# Patient Record
Sex: MALE | Race: WHITE | Employment: OTHER | URBAN - METROPOLITAN AREA
[De-identification: names, ages, dates, MRNs, and addresses within clinical notes are randomized per-mention and may not be internally consistent; named-entity substitution may affect disease eponyms.]

---

## 2017-10-10 ENCOUNTER — HOSPITAL ENCOUNTER (OUTPATIENT)
Dept: GENERAL RADIOLOGY | Age: 62
Discharge: HOME | End: 2017-10-10
Payer: MEDICARE

## 2017-10-10 DIAGNOSIS — R52 PAIN: ICD-10-CM

## 2017-10-10 PROCEDURE — 72100 X-RAY EXAM L-S SPINE 2/3 VWS: CPT

## 2018-04-28 ENCOUNTER — APPOINTMENT (OUTPATIENT)
Dept: GENERAL RADIOLOGY | Age: 63
DRG: 674 | End: 2018-04-28
Attending: EMERGENCY MEDICINE
Payer: MEDICARE

## 2018-04-28 ENCOUNTER — HOSPITAL ENCOUNTER (INPATIENT)
Age: 63
LOS: 16 days | Discharge: HOME | DRG: 674 | End: 2018-05-14
Attending: EMERGENCY MEDICINE | Admitting: HOSPITALIST
Payer: MEDICARE

## 2018-04-28 DIAGNOSIS — Z91.14 NON COMPLIANCE W MEDICATION REGIMEN: ICD-10-CM

## 2018-04-28 DIAGNOSIS — R77.8 ELEVATED TROPONIN: ICD-10-CM

## 2018-04-28 DIAGNOSIS — N17.9 AKI (ACUTE KIDNEY INJURY) (HCC): Primary | ICD-10-CM

## 2018-04-28 PROBLEM — I21.4 NSTEMI (NON-ST ELEVATED MYOCARDIAL INFARCTION) (HCC): Status: ACTIVE | Noted: 2018-04-28

## 2018-04-28 LAB
ALBUMIN SERPL-MCNC: 2.5 G/DL (ref 3.5–5)
ALBUMIN SERPL-MCNC: 2.5 G/DL (ref 3.5–5)
ALBUMIN/GLOB SERPL: 0.7 {RATIO} (ref 1.1–2.2)
ALBUMIN/GLOB SERPL: 0.7 {RATIO} (ref 1.1–2.2)
ALP SERPL-CCNC: 177 U/L (ref 45–117)
ALP SERPL-CCNC: 190 U/L (ref 45–117)
ALT SERPL-CCNC: 12 U/L (ref 12–78)
ALT SERPL-CCNC: 12 U/L (ref 12–78)
ANION GAP SERPL CALC-SCNC: 11 MMOL/L (ref 5–15)
ANION GAP SERPL CALC-SCNC: 12 MMOL/L (ref 5–15)
APTT PPP: 28.8 SEC (ref 22.1–32)
AST SERPL-CCNC: 12 U/L (ref 15–37)
AST SERPL-CCNC: 13 U/L (ref 15–37)
BASOPHILS # BLD: 0 K/UL (ref 0–0.1)
BASOPHILS NFR BLD: 0 % (ref 0–1)
BILIRUB SERPL-MCNC: 0.2 MG/DL (ref 0.2–1)
BILIRUB SERPL-MCNC: 0.3 MG/DL (ref 0.2–1)
BUN SERPL-MCNC: 71 MG/DL (ref 6–20)
BUN SERPL-MCNC: 71 MG/DL (ref 6–20)
BUN/CREAT SERPL: 15 (ref 12–20)
BUN/CREAT SERPL: 16 (ref 12–20)
CALCIUM SERPL-MCNC: 6.9 MG/DL (ref 8.5–10.1)
CALCIUM SERPL-MCNC: 6.9 MG/DL (ref 8.5–10.1)
CHLORIDE SERPL-SCNC: 115 MMOL/L (ref 97–108)
CHLORIDE SERPL-SCNC: 116 MMOL/L (ref 97–108)
CK MB CFR SERPL CALC: 3.7 % (ref 0–2.5)
CK MB SERPL-MCNC: 3.9 NG/ML (ref 5–25)
CK SERPL-CCNC: 105 U/L (ref 39–308)
CO2 SERPL-SCNC: 18 MMOL/L (ref 21–32)
CO2 SERPL-SCNC: 19 MMOL/L (ref 21–32)
CREAT SERPL-MCNC: 4.52 MG/DL (ref 0.7–1.3)
CREAT SERPL-MCNC: 4.66 MG/DL (ref 0.7–1.3)
DIFFERENTIAL METHOD BLD: ABNORMAL
EOSINOPHIL # BLD: 0.1 K/UL (ref 0–0.4)
EOSINOPHIL NFR BLD: 1 % (ref 0–7)
ERYTHROCYTE [DISTWIDTH] IN BLOOD BY AUTOMATED COUNT: 13.3 % (ref 11.5–14.5)
ERYTHROCYTE [DISTWIDTH] IN BLOOD BY AUTOMATED COUNT: 13.5 % (ref 11.5–14.5)
GLOBULIN SER CALC-MCNC: 3.6 G/DL (ref 2–4)
GLOBULIN SER CALC-MCNC: 3.6 G/DL (ref 2–4)
GLUCOSE SERPL-MCNC: 125 MG/DL (ref 65–100)
GLUCOSE SERPL-MCNC: 225 MG/DL (ref 65–100)
HCT VFR BLD AUTO: 30.5 % (ref 36.6–50.3)
HCT VFR BLD AUTO: 31.1 % (ref 36.6–50.3)
HGB BLD-MCNC: 10 G/DL (ref 12.1–17)
HGB BLD-MCNC: 10.3 G/DL (ref 12.1–17)
IMM GRANULOCYTES # BLD: 0 K/UL (ref 0–0.04)
IMM GRANULOCYTES NFR BLD AUTO: 0 % (ref 0–0.5)
INR PPP: 1.1 (ref 0.9–1.1)
LYMPHOCYTES # BLD: 0.8 K/UL (ref 0.8–3.5)
LYMPHOCYTES NFR BLD: 14 % (ref 12–49)
MAGNESIUM SERPL-MCNC: 1.7 MG/DL (ref 1.6–2.4)
MCH RBC QN AUTO: 30.8 PG (ref 26–34)
MCH RBC QN AUTO: 30.9 PG (ref 26–34)
MCHC RBC AUTO-ENTMCNC: 32.8 G/DL (ref 30–36.5)
MCHC RBC AUTO-ENTMCNC: 33.1 G/DL (ref 30–36.5)
MCV RBC AUTO: 93.4 FL (ref 80–99)
MCV RBC AUTO: 93.8 FL (ref 80–99)
MONOCYTES # BLD: 0.5 K/UL (ref 0–1)
MONOCYTES NFR BLD: 8 % (ref 5–13)
NEUTS SEG # BLD: 4.5 K/UL (ref 1.8–8)
NEUTS SEG NFR BLD: 76 % (ref 32–75)
NRBC # BLD: 0 K/UL (ref 0–0.01)
NRBC # BLD: 0 K/UL (ref 0–0.01)
NRBC BLD-RTO: 0 PER 100 WBC
NRBC BLD-RTO: 0 PER 100 WBC
PLATELET # BLD AUTO: 128 K/UL (ref 150–400)
PLATELET # BLD AUTO: 137 K/UL (ref 150–400)
PMV BLD AUTO: 10 FL (ref 8.9–12.9)
PMV BLD AUTO: 10.1 FL (ref 8.9–12.9)
POTASSIUM SERPL-SCNC: 3.5 MMOL/L (ref 3.5–5.1)
POTASSIUM SERPL-SCNC: 3.7 MMOL/L (ref 3.5–5.1)
PROT SERPL-MCNC: 6.1 G/DL (ref 6.4–8.2)
PROT SERPL-MCNC: 6.1 G/DL (ref 6.4–8.2)
PROTHROMBIN TIME: 10.9 SEC (ref 9–11.1)
RBC # BLD AUTO: 3.25 M/UL (ref 4.1–5.7)
RBC # BLD AUTO: 3.33 M/UL (ref 4.1–5.7)
SODIUM SERPL-SCNC: 145 MMOL/L (ref 136–145)
SODIUM SERPL-SCNC: 146 MMOL/L (ref 136–145)
THERAPEUTIC RANGE,PTTT: NORMAL SECS (ref 58–77)
TROPONIN I SERPL-MCNC: 0.09 NG/ML
TROPONIN I SERPL-MCNC: 0.12 NG/ML
TROPONIN I SERPL-MCNC: 0.16 NG/ML
WBC # BLD AUTO: 5.9 K/UL (ref 4.1–11.1)
WBC # BLD AUTO: 7.6 K/UL (ref 4.1–11.1)

## 2018-04-28 PROCEDURE — 74011250637 HC RX REV CODE- 250/637: Performed by: EMERGENCY MEDICINE

## 2018-04-28 PROCEDURE — 74011250637 HC RX REV CODE- 250/637: Performed by: HOSPITALIST

## 2018-04-28 PROCEDURE — 84484 ASSAY OF TROPONIN QUANT: CPT | Performed by: EMERGENCY MEDICINE

## 2018-04-28 PROCEDURE — 71045 X-RAY EXAM CHEST 1 VIEW: CPT

## 2018-04-28 PROCEDURE — 74011250636 HC RX REV CODE- 250/636: Performed by: HOSPITALIST

## 2018-04-28 PROCEDURE — 85610 PROTHROMBIN TIME: CPT | Performed by: EMERGENCY MEDICINE

## 2018-04-28 PROCEDURE — 85025 COMPLETE CBC W/AUTO DIFF WBC: CPT | Performed by: EMERGENCY MEDICINE

## 2018-04-28 PROCEDURE — 85027 COMPLETE CBC AUTOMATED: CPT

## 2018-04-28 PROCEDURE — 80053 COMPREHEN METABOLIC PANEL: CPT | Performed by: EMERGENCY MEDICINE

## 2018-04-28 PROCEDURE — 82550 ASSAY OF CK (CPK): CPT | Performed by: EMERGENCY MEDICINE

## 2018-04-28 PROCEDURE — 94762 N-INVAS EAR/PLS OXIMTRY CONT: CPT

## 2018-04-28 PROCEDURE — 83036 HEMOGLOBIN GLYCOSYLATED A1C: CPT

## 2018-04-28 PROCEDURE — 96374 THER/PROPH/DIAG INJ IV PUSH: CPT

## 2018-04-28 PROCEDURE — 65660000000 HC RM CCU STEPDOWN

## 2018-04-28 PROCEDURE — 80053 COMPREHEN METABOLIC PANEL: CPT

## 2018-04-28 PROCEDURE — 36415 COLL VENOUS BLD VENIPUNCTURE: CPT | Performed by: EMERGENCY MEDICINE

## 2018-04-28 PROCEDURE — 85730 THROMBOPLASTIN TIME PARTIAL: CPT

## 2018-04-28 PROCEDURE — 99285 EMERGENCY DEPT VISIT HI MDM: CPT

## 2018-04-28 PROCEDURE — 83735 ASSAY OF MAGNESIUM: CPT | Performed by: EMERGENCY MEDICINE

## 2018-04-28 PROCEDURE — 74011250636 HC RX REV CODE- 250/636: Performed by: EMERGENCY MEDICINE

## 2018-04-28 PROCEDURE — 93005 ELECTROCARDIOGRAM TRACING: CPT

## 2018-04-28 RX ORDER — MAGNESIUM SULFATE 100 %
4 CRYSTALS MISCELLANEOUS AS NEEDED
Status: DISCONTINUED | OUTPATIENT
Start: 2018-04-28 | End: 2018-05-14 | Stop reason: HOSPADM

## 2018-04-28 RX ORDER — CLOPIDOGREL BISULFATE 75 MG/1
300 TABLET ORAL ONCE
Status: COMPLETED | OUTPATIENT
Start: 2018-04-28 | End: 2018-04-28

## 2018-04-28 RX ORDER — INSULIN LISPRO 100 [IU]/ML
INJECTION, SOLUTION INTRAVENOUS; SUBCUTANEOUS EVERY 6 HOURS
Status: DISCONTINUED | OUTPATIENT
Start: 2018-04-29 | End: 2018-05-02

## 2018-04-28 RX ORDER — OXYCODONE AND ACETAMINOPHEN 5; 325 MG/1; MG/1
1 TABLET ORAL
Status: DISCONTINUED | OUTPATIENT
Start: 2018-04-28 | End: 2018-05-14 | Stop reason: HOSPADM

## 2018-04-28 RX ORDER — ONDANSETRON 2 MG/ML
4 INJECTION INTRAMUSCULAR; INTRAVENOUS
Status: DISCONTINUED | OUTPATIENT
Start: 2018-04-28 | End: 2018-05-14 | Stop reason: HOSPADM

## 2018-04-28 RX ORDER — HYDRALAZINE HYDROCHLORIDE 20 MG/ML
10 INJECTION INTRAMUSCULAR; INTRAVENOUS
Status: DISCONTINUED | OUTPATIENT
Start: 2018-04-28 | End: 2018-05-14 | Stop reason: HOSPADM

## 2018-04-28 RX ORDER — SODIUM CHLORIDE 0.9 % (FLUSH) 0.9 %
5-10 SYRINGE (ML) INJECTION AS NEEDED
Status: DISCONTINUED | OUTPATIENT
Start: 2018-04-28 | End: 2018-04-29 | Stop reason: SDUPTHER

## 2018-04-28 RX ORDER — HEPARIN SODIUM 10000 [USP'U]/100ML
12-25 INJECTION, SOLUTION INTRAVENOUS
Status: DISCONTINUED | OUTPATIENT
Start: 2018-04-28 | End: 2018-05-01

## 2018-04-28 RX ORDER — HYDRALAZINE HYDROCHLORIDE 20 MG/ML
10 INJECTION INTRAMUSCULAR; INTRAVENOUS
Status: COMPLETED | OUTPATIENT
Start: 2018-04-28 | End: 2018-04-28

## 2018-04-28 RX ORDER — NITROGLYCERIN 0.4 MG/1
0.4 TABLET SUBLINGUAL
Status: DISCONTINUED | OUTPATIENT
Start: 2018-04-28 | End: 2018-05-14 | Stop reason: HOSPADM

## 2018-04-28 RX ORDER — FAMOTIDINE 10 MG/ML
20 INJECTION INTRAVENOUS EVERY 12 HOURS
Status: DISCONTINUED | OUTPATIENT
Start: 2018-04-28 | End: 2018-04-28

## 2018-04-28 RX ORDER — SODIUM CHLORIDE 0.9 % (FLUSH) 0.9 %
5-10 SYRINGE (ML) INJECTION EVERY 8 HOURS
Status: DISCONTINUED | OUTPATIENT
Start: 2018-04-28 | End: 2018-04-29 | Stop reason: SDUPTHER

## 2018-04-28 RX ORDER — SODIUM CHLORIDE 0.9 % (FLUSH) 0.9 %
5-10 SYRINGE (ML) INJECTION EVERY 8 HOURS
Status: DISCONTINUED | OUTPATIENT
Start: 2018-04-28 | End: 2018-05-08 | Stop reason: SDUPTHER

## 2018-04-28 RX ORDER — HEPARIN SODIUM 5000 [USP'U]/ML
60 INJECTION, SOLUTION INTRAVENOUS; SUBCUTANEOUS ONCE
Status: COMPLETED | OUTPATIENT
Start: 2018-04-28 | End: 2018-04-28

## 2018-04-28 RX ORDER — CLOPIDOGREL BISULFATE 75 MG/1
75 TABLET ORAL DAILY
Status: DISCONTINUED | OUTPATIENT
Start: 2018-04-29 | End: 2018-05-14 | Stop reason: HOSPADM

## 2018-04-28 RX ORDER — NITROGLYCERIN 0.4 MG/1
0.4 TABLET SUBLINGUAL
Status: DISCONTINUED | OUTPATIENT
Start: 2018-04-28 | End: 2018-04-28 | Stop reason: SDUPTHER

## 2018-04-28 RX ORDER — GUAIFENESIN 100 MG/5ML
81 LIQUID (ML) ORAL DAILY
Status: DISCONTINUED | OUTPATIENT
Start: 2018-04-29 | End: 2018-05-14 | Stop reason: HOSPADM

## 2018-04-28 RX ORDER — NITROGLYCERIN 0.4 MG/1
0.4 TABLET SUBLINGUAL
Status: COMPLETED | OUTPATIENT
Start: 2018-04-28 | End: 2018-04-28

## 2018-04-28 RX ORDER — DEXTROSE 50 % IN WATER (D50W) INTRAVENOUS SYRINGE
12.5-25 AS NEEDED
Status: DISCONTINUED | OUTPATIENT
Start: 2018-04-28 | End: 2018-05-14 | Stop reason: HOSPADM

## 2018-04-28 RX ORDER — ACETAMINOPHEN 325 MG/1
650 TABLET ORAL
Status: DISCONTINUED | OUTPATIENT
Start: 2018-04-28 | End: 2018-05-14 | Stop reason: HOSPADM

## 2018-04-28 RX ORDER — HEPARIN SODIUM 5000 [USP'U]/ML
30 INJECTION, SOLUTION INTRAVENOUS; SUBCUTANEOUS AS NEEDED
Status: DISCONTINUED | OUTPATIENT
Start: 2018-04-29 | End: 2018-05-01

## 2018-04-28 RX ORDER — SODIUM CHLORIDE 9 MG/ML
100 INJECTION, SOLUTION INTRAVENOUS CONTINUOUS
Status: DISCONTINUED | OUTPATIENT
Start: 2018-04-28 | End: 2018-04-29

## 2018-04-28 RX ORDER — FAMOTIDINE 20 MG/50ML
20 INJECTION, SOLUTION INTRAVENOUS EVERY 12 HOURS
Status: DISCONTINUED | OUTPATIENT
Start: 2018-04-28 | End: 2018-04-29

## 2018-04-28 RX ORDER — HEPARIN SODIUM 5000 [USP'U]/ML
60 INJECTION, SOLUTION INTRAVENOUS; SUBCUTANEOUS AS NEEDED
Status: DISCONTINUED | OUTPATIENT
Start: 2018-04-29 | End: 2018-05-01

## 2018-04-28 RX ORDER — METOPROLOL TARTRATE 5 MG/5ML
2.5 INJECTION INTRAVENOUS EVERY 6 HOURS
Status: DISCONTINUED | OUTPATIENT
Start: 2018-04-29 | End: 2018-04-28

## 2018-04-28 RX ORDER — MORPHINE SULFATE 4 MG/ML
4 INJECTION INTRAVENOUS
Status: DISCONTINUED | OUTPATIENT
Start: 2018-04-28 | End: 2018-04-29

## 2018-04-28 RX ORDER — ATORVASTATIN CALCIUM 40 MG/1
80 TABLET, FILM COATED ORAL EVERY EVENING
Status: DISCONTINUED | OUTPATIENT
Start: 2018-04-29 | End: 2018-05-14 | Stop reason: HOSPADM

## 2018-04-28 RX ORDER — SODIUM CHLORIDE 0.9 % (FLUSH) 0.9 %
5-10 SYRINGE (ML) INJECTION AS NEEDED
Status: DISCONTINUED | OUTPATIENT
Start: 2018-04-28 | End: 2018-05-08 | Stop reason: SDUPTHER

## 2018-04-28 RX ORDER — METOPROLOL TARTRATE 25 MG/1
25 TABLET, FILM COATED ORAL EVERY 6 HOURS
Status: DISCONTINUED | OUTPATIENT
Start: 2018-04-28 | End: 2018-05-14 | Stop reason: HOSPADM

## 2018-04-28 RX ORDER — BISACODYL 5 MG
5 TABLET, DELAYED RELEASE (ENTERIC COATED) ORAL DAILY PRN
Status: DISCONTINUED | OUTPATIENT
Start: 2018-04-28 | End: 2018-05-14 | Stop reason: HOSPADM

## 2018-04-28 RX ORDER — DIPHENHYDRAMINE HYDROCHLORIDE 50 MG/ML
12.5 INJECTION, SOLUTION INTRAMUSCULAR; INTRAVENOUS
Status: DISCONTINUED | OUTPATIENT
Start: 2018-04-28 | End: 2018-05-14 | Stop reason: HOSPADM

## 2018-04-28 RX ORDER — NALOXONE HYDROCHLORIDE 0.4 MG/ML
0.4 INJECTION, SOLUTION INTRAMUSCULAR; INTRAVENOUS; SUBCUTANEOUS AS NEEDED
Status: DISCONTINUED | OUTPATIENT
Start: 2018-04-28 | End: 2018-05-14 | Stop reason: HOSPADM

## 2018-04-28 RX ADMIN — HEPARIN SODIUM 3950 UNITS: 5000 INJECTION, SOLUTION INTRAVENOUS; SUBCUTANEOUS at 23:56

## 2018-04-28 RX ADMIN — HEPARIN SODIUM AND DEXTROSE 12 UNITS/KG/HR: 10000; 5 INJECTION INTRAVENOUS at 23:56

## 2018-04-28 RX ADMIN — Medication 10 ML: at 22:42

## 2018-04-28 RX ADMIN — METOPROLOL TARTRATE 25 MG: 25 TABLET ORAL at 23:25

## 2018-04-28 RX ADMIN — HYDRALAZINE HYDROCHLORIDE 10 MG: 20 INJECTION INTRAMUSCULAR; INTRAVENOUS at 16:49

## 2018-04-28 RX ADMIN — OXYCODONE HYDROCHLORIDE AND ACETAMINOPHEN 1 TABLET: 5; 325 TABLET ORAL at 22:41

## 2018-04-28 RX ADMIN — SODIUM CHLORIDE 100 ML/HR: 900 INJECTION, SOLUTION INTRAVENOUS at 22:43

## 2018-04-28 RX ADMIN — NITROGLYCERIN 1 INCH: 20 OINTMENT TOPICAL at 23:12

## 2018-04-28 RX ADMIN — CLOPIDOGREL BISULFATE 300 MG: 75 TABLET ORAL at 23:24

## 2018-04-28 RX ADMIN — NITROGLYCERIN 0.4 MG: 0.4 TABLET SUBLINGUAL at 19:13

## 2018-04-28 RX ADMIN — MORPHINE SULFATE 4 MG: 4 INJECTION INTRAVENOUS at 23:29

## 2018-04-28 RX ADMIN — NITROGLYCERIN 0.4 MG: 0.4 TABLET SUBLINGUAL at 21:37

## 2018-04-28 NOTE — ED NOTES
Pt updated on plan of care, pt resting in bed in position of comfort, family bedside, call bell within reach.

## 2018-04-28 NOTE — ED NOTES
Pt presents today via ems with c/o 8/10 chest pain, sob and nausea since 1300 while sitting outside smoking. Pt denies sob at this time. Pt lives with his sister and she gave pt 325 of aspirin and was given 1 nitro by ems, pain now 3/10. Has h/o MI x 3. . Checked himself out of nursing home [address]  In March and stopped all medications in February.

## 2018-04-28 NOTE — ED NOTES
Bedside and Verbal shift change report given to [facility] (oncoming nurse) by Kayla Delgado (offgoing nurse). Report included the following information SBAR, ED Summary, Intake/Output, MAR and Recent Results. Pt on monitor x 3. Call bell in reach. Family at bedside.

## 2018-04-28 NOTE — IP AVS SNAPSHOT
[address] [facility] 
[phone] Patient: Poonam Duong 
MRN: [id number] LNW:[id number] You are allergic to the following Allergen Reactions Nubain (Nalbuphine) Other (comments) Made me wild; Dysphoria Recent Documentation Height Weight BMI Smoking Status 1.753 m 68.9 kg 22.45 kg/m2 Former Smoker Emergency Contacts  (Rel.) Home Phone Work Phone Mobile Phone Sherene Barthel [phone] -- [phone] Paul Malhotra [phone] -- [phone] Sonali Monzon (Other Relative) [phone] -- -- About your hospitalization You were admitted on:  April 28, 2018 You last received care in the:  [address] You were discharged on:  May 14, 2018 Why you were hospitalized Your primary diagnosis was:  Not on File Your diagnoses also included:  Arf (Acute Renal Failure) (LTAC, located within [facility]), Nstemi (Non-St Elevated Myocardial Infarction) (Hcc) Providers Seen During Your Hospitalization Provider Specialty Primary office phone Rocío Brown DO Emergency Medicine [phone] Nazanin Mcrae MD Internal Medicine [phone] Vince Coe MD Internal Medicine [phone] Flaco Proctor MD Hospitalist [phone] Ebenezer Christy MD Hospitalist [phone] Domo Wilkerson MD Internal Medicine [phone] Shirin Brady MD Internal Medicine [phone] Your Primary Care Physician (PCP) Primary Care Physician Office Phone Office Fax OTHER, PHYS ** None ** ** None ** Follow-up Information Follow up With Details Comments Contact Info Mellissa Magdaleno III, DO Go on 5/17/2018 Hospital follow-up scheduled at 10:30am (If you have questions or need to reschedule please call [phone] [address] [place] [address] 
[phone] On 5/17/2018 Hospital follow-up scheduled at time (If you have questions or need to reschedule please call Phys Ines, MD   Patient can only remember the practice name and not the physician RAVINDER GANT Go on 5/16/2018 Go to dialysis on Wednesday - 5/16/2018 at 11:35AM.  You will need to complete paperwork on arrival and start dialysis at 12:15PM. Jackie [address] [facility] 
[phone] Jhonathan Alicea MD Go on 5/21/2018 For new patient appointment at 11:00AM  [address] [facility] [address] [facility] 
[phone] Appointments for Next 14 days 5/21/2018 11:00 AM  NEW PATIENT [facility] at [address] My Medications TAKE these medications as instructed Instructions Each Dose to Equal  
 Morning Noon Evening Bedtime  
 aspirin 81 mg chewable tablet Start taking on:  5/15/2018 Your last dose was: Your next dose is: Take 1 Tab by mouth daily. Indications: myocardial infarction prevention 81 mg  
    
   
   
   
  
 clopidogrel 75 mg Tab Commonly known as:  PLAVIX Start taking on:  5/15/2018 Your last dose was: Your next dose is: Take 1 Tab by mouth daily. 75 mg  
    
   
   
   
  
 diazePAM 10 mg tablet Commonly known as:  VALIUM Your last dose was: Your next dose is: Take 0.5 Tabs by mouth every twelve (12) hours. Max Daily Amount: 10 mg.  
 5 mg  
    
   
   
   
  
 doxazosin 2 mg tablet Commonly known as:  CARDURA Your last dose was: Your next dose is: Take 1 Tab by mouth nightly. 2 mg  
    
   
   
   
  
 epoetin calos 10,000 unit/mL injection Commonly known as:  EPOGEN;PROCRIT Your last dose was: Your next dose is:    
   
   
 10,000 Units by SubCUTAneous route every thirty (30) days. 89625 Units ergocalciferol 50,000 unit capsule Commonly known as:  VITAMIN D2 Your last dose was: Your next dose is: Take 1 Cap by mouth every seven (7) days. [address]210 Units  
    
   
   
   
  
 fenofibrate micronized 200 mg capsule Commonly known as:  LOFIBRA Your last dose was: Your next dose is: Take 1 Cap by mouth every morning. 200 mg  
    
   
   
   
  
 ferrous gluconate 324 mg (37.5 mg iron) tablet Your last dose was: Your next dose is: Take 1 Tab by mouth three (3) times daily (with meals). 324 mg  
    
   
   
   
  
 finasteride 5 mg tablet Commonly known as:  PROSCAR Your last dose was: Your next dose is: Take 1 Tab by mouth daily. 5 mg  
    
   
   
   
  
 food supplemt, lactose-reduced Liqd Commonly known as:  [facility] Your last dose was: Your next dose is: Take 237 mL by mouth four (4) times daily. 237 mL  
    
   
   
   
  
 gabapentin 400 mg capsule Commonly known as:  NEURONTIN Your last dose was: Your next dose is: Take 1 Cap by mouth three (3) times daily. 400 mg  
    
   
   
   
  
 hydrALAZINE 50 mg tablet Commonly known as:  APRESOLINE Your last dose was: Your next dose is: Take 1 Tab by mouth every eight (8) hours. Indications: hypertension 50 mg  
    
   
   
   
  
 isosorbide mononitrate ER 30 mg tablet Commonly known as:  IMDUR Your last dose was: Your next dose is: Take 1 Tab by mouth daily. 30 mg  
    
   
   
   
  
 methadone 10 mg tablet Commonly known as:  DOLOPHINE Your last dose was: Your next dose is: Take 1 Tab by mouth two (2) times daily (with meals). Max Daily Amount: 60 mg. One tab in am, 2 tab at lunch, 1 at dinner and 2 at bed  10 mg  
    
   
   
   
  
 metoprolol tartrate 25 mg tablet Commonly known as:  LOPRESSOR Your last dose was: Your next dose is: Take 1 Tab by mouth two (2) times a day. 25 mg  
    
   
   
   
  
 neomycin-bacitracin-polymyxin 3.5mg-400 unit- 5,000 unit/gram ointment Commonly known as:  NEOSPORIN Your last dose was: Your next dose is:    
   
   
 Apply  to affected area daily. nitroglycerin 0.4 mg SL tablet Commonly known as:  NITROSTAT Your last dose was: Your next dose is:    
   
   
 1 Tab by SubLINGual route every five (5) minutes as needed for Chest Pain. 0.4 mg  
    
   
   
   
  
 omeprazole 40 mg capsule Commonly known as:  PRILOSEC Your last dose was: Your next dose is: Take 1 Cap by mouth two (2) times a day. 40 mg  
    
   
   
   
  
 pioglitazone 15 mg tablet Commonly known as:  ACTOS Your last dose was: Your next dose is: Take 1 Tab by mouth daily. 15 mg  
    
   
   
   
  
 pravastatin 80 mg tablet Commonly known as:  PRAVACHOL Your last dose was: Your next dose is: Take 1 Tab by mouth nightly. 80 mg  
    
   
   
   
  
 traZODone 150 mg tablet Commonly known as:   Crosby Your last dose was: Your next dose is: Take 1 Tab by mouth nightly. 150 mg  
    
   
   
   
  
 venlafaxine 100 mg tablet Commonly known as:  [facility] Your last dose was: Your next dose is: Take 1 Tab by mouth two (2) times a day. 100 mg Where to Get Your Medications These medications were sent to [address]  [address], [address] [place] [address] Phone:  [phone]  
  hydrALAZINE 50 mg tablet Information on where to get these meds will be given to you by the nurse or doctor. ! Ask your nurse or doctor about these medications  
  aspirin 81 mg chewable tablet  
 clopidogrel 75 mg Tab Discharge Instructions [address] [facility], [address]   [phone] [address]    www.[employer] Patient Discharge Instructions Angelina Jesus / [id number] : 1955 Admitted 2018 Discharged: 2018 · It is important that you take the medication exactly as they are prescribed. · Keep your medication in the bottles provided by the pharmacist and keep a list of the medication names, dosages, and times to be taken in your wallet. · Do not take other medications without consulting your doctor. BRING ALL OF YOUR MEDICINES TO YOUR OFFICE VISIT with Fide Mcginnis III, DO. Follow-up with Fide Mcginnis III, DO in 2 weeks. Cardiac Catheterization  Discharge Instructions Transradial Catheterization Discharge Instructions (WRIST) Discharge instructions: Your radial artery in your wrist was used for your cardiac catheterization. This site may be slightly bruised and sore following your procedure. Expect mild tingling or the hand and tenderness at the puncture site for up to 3 days. Excess movement of the wrist used should be avoided for the next 24-48 hours. 1. No lifting over 2 pounds (approximately a ½ gallon of milk) with this arm for 24 hours. 2. Keep the site of the procedure covered with a bandage for 24 hours. 3. You may shower the day after your procedure. Do not take a tub bath or submerge the puncture site in water for 48 hours. 4. No heavy impact activity/lifting > 30 pounds for 1 week. If bleeding of the wrist occurs at home: If the site on your wrist where you had the catheterization procedure begins to bleed, do not panic.   
1. Place 1 or 2 fingers over the puncture site and hold pressure to stop the bleeding. You may be able to feel your pulse as you hold pressure. 2. Lift your fingers after 5 minutes to see if the bleeding has stopped. 3. Once the bleeding has stopped, gently wipe the wrist area clean and cover with a bandage. If the bleeding from your wrist does not stop after 15 minutes, or if there is a large amount of bleeding or spurting, call 911 immediately (do not drive yourself to the hospital). Other concerns: The site may be slightly bruised and sore following your procedure. Should any of the following occur, contact your physician immediately: 1. Any cool or coldness of the arm, discoloration over a large area, ongoing numbness or any abnormal sensations , moderate to severe pain or swelling in the arm. 2. Redness, soreness, swelling, chills or fever, or colored drainage at the procedure site within 3-7 days after your procedure. If you have any further questions or concerns regarding your procedure please call the Cardiac Cath Lab office at [phone]. During regular business hours ask to speak to Dr. Yuliya Viramontes. During non-business hours the answering service will answer. Ask to speak to the physician on call for [facility].  
 
Transfemoral Catheterization Discharge Instructions (GROIN) ? Do not drive, operate any machinery, or sign any legal documents for 24 hours after your procedure. You must have someone to drive you home. ? You may take a shower 24 hours after your cardiac catheterization. Be sure to get the dressing wet and then remove it; gently wash the area with warm soapy water. Pat dry and leave open to air. To help prevent infections, be sure to keep the cath site clean and dry. No lotions, creams, powders, ointments, etc. in the cath site for approximately 1 week. ? Do not take a tub bath, get in a hot tub or swimming pool for approximately 5 days or until the cath site is completely healed. ? No strenuous activity or heavy lifting over 10 lbs. for 7 days. ? Drink plenty of fluids for 24-48 hours after your cath to flush the contrast dye from your kidneys. No alcoholic beverages for 24 hours. You may resume your previous diet (low fat, low cholesterol) after your cath. ? After your cath, some bruising or discomfort is common during the healing process. Tylenol, 1-2 tablets every 6 hours as needed, is recommended if you experience any discomfort. If you experience any signs or symptoms of infection such as fever, chills, or poorly healing incision, persistent tenderness or swelling in the groin, redness and/or warmth to the touch, numbness, significant tingling or pain at the groin site or affected extremity, rash, drainage from the cath site, or if the leg feels tight or swollen, call your physician right away. ? If bleeding at the cath site occurs, take a clean gauze pad and apply direct pressure to the groin just above the puncture site. Call 911 immediately, and continue to apply direct pressure until an ambulance gets to your location. ? You may return to work  2  days after your cardiac cath if no groin bleeding. Information obtained by : 
I understand that if any problems occur once I am at home I am to contact my physician. I understand and acknowledge receipt of the instructions indicated above. R.N.'s Signature                                                                  Date/Time Patient or Representative Signature                                                          Date/Time Cliff Jameson [facility], [address] [facility] [address] [facility], [address]    [phone] [address]    [address]    [phone] [address]    www.[employer] Patient Discharge Instructions Deneise Mortimer / [id number] : 1955 Admitted 2018 Discharged: 2018 · It is important that you take the medication exactly as they are prescribed. · Keep your medication in the bottles provided by the pharmacist and keep a list of the medication names, dosages, and times to be taken in your wallet. · Do not take other medications without consulting your doctor. BRING ALL OF YOUR MEDICINES TO YOUR OFFICE VISIT. Follow-up with Janna Zuluaga III, DO in 2 weeks. Follow-up with your primary care physician in one week. Heart Attack: After Your Hospitalization Your Care Instructions A heart attack (myocardial infarction, or MI) occurs when one or more of the coronary arteries, which supply the heart with oxygen-rich blood, is blocked. A blockage usually occurs when plaque inside the artery breaks open and a blood clot forms in the artery. After a heart attack, you may be worried about your future. Over the next several weeks, your heart will start to heal. Although it is sometimes hard to break old habits, you can prevent another heart attack by making some lifestyle changes and by taking medicines. You may use the following information for ideas about what to do at home to speed your recovery. Follow-up care is a key part of your treatment and safety. Be sure to make and go to all appointments, and call your doctor if you are having problems. It is also a good idea to keep a list of the medicines you take, including dose. How can you care for yourself at home? Activity Increase your activities slowly. Take short rest breaks when you get tired. As you are able, get more exercise. Walking is a good choice.  Bit by bit, increase the amount you walk every day. Try for at least 30 minutes on most days of the week. You also may want to swim, bike, or do other activities. Cardiac rehabilitation (rehab) program is strongly recommended. Cardiac rehab includes supervised exercise, help with diet and lifestyle changes, and emotional support. It may reduce your risk of future heart problems. Do not drive until your doctor says you can. You can have sex when you are strong enough. This usually means when you can easily walk around or climb stairs. Talk with your doctor if you have any concerns. Do not take sildenafil citrate (Viagra), tadalafil (Cialis), or vardenafil (Levitra) if you are taking nitroglycerin. Lifestyle changes Do not smoke. Smoking increases your risk of another heart attack. If you need help quitting, talk to your doctor about stop-smoking programs and medicines. These can increase your chances of quitting for good. Eat a heart-healthy diet that is low in cholesterol, saturated fat, and salt, and is full of fruits, vegetables and whole-grains. Eat at least two servings of fish each week. You may get more details about how to eat healthy, but these tips can help you get started. Our website has more information:  www.[email or web address]. Bestowed Avoid colds and flu. Get a pneumococcal vaccine shot. If you have had one before, ask your primary care doctor whether you need a second dose. Get a flu shot every fall. If you must be around people with colds or flu, wash your hands often. Medicines Take your medicines exactly as prescribed. Call your doctor if you think you are having a problem with your medicine. You may need several medicines. Angiotensin-converting enzyme (ACE) inhibitors, beta-blockers, and statins can help prevent another heart attack. ACE inhibitors control your blood pressure, and statins help lower cholesterol. Aspirin and other blood thinners help prevent blood clots.  Blood clots can cause a stroke or heart attack. If Plavix is prescribed, you must take it to prevent your stent from clotting. You will likely need the plavix for at least 1 to 2 years. If your doctor has given you nitroglycerin, keep it with you at all times. If you have chest pain, sit down and rest, and take the first dose of nitroglycerin if the discomfort does not resolve. If chest pain gets worse or is not getting better within 5 minutes, call 911 immediately. Stay on the phone with the emergency ; he or she will give you further instructions. Be sure to tell your doctor about any chest pain you have had, even if it went away. Do not take any over-the-counter medicines, vitamins, or herbal products without talking to your doctor first. 
 
Mental health Talk to your family, friends, or a counselor about your feelings. It is normal to feel frightened, angry, hopeless, helpless, and even guilty. Talking openly about bad feelings can help you cope. If the blues last, talk to your primary care doctor. When should you call for help? Call 911 anytime you think you may need emergency care. For example, call if: 
You have signs of a heart attack. These may include: 
Chest pain or pressure. Sweating. Shortness of breath. Nausea or vomiting. Pain that spreads from the chest to the neck, jaw, or one or both shoulders or arms. Dizziness or lightheadedness. A fast or irregular pulse. After calling 911, chew 1 adult-strength aspirin. Wait for an ambulance. Do not try to drive yourself. You passed out (lost consciousness). You feel like you are having another heart attack. Call your doctor now or seek immediate medical care if: 
You have had any chest pain, even if it has gone away. You have new or increased shortness of breath. You are dizzy or lightheaded, or you feel like you may faint.  
 
Watch closely for changes in your health, and be sure to contact your doctor if you have any problems, including gaining 5 pounds or more. Cardiac Catheterization  Discharge Instructions ? You may take a shower. Be sure to get the dressing wet and then remove it; gently wash the area with warm soapy water. Pat dry and leave open to air. To help prevent infections, be sure to keep the cath site clean and dry. No lotions, creams, powders, ointments, etc. in the cath site for approximately 1 week. ? Do not take a tub bath, get in a hot tub or swimming pool for approximately 5 days or until the cath site is completely healed. ? No strenuous activity or heavy lifting over 10 lbs. for 7 days. ? After your cath, some bruising or discomfort is common during the healing process. Tylenol, 1-2 tablets every 6 hours as needed, is recommended if you experience any discomfort. If you experience any signs or symptoms of infection such as fever, chills, or poorly healing incision, persistent tenderness or swelling in the groin, redness and/or warmth to the touch, numbness, significant tingling or pain at the groin site or affected extremity, rash, drainage from the cath site, or if the leg feels tight or swollen, call your physician right away. ? If bleeding at the cath site occurs, take a clean gauze pad and apply direct pressure to the groin just above the puncture site. Call 911 immediately, and continue to apply direct pressure until an ambulance gets to your location. Information obtained by : 
I understand that if any problems occur once I am at home I am to contact my physician. I understand and acknowledge receipt of the instructions indicated above. R.N.'s Signature                                                                  Date/Time Patient or Representative Signature                                                          Date/Time [facility], DO Where can you learn more? Go to [email or web address] [address] [facility], [address]   [phone] [facility] [address] [facility]    www.[employer] Discharge Orders None MyChart Announcement We are excited to announce that we are making your provider's discharge notes available to you in ScienceLogic. You will see these notes when they are completed and signed by the physician that discharged you from your recent hospital stay. If you have any questions or concerns about any information you see in ScienceLogic, please call the Health Information Department where you were seen or reach out to your Primary Care Provider for more information about your plan of care. Introducing [facility]! [address] [facility] introduces ScienceLogic patient portal. Now you can access parts of your medical record, email your doctor's office, and request medication refills online. 1. In your internet browser, go to [email or web address] 2. Click on the First Time User? Click Here link in the Sign In box. You will see the New Member Sign Up page. 3. Enter your ScienceLogic Access Code exactly as it appears below. You will not need to use this code after youve completed the sign-up process. If you do not sign up before the expiration date, you must request a new code. · ScienceLogic Access Code: [email or web address] Expires: 7/27/2018  2:30 PM 
 
4. Enter the last four digits of your Social Security Number (xxxx) and Date of Birth (mm/dd/yyyy) as indicated and click Submit. You will be taken to the next sign-up page. 5. Create a ScienceLogic ID. This will be your ScienceLogic login ID and cannot be changed, so think of one that is secure and easy to remember. 6. Create a Motilo password. You can change your password at any time. 7. Enter your Password Reset Question and Answer. This can be used at a later time if you forget your password. 8. Enter your e-mail address. You will receive e-mail notification when new information is available in [address] 9. Click Sign Up. You can now view and download portions of your medical record. 10. Click the Download Summary menu link to download a portable copy of your medical information. If you have questions, please visit the Frequently Asked Questions section of the Motilo website. Remember, [employer]ilo is NOT to be used for urgent needs. For medical emergencies, dial 911. Now available from your iPhone and Android! General Information Please provide this summary of care documentation to your next provider. Patient Signature:  ____________________________________________________________ Date:  ____________________________________________________________  
  
Gilmer Santiago Provider Signature:  ____________________________________________________________ Date:  ____________________________________________________________

## 2018-04-28 NOTE — ED PROVIDER NOTES
EMERGENCY DEPARTMENT HISTORY AND PHYSICAL EXAM      Date: 4/28/2018  Patient Name: Andreia Cui Sr    History of Presenting Illness     Chief Complaint   Patient presents with    Chest Pain       History Provided By: Patient    HPI: Luis Felipe Bergeron, 58 y.o. male with PMHx significant for MI, CAD, CKD, DM, hypercholesteremia, HTN, GERD, and anemia, presents by EMS to the ED with cc of 4/10, mid-sternal chest pain that radiates into his left arm since 1:00 PM this afternoon. Pt endorses associated nausea. Pt notes that he has a hx of similar symptoms secondary to an MI. Pt explains that he was standing outside earlier this afternoon when he had an acute onset of chest pain followed by left arm pain and nausea. Pt notes that he has a Hx of 3 prior MIs for which he had a coronary stent placed. Pt states that his last stent was placed several years ago. Pt reports that he was on ASA, gabapentin, and other heart regulating medication 2 months ago, but he notes that he stopped taking the medication because it \"made him feel funny\". Pt states that he is currently managed by [facility], but he has not been evaluated by a Cardiologist in several years. Pt is also c/o of generalized pain to his bilateral legs, arms, and face. Pt endorses a social hx of tobacco use. Per EMS, the pt was given 325 mg of ASA and 1 NTG tablet en route to the ED. There are no other complaints, changes, or physical findings at this time. PCP: Mita Chacon MD    Current Facility-Administered Medications   Medication Dose Route Frequency Provider Last Rate Last Dose    sodium chloride (NS) flush 5-10 mL  5-10 mL IntraVENous Q8H Katelynn Wallace MD        sodium chloride (NS) flush 5-10 mL  5-10 mL IntraVENous PRN Katelynn Wallace MD         Current Outpatient Prescriptions   Medication Sig Dispense Refill    methadone (DOLOPHINE) 10 mg tablet Take 1 Tab by mouth two (2) times daily (with meals).  Max Daily Amount: 60 mg. One tab in am, 2 tab at lunch, 1 at dinner and 2 at bed 180 Tab 0    isosorbide mononitrate ER (IMDUR) 30 mg tablet Take 1 Tab by mouth daily. 30 Tab 0    ferrous gluconate 324 mg (37.5 mg iron) tablet Take 1 Tab by mouth three (3) times daily (with meals). 90 Tab 1    omeprazole (PRILOSEC) 40 mg capsule Take 1 Cap by mouth two (2) times a day. 60 Cap 0    metoprolol (LOPRESSOR) 25 mg tablet Take 1 Tab by mouth two (2) times a day. 30 Tab 1    venlafaxine (EFFEXOR) 100 mg tablet Take 1 Tab by mouth two (2) times a day. 60 Tab 3    gabapentin (NEURONTIN) 400 mg capsule Take 1 Cap by mouth three (3) times daily. 90 Cap 1    pravastatin (PRAVACHOL) 80 mg tablet Take 1 Tab by mouth nightly. 30 Tab 6    pioglitazone (ACTOS) 15 mg tablet Take 1 Tab by mouth daily. 90 Tab 0    diazepam (VALIUM) 10 mg tablet Take 0.5 Tabs by mouth every twelve (12) hours. Max Daily Amount: 10 mg. 60 Tab 0    doxazosin (CARDURA) 2 mg tablet Take 1 Tab by mouth nightly. 90 Tab 0    traZODone (DESYREL) 150 mg tablet Take 1 Tab by mouth nightly. 90 Tab 0    fenofibrate micronized (LOFIBRA) 200 mg capsule Take 1 Cap by mouth every morning. 90 Cap 0    finasteride (PROSCAR) 5 mg tablet Take 1 Tab by mouth daily. 90 Tab 1    food supplemt, lactose-reduced (ENSURE ACTIVE CLEAR) liqd Take 237 mL by mouth four (4) times daily. 12 Can 4    neomycin-bacitracin-polymyxin (NEOSPORIN) 3.5mg-400 unit- 5,000 unit/gram ointment Apply  to affected area daily. 1 Tube 1    nitroglycerin (NITROSTAT) 0.4 mg SL tablet 1 Tab by SubLINGual route every five (5) minutes as needed for Chest Pain. 20 Tab 0    ergocalciferol (VITAMIN D2) 50,000 unit capsule Take 1 Cap by mouth every seven (7) days. 8 Cap 2    epoetin calos (EPOGEN;PROCRIT) 10,000 unit/mL injection 10,000 Units by SubCUTAneous route every thirty (30) days.          Past History     Past Medical History:  Past Medical History:   Diagnosis Date    Anemia     Arthritis     back, hips  CAD (coronary artery disease)     Sees Dr. 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 Torsten Parr for pain mgmt    Chronic pain     Confusion     Depression     Diabetes ([facility]     ED (erectile dysfunction)     GERD (gastroesophageal reflux disease) 11/2015    Diagnosed at [facility] last month    Hypercholesteremia     Hypertension     Psychiatric disorder     depression    [facility]     Thromboembolus ([facility]        Past Surgical History:  Past Surgical History:   Procedure Laterality Date    CARDIAC SURG PROCEDURE UNLIST      PTCA    HX ABOVE KNEE AMPUTATION  2013    Left knee stump non-healing ulcer; Dr. Ileana Joe HX APPENDECTOMY      HX Sallyanne Guile      Left leg    HX CHOLECYSTECTOMY      HX GI      HX HIP REPLACEMENT      right hip replaced x 2    HX ORTHOPAEDIC  1990s    4 back surgeries    HX ORTHOPAEDIC      donna ankles pin placed    HX ORTHOPAEDIC      left leg 17 surgeries    UPPER GI ENDOSCOPY,BIOPSY  9/2/2015         UPPER GI ENDOSCOPY,BIOPSY  10/12/2015         VASCULAR SURGERY PROCEDURE UNLIST      Multiple revascularization procedures, toe amputations       Family History:  Family History   Problem Relation Age of Onset    Lung Disease Mother     Alcohol abuse Father     Diabetes Sister     Cancer Maternal Grandfather      Head and neck    Cancer Paternal Grandmother      Stomach    Diabetes Sister        Social History:  Social History   Substance Use Topics    Smoking status: Former Smoker    Smokeless tobacco: Never Used      Comment: 30 pack years; Occasional cigar    Alcohol use No      Comment: Former heavy drinker quit drinking about 17 years ago       Allergies:   Allergies   Allergen Reactions    Nubain [Nalbuphine] Other (comments)     Made me wild; Dysphoria         Review of Systems   Review of Systems   Constitutional: Negative for fatigue and fever. HENT: Negative. Eyes: Negative. Respiratory: Negative for shortness of breath and wheezing. Cardiovascular: Positive for chest pain. Negative for leg swelling. Gastrointestinal: Positive for nausea. Negative for blood in stool, constipation, diarrhea and vomiting. Endocrine: Negative. Genitourinary: Negative for difficulty urinating and dysuria. Musculoskeletal: Positive for arthralgias and myalgias. Skin: Negative for rash. Allergic/Immunologic: Negative. Neurological: Negative for weakness and numbness. Hematological: Negative. Psychiatric/Behavioral: Negative. Physical Exam   Physical Exam   Constitutional: He is oriented to person, place, and time. He appears well-developed and well-nourished. No distress. HENT:   Head: Normocephalic and atraumatic. Mouth/Throat: Oropharynx is clear and moist.   Eyes: Conjunctivae and EOM are normal.   Neck: Neck supple. No JVD present. No tracheal deviation present. Cardiovascular: Normal rate, regular rhythm and intact distal pulses. Exam reveals no gallop and no friction rub. No murmur heard. Pulmonary/Chest: Effort normal and breath sounds normal. No stridor. No respiratory distress. He has no wheezes. Abdominal: Soft. Bowel sounds are normal. He exhibits no distension and no mass. There is no tenderness. There is no guarding. Musculoskeletal: Normal range of motion. He exhibits no edema or tenderness. S/p L AKA   Neurological: He is alert and oriented to person, place, and time. He has normal strength. No focal deficits   Skin: Skin is warm, dry and intact. No rash noted. Psychiatric: He has a normal mood and affect. His behavior is normal. Judgment and thought content normal.   Nursing note and vitals reviewed.         Diagnostic Study Results     Labs -     Recent Results (from the past 12 hour(s))   EKG, 12 LEAD, INITIAL    Collection Time: 04/28/18  2:28 PM   Result Value Ref Range    Ventricular Rate 101 BPM    Atrial Rate 101 BPM    P-R Interval 144 ms    QRS Duration 86 ms    Q-T Interval 416 ms    QTC Calculation (Bezet) 539 ms    Calculated P Axis 70 degrees    Calculated R Axis -59 degrees    Calculated T Axis 106 degrees    Diagnosis       Sinus tachycardia  Left anterior fascicular block  Nonspecific T wave abnormality  Prolonged QT  When compared with ECG of 07-AUG-2015 14:45,  Significant changes have occurred     CBC WITH AUTOMATED DIFF    Collection Time: 04/28/18  2:40 PM   Result Value Ref Range    WBC 5.9 4.1 - 11.1 K/uL    RBC 3.33 (L) 4.10 - 5.70 M/uL    HGB 10.3 (L) 12.1 - 17.0 g/dL    HCT 31.1 (L) 36.6 - 50.3 %    MCV 93.4 80.0 - 99.0 FL    MCH 30.9 26.0 - 34.0 PG    MCHC 33.1 30.0 - 36.5 g/dL    RDW 13.3 11.5 - 14.5 %    PLATELET 881 (L) 844 - 400 K/uL    MPV 10.0 8.9 - 12.9 FL    NRBC 0.0 0  WBC    ABSOLUTE NRBC 0.00 0.00 - 0.01 K/uL    NEUTROPHILS 76 (H) 32 - 75 %    LYMPHOCYTES 14 12 - 49 %    MONOCYTES 8 5 - 13 %    EOSINOPHILS 1 0 - 7 %    BASOPHILS 0 0 - 1 %    IMMATURE GRANULOCYTES 0 0.0 - 0.5 %    ABS. NEUTROPHILS 4.5 1.8 - 8.0 K/UL    ABS. LYMPHOCYTES 0.8 0.8 - 3.5 K/UL    ABS. MONOCYTES 0.5 0.0 - 1.0 K/UL    ABS. EOSINOPHILS 0.1 0.0 - 0.4 K/UL    ABS. BASOPHILS 0.0 0.0 - 0.1 K/UL    ABS. IMM. GRANS. 0.0 0.00 - 0.04 K/UL    DF AUTOMATED     METABOLIC PANEL, COMPREHENSIVE    Collection Time: 04/28/18  2:40 PM   Result Value Ref Range    Sodium 145 136 - 145 mmol/L    Potassium 3.7 3.5 - 5.1 mmol/L    Chloride 115 (H) 97 - 108 mmol/L    CO2 18 (L) 21 - 32 mmol/L    Anion gap 12 5 - 15 mmol/L    Glucose 225 (H) 65 - 100 mg/dL    BUN 71 (H) 6 - 20 MG/DL    Creatinine 4.52 (H) 0.70 - 1.30 MG/DL    BUN/Creatinine ratio 16 12 - 20      GFR est AA 16 (L) >60 ml/min/1.73m2    GFR est non-AA 13 (L) >60 ml/min/1.73m2    Calcium 6.9 (L) 8.5 - 10.1 MG/DL    Bilirubin, total 0.3 0.2 - 1.0 MG/DL    ALT (SGPT) 12 12 - 78 U/L    AST (SGOT) 13 (L) 15 - 37 U/L    Alk. phosphatase 190 (H) 45 - 117 U/L    Protein, total 6.1 (L) 6.4 - 8.2 g/dL    Albumin 2.5 (L) 3.5 - 5.0 g/dL    Globulin 3.6 2.0 - 4.0 g/dL    A-G Ratio 0.7 (L) 1.1 - 2.2     MAGNESIUM    Collection Time: 04/28/18  2:40 PM   Result Value Ref Range    Magnesium 1.7 1.6 - 2.4 mg/dL   PROTHROMBIN TIME + INR    Collection Time: 04/28/18  2:40 PM   Result Value Ref Range    INR 1.1 0.9 - 1.1      Prothrombin time 10.9 9.0 - 11.1 sec   TROPONIN I    Collection Time: 04/28/18  2:40 PM   Result Value Ref Range    Troponin-I, Qt. 0.09 (H) <0.05 ng/mL       Radiologic Studies -     CXR Results  (Last 48 hours)               04/28/18 1458  XR CHEST PORT Final result    Impression:  IMPRESSION: Mild right basilar atelectasis. Narrative:  INDICATION:  Chest Pain with shortness of breath and nausea       COMPARISON: 9/25/2014       FINDINGS: Single AP portable view of the chest obtained at 1451 demonstrates a   stable cardiomediastinal silhouette. There is mild right basilar atelectasis. No   osseous abnormalities are seen. Medical Decision Making   I am the first provider for this patient. I reviewed the vital signs, available nursing notes, past medical history, past surgical history, family history and social history. Vital Signs-Reviewed the patient's vital signs. Patient Vitals for the past 12 hrs:   Temp Pulse Resp BP SpO2   04/28/18 1437 97.5 °F (36.4 °C) 98 11 173/82 99 %       Pulse Oximetry Analysis - 100% on RA    Cardiac Monitor:   Rate: 99 bpm  Rhythm: Normal Sinus Rhythm      EKG interpretation: (Preliminary) 2:28  Rhythm: sinus tachycardia; and irregular. Rate (approx.): 101 bpm; Axis: left axis deviation; MS interval: normal; QRS interval: normal ; ST/T wave: No ST changes. Written by ILEANA Santos, as dictated by Donna Ashby DO.     Records Reviewed: Nursing Notes, Old Medical Records, Previous electrocardiograms, Previous Radiology Studies and Previous Laboratory Studies    Provider Notes (Medical Decision Making):   Pt presenting with chest pain associated with SOB, radiating into his L arm. Pt has a significant CAD history, but has not been taking any of his medications for the past 2 months because they \"didn't make him feel good\". DDx includes ACS, angina, atypical chest pain, GERD, pneumonia, pulmonary edema. Will check labs, cardiac enzymes, ekg, CXR. Will likely need cardiology consult and admit. ED Course:   Initial assessment performed. The patients presenting problems have been discussed, and they are in agreement with the care plan formulated and outlined with them. I have encouraged them to ask questions as they arise throughout their visit. CONSULT NOTE:  6:23 PM  Cassandra Estrada DO spoke with Estrella Gresham DO  Specialty: Cardiology  Discussed patient's hx, disposition, and available diagnostic and imaging results. Reviewed care plans. Consultant agrees with plans as outlined. Dr. Frank Garvin recommends admission to the hospitalist and will follow as a consult. Written by ILEANA Zarate, as dictated by Cassandra Estrada DO.    CONSULT NOTE:   6:35 PM  Cassandra Estrada DO spoke with Stiven Schmidt DO   Specialty: Hospitalist  Discussed pt's hx, disposition, and available diagnostic and imaging results. Reviewed care plans. Consultant will evaluate pt for admission. Written by ILEANA Zarate, as dictated by Cassandra Estrada DO. Critical Care Time:   0    Disposition:  PLAN:  1. Admit to Hospitalist    ADMIT NOTE:  6:35 PM  Patient is being admitted to the hospital by Dr. Yuly Pablo. The results of their tests and reasons for their admission have been discussed with them and/or available family. They convey agreement and understanding for the need to be admitted and for their admission diagnosis. Consultation has been made with the inpatient physician specialist for hospitalization.     Diagnosis Clinical Impression:   1. MINO (acute kidney injury) ([facility]    2. Elevated troponin    3. Non compliance w medication regimen        Attestations: This note is prepared by Sailaja Schilling, acting as Scribe for Dea Huggins DO. Dea Huggins DO: The scribe's documentation has been prepared under my direction and personally reviewed by me in its entirety. I confirm that the note above accurately reflects all work, treatment, procedures, and medical decision making performed by me.

## 2018-04-29 ENCOUNTER — APPOINTMENT (OUTPATIENT)
Dept: ULTRASOUND IMAGING | Age: 63
DRG: 674 | End: 2018-04-29
Attending: INTERNAL MEDICINE
Payer: MEDICARE

## 2018-04-29 LAB
APTT PPP: 42.7 SEC (ref 22.1–32)
APTT PPP: 44.3 SEC (ref 22.1–32)
APTT PPP: 46 SEC (ref 22.1–32)
ATRIAL RATE: 101 BPM
BASOPHILS # BLD: 0 K/UL (ref 0–0.1)
BASOPHILS NFR BLD: 0 % (ref 0–1)
CALCULATED P AXIS, ECG09: 70 DEGREES
CALCULATED R AXIS, ECG10: -59 DEGREES
CALCULATED T AXIS, ECG11: 106 DEGREES
CHOLEST SERPL-MCNC: 236 MG/DL
DIAGNOSIS, 93000: NORMAL
DIFFERENTIAL METHOD BLD: ABNORMAL
EOSINOPHIL # BLD: 0.2 K/UL (ref 0–0.4)
EOSINOPHIL NFR BLD: 3 % (ref 0–7)
ERYTHROCYTE [DISTWIDTH] IN BLOOD BY AUTOMATED COUNT: 13.6 % (ref 11.5–14.5)
EST. AVERAGE GLUCOSE BLD GHB EST-MCNC: 169 MG/DL
GLUCOSE BLD STRIP.AUTO-MCNC: 140 MG/DL (ref 65–100)
GLUCOSE BLD STRIP.AUTO-MCNC: 170 MG/DL (ref 65–100)
GLUCOSE BLD STRIP.AUTO-MCNC: 180 MG/DL (ref 65–100)
GLUCOSE BLD STRIP.AUTO-MCNC: 190 MG/DL (ref 65–100)
HBA1C MFR BLD: 7.5 % (ref 4.2–6.3)
HCT VFR BLD AUTO: 30.3 % (ref 36.6–50.3)
HDLC SERPL-MCNC: 34 MG/DL
HDLC SERPL: 6.9 {RATIO} (ref 0–5)
HGB BLD-MCNC: 9.7 G/DL (ref 12.1–17)
IMM GRANULOCYTES # BLD: 0 K/UL (ref 0–0.04)
IMM GRANULOCYTES NFR BLD AUTO: 0 % (ref 0–0.5)
LDLC SERPL CALC-MCNC: 172.4 MG/DL (ref 0–100)
LIPID PROFILE,FLP: ABNORMAL
LYMPHOCYTES # BLD: 1.2 K/UL (ref 0.8–3.5)
LYMPHOCYTES NFR BLD: 23 % (ref 12–49)
MCH RBC QN AUTO: 30.5 PG (ref 26–34)
MCHC RBC AUTO-ENTMCNC: 32 G/DL (ref 30–36.5)
MCV RBC AUTO: 95.3 FL (ref 80–99)
MONOCYTES # BLD: 0.4 K/UL (ref 0–1)
MONOCYTES NFR BLD: 8 % (ref 5–13)
NEUTS SEG # BLD: 3.4 K/UL (ref 1.8–8)
NEUTS SEG NFR BLD: 64 % (ref 32–75)
NRBC # BLD: 0 K/UL (ref 0–0.01)
NRBC BLD-RTO: 0 PER 100 WBC
P-R INTERVAL, ECG05: 144 MS
PLATELET # BLD AUTO: 116 K/UL (ref 150–400)
PMV BLD AUTO: 9.7 FL (ref 8.9–12.9)
Q-T INTERVAL, ECG07: 416 MS
QRS DURATION, ECG06: 86 MS
QTC CALCULATION (BEZET), ECG08: 539 MS
RBC # BLD AUTO: 3.18 M/UL (ref 4.1–5.7)
SERVICE CMNT-IMP: ABNORMAL
THERAPEUTIC RANGE,PTTT: ABNORMAL SECS (ref 58–77)
TRIGL SERPL-MCNC: 148 MG/DL (ref ?–150)
TROPONIN I SERPL-MCNC: 0.09 NG/ML
VENTRICULAR RATE, ECG03: 101 BPM
VLDLC SERPL CALC-MCNC: 29.6 MG/DL
WBC # BLD AUTO: 5.2 K/UL (ref 4.1–11.1)

## 2018-04-29 PROCEDURE — 93306 TTE W/DOPPLER COMPLETE: CPT

## 2018-04-29 PROCEDURE — 74011000258 HC RX REV CODE- 258: Performed by: INTERNAL MEDICINE

## 2018-04-29 PROCEDURE — 74011250637 HC RX REV CODE- 250/637: Performed by: HOSPITALIST

## 2018-04-29 PROCEDURE — 74011250637 HC RX REV CODE- 250/637: Performed by: INTERNAL MEDICINE

## 2018-04-29 PROCEDURE — 85025 COMPLETE CBC W/AUTO DIFF WBC: CPT

## 2018-04-29 PROCEDURE — 85730 THROMBOPLASTIN TIME PARTIAL: CPT

## 2018-04-29 PROCEDURE — 65660000000 HC RM CCU STEPDOWN

## 2018-04-29 PROCEDURE — 74011636637 HC RX REV CODE- 636/637: Performed by: HOSPITALIST

## 2018-04-29 PROCEDURE — 93005 ELECTROCARDIOGRAM TRACING: CPT

## 2018-04-29 PROCEDURE — 74011250636 HC RX REV CODE- 250/636: Performed by: HOSPITALIST

## 2018-04-29 PROCEDURE — 36415 COLL VENOUS BLD VENIPUNCTURE: CPT

## 2018-04-29 PROCEDURE — 76770 US EXAM ABDO BACK WALL COMP: CPT

## 2018-04-29 PROCEDURE — 82962 GLUCOSE BLOOD TEST: CPT

## 2018-04-29 PROCEDURE — 84484 ASSAY OF TROPONIN QUANT: CPT | Performed by: INTERNAL MEDICINE

## 2018-04-29 PROCEDURE — 77010033678 HC OXYGEN DAILY

## 2018-04-29 PROCEDURE — 80061 LIPID PANEL: CPT

## 2018-04-29 RX ORDER — SODIUM CHLORIDE 9 MG/ML
100 INJECTION, SOLUTION INTRAVENOUS CONTINUOUS
Status: DISCONTINUED | OUTPATIENT
Start: 2018-04-29 | End: 2018-04-29

## 2018-04-29 RX ORDER — FAMOTIDINE 20 MG/50ML
20 INJECTION, SOLUTION INTRAVENOUS EVERY 24 HOURS
Status: DISCONTINUED | OUTPATIENT
Start: 2018-04-30 | End: 2018-05-01

## 2018-04-29 RX ORDER — CLONIDINE HYDROCHLORIDE 0.1 MG/1
0.1 TABLET ORAL
Status: DISCONTINUED | OUTPATIENT
Start: 2018-04-29 | End: 2018-05-14 | Stop reason: HOSPADM

## 2018-04-29 RX ORDER — ISOSORBIDE MONONITRATE 30 MG/1
30 TABLET, EXTENDED RELEASE ORAL DAILY
Status: DISCONTINUED | OUTPATIENT
Start: 2018-04-29 | End: 2018-05-01

## 2018-04-29 RX ORDER — SODIUM CHLORIDE 450 MG/100ML
100 INJECTION, SOLUTION INTRAVENOUS CONTINUOUS
Status: DISCONTINUED | OUTPATIENT
Start: 2018-04-29 | End: 2018-04-30

## 2018-04-29 RX ADMIN — ISOSORBIDE MONONITRATE 30 MG: 30 TABLET, EXTENDED RELEASE ORAL at 14:19

## 2018-04-29 RX ADMIN — NITROGLYCERIN 1 INCH: 20 OINTMENT TOPICAL at 06:09

## 2018-04-29 RX ADMIN — ASPIRIN 81 MG 81 MG: 81 TABLET ORAL at 10:25

## 2018-04-29 RX ADMIN — INSULIN LISPRO 2 UNITS: 100 INJECTION, SOLUTION INTRAVENOUS; SUBCUTANEOUS at 17:51

## 2018-04-29 RX ADMIN — HEPARIN SODIUM AND DEXTROSE 14 UNITS/KG/HR: 10000; 5 INJECTION INTRAVENOUS at 07:31

## 2018-04-29 RX ADMIN — INSULIN LISPRO 2 UNITS: 100 INJECTION, SOLUTION INTRAVENOUS; SUBCUTANEOUS at 06:20

## 2018-04-29 RX ADMIN — NEPHROCAP 1 CAPSULE: 1 CAP ORAL at 14:19

## 2018-04-29 RX ADMIN — SODIUM CHLORIDE 100 ML/HR: 450 INJECTION, SOLUTION INTRAVENOUS at 21:24

## 2018-04-29 RX ADMIN — ATORVASTATIN CALCIUM 80 MG: 40 TABLET, FILM COATED ORAL at 17:51

## 2018-04-29 RX ADMIN — NITROGLYCERIN 0.4 MG: 0.4 TABLET SUBLINGUAL at 08:16

## 2018-04-29 RX ADMIN — HEPARIN SODIUM 2000 UNITS: 5000 INJECTION, SOLUTION INTRAVENOUS; SUBCUTANEOUS at 15:25

## 2018-04-29 RX ADMIN — METOPROLOL TARTRATE 25 MG: 25 TABLET ORAL at 17:51

## 2018-04-29 RX ADMIN — MORPHINE SULFATE 4 MG: 4 INJECTION INTRAVENOUS at 02:34

## 2018-04-29 RX ADMIN — OXYCODONE HYDROCHLORIDE AND ACETAMINOPHEN 1 TABLET: 5; 325 TABLET ORAL at 17:51

## 2018-04-29 RX ADMIN — FAMOTIDINE 20 MG: 20 INJECTION, SOLUTION INTRAVENOUS at 00:13

## 2018-04-29 RX ADMIN — NITROGLYCERIN 0.4 MG: 0.4 TABLET SUBLINGUAL at 08:30

## 2018-04-29 RX ADMIN — HEPARIN SODIUM 2000 UNITS: 5000 INJECTION, SOLUTION INTRAVENOUS; SUBCUTANEOUS at 22:10

## 2018-04-29 RX ADMIN — METOPROLOL TARTRATE 25 MG: 25 TABLET ORAL at 23:14

## 2018-04-29 RX ADMIN — Medication 10 ML: at 08:04

## 2018-04-29 RX ADMIN — Medication 10 ML: at 13:17

## 2018-04-29 RX ADMIN — CLOPIDOGREL BISULFATE 75 MG: 75 TABLET ORAL at 10:25

## 2018-04-29 RX ADMIN — ACETAMINOPHEN 650 MG: 325 TABLET ORAL at 21:51

## 2018-04-29 RX ADMIN — METOPROLOL TARTRATE 25 MG: 25 TABLET ORAL at 11:25

## 2018-04-29 RX ADMIN — HEPARIN SODIUM AND DEXTROSE 16 UNITS/KG/HR: 10000; 5 INJECTION INTRAVENOUS at 15:23

## 2018-04-29 RX ADMIN — HEPARIN SODIUM 2000 UNITS: 5000 INJECTION, SOLUTION INTRAVENOUS; SUBCUTANEOUS at 07:28

## 2018-04-29 RX ADMIN — ONDANSETRON 4 MG: 2 INJECTION INTRAMUSCULAR; INTRAVENOUS at 13:16

## 2018-04-29 RX ADMIN — ONDANSETRON 4 MG: 2 INJECTION INTRAMUSCULAR; INTRAVENOUS at 08:03

## 2018-04-29 RX ADMIN — MORPHINE SULFATE 4 MG: 4 INJECTION INTRAVENOUS at 11:41

## 2018-04-29 RX ADMIN — OXYCODONE HYDROCHLORIDE AND ACETAMINOPHEN 1 TABLET: 5; 325 TABLET ORAL at 21:46

## 2018-04-29 RX ADMIN — Medication 10 ML: at 11:42

## 2018-04-29 RX ADMIN — INSULIN LISPRO 2 UNITS: 100 INJECTION, SOLUTION INTRAVENOUS; SUBCUTANEOUS at 14:18

## 2018-04-29 RX ADMIN — SODIUM CHLORIDE 75 ML/HR: 450 INJECTION, SOLUTION INTRAVENOUS at 09:49

## 2018-04-29 RX ADMIN — Medication 10 ML: at 21:22

## 2018-04-29 NOTE — ED NOTES
TRANSFER - OUT REPORT:    Verbal report given to Yan Weldon on [employer] [facility] Sr  being transferred to [facility] for routine progression of care       Report consisted of patients Situation, Background, Assessment and   Recommendations(SBAR). Information from the following report(s) SBAR, Kardex, ED Summary, [facility] and Recent Results was reviewed with the receiving nurse. Lines:   Peripheral IV 04/28/18 Left Antecubital (Active)   Site Assessment Clean, dry, & intact 4/28/2018  2:31 PM   Phlebitis Assessment 0 4/28/2018  2:31 PM   Infiltration Assessment 0 4/28/2018  2:31 PM   Dressing Status Clean, dry, & intact 4/28/2018  2:31 PM        Opportunity for questions and clarification was provided.

## 2018-04-29 NOTE — PROGRESS NOTES
2330 Pt complain of right chest pain. 7/10. Administered 4mg morphine IV and applied nitroglycerin patch to R chest           Wall. Will cont to monitor pt  2340 Pt states that CP feels better. Pt resting comfortably in bed watching tv.  Will cont to monitor pt

## 2018-04-29 NOTE — PROGRESS NOTES
Primary Nurse Kandi Lao RN and Marky Monge RN performed a dual skin assessment on this patient. Pt has 3 old heeled blisters on Right lower leg, Right leg is darkened and very dry.  Pt has small black scabs on 2nd,4th and 5th toes on right foot  Mitchel score is 20

## 2018-04-29 NOTE — PROGRESS NOTES
Received patient with chest pain this am non radiating. He rated it a 7/10 mid-left sternum. Applied 02 2 lpm gave sublingual x2 with some relief down to 2. Notified Dr. Rita Varma who will see him today. He was seen by Nephrology this am. Patient's blood pressure elevated and tolerated the nitro sub lingual. He also has a scheduled nitro patch on. patient is now getting an ECHO. He is due for some lab work. He had an EKG during the chest pain  0956 Dr. Vega Pae in to see patient. Chest pain has resolved but still has intermittent nausea with dry heaving. Last troponin this am is coming down.

## 2018-04-29 NOTE — CONSULTS
Consult    NAME: Stacey Her   :  1955   MRN:  [id number]     Date/Time:  2018 9:53 AM    Patient PCP: Merary Chacon MD  ________________________________________________________________________     Assessment:     1. Chest pain  2. Elevated troponin  3. Acute on chronic kidney disease Stg 4  4. Coronary artery disease s/p multivessel stenting s/p PCI of LCx, PTCA of RCA , PTCA LAD 3/94. Lexiscan  w/ EF 62%, distal ineroapical infarct w/o ischemia  5. Peripheral vascular disease s/p left iliac stenting, left SFA stenting 3/00, right iliac stenting   6. Status post LAKA  7. Bilateral carotid stenosis; 16-49%   8. Hypertension  9. Diabetes  10. Tobacco abuse  11. Chronic pain  12. Chronic anemia  13. Hyperlipidemia  14. Noncompliance  15. Peptic ulcer disease w/ GIB '15  16. Falls  17. Lives w/ sister in [address] [place] [address] now  . Usual Cardiologist:  Dr. Aislinn Conde (last seen )        Plan: TnI peaked @ 0.16 w/ nml CK  sCr 4.7  EKG:  ST, LAFB, NSSTTWc    1. TnI have peaked, stop checking  2. EKG is nonischemic  3. Can continue heparin gtt for 48h  4. Continue ASA  5. Continue plavix (started by primary)  6. Continue atorva  7. Continue metoprolol 25mg q12h  8. Start ISMN 30mg daily  9. Echo  10. Discussed potential cath vs med tx with pt at length. Cath makes it more likely he needs dialysis. He understands this. Will let me know today. He can eat today. [x]        High complexity decision making was performed        Subjective:   CHIEF COMPLAINT: CP    HISTORY OF PRESENT ILLNESS:     Ellen Bhatia is a 58 y.o.  male who \"presents by EMS to the ED with cc of 4/10, mid-sternal chest pain that radiates into his left arm since 1:00 PM this afternoon. Pt endorses associated nausea. Pt notes that he has a hx of similar symptoms secondary to an MI.  Pt explains that he was standing outside earlier this afternoon when he had an acute onset of chest pain followed by left arm pain and nausea. Pt notes that he has a Hx of 3 prior MIs for which he had a coronary stent placed. Pt states that his last stent was placed several years ago. Pt reports that he was on ASA, gabapentin, and other heart regulating medication 2 months ago, but he notes that he stopped taking the medication because it \"made him feel funny\". Pt states that he is currently managed by [facility], but he has not been evaluated by a Cardiologist in several years. Pt is also c/o of generalized pain to his bilateral legs, arms, and face. Pt endorses a social hx of tobacco use. Per EMS, the pt was given 325 mg of ASA and 1 NTG tablet en route to the ED. \"      We were asked to consult for work up and evaluation of the above problems. Past Medical History:   Diagnosis Date    Anemia     Arthritis     back, hips    CAD (coronary artery disease)     Sees Dr. 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 Dorina Eastman for pain mgmt    Chronic pain     Confusion     Depression     [facility] [address]     ED (erectile dysfunction)     GERD (gastroesophageal reflux disease) 11/2015    Diagnosed at [facility] last month    Hypercholesteremia     Hypertension     Psychiatric disorder     depression    PVD (peripheral vascular disease) ([address]     Thromboembolus ([facility] [address]       Past Surgical History:   Procedure Laterality Date    CARDIAC SURG PROCEDURE UNLIST      PTCA    HX ABOVE KNEE AMPUTATION  2013    Left knee stump non-healing ulcer; Dr. Minnie Jurado      Left leg    HX CHOLECYSTECTOMY      HX GI      HX HIP REPLACEMENT      right hip replaced x 2    HX ORTHOPAEDIC  1990s    4 back surgeries    HX ORTHOPAEDIC      donna ankles pin placed    HX ORTHOPAEDIC      left leg 17 surgeries    UPPER GI ENDOSCOPY,BIOPSY  9/2/2015         UPPER GI ENDOSCOPY,BIOPSY  10/12/2015  VASCULAR SURGERY PROCEDURE UNLIST      Multiple revascularization procedures, toe amputations     Allergies   Allergen Reactions    Nubain [Nalbuphine] Other (comments)     Made me wild; Dysphoria      Meds:  See below  Social History   Substance Use Topics    Smoking status: Former Smoker    Smokeless tobacco: Never Used      Comment: 30 pack years; Occasional cigar    Alcohol use No      Comment: Former heavy drinker quit drinking about 17 years ago      Family History   Problem Relation Age of Onset    Lung Disease Mother     Alcohol abuse Father     Diabetes Sister     Cancer Maternal Grandfather      Head and neck    Cancer Paternal Grandmother      Stomach    Diabetes Sister        REVIEW OF SYSTEMS:     []         Unable to obtain  ROS due to ---   [x]         Total of 12 systems reviewed as follows:    Constitutional: negative fever, negative chills, negative weight loss  Eyes:   negative visual changes  ENT:   negative sore throat, tongue or lip swelling  Respiratory:  negative cough, negative dyspnea  Cards:  negative for chest pain, palpitations, lower extremity edema  GI:   negative for nausea, vomiting, diarrhea, and abdominal pain  Genitourinary: negative for frequency, dysuria  Integument:  negative for rash   Hematologic:  negative for easy bruising and gum/nose bleeding  Musculoskel: negative for myalgias,  back pain  Neurological:  negative for headaches, dizziness, vertigo, weakness  Behavl/Psych: negative for feelings of anxiety, depression     Pertinent Positives include :    Objective:      Physical Exam:    Last 24hrs VS reviewed since prior progress note.  Most recent are:    Visit Vitals    /80    Pulse (!) 58    Temp 97.5 °F (36.4 °C)    Resp 19    Ht 5' 9\" (1.753 m)    Wt 66.2 kg (146 lb)    SpO2 95%    BMI 21.56 kg/m2       Intake/Output Summary (Last 24 hours) at 04/29/18 0953  Last data filed at 04/29/18 0811   Gross per 24 hour   Intake                0 ml   Output              775 ml   Net             -775 ml        General Appearance: Well developed, well nourished, alert & oriented x 3,    no acute distress. Ears/Nose/Mouth/Throat: Pupils equal and round, Hearing grossly normal.  Neck: Supple. JVP within normal limits. Carotids good upstrokes, with no bruit. Chest: Lungs clear to auscultation bilaterally. Cardiovascular: Regular rate and rhythm, S1S2 normal, no murmur, rubs, gallops. Abdomen: Soft, non-tender, bowel sounds are active. No organomegaly. Extremities: No edema bilaterally. LAKA  Skin: Warm and dry. Neuro: CN II-XII grossly intact, Strength and sensation grossly intact. []         Post-cath site without hematoma, bruit, tenderness, or thrill. Distal pulses intact. Data:      Telemetry:  SR    EKG:  SR, LAFB, NSSTTWc  []  No new EKG for review. Prior to Admission medications    Medication Sig Start Date End Date Taking? Authorizing Provider   methadone (DOLOPHINE) 10 mg tablet Take 1 Tab by mouth two (2) times daily (with meals). Max Daily Amount: 60 mg. One tab in am, 2 tab at lunch, 1 at dinner and 2 at bed 1/15/16   Barbara Garcia MD   isosorbide mononitrate ER (IMDUR) 30 mg tablet Take 1 Tab by mouth daily. 1/15/16   Barbara Garcia MD   ferrous gluconate 324 mg (37.5 mg iron) tablet Take 1 Tab by mouth three (3) times daily (with meals). 1/15/16   Barbara Garcia MD   omeprazole (PRILOSEC) 40 mg capsule Take 1 Cap by mouth two (2) times a day. 1/6/16   Barbara Garcia MD   metoprolol (LOPRESSOR) 25 mg tablet Take 1 Tab by mouth two (2) times a day. 12/9/15   Barbara Garcia MD   venlafaxine [facility]) 100 mg tablet Take 1 Tab by mouth two (2) times a day. 12/9/15   Barbara Garcia MD   gabapentin (NEURONTIN) 400 mg capsule Take 1 Cap by mouth three (3) times daily. 12/4/15   Barbara Garcia MD   pravastatin (PRAVACHOL) 80 mg tablet Take 1 Tab by mouth nightly.  12/4/15   Barbara Garcia MD   pioglitazone (ACTOS) 15 mg tablet Take 1 Tab by mouth daily. 12/4/15   Pancho Slade MD   diazepam (VALIUM) 10 mg tablet Take 0.5 Tabs by mouth every twelve (12) hours. Max Daily Amount: 10 mg. 12/4/15   Pancho Slade MD   doxazosin (CARDURA) 2 mg tablet Take 1 Tab by mouth nightly. 12/4/15   Pancho Slade MD   traZODone (DESYREL) 150 mg tablet Take 1 Tab by mouth nightly. 12/4/15   Pancho Slade MD   fenofibrate micronized (LOFIBRA) 200 mg capsule Take 1 Cap by mouth every morning. 12/4/15   Pancho Slade MD   finasteride (PROSCAR) 5 mg tablet Take 1 Tab by mouth daily. 12/4/15   Pancho Slade MD   food supplemt, lactose-reduced [facility] ACTIVE CLEAR) liqd Take 237 mL by mouth four (4) times daily. 12/4/15   Pancho Slade MD   neomycin-bacitracin-polymyxin (NEOSPORIN) 3.5mg-400 unit- 5,000 unit/gram ointment Apply  to affected area daily. 10/13/15   Yuli Johnston MD   nitroglycerin (NITROSTAT) 0.4 mg SL tablet 1 Tab by SubLINGual route every five (5) minutes as needed for Chest Pain. 6/18/15   Pancho Slade MD   ergocalciferol (VITAMIN D2) 50,000 unit capsule Take 1 Cap by mouth every seven (7) days. 3/24/15   Vinicius Arevalo MD   epoetin calos (EPOGEN;PROCRIT) 10,000 unit/mL injection 10,000 Units by SubCUTAneous route every thirty (30) days.     Historical Provider       Recent Results (from the past 24 hour(s))   EKG, 12 LEAD, INITIAL    Collection Time: 04/28/18  2:28 PM   Result Value Ref Range    Ventricular Rate 101 BPM    Atrial Rate 101 BPM    P-R Interval 144 ms    QRS Duration 86 ms    Q-T Interval 416 ms    QTC Calculation (Bezet) 539 ms    Calculated P Axis 70 degrees    Calculated R Axis -59 degrees    Calculated T Axis 106 degrees    Diagnosis       Sinus tachycardia  Left anterior fascicular block  Nonspecific T wave abnormality  Prolonged QT  When compared with ECG of 07-AUG-2015 14:45,  Significant changes have occurred     CBC WITH AUTOMATED DIFF    Collection Time: 04/28/18  2:40 PM   Result Value Ref Range    WBC 5.9 4.1 - 11.1 K/uL    RBC 3.33 (L) 4.10 - 5.70 M/uL    HGB 10.3 (L) 12.1 - 17.0 g/dL    HCT 31.1 (L) 36.6 - 50.3 %    MCV 93.4 80.0 - 99.0 FL    MCH 30.9 26.0 - 34.0 PG    MCHC 33.1 30.0 - 36.5 g/dL    RDW 13.3 11.5 - 14.5 %    PLATELET 351 (L) 245 - 400 K/uL    MPV 10.0 8.9 - 12.9 FL    NRBC 0.0 0  WBC    ABSOLUTE NRBC 0.00 0.00 - 0.01 K/uL    NEUTROPHILS 76 (H) 32 - 75 %    LYMPHOCYTES 14 12 - 49 %    MONOCYTES 8 5 - 13 %    EOSINOPHILS 1 0 - 7 %    BASOPHILS 0 0 - 1 %    IMMATURE GRANULOCYTES 0 0.0 - 0.5 %    ABS. NEUTROPHILS 4.5 1.8 - 8.0 K/UL    ABS. LYMPHOCYTES 0.8 0.8 - 3.5 K/UL    ABS. MONOCYTES 0.5 0.0 - 1.0 K/UL    ABS. EOSINOPHILS 0.1 0.0 - 0.4 K/UL    ABS. BASOPHILS 0.0 0.0 - 0.1 K/UL    ABS. IMM. GRANS. 0.0 0.00 - 0.04 K/UL    DF AUTOMATED     METABOLIC PANEL, COMPREHENSIVE    Collection Time: 04/28/18  2:40 PM   Result Value Ref Range    Sodium 145 136 - 145 mmol/L    Potassium 3.7 3.5 - 5.1 mmol/L    Chloride 115 (H) 97 - 108 mmol/L    CO2 18 (L) 21 - 32 mmol/L    Anion gap 12 5 - 15 mmol/L    Glucose 225 (H) 65 - 100 mg/dL    BUN 71 (H) 6 - 20 MG/DL    Creatinine 4.52 (H) 0.70 - 1.30 MG/DL    BUN/Creatinine ratio 16 12 - 20      GFR est AA 16 (L) >60 ml/min/1.73m2    GFR est non-AA 13 (L) >60 ml/min/1.73m2    Calcium 6.9 (L) 8.5 - 10.1 MG/DL    Bilirubin, total 0.3 0.2 - 1.0 MG/DL    ALT (SGPT) 12 12 - 78 U/L    AST (SGOT) 13 (L) 15 - 37 U/L    Alk.  phosphatase 190 (H) 45 - 117 U/L    Protein, total 6.1 (L) 6.4 - 8.2 g/dL    Albumin 2.5 (L) 3.5 - 5.0 g/dL    Globulin 3.6 2.0 - 4.0 g/dL    A-G Ratio 0.7 (L) 1.1 - 2.2     MAGNESIUM    Collection Time: 04/28/18  2:40 PM   Result Value Ref Range    Magnesium 1.7 1.6 - 2.4 mg/dL   PROTHROMBIN TIME + INR    Collection Time: 04/28/18  2:40 PM   Result Value Ref Range    INR 1.1 0.9 - 1.1      Prothrombin time 10.9 9.0 - 11.1 sec   TROPONIN I    Collection Time: 04/28/18  2:40 PM   Result Value Ref Range    Troponin-I, Qt. 0.09 (H) <0.05 ng/mL   CK W/ CKMB & INDEX    Collection Time: 04/28/18  5:51 PM   Result Value Ref Range     39 - 308 U/L    CK - MB 3.9 (H) <3.6 NG/ML    CK-MB Index 3.7 (H) 0 - 2.5     TROPONIN I    Collection Time: 04/28/18  5:51 PM   Result Value Ref Range    Troponin-I, Qt. 0.12 (H) <0.05 ng/mL   TROPONIN I    Collection Time: 04/28/18 10:17 PM   Result Value Ref Range    Troponin-I, Qt. 0.16 (H) <0.05 ng/mL   CBC W/O DIFF    Collection Time: 04/28/18 10:17 PM   Result Value Ref Range    WBC 7.6 4.1 - 11.1 K/uL    RBC 3.25 (L) 4.10 - 5.70 M/uL    HGB 10.0 (L) 12.1 - 17.0 g/dL    HCT 30.5 (L) 36.6 - 50.3 %    MCV 93.8 80.0 - 99.0 FL    MCH 30.8 26.0 - 34.0 PG    MCHC 32.8 30.0 - 36.5 g/dL    RDW 13.5 11.5 - 14.5 %    PLATELET 840 (L) 043 - 400 K/uL    MPV 10.1 8.9 - 12.9 FL    NRBC 0.0 0  WBC    ABSOLUTE NRBC 0.00 0.00 - 4.25 K/uL   METABOLIC PANEL, COMPREHENSIVE    Collection Time: 04/28/18 10:17 PM   Result Value Ref Range    Sodium 146 (H) 136 - 145 mmol/L    Potassium 3.5 3.5 - 5.1 mmol/L    Chloride 116 (H) 97 - 108 mmol/L    CO2 19 (L) 21 - 32 mmol/L    Anion gap 11 5 - 15 mmol/L    Glucose 125 (H) 65 - 100 mg/dL    BUN 71 (H) 6 - 20 MG/DL    Creatinine 4.66 (H) 0.70 - 1.30 MG/DL    BUN/Creatinine ratio 15 12 - 20      GFR est AA 15 (L) >60 ml/min/1.73m2    GFR est non-AA 13 (L) >60 ml/min/1.73m2    Calcium 6.9 (L) 8.5 - 10.1 MG/DL    Bilirubin, total 0.2 0.2 - 1.0 MG/DL    ALT (SGPT) 12 12 - 78 U/L    AST (SGOT) 12 (L) 15 - 37 U/L    Alk.  phosphatase 177 (H) 45 - 117 U/L    Protein, total 6.1 (L) 6.4 - 8.2 g/dL    Albumin 2.5 (L) 3.5 - 5.0 g/dL    Globulin 3.6 2.0 - 4.0 g/dL    A-G Ratio 0.7 (L) 1.1 - 2.2     PTT    Collection Time: 04/28/18 10:17 PM   Result Value Ref Range    aPTT 28.8 22.1 - 32.0 sec    aPTT, therapeutic range     58.0 - 77.0 SECS   CBC WITH AUTOMATED DIFF    Collection Time: 04/29/18  6:00 AM   Result Value Ref Range    WBC 5.2 4.1 - 11.1 K/uL    RBC 3.18 (L) 4.10 - 5.70 M/uL HGB 9.7 (L) 12.1 - 17.0 g/dL    HCT 30.3 (L) 36.6 - 50.3 %    MCV 95.3 80.0 - 99.0 FL    MCH 30.5 26.0 - 34.0 PG    MCHC 32.0 30.0 - 36.5 g/dL    RDW 13.6 11.5 - 14.5 %    PLATELET 544 (L) 473 - 400 K/uL    MPV 9.7 8.9 - 12.9 FL    NRBC 0.0 0  WBC    ABSOLUTE NRBC 0.00 0.00 - 0.01 K/uL    NEUTROPHILS 64 32 - 75 %    LYMPHOCYTES 23 12 - 49 %    MONOCYTES 8 5 - 13 %    EOSINOPHILS 3 0 - 7 %    BASOPHILS 0 0 - 1 %    IMMATURE GRANULOCYTES 0 0.0 - 0.5 %    ABS. NEUTROPHILS 3.4 1.8 - 8.0 K/UL    ABS. LYMPHOCYTES 1.2 0.8 - 3.5 K/UL    ABS. MONOCYTES 0.4 0.0 - 1.0 K/UL    ABS. EOSINOPHILS 0.2 0.0 - 0.4 K/UL    ABS. BASOPHILS 0.0 0.0 - 0.1 K/UL    ABS. IMM.  GRANS. 0.0 0.00 - 0.04 K/UL    DF AUTOMATED     PTT    Collection Time: 04/29/18  6:00 AM   Result Value Ref Range    aPTT 44.3 (H) 22.1 - 32.0 sec    aPTT, therapeutic range     58.0 - 77.0 SECS   GLUCOSE, POC    Collection Time: 04/29/18  6:16 AM   Result Value Ref Range    Glucose (POC) 170 (H) 65 - 100 mg/dL    Performed by Benjamin Garcia    TROPONIN I    Collection Time: 04/29/18  8:44 AM   Result Value Ref Range    Troponin-I, Qt. 0.09 (H) <0.05 ng/mL        Johan Ratel III, DO

## 2018-04-29 NOTE — PROGRESS NOTES
Renal Dosing/Monitoring  Medication: Famotidine   Current regimen:  20 mg every 12 hr  Recent Labs      04/28/18   2217  04/28/18   1440   CREA  4.66*  4.52*   BUN  71*  71*     Estimated CrCl:  15 mL/min  Plan: Change to 20 mg every 24 hours for CrCl < 50 mL/min per renal dosing protocol.       Thank you,   Savanna Alvarez, PHARMD

## 2018-04-29 NOTE — CONSULTS
[facility] Consult Note        NAME: Vladimir Syed       :  1955       MRN:  [id number]     Date/Time: 2018    Risk of deterioration: medium       Assessment:    Plan:  MINO on CKD IV   [place]  HTN  PAD/PVD s/p (L) AKA  ANemia Longstanding pt of [facility]-he has moved in with his sister after 3 years at [employer]. Admitted with cp  Echo (P)  Obtain renal us  Baseline creatinine around 3.5  Reviewed with pt that if cardiac cath needed that he is at high risk for needing dialysis-he understands will consider his options. Pt has not seen any cardiologist formally since dr. Aris Ott retired. Dr. Diaz Hollie to see today. Dr. Javier Alarcon is his vascular surgeon  Added nephrocap, continue  Epo, will check bone metabolism labs   Asked to see for advancing CKD. Mr Breanna Ojeda has been a pt of mine for around 10 years. PT was living in [employer] for several years with relatively few hospitalizations during that time (states was in [place])-now living with sister and developed substernal cp radiating to his (L) shoulder with diaphoresis and dry heaves. Had cp this am as well-but now resolved with sl ntg. Pt aware of his advanced ckd-    Subjective:     Chief Complaint:  My CP is getting better    Review of Systems: Review of systems otherwise negative    Objective:     VITALS:   Last 24hrs VS reviewed since prior progress note.  Most recent are:  Visit Vitals    /80    Pulse (!) 58    Temp 97.5 °F (36.4 °C)    Resp 19    Ht 5' 9\" (1.753 m)    Wt 66.2 kg (146 lb)    SpO2 (P) 95%    BMI 21.56 kg/m2     SpO2 Readings from Last 6 Encounters:   18 (P) 95%   01/15/16 98%   16 100%   12/04/15 98%   10/13/15 97%   09/25/15 100%    O2 Flow Rate (L/min): (P) 2 l/min     Intake/Output Summary (Last 24 hours) at 18 0932  Last data filed at 18 0811   Gross per 24 hour   Intake                0 ml   Output              675 ml   Net             -675 ml        Telemetry Reviewed    PHYSICAL EXAM:    General   well developed, well nourished, appears stated age, in no acute distress  EENT  NCAT, EOMI  Respiratory   Clear anteriorly  Cardiology  RRR  Abdominal  Soft, nt  Extremities  (L) AKA, (R) with brawny edema (his baseline)  Neurological  No focal neurological deficits noted  Psychological  Oriented x 3. Normal affect.               Lab Data Reviewed: (see below)    Medications Reviewed: (see below)    PMH/SH reviewed - no change compared to H&P  ___________________________________________________  ___________    Attending Physician: Ivan Flores MD     ____________________________________________________  MEDICATIONS:  Current Facility-Administered Medications   Medication Dose Route Frequency    0.45% sodium chloride infusion  75 mL/hr IntraVENous CONTINUOUS    B complex-vitaminC-folic acid (NEPHROCAP) cap  1 Cap Oral DAILY    cloNIDine HCl (CATAPRES) tablet 0.1 mg  0.1 mg Oral Q4H PRN    sodium chloride (NS) flush 5-10 mL  5-10 mL IntraVENous Q8H    sodium chloride (NS) flush 5-10 mL  5-10 mL IntraVENous PRN    nitroglycerin (NITROSTAT) tablet 0.4 mg  0.4 mg SubLINGual Q5MIN PRN    glucose chewable tablet 16 g  4 Tab Oral PRN    dextrose (D50W) injection syrg 12.5-25 g  12.5-25 g IntraVENous PRN    glucagon (GLUCAGEN) injection 1 mg  1 mg IntraMUSCular PRN    sodium chloride (NS) flush 5-10 mL  5-10 mL IntraVENous Q8H    sodium chloride (NS) flush 5-10 mL  5-10 mL IntraVENous PRN    insulin lispro (HUMALOG) injection   SubCUTAneous Q6H    nitroglycerin (NITROBID) 2 % ointment 1 Inch  1 Inch Topical Q6H    metoprolol tartrate (LOPRESSOR) tablet 25 mg  25 mg Oral Q6H    aspirin chewable tablet 81 mg  81 mg Oral DAILY    atorvastatin (LIPITOR) tablet 80 mg  80 mg Oral QPM    acetaminophen (TYLENOL) tablet 650 mg  650 mg Oral Q4H PRN    oxyCODONE-acetaminophen (PERCOCET) 5-325 mg per tablet 1 Tab  1 Tab Oral Q4H PRN    morphine injection 4 mg  4 mg IntraVENous Q5MIN PRN    hydrALAZINE (APRESOLINE) 20 mg/mL injection 10 mg  10 mg IntraVENous Q2H PRN    naloxone (NARCAN) injection 0.4 mg  0.4 mg IntraVENous PRN    diphenhydrAMINE (BENADRYL) injection 12.5 mg  12.5 mg IntraVENous Q4H PRN    ondansetron (ZOFRAN) injection 4 mg  4 mg IntraVENous Q4H PRN    bisacodyl (DULCOLAX) tablet 5 mg  5 mg Oral DAILY PRN    heparin 25,000 units in D5W 250 ml infusion  12-25 Units/kg/hr IntraVENous TITRATE    famotidine (PEPCID) IVPB 20 mg  20 mg IntraVENous Q12H    clopidogrel (PLAVIX) tablet 75 mg  75 mg Oral DAILY    heparin (porcine) injection 3,950 Units  60 Units/kg IntraVENous PRN    heparin (porcine) injection 2,000 Units  30 Units/kg IntraVENous PRN        LABS:  Recent Labs      04/29/18   0600 04/28/18 2217   WBC  5.2  7.6   HGB  9.7*  10.0*   HCT  30.3*  30.5*   PLT  116*  137*     Recent Labs      04/28/18 2217 04/28/18   1440   NA  146*  145   K  3.5  3.7   CL  116*  115*   CO2  19*  18*   BUN  71*  71*   CREA  4.66*  4.52*   GLU  125*  225*   CA  6.9*  6.9*   MG   --   1.7     Recent Labs      04/28/18 2217 04/28/18   1440   SGOT  12*  13*   ALT  12  12   AP  177*  190*   TBILI  0.2  0.3   TP  6.1*  6.1*   ALB  2.5*  2.5*   GLOB  3.6  3.6     Recent Labs      04/29/18   0600  04/28/18 2217 04/28/18   1440   INR   --    --   1.1   PTP   --    --   10.9   APTT  44.3*  28.8   --       No results for input(s): FE, TIBC, PSAT, FERR in the last 72 hours. No results for input(s): PH, PCO2, PO2 in the last 72 hours.   Recent Labs      04/29/18   0844  04/28/18 2217 04/28/18   1751   CPK   --    --   105   CKNDX   --    --   3.7*   TROIQ  0.09*  0.16*  0.12*     Lab Results   Component Value Date/Time    Glucose (POC) 170 (H) 04/29/2018 06:16 AM    Glucose (POC) 262 (H) 10/14/2015 08:58 AM    Glucose (POC) 57 (L) 10/14/2015 08:35 AM    Glucose (POC) 62 (L) 10/14/2015 08:10 AM    Glucose (POC) 119 (H) 10/13/2015 09:06 PM

## 2018-04-29 NOTE — H&P
Hospitalist Admission Note    NAME: Jennifer Turner   :  1955   MRN:  [id number]     Date/Time:  2018 9:12 PM    Patient PCP: Tabitha Warner MD  ________________________________________________________________________    My assessment of this patient's clinical condition and my plan of care is as follows. Assessment / Plan:  A/     1. Acute chest pain concerning for ACS likely NSTEMI vs UA with mildrly elevated troponin. 2. ARF on CKD stage 5  3. Hypertensive urgency  4. Elevated troponin likely NSTEMI but can be related to ARF and HTN urgency  5. DM 2 with nephropathy & neuropathy  6. Anemia of CKD  7. Chronic thrombocytopenia 137 on adm  8. Tobacco abuse  9. Medication noncompliance      P/     -  Will admit for further workup and treatment of possible ACS. -  Vitals per protocol  -  Bed rest with BSC  Meds:   -  Oxygen per protocol  -  ASA   -  NTG  -  Beta blocker   -  No Ace inhibitor or ARB due to ARF  -  Statin  -  Morphine as needed for pain  -  Heparin IV  -  Plavix load 300 mg then if less than 76 y.o. or 75 mg po if >75 y.o.  -  PRNs: Antiemetic, stool softener  Labs:  Serial Troponin, CMP, BNP, MAG, PO4, CBC, Lipid  Drug levels: n/a  Cultures:  n/a  Extras:    -  ECG prn CP  -  PCXR prn SOB  -  Echocardiogram  Consult(s):   -  Cardiology  -  Nephrology  -  Pharmacy to monitor and adjust meds. **Chart reviewed including medications, vitals, notes, labs and pertinent studies. **Assessment, condition, and plan reviewed and all questions and concerns addressed. **Counseled on the importance of smoking cessation.      Code Status: [x]Full Code      []DNR      []DNI      Surrogate Decision Maker:     DVT Prophylaxis: []  [x]On heparin drip  []SCDs    GI Prophylaxis: [x]H2B/PPI  []Not indicated    Disposition:  [x]Home w/ Family   [x] PT,OT,RN   []SNF/LTC   []SAH/Rehab    Care Plan discussed with:  [x]Patient   [x]Family    [x]ED Doc  [x]RN   []Specialist  []Care Manager Patient is at high risk of decompensation and will need to receive treatment that can only be done on an inpatient basis which will require at 2 or more midnights to treat NSTEMI and ARF. CC:   Chest pain    History of Present Illness:   Vonnie Zaman is a 58 y.o. male with history that includes CAD and MI x 3 with stens all three times, DM and CKD presents with gradual onset of severe substernal chest pain that radiated into the neck and into the left arm. .. Similar to the symptoms he felt when he had previous heart attack. He reports shortness of breath and lightheadedness, diaphoresis with nausea but no vomiting. Improved with NTG SL. He was given ASA prior to EMS arrival. He is currnetly CP free but in ER had another couple of episodes and given NTG in ER which also helped. No dyspnea now. Has not been taking meds because he didn't know how to take it and has not established care with PCP after leaving SNF last month. Dr Leonor Mata is neprhologist, Cardiologist  Dr. Tremaine Romo who retired. Review of Systems:  14 point review of systems. A comprehensive review of systems was negative except for: Constitutional: positive for fatigue  Respiratory: positive for dyspnea  Cardiovascular: positive for chest pain  Gastrointestinal: positive for nausea     Past Medical History:   Diagnosis Date    Anemia     Arthritis     back, hips    CAD (coronary artery disease)     Sees Dr. 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 Jadyn Urias for pain mgmt    Chronic pain     Confusion     Depression     Diabetes ([facility] [address]     ED (erectile dysfunction)     GERD (gastroesophageal reflux disease) 11/2015    Diagnosed at [facility] last month    Hypercholesteremia     Hypertension     Psychiatric disorder     depression    PVD (peripheral vascular disease) ([facility] [address]     Thromboembolus ([address]       Past Surgical History:   Procedure Laterality Date    CARDIAC SURG PROCEDURE UNLIST      PTCA    HX ABOVE KNEE AMPUTATION  2013    Left knee stump non-healing ulcer; Dr. Kyara Rubio      Left leg    HX CHOLECYSTECTOMY      HX GI      HX HIP REPLACEMENT      right hip replaced x 2    HX ORTHOPAEDIC  1990s    4 back surgeries    HX ORTHOPAEDIC      donna ankles pin placed    HX ORTHOPAEDIC      left leg 17 surgeries    UPPER GI ENDOSCOPY,BIOPSY  9/2/2015         UPPER GI ENDOSCOPY,BIOPSY  10/12/2015         VASCULAR SURGERY PROCEDURE UNLIST      Multiple revascularization procedures, toe amputations     Prior to Admission medications    Medication Sig Start Date End Date Taking? Authorizing Provider   methadone (DOLOPHINE) 10 mg tablet Take 1 Tab by mouth two (2) times daily (with meals). Max Daily Amount: 60 mg. One tab in am, 2 tab at lunch, 1 at dinner and 2 at bed 1/15/16   Pancho Slade MD   isosorbide mononitrate ER (IMDUR) 30 mg tablet Take 1 Tab by mouth daily. 1/15/16   Pancho Slade MD   ferrous gluconate 324 mg (37.5 mg iron) tablet Take 1 Tab by mouth three (3) times daily (with meals). 1/15/16   Pancho Slade MD   omeprazole (PRILOSEC) 40 mg capsule Take 1 Cap by mouth two (2) times a day. 1/6/16   Pancho Slade MD   metoprolol (LOPRESSOR) 25 mg tablet Take 1 Tab by mouth two (2) times a day. 12/9/15   Pancho Slade MD   venlafaxine [facility]) 100 mg tablet Take 1 Tab by mouth two (2) times a day. 12/9/15   Pnacho Slade MD   gabapentin (NEURONTIN) 400 mg capsule Take 1 Cap by mouth three (3) times daily. 12/4/15   Pancho Slade MD   pravastatin (PRAVACHOL) 80 mg tablet Take 1 Tab by mouth nightly. 12/4/15   Pancho Slade MD   pioglitazone (ACTOS) 15 mg tablet Take 1 Tab by mouth daily. 12/4/15   Pancho Slade MD   diazepam (VALIUM) 10 mg tablet Take 0.5 Tabs by mouth every twelve (12) hours.  Max Daily Amount: 10 mg. 12/4/15   Rogelio Gaitan Laine Cantu MD   doxazosin (CARDURA) 2 mg tablet Take 1 Tab by mouth nightly. 12/4/15   Cali Varner MD   traZODone (DESYREL) 150 mg tablet Take 1 Tab by mouth nightly. 12/4/15   Cali Varner MD   fenofibrate micronized (LOFIBRA) 200 mg capsule Take 1 Cap by mouth every morning. 12/4/15   Cali Varner MD   finasteride (PROSCAR) 5 mg tablet Take 1 Tab by mouth daily. 12/4/15   Cali Varner MD   food supplemt, lactose-reduced [facility] ACTIVE CLEAR) liqd Take 237 mL by mouth four (4) times daily. 12/4/15   Cali Varner MD   neomycin-bacitracin-polymyxin (NEOSPORIN) 3.5mg-400 unit- 5,000 unit/gram ointment Apply  to affected area daily. 10/13/15   Eliseo Alfaro MD   nitroglycerin (NITROSTAT) 0.4 mg SL tablet 1 Tab by SubLINGual route every five (5) minutes as needed for Chest Pain. 6/18/15   Cali Varner MD   ergocalciferol (VITAMIN D2) 50,000 unit capsule Take 1 Cap by mouth every seven (7) days. 3/24/15   Radha Maddox MD   epoetin calos (EPOGEN;PROCRIT) 10,000 unit/mL injection 10,000 Units by SubCUTAneous route every thirty (30) days. Historical Provider     Allergies   Allergen Reactions    Nubain [Nalbuphine] Other (comments)     Made me wild; Dysphoria      Family History   Problem Relation Age of Onset    Lung Disease Mother     Alcohol abuse Father     Diabetes Sister     Cancer Maternal Grandfather      Head and neck    Cancer Paternal Grandmother      Stomach    Diabetes Sister        [x]Family history reviewed. SOCIAL HISTORY:  Patient resides at Home. Smoking history:  Active smoker  Alcohol history: denies    Objective:     Physical Exam:   Visit Vitals    /70    Pulse 94    Temp 97.5 °F (36.4 °C)    Resp 16    Ht 5' 9\" (1.753 m)    Wt 66.2 kg (146 lb)    SpO2 100%    BMI 21.56 kg/m2     Temp (24hrs), Av.5 °F (36.4 °C), Min:97.5 °F (36.4 °C), Max:97.5 °F (36.4 °C)    Visit Vitals    /83    Pulse 86    Temp 97.5 °F (36.4 °C)    Resp 19    Ht 5' 9\" (1.753 m)    Wt 66.2 kg (146 lb)    SpO2 100%    BMI 21.56 kg/m2     General:  Alert, cooperative, no distress, appears stated age. Head:  Normocephalic, without obvious abnormality, atraumatic. Eyes:  Conjunctivae/corneas clear. PERRL, EOMs intact. Fundi benign   Ears:  Normal external, normal hearing   Nose: Nares normal. Septum midline. Mucosa normal. No drainage or sinus tenderness. Throat: Lips, mucosa, and tongue normal. No teeth   Neck: Supple, symmetrical, trachea midline, no adenopathy, thyroid: no enlargement/tenderness/nodules, no carotid bruit and no JVD. Back:   Symmetric, no curvature. ROM normal. No CVA tenderness. Lungs:   Clear to auscultation bilaterally. Chest wall:  No tenderness or deformity. Heart:  Regular rate and rhythm, S1, S2 normal, systolic murmur, but no click, rub or gallop. Abdomen:   Soft, mild epigastric tenderness to palpation w/o guarding or rebound, not reproducing chest pain. Extremities: Left BKA, right LE with chronic vascular dermatitis changes and 2 scabs old. Pulses: 1+ and symmetric all extremities. Skin: Skin color, texture, turgor normal. No rashes or lesions except as noted in the extremities   Lymph nodes: Cervical, supraclavicular, and axillary nodes normal.   Neurologic: CNII-XII intact. Normal strength, sensation and reflexes throughout. ECG:   Sinus tachycardia 101  Left anterior fascicular block   Nonspecific T wave abnormality   Prolonged QT   When compared with ECG of 07-AUG-2015 14:45,   Significant changes have occurred     Data Review: All diagnostic labs and studies have been reviewed. Xr Chest Port    Result Date: 4/28/2018  IMPRESSION: Mild right basilar atelectasis. Procedures: see electronic medical records for all procedures/Xrays and details which were not copied into this note but were reviewed prior to creation of Plan.

## 2018-04-29 NOTE — PROGRESS NOTES
Hospitalist Progress Note    NAME: Tello Haq   :  1955   MRN:  [id number]       Assessment / Plan:  1. Possible NSTEMI. 2. ARF on CKD stage 5  3. Hypertensive urgency  4. Hx of PAD with multiple stents and follows with Dr Jorge Benavides  5. DM 2 with nephropathy & neuropathy  6. Anemia of CKD  7. Chronic thrombocytopenia 137 on adm  8. Tobacco abuse  9. Medication noncompliance    Plan  Cont heparin drip. Cont asa, statin and BB. Cardiology consulted. Possible cath in am per cardiology. Pt is chest pain free now. Started on plavix and cont if okay with cardiology. Echo pending  Renal is following for MINO. He will need HD if cath is attempted. Base line cr is around 3.5 and currently cr is 4.66. Bmp in am. Check renal USG. On clonidine and hydralazine prn for htn urgency  Cbc in am  Cont SSI and blood sugar checks      Body mass index is 21.56 kg/(m^2). Code status: Full  Prophylaxis: Hep SQ  Recommended Disposition: Home w/Family     Subjective:     Chief Complaint / Reason for Physician Visit  Feeling better. No chest pain or sob. Review of Systems:  Symptom Y/N Comments  Symptom Y/N Comments   Fever/Chills    Chest Pain n    Poor Appetite    Edema     Cough n   Abdominal Pain n    Sputum    Joint Pain     SOB/COREA n   Pruritis/Rash     Nausea/vomit    Tolerating PT/OT     Diarrhea    Tolerating Diet     Constipation    Other       Could NOT obtain due to:      Objective:     VITALS:   Last 24hrs VS reviewed since prior progress note.  Most recent are:  Patient Vitals for the past 24 hrs:   Temp Pulse Resp BP SpO2   18 1119 97.8 °F (36.6 °C) (!) 51 18 (!) 188/95 100 %   18 0828 - (!) 58 - 148/80 95 %   18 0728 97.5 °F (36.4 °C) (!) 55 19 175/67 100 %   18 0550 97.4 °F (36.3 °C) (!) 57 18 157/63 95 %   18 2305 98.7 °F (37.1 °C) 75 18 179/65 100 %   18 2215 - 88 18 185/86 100 %   18 2200 - 86 19 188/83 100 %   185 - 92 23 182/90 100 %   18 - 86 - (!) 208/89 -   04/28/18 2100 - 90 23 (!) 191/95 100 %   04/28/18 2045 - 83 18 180/83 100 %   04/28/18 2030 - 90 22 192/90 100 %   04/28/18 2015 - 87 14 193/87 100 %   04/28/18 2000 - 87 12 167/67 100 %   04/28/18 1946 - 94 20 190/82 100 %   04/28/18 1930 - 94 15 168/83 100 %   04/28/18 1915 - 91 15 182/70 100 %   04/28/18 1914 - 94 16 - -   04/28/18 1913 - 94 - 182/70 -   04/28/18 1900 - 94 17 169/70 100 %   04/28/18 1845 - 95 19 174/70 100 %   04/28/18 1815 - 100 21 (!) 209/84 100 %   04/28/18 1715 - 99 18 170/89 100 %   04/28/18 1600 - 95 17 (!) 204/86 100 %   04/28/18 1437 97.5 °F (36.4 °C) 98 11 173/82 99 %       Intake/Output Summary (Last 24 hours) at 04/29/18 1206  Last data filed at 04/29/18 0811   Gross per 24 hour   Intake                0 ml   Output              775 ml   Net             -775 ml        PHYSICAL EXAM:  General: cooperative, no acute distress    EENT:  EOMI. Anicteric sclerae. MMM  Resp:  CTA bilaterally, no wheezing or rales. No accessory muscle use  CV:  Regular  rhythm,  No edema  GI:  Soft, Non distended, Non tender.  +Bowel sounds  Neurologic:  Alert and oriented X 3, normal speech,   Psych:   Not anxious nor agitated  Skin:  No rashes.   No jaundice    Reviewed most current lab test results and cultures  YES  Reviewed most current radiology test results   YES  Review and summation of old records today    NO  Reviewed patient's current orders and MAR    YES  PMH/SH reviewed - no change compared to H&P          Current Facility-Administered Medications:     0.45% sodium chloride infusion, 75 mL/hr, IntraVENous, CONTINUOUS, Cortney Todd MD, Last Rate: 75 mL/hr at 04/29/18 0949, 75 mL/hr at 04/29/18 0949    B complex-vitaminC-folic acid (NEPHROCAP) cap, 1 Cap, Oral, DAILY, Cortney Todd MD    cloNIDine HCl (CATAPRES) tablet 0.1 mg, 0.1 mg, Oral, Q4H PRN, Cortney Todd MD    isosorbide mononitrate ER (IMDUR) tablet 30 mg, 30 mg, Oral, DAILY, Brookland Scale III, DO   [START ON 4/30/2018] famotidine (PEPCID) IVPB 20 mg, 20 mg, IntraVENous, Q24H, Domonique Holden MD    nitroglycerin (NITROSTAT) tablet 0.4 mg, 0.4 mg, SubLINGual, Q5MIN PRN, Heriberto Cheema MD, 0.4 mg at 04/29/18 0830    glucose chewable tablet 16 g, 4 Tab, Oral, PRN, Heriberto Cheema MD    dextrose (D50W) injection syrg 12.5-25 g, 12.5-25 g, IntraVENous, PRN, Heriberto Cheema MD    glucagon [facility]) injection 1 mg, 1 mg, IntraMUSCular, PRN, Heriberto Cheema MD    sodium chloride (NS) flush 5-10 mL, 5-10 mL, IntraVENous, Q8H, Heriberto Cheema MD, 10 mL at 04/28/18 2242    sodium chloride (NS) flush 5-10 mL, 5-10 mL, IntraVENous, PRN, Heriberto Cheema MD    insulin lispro (HUMALOG) injection, , SubCUTAneous, Q6H, Heriberto Cheema MD, 2 Units at 04/29/18 0620    metoprolol tartrate (LOPRESSOR) tablet 25 mg, 25 mg, Oral, Q6H, Heriberto Cheema MD, 25 mg at 04/29/18 1125    aspirin chewable tablet 81 mg, 81 mg, Oral, DAILY, Heriberto Cheema MD, 81 mg at 04/29/18 1025    atorvastatin (LIPITOR) tablet 80 mg, 80 mg, Oral, QPM, Heriberto Cheema MD    acetaminophen (TYLENOL) tablet 650 mg, 650 mg, Oral, Q4H PRN, Heriberto Cheema MD    oxyCODONE-acetaminophen (PERCOCET) 5-325 mg per tablet 1 Tab, 1 Tab, Oral, Q4H PRN, Heriberto Cheema MD, 1 Tab at 04/28/18 2241    morphine injection 4 mg, 4 mg, IntraVENous, Q5MIN PRN, Heriberto Cheema MD, 4 mg at 04/29/18 1141    hydrALAZINE (APRESOLINE) 20 mg/mL injection 10 mg, 10 mg, IntraVENous, Q2H PRN, Heriberto Cheema MD    naloxone [facility]) injection 0.4 mg, 0.4 mg, IntraVENous, PRN, Heriberto Cheema MD    diphenhydrAMINE (BENADRYL) injection 12.5 mg, 12.5 mg, IntraVENous, Q4H PRN, Heriberto Cheema MD    ondansetron [facility]) injection 4 mg, 4 mg, IntraVENous, Q4H PRN, Heriberto Cheema MD, 4 mg at 04/29/18 0803    bisacodyl (DULCOLAX) tablet 5 mg, 5 mg, Oral, DAILY PRN, Heriberto Cheema MD    heparin 25,000 units in D5W 250 ml infusion, 12-25 Units/kg/hr, IntraVENous, TITRATE, Azeem Bravo MD, Last Rate: 9.3 mL/hr at 04/29/18 0731, 14 Units/kg/hr at 04/29/18 0731    clopidogrel (PLAVIX) tablet 75 mg, 75 mg, Oral, DAILY, Azeem Bravo MD, 75 mg at 04/29/18 1025    heparin (porcine) injection 3,950 Units, 60 Units/kg, IntraVENous, PRN, Azeem Bravo MD    heparin (porcine) injection 2,000 Units, 30 Units/kg, IntraVENous, PRN, Azeem Bravo MD, 2,000 Units at 04/29/18 2508  ________________________________________________________________________  Care Plan discussed with:    Comments   Patient y    Family      RN y    Care Manager     Consultant                        Multidiciplinary team rounds were held today with , nursing, pharmacist and clinical coordinator. Patient's plan of care was discussed; medications were reviewed and discharge planning was addressed. ________________________________________________________________________  Total NON critical care TIME:  35   Minutes    Total CRITICAL CARE TIME Spent:   Minutes non procedure based      Comments   >50% of visit spent in counseling and coordination of care     ________________________________________________________________________  Nikos Walsh MD     Procedures: see electronic medical records for all procedures/Xrays and details which were not copied into this note but were reviewed prior to creation of Plan. LABS:  I reviewed today's most current labs and imaging studies.   Pertinent labs include:  Recent Labs      04/29/18   0600  04/28/18 2217 04/28/18   1440   WBC  5.2  7.6  5.9   HGB  9.7*  10.0*  10.3*   HCT  30.3*  30.5*  31.1*   PLT  116*  137*  128*     Recent Labs      04/28/18 2217 04/28/18   1440   NA  146*  145   K  3.5  3.7   CL  116*  115*   CO2  19*  18*   GLU  125*  225*   BUN  71*  71*   CREA  4.66*  4.52*   CA  6.9*  6.9*   MG   --   1.7   ALB  2.5*  2.5*   TBILI  0.2  0.3   SGOT  12*  13*   ALT  12  12   INR   --   1.1 Signed: Mana Tolbert MD

## 2018-04-29 NOTE — PROGRESS NOTES
Pt has elected to proceed with cath tomorrow. Gentle hydration. NPO p MN  He understands the risks. I have recommended diagnostic cath for definitive evaluation of the patient's coronary anatomy and PCI if appropriate. All risks, benefits, and alternatives were discussed and the patient wishes to proceed.

## 2018-04-30 LAB
25(OH)D3 SERPL-MCNC: 22.5 NG/ML (ref 30–100)
ANION GAP SERPL CALC-SCNC: 11 MMOL/L (ref 5–15)
APTT PPP: 32.8 SEC (ref 22.1–32)
ATRIAL RATE: 58 BPM
BUN SERPL-MCNC: 63 MG/DL (ref 6–20)
BUN/CREAT SERPL: 14 (ref 12–20)
CALCIUM SERPL-MCNC: 6.1 MG/DL (ref 8.5–10.1)
CALCIUM SERPL-MCNC: 6.1 MG/DL (ref 8.5–10.1)
CALCULATED P AXIS, ECG09: 46 DEGREES
CALCULATED R AXIS, ECG10: -42 DEGREES
CALCULATED T AXIS, ECG11: -138 DEGREES
CHLORIDE SERPL-SCNC: 114 MMOL/L (ref 97–108)
CO2 SERPL-SCNC: 17 MMOL/L (ref 21–32)
CREAT SERPL-MCNC: 4.51 MG/DL (ref 0.7–1.3)
DIAGNOSIS, 93000: NORMAL
ERYTHROCYTE [DISTWIDTH] IN BLOOD BY AUTOMATED COUNT: 13.9 % (ref 11.5–14.5)
FERRITIN SERPL-MCNC: 744 NG/ML (ref 26–388)
GLUCOSE BLD STRIP.AUTO-MCNC: 126 MG/DL (ref 65–100)
GLUCOSE BLD STRIP.AUTO-MCNC: 173 MG/DL (ref 65–100)
GLUCOSE BLD STRIP.AUTO-MCNC: 180 MG/DL (ref 65–100)
GLUCOSE BLD STRIP.AUTO-MCNC: 90 MG/DL (ref 65–100)
GLUCOSE SERPL-MCNC: 236 MG/DL (ref 65–100)
HCT VFR BLD AUTO: 25.2 % (ref 36.6–50.3)
HGB BLD-MCNC: 8.1 G/DL (ref 12.1–17)
IRON SATN MFR SERPL: 61 % (ref 20–50)
IRON SERPL-MCNC: 98 UG/DL (ref 35–150)
MAGNESIUM SERPL-MCNC: 1.5 MG/DL (ref 1.6–2.4)
MCH RBC QN AUTO: 30.6 PG (ref 26–34)
MCHC RBC AUTO-ENTMCNC: 32.1 G/DL (ref 30–36.5)
MCV RBC AUTO: 95.1 FL (ref 80–99)
NRBC # BLD: 0 K/UL (ref 0–0.01)
NRBC BLD-RTO: 0 PER 100 WBC
P-R INTERVAL, ECG05: 146 MS
PHOSPHATE SERPL-MCNC: 6.5 MG/DL (ref 2.6–4.7)
PLATELET # BLD AUTO: 106 K/UL (ref 150–400)
PMV BLD AUTO: 10.3 FL (ref 8.9–12.9)
POTASSIUM SERPL-SCNC: 3.6 MMOL/L (ref 3.5–5.1)
PTH-INTACT SERPL-MCNC: 705.1 PG/ML (ref 18.4–88)
Q-T INTERVAL, ECG07: 568 MS
QRS DURATION, ECG06: 84 MS
QTC CALCULATION (BEZET), ECG08: 557 MS
RBC # BLD AUTO: 2.65 M/UL (ref 4.1–5.7)
SERVICE CMNT-IMP: ABNORMAL
SERVICE CMNT-IMP: NORMAL
SODIUM SERPL-SCNC: 142 MMOL/L (ref 136–145)
THERAPEUTIC RANGE,PTTT: ABNORMAL SECS (ref 58–77)
TIBC SERPL-MCNC: 161 UG/DL (ref 250–450)
URATE SERPL-MCNC: 7.7 MG/DL (ref 3.5–7.2)
VENTRICULAR RATE, ECG03: 58 BPM
WBC # BLD AUTO: 4.3 K/UL (ref 4.1–11.1)

## 2018-04-30 PROCEDURE — 74011636637 HC RX REV CODE- 636/637: Performed by: HOSPITALIST

## 2018-04-30 PROCEDURE — 82306 VITAMIN D 25 HYDROXY: CPT | Performed by: INTERNAL MEDICINE

## 2018-04-30 PROCEDURE — 84550 ASSAY OF BLOOD/URIC ACID: CPT | Performed by: INTERNAL MEDICINE

## 2018-04-30 PROCEDURE — 77030019698 HC SYR ANGI MDLON MRTM -A

## 2018-04-30 PROCEDURE — 77030019569 HC BND COMPR RAD TERU -B

## 2018-04-30 PROCEDURE — 77030008543 HC TBNG MON PRSS MRTM -A

## 2018-04-30 PROCEDURE — 74011250637 HC RX REV CODE- 250/637: Performed by: INTERNAL MEDICINE

## 2018-04-30 PROCEDURE — 74011250637 HC RX REV CODE- 250/637

## 2018-04-30 PROCEDURE — B4101ZZ FLUOROSCOPY OF ABDOMINAL AORTA USING LOW OSMOLAR CONTRAST: ICD-10-PCS | Performed by: INTERNAL MEDICINE

## 2018-04-30 PROCEDURE — 83970 ASSAY OF PARATHORMONE: CPT | Performed by: INTERNAL MEDICINE

## 2018-04-30 PROCEDURE — 77030028837 HC SYR ANGI PWR INJ COEU -A

## 2018-04-30 PROCEDURE — 93926 LOWER EXTREMITY STUDY: CPT

## 2018-04-30 PROCEDURE — C1769 GUIDE WIRE: HCPCS

## 2018-04-30 PROCEDURE — 74011000258 HC RX REV CODE- 258: Performed by: INTERNAL MEDICINE

## 2018-04-30 PROCEDURE — B4181ZZ FLUOROSCOPY OF BILATERAL RENAL ARTERIES USING LOW OSMOLAR CONTRAST: ICD-10-PCS | Performed by: INTERNAL MEDICINE

## 2018-04-30 PROCEDURE — 77030004549 HC CATH ANGI DX PRF MRTM -A

## 2018-04-30 PROCEDURE — 74011000250 HC RX REV CODE- 250: Performed by: INTERNAL MEDICINE

## 2018-04-30 PROCEDURE — 74011250636 HC RX REV CODE- 250/636: Performed by: INTERNAL MEDICINE

## 2018-04-30 PROCEDURE — 82962 GLUCOSE BLOOD TEST: CPT

## 2018-04-30 PROCEDURE — 83540 ASSAY OF IRON: CPT | Performed by: INTERNAL MEDICINE

## 2018-04-30 PROCEDURE — 74011250636 HC RX REV CODE- 250/636

## 2018-04-30 PROCEDURE — 75625 CONTRAST EXAM ABDOMINL AORTA: CPT

## 2018-04-30 PROCEDURE — 82728 ASSAY OF FERRITIN: CPT | Performed by: INTERNAL MEDICINE

## 2018-04-30 PROCEDURE — 74011636320 HC RX REV CODE- 636/320

## 2018-04-30 PROCEDURE — 4A033BC MEASUREMENT OF ARTERIAL PRESSURE, CORONARY, PERCUTANEOUS APPROACH: ICD-10-PCS | Performed by: INTERNAL MEDICINE

## 2018-04-30 PROCEDURE — 77030004553 HC CATH ANGI DX SFT MRTM -A

## 2018-04-30 PROCEDURE — 74011000250 HC RX REV CODE- 250

## 2018-04-30 PROCEDURE — 85027 COMPLETE CBC AUTOMATED: CPT | Performed by: INTERNAL MEDICINE

## 2018-04-30 PROCEDURE — 77030015766

## 2018-04-30 PROCEDURE — 84100 ASSAY OF PHOSPHORUS: CPT | Performed by: INTERNAL MEDICINE

## 2018-04-30 PROCEDURE — 83735 ASSAY OF MAGNESIUM: CPT | Performed by: INTERNAL MEDICINE

## 2018-04-30 PROCEDURE — 36415 COLL VENOUS BLD VENIPUNCTURE: CPT | Performed by: INTERNAL MEDICINE

## 2018-04-30 PROCEDURE — 85730 THROMBOPLASTIN TIME PARTIAL: CPT | Performed by: INTERNAL MEDICINE

## 2018-04-30 PROCEDURE — 74011250636 HC RX REV CODE- 250/636: Performed by: HOSPITALIST

## 2018-04-30 PROCEDURE — C1887 CATHETER, GUIDING: HCPCS

## 2018-04-30 PROCEDURE — 4A023N7 MEASUREMENT OF CARDIAC SAMPLING AND PRESSURE, LEFT HEART, PERCUTANEOUS APPROACH: ICD-10-PCS | Performed by: INTERNAL MEDICINE

## 2018-04-30 PROCEDURE — 77030010221 HC SPLNT WR POS TELE -B

## 2018-04-30 PROCEDURE — B2111ZZ FLUOROSCOPY OF MULTIPLE CORONARY ARTERIES USING LOW OSMOLAR CONTRAST: ICD-10-PCS | Performed by: INTERNAL MEDICINE

## 2018-04-30 PROCEDURE — C1894 INTRO/SHEATH, NON-LASER: HCPCS

## 2018-04-30 PROCEDURE — 65660000000 HC RM CCU STEPDOWN

## 2018-04-30 PROCEDURE — 80048 BASIC METABOLIC PNL TOTAL CA: CPT | Performed by: INTERNAL MEDICINE

## 2018-04-30 PROCEDURE — 74011250637 HC RX REV CODE- 250/637: Performed by: HOSPITALIST

## 2018-04-30 RX ORDER — MIDAZOLAM HYDROCHLORIDE 1 MG/ML
.5-2 INJECTION, SOLUTION INTRAMUSCULAR; INTRAVENOUS
Status: DISCONTINUED | OUTPATIENT
Start: 2018-04-30 | End: 2018-04-30 | Stop reason: HOSPADM

## 2018-04-30 RX ORDER — IODIXANOL 320 MG/ML
INJECTION, SOLUTION INTRAVASCULAR
Status: COMPLETED
Start: 2018-04-30 | End: 2018-04-30

## 2018-04-30 RX ORDER — VERAPAMIL HYDROCHLORIDE 2.5 MG/ML
INJECTION, SOLUTION INTRAVENOUS
Status: COMPLETED
Start: 2018-04-30 | End: 2018-04-30

## 2018-04-30 RX ORDER — CALCITRIOL 0.25 UG/1
0.25 CAPSULE ORAL DAILY
Status: DISCONTINUED | OUTPATIENT
Start: 2018-04-30 | End: 2018-05-14 | Stop reason: HOSPADM

## 2018-04-30 RX ORDER — HEPARIN SODIUM 200 [USP'U]/100ML
500 INJECTION, SOLUTION INTRAVENOUS ONCE
Status: COMPLETED | OUTPATIENT
Start: 2018-04-30 | End: 2018-04-30

## 2018-04-30 RX ORDER — SODIUM CHLORIDE 0.9 % (FLUSH) 0.9 %
5-10 SYRINGE (ML) INJECTION EVERY 8 HOURS
Status: DISCONTINUED | OUTPATIENT
Start: 2018-04-30 | End: 2018-05-11

## 2018-04-30 RX ORDER — IODIXANOL 320 MG/ML
0-100 INJECTION, SOLUTION INTRAVASCULAR
Status: DISCONTINUED | OUTPATIENT
Start: 2018-04-30 | End: 2018-05-10

## 2018-04-30 RX ORDER — CALCIUM CARBONATE 200(500)MG
200 TABLET,CHEWABLE ORAL
Status: DISCONTINUED | OUTPATIENT
Start: 2018-04-30 | End: 2018-05-14 | Stop reason: HOSPADM

## 2018-04-30 RX ORDER — HEPARIN SODIUM 200 [USP'U]/100ML
INJECTION, SOLUTION INTRAVENOUS
Status: COMPLETED
Start: 2018-04-30 | End: 2018-04-30

## 2018-04-30 RX ORDER — VERAPAMIL HYDROCHLORIDE 2.5 MG/ML
2.5 INJECTION, SOLUTION INTRAVENOUS ONCE
Status: COMPLETED | OUTPATIENT
Start: 2018-04-30 | End: 2018-04-30

## 2018-04-30 RX ORDER — SODIUM CHLORIDE 0.9 % (FLUSH) 0.9 %
5-10 SYRINGE (ML) INJECTION AS NEEDED
Status: DISCONTINUED | OUTPATIENT
Start: 2018-04-30 | End: 2018-05-11

## 2018-04-30 RX ORDER — LIDOCAINE HYDROCHLORIDE 10 MG/ML
1-30 INJECTION, SOLUTION EPIDURAL; INFILTRATION; INTRACAUDAL; PERINEURAL
Status: DISCONTINUED | OUTPATIENT
Start: 2018-04-30 | End: 2018-04-30 | Stop reason: HOSPADM

## 2018-04-30 RX ORDER — ADENOSINE 3 MG/ML
INJECTION, SOLUTION INTRAVENOUS
Status: DISCONTINUED
Start: 2018-04-30 | End: 2018-04-30

## 2018-04-30 RX ORDER — LANOLIN ALCOHOL/MO/W.PET/CERES
400 CREAM (GRAM) TOPICAL DAILY
Status: DISCONTINUED | OUTPATIENT
Start: 2018-04-30 | End: 2018-05-14 | Stop reason: HOSPADM

## 2018-04-30 RX ORDER — FENTANYL CITRATE 50 UG/ML
25-50 INJECTION, SOLUTION INTRAMUSCULAR; INTRAVENOUS
Status: DISCONTINUED | OUTPATIENT
Start: 2018-04-30 | End: 2018-04-30 | Stop reason: HOSPADM

## 2018-04-30 RX ORDER — LIDOCAINE HYDROCHLORIDE 10 MG/ML
INJECTION, SOLUTION EPIDURAL; INFILTRATION; INTRACAUDAL; PERINEURAL
Status: COMPLETED
Start: 2018-04-30 | End: 2018-04-30

## 2018-04-30 RX ORDER — HEPARIN SODIUM 1000 [USP'U]/ML
1000-10000 INJECTION, SOLUTION INTRAVENOUS; SUBCUTANEOUS
Status: DISCONTINUED | OUTPATIENT
Start: 2018-04-30 | End: 2018-04-30 | Stop reason: HOSPADM

## 2018-04-30 RX ORDER — HEPARIN SODIUM 1000 [USP'U]/ML
INJECTION, SOLUTION INTRAVENOUS; SUBCUTANEOUS
Status: COMPLETED
Start: 2018-04-30 | End: 2018-04-30

## 2018-04-30 RX ORDER — MIDAZOLAM HYDROCHLORIDE 1 MG/ML
INJECTION, SOLUTION INTRAMUSCULAR; INTRAVENOUS
Status: COMPLETED
Start: 2018-04-30 | End: 2018-04-30

## 2018-04-30 RX ORDER — FENTANYL CITRATE 50 UG/ML
INJECTION, SOLUTION INTRAMUSCULAR; INTRAVENOUS
Status: COMPLETED
Start: 2018-04-30 | End: 2018-04-30

## 2018-04-30 RX ADMIN — MIDAZOLAM HYDROCHLORIDE 1 MG: 1 INJECTION, SOLUTION INTRAMUSCULAR; INTRAVENOUS at 09:31

## 2018-04-30 RX ADMIN — NITROGLYCERIN 1 INCH: 20 OINTMENT TOPICAL at 10:24

## 2018-04-30 RX ADMIN — ASPIRIN 81 MG 81 MG: 81 TABLET ORAL at 08:54

## 2018-04-30 RX ADMIN — FENTANYL CITRATE 25 MCG: 50 INJECTION, SOLUTION INTRAMUSCULAR; INTRAVENOUS at 09:45

## 2018-04-30 RX ADMIN — IODIXANOL 80 ML: 320 INJECTION, SOLUTION INTRAVASCULAR at 10:11

## 2018-04-30 RX ADMIN — HEPARIN SODIUM 3950 UNITS: 5000 INJECTION, SOLUTION INTRAVENOUS; SUBCUTANEOUS at 04:51

## 2018-04-30 RX ADMIN — ATORVASTATIN CALCIUM 80 MG: 40 TABLET, FILM COATED ORAL at 17:57

## 2018-04-30 RX ADMIN — FENTANYL CITRATE 25 MCG: 50 INJECTION, SOLUTION INTRAMUSCULAR; INTRAVENOUS at 09:31

## 2018-04-30 RX ADMIN — NITROGLYCERIN 300 MCG: 5 INJECTION, SOLUTION INTRAVENOUS at 10:28

## 2018-04-30 RX ADMIN — HEPARIN SODIUM AND DEXTROSE 22.05 UNITS/KG/HR: 10000; 5 INJECTION INTRAVENOUS at 07:13

## 2018-04-30 RX ADMIN — FENTANYL CITRATE 25 MCG: 50 INJECTION, SOLUTION INTRAMUSCULAR; INTRAVENOUS at 10:12

## 2018-04-30 RX ADMIN — HEPARIN SODIUM 1000 UNITS: 1000 INJECTION, SOLUTION INTRAVENOUS; SUBCUTANEOUS at 09:54

## 2018-04-30 RX ADMIN — VERAPAMIL HYDROCHLORIDE 2.5 MG: 2.5 INJECTION INTRAVENOUS at 09:54

## 2018-04-30 RX ADMIN — CALCITRIOL 0.25 MCG: 0.25 CAPSULE, LIQUID FILLED ORAL at 14:29

## 2018-04-30 RX ADMIN — CALCIUM CARBONATE 200 MG: 500 TABLET, CHEWABLE ORAL at 21:33

## 2018-04-30 RX ADMIN — OXYCODONE HYDROCHLORIDE AND ACETAMINOPHEN 1 TABLET: 5; 325 TABLET ORAL at 03:55

## 2018-04-30 RX ADMIN — HEPARIN SODIUM 1000 UNITS: 200 INJECTION, SOLUTION INTRAVENOUS at 09:55

## 2018-04-30 RX ADMIN — CALCIUM CARBONATE 200 MG: 500 TABLET, CHEWABLE ORAL at 17:57

## 2018-04-30 RX ADMIN — IODIXANOL 100 ML: 320 INJECTION, SOLUTION INTRAVASCULAR at 10:36

## 2018-04-30 RX ADMIN — CALCIUM CARBONATE 200 MG: 500 TABLET, CHEWABLE ORAL at 12:05

## 2018-04-30 RX ADMIN — MIDAZOLAM 1 MG: 1 INJECTION INTRAMUSCULAR; INTRAVENOUS at 09:31

## 2018-04-30 RX ADMIN — FAMOTIDINE 20 MG: 20 INJECTION, SOLUTION INTRAVENOUS at 08:54

## 2018-04-30 RX ADMIN — ISOSORBIDE MONONITRATE 30 MG: 30 TABLET, EXTENDED RELEASE ORAL at 12:04

## 2018-04-30 RX ADMIN — WATER: 1000 INJECTION, SOLUTION INTRAVENOUS at 14:29

## 2018-04-30 RX ADMIN — IODIXANOL 50 ML: 320 INJECTION, SOLUTION INTRAVASCULAR at 10:41

## 2018-04-30 RX ADMIN — IOPAMIDOL 40 ML: 755 INJECTION, SOLUTION INTRAVENOUS at 10:35

## 2018-04-30 RX ADMIN — SODIUM CHLORIDE 100 ML/HR: 450 INJECTION, SOLUTION INTRAVENOUS at 08:51

## 2018-04-30 RX ADMIN — Medication 10 ML: at 21:34

## 2018-04-30 RX ADMIN — Medication 400 MG: at 12:04

## 2018-04-30 RX ADMIN — MIDAZOLAM HYDROCHLORIDE 1 MG: 1 INJECTION, SOLUTION INTRAMUSCULAR; INTRAVENOUS at 10:30

## 2018-04-30 RX ADMIN — LIDOCAINE HYDROCHLORIDE 2 ML: 10 INJECTION, SOLUTION EPIDURAL; INFILTRATION; INTRACAUDAL; PERINEURAL at 09:52

## 2018-04-30 RX ADMIN — Medication 10 ML: at 06:00

## 2018-04-30 RX ADMIN — MIDAZOLAM HYDROCHLORIDE 1 MG: 1 INJECTION, SOLUTION INTRAMUSCULAR; INTRAVENOUS at 09:45

## 2018-04-30 RX ADMIN — NEPHROCAP 1 CAPSULE: 1 CAP ORAL at 12:04

## 2018-04-30 RX ADMIN — ERYTHROPOIETIN 10000 UNITS: 10000 INJECTION, SOLUTION INTRAVENOUS; SUBCUTANEOUS at 18:37

## 2018-04-30 RX ADMIN — INSULIN LISPRO 2 UNITS: 100 INJECTION, SOLUTION INTRAVENOUS; SUBCUTANEOUS at 12:07

## 2018-04-30 RX ADMIN — CLOPIDOGREL BISULFATE 75 MG: 75 TABLET ORAL at 08:54

## 2018-04-30 RX ADMIN — NITROGLYCERIN 200 MCG: 5 INJECTION, SOLUTION INTRAVENOUS at 09:54

## 2018-04-30 RX ADMIN — ADENOSINE 140 MCG/KG/MIN: 3 INJECTION, SOLUTION INTRAVENOUS at 10:23

## 2018-04-30 RX ADMIN — METOPROLOL TARTRATE 25 MG: 25 TABLET ORAL at 17:57

## 2018-04-30 RX ADMIN — METOPROLOL TARTRATE 25 MG: 25 TABLET ORAL at 12:04

## 2018-04-30 RX ADMIN — OXYCODONE HYDROCHLORIDE AND ACETAMINOPHEN 1 TABLET: 5; 325 TABLET ORAL at 18:04

## 2018-04-30 RX ADMIN — HEPARIN SODIUM 5000 UNITS: 1000 INJECTION, SOLUTION INTRAVENOUS; SUBCUTANEOUS at 10:25

## 2018-04-30 RX ADMIN — VERAPAMIL HYDROCHLORIDE 2.5 MG: 2.5 INJECTION, SOLUTION INTRAVENOUS at 09:54

## 2018-04-30 RX ADMIN — MIDAZOLAM HYDROCHLORIDE 1 MG: 1 INJECTION, SOLUTION INTRAMUSCULAR; INTRAVENOUS at 10:10

## 2018-04-30 NOTE — PROGRESS NOTES
Progress Note      4/30/2018 9:27 AM  NAME: Moo Paul   MRN:  [id number]   Admit Diagnosis: NSTEMI (non-ST elevated myocardial infarction) ([facility]  ARF (acute renal failure) ([facility]      Problem List:     1. Chest pain  2. Elevated troponin  3. Acute on chronic kidney disease Stg 4  4. Coronary artery disease s/p multivessel stenting s/p PCI of LCx, PTCA of RCA 7/97, PTCA LAD 3/94. Lexiscan 2/08 w/ EF 62%, distal ineroapical infarct w/o ischemia  5. Peripheral vascular disease s/p left iliac stenting, left SFA stenting 3/00, right iliac stenting 11/99  6. Status post LAKA  7. Bilateral carotid stenosis; 16-49% 9/13  8. Hypertension  9. Diabetes  10. Tobacco abuse  11. Chronic pain  12. Chronic anemia  13. Hyperlipidemia  14. Noncompliance  15. Peptic ulcer disease w/ GIB '15  16. Falls  17. Lives w/ sister in [address] [place] [address] now  25. Usual Cardiologist:  Dr. Rainer Olvera (last seen 8/16)     Assessment/Plan:   TnI peaked @ 0.16 w/ nml CK  sCr 4.7  EKG:  ST, LAFB, NSSTTWc    No CP overnight     19. Cath today; he understands to the real risk of potentially needing dialysis. Discussed w/ Dr. Anshul Tillman; will shoot renals as well. 20. Continue ASA  21. Continue plavix  22. Continue atorva  23. Continue metoprolol 25mg q12h  24. Continue ISMN 30mg daily  25. Echo pending         []       High complexity decision making was performed in this patient at high risk for decompensation with multiple organ involvement. Subjective:     Moo Paul denies chest pain, dyspnea. Discussed with RN events overnight.      Review of Systems:    Symptom Y/N Comments  Symptom Y/N Comments   Fever/Chills N   Chest Pain N    Poor Appetite N   Edema N    Cough N   Abdominal Pain N    Sputum N   Joint Pain N    SOB/COREA N   Pruritis/Rash N    Nausea/vomit N   Tolerating PT/OT Y    Diarrhea N   Tolerating Diet Y    Constipation N   Other       Could NOT obtain due to:      Objective:      Physical Exam:    Last 24hrs VS reviewed since prior progress note. Most recent are:    Visit Vitals    /58 (BP 1 Location: Right arm, BP Patient Position: At rest)    Pulse (!) 55  Comment: notified PadmajaRN    Temp 97.7 °F (36.5 °C)    Resp 16    Ht 5' 9\" (1.753 m)    Wt 71.9 kg (158 lb 9.6 oz)    SpO2 98%    BMI 23.42 kg/m2       Intake/Output Summary (Last 24 hours) at 04/30/18 3574  Last data filed at 04/30/18 0426   Gross per 24 hour   Intake          2740.36 ml   Output              300 ml   Net          2440.36 ml        General Appearance: Well developed, well nourished, alert & oriented x 3,    no acute distress. Ears/Nose/Mouth/Throat: Hearing grossly normal.  Neck: Supple. Chest: Lungs clear to auscultation bilaterally. Cardiovascular: Regular rate and rhythm, S1S2 normal, no murmur. Abdomen: Soft, non-tender, bowel sounds are active. Extremities: No edema bilaterally. LAKA. 1+ RCFA pulses. Skin: Warm and dry. []         Post-cath site without hematoma, bruit, tenderness, or thrill. Distal pulses intact. PMH/SH reviewed - no change compared to H&P    Data Review    Telemetry: sinus rhythm     EKG:   [x]  No new EKG for review    Lab Data Personally Reviewed:    Recent Labs      04/30/18   0407  04/29/18   0600   WBC  4.3  5.2   HGB  8.1*  9.7*   HCT  25.2*  30.3*   PLT  106*  116*     Recent Labs      04/30/18   0407  04/29/18   2133  04/29/18   1435   04/28/18   1440   INR   --    --    --    --   1.1   PTP   --    --    --    --   10.9   APTT  32.8*  46.0*  42.7*   < >   --     < > = values in this interval not displayed.       Recent Labs      04/30/18   0407  04/28/18   2217  04/28/18   1440   NA  142  146*  145   K  3.6  3.5  3.7   CL  114*  116*  115*   CO2  17*  19*  18*   BUN  63*  71*  71*   CREA  4.51*  4.66*  4.52*   GLU  236*  125*  225*   CA  6.1*  6.1*  6.9*  6.9*   MG  1.5*   --   1.7     Recent Labs      04/29/18   0844  04/28/18   2217  04/28/18   1751   CPK   --    --   105   CKNDX   --    --   3.7* TROIQ  0.09*  0.16*  0.12*     Lab Results   Component Value Date/Time    Cholesterol, total 236 (H) 04/29/2018 06:00 AM    HDL Cholesterol 34 04/29/2018 06:00 AM    LDL,Direct 62 10/03/2014 05:50 AM    LDL, calculated 172.4 (H) 04/29/2018 06:00 AM    Triglyceride 148 04/29/2018 06:00 AM    CHOL/HDL Ratio 6.9 (H) 04/29/2018 06:00 AM       Recent Labs      04/28/18   2217  04/28/18   1440   SGOT  12*  13*   AP  177*  190*   TP  6.1*  6.1*   ALB  2.5*  2.5*   GLOB  3.6  3.6     No results for input(s): PH, PCO2, PO2 in the last 72 hours.     Medications Personally Reviewed:    Current Facility-Administered Medications   Medication Dose Route Frequency    calcium carbonate (TUMS) chewable tablet 200 mg [elemental]  200 mg Oral QID WITH MEALS    calcitRIOL (ROCALTROL) capsule 0.25 mcg  0.25 mcg Oral DAILY    magnesium oxide (MAG-OX) tablet 400 mg  400 mg Oral DAILY    epoetin calos (EPOGEN;PROCRIT) injection 10,000 Units  10,000 Units SubCUTAneous Once per day on Mon Thu    fentaNYL citrate (PF) injection 25-50 mcg  25-50 mcg IntraVENous Multiple    heparinized saline 2 units/mL infusion 1,000 Units  500 mL Irrigation ONCE    heparinized saline 2 units/mL infusion 1,000 Units  500 mL Irrigation ONCE    heparinized saline 2 units/mL infusion 1,000 Units  500 mL Irrigation ONCE    lidocaine (PF) (XYLOCAINE) 10 mg/mL (1 %) injection 1-30 mL  1-30 mL IntraDERMal Multiple    midazolam (VERSED) injection 0.5-2 mg  0.5-2 mg IntraVENous Multiple    iodixanol (VISIPAQUE) 320 mg iodine/mL contrast injection 0-100 mL  0-100 mL IntraarTERial Multiple    midazolam (VERSED) 1 mg/mL injection        lidocaine (PF) (XYLOCAINE) 10 mg/mL (1 %) injection        fentaNYL citrate (PF) 50 mcg/mL injection        iodixanol (VISIPAQUE) 320 mg iodine/mL contrast injection        heparinized saline 2 units/mL 1,000 unit/500 mL infusion        verapamil (ISOPTIN) 2.5 mg/mL injection        heparin (porcine) 1,000 unit/mL injection        nitroglycerin 1 mg/10mL injection        0.45% sodium chloride infusion  100 mL/hr IntraVENous CONTINUOUS    B complex-vitaminC-folic acid (NEPHROCAP) cap  1 Cap Oral DAILY    cloNIDine HCl (CATAPRES) tablet 0.1 mg  0.1 mg Oral Q4H PRN    isosorbide mononitrate ER (IMDUR) tablet 30 mg  30 mg Oral DAILY    famotidine (PEPCID) IVPB 20 mg  20 mg IntraVENous Q24H    nitroglycerin (NITROSTAT) tablet 0.4 mg  0.4 mg SubLINGual Q5MIN PRN    glucose chewable tablet 16 g  4 Tab Oral PRN    dextrose (D50W) injection syrg 12.5-25 g  12.5-25 g IntraVENous PRN    glucagon (GLUCAGEN) injection 1 mg  1 mg IntraMUSCular PRN    sodium chloride (NS) flush 5-10 mL  5-10 mL IntraVENous Q8H    sodium chloride (NS) flush 5-10 mL  5-10 mL IntraVENous PRN    insulin lispro (HUMALOG) injection   SubCUTAneous Q6H    metoprolol tartrate (LOPRESSOR) tablet 25 mg  25 mg Oral Q6H    aspirin chewable tablet 81 mg  81 mg Oral DAILY    atorvastatin (LIPITOR) tablet 80 mg  80 mg Oral QPM    acetaminophen (TYLENOL) tablet 650 mg  650 mg Oral Q4H PRN    oxyCODONE-acetaminophen (PERCOCET) 5-325 mg per tablet 1 Tab  1 Tab Oral Q4H PRN    hydrALAZINE (APRESOLINE) 20 mg/mL injection 10 mg  10 mg IntraVENous Q2H PRN    naloxone (NARCAN) injection 0.4 mg  0.4 mg IntraVENous PRN    diphenhydrAMINE (BENADRYL) injection 12.5 mg  12.5 mg IntraVENous Q4H PRN    ondansetron (ZOFRAN) injection 4 mg  4 mg IntraVENous Q4H PRN    bisacodyl (DULCOLAX) tablet 5 mg  5 mg Oral DAILY PRN    heparin 25,000 units in D5W 250 ml infusion  12-25 Units/kg/hr IntraVENous TITRATE    clopidogrel (PLAVIX) tablet 75 mg  75 mg Oral DAILY    heparin (porcine) injection 3,950 Units  60 Units/kg IntraVENous PRN    heparin (porcine) injection 2,000 Units  30 Units/kg IntraVENous PRN         Gonzalo Diana III, DO

## 2018-04-30 NOTE — CONSULTS
[facility] Progress Note        NAME: Viki Prado       :  1955       MRN:  [id number]     Date/Time: 2018    Risk of deterioration: medium       Assessment:    Plan:  MINO on CKD IV   S/P CATH-2V cad, (R) KIA--Right kidney measures 10.9 cm by US  HTN  PAD/PVD s/p (L) AKA-(L) iliac occlusion  ANemia  Secondary PTH Creatinine remains poor. Pt for cath-d/w dr. Olga Lidia Quintero, 2v cad with a (R) kia, occluded (L) common iliac (s/p left aka in past)  Dr. Adah Kawasaki is his vascular surgeon--will d/w him-either way pt is heading toward dialysis. May be worthwhile to have acces placed while here. .. Added nephrocap,  Epo,    checked bone metabolism labs--adding tums/calcitriol as PTH > 700  Will also check 25 vit D  Not adding allopurinol due to lower platelets  Add bicarb       Subjective:     Chief Complaint:  none    Review of Systems: no n/v/f/c    Objective:     VITALS:   Last 24hrs VS reviewed since prior progress note. Most recent are:  Visit Vitals    /65    Pulse (!) 54    Temp 97.4 °F (36.3 °C)    Resp 18    Ht 5' 9\" (1.753 m)    Wt 71.9 kg (158 lb 9.6 oz)    SpO2 100%    BMI 23.42 kg/m2     SpO2 Readings from Last 6 Encounters:   18 100%   01/15/16 98%   16 100%   12/04/15 98%   10/13/15 97%   09/25/15 100%    O2 Flow Rate (L/min): 2 l/min       Intake/Output Summary (Last 24 hours) at 18 1155  Last data filed at 18 4133   Gross per 24 hour   Intake          2590.36 ml   Output              550 ml   Net          2040.36 ml        Telemetry Reviewed    PHYSICAL EXAM:    General   well developed, well nourished, appears stated age, in no acute distress  EENT  NCAT, EOMI  Respiratory   Clear anteriorly  Cardiology  RRR  Abdominal  Soft, nt  Extremities  (L) AKA, (R) with brawny edema (his baseline)  Neurological  No focal neurological deficits noted  Psychological  Oriented x 3. Normal affect.               Lab Data Reviewed: (see below)    Medications Reviewed: (see below)    PMH/SH reviewed - no change compared to H&P  ___________________________________________________  ___________    Attending Physician: Enoc Urena MD     ____________________________________________________  MEDICATIONS:  Current Facility-Administered Medications   Medication Dose Route Frequency    calcium carbonate (TUMS) chewable tablet 200 mg [elemental]  200 mg Oral QID WITH MEALS    calcitRIOL (ROCALTROL) capsule 0.25 mcg  0.25 mcg Oral DAILY    magnesium oxide (MAG-OX) tablet 400 mg  400 mg Oral DAILY    epoetin calos (EPOGEN;PROCRIT) injection 10,000 Units  10,000 Units SubCUTAneous Once per day on Mon Thu    iodixanol (VISIPAQUE) 320 mg iodine/mL contrast injection 0-100 mL  0-100 mL IntraarTERial Multiple    sodium chloride (NS) flush 5-10 mL  5-10 mL IntraVENous Q8H    sodium chloride (NS) flush 5-10 mL  5-10 mL IntraVENous PRN    0.45% sodium chloride infusion  100 mL/hr IntraVENous CONTINUOUS    B complex-vitaminC-folic acid (NEPHROCAP) cap  1 Cap Oral DAILY    cloNIDine HCl (CATAPRES) tablet 0.1 mg  0.1 mg Oral Q4H PRN    isosorbide mononitrate ER (IMDUR) tablet 30 mg  30 mg Oral DAILY    famotidine (PEPCID) IVPB 20 mg  20 mg IntraVENous Q24H    nitroglycerin (NITROSTAT) tablet 0.4 mg  0.4 mg SubLINGual Q5MIN PRN    glucose chewable tablet 16 g  4 Tab Oral PRN    dextrose (D50W) injection syrg 12.5-25 g  12.5-25 g IntraVENous PRN    glucagon (GLUCAGEN) injection 1 mg  1 mg IntraMUSCular PRN    sodium chloride (NS) flush 5-10 mL  5-10 mL IntraVENous Q8H    sodium chloride (NS) flush 5-10 mL  5-10 mL IntraVENous PRN    insulin lispro (HUMALOG) injection   SubCUTAneous Q6H    metoprolol tartrate (LOPRESSOR) tablet 25 mg  25 mg Oral Q6H    aspirin chewable tablet 81 mg  81 mg Oral DAILY    atorvastatin (LIPITOR) tablet 80 mg  80 mg Oral QPM    acetaminophen (TYLENOL) tablet 650 mg  650 mg Oral Q4H PRN    oxyCODONE-acetaminophen (PERCOCET) 5-325 mg per tablet 1 Tab  1 Tab Oral Q4H PRN    hydrALAZINE (APRESOLINE) 20 mg/mL injection 10 mg  10 mg IntraVENous Q2H PRN    naloxone (NARCAN) injection 0.4 mg  0.4 mg IntraVENous PRN    diphenhydrAMINE (BENADRYL) injection 12.5 mg  12.5 mg IntraVENous Q4H PRN    ondansetron (ZOFRAN) injection 4 mg  4 mg IntraVENous Q4H PRN    bisacodyl (DULCOLAX) tablet 5 mg  5 mg Oral DAILY PRN    heparin 25,000 units in D5W 250 ml infusion  12-25 Units/kg/hr IntraVENous TITRATE    clopidogrel (PLAVIX) tablet 75 mg  75 mg Oral DAILY    heparin (porcine) injection 3,950 Units  60 Units/kg IntraVENous PRN    heparin (porcine) injection 2,000 Units  30 Units/kg IntraVENous PRN        LABS:  Recent Labs      04/30/18 0407 04/29/18   0600   WBC  4.3  5.2   HGB  8.1*  9.7*   HCT  25.2*  30.3*   PLT  106*  116*     Recent Labs      04/30/18 0407 04/28/18 2217 04/28/18   1440   NA  142  146*  145   K  3.6  3.5  3.7   CL  114*  116*  115*   CO2  17*  19*  18*   BUN  63*  71*  71*   CREA  4.51*  4.66*  4.52*   GLU  236*  125*  225*   CA  6.1*  6.1*  6.9*  6.9*   MG  1.5*   --   1.7   PHOS  6.5*   --    --    URICA  7.7*   --    --      Recent Labs      04/28/18   2217 04/28/18   1440   SGOT  12*  13*   ALT  12  12   AP  177*  190*   TBILI  0.2  0.3   TP  6.1*  6.1*   ALB  2.5*  2.5*   GLOB  3.6  3.6     Recent Labs      04/30/18   0407  04/29/18   2133  04/29/18   1435   04/28/18   1440   INR   --    --    --    --   1.1   PTP   --    --    --    --   10.9   APTT  32.8*  46.0*  42.7*   < >   --     < > = values in this interval not displayed. Recent Labs      04/30/18   0407   TIBC  161*   PSAT  61*   FERR  744*      No results for input(s): PH, PCO2, PO2 in the last 72 hours.   Recent Labs      04/29/18   0844  04/28/18   2217  04/28/18   1751   CPK   --    --   105   CKNDX   --    --   3.7*   TROIQ  0.09*  0.16*  0.12*     Lab Results   Component Value Date/Time    Glucose (POC) 180 (H) 04/30/2018 10:59 AM    Glucose (POC) 173 (H) 04/30/2018 07:37 AM    Glucose (POC) 190 (H) 04/29/2018 09:49 PM    Glucose (POC) 180 (H) 04/29/2018 05:08 PM    Glucose (POC) 140 (H) 04/29/2018 01:23 PM       D/W Dr. Abigail Huggins and Dr. Yuri Green

## 2018-04-30 NOTE — PROCEDURES
Cardiac Cathetherization Note     PreOp Diagnosis:    []       Chest Pain   []       STEMI   []       Unstable Angina   [x]       NSTEMI   []       Cardiomyopathy/Heart Failure   []       Abnormal Stress Test   []       Valve Disease   []       Pre-Operative Evaluation   []       Other:      Findings/PostOp Diagnosis:   1. Two vessel CAD  2. Patent stent in OM1 w/ mild ISR  3. Elevated LVEDP  4. Insignificant FFR of the oLAD  5. Patent left renal artery  6. Severe stenosis of the proximal right renal artery  7. Patent splenic and hepatic arteries  8. Occluded left common iliac artery  9. Right common iliac artery appears to be patent at its origin    Recommendations:   1. Continue DAPT   2. Routine post procedure & access site care   3. Continue aggressive medical management and risk factor modification   4. Dr. Rossy Forbes made aware of results    I have explained the nature of cardiac catheterization and possible percutaneous coronary intervention including risks and benefits of the procedure with the patient which include at least a 1:1000 risk for diagnostic procedure and 1/100 risk for percutaneous intervention. Risks include but are not limited to risk of heart attack, stroke, vascular trauma requiring surgical repair or transfusion, abnormal heart rhythm requiring defibrillation or pacemaker, need for intraaortic balloon pump support, renal dysfunction requiring dialysis, exacerbated gastrointestinal bleeding, allergic response to medications requiring ventilatory support, emergent cardiac surgery and even death. They also understand the need for medical compliance - particularly if stenting is required - mandating continued daily consumption of aspirin and plavix or other antiplatelet therapy. Differences between medicated stent versus bare metal stent reviewed with patient. The patient expresses an understanding and verbally consents.  They also understand plans for either radial or femoral access - with unique risks to both vascular beds including arterial occlusion, vascular trauma, hematoma and need for vascular surgery. I have answered all of their questions regarding the procedure and they are willing to proceed. Procedures: LHC, Cors, Cineflouroscopy, FFR of LAD, ABD Aogram, bilateral selective renal angiogram     Indication:  As above    Procedure status: []  Elective  [x] Urgent  [] Emergent    Operators:  Pancho Gaona,     Assistants: None    Access:   [x]  RIGHT Radial  []  LEFT Radial  [] RCFA  []  LCFA  []  RCFV  []  LCFV    Catheters: 6Fr JR4, EBU 3.5    Closure: [x]  TR Band  []  Angioseal  []  Perclose  []  Manual Compression    Tubes/Drains:  [x] No tubes or drains remain from this procedure  [] Other:     Estimated Blood Loss: Minimal     Specimens: None     Sedation: Moderate conscious sedation with IV fentany & versed, local anesthesia with 1% lidocaine. This was performed by non-anesthesia personnel and I provided direct supervision to a trained independent observer. Time under moderate sedation:  61 Min    Patient age:  58 y.o. Contrast:   270  cc  []  Isovue   [x]  Visipaque    Fluoro Time:   17.4  Min    Radiation Dose:   9142  mGy    Complications:  [x] None  [] Other:     Patient Condition at the end of the procedure:  [x] Stable  [] Other:      Hemodynamics: Ao: 154/68/97  LV: 154/25    Cors:     Dominance: [] Right  [] Left  [x] Mixed    LM: Large caliber vessel without significant stenosis    LAD: Large caliber vessel that wraps around the apex with diffuse luminal irregularities and an ostial 60% stenosis. This vessel provides some faint collaterals to the right. D1: Moderate caliber vessel without significant stenosis. D2: Small caliber vessel without significant stenosis.      LCX: Moderate caliber codominant vessel with a mid tubular 30% stenosis   OM1: Moderate caliber vessel with a patent stent with mild ISR  LPDA: Small caliber vessel without significant stenosis. LPLB: Small caliber vessel without significant stenosis. RCA: Proximal     LV angiography: N/A  EF:   Wall motion:   MR:    Indication for FFR:  Indeterminate stenosis of the LAD. Technique:    Lesion #1:  LAD    Anticoagulation:  Heparin was used for anticoagulation. Guiding Catheter:  A 6Fr EBU 3.5 guiding catheter was used to engage the Left Main. Guidewire: A Pressure was used to cross the lesion without difficulty. The pressure wire was zeroed outside of the body. The pressure wire was then introduced into the guiding catheter and into the vessel. The pressure wire was then equalized and then advanced across the indeterminate stenosis. The baseline flow rate was 0.90.  140 mcg/kg/min of adenosine was then administered intravenously for a total of three minutes. The final flow rate was 0.88 indicating a hemodynamically insignificant stenosis. Post-FFR Angiogram showed MALENA 3 flow without dissection or perforation. The patient tolerated the procedure well without any hemodynamic instability. Abdominal Aortogram:  Large caliber vessel with mild diffuse disease. The left common iliac artery appears occluded. The right common iliac artery appears to be patent at its ostium. Splenic and hepatic arteries are patent. The left renal artery is patent. There is concern for significant stenosis of the right renal artery proximally. Selective bilateral renal angiogram:  The left renal artery is patent. There is a subtotal occlusion of the very proximal right renal artery.       Jorge Kerr III, DO

## 2018-04-30 NOTE — PROGRESS NOTES
[facility]  Preliminary Report:  Arterial Duplex Leg - right    An arterial duplex scan of the right leg native arteries was performed with the following findings:    Vessel    Velocity (cm/s)  Waveform     Right CFA and SFA are patent. The CFA diameter is 7.4 mm. The SFA diameter is 6.2 mm. Final report to follow.

## 2018-04-30 NOTE — PROGRESS NOTES
0501 Paged on call doctor concerning critical calcium of 6.1.   0503 Spoke to Dr. Jacquie Preston, no new orders received.

## 2018-04-30 NOTE — WOUND CARE
Wound care Nurse consult from staff nurse for \" Pt with Black spots on right toes, possible DTI's?\". Patient is a 57 y/o CM admitted with ARF on CKD stage 5 and acute chest pain. PMHX:   Past Medical History:   Diagnosis Date    Anemia     Arthritis     back, hips    CAD (coronary artery disease)     Sees Dr. 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 Myriam Stiles for pain mgmt    Chronic pain     Confusion     Depression     [facility]     ED (erectile dysfunction)     GERD (gastroesophageal reflux disease) 11/2015    Diagnosed at [facility] last month    Hypercholesteremia     Hypertension     Psychiatric disorder     depression    [facility]peripheral vascular disease) [facility]     Thromboembolus ([facility]      Past Surgical History:   Procedure Laterality Date    CARDIAC SURG PROCEDURE UNLIST      PTCA    HX ABOVE KNEE AMPUTATION  2013    Left knee stump non-healing ulcer; Dr. Will Reddish      Left leg    HX CHOLECYSTECTOMY      HX GI      HX HIP REPLACEMENT      right hip replaced x 2    HX ORTHOPAEDIC  1990s    4 back surgeries    HX ORTHOPAEDIC      donna ankles pin placed    HX ORTHOPAEDIC      left leg 17 surgeries    UPPER GI ENDOSCOPY,BIOPSY  9/2/2015         UPPER GI ENDOSCOPY,BIOPSY  10/12/2015         VASCULAR SURGERY PROCEDURE UNLIST      Multiple revascularization procedures, toe amputations     Patient continues to smoke despite severe PAD. Patient has dry stable scabs to x2 toes to his remaining right foot and leg. These are vascular \"injuries\", not DTI's. Patient still follows with Dr Sandhya Smith, vascular surgeon, and he is well aware of his issues. I am able to find a dorsal pedal pulse, but foot is cold to touch and LE is cool with thin, hairless skin. .    No recommendations for treatment at this time because there is not an issue with current dry, stable scabs.     Ivan Hardy RN, [employer]

## 2018-04-30 NOTE — PROGRESS NOTES
Hospitalist Progress Note    NAME: David Montgomery   :  1955   MRN:  [id number]       Assessment / Plan:  1. Possible NSTEMI. 2. ARF on CKD stage 5  3. Hypertensive urgency  4. Hx of PAD with multiple stents and follows with Dr Edin Patterson  5. DM 2 with nephropathy & neuropathy  6. Anemia of CKD  7. Chronic thrombocytopenia 137 on adm  8. Tobacco abuse  9. Medication noncompliance    Plan  Cont heparin drip. Cont asa, statin and BB. For cath today per cardiology. Echo shows EF of 55 %. Cont plavix of okay with cardiology. Cont metoprolol. Renal is following for MINO. He will likely need HD. Base line cr is around 3.5 and currently cr is 4.5. Bmp in am. Renal USG is normal.  On clonidine and hydralazine prn for htn urgency  Cbc in am  Cont SSI and blood sugar checks      Body mass index is 23.42 kg/(m^2). Code status: Full  Prophylaxis: Hep SQ  Recommended Disposition: Home w/Family     Subjective:     Chief Complaint / Reason for Physician Visit  Going for Heart cath today. All questions answered. No chest pain or sob. Review of Systems:  Symptom Y/N Comments  Symptom Y/N Comments   Fever/Chills    Chest Pain n    Poor Appetite    Edema     Cough n   Abdominal Pain n    Sputum    Joint Pain     SOB/COREA n   Pruritis/Rash     Nausea/vomit    Tolerating PT/OT     Diarrhea    Tolerating Diet     Constipation    Other       Could NOT obtain due to:      Objective:     VITALS:   Last 24hrs VS reviewed since prior progress note.  Most recent are:  Patient Vitals for the past 24 hrs:   Temp Pulse Resp BP SpO2   18 1310 - (!) 49 - 174/73 100 %   18 1305 - (!) 50 - 168/77 99 %   18 1300 - (!) 52 - 175/77 -   18 1230 - (!) 54 - 175/74 98 %   18 1200 - (!) 57 - 184/78 99 %   18 1145 - (!) 56 - 169/71 100 %   18 1130 - (!) 54 - 142/65 100 %   18 1115 97.4 °F (36.3 °C) (!) 54 - 150/58 96 %   18 1100 - (!) 56 - 142/74 (!) 88 %   18 1054 97.5 °F (36.4 °C) (!) 56 18 146/55 94 %   04/30/18 0735 97.7 °F (36.5 °C) (!) 55 16 131/58 98 %   04/30/18 0354 98.1 °F (36.7 °C) (!) 59 18 125/55 100 %   04/29/18 2313 98.4 °F (36.9 °C) (!) 59 16 120/49 98 %   04/29/18 2030 98.3 °F (36.8 °C) (!) 59 18 127/54 100 %   04/29/18 1558 97.6 °F (36.4 °C) 60 18 154/56 98 %       Intake/Output Summary (Last 24 hours) at 04/30/18 1339  Last data filed at 04/30/18 2540   Gross per 24 hour   Intake          2590.36 ml   Output              550 ml   Net          2040.36 ml        PHYSICAL EXAM:  General: cooperative, no acute distress    EENT:  EOMI. Anicteric sclerae. MMM  Resp:  CTA bilaterally, no wheezing or rales. No accessory muscle use  CV:  Regular  rhythm,  No edema  GI:  Soft, Non distended, Non tender.  +Bowel sounds  Neurologic:  Alert and oriented X 3, normal speech,   Psych:   Not anxious nor agitated  Skin:  No rashes.   No jaundice    Reviewed most current lab test results and cultures  YES  Reviewed most current radiology test results   YES  Review and summation of old records today    NO  Reviewed patient's current orders and MAR    YES  PMH/SH reviewed - no change compared to H&P          Current Facility-Administered Medications:     calcium carbonate (TUMS) chewable tablet 200 mg [elemental], 200 mg, Oral, QID WITH MEALS, Katelyn Larry MD, 200 mg at 04/30/18 1205    calcitRIOL (ROCALTROL) capsule 0.25 mcg, 0.25 mcg, Oral, DAILY, Katelyn Larry MD    magnesium oxide (MAG-OX) tablet 400 mg, 400 mg, Oral, DAILY, Katelyn Larry MD, 400 mg at 04/30/18 1204    epoetin calos (EPOGEN;PROCRIT) injection 10,000 Units, 10,000 Units, SubCUTAneous, Once per day on Mon Thu, Ruma Crump MD    iodixanol (VISIPAQUE) 320 mg iodine/mL contrast injection 0-100 mL, 0-100 mL, IntraarTERial, Multiple, Ellin Joel Herrera III, DO, 50 mL at 04/30/18 1041    sodium chloride (NS) flush 5-10 mL, 5-10 mL, IntraVENous, Q8H, Ricardo Herrera III, DO    sodium chloride (NS) flush 5-10 mL, 5-10 mL, IntraVENous, PRN, Gwyn Muller III, DO    sodium bicarbonate (8.4%) 150 mEq in sterile water 1,000 mL infusion, , IntraVENous, CONTINUOUS, Cha Kramer MD    B complex-vitaminC-folic acid (NEPHROCAP) cap, 1 Cap, Oral, DAILY, Cha Kramer MD, 1 Cap at 04/30/18 1204    cloNIDine HCl (CATAPRES) tablet 0.1 mg, 0.1 mg, Oral, Q4H PRN, Cha Kramer MD    isosorbide mononitrate ER (IMDUR) tablet 30 mg, 30 mg, Oral, DAILY, Kavita Herrera III, DO, 30 mg at 04/30/18 1204    famotidine (PEPCID) IVPB 20 mg, 20 mg, IntraVENous, Q24H, Eugenie Pagan MD, 20 mg at 04/30/18 0854    nitroglycerin (NITROSTAT) tablet 0.4 mg, 0.4 mg, SubLINGual, Q5MIN PRN, Roshan Garcia MD, 0.4 mg at 04/29/18 0830    glucose chewable tablet 16 g, 4 Tab, Oral, PRN, Roshan Garcia MD    dextrose (D50W) injection syrg 12.5-25 g, 12.5-25 g, IntraVENous, PRN, Roshan Garcia MD    glucagon [facility]) injection 1 mg, 1 mg, IntraMUSCular, PRN, Roshan Garcia MD    sodium chloride (NS) flush 5-10 mL, 5-10 mL, IntraVENous, Q8H, Roshan Garcia MD, 10 mL at 04/30/18 0600    sodium chloride (NS) flush 5-10 mL, 5-10 mL, IntraVENous, PRN, Roshan Garcia MD    insulin lispro (HUMALOG) injection, , SubCUTAneous, Q6H, Roshan Garcia MD, 2 Units at 04/30/18 1207    metoprolol tartrate (LOPRESSOR) tablet 25 mg, 25 mg, Oral, Q6H, Roshan Garcia MD, 25 mg at 04/30/18 1204    aspirin chewable tablet 81 mg, 81 mg, Oral, DAILY, Roshan Garcia MD, 81 mg at 04/30/18 0854    atorvastatin (LIPITOR) tablet 80 mg, 80 mg, Oral, QPM, Roshan Garcia MD, 80 mg at 04/29/18 1751    acetaminophen (TYLENOL) tablet 650 mg, 650 mg, Oral, Q4H PRN, Roshan Garcia MD, 650 mg at 04/29/18 2151    oxyCODONE-acetaminophen (PERCOCET) 5-325 mg per tablet 1 Tab, 1 Tab, Oral, Q4H PRN, Roshan Garcia MD, 1 Tab at 04/30/18 0355    hydrALAZINE (APRESOLINE) 20 mg/mL injection 10 mg, 10 mg, IntraVENous, Q2H PRN, Roshan Garcia, MD    naloxone [facility]) injection 0.4 mg, 0.4 mg, IntraVENous, PRN, Bailey Meier MD    diphenhydrAMINE (BENADRYL) injection 12.5 mg, 12.5 mg, IntraVENous, Q4H PRN, Bailey Meier MD    ondansetron [facility]) injection 4 mg, 4 mg, IntraVENous, Q4H PRN, Bailey Meier MD, 4 mg at 04/29/18 1316    bisacodyl (DULCOLAX) tablet 5 mg, 5 mg, Oral, DAILY PRN, Bailey Meier MD    heparin 25,000 units in D5W 250 ml infusion, 12-25 Units/kg/hr, IntraVENous, TITRATE, Bailey Meier MD, Last Rate: 14.6 mL/hr at 04/30/18 0713, 22.054 Units/kg/hr at 04/30/18 0713    clopidogrel (PLAVIX) tablet 75 mg, 75 mg, Oral, DAILY, Bailey Meier MD, 75 mg at 04/30/18 0854    heparin (porcine) injection 3,950 Units, 60 Units/kg, IntraVENous, PRN, Bailey Meier MD, 7,594 Units at 04/30/18 0451    heparin (porcine) injection 2,000 Units, 30 Units/kg, IntraVENous, PRN, Bailey Meier MD, 2,000 Units at 04/29/18 2210  ________________________________________________________________________  Care Plan discussed with:    Comments   Patient y    Family      RN y    Care Manager     Consultant                        Multidiciplinary team rounds were held today with , nursing, pharmacist and clinical coordinator. Patient's plan of care was discussed; medications were reviewed and discharge planning was addressed. ________________________________________________________________________  Total NON critical care TIME:  35   Minutes    Total CRITICAL CARE TIME Spent:   Minutes non procedure based      Comments   >50% of visit spent in counseling and coordination of care     ________________________________________________________________________  Marley Zheng MD     Procedures: see electronic medical records for all procedures/Xrays and details which were not copied into this note but were reviewed prior to creation of Plan. LABS:  I reviewed today's most current labs and imaging studies.   Pertinent labs include:  Recent Labs      04/30/18   0407  04/29/18   0600  04/28/18   2217   WBC  4.3  5.2  7.6   HGB  8.1*  9.7*  10.0*   HCT  25.2*  30.3*  30.5*   PLT  106*  116*  137*     Recent Labs      04/30/18   0407  04/28/18   2217  04/28/18   1440   NA  142  146*  145   K  3.6  3.5  3.7   CL  114*  116*  115*   CO2  17*  19*  18*   GLU  236*  125*  225*   BUN  63*  71*  71*   CREA  4.51*  4.66*  4.52*   CA  6.1*  6.1*  6.9*  6.9*   MG  1.5*   --   1.7   PHOS  6.5*   --    --    ALB   --   2.5*  2.5*   TBILI   --   0.2  0.3   SGOT   --   12*  13*   ALT   --   12  12   INR   --    --   1.1       Signed: Shilo Agudelo MD

## 2018-04-30 NOTE — PROGRESS NOTES
Prelim arterial duplex w/ patent RCFA  Will plan on stenting of right renal artery Friday AM via RCFA

## 2018-04-30 NOTE — CARDIO/PULMONARY
Cardiopulmonary Rehab:    Chart reviewed. Consult acknowledged. Pt is a 58 y.o. male admitted with possible NSTEMI. Pt with hypertensive urgency, JENNIFER stage 5. PMH includes CKD, PAD with multiple stents, DM2, anemia, hx of tobacco abuse, medication noncompliance. Cath pending. Echo pending. May need dialysis. Pt visited, family at the bedside. Pt off the floor to cath lab at this time. Introduced self and explained role of cardiac rehab nurse to family at the bedside. MI teaching folder provided. Family repots is unable to walk at her is a above knee amuptee and does not use a prosthetic. He is not a candidate for cardiac rehab as he does not walk. Will follow pt for cardiac teaching post cath.

## 2018-04-30 NOTE — ROUTINE PROCESS
When attempting to take down pt's TR band, patient re-bled. I waited approx 45 minutes to try again, and he re-bled a second time. Air was re-inserted and pt is not bleeding. Will check band again.

## 2018-04-30 NOTE — PROCEDURES
[facility]  *** FINAL REPORT ***    Name: Lara Chávez  MRN: [id number]    Inpatient  : 1955  HIS Order #: [id number]  [id number] [address] [facility] Visit #: [id number]  Date: 2018    TYPE OF TEST: Extremity Arterial Duplex    REASON FOR TEST  Eval right CFA patency                            Right                     Left  Artery               PSV   Finding             PSV   Finding  ------------------  -----  ---------------    -----  ---------------  External iliac:  Common femoral:      83.0  Normal  Profunda femoris:    70.0  Normal  Proximal SFA:        73.0  Mid SFA:              Normal  Distal SFA:  Popliteal:  Anterior tibial:  Posterior tibial:    Pressures               Right     Left               -----     -----     Brachial:           DP:           PT:            LUISA:            Toe: INTERPRETATION/FINDINGS  PROCEDURE:  Arterial duplex examination using B-mode, color flow and  spectral Doppler of the lower extremity arteries. Right leg :  1. No significant occlusive disease in the arteries examined. 2. CFA diameter is 7.4mm and the SFA diameter is 6.2mm. ADDITIONAL COMMENTS    I have personally reviewed the data relevant to the interpretation of  this  study. TECHNOLOGIST: Shane Gomez. SABAS Bunch, RDMS  Signed: 2018 11:43 AM    PHYSICIAN: Charity Nicole.  Sharyn Mariano MD  Signed: 2018 04:11 PM

## 2018-05-01 LAB
ANION GAP SERPL CALC-SCNC: 14 MMOL/L (ref 5–15)
APTT PPP: 28.7 SEC (ref 22.1–32)
BUN SERPL-MCNC: 60 MG/DL (ref 6–20)
BUN/CREAT SERPL: 13 (ref 12–20)
CALCIUM SERPL-MCNC: 6.6 MG/DL (ref 8.5–10.1)
CHLORIDE SERPL-SCNC: 111 MMOL/L (ref 97–108)
CO2 SERPL-SCNC: 17 MMOL/L (ref 21–32)
CREAT SERPL-MCNC: 4.57 MG/DL (ref 0.7–1.3)
ERYTHROCYTE [DISTWIDTH] IN BLOOD BY AUTOMATED COUNT: 14.1 % (ref 11.5–14.5)
GLUCOSE BLD STRIP.AUTO-MCNC: 119 MG/DL (ref 65–100)
GLUCOSE BLD STRIP.AUTO-MCNC: 127 MG/DL (ref 65–100)
GLUCOSE BLD STRIP.AUTO-MCNC: 215 MG/DL (ref 65–100)
GLUCOSE SERPL-MCNC: 104 MG/DL (ref 65–100)
HCT VFR BLD AUTO: 27.2 % (ref 36.6–50.3)
HGB BLD-MCNC: 8.8 G/DL (ref 12.1–17)
MCH RBC QN AUTO: 30.7 PG (ref 26–34)
MCHC RBC AUTO-ENTMCNC: 32.4 G/DL (ref 30–36.5)
MCV RBC AUTO: 94.8 FL (ref 80–99)
NRBC # BLD: 0 K/UL (ref 0–0.01)
NRBC BLD-RTO: 0 PER 100 WBC
PLATELET # BLD AUTO: 114 K/UL (ref 150–400)
PMV BLD AUTO: 10.4 FL (ref 8.9–12.9)
POTASSIUM SERPL-SCNC: 3.7 MMOL/L (ref 3.5–5.1)
RBC # BLD AUTO: 2.87 M/UL (ref 4.1–5.7)
SERVICE CMNT-IMP: ABNORMAL
SODIUM SERPL-SCNC: 142 MMOL/L (ref 136–145)
THERAPEUTIC RANGE,PTTT: NORMAL SECS (ref 58–77)
WBC # BLD AUTO: 5.1 K/UL (ref 4.1–11.1)

## 2018-05-01 PROCEDURE — 74011250637 HC RX REV CODE- 250/637: Performed by: HOSPITALIST

## 2018-05-01 PROCEDURE — 74011636637 HC RX REV CODE- 636/637: Performed by: HOSPITALIST

## 2018-05-01 PROCEDURE — 80048 BASIC METABOLIC PNL TOTAL CA: CPT | Performed by: INTERNAL MEDICINE

## 2018-05-01 PROCEDURE — 85730 THROMBOPLASTIN TIME PARTIAL: CPT | Performed by: INTERNAL MEDICINE

## 2018-05-01 PROCEDURE — 85027 COMPLETE CBC AUTOMATED: CPT | Performed by: INTERNAL MEDICINE

## 2018-05-01 PROCEDURE — 74011250636 HC RX REV CODE- 250/636: Performed by: INTERNAL MEDICINE

## 2018-05-01 PROCEDURE — 93970 EXTREMITY STUDY: CPT

## 2018-05-01 PROCEDURE — 82962 GLUCOSE BLOOD TEST: CPT

## 2018-05-01 PROCEDURE — 65660000000 HC RM CCU STEPDOWN

## 2018-05-01 PROCEDURE — 36415 COLL VENOUS BLD VENIPUNCTURE: CPT | Performed by: INTERNAL MEDICINE

## 2018-05-01 PROCEDURE — 74011250637 HC RX REV CODE- 250/637: Performed by: INTERNAL MEDICINE

## 2018-05-01 PROCEDURE — 74011250636 HC RX REV CODE- 250/636: Performed by: HOSPITALIST

## 2018-05-01 PROCEDURE — 74011000250 HC RX REV CODE- 250: Performed by: INTERNAL MEDICINE

## 2018-05-01 RX ORDER — HYDRALAZINE HYDROCHLORIDE 25 MG/1
25 TABLET, FILM COATED ORAL EVERY 8 HOURS
Status: DISCONTINUED | OUTPATIENT
Start: 2018-05-01 | End: 2018-05-10

## 2018-05-01 RX ORDER — FAMOTIDINE 20 MG/1
20 TABLET, FILM COATED ORAL DAILY
Status: DISCONTINUED | OUTPATIENT
Start: 2018-05-02 | End: 2018-05-04

## 2018-05-01 RX ORDER — ISOSORBIDE MONONITRATE 30 MG/1
60 TABLET, EXTENDED RELEASE ORAL DAILY
Status: DISCONTINUED | OUTPATIENT
Start: 2018-05-02 | End: 2018-05-14 | Stop reason: HOSPADM

## 2018-05-01 RX ORDER — ISOSORBIDE MONONITRATE 30 MG/1
30 TABLET, EXTENDED RELEASE ORAL ONCE
Status: COMPLETED | OUTPATIENT
Start: 2018-05-01 | End: 2018-05-01

## 2018-05-01 RX ORDER — HEPARIN SODIUM 5000 [USP'U]/ML
5000 INJECTION, SOLUTION INTRAVENOUS; SUBCUTANEOUS EVERY 12 HOURS
Status: DISCONTINUED | OUTPATIENT
Start: 2018-05-01 | End: 2018-05-08

## 2018-05-01 RX ADMIN — OXYCODONE HYDROCHLORIDE AND ACETAMINOPHEN 1 TABLET: 5; 325 TABLET ORAL at 20:14

## 2018-05-01 RX ADMIN — METOPROLOL TARTRATE 25 MG: 25 TABLET ORAL at 00:20

## 2018-05-01 RX ADMIN — WATER: 1000 INJECTION, SOLUTION INTRAVENOUS at 04:40

## 2018-05-01 RX ADMIN — ISOSORBIDE MONONITRATE 30 MG: 30 TABLET, EXTENDED RELEASE ORAL at 10:27

## 2018-05-01 RX ADMIN — METOPROLOL TARTRATE 25 MG: 25 TABLET ORAL at 13:33

## 2018-05-01 RX ADMIN — ASPIRIN 81 MG 81 MG: 81 TABLET ORAL at 08:54

## 2018-05-01 RX ADMIN — METOPROLOL TARTRATE 25 MG: 25 TABLET ORAL at 05:50

## 2018-05-01 RX ADMIN — Medication 10 ML: at 22:13

## 2018-05-01 RX ADMIN — CALCIUM CARBONATE 200 MG: 500 TABLET, CHEWABLE ORAL at 22:13

## 2018-05-01 RX ADMIN — NEPHROCAP 1 CAPSULE: 1 CAP ORAL at 08:55

## 2018-05-01 RX ADMIN — WATER: 1000 INJECTION, SOLUTION INTRAVENOUS at 18:50

## 2018-05-01 RX ADMIN — ONDANSETRON 4 MG: 2 INJECTION INTRAMUSCULAR; INTRAVENOUS at 17:27

## 2018-05-01 RX ADMIN — ISOSORBIDE MONONITRATE 30 MG: 30 TABLET, EXTENDED RELEASE ORAL at 08:55

## 2018-05-01 RX ADMIN — CALCIUM CARBONATE 200 MG: 500 TABLET, CHEWABLE ORAL at 08:55

## 2018-05-01 RX ADMIN — HEPARIN SODIUM 5000 UNITS: 5000 INJECTION, SOLUTION INTRAVENOUS; SUBCUTANEOUS at 17:21

## 2018-05-01 RX ADMIN — FAMOTIDINE 20 MG: 20 INJECTION, SOLUTION INTRAVENOUS at 08:54

## 2018-05-01 RX ADMIN — Medication 10 ML: at 05:13

## 2018-05-01 RX ADMIN — CLONIDINE HYDROCHLORIDE 0.1 MG: 0.1 TABLET ORAL at 10:27

## 2018-05-01 RX ADMIN — HYDRALAZINE HYDROCHLORIDE 25 MG: 50 TABLET, FILM COATED ORAL at 13:32

## 2018-05-01 RX ADMIN — ATORVASTATIN CALCIUM 80 MG: 40 TABLET, FILM COATED ORAL at 17:21

## 2018-05-01 RX ADMIN — CLONIDINE HYDROCHLORIDE 0.1 MG: 0.1 TABLET ORAL at 04:44

## 2018-05-01 RX ADMIN — CALCIUM CARBONATE 200 MG: 500 TABLET, CHEWABLE ORAL at 17:21

## 2018-05-01 RX ADMIN — CALCITRIOL 0.25 MCG: 0.25 CAPSULE, LIQUID FILLED ORAL at 08:54

## 2018-05-01 RX ADMIN — INSULIN LISPRO 2 UNITS: 100 INJECTION, SOLUTION INTRAVENOUS; SUBCUTANEOUS at 17:23

## 2018-05-01 RX ADMIN — METOPROLOL TARTRATE 25 MG: 25 TABLET ORAL at 17:21

## 2018-05-01 RX ADMIN — CLOPIDOGREL BISULFATE 75 MG: 75 TABLET ORAL at 08:54

## 2018-05-01 RX ADMIN — Medication 400 MG: at 08:54

## 2018-05-01 RX ADMIN — HYDRALAZINE HYDROCHLORIDE 25 MG: 50 TABLET, FILM COATED ORAL at 22:13

## 2018-05-01 NOTE — CONSULTS
Vascular Surgery Consult Note  5/1/2018    Subjective:     Yvonne Martines is a 58 y.o.  male with a complicated pmhx: see below. He is an every day smoker. He presented to the hospital with complaint of CP with associated dyspnea and nausea while smoking. The pain radiated to his left arm. He was given ASA by his sister and EMS was called. En route he was given NTG. On arrival he was tachycardic and HTN. His RR was normal and he was afebrile. He was not hypoxic. He was dx with NSTEMI and hypertensive urgency. He was referred for hospital admission. He presents with acute on chronic renal disease stage V tjat has been exacerbated by cardiac cath. His renal disease has progressed and we have been asked to consult for HD access. He is right handed per his report. He also presented with ischemic digits of his right foot. PVR RLE: CFA diameter is 7.4mm and the SFA diameter is 6.2mm. He has underlying carotid stenosis. He has been lost to follow up for his vascular disease. Of note Mr. Sylvia Pinedo self discontinued all of his medications in February and checked himself out of a nsg home in March after living there for 3 years. Past Medical History  PAD s/p left iliac stenting, left SFA stenting 3/00, right iliac stenting 11/99  Bl carotid stenosis: 16-49% 9/13  ASHD s/p PCI of LCx, PTCA of RCA 7/97, PTCA LAD 3/94. HTN  HLD  CKD V  Anemia of CKD. Now on epogen.     Secondary PTH  Chronic thromboyctopenia   Diabetes with nephropathy and neuropathy    Osteoarthritis with chronic back and hip pain  Erectile dysfunction   GERD   Depression   PUD with GIB 2015    Past Surgical History  Toe amputation   Left AKA for a non-healing wound 2013  Left BKA  Left leg surgery x 17 secondary to trauma   Cheolcystectomy  Right hip replacement  Back surgery x 4.    BL ankle ORIFs  Appendectomy     Family History   Problem Relation Age of Onset    Lung Disease Mother     Alcohol abuse Father     Diabetes Sister     Cancer Maternal Grandfather      Head and neck    Cancer Paternal Grandmother      Stomach    Diabetes Sister       Social History   Substance Use Topics    Smoking status: Every day smoker     Smokeless tobacco: Never Used      Comment: 30 pack years; Occasional cigar    Alcohol use No      Comment: Former heavy drinker quit drinking about 17 years ago       Patient currently resides with his sister. Prior to Admission medications    Medication Sig Start Date End Date Taking? Authorizing Provider   methadone (DOLOPHINE) 10 mg tablet Take 1 Tab by mouth two (2) times daily (with meals). Max Daily Amount: 60 mg. One tab in am, 2 tab at lunch, 1 at dinner and 2 at bed 1/15/16   Garrick Carlos MD   isosorbide mononitrate ER (IMDUR) 30 mg tablet Take 1 Tab by mouth daily. 1/15/16   Garrick Carlos MD   ferrous gluconate 324 mg (37.5 mg iron) tablet Take 1 Tab by mouth three (3) times daily (with meals). 1/15/16   Garrick Carlos MD   omeprazole (PRILOSEC) 40 mg capsule Take 1 Cap by mouth two (2) times a day. 1/6/16   Garrick Carlos MD   metoprolol (LOPRESSOR) 25 mg tablet Take 1 Tab by mouth two (2) times a day. 12/9/15   Garrick Carlos MD   venlafaxine [facility]) 100 mg tablet Take 1 Tab by mouth two (2) times a day. 12/9/15   Garrick Carlos MD   gabapentin (NEURONTIN) 400 mg capsule Take 1 Cap by mouth three (3) times daily. 12/4/15   Garrick Carlos MD   pravastatin (PRAVACHOL) 80 mg tablet Take 1 Tab by mouth nightly. 12/4/15   Garrick Carlos MD   pioglitazone (ACTOS) 15 mg tablet Take 1 Tab by mouth daily. 12/4/15   Garrick Carlos MD   diazepam (VALIUM) 10 mg tablet Take 0.5 Tabs by mouth every twelve (12) hours. Max Daily Amount: 10 mg. 12/4/15   Garrick Carlos MD   doxazosin (CARDURA) 2 mg tablet Take 1 Tab by mouth nightly. 12/4/15   Garrick Carlos MD   traZODone (DESYREL) 150 mg tablet Take 1 Tab by mouth nightly.  12/4/15   Garrick Carlos MD   fenofibrate micronized (LOFIBRA) 200 mg capsule Take 1 Cap by mouth every morning. 12/4/15   Jennifer Abdullahi MD   finasteride (PROSCAR) 5 mg tablet Take 1 Tab by mouth daily. 12/4/15   Jennifer Abdullahi MD   food supplemt, lactose-reduced [facility] ACTIVE CLEAR) liqd Take 237 mL by mouth four (4) times daily. 12/4/15   Jennifer Abdullahi MD   neomycin-bacitracin-polymyxin (NEOSPORIN) 3.5mg-400 unit- 5,000 unit/gram ointment Apply  to affected area daily. 10/13/15   Cristine Gann MD   nitroglycerin (NITROSTAT) 0.4 mg SL tablet 1 Tab by SubLINGual route every five (5) minutes as needed for Chest Pain. 6/18/15   Jennifer Abdullahi MD   ergocalciferol (VITAMIN D2) 50,000 unit capsule Take 1 Cap by mouth every seven (7) days. 3/24/15   Sky Orellana MD   epoetin calos (EPOGEN;PROCRIT) 10,000 unit/mL injection 10,000 Units by SubCUTAneous route every thirty (30) days. Historical Provider     Allergies   Allergen Reactions    Nubain [Nalbuphine] Other (comments)     Made me wild; Dysphoria      Review of Systems   Constitutional: Negative for activity change, chills and fever. HENT: Negative. Eyes: Negative. Respiratory: Positive for chest tightness and shortness of breath. Cardiovascular: Positive for chest pain. Negative for leg swelling. Gastrointestinal: Positive for nausea and vomiting. Endocrine: Negative. Genitourinary: Negative. Musculoskeletal: Negative. Skin: Positive for color change. Allergic/Immunologic: Negative. Neurological: Negative. Hematological: Negative. Psychiatric/Behavioral: Negative.         Objective:       Patient Vitals for the past 24 hrs:   BP Temp Pulse Resp SpO2   05/01/18 0858 194/75 97.8 °F (36.6 °C) 64 16 97 %   05/01/18 0554 184/63 - 72 18 95 %   05/01/18 0550 (!) 202/76 - 78 - -   05/01/18 0514 (!) 202/76 - 66 - 99 %   05/01/18 0443 194/87 97.9 °F (36.6 °C) 67 18 98 %   05/01/18 0020 175/70 97.6 °F (36.4 °C) 67 18 100 %   05/01/18 0019 175/70 - 60 - 99 %   04/30/18 2300 175/70 - (!) 57 - 100 %   04/30/18 2238 175/62 97.6 °F (36.4 °C) 60 18 100 %   04/30/18 2220 - - (!) 57 - 100 %   04/30/18 2200 131/58 - (!) 54 - -   04/30/18 2127 144/63 - 64 - -   04/30/18 2100 - - (!) 56 - -   04/30/18 2000 - - (!) 59 - -   04/30/18 1923 172/68 97.9 °F (36.6 °C) 62 18 100 %   04/30/18 1800 176/73 - (!) 58 - -   04/30/18 1700 162/67 - (!) 55 - -   04/30/18 1630 167/75 - (!) 54 - -   04/30/18 1615 162/67 - (!) 52 - -   04/30/18 1600 161/63 - (!) 51 - -   04/30/18 1545 176/73 - (!) 52 - -   04/30/18 1445 165/70 - (!) 54 - -   04/30/18 1430 153/61 - (!) 51 - -   04/30/18 1415 164/77 - (!) 51 - -   04/30/18 1400 164/72 - (!) 52 - -   04/30/18 1345 - - (!) 50 - -   04/30/18 1310 174/73 - (!) 49 - 100 %   04/30/18 1305 168/77 - (!) 50 - 99 %   04/30/18 1300 175/77 - (!) 52 - -   04/30/18 1230 175/74 - (!) 54 - 98 %   04/30/18 1200 184/78 - (!) 57 - 99 %   04/30/18 1145 169/71 - (!) 56 - 100 %   04/30/18 1130 142/65 - (!) 54 - 100 %   04/30/18 1115 150/58 97.4 °F (36.3 °C) (!) 54 - 96 %   04/30/18 1100 142/74 - (!) 56 - (!) 88 %     Physical Exam   Constitutional: He is oriented to person, place, and time. Frail chronically ill appearing white male in NAD. HENT:   Head: Normocephalic. Eyes: Pupils are equal, round, and reactive to light. Neck: Normal range of motion. Cardiovascular: Normal rate and regular rhythm. Pulses:       Femoral pulses are 2+ on the right side, and 2+ on the left side. Popliteal pulses are 1+ on the left side. Dorsalis pedis pulses are 0 on the left side. Posterior tibial pulses are 0 on the left side. No edema. Pulmonary/Chest: Effort normal. No respiratory distress. Abdominal: Soft. He exhibits no distension. Musculoskeletal: Normal range of motion. Left AKA    Neurological: He is alert and oriented to person, place, and time. Skin:   Very fragile skin to the BUE.     Areas of necrosis to the 2nd, 4th, and 5th digits of the right foot.   Psychiatric: His behavior is normal. Judgment and thought content normal.       Pertinent Test Results:   Recent Results (from the past 24 hour(s))   GLUCOSE, POC    Collection Time: 04/30/18 10:59 AM   Result Value Ref Range    Glucose (POC) 180 (H) 65 - 100 mg/dL    Performed by Max Flax    GLUCOSE, POC    Collection Time: 04/30/18  5:56 PM   Result Value Ref Range    Glucose (POC) 90 65 - 100 mg/dL    Performed by Max Flax    GLUCOSE, POC    Collection Time: 04/30/18  9:18 PM   Result Value Ref Range    Glucose (POC) 126 (H) 65 - 100 mg/dL    Performed by Becca Marcum    PTT    Collection Time: 05/01/18 12:34 AM   Result Value Ref Range    aPTT 28.7 22.1 - 32.0 sec    aPTT, therapeutic range     58.0 - 77.0 SECS   CBC W/O DIFF    Collection Time: 05/01/18 12:34 AM   Result Value Ref Range    WBC 5.1 4.1 - 11.1 K/uL    RBC 2.87 (L) 4.10 - 5.70 M/uL    HGB 8.8 (L) 12.1 - 17.0 g/dL    HCT 27.2 (L) 36.6 - 50.3 %    MCV 94.8 80.0 - 99.0 FL    MCH 30.7 26.0 - 34.0 PG    MCHC 32.4 30.0 - 36.5 g/dL    RDW 14.1 11.5 - 14.5 %    PLATELET 621 (L) 492 - 400 K/uL    MPV 10.4 8.9 - 12.9 FL    NRBC 0.0 0  WBC    ABSOLUTE NRBC 0.00 0.00 - 5.14 K/uL   METABOLIC PANEL, BASIC    Collection Time: 05/01/18 12:34 AM   Result Value Ref Range    Sodium 142 136 - 145 mmol/L    Potassium 3.7 3.5 - 5.1 mmol/L    Chloride 111 (H) 97 - 108 mmol/L    CO2 17 (L) 21 - 32 mmol/L    Anion gap 14 5 - 15 mmol/L    Glucose 104 (H) 65 - 100 mg/dL    BUN 60 (H) 6 - 20 MG/DL    Creatinine 4.57 (H) 0.70 - 1.30 MG/DL    BUN/Creatinine ratio 13 12 - 20      GFR est AA 16 (L) >60 ml/min/1.73m2    GFR est non-AA 13 (L) >60 ml/min/1.73m2    Calcium 6.6 (L) 8.5 - 10.1 MG/DL         Results from Hospital Encounter encounter on 04/28/18   XR CHEST PORT   Narrative INDICATION:  Chest Pain with shortness of breath and nausea    COMPARISON: 9/25/2014    FINDINGS: Single AP portable view of the chest obtained at 1451 demonstrates a  stable cardiomediastinal silhouette. There is mild right basilar atelectasis. No  osseous abnormalities are seen. Impression IMPRESSION: Mild right basilar atelectasis. Assessmen/Plan:     Consult problems:  CKD V with plan for HD. Will order BL vein mapping. Patient is right hand dominant. Skin is very fragile. On Plavix with remote hx of cardiac stenting. Dr. Mendez Necessary to see in the am.  Currently receiving bi-carb drip. PAD with evidence of critical limb ischemia of the 2nd, 4th, and 5th digits of the right foot. S/p left iliac stenting, left SFA stenting 3/00, right iliac stenting 11/99  PVR RLE: CFA diameter is 7.4mm and the SFA diameter is 6.2mm. Obtain RLE toe index. Rule out distal occlusion vs DM ulcer. Obtain LUISA. Occluded left BLANCA. Right BLANCA patent s/p intervention. Severe stenosis of the proximal right renal artery. Left renal artery patent. Plan for stenting via patent RCFA by Dr. Farhana Levi on Friday. Bl carotid stenosis: Outpatient follow up for routine surveillance. Continue ASA/Plavix and statin. Active problems:  NSTEMI: s/p cardiac cath 04/30/18 w/ RCA , 60% oLAD w/ neg FFR (0.88), patent LCx stent w/ mild ISR. Continue aggressive medical management and risk factor modification per Dr. Farhana Levi. Hypertensive urgency: stenosis likely contributing factor   ASHD s/p PCI of LCx, PTCA of RCA 7/97, PTCA LAD 3/94. HLD  Patient is on BBlocker, ASA/Plavix, and high intensity statin. Anemia of CKD. Now on epogen. Stable    Secondary PTH/Hypocalcemia: Calcitrol and Tums ordered. Hypomagnesemia: on oral supplementation. Chronic thrombocytopenia: mild   Diabetes with nephropathy and neuropathy: serum BG controlled. Consider evaluation A1c. Osteoarthritis with chronic back and hip pain: Perocet ordered  GERD/PUD with GIB 2015. Pepcid ordered. Tobacco Use smoking cessation edu completed. Consider nicotine patch.      VTE prophylaxis  [facility]    Disposition: TBD by primary team.  Post procedure on Friday.       Signed By: Rodolfo Hess NP     May 1, 2018

## 2018-05-01 NOTE — PROGRESS NOTES
0500 pt medicated with Clonidine 0.1mg for SBP of 194     0730Bedside shift change report given to [employer]  (oncoming nurse) by me (offgoing nurse). Report given with SBAR, MAR and Recent Results.

## 2018-05-01 NOTE — PROGRESS NOTES
Per [facility] P&T committee:     Changed heparin 5,000 units every 8hr to heparin 5,000 units every 12hr for creatinine clearance <20mL/min.       Rosemary Tran [place]

## 2018-05-01 NOTE — PROGRESS NOTES
[facility] Progress Note        NAME: Param lAtamirano       :  1955       MRN:  [id number]     Date/Time: 2018    Risk of deterioration: medium       Assessment:    Plan:  MINO on CKD IV   S/P CATH-2V cad, (R) KIA--Right kidney measures 10.9 cm by US  HTN  PAD/PVD s/p (L) AKA-(L) iliac occlusion  ANemia  Secondary PTH For kia stenting on Friday  Pt had a baseline creatinine of around 2-2.5 over the last few years with recent deterioration to 4-? If KIA has anything to do with this vs advancing ckd. Pt stated again today that he does want to pursue hd. Hydralazine added today for further bp control-once on hd can add ace/arb  Appreciate vascular seeing pt. Continue bicarb gtt  May have amanda placed this week depending on how creatinine does over the next day/two       Subjective:     Chief Complaint:  none    Review of Systems: no n/v/f/c    Objective:     VITALS:   Last 24hrs VS reviewed since prior progress note. Most recent are:  Visit Vitals    /75    Pulse 64    Temp 98.2 °F (36.8 °C)    Resp 18    Ht 5' 9\" (1.753 m)    Wt 71.9 kg (158 lb 9.6 oz)    SpO2 98%    BMI 23.42 kg/m2     SpO2 Readings from Last 6 Encounters:   18 98%   01/15/16 98%   16 100%   12/04/15 98%   10/13/15 97%   09/25/15 100%    O2 Flow Rate (L/min): 2 l/min       Intake/Output Summary (Last 24 hours) at 18 1235  Last data filed at 18 0443   Gross per 24 hour   Intake            367.5 ml   Output             1425 ml   Net          -1057.5 ml        Telemetry Reviewed    PHYSICAL EXAM:    General   well developed, well nourished, appears stated age, in no acute distress  EENT  NCAT, EOMI  Respiratory   Clear anteriorly  Cardiology  RRR  Abdominal  Soft, nt  Extremities  (L) AKA, (R) with brawny edema (his baseline)  Neurological  No focal neurological deficits noted  Psychological  Oriented x 3. Normal affect.               Lab Data Reviewed: (see below)    Medications Reviewed: (see below)    PMH/ reviewed - no change compared to H&P  ___________________________________________________  ___________    Attending Physician: Suman Marr MD     ____________________________________________________  MEDICATIONS:  Current Facility-Administered Medications   Medication Dose Route Frequency    [START ON 5/2/2018] isosorbide mononitrate ER (IMDUR) tablet 60 mg  60 mg Oral DAILY    heparin (porcine) injection 5,000 Units  5,000 Units SubCUTAneous Q12H    hydrALAZINE (APRESOLINE) tablet 25 mg  25 mg Oral Q8H    [START ON 5/2/2018] famotidine (PEPCID) tablet 20 mg  20 mg Oral DAILY    calcium carbonate (TUMS) chewable tablet 200 mg [elemental]  200 mg Oral QID WITH MEALS    calcitRIOL (ROCALTROL) capsule 0.25 mcg  0.25 mcg Oral DAILY    magnesium oxide (MAG-OX) tablet 400 mg  400 mg Oral DAILY    epoetin calos (EPOGEN;PROCRIT) injection 10,000 Units  10,000 Units SubCUTAneous Once per day on Mon Thu    iodixanol (VISIPAQUE) 320 mg iodine/mL contrast injection 0-100 mL  0-100 mL IntraarTERial Multiple    sodium chloride (NS) flush 5-10 mL  5-10 mL IntraVENous Q8H    sodium chloride (NS) flush 5-10 mL  5-10 mL IntraVENous PRN    sodium bicarbonate (8.4%) 150 mEq in sterile water 1,000 mL infusion   IntraVENous CONTINUOUS    B complex-vitaminC-folic acid (NEPHROCAP) cap  1 Cap Oral DAILY    cloNIDine HCl (CATAPRES) tablet 0.1 mg  0.1 mg Oral Q4H PRN    nitroglycerin (NITROSTAT) tablet 0.4 mg  0.4 mg SubLINGual Q5MIN PRN    glucose chewable tablet 16 g  4 Tab Oral PRN    dextrose (D50W) injection syrg 12.5-25 g  12.5-25 g IntraVENous PRN    glucagon (GLUCAGEN) injection 1 mg  1 mg IntraMUSCular PRN    sodium chloride (NS) flush 5-10 mL  5-10 mL IntraVENous Q8H    sodium chloride (NS) flush 5-10 mL  5-10 mL IntraVENous PRN    insulin lispro (HUMALOG) injection   SubCUTAneous Q6H    metoprolol tartrate (LOPRESSOR) tablet 25 mg  25 mg Oral Q6H    aspirin chewable tablet 81 mg  81 mg Oral DAILY    atorvastatin (LIPITOR) tablet 80 mg  80 mg Oral QPM    acetaminophen (TYLENOL) tablet 650 mg  650 mg Oral Q4H PRN    oxyCODONE-acetaminophen (PERCOCET) 5-325 mg per tablet 1 Tab  1 Tab Oral Q4H PRN    hydrALAZINE (APRESOLINE) 20 mg/mL injection 10 mg  10 mg IntraVENous Q2H PRN    naloxone (NARCAN) injection 0.4 mg  0.4 mg IntraVENous PRN    diphenhydrAMINE (BENADRYL) injection 12.5 mg  12.5 mg IntraVENous Q4H PRN    ondansetron (ZOFRAN) injection 4 mg  4 mg IntraVENous Q4H PRN    bisacodyl (DULCOLAX) tablet 5 mg  5 mg Oral DAILY PRN    clopidogrel (PLAVIX) tablet 75 mg  75 mg Oral DAILY        LABS:  Recent Labs      05/01/18 0034 04/30/18 0407   WBC  5.1  4.3   HGB  8.8*  8.1*   HCT  27.2*  25.2*   PLT  114*  106*     Recent Labs      05/01/18 0034 04/30/18 0407 04/28/18 2217 04/28/18   1440   NA  142  142  146*  145   K  3.7  3.6  3.5  3.7   CL  111*  114*  116*  115*   CO2  17*  17*  19*  18*   BUN  60*  63*  71*  71*   CREA  4.57*  4.51*  4.66*  4.52*   GLU  104*  236*  125*  225*   CA  6.6*  6.1*  6.1*  6.9*  6.9*   MG   --   1.5*   --   1.7   PHOS   --   6.5*   --    --    URICA   --   7.7*   --    --      Recent Labs      04/28/18 2217 04/28/18   1440   SGOT  12*  13*   ALT  12  12   AP  177*  190*   TBILI  0.2  0.3   TP  6.1*  6.1*   ALB  2.5*  2.5*   GLOB  3.6  3.6     Recent Labs      05/01/18 0034 04/30/18 0407 04/29/18   2133   04/28/18   1440   INR   --    --    --    --   1.1   PTP   --    --    --    --   10.9   APTT  28.7  32.8*  46.0*   < >   --     < > = values in this interval not displayed. Recent Labs      04/30/18   0407   TIBC  161*   PSAT  61*   FERR  744*      No results for input(s): PH, PCO2, PO2 in the last 72 hours.   Recent Labs      04/29/18   0844  04/28/18   2217  04/28/18   1751   CPK   --    --   105   CKNDX   --    --   3.7*   TROIQ  0.09*  0.16*  0.12*     Lab Results   Component Value Date/Time    Glucose (POC) 126 (H) 04/30/2018 09:18 PM    Glucose (POC) 90 04/30/2018 05:56 PM    Glucose (POC) 180 (H) 04/30/2018 10:59 AM    Glucose (POC) 173 (H) 04/30/2018 07:37 AM    Glucose (POC) 190 (H) 04/29/2018 09:49 PM

## 2018-05-01 NOTE — PROGRESS NOTES
Progress Note      5/1/2018 9:27 AM  NAME: Lucina Martinez   MRN:  [id number]   Admit Diagnosis: NSTEMI (non-ST elevated myocardial infarction) ([facility]  ARF (acute renal failure) ([facility]      Problem List:     1. Chest pain; cath 4/30 w/ RCA , 60% oLAD w/ neg FFR (0.88), patent LCx stent w/ mild ISR. 2. Right renal artery stenosis. 3. Occluded LCIA. 4. Elevated troponin  5. Acute on chronic kidney disease Stg 4  6. Coronary artery disease s/p multivessel stenting s/p PCI of LCx, PTCA of RCA 7/97, PTCA LAD 3/94. Lexiscan 2/08 w/ EF 62%, distal ineroapical infarct w/o ischemia  7. Peripheral vascular disease s/p left iliac stenting, left SFA stenting 3/00, right iliac stenting 11/99.  8. Status post LAKA  9. Bilateral carotid stenosis; 16-49% 9/13  10. Hypertension  11. Diabetes  12. Tobacco abuse  13. Chronic pain  14. Chronic anemia  15. Hyperlipidemia  16. Noncompliance  17. Peptic ulcer disease w/ GIB '15  18. Falls  19. Lives w/ sister in [address] [place] [address] now  21. Usual Cardiologist:  Dr. Maksim Senior (last seen 8/16)     Assessment/Plan:   TnI peaked @ 0.16 w/ nml CK  sCr 4.6  EKG:  ST, LAFB, NSSTTWc    No CP overnight  HTNive  Echo w/ EF 55%, LAE, mild MR, AoSclerosis, mild TR     21. Will plan for right renal artery stenting on Friday 22. Continue ASA  23. Continue plavix  24. Continue atorva  25. Continue metoprolol 25mg q12h  26. Continue ISMN; increase to 60mg daily  27. Will plan for medical therapy of his CAD. If fails, will require complex bifurcation left main stenting. []       High complexity decision making was performed in this patient at high risk for decompensation with multiple organ involvement. Subjective:     Lucina Martinez denies chest pain, dyspnea. Discussed with RN events overnight.      Review of Systems:    Symptom Y/N Comments  Symptom Y/N Comments   Fever/Chills N   Chest Pain N    Poor Appetite N   Edema N    Cough N   Abdominal Pain N    Sputum N   Joint Pain N    SOB/COREA N   Pruritis/Rash N    Nausea/vomit N   Tolerating PT/OT Y    Diarrhea N   Tolerating Diet Y    Constipation N   Other       Could NOT obtain due to:      Objective:      Physical Exam:    Last 24hrs VS reviewed since prior progress note. Most recent are:    Visit Vitals    /63    Pulse 72    Temp 97.9 °F (36.6 °C)    Resp 18    Ht 5' 9\" (1.753 m)    Wt 71.9 kg (158 lb 9.6 oz)    SpO2 95%    BMI 23.42 kg/m2       Intake/Output Summary (Last 24 hours) at 05/01/18 4394  Last data filed at 05/01/18 0443   Gross per 24 hour   Intake            367.5 ml   Output             1675 ml   Net          -1307.5 ml        General Appearance: Well developed, well nourished, alert & oriented x 3,    no acute distress. Ears/Nose/Mouth/Throat: Hearing grossly normal.  Neck: Supple. Chest: Lungs clear to auscultation bilaterally. Cardiovascular: Regular rate and rhythm, S1S2 normal, no murmur. Abdomen: Soft, non-tender, bowel sounds are active. Extremities: No edema bilaterally. LAKA. 1+ RCFA pulses. Skin: Warm and dry. [x]         Post-cath site without hematoma, bruit, tenderness, or thrill. Distal pulses intact. PMH/SH reviewed - no change compared to H&P    Data Review    Telemetry: sinus rhythm     EKG:   [x]  No new EKG for review    Lab Data Personally Reviewed:    Recent Labs      05/01/18 0034 04/30/18   0407   WBC  5.1  4.3   HGB  8.8*  8.1*   HCT  27.2*  25.2*   PLT  114*  106*     Recent Labs      05/01/18 0034 04/30/18 0407  04/29/18   2133   04/28/18   1440   INR   --    --    --    --   1.1   PTP   --    --    --    --   10.9   APTT  28.7  32.8*  46.0*   < >   --     < > = values in this interval not displayed.       Recent Labs      05/01/18 0034 04/30/18   0407  04/28/18   2217  04/28/18   1440   NA  142  142  146*  145   K  3.7  3.6  3.5  3.7   CL  111*  114*  116*  115*   CO2  17*  17*  19*  18*   BUN  60*  63*  71*  71*   CREA  4.57*  4.51*  4.66*  4.52*   GLU  104*  236* 125*  225*   CA  6.6*  6.1*  6.1*  6.9*  6.9*   MG   --   1.5*   --   1.7     Recent Labs      04/29/18   0844  04/28/18 2217 04/28/18   1751   CPK   --    --   105   CKNDX   --    --   3.7*   TROIQ  0.09*  0.16*  0.12*     Lab Results   Component Value Date/Time    Cholesterol, total 236 (H) 04/29/2018 06:00 AM    HDL Cholesterol 34 04/29/2018 06:00 AM    LDL,Direct 62 10/03/2014 05:50 AM    LDL, calculated 172.4 (H) 04/29/2018 06:00 AM    Triglyceride 148 04/29/2018 06:00 AM    CHOL/HDL Ratio 6.9 (H) 04/29/2018 06:00 AM       Recent Labs      04/28/18 2217 04/28/18   1440   SGOT  12*  13*   AP  177*  190*   TP  6.1*  6.1*   ALB  2.5*  2.5*   GLOB  3.6  3.6     No results for input(s): PH, PCO2, PO2 in the last 72 hours.     Medications Personally Reviewed:    Current Facility-Administered Medications   Medication Dose Route Frequency    [START ON 5/2/2018] isosorbide mononitrate ER (IMDUR) tablet 60 mg  60 mg Oral DAILY    isosorbide mononitrate ER (IMDUR) tablet 30 mg  30 mg Oral ONCE    calcium carbonate (TUMS) chewable tablet 200 mg [elemental]  200 mg Oral QID WITH MEALS    calcitRIOL (ROCALTROL) capsule 0.25 mcg  0.25 mcg Oral DAILY    magnesium oxide (MAG-OX) tablet 400 mg  400 mg Oral DAILY    epoetin calos (EPOGEN;PROCRIT) injection 10,000 Units  10,000 Units SubCUTAneous Once per day on Mon Thu    iodixanol (VISIPAQUE) 320 mg iodine/mL contrast injection 0-100 mL  0-100 mL IntraarTERial Multiple    sodium chloride (NS) flush 5-10 mL  5-10 mL IntraVENous Q8H    sodium chloride (NS) flush 5-10 mL  5-10 mL IntraVENous PRN    sodium bicarbonate (8.4%) 150 mEq in sterile water 1,000 mL infusion   IntraVENous CONTINUOUS    B complex-vitaminC-folic acid (NEPHROCAP) cap  1 Cap Oral DAILY    cloNIDine HCl (CATAPRES) tablet 0.1 mg  0.1 mg Oral Q4H PRN    famotidine (PEPCID) IVPB 20 mg  20 mg IntraVENous Q24H    nitroglycerin (NITROSTAT) tablet 0.4 mg  0.4 mg SubLINGual Q5MIN PRN    glucose chewable tablet 16 g  4 Tab Oral PRN    dextrose (D50W) injection syrg 12.5-25 g  12.5-25 g IntraVENous PRN    glucagon (GLUCAGEN) injection 1 mg  1 mg IntraMUSCular PRN    sodium chloride (NS) flush 5-10 mL  5-10 mL IntraVENous Q8H    sodium chloride (NS) flush 5-10 mL  5-10 mL IntraVENous PRN    insulin lispro (HUMALOG) injection   SubCUTAneous Q6H    metoprolol tartrate (LOPRESSOR) tablet 25 mg  25 mg Oral Q6H    aspirin chewable tablet 81 mg  81 mg Oral DAILY    atorvastatin (LIPITOR) tablet 80 mg  80 mg Oral QPM    acetaminophen (TYLENOL) tablet 650 mg  650 mg Oral Q4H PRN    oxyCODONE-acetaminophen (PERCOCET) 5-325 mg per tablet 1 Tab  1 Tab Oral Q4H PRN    hydrALAZINE (APRESOLINE) 20 mg/mL injection 10 mg  10 mg IntraVENous Q2H PRN    naloxone (NARCAN) injection 0.4 mg  0.4 mg IntraVENous PRN    diphenhydrAMINE (BENADRYL) injection 12.5 mg  12.5 mg IntraVENous Q4H PRN    ondansetron (ZOFRAN) injection 4 mg  4 mg IntraVENous Q4H PRN    bisacodyl (DULCOLAX) tablet 5 mg  5 mg Oral DAILY PRN    heparin 25,000 units in D5W 250 ml infusion  12-25 Units/kg/hr IntraVENous TITRATE    clopidogrel (PLAVIX) tablet 75 mg  75 mg Oral DAILY    heparin (porcine) injection 3,950 Units  60 Units/kg IntraVENous PRN    heparin (porcine) injection 2,000 Units  30 Units/kg IntraVENous PRN         Idalia Malling III, DO

## 2018-05-01 NOTE — PROGRESS NOTES
Hospitalist Progress Note    NAME: Ivelisse Bullard   :  1955   MRN:  [id number]       Assessment / Plan:  Chest pain - borderline trop elevation; NSTEMI less likely. - Cardiac cath report:  1. Two vessel CAD  2. Patent stent in OM1 w/ mild ISR  3. Elevated LVEDP  4. Insignificant FFR of the oLAD  5. Patent left renal artery  6. Severe stenosis of the proximal right renal artery  7. Patent splenic and hepatic arteries  8. Occluded left common iliac artery  9. Right common iliac artery appears to be patent at its origin     CAD  -cont aspirin, Plavix; heparin drip discontinued  -Lipitor    Renal artery stenosis  -stenting planned for Friday    MINO on CKD stage 5     Hypertensive Urgency - blood pressure remains elevated. -cont lopressor, Imdur  -add scheduled hydralazine    PAD w/ multiple stents - followed by Dr. Marcelino Edwards    DM-2 w/ nephropathy and neuropathy  -SSI    Anemia of chronic renal disease    Chronic thrombocytopenia    Tobacco abuse    Depression     Medical noncompliance    Plan:   Cardiology and renal consulted. May need HD  Cont IV fluids    Body mass index is 23.42 kg/(m^2). Code status: Full  Prophylaxis: Hep SQ  Recommended Disposition: Home w/Family     Subjective:     Chief Complaint / Reason for Physician Visit  No problems overnight. No chest pain or sob. Review of Systems:  Symptom Y/N Comments  Symptom Y/N Comments   Fever/Chills    Chest Pain n    Poor Appetite    Edema     Cough n   Abdominal Pain n    Sputum    Joint Pain     SOB/COREA n   Pruritis/Rash     Nausea/vomit    Tolerating PT/OT     Diarrhea    Tolerating Diet     Constipation    Other       Could NOT obtain due to:      Objective:     VITALS:   Last 24hrs VS reviewed since prior progress note.  Most recent are:  Patient Vitals for the past 24 hrs:   Temp Pulse Resp BP SpO2   18 0858 97.8 °F (36.6 °C) 64 16 194/75 97 %   18 0554 - 72 18 184/63 95 %   18 0550 - 78 - (!) 202/76 -   18 2341 - 66 - (!) 202/76 99 %   05/01/18 0443 97.9 °F (36.6 °C) 67 18 194/87 98 %   05/01/18 0020 97.6 °F (36.4 °C) 67 18 175/70 100 %   05/01/18 0019 - 60 - 175/70 99 %   04/30/18 2300 - (!) 57 - 175/70 100 %   04/30/18 2238 97.6 °F (36.4 °C) 60 18 175/62 100 %   04/30/18 2220 - (!) 57 - - 100 %   04/30/18 2200 - (!) 54 - 131/58 -   04/30/18 2127 - 64 - 144/63 -   04/30/18 2100 - (!) 56 - - -   04/30/18 2000 - (!) 59 - - -   04/30/18 1923 97.9 °F (36.6 °C) 62 18 172/68 100 %   04/30/18 1800 - (!) 58 - 176/73 -   04/30/18 1700 - (!) 55 - 162/67 -   04/30/18 1630 - (!) 54 - 167/75 -   04/30/18 1615 - (!) 52 - 162/67 -   04/30/18 1600 - (!) 51 - 161/63 -   04/30/18 1545 - (!) 52 - 176/73 -   04/30/18 1445 - (!) 54 - 165/70 -   04/30/18 1430 - (!) 51 - 153/61 -   04/30/18 1415 - (!) 51 - 164/77 -   04/30/18 1400 - (!) 52 - 164/72 -   04/30/18 1345 - (!) 50 - - -   04/30/18 1310 - (!) 49 - 174/73 100 %   04/30/18 1305 - (!) 50 - 168/77 99 %   04/30/18 1300 - (!) 52 - 175/77 -   04/30/18 1230 - (!) 54 - 175/74 98 %   04/30/18 1200 - (!) 57 - 184/78 99 %   04/30/18 1145 - (!) 56 - 169/71 100 %   04/30/18 1130 - (!) 54 - 142/65 100 %       Intake/Output Summary (Last 24 hours) at 05/01/18 1116  Last data filed at 05/01/18 0443   Gross per 24 hour   Intake            367.5 ml   Output             1425 ml   Net          -1057.5 ml        PHYSICAL EXAM:  General: cooperative, no acute distress    EENT:  EOMI. Anicteric sclerae. MMM  Resp:  CTA bilaterally, no wheezing or rales. No accessory muscle use  CV:  Regular  rhythm,  No edema  GI:  Soft, Non distended, Non tender.  +Bowel sounds  Neurologic:  Alert and oriented X 3, normal speech,   Psych:   Not anxious nor agitated  Skin:  No rashes.   No jaundice  Ext:                  No edema right leg; left AKA    Reviewed most current lab test results and cultures  YES  Reviewed most current radiology test results   YES  Review and summation of old records today    NO  Reviewed patient's current orders and MAR    YES  PMH/SH reviewed - no change compared to H&P          Current Facility-Administered Medications:     [START ON 5/2/2018] isosorbide mononitrate ER (IMDUR) tablet 60 mg, 60 mg, Oral, DAILY, Pk Herrera III, DO    heparin (porcine) injection 5,000 Units, 5,000 Units, SubCUTAneous, Q12H, Ruiz Binh III, DO    calcium carbonate (TUMS) chewable tablet 200 mg [elemental], 200 mg, Oral, QID WITH MEALS, Quiana Hooks MD, 200 mg at 05/01/18 0855    calcitRIOL (ROCALTROL) capsule 0.25 mcg, 0.25 mcg, Oral, DAILY, Quiana Hooks MD, 0.25 mcg at 05/01/18 0854    magnesium oxide (MAG-OX) tablet 400 mg, 400 mg, Oral, DAILY, Quiana Hooks MD, 400 mg at 05/01/18 0854    epoetin calos (EPOGEN;PROCRIT) injection 10,000 Units, 10,000 Units, SubCUTAneous, Once per day on Mon Thu, Quiana Hooks MD, 10,000 Units at 04/30/18 1837    iodixanol (VISIPAQUE) 320 mg iodine/mL contrast injection 0-100 mL, 0-100 mL, IntraarTERial, Multiple, Pk Herrera III, DO, 50 mL at 04/30/18 1041    sodium chloride (NS) flush 5-10 mL, 5-10 mL, IntraVENous, Q8H, Ricardo Herrera III, DO, 10 mL at 05/01/18 0513    sodium chloride (NS) flush 5-10 mL, 5-10 mL, IntraVENous, PRN, Ruiz Sainzus III, DO    sodium bicarbonate (8.4%) 150 mEq in sterile water 1,000 mL infusion, , IntraVENous, CONTINUOUS, Quiana Hooks MD, Last Rate: 75 mL/hr at 05/01/18 0440    B complex-vitaminC-folic acid (NEPHROCAP) cap, 1 Cap, Oral, DAILY, Quiana Hooks MD, 1 Cap at 05/01/18 0855    cloNIDine HCl (CATAPRES) tablet 0.1 mg, 0.1 mg, Oral, Q4H PRN, Quiana Hooks MD, 0.1 mg at 05/01/18 1027    famotidine (PEPCID) IVPB 20 mg, 20 mg, IntraVENous, Q24H, Santos Christopher MD, 20 mg at 05/01/18 0854    nitroglycerin (NITROSTAT) tablet 0.4 mg, 0.4 mg, SubLINGual, Q5MIN PRN, Ashwin Diaz MD, 0.4 mg at 04/29/18 0830    glucose chewable tablet 16 g, 4 Tab, Oral, PRN, Ashwin Diaz MD    dextrose (D50W) injection syrg 12.5-25 g, 12.5-25 g, IntraVENous, PRN, Bailey Meier MD    glucagon [facility]) injection 1 mg, 1 mg, IntraMUSCular, PRN, Bailey Meier MD    sodium chloride (NS) flush 5-10 mL, 5-10 mL, IntraVENous, Q8H, Bailey Meier MD, 10 mL at 05/01/18 0513    sodium chloride (NS) flush 5-10 mL, 5-10 mL, IntraVENous, PRN, Bailey Meier MD    insulin lispro (HUMALOG) injection, , SubCUTAneous, Q6H, Bailey Meier MD, Stopped at 05/01/18 0000    metoprolol tartrate (LOPRESSOR) tablet 25 mg, 25 mg, Oral, Q6H, Bailey Meier MD, 25 mg at 05/01/18 0550    aspirin chewable tablet 81 mg, 81 mg, Oral, DAILY, Bailey Meier MD, 81 mg at 05/01/18 0854    atorvastatin (LIPITOR) tablet 80 mg, 80 mg, Oral, QPM, Bailey Meier MD, 80 mg at 04/30/18 1757    acetaminophen (TYLENOL) tablet 650 mg, 650 mg, Oral, Q4H PRN, Bailey Meier MD, 650 mg at 04/29/18 2151    oxyCODONE-acetaminophen (PERCOCET) 5-325 mg per tablet 1 Tab, 1 Tab, Oral, Q4H PRN, Bailey Meier MD, 1 Tab at 04/30/18 1804    hydrALAZINE (APRESOLINE) 20 mg/mL injection 10 mg, 10 mg, IntraVENous, Q2H PRN, Bailey Meier MD    naloxone [facility]) injection 0.4 mg, 0.4 mg, IntraVENous, PRN, Bailey Meier MD    diphenhydrAMINE (BENADRYL) injection 12.5 mg, 12.5 mg, IntraVENous, Q4H PRN, Bailey Meier MD    ondansetron [facility]) injection 4 mg, 4 mg, IntraVENous, Q4H PRN, Bailey Meier MD, 4 mg at 04/29/18 1316    bisacodyl (DULCOLAX) tablet 5 mg, 5 mg, Oral, DAILY PRN, Bailey Meier MD    clopidogrel (PLAVIX) tablet 75 mg, 75 mg, Oral, DAILY, Bailey Meier MD, 75 mg at 05/01/18 1405  ________________________________________________________________________  Care Plan discussed with:    Comments   Patient y    Family      RN y    Care Manager     Consultant                        Multidiciplinary team rounds were held today with , nursing, pharmacist and clinical coordinator.   Patient's plan of care was discussed; medications were reviewed and discharge planning was addressed. ________________________________________________________________________  Total NON critical care TIME:  30  Minutes    Total CRITICAL CARE TIME Spent:   Minutes non procedure based      Comments   >50% of visit spent in counseling and coordination of care     ________________________________________________________________________  Wallace Mccoy MD     Procedures: see electronic medical records for all procedures/Xrays and details which were not copied into this note but were reviewed prior to creation of Plan. LABS:  I reviewed today's most current labs and imaging studies.   Pertinent labs include:  Recent Labs      05/01/18   0034  04/30/18   0407  04/29/18   0600   WBC  5.1  4.3  5.2   HGB  8.8*  8.1*  9.7*   HCT  27.2*  25.2*  30.3*   PLT  114*  106*  116*     Recent Labs      05/01/18   0034  04/30/18   0407  04/28/18   2217  04/28/18   1440   NA  142  142  146*  145   K  3.7  3.6  3.5  3.7   CL  111*  114*  116*  115*   CO2  17*  17*  19*  18*   GLU  104*  236*  125*  225*   BUN  60*  63*  71*  71*   CREA  4.57*  4.51*  4.66*  4.52*   CA  6.6*  6.1*  6.1*  6.9*  6.9*   MG   --   1.5*   --   1.7   PHOS   --   6.5*   --    --    ALB   --    --   2.5*  2.5*   TBILI   --    --   0.2  0.3   SGOT   --    --   12*  13*   ALT   --    --   12  12   INR   --    --    --   1.1       Signed: Wallace Mccoy MD

## 2018-05-01 NOTE — PROCEDURES
[facility]  *** FINAL REPORT ***    Name: Daiana Angel  MRN: [id number]    Inpatient  : 1955  HIS Order #: [id number]  [id number]  Date: 01 May 2018    TYPE OF TEST: Peripheral Venous Testing    REASON FOR TEST  Pre-Op HD Access    Right Arm:-  Deep venous thrombosis:           Not examined  Superficial venous thrombosis:    Not examined    Left Arm:-  Deep venous thrombosis:           Not examined  Superficial venous thrombosis:    Not examined    Vein Mapping:    Diam.   Depth  (mm)    Right Cephalic Vein:    Axilla:    Upper Arm:       3.7    Lower Arm:       3.2    Antecubital:     3.3    Upper Forearm:   3.1    Forearm:         2.6    Wrist:           1.9    Right Basilic Vein:    Upper Arm:       4.8    Lower Arm:       2.9    Antecubital:     2.2    Upper Forearm:   2.1    Lower Forearm:   2.0    Wrist:           1.6    R. Median Cubital:    Right Brachial Vein:    Proximal:    Distal:    Right Subclavian Artery:  Right Axillary Artery  :    Left Cephalic Vein:    Axilla:    Upper Arm:       4.5    Lower Arm:       4.8    Antecubital:     3.6    Upper Forearm:   2.5    Forearm:         2.4    Wrist:           2.0    Left Basilic Vein:    Upper Arm:       4.0    Lower Arm:       2.4    Antecubital:     2.1    Upper Forearm:   1.6    Lower Forearm:   1.7    Wrist:           1.9    L. Median Cubital:    Left Brachial Vein:    Proximal:    Distal:    Left Subclavian Artery:  Left Axillary Artery  :      INTERPRETATION/FINDINGS  PROCEDURE:  Venous duplex examination using B-mode, color flow and  spectral Doppler of the upper extremity veins. Right arm :  1. Superficial vein(s) visualized include the basilic (upper arm),  basilic (forearm), cephalic (upper arm) and cephalic (forearm) veins.   2. The cephalic vein may be an inadequate conduit for dialysis access  - the smallest diameter is 1.9 mm.  3. The basilic vein may be an inadequate conduit for dialysis access -   the smallest diameter is 1.6 mm. Left arm :  1. Superficial vein(s) visualized include the basilic (upper arm),  basilic (forearm), cephalic (upper arm) and cephalic (forearm) veins. 2. The cephalic vein may be an inadequate conduit for dialysis access  - the smallest diameter is 2.0 mm.  3. The basilic vein may be an inadequate conduit for dialysis access -   the smallest diameter is 1.6 mm. ADDITIONAL COMMENTS    I have personally reviewed the data relevant to the interpretation of  this  study. TECHNOLOGIST: Davi Villela.  SABAS Bunch, RDMS  Signed: 05/01/2018 02:50 PM    PHYSICIAN: Ruby Parks MD  Signed: 05/02/2018 07:22 AM

## 2018-05-02 LAB
ANION GAP SERPL CALC-SCNC: 12 MMOL/L (ref 5–15)
APTT PPP: 28.2 SEC (ref 22.1–32)
APTT PPP: 33.7 SEC (ref 22.1–32)
BUN SERPL-MCNC: 56 MG/DL (ref 6–20)
BUN/CREAT SERPL: 12 (ref 12–20)
CALCIUM SERPL-MCNC: 6.6 MG/DL (ref 8.5–10.1)
CHLORIDE SERPL-SCNC: 109 MMOL/L (ref 97–108)
CO2 SERPL-SCNC: 22 MMOL/L (ref 21–32)
CREAT SERPL-MCNC: 4.6 MG/DL (ref 0.7–1.3)
GLUCOSE BLD STRIP.AUTO-MCNC: 129 MG/DL (ref 65–100)
GLUCOSE BLD STRIP.AUTO-MCNC: 162 MG/DL (ref 65–100)
GLUCOSE BLD STRIP.AUTO-MCNC: 187 MG/DL (ref 65–100)
GLUCOSE BLD STRIP.AUTO-MCNC: 204 MG/DL (ref 65–100)
GLUCOSE SERPL-MCNC: 91 MG/DL (ref 65–100)
INR PPP: 1.1 (ref 0.9–1.1)
MAGNESIUM SERPL-MCNC: 1.6 MG/DL (ref 1.6–2.4)
PHOSPHATE SERPL-MCNC: 5.5 MG/DL (ref 2.6–4.7)
POTASSIUM SERPL-SCNC: 3.7 MMOL/L (ref 3.5–5.1)
PROTHROMBIN TIME: 11.3 SEC (ref 9–11.1)
SERVICE CMNT-IMP: ABNORMAL
SODIUM SERPL-SCNC: 143 MMOL/L (ref 136–145)
THERAPEUTIC RANGE,PTTT: ABNORMAL SECS (ref 58–77)
THERAPEUTIC RANGE,PTTT: NORMAL SECS (ref 58–77)

## 2018-05-02 PROCEDURE — 85027 COMPLETE CBC AUTOMATED: CPT | Performed by: INTERNAL MEDICINE

## 2018-05-02 PROCEDURE — 74011250637 HC RX REV CODE- 250/637: Performed by: INTERNAL MEDICINE

## 2018-05-02 PROCEDURE — 83735 ASSAY OF MAGNESIUM: CPT | Performed by: INTERNAL MEDICINE

## 2018-05-02 PROCEDURE — 74011250636 HC RX REV CODE- 250/636: Performed by: HOSPITALIST

## 2018-05-02 PROCEDURE — 36415 COLL VENOUS BLD VENIPUNCTURE: CPT | Performed by: INTERNAL MEDICINE

## 2018-05-02 PROCEDURE — 74011000250 HC RX REV CODE- 250: Performed by: INTERNAL MEDICINE

## 2018-05-02 PROCEDURE — 74011250637 HC RX REV CODE- 250/637: Performed by: HOSPITALIST

## 2018-05-02 PROCEDURE — 80053 COMPREHEN METABOLIC PANEL: CPT | Performed by: INTERNAL MEDICINE

## 2018-05-02 PROCEDURE — 85610 PROTHROMBIN TIME: CPT | Performed by: INTERNAL MEDICINE

## 2018-05-02 PROCEDURE — 74011250636 HC RX REV CODE- 250/636: Performed by: INTERNAL MEDICINE

## 2018-05-02 PROCEDURE — 65660000000 HC RM CCU STEPDOWN

## 2018-05-02 PROCEDURE — 85730 THROMBOPLASTIN TIME PARTIAL: CPT | Performed by: INTERNAL MEDICINE

## 2018-05-02 PROCEDURE — 85730 THROMBOPLASTIN TIME PARTIAL: CPT

## 2018-05-02 PROCEDURE — 80048 BASIC METABOLIC PNL TOTAL CA: CPT | Performed by: INTERNAL MEDICINE

## 2018-05-02 PROCEDURE — 82962 GLUCOSE BLOOD TEST: CPT

## 2018-05-02 PROCEDURE — 74011636637 HC RX REV CODE- 636/637: Performed by: HOSPITALIST

## 2018-05-02 PROCEDURE — 84100 ASSAY OF PHOSPHORUS: CPT | Performed by: INTERNAL MEDICINE

## 2018-05-02 RX ORDER — POLYETHYLENE GLYCOL 3350 17 G/17G
17 POWDER, FOR SOLUTION ORAL DAILY
Status: DISCONTINUED | OUTPATIENT
Start: 2018-05-02 | End: 2018-05-14 | Stop reason: HOSPADM

## 2018-05-02 RX ORDER — DOCUSATE SODIUM 100 MG/1
100 CAPSULE, LIQUID FILLED ORAL 2 TIMES DAILY
Status: DISCONTINUED | OUTPATIENT
Start: 2018-05-02 | End: 2018-05-14 | Stop reason: HOSPADM

## 2018-05-02 RX ORDER — AMLODIPINE BESYLATE 5 MG/1
5 TABLET ORAL DAILY
Status: DISCONTINUED | OUTPATIENT
Start: 2018-05-02 | End: 2018-05-03

## 2018-05-02 RX ORDER — INSULIN LISPRO 100 [IU]/ML
INJECTION, SOLUTION INTRAVENOUS; SUBCUTANEOUS
Status: DISCONTINUED | OUTPATIENT
Start: 2018-05-02 | End: 2018-05-14 | Stop reason: HOSPADM

## 2018-05-02 RX ADMIN — METOPROLOL TARTRATE 25 MG: 25 TABLET ORAL at 22:53

## 2018-05-02 RX ADMIN — ASPIRIN 81 MG 81 MG: 81 TABLET ORAL at 08:49

## 2018-05-02 RX ADMIN — OXYCODONE HYDROCHLORIDE AND ACETAMINOPHEN 1 TABLET: 5; 325 TABLET ORAL at 15:36

## 2018-05-02 RX ADMIN — METOPROLOL TARTRATE 25 MG: 25 TABLET ORAL at 11:10

## 2018-05-02 RX ADMIN — DOCUSATE SODIUM 100 MG: 100 CAPSULE, LIQUID FILLED ORAL at 17:07

## 2018-05-02 RX ADMIN — WATER: 1000 INJECTION, SOLUTION INTRAVENOUS at 23:44

## 2018-05-02 RX ADMIN — INSULIN LISPRO 2 UNITS: 100 INJECTION, SOLUTION INTRAVENOUS; SUBCUTANEOUS at 17:06

## 2018-05-02 RX ADMIN — OXYCODONE HYDROCHLORIDE AND ACETAMINOPHEN 1 TABLET: 5; 325 TABLET ORAL at 06:49

## 2018-05-02 RX ADMIN — CALCIUM CARBONATE 200 MG: 500 TABLET, CHEWABLE ORAL at 11:10

## 2018-05-02 RX ADMIN — DOCUSATE SODIUM 100 MG: 100 CAPSULE, LIQUID FILLED ORAL at 10:36

## 2018-05-02 RX ADMIN — AMLODIPINE BESYLATE 5 MG: 5 TABLET ORAL at 09:03

## 2018-05-02 RX ADMIN — METOPROLOL TARTRATE 25 MG: 25 TABLET ORAL at 17:06

## 2018-05-02 RX ADMIN — NEPHROCAP 1 CAPSULE: 1 CAP ORAL at 08:49

## 2018-05-02 RX ADMIN — FAMOTIDINE 20 MG: 20 TABLET, FILM COATED ORAL at 08:49

## 2018-05-02 RX ADMIN — HEPARIN SODIUM 5000 UNITS: 5000 INJECTION, SOLUTION INTRAVENOUS; SUBCUTANEOUS at 17:07

## 2018-05-02 RX ADMIN — INSULIN LISPRO 3 UNITS: 100 INJECTION, SOLUTION INTRAVENOUS; SUBCUTANEOUS at 11:10

## 2018-05-02 RX ADMIN — CALCIUM CARBONATE 200 MG: 500 TABLET, CHEWABLE ORAL at 08:49

## 2018-05-02 RX ADMIN — CALCIUM CARBONATE 200 MG: 500 TABLET, CHEWABLE ORAL at 17:06

## 2018-05-02 RX ADMIN — CALCITRIOL 0.25 MCG: 0.25 CAPSULE, LIQUID FILLED ORAL at 08:49

## 2018-05-02 RX ADMIN — HYDRALAZINE HYDROCHLORIDE 25 MG: 50 TABLET, FILM COATED ORAL at 13:06

## 2018-05-02 RX ADMIN — CLOPIDOGREL BISULFATE 75 MG: 75 TABLET ORAL at 08:49

## 2018-05-02 RX ADMIN — ISOSORBIDE MONONITRATE 60 MG: 30 TABLET, EXTENDED RELEASE ORAL at 08:49

## 2018-05-02 RX ADMIN — ATORVASTATIN CALCIUM 80 MG: 40 TABLET, FILM COATED ORAL at 17:06

## 2018-05-02 RX ADMIN — Medication 10 ML: at 22:53

## 2018-05-02 RX ADMIN — CALCIUM CARBONATE 200 MG: 500 TABLET, CHEWABLE ORAL at 22:53

## 2018-05-02 RX ADMIN — HEPARIN SODIUM 5000 UNITS: 5000 INJECTION, SOLUTION INTRAVENOUS; SUBCUTANEOUS at 06:36

## 2018-05-02 RX ADMIN — HYDRALAZINE HYDROCHLORIDE 25 MG: 50 TABLET, FILM COATED ORAL at 22:53

## 2018-05-02 RX ADMIN — HYDRALAZINE HYDROCHLORIDE 25 MG: 50 TABLET, FILM COATED ORAL at 06:36

## 2018-05-02 RX ADMIN — OXYCODONE HYDROCHLORIDE AND ACETAMINOPHEN 1 TABLET: 5; 325 TABLET ORAL at 22:54

## 2018-05-02 RX ADMIN — Medication 10 ML: at 06:37

## 2018-05-02 RX ADMIN — Medication 10 ML: at 13:06

## 2018-05-02 RX ADMIN — WATER: 1000 INJECTION, SOLUTION INTRAVENOUS at 09:03

## 2018-05-02 RX ADMIN — POLYETHYLENE GLYCOL 3350 17 G: 17 POWDER, FOR SOLUTION ORAL at 10:36

## 2018-05-02 RX ADMIN — DIPHENHYDRAMINE HYDROCHLORIDE 12.5 MG: 50 INJECTION, SOLUTION INTRAMUSCULAR; INTRAVENOUS at 22:53

## 2018-05-02 RX ADMIN — Medication 400 MG: at 08:49

## 2018-05-02 RX ADMIN — METOPROLOL TARTRATE 25 MG: 25 TABLET ORAL at 06:36

## 2018-05-02 NOTE — PROGRESS NOTES
Bedside and Verbal shift change report given to Lamberto RN (oncoming nurse) by Stormy Borrego RN (offgoing nurse). Report included the following information SBAR, Kardex, Procedure Summary, Intake/Output, MAR, Accordion, Recent Results and Cardiac Rhythm NSR.

## 2018-05-02 NOTE — PROGRESS NOTES
Call received from telemetry that patient was off the monitor. Went into patient's room to reconnect him and he ripped the remaining leads off of his chest.  Rafiq Escalante, 'It has nothing to do with you, I know you're doing your job but I'm tired of this! Take it all off!'  Patient signed a 'refusal of telemetry' form.

## 2018-05-02 NOTE — PROGRESS NOTES
Well-known to me  RRA intervention pending for Friday  Venous studies reviewed  Would plan LUE access when appropriate

## 2018-05-02 NOTE — PROGRESS NOTES
[facility] Progress Note        NAME: Moo Paul       :  1955       MRN:  [id number]     Date/Time: 2018    Risk of deterioration: medium       Assessment:    Plan:  MINO on CKD IV   S/P CATH-2V cad, (R) KIA--Right kidney measures 10.9 cm by US  HTN  PAD/PVD s/p (L) AKA-(L) iliac occlusion  ANemia  Secondary PTH For kia stenting on Friday  Pt had a baseline creatinine of around 2-2.5 over the last few years with recent deterioration to 4-4.6--likely due to advancing ckd (+/-) KIA  Pt stated again today that he does want to pursue hd.   BP meds being adjusted   once on hd can add ace/arb  Appreciate vascular seeing pt.--looks like his left arm will be used for acces with dr. Danielle Lyn gtt  May have amanda placed this week depending on how creatinine does over the next day/two  Continue rocaltrol, epo       Subjective:     Chief Complaint:  none    Review of Systems: no n/v/f/c    Objective:     VITALS:   Last 24hrs VS reviewed since prior progress note. Most recent are:  Visit Vitals    /64 (BP 1 Location: Left arm, BP Patient Position: At rest)    Pulse 66    Temp 97.6 °F (36.4 °C)    Resp 16    Ht 5' 9\" (1.753 m)    Wt 71.9 kg (158 lb 9.6 oz)    SpO2 97%    BMI 23.42 kg/m2     SpO2 Readings from Last 6 Encounters:   18 97%   01/15/16 98%   16 100%   12/04/15 98%   10/13/15 97%   09/25/15 100%    O2 Flow Rate (L/min): 2 l/min     No intake or output data in the 24 hours ending 18 1304     Telemetry Reviewed    PHYSICAL EXAM:    General   well developed, well nourished, appears stated age, in no acute distress  EENT  NCAT, EOMI  Respiratory   Clear anteriorly  Cardiology  RRR  Abdominal  Soft, nt  Extremities  (L) AKA, (R) with brawny edema (his baseline)  Neurological  No focal neurological deficits noted  Psychological  Oriented x 3. Normal affect.               Lab Data Reviewed: (see below)    Medications Reviewed: (see below)    PMH/SH reviewed - no change compared to H&P  ___________________________________________________  ___________    Attending Physician: Flor Valdez MD     ____________________________________________________  MEDICATIONS:  Current Facility-Administered Medications   Medication Dose Route Frequency    amLODIPine (NORVASC) tablet 5 mg  5 mg Oral DAILY    polyethylene glycol (MIRALAX) packet 17 g  17 g Oral DAILY    docusate sodium (COLACE) capsule 100 mg  100 mg Oral BID    isosorbide mononitrate ER (IMDUR) tablet 60 mg  60 mg Oral DAILY    heparin (porcine) injection 5,000 Units  5,000 Units SubCUTAneous Q12H    hydrALAZINE (APRESOLINE) tablet 25 mg  25 mg Oral Q8H    famotidine (PEPCID) tablet 20 mg  20 mg Oral DAILY    calcium carbonate (TUMS) chewable tablet 200 mg [elemental]  200 mg Oral QID WITH MEALS    calcitRIOL (ROCALTROL) capsule 0.25 mcg  0.25 mcg Oral DAILY    magnesium oxide (MAG-OX) tablet 400 mg  400 mg Oral DAILY    epoetin calos (EPOGEN;PROCRIT) injection 10,000 Units  10,000 Units SubCUTAneous Once per day on Mon Thu    iodixanol (VISIPAQUE) 320 mg iodine/mL contrast injection 0-100 mL  0-100 mL IntraarTERial Multiple    sodium chloride (NS) flush 5-10 mL  5-10 mL IntraVENous Q8H    sodium chloride (NS) flush 5-10 mL  5-10 mL IntraVENous PRN    sodium bicarbonate (8.4%) 150 mEq in sterile water 1,000 mL infusion   IntraVENous CONTINUOUS    B complex-vitaminC-folic acid (NEPHROCAP) cap  1 Cap Oral DAILY    cloNIDine HCl (CATAPRES) tablet 0.1 mg  0.1 mg Oral Q4H PRN    nitroglycerin (NITROSTAT) tablet 0.4 mg  0.4 mg SubLINGual Q5MIN PRN    glucose chewable tablet 16 g  4 Tab Oral PRN    dextrose (D50W) injection syrg 12.5-25 g  12.5-25 g IntraVENous PRN    glucagon (GLUCAGEN) injection 1 mg  1 mg IntraMUSCular PRN    sodium chloride (NS) flush 5-10 mL  5-10 mL IntraVENous Q8H    sodium chloride (NS) flush 5-10 mL  5-10 mL IntraVENous PRN    insulin lispro (HUMALOG) injection   SubCUTAneous Q6H    metoprolol tartrate (LOPRESSOR) tablet 25 mg  25 mg Oral Q6H    aspirin chewable tablet 81 mg  81 mg Oral DAILY    atorvastatin (LIPITOR) tablet 80 mg  80 mg Oral QPM    acetaminophen (TYLENOL) tablet 650 mg  650 mg Oral Q4H PRN    oxyCODONE-acetaminophen (PERCOCET) 5-325 mg per tablet 1 Tab  1 Tab Oral Q4H PRN    hydrALAZINE (APRESOLINE) 20 mg/mL injection 10 mg  10 mg IntraVENous Q2H PRN    naloxone (NARCAN) injection 0.4 mg  0.4 mg IntraVENous PRN    diphenhydrAMINE (BENADRYL) injection 12.5 mg  12.5 mg IntraVENous Q4H PRN    ondansetron (ZOFRAN) injection 4 mg  4 mg IntraVENous Q4H PRN    bisacodyl (DULCOLAX) tablet 5 mg  5 mg Oral DAILY PRN    clopidogrel (PLAVIX) tablet 75 mg  75 mg Oral DAILY        LABS:  Recent Labs      05/01/18 0034  04/30/18   0407   WBC  5.1  4.3   HGB  8.8*  8.1*   HCT  27.2*  25.2*   PLT  114*  106*     Recent Labs      05/02/18   0559  05/01/18 0034  04/30/18   0407   NA  143  142  142   K  3.7  3.7  3.6   CL  109*  111*  114*   CO2  22  17*  17*   BUN  56*  60*  63*   CREA  4.60*  4.57*  4.51*   GLU  91  104*  236*   CA  6.6*  6.6*  6.1*  6.1*   MG  1.6   --   1.5*   PHOS  5.5*   --   6.5*   URICA   --    --   7.7*     No results for input(s): SGOT, GPT, ALT, AP, TBIL, TBILI, TP, ALB, GLOB, GGT, AML, LPSE in the last 72 hours. No lab exists for component: AMYP, HLPSE  Recent Labs      05/02/18 0559  05/01/18 0034  04/30/18   0407   APTT  28.2  28.7  32.8*      Recent Labs      04/30/18   0407   TIBC  161*   PSAT  61*   FERR  744*      No results for input(s): PH, PCO2, PO2 in the last 72 hours. No results for input(s): CPK, CKNDX, TROIQ in the last 72 hours.     No lab exists for component: CPKMB  Lab Results   Component Value Date/Time    Glucose (POC) 204 (H) 05/02/2018 11:06 AM    Glucose (POC) 129 (H) 05/02/2018 07:40 AM    Glucose (POC) 127 (H) 05/01/2018 09:55 PM    Glucose (POC) 215 (H) 05/01/2018 05:20 PM    Glucose (POC) 119 (H) 05/01/2018 12:46 PM

## 2018-05-02 NOTE — PROGRESS NOTES
Documented on 5/2/18:    Spiritual Care Partner Volunteer visited patient in [employer] on May 1, 2018.   Documented by:  GABINO Barnes, [facility], Chaplain ELISEO BENNETT [facility] Paging Service  [phone]

## 2018-05-02 NOTE — ROUTINE PROCESS
Bedside and Verbal shift change report given to Cornell RN (oncoming nurse) by Lamberto RN (offgoing nurse). Report included the following information SBAR, Kardex, Intake/Output and MAR.

## 2018-05-02 NOTE — PROGRESS NOTES
Progress Note      5/2/2018 9:27 AM  NAME: Vonnie Zaman   MRN:  [id number]   Admit Diagnosis: NSTEMI (non-ST elevated myocardial infarction) ([facility]  [facility]acute renal failure[facility]      Problem List:     1. Chest pain; cath 4/30 w/ RCA , 60% oLAD w/ neg FFR (0.88), patent LCx stent w/ mild ISR. 2. Right renal artery stenosis. 3. Occluded LCIA. 4. Elevated troponin  5. Acute on chronic kidney disease Stg 4  6. Coronary artery disease s/p multivessel stenting s/p PCI of LCx, PTCA of RCA 7/97, PTCA LAD 3/94. Lexiscan 2/08 w/ EF 62%, distal ineroapical infarct w/o ischemia  7. Peripheral vascular disease s/p left iliac stenting, left SFA stenting 3/00, right iliac stenting 11/99.  8. Status post LAKA  9. Bilateral carotid stenosis; 16-49% 9/13  10. Hypertension  11. Diabetes  12. Tobacco abuse  13. Chronic pain  14. Chronic anemia  15. Hyperlipidemia  16. Noncompliance  17. Peptic ulcer disease w/ GIB '15  18. Falls  19. Lives w/ sister in [address] [place] [address] now  21. Usual Cardiologist:  Dr. Mamadou Patel (last seen 8/16)     Assessment/Plan:   TnI peaked @ 0.16 w/ nml CK  sCr 4.6  EKG:  ST, LAFB, NSSTTWc    No CP overnight  HTNive  Echo w/ EF 55%, LAE, mild MR, AoSclerosis, mild TR     21. Start norvasc 5mg  22. Will plan for right renal artery stenting on Friday 23. Continue ASA  24. Continue plavix  25. Continue atorva  26. Continue metoprolol 25mg q12h  27. Continue ISMN; increase to 60mg daily  28. Will plan for medical therapy of his CAD. If fails, will require complex bifurcation left main stenting. []       High complexity decision making was performed in this patient at high risk for decompensation with multiple organ involvement. Subjective:     Vonnie Zaman denies chest pain, dyspnea. Discussed with RN events overnight.      Review of Systems:    Symptom Y/N Comments  Symptom Y/N Comments   Fever/Chills N   Chest Pain N    Poor Appetite N   Edema N    Cough N   Abdominal Pain N    Sputum N Joint Pain N    SOB/COREA N   Pruritis/Rash N    Nausea/vomit N   Tolerating PT/OT Y    Diarrhea N   Tolerating Diet Y    Constipation N   Other       Could NOT obtain due to:      Objective:      Physical Exam:    Last 24hrs VS reviewed since prior progress note. Most recent are:    Visit Vitals    /71 (BP 1 Location: Left arm, BP Patient Position: At rest)    Pulse 74    Temp 98.9 °F (37.2 °C)    Resp 16    Ht 5' 9\" (1.753 m)    Wt 71.9 kg (158 lb 9.6 oz)    SpO2 95%    BMI 23.42 kg/m2     No intake or output data in the 24 hours ending 05/02/18 0845     General Appearance: Well developed, well nourished, alert & oriented x 3,    no acute distress. Ears/Nose/Mouth/Throat: Hearing grossly normal.  Neck: Supple. Chest: Lungs clear to auscultation bilaterally. Cardiovascular: Regular rate and rhythm, S1S2 normal, no murmur. Abdomen: Soft, non-tender, bowel sounds are active. Extremities: No edema bilaterally. LAKA. 1+ RCFA pulses. Skin: Warm and dry. [x]         Post-cath site without hematoma, bruit, tenderness, or thrill. Distal pulses intact. PMH/SH reviewed - no change compared to H&P    Data Review    Telemetry: sinus rhythm     EKG:   [x]  No new EKG for review    Lab Data Personally Reviewed:    Recent Labs      05/01/18   0034  04/30/18   0407   WBC  5.1  4.3   HGB  8.8*  8.1*   HCT  27.2*  25.2*   PLT  114*  106*     Recent Labs      05/02/18   0559  05/01/18   0034  04/30/18   0407   APTT  28.2  28.7  32.8*      Recent Labs      05/02/18   0559  05/01/18   0034  04/30/18   0407   NA  143  142  142   K  3.7  3.7  3.6   CL  109*  111*  114*   CO2  22  17*  17*   BUN  56*  60*  63*   CREA  4.60*  4.57*  4.51*   GLU  91  104*  236*   CA  6.6*  6.6*  6.1*  6.1*   MG  1.6   --   1.5*     No results for input(s): CPK, CKNDX, TROIQ in the last 72 hours.     No lab exists for component: CPKMB  Lab Results   Component Value Date/Time    Cholesterol, total 236 (H) 04/29/2018 06:00 AM    HDL Cholesterol 34 04/29/2018 06:00 AM    LDL,Direct 62 10/03/2014 05:50 AM    LDL, calculated 172.4 (H) 04/29/2018 06:00 AM    Triglyceride 148 04/29/2018 06:00 AM    CHOL/HDL Ratio 6.9 (H) 04/29/2018 06:00 AM       No results for input(s): SGOT, GPT, AP, TBIL, TP, ALB, GLOB, GGT, AML, LPSE in the last 72 hours. No lab exists for component: AMYP, HLPSE  No results for input(s): PH, PCO2, PO2 in the last 72 hours.     Medications Personally Reviewed:    Current Facility-Administered Medications   Medication Dose Route Frequency    amLODIPine (NORVASC) tablet 5 mg  5 mg Oral DAILY    isosorbide mononitrate ER (IMDUR) tablet 60 mg  60 mg Oral DAILY    heparin (porcine) injection 5,000 Units  5,000 Units SubCUTAneous Q12H    hydrALAZINE (APRESOLINE) tablet 25 mg  25 mg Oral Q8H    famotidine (PEPCID) tablet 20 mg  20 mg Oral DAILY    calcium carbonate (TUMS) chewable tablet 200 mg [elemental]  200 mg Oral QID WITH MEALS    calcitRIOL (ROCALTROL) capsule 0.25 mcg  0.25 mcg Oral DAILY    magnesium oxide (MAG-OX) tablet 400 mg  400 mg Oral DAILY    epoetin calos (EPOGEN;PROCRIT) injection 10,000 Units  10,000 Units SubCUTAneous Once per day on Mon Thu    iodixanol (VISIPAQUE) 320 mg iodine/mL contrast injection 0-100 mL  0-100 mL IntraarTERial Multiple    sodium chloride (NS) flush 5-10 mL  5-10 mL IntraVENous Q8H    sodium chloride (NS) flush 5-10 mL  5-10 mL IntraVENous PRN    sodium bicarbonate (8.4%) 150 mEq in sterile water 1,000 mL infusion   IntraVENous CONTINUOUS    B complex-vitaminC-folic acid (NEPHROCAP) cap  1 Cap Oral DAILY    cloNIDine HCl (CATAPRES) tablet 0.1 mg  0.1 mg Oral Q4H PRN    nitroglycerin (NITROSTAT) tablet 0.4 mg  0.4 mg SubLINGual Q5MIN PRN    glucose chewable tablet 16 g  4 Tab Oral PRN    dextrose (D50W) injection syrg 12.5-25 g  12.5-25 g IntraVENous PRN    glucagon (GLUCAGEN) injection 1 mg  1 mg IntraMUSCular PRN    sodium chloride (NS) flush 5-10 mL  5-10 mL IntraVENous Q8H    sodium chloride (NS) flush 5-10 mL  5-10 mL IntraVENous PRN    insulin lispro (HUMALOG) injection   SubCUTAneous Q6H    metoprolol tartrate (LOPRESSOR) tablet 25 mg  25 mg Oral Q6H    aspirin chewable tablet 81 mg  81 mg Oral DAILY    atorvastatin (LIPITOR) tablet 80 mg  80 mg Oral QPM    acetaminophen (TYLENOL) tablet 650 mg  650 mg Oral Q4H PRN    oxyCODONE-acetaminophen (PERCOCET) 5-325 mg per tablet 1 Tab  1 Tab Oral Q4H PRN    hydrALAZINE (APRESOLINE) 20 mg/mL injection 10 mg  10 mg IntraVENous Q2H PRN    naloxone (NARCAN) injection 0.4 mg  0.4 mg IntraVENous PRN    diphenhydrAMINE (BENADRYL) injection 12.5 mg  12.5 mg IntraVENous Q4H PRN    ondansetron (ZOFRAN) injection 4 mg  4 mg IntraVENous Q4H PRN    bisacodyl (DULCOLAX) tablet 5 mg  5 mg Oral DAILY PRN    clopidogrel (PLAVIX) tablet 75 mg  75 mg Oral DAILY         Romero Gonzalez III, DO

## 2018-05-02 NOTE — PROGRESS NOTES
Hospitalist Progress Note    NAME: Austyn Mosquera   :  1955   MRN:  [id number]       Assessment / Plan:  Chest pain - borderline trop elevation; NSTEMI less likely. - Cardiac cath report:  1. Two vessel CAD  2. Patent stent in OM1 w/ mild ISR  3. Elevated LVEDP  4. Insignificant FFR of the oLAD  5. Patent left renal artery  6. Severe stenosis of the proximal right renal artery  7. Patent splenic and hepatic arteries  8. Occluded left common iliac artery  9. Right common iliac artery appears to be patent at its origin     CAD  -cont aspirin, Plavix; heparin drip discontinued  -Lipitor    Renal artery stenosis  -stenting planned for Friday    MINO on CKD stage 5   -Upper extremity vein mapping done  -Seen by Dr. Radha Barraza who is known to patient. Will plan LUE access when appropriate.  -patient not wanting HD at this time. However, renal function worsening.  -cont Bicarbonate drip per renal.    Hypertensive Urgency - blood pressure improved. -cont lopressor, Imdur  -add scheduled hydralazine    PAD w/ multiple stents - followed by Dr. Radha Barraza    DM-2 w/ nephropathy and neuropathy  -SSI    Anemia of chronic renal disease    Chronic thrombocytopenia    Tobacco abuse    Depression     Medical noncompliance    Consultants help appreciated. Right renal artery stenting planned for Friday. Body mass index is 23.42 kg/(m^2). Code status: Full  Prophylaxis: Hep SQ  Recommended Disposition: Home w/Family     Subjective:     Chief Complaint / Reason for Physician Visit  No problems overnight. No chest pain or sob.     Review of Systems:  Symptom Y/N Comments  Symptom Y/N Comments   Fever/Chills    Chest Pain n    Poor Appetite    Edema     Cough n   Abdominal Pain n    Sputum    Joint Pain     SOB/COREA n   Pruritis/Rash     Nausea/vomit    Tolerating PT/OT     Diarrhea    Tolerating Diet     Constipation    Other       Could NOT obtain due to:      Objective:     VITALS:   Last 24hrs VS reviewed since prior progress note. Most recent are:  Patient Vitals for the past 24 hrs:   Temp Pulse Resp BP SpO2   05/02/18 1153 97.6 °F (36.4 °C) 66 16 135/64 97 %   05/02/18 0602 98.9 °F (37.2 °C) 74 16 179/71 95 %   05/01/18 2211 98.9 °F (37.2 °C) 65 14 148/63 96 %   05/01/18 1532 97.7 °F (36.5 °C) 64 16 (!) 156/95 98 %       Intake/Output Summary (Last 24 hours) at 05/02/18 1430  Last data filed at 05/02/18 1324   Gross per 24 hour   Intake                0 ml   Output              300 ml   Net             -300 ml        PHYSICAL EXAM:  General: cooperative, no acute distress    EENT:  EOMI. Anicteric sclerae. MMM  Resp:  CTA bilaterally, no wheezing or rales. No accessory muscle use  CV:  Regular  rhythm,  No edema  GI:  Soft, Non distended, Non tender.  +Bowel sounds  Neurologic:  Alert and oriented X 3, normal speech,   Psych:   Not anxious nor agitated  Skin:  No rashes.   No jaundice  Ext:                  No edema right leg; left AKA    Reviewed most current lab test results and cultures  YES  Reviewed most current radiology test results   YES  Review and summation of old records today    NO  Reviewed patient's current orders and MAR    YES  PMH/SH reviewed - no change compared to H&P          Current Facility-Administered Medications:     amLODIPine (NORVASC) tablet 5 mg, 5 mg, Oral, DAILY, Garrett Herrera III, DO, 5 mg at 05/02/18 0903    polyethylene glycol (MIRALAX) packet 17 g, 17 g, Oral, DAILY, Catarina Bello MD, 17 g at 05/02/18 1036    docusate sodium (COLACE) capsule 100 mg, 100 mg, Oral, BID, Catarina Bello MD, 100 mg at 05/02/18 1036    isosorbide mononitrate ER (IMDUR) tablet 60 mg, 60 mg, Oral, DAILY, Garrett Herrera III, DO, 60 mg at 05/02/18 0849    heparin (porcine) injection 5,000 Units, 5,000 Units, SubCUTAneous, Q12H, Johan Ratel III, DO, 5,000 Units at 05/02/18 0636    hydrALAZINE (APRESOLINE) tablet 25 mg, 25 mg, Oral, Q8H, Catarina Bello MD, 25 mg at 05/02/18 1306    famotidine (PEPCID) tablet 20 mg, 20 mg, Oral, DAILY, Quiana Hooks MD, 20 mg at 05/02/18 0849    calcium carbonate (TUMS) chewable tablet 200 mg [elemental], 200 mg, Oral, QID WITH MEALS, Quiana Hooks MD, 200 mg at 05/02/18 1110    calcitRIOL (ROCALTROL) capsule 0.25 mcg, 0.25 mcg, Oral, DAILY, Quiana Hooks MD, 0.25 mcg at 05/02/18 0849    magnesium oxide (MAG-OX) tablet 400 mg, 400 mg, Oral, DAILY, Quiana Hooks MD, 400 mg at 05/02/18 0849    epoetin calos (EPOGEN;PROCRIT) injection 10,000 Units, 10,000 Units, SubCUTAneous, Once per day on Mon Thu, Quiana Hooks MD, 10,000 Units at 04/30/18 1837    iodixanol (VISIPAQUE) 320 mg iodine/mL contrast injection 0-100 mL, 0-100 mL, IntraarTERial, Multiple, Lorella Nate Herrera III, DO, 50 mL at 04/30/18 1041    sodium chloride (NS) flush 5-10 mL, 5-10 mL, IntraVENous, Q8H, Ricardo THORNTON Herrera III, DO, 10 mL at 05/02/18 1306    sodium chloride (NS) flush 5-10 mL, 5-10 mL, IntraVENous, PRN, Ruiz Binh III, DO    sodium bicarbonate (8.4%) 150 mEq in sterile water 1,000 mL infusion, , IntraVENous, CONTINUOUS, Quiana Hooks MD, Last Rate: 75 mL/hr at 05/02/18 0903    B complex-vitaminC-folic acid (NEPHROCAP) cap, 1 Cap, Oral, DAILY, Quiana Hooks MD, 1 Cap at 05/02/18 0849    cloNIDine HCl (CATAPRES) tablet 0.1 mg, 0.1 mg, Oral, Q4H PRN, Quiana Hooks MD, 0.1 mg at 05/01/18 1027    nitroglycerin (NITROSTAT) tablet 0.4 mg, 0.4 mg, SubLINGual, Q5MIN PRN, Ashwin Diaz MD, 0.4 mg at 04/29/18 0830    glucose chewable tablet 16 g, 4 Tab, Oral, PRN, Ashwin Diaz MD    dextrose (D50W) injection syrg 12.5-25 g, 12.5-25 g, IntraVENous, PRN, Ashwin Diaz MD    glucagon [facility]) injection 1 mg, 1 mg, IntraMUSCular, PRN, Ashwin Diaz MD    sodium chloride (NS) flush 5-10 mL, 5-10 mL, IntraVENous, Q8H, Ashwin Diaz MD, 10 mL at 05/02/18 9929    sodium chloride (NS) flush 5-10 mL, 5-10 mL, IntraVENous, PRN, Ashwin Diaz MD  [employer] insulin lispro (HUMALOG) injection, , SubCUTAneous, Q6H, Mayra Her MD, 3 Units at 05/02/18 1110    metoprolol tartrate (LOPRESSOR) tablet 25 mg, 25 mg, Oral, Q6H, Mayra Her MD, 25 mg at 05/02/18 1110    aspirin chewable tablet 81 mg, 81 mg, Oral, DAILY, Mayra Her MD, 81 mg at 05/02/18 0849    atorvastatin (LIPITOR) tablet 80 mg, 80 mg, Oral, QPM, Mayra Her MD, 80 mg at 05/01/18 1721    acetaminophen (TYLENOL) tablet 650 mg, 650 mg, Oral, Q4H PRN, Mayra Her MD, 650 mg at 04/29/18 2151    oxyCODONE-acetaminophen (PERCOCET) 5-325 mg per tablet 1 Tab, 1 Tab, Oral, Q4H PRN, Mayra Her MD, 1 Tab at 05/02/18 5556    hydrALAZINE (APRESOLINE) 20 mg/mL injection 10 mg, 10 mg, IntraVENous, Q2H PRN, Mayra Her MD    [facility]) injection 0.4 mg, 0.4 mg, IntraVENous, PRN, Mayra Her MD    diphenhydrAMINE (BENADRYL) injection 12.5 mg, 12.5 mg, IntraVENous, Q4H PRN, Mayra Her MD    ondansetron [facility]) injection 4 mg, 4 mg, IntraVENous, Q4H PRN, Mayra Her MD, 4 mg at 05/01/18 1727    bisacodyl (DULCOLAX) tablet 5 mg, 5 mg, Oral, DAILY PRN, Mayra Her MD    clopidogrel (PLAVIX) tablet 75 mg, 75 mg, Oral, DAILY, Mayra Her MD, 75 mg at 05/02/18 0352  ________________________________________________________________________  Care Plan discussed with:    Comments   Patient y    Family      RN y    Care Manager     Consultant                        Multidiciplinary team rounds were held today with , nursing, pharmacist and clinical coordinator. Patient's plan of care was discussed; medications were reviewed and discharge planning was addressed.      ________________________________________________________________________  Total NON critical care TIME:  30  Minutes    Total CRITICAL CARE TIME Spent:   Minutes non procedure based      Comments   >50% of visit spent in counseling and coordination of care ________________________________________________________________________  Ebenezer Pearson MD     Procedures: see electronic medical records for all procedures/Xrays and details which were not copied into this note but were reviewed prior to creation of Plan. LABS:  I reviewed today's most current labs and imaging studies.   Pertinent labs include:  Recent Labs      05/01/18   0034  04/30/18   0407   WBC  5.1  4.3   HGB  8.8*  8.1*   HCT  27.2*  25.2*   PLT  114*  106*     Recent Labs      05/02/18   1319  05/02/18   0559  05/01/18   0034  04/30/18   0407   NA   --   143  142  142   K   --   3.7  3.7  3.6   CL   --   109*  111*  114*   CO2   --   22  17*  17*   GLU   --   91  104*  236*   BUN   --   56*  60*  63*   CREA   --   4.60*  4.57*  4.51*   CA   --   6.6*  6.6*  6.1*  6.1*   MG   --   1.6   --   1.5*   PHOS   --   5.5*   --   6.5*   INR  1.1   --    --    --        Signed: Ebenezer Pearson MD

## 2018-05-03 LAB
ALBUMIN SERPL-MCNC: 2.1 G/DL (ref 3.5–5)
ALBUMIN/GLOB SERPL: 0.6 {RATIO} (ref 1.1–2.2)
ALP SERPL-CCNC: 172 U/L (ref 45–117)
ALT SERPL-CCNC: 11 U/L (ref 12–78)
ANION GAP SERPL CALC-SCNC: 10 MMOL/L (ref 5–15)
APTT PPP: 27.2 SEC (ref 22.1–32)
AST SERPL-CCNC: 15 U/L (ref 15–37)
BILIRUB SERPL-MCNC: 0.4 MG/DL (ref 0.2–1)
BUN SERPL-MCNC: 58 MG/DL (ref 6–20)
BUN/CREAT SERPL: 12 (ref 12–20)
CALCIUM SERPL-MCNC: 6.5 MG/DL (ref 8.5–10.1)
CHLORIDE SERPL-SCNC: 104 MMOL/L (ref 97–108)
CO2 SERPL-SCNC: 29 MMOL/L (ref 21–32)
CREAT SERPL-MCNC: 4.67 MG/DL (ref 0.7–1.3)
ERYTHROCYTE [DISTWIDTH] IN BLOOD BY AUTOMATED COUNT: 14.5 % (ref 11.5–14.5)
GLOBULIN SER CALC-MCNC: 3.7 G/DL (ref 2–4)
GLUCOSE BLD STRIP.AUTO-MCNC: 145 MG/DL (ref 65–100)
GLUCOSE BLD STRIP.AUTO-MCNC: 186 MG/DL (ref 65–100)
GLUCOSE BLD STRIP.AUTO-MCNC: 211 MG/DL (ref 65–100)
GLUCOSE BLD STRIP.AUTO-MCNC: 221 MG/DL (ref 65–100)
GLUCOSE SERPL-MCNC: 163 MG/DL (ref 65–100)
HCT VFR BLD AUTO: 27.2 % (ref 36.6–50.3)
HGB BLD-MCNC: 8.8 G/DL (ref 12.1–17)
MCH RBC QN AUTO: 30.3 PG (ref 26–34)
MCHC RBC AUTO-ENTMCNC: 32.4 G/DL (ref 30–36.5)
MCV RBC AUTO: 93.8 FL (ref 80–99)
NRBC # BLD: 0 K/UL (ref 0–0.01)
NRBC BLD-RTO: 0 PER 100 WBC
PLATELET # BLD AUTO: 146 K/UL (ref 150–400)
PMV BLD AUTO: 10.7 FL (ref 8.9–12.9)
POTASSIUM SERPL-SCNC: 3.5 MMOL/L (ref 3.5–5.1)
PROT SERPL-MCNC: 5.8 G/DL (ref 6.4–8.2)
RBC # BLD AUTO: 2.9 M/UL (ref 4.1–5.7)
SERVICE CMNT-IMP: ABNORMAL
SODIUM SERPL-SCNC: 143 MMOL/L (ref 136–145)
THERAPEUTIC RANGE,PTTT: NORMAL SECS (ref 58–77)
WBC # BLD AUTO: 6.5 K/UL (ref 4.1–11.1)

## 2018-05-03 PROCEDURE — 74011250637 HC RX REV CODE- 250/637: Performed by: HOSPITALIST

## 2018-05-03 PROCEDURE — 74011000250 HC RX REV CODE- 250: Performed by: INTERNAL MEDICINE

## 2018-05-03 PROCEDURE — 74011636637 HC RX REV CODE- 636/637: Performed by: INTERNAL MEDICINE

## 2018-05-03 PROCEDURE — 82962 GLUCOSE BLOOD TEST: CPT

## 2018-05-03 PROCEDURE — 65660000000 HC RM CCU STEPDOWN

## 2018-05-03 PROCEDURE — 74011250636 HC RX REV CODE- 250/636: Performed by: HOSPITALIST

## 2018-05-03 PROCEDURE — 74011250637 HC RX REV CODE- 250/637: Performed by: INTERNAL MEDICINE

## 2018-05-03 PROCEDURE — 74011250636 HC RX REV CODE- 250/636: Performed by: INTERNAL MEDICINE

## 2018-05-03 RX ORDER — AMLODIPINE BESYLATE 5 MG/1
10 TABLET ORAL DAILY
Status: DISCONTINUED | OUTPATIENT
Start: 2018-05-03 | End: 2018-05-14 | Stop reason: HOSPADM

## 2018-05-03 RX ADMIN — FAMOTIDINE 20 MG: 20 TABLET, FILM COATED ORAL at 08:15

## 2018-05-03 RX ADMIN — METOPROLOL TARTRATE 25 MG: 25 TABLET ORAL at 23:43

## 2018-05-03 RX ADMIN — INSULIN LISPRO 3 UNITS: 100 INJECTION, SOLUTION INTRAVENOUS; SUBCUTANEOUS at 17:06

## 2018-05-03 RX ADMIN — Medication 10 ML: at 06:40

## 2018-05-03 RX ADMIN — CLOPIDOGREL BISULFATE 75 MG: 75 TABLET ORAL at 08:14

## 2018-05-03 RX ADMIN — HEPARIN SODIUM 5000 UNITS: 5000 INJECTION, SOLUTION INTRAVENOUS; SUBCUTANEOUS at 06:38

## 2018-05-03 RX ADMIN — INSULIN LISPRO 2 UNITS: 100 INJECTION, SOLUTION INTRAVENOUS; SUBCUTANEOUS at 11:10

## 2018-05-03 RX ADMIN — POLYETHYLENE GLYCOL 3350 17 G: 17 POWDER, FOR SOLUTION ORAL at 08:15

## 2018-05-03 RX ADMIN — HEPARIN SODIUM 5000 UNITS: 5000 INJECTION, SOLUTION INTRAVENOUS; SUBCUTANEOUS at 17:06

## 2018-05-03 RX ADMIN — HYDRALAZINE HYDROCHLORIDE 25 MG: 50 TABLET, FILM COATED ORAL at 21:17

## 2018-05-03 RX ADMIN — AMLODIPINE BESYLATE 10 MG: 5 TABLET ORAL at 08:15

## 2018-05-03 RX ADMIN — OXYCODONE HYDROCHLORIDE AND ACETAMINOPHEN 1 TABLET: 5; 325 TABLET ORAL at 18:59

## 2018-05-03 RX ADMIN — DOCUSATE SODIUM 100 MG: 100 CAPSULE, LIQUID FILLED ORAL at 08:14

## 2018-05-03 RX ADMIN — NEPHROCAP 1 CAPSULE: 1 CAP ORAL at 08:14

## 2018-05-03 RX ADMIN — HYDRALAZINE HYDROCHLORIDE 25 MG: 50 TABLET, FILM COATED ORAL at 06:38

## 2018-05-03 RX ADMIN — OXYCODONE HYDROCHLORIDE AND ACETAMINOPHEN 1 TABLET: 5; 325 TABLET ORAL at 23:43

## 2018-05-03 RX ADMIN — ASPIRIN 81 MG 81 MG: 81 TABLET ORAL at 08:15

## 2018-05-03 RX ADMIN — HYDRALAZINE HYDROCHLORIDE 25 MG: 50 TABLET, FILM COATED ORAL at 14:00

## 2018-05-03 RX ADMIN — CALCIUM CARBONATE 200 MG: 500 TABLET, CHEWABLE ORAL at 17:06

## 2018-05-03 RX ADMIN — WATER: 1000 INJECTION, SOLUTION INTRAVENOUS at 15:46

## 2018-05-03 RX ADMIN — OXYCODONE HYDROCHLORIDE AND ACETAMINOPHEN 1 TABLET: 5; 325 TABLET ORAL at 13:08

## 2018-05-03 RX ADMIN — Medication 10 ML: at 08:18

## 2018-05-03 RX ADMIN — CALCITRIOL 0.25 MCG: 0.25 CAPSULE, LIQUID FILLED ORAL at 08:14

## 2018-05-03 RX ADMIN — Medication 400 MG: at 08:14

## 2018-05-03 RX ADMIN — METOPROLOL TARTRATE 25 MG: 25 TABLET ORAL at 06:38

## 2018-05-03 RX ADMIN — CALCIUM CARBONATE 200 MG: 500 TABLET, CHEWABLE ORAL at 08:15

## 2018-05-03 RX ADMIN — CALCIUM CARBONATE 200 MG: 500 TABLET, CHEWABLE ORAL at 21:17

## 2018-05-03 RX ADMIN — INSULIN LISPRO 2 UNITS: 100 INJECTION, SOLUTION INTRAVENOUS; SUBCUTANEOUS at 08:15

## 2018-05-03 RX ADMIN — METOPROLOL TARTRATE 25 MG: 25 TABLET ORAL at 17:06

## 2018-05-03 RX ADMIN — CALCIUM CARBONATE 200 MG: 500 TABLET, CHEWABLE ORAL at 11:10

## 2018-05-03 RX ADMIN — DOCUSATE SODIUM 100 MG: 100 CAPSULE, LIQUID FILLED ORAL at 17:06

## 2018-05-03 RX ADMIN — ONDANSETRON 4 MG: 2 INJECTION INTRAMUSCULAR; INTRAVENOUS at 12:54

## 2018-05-03 RX ADMIN — ATORVASTATIN CALCIUM 80 MG: 40 TABLET, FILM COATED ORAL at 17:06

## 2018-05-03 RX ADMIN — ISOSORBIDE MONONITRATE 60 MG: 30 TABLET, EXTENDED RELEASE ORAL at 08:15

## 2018-05-03 RX ADMIN — METOPROLOL TARTRATE 25 MG: 25 TABLET ORAL at 11:10

## 2018-05-03 RX ADMIN — ERYTHROPOIETIN 10000 UNITS: 10000 INJECTION, SOLUTION INTRAVENOUS; SUBCUTANEOUS at 17:12

## 2018-05-03 NOTE — PROGRESS NOTES
Hospitalist Progress Note    NAME: Stacey Her   :  1955   MRN:  [id number]       Assessment / Plan:  Chest pain - borderline trop elevation; NSTEMI less likely. - Cardiac cath report:  1. Two vessel CAD  2. Patent stent in OM1 w/ mild ISR  3. Elevated LVEDP  4. Insignificant FFR of the oLAD  5. Patent left renal artery  6. Severe stenosis of the proximal right renal artery  7. Patent splenic and hepatic arteries  8. Occluded left common iliac artery  9. Right common iliac artery appears to be patent at its origin     CAD  -cont aspirin, Plavix; heparin drip discontinued  -Lipitor    Renal artery stenosis  -stenting planned for Friday    MINO on CKD stage 5   -Upper extremity vein mapping done  -Seen by Dr. Michell Vallejo who is known to patient. Will plan LUE access when appropriate.  -cont Bicarbonate drip per renal.  -he is now open to possibility of needing HD. Hypertensive Urgency - blood pressure improved. -cont lopressor, Imdur  -cont scheduled hydralazine    PAD w/ multiple stents - followed by Dr. Michell Vallejo    DM-2 w/ nephropathy and neuropathy  -SSI    Anemia of chronic renal disease    Chronic thrombocytopenia    Tobacco abuse    Depression     Medical noncompliance      Summary:  57 yo male w/ pmh CAD, CKD, PAD, and DM who presented to ED for chest pain. Borderline trop elevation. Patient admitted for further evaluation. He underwent cardiac cath. Results above. Right renal artery stenosis detected. Scheduled for stenting of renal artery. Nephrology consulted for further guiidance regarding renal disease. Serum Cr increasing. Bicarbonate drip started per nephrology. UE cara mapping done. Arterial duplex Right leg done:  Right leg :  1. No significant occlusive disease in the arteries examined. 2. CFA diameter is 7.4mm and the SFA diameter is 6.2mm. Body mass index is 23.42 kg/(m^2).     Code status: Full  Prophylaxis: Hep SQ  Recommended Disposition: Home w/Family     Subjective: Chief Complaint / Reason for Physician Visit  No problems overnight. No chest pain or sob. He is open to HD if needed. Review of Systems:  Symptom Y/N Comments  Symptom Y/N Comments   Fever/Chills    Chest Pain n    Poor Appetite    Edema     Cough n   Abdominal Pain n    Sputum    Joint Pain     SOB/COREA n   Pruritis/Rash     Nausea/vomit    Tolerating PT/OT     Diarrhea    Tolerating Diet     Constipation    Other       Could NOT obtain due to:      Objective:     VITALS:   Last 24hrs VS reviewed since prior progress note. Most recent are:  Patient Vitals for the past 24 hrs:   Temp Pulse Resp BP SpO2   05/03/18 1546 97.6 °F (36.4 °C) 63 18 125/66 98 %   05/03/18 1103 97.7 °F (36.5 °C) 68 16 144/64 96 %   05/03/18 0638 97.6 °F (36.4 °C) 65 18 163/67 95 %   05/03/18 0239 98.2 °F (36.8 °C) 66 18 162/65 96 %   05/02/18 2210 97.8 °F (36.6 °C) 65 18 140/64 96 %   05/02/18 1759 97.4 °F (36.3 °C) 61 18 154/61 97 %   05/02/18 1619 98 °F (36.7 °C) 63 16 145/62 98 %       Intake/Output Summary (Last 24 hours) at 05/03/18 1547  Last data filed at 05/03/18 1022   Gross per 24 hour   Intake                0 ml   Output              400 ml   Net             -400 ml        PHYSICAL EXAM:  General: cooperative, no acute distress    EENT:  EOMI. Anicteric sclerae. MMM  Resp:  CTA bilaterally, no wheezing or rales. No accessory muscle use  CV:  Regular  rhythm,  No edema  GI:  Soft, Non distended, Non tender.  +Bowel sounds  Neurologic:  Alert and oriented X 3, normal speech,   Psych:   Not anxious nor agitated  Skin:  No rashes.   No jaundice  Ext:                  No edema right leg; left AKA    Reviewed most current lab test results and cultures  YES  Reviewed most current radiology test results   YES  Review and summation of old records today    NO  Reviewed patient's current orders and MAR    YES  PMH/SH reviewed - no change compared to H&P          Current Facility-Administered Medications:     amLODIPine (NORVASC) tablet 10 mg, 10 mg, Oral, DAILY, Dalton Herrera III, DO, 10 mg at 05/03/18 0815    polyethylene glycol (MIRALAX) packet 17 g, 17 g, Oral, DAILY, Ann-Marie Plaza MD, 17 g at 05/03/18 0815    docusate sodium (COLACE) capsule 100 mg, 100 mg, Oral, BID, Ann-Marie Plaza MD, 100 mg at 05/03/18 8961    insulin lispro (HUMALOG) injection, , SubCUTAneous, AC&HS, Ann-Marie Plaza MD, 2 Units at 05/03/18 1110    isosorbide mononitrate ER (IMDUR) tablet 60 mg, 60 mg, Oral, DAILY, Dalton Herrera III, DO, 60 mg at 05/03/18 0815    heparin (porcine) injection 5,000 Units, 5,000 Units, SubCUTAneous, Q12H, Luigi Jovel III, DO, 5,000 Units at 05/03/18 6333    hydrALAZINE (APRESOLINE) tablet 25 mg, 25 mg, Oral, Q8H, Ann-Marie Plaza MD, 25 mg at 05/03/18 1400    famotidine (PEPCID) tablet 20 mg, 20 mg, Oral, DAILY, Pedro Angel MD, 20 mg at 05/03/18 0815    calcium carbonate (TUMS) chewable tablet 200 mg [elemental], 200 mg, Oral, QID WITH MEALS, Pedro Angel MD, 200 mg at 05/03/18 1110    calcitRIOL (ROCALTROL) capsule 0.25 mcg, 0.25 mcg, Oral, DAILY, Pedro Angel MD, 0.25 mcg at 05/03/18 0814    magnesium oxide (MAG-OX) tablet 400 mg, 400 mg, Oral, DAILY, Pedro Angel MD, 400 mg at 05/03/18 0814    epoetin calos (EPOGEN;PROCRIT) injection 10,000 Units, 10,000 Units, SubCUTAneous, Once per day on Mon Thu, Pedro Angel MD, 10,000 Units at 04/30/18 1837    iodixanol (VISIPAQUE) 320 mg iodine/mL contrast injection 0-100 mL, 0-100 mL, IntraarTERial, Multiple, Dalton Herrera III, DO, 50 mL at 04/30/18 1041    sodium chloride (NS) flush 5-10 mL, 5-10 mL, IntraVENous, Q8H, Ricardo Herrera III, DO, 10 mL at 05/03/18 0640    sodium chloride (NS) flush 5-10 mL, 5-10 mL, IntraVENous, PRN, Luigi Jovel III, DO    sodium bicarbonate (8.4%) 150 mEq in sterile water 1,000 mL infusion, , IntraVENous, CONTINUOUS, Pedro Angel MD, Last Rate: 75 mL/hr at 05/03/18 1546    B complex-vitaminC-folic acid (NEPHROCAP) cap, 1 Cap, Oral, DAILY, Rissa Rdz MD, 1 Cap at 05/03/18 0814    cloNIDine HCl (CATAPRES) tablet 0.1 mg, 0.1 mg, Oral, Q4H PRN, Rissa Rdz MD, 0.1 mg at 05/01/18 1027    nitroglycerin (NITROSTAT) tablet 0.4 mg, 0.4 mg, SubLINGual, Q5MIN PRN, Rachana Patricio MD, 0.4 mg at 04/29/18 0830    glucose chewable tablet 16 g, 4 Tab, Oral, PRN, Rachana Patricio MD    dextrose (D50W) injection syrg 12.5-25 g, 12.5-25 g, IntraVENous, PRN, Rachana Patricio MD    glucagon [facility]) injection 1 mg, 1 mg, IntraMUSCular, PRN, Rachana Patricio MD    sodium chloride (NS) flush 5-10 mL, 5-10 mL, IntraVENous, Q8H, Rachana Patricio MD, 10 mL at 05/03/18 0818    sodium chloride (NS) flush 5-10 mL, 5-10 mL, IntraVENous, PRN, Rachana Patricio MD    metoprolol tartrate (LOPRESSOR) tablet 25 mg, 25 mg, Oral, Q6H, Rachana Patricio MD, 25 mg at 05/03/18 1110    aspirin chewable tablet 81 mg, 81 mg, Oral, DAILY, Rachana Patricio MD, 81 mg at 05/03/18 0815    atorvastatin (LIPITOR) tablet 80 mg, 80 mg, Oral, QPM, Rachana Patricio MD, 80 mg at 05/02/18 1706    acetaminophen (TYLENOL) tablet 650 mg, 650 mg, Oral, Q4H PRN, Rachana Patricio MD, 650 mg at 04/29/18 2151    oxyCODONE-acetaminophen (PERCOCET) 5-325 mg per tablet 1 Tab, 1 Tab, Oral, Q4H PRN, Rachana Patricio MD, 1 Tab at 05/03/18 1308    hydrALAZINE (APRESOLINE) 20 mg/mL injection 10 mg, 10 mg, IntraVENous, Q2H PRN, Rachana Patricio MD    naloxone [facility]) injection 0.4 mg, 0.4 mg, IntraVENous, PRN, Rachana Patricio MD    diphenhydrAMINE (BENADRYL) injection 12.5 mg, 12.5 mg, IntraVENous, Q4H PRN, Rachana aPtricio MD, 12.5 mg at 05/02/18 2253    ondansetron [facility]) injection 4 mg, 4 mg, IntraVENous, Q4H PRN, Rachana Patricio MD, 4 mg at 05/03/18 1254    bisacodyl (DULCOLAX) tablet 5 mg, 5 mg, Oral, DAILY PRN, Rachana Patricio MD    clopidogrel (PLAVIX) tablet 75 mg, 75 mg, Oral, DAILY, Michael Serna Michele Oliva MD, 75 mg at 05/03/18 5043  ________________________________________________________________________  Care Plan discussed with:    Comments   Patient y    [facility]      RN     Care Manager     Consultant                        Multidiciplinary team rounds were held today with , nursing, pharmacist and clinical coordinator. Patient's plan of care was discussed; medications were reviewed and discharge planning was addressed. ________________________________________________________________________  Total NON critical care TIME:  25  Minutes    Total CRITICAL CARE TIME Spent:   Minutes non procedure based      Comments   >50% of visit spent in counseling and coordination of care     ________________________________________________________________________  Anthony Chu MD     Procedures: see electronic medical records for all procedures/Xrays and details which were not copied into this note but were reviewed prior to creation of Plan. LABS:  I reviewed today's most current labs and imaging studies.   Pertinent labs include:  Recent Labs      05/02/18 2338 05/01/18   0034   WBC  6.5  5.1   HGB  8.8*  8.8*   HCT  27.2*  27.2*   PLT  146*  114*     Recent Labs      05/02/18 2338 05/02/18   1319  05/02/18   0559  05/01/18   0034   NA  143   --   143  142   K  3.5   --   3.7  3.7   CL  104   --   109*  111*   CO2  29   --   22  17*   GLU  163*   --   91  104*   BUN  58*   --   56*  60*   CREA  4.67*   --   4.60*  4.57*   CA  6.5*   --   6.6*  6.6*   MG   --    --   1.6   --    PHOS   --    --   5.5*   --    ALB  2.1*   --    --    --    TBILI  0.4   --    --    --    SGOT  15   --    --    --    ALT  11*   --    --    --    INR   --   1.1   --    --        Signed: Anthony Chu MD

## 2018-05-03 NOTE — DIABETES MGMT
DTC Progress Note    Recommendations/ Comments:  Pt's BG < 180 mg/dL on 3 occassions in the last 24 hrs. If appropriate, please consider:  1. Adding CHO consistent to current diet   2. Adding no concentrated sweets to current diet  3. Adding tradjenta 5 mg to help with elevated BG throughout the day      Current hospital DM medication: lispro correction - normal correction scale     Chart reviewed on [facility]    Patient is a 58 y.o. male with known DM on Actos 15 mg at home  A1c:   Lab Results   Component Value Date/Time    Hemoglobin A1c 7.5 (H) 04/28/2018 10:17 PM    Hemoglobin A1c 5.5 04/03/2015 12:00 PM       Recent Glucose Results:   Lab Results   Component Value Date/Time     (H) 05/02/2018 11:38 PM    GLUCPOC 186 (H) 05/03/2018 11:05 AM    GLUCPOC 145 (H) 05/03/2018 07:25 AM    GLUCPOC 187 (H) 05/02/2018 09:16 PM        Lab Results   Component Value Date/Time    Creatinine 4.67 (H) 05/02/2018 11:38 PM     Estimated Creatinine Clearance: 16 mL/min (based on Cr of 4.67). Active Orders   Diet    DIET CARDIAC Regular    DIET NPO        PO intake: No data found. Will continue to follow as needed.     Thank you    Jimmy Lu, [facility]    [phone]

## 2018-05-03 NOTE — PROGRESS NOTES
Bedside and Verbal shift change report given to CAITLYN Orantes (oncoming nurse) by Ashwin Reese RN (offgoing nurse). Report included the following information SBAR, Kardex, Procedure Summary, Intake/Output, MAR, Accordion, Recent Results and Cardiac Rhythm NSR.

## 2018-05-03 NOTE — PROGRESS NOTES
[facility] Progress Note        NAME: Wille Harada       :  1955       MRN:  [id number]     Date/Time: 5/3/2018    Risk of deterioration: medium       Assessment:    Plan:  MINO on CKD IV   S/P CATH-2V cad, (R) KIA--Right kidney measures 10.9 cm by US  HTN  PAD/PVD s/p (L) AKA-(L) iliac occlusion  ANemia  Secondary PTH For kia stenting on Friday  Pt had a baseline creatinine of around 2-2.5 over the last few years with recent deterioration to 4-4.6--likely due to advancing ckd (+/-) KIA  Pt stated again today that he does want to pursue hd. Discussed placing pre-emptive Kash today incase needed over weekend-he prefers not to do this  BP meds being adjusted   once on hd can add ace/arb  Appreciate vascular seeing pt.--looks like his left arm will be used for acces with dr. Colt Hendricks  Continue bicarb gtt  Continue rocaltrol, epo       Subjective:     Chief Complaint:  none    Review of Systems: no n/v/f/c    Objective:     VITALS:   Last 24hrs VS reviewed since prior progress note.  Most recent are:  Visit Vitals    /67 (BP 1 Location: Left arm, BP Patient Position: Sitting)    Pulse 65    Temp 97.6 °F (36.4 °C)    Resp 18    Ht 5' 9\" (1.753 m)    Wt 71.9 kg (158 lb 9.6 oz)    SpO2 95%    BMI 23.42 kg/m2     SpO2 Readings from Last 6 Encounters:   18 95%   01/15/16 98%   16 100%   12/04/15 98%   10/13/15 97%   09/25/15 100%    O2 Flow Rate (L/min): 2 l/min       Intake/Output Summary (Last 24 hours) at 18 0910  Last data filed at 18 1759   Gross per 24 hour   Intake                0 ml   Output              450 ml   Net             -450 ml        Telemetry Reviewed    PHYSICAL EXAM:    General   well developed, well nourished, appears stated age, in no acute distress  EENT  NCAT, EOMI  Respiratory   Clear anteriorly  Cardiology  RRR  Abdominal  Soft, nt  Extremities  (L) AKA, (R) with brawny edema (his baseline)  Neurological  No focal neurological deficits noted  Psychological Oriented x 3. Normal affect.               Lab Data Reviewed: (see below)    Medications Reviewed: (see below)    PMH/SH reviewed - no change compared to H&P  ___________________________________________________  ___________    Attending Physician: Rissa Rdz MD     ____________________________________________________  MEDICATIONS:  Current Facility-Administered Medications   Medication Dose Route Frequency    amLODIPine (NORVASC) tablet 10 mg  10 mg Oral DAILY    polyethylene glycol (MIRALAX) packet 17 g  17 g Oral DAILY    docusate sodium (COLACE) capsule 100 mg  100 mg Oral BID    insulin lispro (HUMALOG) injection   SubCUTAneous AC&HS    isosorbide mononitrate ER (IMDUR) tablet 60 mg  60 mg Oral DAILY    heparin (porcine) injection 5,000 Units  5,000 Units SubCUTAneous Q12H    hydrALAZINE (APRESOLINE) tablet 25 mg  25 mg Oral Q8H    famotidine (PEPCID) tablet 20 mg  20 mg Oral DAILY    calcium carbonate (TUMS) chewable tablet 200 mg [elemental]  200 mg Oral QID WITH MEALS    calcitRIOL (ROCALTROL) capsule 0.25 mcg  0.25 mcg Oral DAILY    magnesium oxide (MAG-OX) tablet 400 mg  400 mg Oral DAILY    epoetin calos (EPOGEN;PROCRIT) injection 10,000 Units  10,000 Units SubCUTAneous Once per day on Mon Thu    iodixanol (VISIPAQUE) 320 mg iodine/mL contrast injection 0-100 mL  0-100 mL IntraarTERial Multiple    sodium chloride (NS) flush 5-10 mL  5-10 mL IntraVENous Q8H    sodium chloride (NS) flush 5-10 mL  5-10 mL IntraVENous PRN    sodium bicarbonate (8.4%) 150 mEq in sterile water 1,000 mL infusion   IntraVENous CONTINUOUS    B complex-vitaminC-folic acid (NEPHROCAP) cap  1 Cap Oral DAILY    cloNIDine HCl (CATAPRES) tablet 0.1 mg  0.1 mg Oral Q4H PRN    nitroglycerin (NITROSTAT) tablet 0.4 mg  0.4 mg SubLINGual Q5MIN PRN    glucose chewable tablet 16 g  4 Tab Oral PRN    dextrose (D50W) injection syrg 12.5-25 g  12.5-25 g IntraVENous PRN    glucagon (GLUCAGEN) injection 1 mg  1 mg IntraMUSCular PRN    sodium chloride (NS) flush 5-10 mL  5-10 mL IntraVENous Q8H    sodium chloride (NS) flush 5-10 mL  5-10 mL IntraVENous PRN    metoprolol tartrate (LOPRESSOR) tablet 25 mg  25 mg Oral Q6H    aspirin chewable tablet 81 mg  81 mg Oral DAILY    atorvastatin (LIPITOR) tablet 80 mg  80 mg Oral QPM    acetaminophen (TYLENOL) tablet 650 mg  650 mg Oral Q4H PRN    oxyCODONE-acetaminophen (PERCOCET) 5-325 mg per tablet 1 Tab  1 Tab Oral Q4H PRN    hydrALAZINE (APRESOLINE) 20 mg/mL injection 10 mg  10 mg IntraVENous Q2H PRN    naloxone (NARCAN) injection 0.4 mg  0.4 mg IntraVENous PRN    diphenhydrAMINE (BENADRYL) injection 12.5 mg  12.5 mg IntraVENous Q4H PRN    ondansetron (ZOFRAN) injection 4 mg  4 mg IntraVENous Q4H PRN    bisacodyl (DULCOLAX) tablet 5 mg  5 mg Oral DAILY PRN    clopidogrel (PLAVIX) tablet 75 mg  75 mg Oral DAILY        LABS:  Recent Labs      05/02/18 2338 05/01/18   0034   WBC  6.5  5.1   HGB  8.8*  8.8*   HCT  27.2*  27.2*   PLT  146*  114*     Recent Labs      05/02/18 2338 05/02/18   0559  05/01/18   0034   NA  143  143  142   K  3.5  3.7  3.7   CL  104  109*  111*   CO2  29  22  17*   BUN  58*  56*  60*   CREA  4.67*  4.60*  4.57*   GLU  163*  91  104*   CA  6.5*  6.6*  6.6*   MG   --   1.6   --    PHOS   --   5.5*   --      Recent Labs      05/02/18 2338   SGOT  15   ALT  11*   AP  172*   TBILI  0.4   TP  5.8*   ALB  2.1*   GLOB  3.7     Recent Labs      05/02/18 2338 05/02/18   1319  05/02/18   0559   INR   --   1.1   --    PTP   --   11.3*   --    APTT  27.2  33.7*  28.2      No results for input(s): FE, TIBC, PSAT, FERR in the last 72 hours. No results for input(s): PH, PCO2, PO2 in the last 72 hours. No results for input(s): CPK, CKNDX, TROIQ in the last 72 hours.     No lab exists for component: CPKMB  Lab Results   Component Value Date/Time    Glucose (POC) 145 (H) 05/03/2018 07:25 AM    Glucose (POC) 187 (H) 05/02/2018 09:16 PM    Glucose (POC) 162 (H) 05/02/2018 05:05 PM    Glucose (POC) 204 (H) 05/02/2018 11:06 AM    Glucose (POC) 129 (H) 05/02/2018 07:40 AM

## 2018-05-03 NOTE — PROGRESS NOTES
Progress Note      5/3/2018 9:27 AM  NAME: Laura Fuentes   MRN:  [id number]   Admit Diagnosis: NSTEMI (non-ST elevated myocardial infarction) ([facility] [address]  ARF (acute renal failure) ([facility]      Problem List:     1. Chest pain; cath 4/30 w/ RCA , 60% oLAD w/ neg FFR (0.88), patent LCx stent w/ mild ISR. 2. Right renal artery stenosis. 3. Occluded LCIA. 4. Elevated troponin  5. Acute on chronic kidney disease Stg 4  6. Coronary artery disease s/p multivessel stenting s/p PCI of LCx, PTCA of RCA 7/97, PTCA LAD 3/94. Lexiscan 2/08 w/ EF 62%, distal ineroapical infarct w/o ischemia  7. Peripheral vascular disease s/p left iliac stenting, left SFA stenting 3/00, right iliac stenting 11/99.  8. Status post LAKA  9. Bilateral carotid stenosis; 16-49% 9/13  10. Hypertension  11. Diabetes  12. Tobacco abuse  13. Chronic pain  14. Chronic anemia  15. Hyperlipidemia  16. Noncompliance  17. Peptic ulcer disease w/ GIB '15  18. Falls  19. Lives w/ sister in [address] [place] [address] now  21. Usual Cardiologist:  Dr. Viki Nunez (last seen 8/16)     Assessment/Plan:   TnI peaked @ 0.16 w/ nml CK  sCr 4.7  EKG:  ST, LAFB, NSSTTWc    No CP overnight  HTNive  Echo w/ EF 55%, LAE, mild MR, AoSclerosis, mild TR     21. Inc norvasc to 10mg  22. Will plan for right renal artery stenting tomorrow  23. NPO p MN  24. Continue ASA 81mg  25. Continue plavix 75mg  26. Continue atorva 80mg  27. Continue hydralazine 25mg TID  28. Continue metoprolol 25mg q12h  29. Continue ISMN 60mg daily  30. Will plan for medical therapy of his CAD. If fails, will require complex bifurcation left main stenting. []       High complexity decision making was performed in this patient at high risk for decompensation with multiple organ involvement. Subjective:     Laura Fuentes denies chest pain, dyspnea. Discussed with RN events overnight.      Review of Systems:    Symptom Y/N Comments  Symptom Y/N Comments   Fever/Chills N   Chest Pain N    Poor Appetite N Edema N    Cough N   Abdominal Pain N    Sputum N   Joint Pain N    SOB/COREA N   Pruritis/Rash N    Nausea/vomit N   Tolerating PT/OT Y    Diarrhea N   Tolerating Diet Y    Constipation N   Other       Could NOT obtain due to:      Objective:      Physical Exam:    Last 24hrs VS reviewed since prior progress note. Most recent are:    Visit Vitals    /67    Pulse 65    Temp 97.6 °F (36.4 °C)    Resp 18    Ht 5' 9\" (1.753 m)    Wt 71.9 kg (158 lb 9.6 oz)    SpO2 95%    BMI 23.42 kg/m2       Intake/Output Summary (Last 24 hours) at 05/03/18 0725  Last data filed at 05/02/18 1759   Gross per 24 hour   Intake                0 ml   Output              450 ml   Net             -450 ml        General Appearance: Well developed, well nourished, alert & oriented x 3,    no acute distress. Ears/Nose/Mouth/Throat: Hearing grossly normal.  Neck: Supple. Chest: Lungs clear to auscultation bilaterally. Cardiovascular: Regular rate and rhythm, S1S2 normal, no murmur. Abdomen: Soft, non-tender, bowel sounds are active. Extremities: No edema bilaterally. LAKA. 1+ RCFA pulses. Skin: Warm and dry. [x]         Post-cath site without hematoma, bruit, tenderness, or thrill. Distal pulses intact.     PMH/SH reviewed - no change compared to H&P    Data Review    Telemetry: sinus rhythm     EKG:   [x]  No new EKG for review    Lab Data Personally Reviewed:    Recent Labs      05/02/18 2338 05/01/18   0034   WBC  6.5  5.1   HGB  8.8*  8.8*   HCT  27.2*  27.2*   PLT  146*  114*     Recent Labs      05/02/18 2338 05/02/18   1319  05/02/18   0559   INR   --   1.1   --    PTP   --   11.3*   --    APTT  27.2  33.7*  28.2      Recent Labs      05/02/18 2338 05/02/18   0559  05/01/18   0034   NA  143  143  142   K  3.5  3.7  3.7   CL  104  109*  111*   CO2  29  22  17*   BUN  58*  56*  60*   CREA  4.67*  4.60*  4.57*   GLU  163*  91  104*   CA  6.5*  6.6*  6.6*   MG   --   1.6   --      No results for input(s): CARLK, CKNDX, TROIQ in the last 72 hours. No lab exists for component: CPKMB  Lab Results   Component Value Date/Time    Cholesterol, total 236 (H) 04/29/2018 06:00 AM    HDL Cholesterol 34 04/29/2018 06:00 AM    LDL,Direct 62 10/03/2014 05:50 AM    LDL, calculated 172.4 (H) 04/29/2018 06:00 AM    Triglyceride 148 04/29/2018 06:00 AM    CHOL/HDL Ratio 6.9 (H) 04/29/2018 06:00 AM       Recent Labs      05/02/18   2338   SGOT  15   AP  172*   TP  5.8*   ALB  2.1*   GLOB  3.7     No results for input(s): PH, PCO2, PO2 in the last 72 hours.     Medications Personally Reviewed:    Current Facility-Administered Medications   Medication Dose Route Frequency    amLODIPine (NORVASC) tablet 10 mg  10 mg Oral DAILY    polyethylene glycol (MIRALAX) packet 17 g  17 g Oral DAILY    docusate sodium (COLACE) capsule 100 mg  100 mg Oral BID    insulin lispro (HUMALOG) injection   SubCUTAneous AC&HS    isosorbide mononitrate ER (IMDUR) tablet 60 mg  60 mg Oral DAILY    heparin (porcine) injection 5,000 Units  5,000 Units SubCUTAneous Q12H    hydrALAZINE (APRESOLINE) tablet 25 mg  25 mg Oral Q8H    famotidine (PEPCID) tablet 20 mg  20 mg Oral DAILY    calcium carbonate (TUMS) chewable tablet 200 mg [elemental]  200 mg Oral QID WITH MEALS    calcitRIOL (ROCALTROL) capsule 0.25 mcg  0.25 mcg Oral DAILY    magnesium oxide (MAG-OX) tablet 400 mg  400 mg Oral DAILY    epoetin calos (EPOGEN;PROCRIT) injection 10,000 Units  10,000 Units SubCUTAneous Once per day on Mon Thu    iodixanol (VISIPAQUE) 320 mg iodine/mL contrast injection 0-100 mL  0-100 mL IntraarTERial Multiple    sodium chloride (NS) flush 5-10 mL  5-10 mL IntraVENous Q8H    sodium chloride (NS) flush 5-10 mL  5-10 mL IntraVENous PRN    sodium bicarbonate (8.4%) 150 mEq in sterile water 1,000 mL infusion   IntraVENous CONTINUOUS    B complex-vitaminC-folic acid (NEPHROCAP) cap  1 Cap Oral DAILY    cloNIDine HCl (CATAPRES) tablet 0.1 mg  0.1 mg Oral Q4H PRN    nitroglycerin (NITROSTAT) tablet 0.4 mg  0.4 mg SubLINGual Q5MIN PRN    glucose chewable tablet 16 g  4 Tab Oral PRN    dextrose (D50W) injection syrg 12.5-25 g  12.5-25 g IntraVENous PRN    glucagon (GLUCAGEN) injection 1 mg  1 mg IntraMUSCular PRN    sodium chloride (NS) flush 5-10 mL  5-10 mL IntraVENous Q8H    sodium chloride (NS) flush 5-10 mL  5-10 mL IntraVENous PRN    metoprolol tartrate (LOPRESSOR) tablet 25 mg  25 mg Oral Q6H    aspirin chewable tablet 81 mg  81 mg Oral DAILY    atorvastatin (LIPITOR) tablet 80 mg  80 mg Oral QPM    acetaminophen (TYLENOL) tablet 650 mg  650 mg Oral Q4H PRN    oxyCODONE-acetaminophen (PERCOCET) 5-325 mg per tablet 1 Tab  1 Tab Oral Q4H PRN    hydrALAZINE (APRESOLINE) 20 mg/mL injection 10 mg  10 mg IntraVENous Q2H PRN    naloxone (NARCAN) injection 0.4 mg  0.4 mg IntraVENous PRN    diphenhydrAMINE (BENADRYL) injection 12.5 mg  12.5 mg IntraVENous Q4H PRN    ondansetron (ZOFRAN) injection 4 mg  4 mg IntraVENous Q4H PRN    bisacodyl (DULCOLAX) tablet 5 mg  5 mg Oral DAILY PRN    clopidogrel (PLAVIX) tablet 75 mg  75 mg Oral DAILY         Abdias Ramirezer III, DO

## 2018-05-03 NOTE — ROUTINE PROCESS
Bedside and Verbal shift change report given to Julio C Loo (oncoming nurse) by Will RN (offgoing nurse). Report included the following information SBAR, Kardex, Intake/Output and MAR.

## 2018-05-03 NOTE — PROGRESS NOTES
Reason for Admission:   Patient states he came to ed with chest pain radiating to arm with nausea. He had hx three prior mi where he ad stents placed. He was at the [employer] 3 years ago. Now he lives in two story home with his mother and sister and sometimes he is with his wife. RRAT Score:    28              Resources/supports as identified by patient/family:   Patient states he has a lot family support. Top Challenges facing patient (as identified by patient/family and CM): Finances/Medication cost?      He states he has no problems getting his medication. Transportation? His family drives him where he has to go. Support system or lack thereof? Has family support. .                      Living arrangements? Lives in two story home with his family. Self-care/ADLs/Cognition? He uses a wheelchair . He is left amputee. He is able to get himself from wheelchair to bed and vise versa. He has a prosthesis however he does not use it all the time. Current Advanced Directive/Advance Care Plan:  Not on file,                          Plan for utilizing home health:  He will use home health if needed. Likelihood of readmission: Mod                 Transition of Care Plan:       He plans to follow up with his healthcare team when discharged. Care Management Interventions  PCP Verified by CM: Yes  Transition of Care Consult (CM Consult): Discharge Planning  Discharge Durable Medical Equipment:  (Patient has wheelchair at home. )  Current Support Network:  Other (Lives with his mother at this time and sometimes he lives with his wife. )  Confirm Follow Up Transport: Family  Plan discussed with Pt/Family/Caregiver: Yes  Discharge Location  Discharge Placement: Home

## 2018-05-04 ENCOUNTER — APPOINTMENT (OUTPATIENT)
Dept: INTERVENTIONAL RADIOLOGY/VASCULAR | Age: 63
DRG: 674 | End: 2018-05-04
Attending: INTERNAL MEDICINE
Payer: MEDICARE

## 2018-05-04 LAB
ANION GAP SERPL CALC-SCNC: 8 MMOL/L (ref 5–15)
APTT PPP: 34.8 SEC (ref 22.1–32)
ATRIAL RATE: 62 BPM
BUN SERPL-MCNC: 57 MG/DL (ref 6–20)
BUN/CREAT SERPL: 11 (ref 12–20)
CALCIUM SERPL-MCNC: 6.8 MG/DL (ref 8.5–10.1)
CALCULATED P AXIS, ECG09: 71 DEGREES
CALCULATED R AXIS, ECG10: -47 DEGREES
CALCULATED T AXIS, ECG11: -130 DEGREES
CHLORIDE SERPL-SCNC: 99 MMOL/L (ref 97–108)
CO2 SERPL-SCNC: 35 MMOL/L (ref 21–32)
CREAT SERPL-MCNC: 5.03 MG/DL (ref 0.7–1.3)
DIAGNOSIS, 93000: NORMAL
GLUCOSE BLD STRIP.AUTO-MCNC: 119 MG/DL (ref 65–100)
GLUCOSE BLD STRIP.AUTO-MCNC: 174 MG/DL (ref 65–100)
GLUCOSE BLD STRIP.AUTO-MCNC: 187 MG/DL (ref 65–100)
GLUCOSE BLD STRIP.AUTO-MCNC: 227 MG/DL (ref 65–100)
GLUCOSE SERPL-MCNC: 155 MG/DL (ref 65–100)
HBV SURFACE AB SER QL: NONREACTIVE
HBV SURFACE AB SER-ACNC: <3.1 MIU/ML
HBV SURFACE AG SER QL: <0.1 INDEX
HBV SURFACE AG SER QL: NEGATIVE
HCV AB SERPL QL IA: NONREACTIVE
HCV COMMENT,HCGAC: NORMAL
MAGNESIUM SERPL-MCNC: 1.7 MG/DL (ref 1.6–2.4)
P-R INTERVAL, ECG05: 160 MS
POTASSIUM SERPL-SCNC: 3.2 MMOL/L (ref 3.5–5.1)
Q-T INTERVAL, ECG07: 542 MS
QRS DURATION, ECG06: 90 MS
QTC CALCULATION (BEZET), ECG08: 550 MS
SERVICE CMNT-IMP: ABNORMAL
SODIUM SERPL-SCNC: 142 MMOL/L (ref 136–145)
THERAPEUTIC RANGE,PTTT: ABNORMAL SECS (ref 58–77)
VENTRICULAR RATE, ECG03: 62 BPM

## 2018-05-04 PROCEDURE — C1892 INTRO/SHEATH,FIXED,PEEL-AWAY: HCPCS

## 2018-05-04 PROCEDURE — 74011250637 HC RX REV CODE- 250/637: Performed by: INTERNAL MEDICINE

## 2018-05-04 PROCEDURE — 77001 FLUOROGUIDE FOR VEIN DEVICE: CPT

## 2018-05-04 PROCEDURE — 87340 HEPATITIS B SURFACE AG IA: CPT | Performed by: INTERNAL MEDICINE

## 2018-05-04 PROCEDURE — C1752 CATH,HEMODIALYSIS,SHORT-TERM: HCPCS

## 2018-05-04 PROCEDURE — C1769 GUIDE WIRE: HCPCS

## 2018-05-04 PROCEDURE — 86706 HEP B SURFACE ANTIBODY: CPT | Performed by: INTERNAL MEDICINE

## 2018-05-04 PROCEDURE — C1725 CATH, TRANSLUMIN NON-LASER: HCPCS

## 2018-05-04 PROCEDURE — 74011250637 HC RX REV CODE- 250/637: Performed by: HOSPITALIST

## 2018-05-04 PROCEDURE — 04793DZ DILATION OF RIGHT RENAL ARTERY WITH INTRALUMINAL DEVICE, PERCUTANEOUS APPROACH: ICD-10-PCS | Performed by: INTERNAL MEDICINE

## 2018-05-04 PROCEDURE — 77030018719 HC DRSG PTCH ANTIMIC J&J -A

## 2018-05-04 PROCEDURE — 77030019697 HC SYR ANGI INFL MRTM -B

## 2018-05-04 PROCEDURE — 36251 INS CATH REN ART 1ST UNILAT: CPT

## 2018-05-04 PROCEDURE — 90935 HEMODIALYSIS ONE EVALUATION: CPT

## 2018-05-04 PROCEDURE — 85730 THROMBOPLASTIN TIME PARTIAL: CPT

## 2018-05-04 PROCEDURE — 74011250637 HC RX REV CODE- 250/637

## 2018-05-04 PROCEDURE — 37236 OPEN/PERQ PLACE STENT 1ST: CPT

## 2018-05-04 PROCEDURE — 82962 GLUCOSE BLOOD TEST: CPT

## 2018-05-04 PROCEDURE — C1894 INTRO/SHEATH, NON-LASER: HCPCS

## 2018-05-04 PROCEDURE — 65660000000 HC RM CCU STEPDOWN

## 2018-05-04 PROCEDURE — 80048 BASIC METABOLIC PNL TOTAL CA: CPT

## 2018-05-04 PROCEDURE — 77030029065 HC DRSG HEMO QCLOT ZMED -B

## 2018-05-04 PROCEDURE — 83735 ASSAY OF MAGNESIUM: CPT

## 2018-05-04 PROCEDURE — C1760 CLOSURE DEV, VASC: HCPCS

## 2018-05-04 PROCEDURE — 74011000250 HC RX REV CODE- 250

## 2018-05-04 PROCEDURE — 74011250636 HC RX REV CODE- 250/636

## 2018-05-04 PROCEDURE — 85347 COAGULATION TIME ACTIVATED: CPT

## 2018-05-04 PROCEDURE — 86803 HEPATITIS C AB TEST: CPT | Performed by: INTERNAL MEDICINE

## 2018-05-04 PROCEDURE — 74011000250 HC RX REV CODE- 250: Performed by: RADIOLOGY

## 2018-05-04 PROCEDURE — 99152 MOD SED SAME PHYS/QHP 5/>YRS: CPT

## 2018-05-04 PROCEDURE — 74011000250 HC RX REV CODE- 250: Performed by: INTERNAL MEDICINE

## 2018-05-04 PROCEDURE — 02H633Z INSERTION OF INFUSION DEVICE INTO RIGHT ATRIUM, PERCUTANEOUS APPROACH: ICD-10-PCS | Performed by: RADIOLOGY

## 2018-05-04 PROCEDURE — 93041 RHYTHM ECG TRACING: CPT

## 2018-05-04 PROCEDURE — C2625 STENT, NON-COR, TEM W/DEL SY: HCPCS

## 2018-05-04 PROCEDURE — 36415 COLL VENOUS BLD VENIPUNCTURE: CPT

## 2018-05-04 PROCEDURE — 99153 MOD SED SAME PHYS/QHP EA: CPT

## 2018-05-04 PROCEDURE — C1887 CATHETER, GUIDING: HCPCS

## 2018-05-04 PROCEDURE — 74011636637 HC RX REV CODE- 636/637: Performed by: INTERNAL MEDICINE

## 2018-05-04 PROCEDURE — 74011636320 HC RX REV CODE- 636/320

## 2018-05-04 PROCEDURE — 74011250636 HC RX REV CODE- 250/636: Performed by: INTERNAL MEDICINE

## 2018-05-04 PROCEDURE — 74011250636 HC RX REV CODE- 250/636: Performed by: RADIOLOGY

## 2018-05-04 RX ORDER — MIDAZOLAM HYDROCHLORIDE 1 MG/ML
INJECTION, SOLUTION INTRAMUSCULAR; INTRAVENOUS
Status: DISCONTINUED
Start: 2018-05-04 | End: 2018-05-04

## 2018-05-04 RX ORDER — MIDAZOLAM HYDROCHLORIDE 1 MG/ML
INJECTION, SOLUTION INTRAMUSCULAR; INTRAVENOUS
Status: COMPLETED
Start: 2018-05-04 | End: 2018-05-04

## 2018-05-04 RX ORDER — CLOPIDOGREL 300 MG/1
300 TABLET, FILM COATED ORAL ONCE
Status: COMPLETED | OUTPATIENT
Start: 2018-05-04 | End: 2018-05-04

## 2018-05-04 RX ORDER — HEPARIN SODIUM 1000 [USP'U]/ML
1000-10000 INJECTION, SOLUTION INTRAVENOUS; SUBCUTANEOUS
Status: DISCONTINUED | OUTPATIENT
Start: 2018-05-04 | End: 2018-05-04

## 2018-05-04 RX ORDER — LIDOCAINE HYDROCHLORIDE 10 MG/ML
1-30 INJECTION, SOLUTION EPIDURAL; INFILTRATION; INTRACAUDAL; PERINEURAL
Status: DISCONTINUED | OUTPATIENT
Start: 2018-05-04 | End: 2018-05-04

## 2018-05-04 RX ORDER — HEPARIN SODIUM 200 [USP'U]/100ML
500 INJECTION, SOLUTION INTRAVENOUS ONCE
Status: COMPLETED | OUTPATIENT
Start: 2018-05-04 | End: 2018-05-04

## 2018-05-04 RX ORDER — FENTANYL CITRATE 50 UG/ML
INJECTION, SOLUTION INTRAMUSCULAR; INTRAVENOUS
Status: COMPLETED
Start: 2018-05-04 | End: 2018-05-04

## 2018-05-04 RX ORDER — LIDOCAINE HYDROCHLORIDE 20 MG/ML
20 INJECTION, SOLUTION INFILTRATION; PERINEURAL ONCE
Status: COMPLETED | OUTPATIENT
Start: 2018-05-04 | End: 2018-05-04

## 2018-05-04 RX ORDER — HEPARIN SODIUM 200 [USP'U]/100ML
INJECTION, SOLUTION INTRAVENOUS
Status: COMPLETED
Start: 2018-05-04 | End: 2018-05-04

## 2018-05-04 RX ORDER — MIDAZOLAM HYDROCHLORIDE 1 MG/ML
.5-2 INJECTION, SOLUTION INTRAMUSCULAR; INTRAVENOUS
Status: DISCONTINUED | OUTPATIENT
Start: 2018-05-04 | End: 2018-05-04

## 2018-05-04 RX ORDER — LIDOCAINE HYDROCHLORIDE 10 MG/ML
1-40 INJECTION INFILTRATION; PERINEURAL ONCE
Status: DISCONTINUED | OUTPATIENT
Start: 2018-05-04 | End: 2018-05-04

## 2018-05-04 RX ORDER — HEPARIN SODIUM 200 [USP'U]/100ML
400 INJECTION, SOLUTION INTRAVENOUS ONCE
Status: COMPLETED | OUTPATIENT
Start: 2018-05-04 | End: 2018-05-04

## 2018-05-04 RX ORDER — HEPARIN 100 UNIT/ML
300 SYRINGE INTRAVENOUS AS NEEDED
Status: DISCONTINUED | OUTPATIENT
Start: 2018-05-04 | End: 2018-05-14 | Stop reason: HOSPADM

## 2018-05-04 RX ORDER — IODIXANOL 320 MG/ML
0-100 INJECTION, SOLUTION INTRAVASCULAR
Status: DISCONTINUED | OUTPATIENT
Start: 2018-05-04 | End: 2018-05-04

## 2018-05-04 RX ORDER — CLOPIDOGREL 300 MG/1
TABLET, FILM COATED ORAL
Status: COMPLETED
Start: 2018-05-04 | End: 2018-05-04

## 2018-05-04 RX ORDER — LIDOCAINE HYDROCHLORIDE 10 MG/ML
INJECTION, SOLUTION EPIDURAL; INFILTRATION; INTRACAUDAL; PERINEURAL
Status: COMPLETED
Start: 2018-05-04 | End: 2018-05-04

## 2018-05-04 RX ORDER — HEPARIN SODIUM 1000 [USP'U]/ML
INJECTION, SOLUTION INTRAVENOUS; SUBCUTANEOUS
Status: COMPLETED
Start: 2018-05-04 | End: 2018-05-04

## 2018-05-04 RX ORDER — HEPARIN SODIUM (PORCINE) LOCK FLUSH IV SOLN 100 UNIT/ML 100 UNIT/ML
300 SOLUTION INTRAVENOUS ONCE
Status: DISCONTINUED | OUTPATIENT
Start: 2018-05-04 | End: 2018-05-04 | Stop reason: SDUPTHER

## 2018-05-04 RX ORDER — FAMOTIDINE 20 MG/1
20 TABLET, FILM COATED ORAL
Status: DISCONTINUED | OUTPATIENT
Start: 2018-05-06 | End: 2018-05-07

## 2018-05-04 RX ORDER — LIDOCAINE HYDROCHLORIDE 10 MG/ML
1-30 INJECTION, SOLUTION EPIDURAL; INFILTRATION; INTRACAUDAL; PERINEURAL ONCE
Status: COMPLETED | OUTPATIENT
Start: 2018-05-04 | End: 2018-05-04

## 2018-05-04 RX ORDER — HYDRALAZINE HYDROCHLORIDE 20 MG/ML
INJECTION INTRAMUSCULAR; INTRAVENOUS
Status: COMPLETED
Start: 2018-05-04 | End: 2018-05-04

## 2018-05-04 RX ORDER — HYDRALAZINE HYDROCHLORIDE 20 MG/ML
10 INJECTION INTRAMUSCULAR; INTRAVENOUS ONCE
Status: COMPLETED | OUTPATIENT
Start: 2018-05-04 | End: 2018-05-04

## 2018-05-04 RX ORDER — FENTANYL CITRATE 50 UG/ML
25-50 INJECTION, SOLUTION INTRAMUSCULAR; INTRAVENOUS
Status: DISCONTINUED | OUTPATIENT
Start: 2018-05-04 | End: 2018-05-04

## 2018-05-04 RX ADMIN — METOPROLOL TARTRATE 25 MG: 25 TABLET ORAL at 04:44

## 2018-05-04 RX ADMIN — MIDAZOLAM HYDROCHLORIDE 1 MG: 1 INJECTION, SOLUTION INTRAMUSCULAR; INTRAVENOUS at 10:40

## 2018-05-04 RX ADMIN — FENTANYL CITRATE 50 MCG: 50 INJECTION, SOLUTION INTRAMUSCULAR; INTRAVENOUS at 10:12

## 2018-05-04 RX ADMIN — NITROGLYCERIN 1 INCH: 20 OINTMENT TOPICAL at 10:25

## 2018-05-04 RX ADMIN — INSULIN LISPRO 2 UNITS: 100 INJECTION, SOLUTION INTRAVENOUS; SUBCUTANEOUS at 07:52

## 2018-05-04 RX ADMIN — FENTANYL CITRATE 25 MCG: 50 INJECTION, SOLUTION INTRAMUSCULAR; INTRAVENOUS at 11:00

## 2018-05-04 RX ADMIN — HYDRALAZINE HYDROCHLORIDE 10 MG: 20 INJECTION INTRAMUSCULAR; INTRAVENOUS at 10:36

## 2018-05-04 RX ADMIN — HEPARIN SODIUM IN SODIUM CHLORIDE: 200 INJECTION INTRAVENOUS at 14:16

## 2018-05-04 RX ADMIN — HEPARIN SODIUM 1000 UNITS: 200 INJECTION, SOLUTION INTRAVENOUS at 10:32

## 2018-05-04 RX ADMIN — LIDOCAINE HYDROCHLORIDE 30 ML: 10 INJECTION, SOLUTION EPIDURAL; INFILTRATION; INTRACAUDAL; PERINEURAL at 10:33

## 2018-05-04 RX ADMIN — OXYCODONE HYDROCHLORIDE AND ACETAMINOPHEN 1 TABLET: 5; 325 TABLET ORAL at 23:37

## 2018-05-04 RX ADMIN — Medication 400 MG: at 12:34

## 2018-05-04 RX ADMIN — NEPHROCAP 1 CAPSULE: 1 CAP ORAL at 12:33

## 2018-05-04 RX ADMIN — OXYCODONE HYDROCHLORIDE AND ACETAMINOPHEN 1 TABLET: 5; 325 TABLET ORAL at 04:44

## 2018-05-04 RX ADMIN — WATER: 1000 INJECTION, SOLUTION INTRAVENOUS at 06:32

## 2018-05-04 RX ADMIN — MIDAZOLAM HYDROCHLORIDE 1 MG: 1 INJECTION, SOLUTION INTRAMUSCULAR; INTRAVENOUS at 10:54

## 2018-05-04 RX ADMIN — FENTANYL CITRATE 50 MCG: 50 INJECTION, SOLUTION INTRAMUSCULAR; INTRAVENOUS at 10:52

## 2018-05-04 RX ADMIN — CALCIUM CARBONATE 200 MG: 500 TABLET, CHEWABLE ORAL at 17:02

## 2018-05-04 RX ADMIN — AMLODIPINE BESYLATE 10 MG: 5 TABLET ORAL at 12:33

## 2018-05-04 RX ADMIN — OXYCODONE HYDROCHLORIDE AND ACETAMINOPHEN 1 TABLET: 5; 325 TABLET ORAL at 07:52

## 2018-05-04 RX ADMIN — CLOPIDOGREL BISULFATE 75 MG: 75 TABLET ORAL at 06:34

## 2018-05-04 RX ADMIN — SODIUM BICARBONATE 2 ML: 0.2 INJECTION, SOLUTION INTRAVENOUS at 14:16

## 2018-05-04 RX ADMIN — DOCUSATE SODIUM 100 MG: 100 CAPSULE, LIQUID FILLED ORAL at 17:02

## 2018-05-04 RX ADMIN — IOPAMIDOL 120 ML: 755 INJECTION, SOLUTION INTRAVENOUS at 11:11

## 2018-05-04 RX ADMIN — CLOPIDOGREL BISULFATE 300 MG: 300 TABLET, FILM COATED ORAL at 11:07

## 2018-05-04 RX ADMIN — CLOPIDOGREL 300 MG: 300 TABLET, FILM COATED ORAL at 11:07

## 2018-05-04 RX ADMIN — MIDAZOLAM HYDROCHLORIDE 2 MG: 1 INJECTION, SOLUTION INTRAMUSCULAR; INTRAVENOUS at 10:12

## 2018-05-04 RX ADMIN — ASPIRIN 81 MG 81 MG: 81 TABLET ORAL at 06:34

## 2018-05-04 RX ADMIN — ATORVASTATIN CALCIUM 80 MG: 40 TABLET, FILM COATED ORAL at 17:02

## 2018-05-04 RX ADMIN — HEPARIN SODIUM 7500 UNITS: 1000 INJECTION, SOLUTION INTRAVENOUS; SUBCUTANEOUS at 10:37

## 2018-05-04 RX ADMIN — FENTANYL CITRATE 25 MCG: 50 INJECTION, SOLUTION INTRAMUSCULAR; INTRAVENOUS at 10:40

## 2018-05-04 RX ADMIN — LIDOCAINE HYDROCHLORIDE 200 MG: 20 INJECTION, SOLUTION INFILTRATION; PERINEURAL at 14:15

## 2018-05-04 RX ADMIN — SODIUM CHLORIDE, PRESERVATIVE FREE 300 UNITS: 5 INJECTION INTRAVENOUS at 14:16

## 2018-05-04 RX ADMIN — MIDAZOLAM 2 MG: 1 INJECTION INTRAMUSCULAR; INTRAVENOUS at 10:12

## 2018-05-04 RX ADMIN — HYDRALAZINE HYDROCHLORIDE 25 MG: 50 TABLET, FILM COATED ORAL at 06:33

## 2018-05-04 RX ADMIN — Medication 10 ML: at 04:43

## 2018-05-04 RX ADMIN — INSULIN LISPRO 3 UNITS: 100 INJECTION, SOLUTION INTRAVENOUS; SUBCUTANEOUS at 17:02

## 2018-05-04 RX ADMIN — METOPROLOL TARTRATE 25 MG: 25 TABLET ORAL at 12:33

## 2018-05-04 RX ADMIN — FAMOTIDINE 20 MG: 20 TABLET, FILM COATED ORAL at 07:52

## 2018-05-04 RX ADMIN — ISOSORBIDE MONONITRATE 60 MG: 30 TABLET, EXTENDED RELEASE ORAL at 12:33

## 2018-05-04 NOTE — PROGRESS NOTES
Spiritual Care Assessment/Progress Note  [facility]      NAME: Yvonne Martines      MRN: [id number]  AGE: 58 y.o. SEX: male  Zoroastrian Affiliation: Shinto   Language: English     5/4/2018     Total Time (in minutes): 19     Spiritual Assessment begun in [facility] through conversation with:         []Patient        [x] Family    [] Friend(s)        Reason for Consult: Initial/Spiritual assessment, patient floor     Spiritual beliefs: (Please include comment if needed)     [x] Identifies with a caroline tradition:     [] Supported by a caroline community:      [] Claims no spiritual orientation:      [] Seeking spiritual identity:           [] Adheres to an individual form of spirituality:      [] Not able to assess:                     Identified resources for coping:      [x] Prayer                               [] Music                  [] Guided Imagery     [x] Family/friends                 [] Pet visits     [] Devotional reading                         [] Unknown     [] Other:                                               Interventions offered during this visit: (See comments for more details)    Patient Interventions: Spiritual care volunteer support     Family/Friend(s):  Affirmation of emotions/emotional suffering, Coping skills reviewed/reinforced, Zoroastrian beliefs/image of God discussed, Prayer (actual), Initial Assessment, Normalization of emotional/spiritual concerns, Iconic (affirming the presence of God/Higher Power)     Plan of Care:     [] Support spiritual and/or cultural needs    [] Support AMD and/or advance care planning process      [] Support grieving process   [] Coordinate Rites and/or Rituals    [] Coordination with community clergy   [] No spiritual needs identified at this time   [] Detailed Plan of Care below (See Comments)  [] Make referral to Music Therapy  [] Make referral to Pet Therapy     [] Make referral to Addiction services  [] Make referral to [facility]  [] Make referral to Spiritual Care Partner  [] No future visits requested        [x] Follow up visits as needed   Initial Spiritual Assessment in 2155. Pt in Cath lab, met with spouse, son, and mother in pt's room. Led in processing, provided active listening as family shared pt's health history. Reported that pt had appeared agitated this morning prior to going to Cath, and may be from being informed that he would need some dialysis which would be a first for pt. Son self-reported to be holding on, commended family for their presence. Family identifies as Episcopalian and rooted in prayer, prayed on behalf of pt and family who expressed appreciation. Advised of availability, pt to be here through Monday at least. Will follow up as needed/able. GISELE Singleton. Div

## 2018-05-04 NOTE — PROGRESS NOTES
Initial Nutrition Assessment:    INTERVENTIONS/RECOMMENDATIONS:   · Continue current diet, add 2g Na due to CKD and HTN  · Trail of nepro  · If last documented BM is accurate consider a more aggressive bowel regimen. · Could add renal vitamin due to poor PO intake     ASSESSMENT:   Chart reviewed, medically noted for MINO on CKD4, CAD, T2DM PMH shown below. Pt reports a poor/fair appetite currently and PTA. He notes ~5 lbs weight loss over the past month (not a significant amount). He agreed to try a nepro shake to help meet kcal and protein needs. Last documented BM was 4/28, constipation can effect appetite. Past Medical History:   Diagnosis Date    Anemia     Arthritis     back, hips    CAD (coronary artery disease)     Sees Dr. 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 Kelsi Carrera for pain mgmt    Chronic pain     Confusion     Depression     Diabetes ([facility] [address]     ED (erectile dysfunction)     GERD (gastroesophageal reflux disease) 11/2015    Diagnosed at [facility] last month    Hypercholesteremia     Hypertension     Psychiatric disorder     depression    PVD (peripheral vascular disease) ([facility] [address]     Thromboembolus ([facility] [address]        Diet Order: Consistent carb, Cardiac  % Eaten:  No data found.     Pertinent Medications: [x]Reviewed: B vit, calcitriol, tums, colace, epo,  Humalog, mag ox, miralax, Na bicarb,   Pertinent Labs: [x]Reviewed: K+ 3.2, phos 5.5, low vit D   Food Allergies: [x]NKFA  []Other   Last BM: 4/28  Edema:        []RUE   []LUE   []RLE   []LLE      Pressure Injury:      [] Stage I   [] Stage II   [] Stage III   [] Stage IV      Wt Readings from Last 30 Encounters:   05/04/18 74.3 kg (163 lb 12.8 oz)   01/06/16 49.9 kg (110 lb)   10/11/15 49.9 kg (110 lb)   09/01/15 56.7 kg (125 lb)   08/07/15 49.9 kg (110 lb)   05/22/15 55.8 kg (123 lb)   04/21/15 60.8 kg (134 lb)   12/08/14 60.8 kg (134 lb) 11/26/14 57.8 kg (127 lb 7 oz)   10/01/14 54.4 kg (120 lb)   09/26/14 53.5 kg (118 lb)   09/08/14 53.2 kg (117 lb 4 oz)   08/11/14 57 kg (125 lb 9.6 oz)   08/12/14 56.7 kg (125 lb)   06/06/14 60.5 kg (133 lb 6.4 oz)   12/20/13 65.3 kg (144 lb)   06/17/13 55.8 kg (123 lb)   05/08/13 69.4 kg (153 lb)   05/06/13 73.5 kg (162 lb)   03/19/13 73.5 kg (162 lb)   10/18/12 72.1 kg (159 lb)   06/04/12 68 kg (150 lb)   04/02/12 74.4 kg (164 lb)   03/23/12 72.6 kg (160 lb)   01/30/12 74.8 kg (165 lb)   12/21/11 78 kg (172 lb)   08/22/11 76.7 kg (169 lb)   06/24/11 77.1 kg (170 lb)   06/11/11 76.7 kg (169 lb)   04/14/11 81.2 kg (179 lb)       Anthropometrics:   Height: 5' 9\" (175.3 cm) Weight: 74.3 kg (163 lb 12.8 oz)   IBW (%IBW):   ( ) UBW (%UBW):   (  %)   Last Weight Metrics:  Weight Loss Metrics 5/4/2018 1/15/2016 1/6/2016 12/4/2015 10/11/2015 9/25/2015 9/1/2015   Today's Wt 163 lb 12.8 oz - 110 lb - 110 lb - 125 lb   BMI 24.19 kg/m2 - 16.24 kg/m2 - 16.24 kg/m2 - 18.45 kg/m2       BMI: Body mass index is 24.19 kg/(m^2). This BMI is indicative of:   []Underweight    [x]Normal    []Overweight    [] Obesity   [] Extreme Obesity (BMI>40)     Estimated Nutrition Needs (Based on):   1975 Kcals/day (BMR: 1525 x 1.3) , 60 g (0.8 g/kg) Protein  Carbohydrate:  At Least 130 g/day  Fluids: 1975 mL/day (1ml/kcal) or per primary team    NUTRITION DIAGNOSES:   Problem:  Inadequate protein-energy intake      Etiology: related to decreased appetite     Signs/Symptoms: as evidenced by pt report of poor PO intake PTA and currently       NUTRITION INTERVENTIONS:  Meals/Snacks: General/healthful diet   Supplements: Commercial supplement              GOAL:   consume >50% of meals and ONS in 2-4 days    LEARNING NEEDS (Diet, Food/Nutrient-Drug Interaction):    [x] None Identified   [] Identified and Education Provided/Documented   [] Identified and Pt declined/was not appropriate     Cultureal, Voodoo, OR Ethnic Dietary Needs:    [x] None Identified   [] Identified and Addressed     [x] Interdisciplinary Care Plan Reviewed/Documented    [x] Discharge Planning:  Consistent carb, heart healthy diet     MONITORING /EVALUATION:      Food/Nutrient Intake Outcomes:  Total energy intake  Physical Signs/Symptoms Outcomes: Weight/weight change, Electrolyte and renal profile, Glucose profile, GI    NUTRITION RISK:    [x] High       to       [x] Moderate           []  Low  []  Minimal/Uncompromised    PT SEEN FOR:    []  MD Consult: []Calorie Count      []Diabetic Diet Education        []Diet Education     []Electrolyte Management     []General Nutrition Management and Supplements     []Management of Tube Feeding     []TPN Recommendations    []  RN Referral:  []MST score >=2     []Enteral/Parenteral Nutrition PTA     []Pregnant: Gestational DM or Multigestation     []Pressure Ulcer/Wound Care needs        []  Low BMI  [x]  LOS Referral       Clarisse Schwab, RDN  Pager [phone]  Weekend Pager [phone]

## 2018-05-04 NOTE — ROUTINE PROCESS
Name of procedure: [place] Cath Placement      Complications, if any, r/t procedure: None      Sedation medications given: None      Sedation tolerated:  N/A      VS : Stable vs Unstable: Stable      Post Procedure Care Needed/order sets in [facility]: YES      Any other specific needs to pt. Care:    Bedside report given to [facility]. SBAR items covered.

## 2018-05-04 NOTE — PROGRESS NOTES
Hospitalist Progress Note    NAME: Jennifer Turner   :  1955   MRN:  [id number]       Assessment / Plan:  Chest pain - borderline trop elevation; NSTEMI less likely. S/p  Cardiac cath report:    CAD  cont aspirin, Plavix. Lipitor    sever Renal artery stenosis  S/p Successful PTA and stenting of the R renal artery w/ a BMS  cont aspirin, Plavix    MINO on CKD stage 5   -Upper extremity vein mapping done  -Seen by Dr. Jay Levin who is known to patient. Will plan LUE access when appropriate. HD day 1 today and day 2 tomorrow. Hypertensive Urgency - blood pressure improved. -cont lopressor, Imdur  -cont scheduled hydralazine    PAD w/ multiple stents - followed by Dr. Jay Levin    DM-2 w/ nephropathy and neuropathy  -SSI    Anemia of chronic renal disease    Chronic thrombocytopenia    Tobacco abuse    Depression     Medical noncompliance      Body mass index is 24.19 kg/(m^2). Code status: Full  Prophylaxis: Hep SQ  Recommended Disposition: Home w/Family     Subjective:     Chief Complaint / Reason for Physician Visit  Patient away for procedure and HD. Review of Systems:  Symptom Y/N Comments  Symptom Y/N Comments   Fever/Chills    Chest Pain     Poor Appetite    Edema     Cough    Abdominal Pain     Sputum    Joint Pain     SOB/COREA    Pruritis/Rash     Nausea/vomit    Tolerating PT/OT     Diarrhea    Tolerating Diet     Constipation    Other       Could NOT obtain due to:      Objective:     VITALS:   Last 24hrs VS reviewed since prior progress note.  Most recent are:  Patient Vitals for the past 24 hrs:   Temp Pulse Resp BP SpO2   18 1245 - (!) 58 - 146/60 93 %   18 1230 - 63 - 150/58 93 %   18 1215 - 61 - 138/58 93 %   18 1200 - 60 - 152/60 93 %   18 1145 - 62 - 146/65 92 %   18 1130 - 62 - 145/59 92 %   18 1127 - 65 - 154/62 91 %   18 1123 98.2 °F (36.8 °C) 68 16 150/60 91 %   18 0435 97.7 °F (36.5 °C) 65 17 156/56 98 %   18 2214 97.9 °F (36.6 °C) 65 18 137/52 98 %   05/03/18 1829 97.8 °F (36.6 °C) 61 20 116/52 97 %   05/03/18 1546 97.6 °F (36.4 °C) 63 18 125/66 98 %       Intake/Output Summary (Last 24 hours) at 05/04/18 1318  Last data filed at 05/04/18 0435   Gross per 24 hour   Intake              240 ml   Output              375 ml   Net             -135 ml        PHYSICAL EXAM:on the prior date   General: cooperative, no acute distress    EENT:  EOMI. Anicteric sclerae. MMM  Resp:  CTA bilaterally, no wheezing or rales. No accessory muscle use  CV:  Regular  rhythm,  No edema  GI:  Soft, Non distended, Non tender.  +Bowel sounds  Neurologic:  Alert and oriented X 3, normal speech,   Psych:   Not anxious nor agitated  Skin:  No rashes.   No jaundice  Ext:                  No edema right leg; left AKA    Reviewed most current lab test results and cultures  YES  Reviewed most current radiology test results   YES  Review and summation of old records today    NO  Reviewed patient's current orders and MAR    YES  PMH/SH reviewed - no change compared to H&P          Current Facility-Administered Medications:     [START ON 5/6/2018] famotidine (PEPCID) tablet 20 mg, 20 mg, Oral, Q48H, Lazaro Alonzo MD    amLODIPine (NORVASC) tablet 10 mg, 10 mg, Oral, DAILY, Tatianna Body Javier III, DO, 10 mg at 05/04/18 1233    polyethylene glycol (MIRALAX) packet 17 g, 17 g, Oral, DAILY, Wallace Mccoy MD, Stopped at 05/04/18 0900    docusate sodium (COLACE) capsule 100 mg, 100 mg, Oral, BID, Wallace Mccoy MD, Stopped at 05/04/18 0900    insulin lispro (HUMALOG) injection, , SubCUTAneous, AC&HS, Wallace Mccoy MD, Stopped at 05/04/18 1130    isosorbide mononitrate ER (IMDUR) tablet 60 mg, 60 mg, Oral, DAILY, Tatianna Body Javier III, DO, 60 mg at 05/04/18 1233    heparin (porcine) injection 5,000 Units, 5,000 Units, SubCUTAneous, Q12H, Keith Evangelista III, DO, Stopped at 05/04/18 0500    hydrALAZINE (APRESOLINE) tablet 25 mg, 25 mg, Oral, Q8H, Catarina Bello MD, 25 mg at 05/04/18 0929    calcium carbonate (TUMS) chewable tablet 200 mg [elemental], 200 mg, Oral, QID WITH MEALS, Katelyn Larry MD, 200 mg at 05/03/18 2117    calcitRIOL (ROCALTROL) capsule 0.25 mcg, 0.25 mcg, Oral, DAILY, Katelyn Larry MD, Stopped at 05/04/18 0900    magnesium oxide (MAG-OX) tablet 400 mg, 400 mg, Oral, DAILY, Katelyn Larry MD, 400 mg at 05/04/18 1234    epoetin calos (EPOGEN;PROCRIT) injection 10,000 Units, 10,000 Units, SubCUTAneous, Once per day on Mon Thu, Katelyn Larry MD, 10,000 Units at 05/03/18 1712    iodixanol (VISIPAQUE) 320 mg iodine/mL contrast injection 0-100 mL, 0-100 mL, IntraarTERial, Multiple, Ellin Room Herrera III, DO, 50 mL at 04/30/18 1041    sodium chloride (NS) flush 5-10 mL, 5-10 mL, IntraVENous, Q8H, Ricardo  Herrera III, DO, 10 mL at 05/04/18 0443    sodium chloride (NS) flush 5-10 mL, 5-10 mL, IntraVENous, PRN, [facility] Ratel III, DO    sodium bicarbonate (8.4%) 150 mEq in sterile water 1,000 mL infusion, , IntraVENous, CONTINUOUS, Katelyn Larry MD, Last Rate: 75 mL/hr at 05/04/18 3674    B complex-vitaminC-folic acid (NEPHROCAP) cap, 1 Cap, Oral, DAILY, Katelyn Larry MD, 1 Cap at 05/04/18 1233    cloNIDine HCl (CATAPRES) tablet 0.1 mg, 0.1 mg, Oral, Q4H PRN, Katelyn Larry MD, 0.1 mg at 05/01/18 1027    nitroglycerin (NITROSTAT) tablet 0.4 mg, 0.4 mg, SubLINGual, Q5MIN PRN, Jaye Cramer MD, 0.4 mg at 04/29/18 0830    glucose chewable tablet 16 g, 4 Tab, Oral, PRN, Jaye Cramer MD    dextrose (D50W) injection syrg 12.5-25 g, 12.5-25 g, IntraVENous, PRN, Jaye Cramer MD    glucagon [facility]) injection 1 mg, 1 mg, IntraMUSCular, PRN, Jaye Cramer MD    sodium chloride (NS) flush 5-10 mL, 5-10 mL, IntraVENous, Q8H, Jaye Cramer MD, 10 mL at 05/03/18 0818    sodium chloride (NS) flush 5-10 mL, 5-10 mL, IntraVENous, PRN, Jaye Cramer MD    metoprolol tartrate (LOPRESSOR) tablet 25 mg, 25 mg, Oral, Q6H, Sarah Mendoza MD, 25 mg at 05/04/18 1233    aspirin chewable tablet 81 mg, 81 mg, Oral, DAILY, Sarah Mendoza MD, 81 mg at 05/04/18 0634    atorvastatin (LIPITOR) tablet 80 mg, 80 mg, Oral, QPM, Sarah Mendoza MD, 80 mg at 05/03/18 1706    acetaminophen (TYLENOL) tablet 650 mg, 650 mg, Oral, Q4H PRN, Sarah Mendoza MD, 650 mg at 04/29/18 2151    oxyCODONE-acetaminophen (PERCOCET) 5-325 mg per tablet 1 Tab, 1 Tab, Oral, Q4H PRN, Sarah Mendoza MD, 1 Tab at 05/04/18 8665    hydrALAZINE (APRESOLINE) 20 mg/mL injection 10 mg, 10 mg, IntraVENous, Q2H PRN, Sarah Mendoza MD    [facility]) injection 0.4 mg, 0.4 mg, IntraVENous, PRN, Sarah Mendoza MD    diphenhydrAMINE (BENADRYL) injection 12.5 mg, 12.5 mg, IntraVENous, Q4H PRN, Sarah Mendoza MD, 12.5 mg at 05/02/18 2253    ondansetron [facility]) injection 4 mg, 4 mg, IntraVENous, Q4H PRN, Sarah Mendoza MD, 4 mg at 05/03/18 1254    bisacodyl (DULCOLAX) tablet 5 mg, 5 mg, Oral, DAILY PRN, Sarah Mendoza MD    clopidogrel (PLAVIX) tablet 75 mg, 75 mg, Oral, DAILY, Sarah Mendoza MD, 75 mg at 05/04/18 5303  ________________________________________________________________________  Care Plan discussed with:    Comments   Patient     Family  y Discussed with family in the room    RN y    Care Manager     Consultant                        Multidiciplinary team rounds were held today with , nursing, pharmacist and clinical coordinator. Patient's plan of care was discussed; medications were reviewed and discharge planning was addressed.      ________________________________________________________________________  Total NON critical care TIME:  25  Minutes    Total CRITICAL CARE TIME Spent:   Minutes non procedure based      Comments   >50% of visit spent in counseling and coordination of care     ________________________________________________________________________  Susan MD Nura Procedures: see electronic medical records for all procedures/Xrays and details which were not copied into this note but were reviewed prior to creation of Plan. LABS:  I reviewed today's most current labs and imaging studies. Pertinent labs include:  Recent Labs      05/02/18 2338   WBC  6.5   HGB  8.8*   HCT  27.2*   PLT  146*     Recent Labs      05/04/18   0430  05/02/18   2338  05/02/18   1319  05/02/18   0559   NA  142  143   --   143   K  3.2*  3.5   --   3.7   CL  99  104   --   109*   CO2  35*  29   --   22   GLU  155*  163*   --   91   BUN  57*  58*   --   56*   CREA  5.03*  4.67*   --   4.60*   CA  6.8*  6.5*   --   6.6*   MG  1.7   --    --   1.6   PHOS   --    --    --   5.5*   ALB   --   2.1*   --    --    TBILI   --   0.4   --    --    SGOT   --   15   --    --    ALT   --   11*   --    --    INR   --    --   1.1   --        Signed:  Franny Rivera MD

## 2018-05-04 NOTE — PROCEDURES
Cardiac Cathetherization Note     PreOp Diagnosis:    []       Chest Pain   []       STEMI   []       Unstable Angina   []       NSTEMI   []       Cardiomyopathy/Heart Failure   []       Abnormal Stress Test   []       Valve Disease   []       Pre-Operative Evaluation   [x]       Other:  Right renal artery stenosis    Findings/PostOp Diagnosis:   1. Severe R ROSALEE  2. Successful PTA and stenting of the R renal artery w/ a BMS  3. Angioseal RCFA  4. Mild disease in the REIA, RCFA, and proximal RSFA/RPFA    Recommendations:   1. Continue DAPT   2. Routine post procedure & access site care   3. Continue aggressive medical management and risk factor modification     Procedures: Selective renal artery angiogram, renal artery stent, selective unilateral lower extremity angiogram, angioseal RCFA, ultrasound-guided vascular access    Indication:  As above    Procedure status: [x]  Elective  [] Urgent  [] Emergent    Operators:  Brigid Jordan,     Assistants: None    Access:   []  RIGHT Radial  []  LEFT Radial  [x] RCFA  []  LCFA  []  RCFV  []  LCFV    Catheters: 7Fr [facility]     Closure: []  TR Band  [x]  Angioseal  []  Perclose  []  Manual Compression    Tubes/Drains:  [x] No tubes or drains remain from this procedure  [] Other:     Estimated Blood Loss: Minimal     Specimens: None     Sedation: Moderate conscious sedation with IV fentany & versed, local anesthesia with 1% lidocaine. This was performed by non-anesthesia personnel and I provided direct supervision to a trained independent observer. Time under moderate sedation: 39  Min    Patient age:  58 y.o. Contrast:    102 cc  [x]  Isovue   []  Visipaque    Fluoro Time:   10:24  Min    Radiation Dose:    700 mGy    Complications:  [x] None  [] Other:     Patient Condition at the end of the procedure:  [x] Stable  [] Other:      Hemodynamics:    Ao: 178/63/104     Selective RIGHT renal artery angiogram:  Ostial/proximal 90% stenosis      Indication for renal stenting: RRAS, acute on chronic renal insufficiency, uncontrolled HTN    Technique:    Lesion #1:  Right renal artery    Anticoagulation:  Heparin was used for anticoagulation. Guiding Catheter:  A 7Fr [facility] guiding catheter was used to engage the right renal artery. Guidewire:  A Runthrough guidewire was used to cross the lesion without difficulty. Predilatation:  A 2.5 x 12mm Mini Trek balloon was used and inflated at 14 GEORGI. Next, a 5.0 x 15mm Michael balloon was used and inflated at 6 GEORGI. Stenting:  Next, a 7.0 x 19mm Express SD Renal BMS was used and deployed at 14 GEORGI for 30 seconds. Post-PCI Angiogram:  Angiogram following renal stenting showed good results with MALENA 3 flow without evidence of dissection, residual stenosis, or perforation. The patient tolerated the procedure well without any significant hemodynamic instability. Selective Unilateral Lower Extremity Angiography  The arteriotomy is above the bifurcation. The REIA, RCFA ,and proximal RSFA/profunda are large caliber vessel with mild diffuse disease. Results:  1. Successful PTA and stenting of the right renal artery. Recommendations:  1. Reloaded with 300mg plavix on the table  2. Plavix 75mg daily for life. 3.  ASA 81mg daily for life.       Johan Ratel III, DO

## 2018-05-04 NOTE — PROGRESS NOTES
Brief Procedure Note    Patient: Viki Prado MRN: [id number]  SSN: [id number]    YOB: 1955  Age: 58 y.o. Sex: male      Date of Procedure: 5/4/2018     Pre-procedure Diagnosis: CKD, RRAS    Post-procedure Diagnosis: ROSALEE    Procedure: R renal stent, Angioseal RCFA    Performed By: Nehemias Ford III, DO     Anesthesia: Moderate Sedation    Estimated Blood Loss: Less than 10 mL      Specimens:  None    Findings: Severe RRAS    Complications: None    Implants: 7.0 x 19mm BMS    Recommendations: Continue medical therapy.     Signed By: Nehemias Ford III, DO     May 4, 2018

## 2018-05-04 NOTE — DIALYSIS
[facility] Team [facility]  [phone]    Vitals   Pre   Post   Assessment   Pre   Post     Temp  Temp: 97.8 °F (36.6 °C) (05/04/18 1945) 97.8 LOC  AOx3 AOx3   HR   Pulse (Heart Rate): 62 (05/04/18 1945) 63 Lungs   Diminished  Diminished   B/P   BP: 112/51 (HD initiated) (05/04/18 1945) 128/58 Cardiac   NSR  NSR   Resp   Resp Rate: 16 (05/04/18 1945) 16 Skin   Warm, dry  Warm, dry   Pain level  Pain Intensity 1: 3 (05/04/18 1842) 0/10 Edema  Generalized   Generalized   Orders:    Duration:   Start:   1945 End:   2145 Total:   2 hours first HD   Dialyzer:   Dialyzer/Set Up Inspection: Shirley Daily (05/04/18 1945)   K Bath:   Dialysate K (mEq/L): 4 (05/04/18 1945)   Ca Bath:   Dialysate CA (mEq/L): 2.5 (05/04/18 1945)   Na/Bicarb:   Dialysate NA (mEq/L): 140/35 (05/04/18 1945)   Target Fluid Removal:   Goal/Amount of Fluid to Remove (mL): 500 mL (05/04/18 1945)   Access     Type & Location:   RIJ CVC patent x2 ports, placement verified. No active bleeding noted to site. Dressing CDI. Post HD, ports flushed with NSS, caps replaced and clamps secured.    Labs     Obtained/Reviewed   Critical Results Called   Date when labs were drawn-  Hgb-    HGB   Date Value Ref Range Status   05/02/2018 8.8 (L) 12.1 - 17.0 g/dL Final     K-    Potassium   Date Value Ref Range Status   05/04/2018 3.2 (L) 3.5 - 5.1 mmol/L Final     Ca-   Calcium   Date Value Ref Range Status   05/04/2018 6.8 (L) 8.5 - 10.1 MG/DL Final     Bun-   BUN   Date Value Ref Range Status   05/04/2018 57 (H) 6 - 20 MG/DL Final     Creat-   Creatinine   Date Value Ref Range Status   05/04/2018 5.03 (H) 0.70 - 1.30 MG/DL Final        Medications/ Blood Products Given     Name   Dose   Route and Time     None                Blood Volume Processed (BVP):    24 Net Fluid   Removed:  500 ml   Comments     Time Out Done: 5841    Primary Nurse Rpt Pre: Gaye Moon RN  Primary Nurse Rpt Post: Gaye Moon RN    Pt Education: Procedural  Care Plan: HD as planned, educate re HD, comfort    Tx Summary: Patient tolerated first treatment well. All possible blood returned without any complications. Report given to primary nurse for progression of care. Left suite via transport in stable condition. Admiting Diagnosis: MINO on CKD  Pt's previous clinic- N/A    Consent signed - Informed Consent Verified: Yes (05/04/18 1945)  [facility] Consent - Obtained    Hepatitis Status-  Labs ordered  Machine #- Machine Number: [id number] / [facility] (05/04/18 1945)    Telemetry status- Monitored  Pre-dialysis wt. - Pre-Dialysis Weight: 74.3 kg (163 lb 12.8 oz) (05/04/18 1945)

## 2018-05-04 NOTE — PROGRESS NOTES
Progress Note      5/4/2018 9:27 AM  NAME: Marlo Steel   MRN:  [id number]   Admit Diagnosis: NSTEMI (non-ST elevated myocardial infarction) ([facility]  ARF (acute renal failure) ([facility]      Problem List:     1. Chest pain; cath 4/30 w/ RCA , 60% oLAD w/ neg FFR (0.88), patent LCx stent w/ mild ISR. 2. Right renal artery stenosis. 3. Occluded LCIA. 4. Elevated troponin  5. Acute on chronic kidney disease Stg 4  6. Coronary artery disease s/p multivessel stenting s/p PCI of LCx, PTCA of RCA 7/97, PTCA LAD 3/94. Lexiscan 2/08 w/ EF 62%, distal ineroapical infarct w/o ischemia  7. Peripheral vascular disease s/p left iliac stenting, left SFA stenting 3/00, right iliac stenting 11/99.  8. Status post LAKA  9. Bilateral carotid stenosis; 16-49% 9/13  10. Hypertension  11. Diabetes  12. Tobacco abuse  13. Chronic pain  14. Chronic anemia  15. Hyperlipidemia  16. Noncompliance  17. Peptic ulcer disease w/ GIB '15  18. Falls  19. Lives w/ sister in [address] [place] [address] now  21. Usual Cardiologist:  Dr. Kia Jiménez (last seen 8/16)     Assessment/Plan:   TnI peaked @ 0.16 w/ nml CK  sCr 4.7  EKG:  ST, LAFB, NSSTTWc    No CP overnight  HTNive; better  Echo w/ EF 55%, LAE, mild MR, AoSclerosis, mild TR    Kash and dialysis after procedure     21. Cont norvasc 10mg  22. Will plan for right renal artery stenting today  23. NPO  24. Continue ASA 81mg  25. Continue plavix 75mg  26. Continue atorva 80mg  27. Continue hydralazine 25mg TID  28. Continue metoprolol 25mg q12h  29. Continue ISMN 60mg daily  30. Will plan for medical therapy of his CAD. If fails, will require complex bifurcation left main stenting. []       High complexity decision making was performed in this patient at high risk for decompensation with multiple organ involvement. Subjective:     Marlo Steel denies chest pain, dyspnea. Discussed with RN events overnight.      Review of Systems:    Symptom Y/N Comments  Symptom Y/N Comments   Fever/Chills N Chest Pain N    Poor Appetite N   Edema N    Cough N   Abdominal Pain N    Sputum N   Joint Pain N    SOB/COREA N   Pruritis/Rash N    Nausea/vomit N   Tolerating PT/OT Y    Diarrhea N   Tolerating Diet Y    Constipation N   Other       Could NOT obtain due to:      Objective:      Physical Exam:    Last 24hrs VS reviewed since prior progress note. Most recent are:    Visit Vitals    /56 (BP Patient Position: Sitting)    Pulse 65    Temp 97.7 °F (36.5 °C)    Resp 17    Ht 5' 9\" (1.753 m)    Wt 74.3 kg (163 lb 12.8 oz)    SpO2 98%    BMI 24.19 kg/m2       Intake/Output Summary (Last 24 hours) at 05/04/18 0958  Last data filed at 05/04/18 0435   Gross per 24 hour   Intake              240 ml   Output              625 ml   Net             -385 ml        General Appearance: Well developed, well nourished, alert & oriented x 3,    no acute distress. Ears/Nose/Mouth/Throat: Hearing grossly normal.  Neck: Supple. Chest: Lungs clear to auscultation bilaterally. Cardiovascular: Regular rate and rhythm, S1S2 normal, no murmur. Abdomen: Soft, non-tender, bowel sounds are active. Extremities: No edema bilaterally. LAKA. 1+ RCFA pulses. Skin: Warm and dry. [x]         Post-cath site without hematoma, bruit, tenderness, or thrill. Distal pulses intact.     PMH/SH reviewed - no change compared to H&P    Data Review    Telemetry: sinus rhythm     EKG:   [x]  No new EKG for review    Lab Data Personally Reviewed:    Recent Labs      05/02/18 2338   WBC  6.5   HGB  8.8*   HCT  27.2*   PLT  146*     Recent Labs      05/04/18   0850  05/02/18 2338  05/02/18   1319   INR   --    --   1.1   PTP   --    --   11.3*   APTT  34.8*  27.2  33.7*      Recent Labs      05/04/18   0430  05/02/18 2338 05/02/18   0559   NA  142  143  143   K  3.2*  3.5  3.7   CL  99  104  109*   CO2  35*  29  22   BUN  57*  58*  56*   CREA  5.03*  4.67*  4.60*   GLU  155*  163*  91   CA  6.8*  6.5*  6.6*   MG  1.7   --   1.6     No results for input(s): CPK, CKNDX, TROIQ in the last 72 hours. No lab exists for component: CPKMB  Lab Results   Component Value Date/Time    Cholesterol, total 236 (H) 04/29/2018 06:00 AM    HDL Cholesterol 34 04/29/2018 06:00 AM    LDL,Direct 62 10/03/2014 05:50 AM    LDL, calculated 172.4 (H) 04/29/2018 06:00 AM    Triglyceride 148 04/29/2018 06:00 AM    CHOL/HDL Ratio 6.9 (H) 04/29/2018 06:00 AM       Recent Labs      05/02/18   2338   SGOT  15   AP  172*   TP  5.8*   ALB  2.1*   GLOB  3.7     No results for input(s): PH, PCO2, PO2 in the last 72 hours.     Medications Personally Reviewed:    Current Facility-Administered Medications   Medication Dose Route Frequency    iodixanol (VISIPAQUE) 320 mg iodine/mL contrast injection 0-100 mL  0-100 mL IntraarTERial Multiple    amLODIPine (NORVASC) tablet 10 mg  10 mg Oral DAILY    polyethylene glycol (MIRALAX) packet 17 g  17 g Oral DAILY    docusate sodium (COLACE) capsule 100 mg  100 mg Oral BID    insulin lispro (HUMALOG) injection   SubCUTAneous AC&HS    isosorbide mononitrate ER (IMDUR) tablet 60 mg  60 mg Oral DAILY    heparin (porcine) injection 5,000 Units  5,000 Units SubCUTAneous Q12H    hydrALAZINE (APRESOLINE) tablet 25 mg  25 mg Oral Q8H    famotidine (PEPCID) tablet 20 mg  20 mg Oral DAILY    calcium carbonate (TUMS) chewable tablet 200 mg [elemental]  200 mg Oral QID WITH MEALS    calcitRIOL (ROCALTROL) capsule 0.25 mcg  0.25 mcg Oral DAILY    magnesium oxide (MAG-OX) tablet 400 mg  400 mg Oral DAILY    epoetin calos (EPOGEN;PROCRIT) injection 10,000 Units  10,000 Units SubCUTAneous Once per day on Mon Thu    iodixanol (VISIPAQUE) 320 mg iodine/mL contrast injection 0-100 mL  0-100 mL IntraarTERial Multiple    sodium chloride (NS) flush 5-10 mL  5-10 mL IntraVENous Q8H    sodium chloride (NS) flush 5-10 mL  5-10 mL IntraVENous PRN    sodium bicarbonate (8.4%) 150 mEq in sterile water 1,000 mL infusion   IntraVENous CONTINUOUS    B complex-vitaminC-folic acid (NEPHROCAP) cap  1 Cap Oral DAILY    cloNIDine HCl (CATAPRES) tablet 0.1 mg  0.1 mg Oral Q4H PRN    nitroglycerin (NITROSTAT) tablet 0.4 mg  0.4 mg SubLINGual Q5MIN PRN    glucose chewable tablet 16 g  4 Tab Oral PRN    dextrose (D50W) injection syrg 12.5-25 g  12.5-25 g IntraVENous PRN    glucagon (GLUCAGEN) injection 1 mg  1 mg IntraMUSCular PRN    sodium chloride (NS) flush 5-10 mL  5-10 mL IntraVENous Q8H    sodium chloride (NS) flush 5-10 mL  5-10 mL IntraVENous PRN    metoprolol tartrate (LOPRESSOR) tablet 25 mg  25 mg Oral Q6H    aspirin chewable tablet 81 mg  81 mg Oral DAILY    atorvastatin (LIPITOR) tablet 80 mg  80 mg Oral QPM    acetaminophen (TYLENOL) tablet 650 mg  650 mg Oral Q4H PRN    oxyCODONE-acetaminophen (PERCOCET) 5-325 mg per tablet 1 Tab  1 Tab Oral Q4H PRN    hydrALAZINE (APRESOLINE) 20 mg/mL injection 10 mg  10 mg IntraVENous Q2H PRN    naloxone (NARCAN) injection 0.4 mg  0.4 mg IntraVENous PRN    diphenhydrAMINE (BENADRYL) injection 12.5 mg  12.5 mg IntraVENous Q4H PRN    ondansetron (ZOFRAN) injection 4 mg  4 mg IntraVENous Q4H PRN    bisacodyl (DULCOLAX) tablet 5 mg  5 mg Oral DAILY PRN    clopidogrel (PLAVIX) tablet 75 mg  75 mg Oral DAILY         Romero Gonzalez III, DO

## 2018-05-04 NOTE — PROGRESS NOTES
Renal Dosing/Monitoring  Medication: Famotidine   Current regimen:  20 mg every 24 hr  Recent Labs      05/04/18   0430  05/02/18   2338  05/02/18   0559   CREA  5.03*  4.67*  4.60*   BUN  57*  58*  56*     Estimated CrCl:  HD  Plan: Change to 20 mg every 48 hours for HD dosing per renal dosing protocol.      Thank you,  Franco Colon, PHARMD

## 2018-05-04 NOTE — ROUTINE PROCESS
Arrived into the Angio holding area A/O x 3 via stretcher in NAD accompanied by two (2) transporters. Here today to have a amanda cath. placed.

## 2018-05-04 NOTE — DIABETES MGMT
DTC Progress Note    Recommendations/ Comments:  Pt's trending upward throughout the day. If appropriate, please consider:  1. Adding CHO consistent when diet is resumed  2. Adding no concentrated sweets to when diet is resumed  3. Adding tradjenta 5 mg to help with elevated BG throughout the day       Current hospital DM medication: lispro correction - normal correction scale     Chart reviewed on [facility]    Patient is a 58 y.o. male with known DM on Actos 15 mg at home  A1c:   Lab Results   Component Value Date/Time    Hemoglobin A1c 7.5 (H) 04/28/2018 10:17 PM    Hemoglobin A1c 5.5 04/03/2015 12:00 PM       Recent Glucose Results:   Lab Results   Component Value Date/Time     (H) 05/04/2018 04:30 AM    GLUCPOC 187 (H) 05/04/2018 11:24 AM    GLUCPOC 174 (H) 05/04/2018 07:33 AM    GLUCPOC 221 (H) 05/03/2018 09:36 PM        Lab Results   Component Value Date/Time    Creatinine 5.03 (H) 05/04/2018 04:30 AM     Estimated Creatinine Clearance: 14.8 mL/min (based on Cr of 5.03). Active Orders   Diet    DIET NPO        PO intake: No data found. Will continue to follow as needed.     Thank you    Chelsey Drummond, [address] [place] [address]    [phone]

## 2018-05-04 NOTE — PROGRESS NOTES
[facility] Progress Note        NAME: Maryann Real       :  1955       MRN:  [id number]     Date/Time: 2018    Risk of deterioration: medium       Assessment:    Plan:  MINO on CKD V  S/P CATH-2V cad, (R) KIA--Right kidney measures 10.9 cm by US  HTN  PAD/PVD s/p (L) AKA-(L) iliac occlusion  ANemia  Secondary PTH For kia stenting --good result   Pt had a baseline creatinine of around 2-2.5 over the last few years with recent deterioration to 4-4.6--likely due to advancing ckd (+/-) KIA  Kash/hd #1 today, HD #2 tomorrow-If he is deemed ESRD his son will transport him and is flexibile regarding units. BP meds being adjusted   once on hd can add ace/arb  Appreciate vascular seeing pt.--looks like his left arm will be used for access with dr. Dai Mays in the future  stop bicarb gtt  Continue rocaltrol, epo       Subjective:     Chief Complaint: I'm not happy about the dialysis but I don't want to die. Review of Systems: no n/v/f/c    Objective:     VITALS:   Last 24hrs VS reviewed since prior progress note. Most recent are:  Visit Vitals    /60    Pulse (!) 58    Temp 98.2 °F (36.8 °C)    Resp 16    Ht 5' 9\" (1.753 m)    Wt 74.3 kg (163 lb 12.8 oz)    SpO2 93%    BMI 24.19 kg/m2     SpO2 Readings from Last 6 Encounters:   18 93%   01/15/16 98%   16 100%   12/04/15 98%   10/13/15 97%   09/25/15 100%    O2 Flow Rate (L/min): 2 l/min       Intake/Output Summary (Last 24 hours) at 18 1318  Last data filed at 18 0435   Gross per 24 hour   Intake              240 ml   Output              375 ml   Net             -135 ml        Telemetry Reviewed    PHYSICAL EXAM:    General   well developed, well nourished, appears stated age, in no acute distress  EENT  NCAT, EOMI  Respiratory   Clear anteriorly  Cardiology  RRR  Abdominal  Soft, nt  Extremities  (L) AKA, (R) with brawny edema (his baseline)  Neurological  No focal neurological deficits noted  Psychological  Oriented x 3. Normal affect.               Lab Data Reviewed: (see below)    Medications Reviewed: (see below)    PMH/SH reviewed - no change compared to H&P  ___________________________________________________  ___________    Attending Physician: Cristina Lama MD     ____________________________________________________  MEDICATIONS:  Current Facility-Administered Medications   Medication Dose Route Frequency    [START ON 5/6/2018] famotidine (PEPCID) tablet 20 mg  20 mg Oral Q48H    amLODIPine (NORVASC) tablet 10 mg  10 mg Oral DAILY    polyethylene glycol (MIRALAX) packet 17 g  17 g Oral DAILY    docusate sodium (COLACE) capsule 100 mg  100 mg Oral BID    insulin lispro (HUMALOG) injection   SubCUTAneous AC&HS    isosorbide mononitrate ER (IMDUR) tablet 60 mg  60 mg Oral DAILY    heparin (porcine) injection 5,000 Units  5,000 Units SubCUTAneous Q12H    hydrALAZINE (APRESOLINE) tablet 25 mg  25 mg Oral Q8H    calcium carbonate (TUMS) chewable tablet 200 mg [elemental]  200 mg Oral QID WITH MEALS    calcitRIOL (ROCALTROL) capsule 0.25 mcg  0.25 mcg Oral DAILY    magnesium oxide (MAG-OX) tablet 400 mg  400 mg Oral DAILY    epoetin calos (EPOGEN;PROCRIT) injection 10,000 Units  10,000 Units SubCUTAneous Once per day on Mon Thu    iodixanol (VISIPAQUE) 320 mg iodine/mL contrast injection 0-100 mL  0-100 mL IntraarTERial Multiple    sodium chloride (NS) flush 5-10 mL  5-10 mL IntraVENous Q8H    sodium chloride (NS) flush 5-10 mL  5-10 mL IntraVENous PRN    sodium bicarbonate (8.4%) 150 mEq in sterile water 1,000 mL infusion   IntraVENous CONTINUOUS    B complex-vitaminC-folic acid (NEPHROCAP) cap  1 Cap Oral DAILY    cloNIDine HCl (CATAPRES) tablet 0.1 mg  0.1 mg Oral Q4H PRN    nitroglycerin (NITROSTAT) tablet 0.4 mg  0.4 mg SubLINGual Q5MIN PRN    glucose chewable tablet 16 g  4 Tab Oral PRN    dextrose (D50W) injection syrg 12.5-25 g  12.5-25 g IntraVENous PRN    glucagon (GLUCAGEN) injection 1 mg  1 mg IntraMUSCular PRN    sodium chloride (NS) flush 5-10 mL  5-10 mL IntraVENous Q8H    sodium chloride (NS) flush 5-10 mL  5-10 mL IntraVENous PRN    metoprolol tartrate (LOPRESSOR) tablet 25 mg  25 mg Oral Q6H    aspirin chewable tablet 81 mg  81 mg Oral DAILY    atorvastatin (LIPITOR) tablet 80 mg  80 mg Oral QPM    acetaminophen (TYLENOL) tablet 650 mg  650 mg Oral Q4H PRN    oxyCODONE-acetaminophen (PERCOCET) 5-325 mg per tablet 1 Tab  1 Tab Oral Q4H PRN    hydrALAZINE (APRESOLINE) 20 mg/mL injection 10 mg  10 mg IntraVENous Q2H PRN    naloxone (NARCAN) injection 0.4 mg  0.4 mg IntraVENous PRN    diphenhydrAMINE (BENADRYL) injection 12.5 mg  12.5 mg IntraVENous Q4H PRN    ondansetron (ZOFRAN) injection 4 mg  4 mg IntraVENous Q4H PRN    bisacodyl (DULCOLAX) tablet 5 mg  5 mg Oral DAILY PRN    clopidogrel (PLAVIX) tablet 75 mg  75 mg Oral DAILY        LABS:  Recent Labs      05/02/18 2338   WBC  6.5   HGB  8.8*   HCT  27.2*   PLT  146*     Recent Labs      05/04/18   0430  05/02/18   2338  05/02/18   0559   NA  142  143  143   K  3.2*  3.5  3.7   CL  99  104  109*   CO2  35*  29  22   BUN  57*  58*  56*   CREA  5.03*  4.67*  4.60*   GLU  155*  163*  91   CA  6.8*  6.5*  6.6*   MG  1.7   --   1.6   PHOS   --    --   5.5*     Recent Labs      05/02/18 2338   SGOT  15   ALT  11*   AP  172*   TBILI  0.4   TP  5.8*   ALB  2.1*   GLOB  3.7     Recent Labs      05/04/18   0850  05/02/18   2338  05/02/18   1319   INR   --    --   1.1   PTP   --    --   11.3*   APTT  34.8*  27.2  33.7*      No results for input(s): FE, TIBC, PSAT, FERR in the last 72 hours. No results for input(s): PH, PCO2, PO2 in the last 72 hours. No results for input(s): CPK, CKNDX, TROIQ in the last 72 hours.     No lab exists for component: CPKMB  Lab Results   Component Value Date/Time    Glucose (POC) 187 (H) 05/04/2018 11:24 AM    Glucose (POC) 174 (H) 05/04/2018 07:33 AM    Glucose (POC) 221 (H) 05/03/2018 09:36 PM    Glucose (POC) 211 (H) 05/03/2018 04:24 PM    Glucose (POC) 186 (H) 05/03/2018 11:05 AM

## 2018-05-05 LAB
ALBUMIN SERPL-MCNC: 2.1 G/DL (ref 3.5–5)
ALBUMIN/GLOB SERPL: 0.6 {RATIO} (ref 1.1–2.2)
ALP SERPL-CCNC: 164 U/L (ref 45–117)
ALT SERPL-CCNC: 13 U/L (ref 12–78)
ANION GAP SERPL CALC-SCNC: 9 MMOL/L (ref 5–15)
AST SERPL-CCNC: 18 U/L (ref 15–37)
BASOPHILS # BLD: 0 K/UL (ref 0–0.1)
BASOPHILS NFR BLD: 0 % (ref 0–1)
BILIRUB SERPL-MCNC: 0.6 MG/DL (ref 0.2–1)
BUN SERPL-MCNC: 38 MG/DL (ref 6–20)
BUN/CREAT SERPL: 10 (ref 12–20)
CALCIUM SERPL-MCNC: 6.9 MG/DL (ref 8.5–10.1)
CHLORIDE SERPL-SCNC: 99 MMOL/L (ref 97–108)
CO2 SERPL-SCNC: 33 MMOL/L (ref 21–32)
CREAT SERPL-MCNC: 3.81 MG/DL (ref 0.7–1.3)
DIFFERENTIAL METHOD BLD: ABNORMAL
EOSINOPHIL # BLD: 0.1 K/UL (ref 0–0.4)
EOSINOPHIL NFR BLD: 2 % (ref 0–7)
ERYTHROCYTE [DISTWIDTH] IN BLOOD BY AUTOMATED COUNT: 14.7 % (ref 11.5–14.5)
GLOBULIN SER CALC-MCNC: 3.5 G/DL (ref 2–4)
GLUCOSE BLD STRIP.AUTO-MCNC: 100 MG/DL (ref 65–100)
GLUCOSE BLD STRIP.AUTO-MCNC: 201 MG/DL (ref 65–100)
GLUCOSE BLD STRIP.AUTO-MCNC: 252 MG/DL (ref 65–100)
GLUCOSE BLD STRIP.AUTO-MCNC: 256 MG/DL (ref 65–100)
GLUCOSE SERPL-MCNC: 202 MG/DL (ref 65–100)
HCT VFR BLD AUTO: 26.6 % (ref 36.6–50.3)
HGB BLD-MCNC: 8.6 G/DL (ref 12.1–17)
IMM GRANULOCYTES # BLD: 0 K/UL (ref 0–0.04)
IMM GRANULOCYTES NFR BLD AUTO: 0 % (ref 0–0.5)
LYMPHOCYTES # BLD: 0.8 K/UL (ref 0.8–3.5)
LYMPHOCYTES NFR BLD: 14 % (ref 12–49)
MAGNESIUM SERPL-MCNC: 1.9 MG/DL (ref 1.6–2.4)
MCH RBC QN AUTO: 31 PG (ref 26–34)
MCHC RBC AUTO-ENTMCNC: 32.3 G/DL (ref 30–36.5)
MCV RBC AUTO: 96 FL (ref 80–99)
MONOCYTES # BLD: 0.6 K/UL (ref 0–1)
MONOCYTES NFR BLD: 12 % (ref 5–13)
NEUTS SEG # BLD: 3.9 K/UL (ref 1.8–8)
NEUTS SEG NFR BLD: 72 % (ref 32–75)
NRBC # BLD: 0 K/UL (ref 0–0.01)
NRBC BLD-RTO: 0 PER 100 WBC
PHOSPHATE SERPL-MCNC: 3.6 MG/DL (ref 2.6–4.7)
PLATELET # BLD AUTO: 150 K/UL (ref 150–400)
PMV BLD AUTO: 10.1 FL (ref 8.9–12.9)
POTASSIUM SERPL-SCNC: 3.2 MMOL/L (ref 3.5–5.1)
PROT SERPL-MCNC: 5.6 G/DL (ref 6.4–8.2)
RBC # BLD AUTO: 2.77 M/UL (ref 4.1–5.7)
RBC MORPH BLD: ABNORMAL
SERVICE CMNT-IMP: ABNORMAL
SERVICE CMNT-IMP: NORMAL
SODIUM SERPL-SCNC: 141 MMOL/L (ref 136–145)
WBC # BLD AUTO: 5.4 K/UL (ref 4.1–11.1)

## 2018-05-05 PROCEDURE — 84100 ASSAY OF PHOSPHORUS: CPT | Performed by: INTERNAL MEDICINE

## 2018-05-05 PROCEDURE — 83735 ASSAY OF MAGNESIUM: CPT | Performed by: INTERNAL MEDICINE

## 2018-05-05 PROCEDURE — 80053 COMPREHEN METABOLIC PANEL: CPT | Performed by: INTERNAL MEDICINE

## 2018-05-05 PROCEDURE — 77030011256 HC DRSG MEPILEX <16IN NO BORD MOLN -A

## 2018-05-05 PROCEDURE — 74011250637 HC RX REV CODE- 250/637: Performed by: INTERNAL MEDICINE

## 2018-05-05 PROCEDURE — 74011250636 HC RX REV CODE- 250/636: Performed by: INTERNAL MEDICINE

## 2018-05-05 PROCEDURE — 74011636637 HC RX REV CODE- 636/637: Performed by: INTERNAL MEDICINE

## 2018-05-05 PROCEDURE — 82962 GLUCOSE BLOOD TEST: CPT

## 2018-05-05 PROCEDURE — 36415 COLL VENOUS BLD VENIPUNCTURE: CPT | Performed by: INTERNAL MEDICINE

## 2018-05-05 PROCEDURE — 90935 HEMODIALYSIS ONE EVALUATION: CPT

## 2018-05-05 PROCEDURE — 85025 COMPLETE CBC W/AUTO DIFF WBC: CPT | Performed by: INTERNAL MEDICINE

## 2018-05-05 PROCEDURE — 65660000000 HC RM CCU STEPDOWN

## 2018-05-05 PROCEDURE — 74011250637 HC RX REV CODE- 250/637: Performed by: HOSPITALIST

## 2018-05-05 RX ORDER — POTASSIUM CHLORIDE 750 MG/1
40 TABLET, FILM COATED, EXTENDED RELEASE ORAL
Status: COMPLETED | OUTPATIENT
Start: 2018-05-05 | End: 2018-05-05

## 2018-05-05 RX ADMIN — INSULIN LISPRO 5 UNITS: 100 INJECTION, SOLUTION INTRAVENOUS; SUBCUTANEOUS at 13:21

## 2018-05-05 RX ADMIN — ATORVASTATIN CALCIUM 80 MG: 40 TABLET, FILM COATED ORAL at 17:02

## 2018-05-05 RX ADMIN — AMLODIPINE BESYLATE 10 MG: 5 TABLET ORAL at 11:49

## 2018-05-05 RX ADMIN — HEPARIN SODIUM 5000 UNITS: 5000 INJECTION, SOLUTION INTRAVENOUS; SUBCUTANEOUS at 17:03

## 2018-05-05 RX ADMIN — DOCUSATE SODIUM 100 MG: 100 CAPSULE, LIQUID FILLED ORAL at 11:50

## 2018-05-05 RX ADMIN — HEPARIN SODIUM 5000 UNITS: 5000 INJECTION, SOLUTION INTRAVENOUS; SUBCUTANEOUS at 05:54

## 2018-05-05 RX ADMIN — Medication 400 MG: at 09:44

## 2018-05-05 RX ADMIN — Medication 10 ML: at 05:54

## 2018-05-05 RX ADMIN — METOPROLOL TARTRATE 25 MG: 25 TABLET ORAL at 22:38

## 2018-05-05 RX ADMIN — METOPROLOL TARTRATE 25 MG: 25 TABLET ORAL at 17:02

## 2018-05-05 RX ADMIN — DOCUSATE SODIUM 100 MG: 100 CAPSULE, LIQUID FILLED ORAL at 17:02

## 2018-05-05 RX ADMIN — INSULIN LISPRO 2 UNITS: 100 INJECTION, SOLUTION INTRAVENOUS; SUBCUTANEOUS at 22:43

## 2018-05-05 RX ADMIN — HYDRALAZINE HYDROCHLORIDE 25 MG: 50 TABLET, FILM COATED ORAL at 05:54

## 2018-05-05 RX ADMIN — CLOPIDOGREL BISULFATE 75 MG: 75 TABLET ORAL at 11:50

## 2018-05-05 RX ADMIN — POTASSIUM CHLORIDE 40 MEQ: 750 TABLET, EXTENDED RELEASE ORAL at 09:43

## 2018-05-05 RX ADMIN — CALCIUM CARBONATE 200 MG: 500 TABLET, CHEWABLE ORAL at 17:02

## 2018-05-05 RX ADMIN — METOPROLOL TARTRATE 25 MG: 25 TABLET ORAL at 11:55

## 2018-05-05 RX ADMIN — NEPHROCAP 1 CAPSULE: 1 CAP ORAL at 11:50

## 2018-05-05 RX ADMIN — OXYCODONE HYDROCHLORIDE AND ACETAMINOPHEN 1 TABLET: 5; 325 TABLET ORAL at 17:06

## 2018-05-05 RX ADMIN — CALCITRIOL 0.25 MCG: 0.25 CAPSULE, LIQUID FILLED ORAL at 11:54

## 2018-05-05 RX ADMIN — CALCIUM CARBONATE 200 MG: 500 TABLET, CHEWABLE ORAL at 11:50

## 2018-05-05 RX ADMIN — HYDRALAZINE HYDROCHLORIDE 25 MG: 50 TABLET, FILM COATED ORAL at 22:39

## 2018-05-05 RX ADMIN — ISOSORBIDE MONONITRATE 60 MG: 30 TABLET, EXTENDED RELEASE ORAL at 09:43

## 2018-05-05 RX ADMIN — HYDRALAZINE HYDROCHLORIDE 25 MG: 50 TABLET, FILM COATED ORAL at 13:21

## 2018-05-05 RX ADMIN — CALCIUM CARBONATE 200 MG: 500 TABLET, CHEWABLE ORAL at 22:38

## 2018-05-05 RX ADMIN — OXYCODONE HYDROCHLORIDE AND ACETAMINOPHEN 1 TABLET: 5; 325 TABLET ORAL at 22:39

## 2018-05-05 RX ADMIN — OXYCODONE HYDROCHLORIDE AND ACETAMINOPHEN 1 TABLET: 5; 325 TABLET ORAL at 05:54

## 2018-05-05 RX ADMIN — ASPIRIN 81 MG 81 MG: 81 TABLET ORAL at 11:49

## 2018-05-05 RX ADMIN — METOPROLOL TARTRATE 25 MG: 25 TABLET ORAL at 05:54

## 2018-05-05 NOTE — PROGRESS NOTES
Hospitalist Progress Note    NAME: Viki Prado   :  1955   MRN:  [id number]       Assessment / Plan:  Chest pain - borderline trop elevation; NSTEMI less likely. S/p  Cardiac cath report:    CAD, PAD w/ multiple stents - followed by Dr. Adah Kawasaki  cont aspirin, Plavix. Lipitor    sever Renal artery stenosis  S/p Successful PTA and stenting of the R renal artery w/ a BMS  cont aspirin, Plavix    MINO on CKD stage 5   -Upper extremity vein mapping done. Nephrology on board.  -Seen by Dr. Adah Kawasaki who is known to patient. Will plan LUE access when appropriate. HD day 1 today and day 2 tomorrow. Hypertensive Urgency - blood pressure improved. cont lopressor, Imdur  cont scheduled hydralazine      DM-2 w/ nephropathy and neuropathy  SSI    Anemia of chronic renal disease    Chronic thrombocytopenia    Tobacco abuse    Depression     Medical noncompliance      Body mass index is 24.19 kg/(m^2). Code status: Full  Prophylaxis: Hep SQ  Recommended Disposition: Home w/Family     Subjective:     Chief Complaint / Reason for Physician Visit  Patient was not happy this morning with doctor talked to him prior to me . asked if I can help to makes it better. He said he is ok now. Review of Systems:  Symptom Y/N Comments  Symptom Y/N Comments   Fever/Chills    Chest Pain     Poor Appetite    Edema     Cough    Abdominal Pain     Sputum    Joint Pain     SOB/COREA    Pruritis/Rash     Nausea/vomit    Tolerating PT/OT     Diarrhea    Tolerating Diet     Constipation    Other       Could NOT obtain due to: [patient was mad not willing to talk much. he said procedures went well yesterday. Objective:     VITALS:   Last 24hrs VS reviewed since prior progress note.  Most recent are:  Patient Vitals for the past 24 hrs:   Temp Pulse Resp BP SpO2   18 0730 98.5 °F (36.9 °C) 75 17 143/58 90 %   18 0319 97.8 °F (36.6 °C) 80 17 134/72 95 %   18 2249 - - - - 96 %   18 2239 97.6 °F (36.4 °C) 67 16 127/53 90 %   05/04/18 2145 - 60 16 113/56 95 %   05/04/18 2115 - 62 - 115/57 -   05/04/18 2045 - 61 - 117/54 -   05/04/18 2015 - 60 - 114/57 -   05/04/18 1945 97.8 °F (36.6 °C) 62 16 112/51 95 %   05/04/18 1842 97.3 °F (36.3 °C) 64 16 123/63 92 %   05/04/18 1700 - (!) 58 - 125/55 91 %   05/04/18 1600 - 63 - 120/65 (!) 85 %   05/04/18 1500 - 63 - 149/77 90 %   05/04/18 1435 98.3 °F (36.8 °C) (!) 56 16 128/68 91 %   05/04/18 1411 - 60 18 147/68 91 %   05/04/18 1355 - (!) 58 16 149/61 92 %   05/04/18 1245 - (!) 58 - 146/60 93 %   05/04/18 1230 - 63 - 150/58 93 %   05/04/18 1215 - 61 - 138/58 93 %   05/04/18 1200 - 60 - 152/60 93 %   05/04/18 1145 - 62 - 146/65 92 %   05/04/18 1130 - 62 - 145/59 92 %   05/04/18 1127 - 65 - 154/62 91 %   05/04/18 1123 98.2 °F (36.8 °C) 68 16 150/60 91 %       Intake/Output Summary (Last 24 hours) at 05/05/18 1032  Last data filed at 05/04/18 2326   Gross per 24 hour   Intake                0 ml   Output             1050 ml   Net            -1050 ml        PHYSICAL EXAM:on the prior date   General: cooperative, no acute distress    EENT:  EOMI. Anicteric sclerae. MMM  Resp:  CTA bilaterally, no wheezing or rales. No accessory muscle use  CV:  Regular  rhythm,  No edema  GI:  Soft, Non distended, Non tender.  +Bowel sounds  Neurologic:  Alert and oriented X 3, normal speech,   Psych:   Not anxious nor agitated  Skin:  No rashes.   No jaundice  Ext:                  No edema right leg; left AKA noted     Reviewed most current lab test results and cultures  YES  Reviewed most current radiology test results   YES  Review and summation of old records today    NO  Reviewed patient's current orders and MAR    YES  PMH/SH reviewed - no change compared to H&P          Current Facility-Administered Medications:     [START ON 5/6/2018] famotidine (PEPCID) tablet 20 mg, 20 mg, Oral, Q48H, Keri Soler MD    heparin (porcine) pf 300 Units, 300 Units, InterCATHeter, PRN, Dennis Rudolph, MD, 300 Units at 05/04/18 1416    amLODIPine (NORVASC) tablet 10 mg, 10 mg, Oral, DAILY, Elvia Herrera III, DO, 10 mg at 05/05/18 7166    polyethylene glycol (MIRALAX) packet 17 g, 17 g, Oral, DAILY, Dex George MD, Stopped at 05/04/18 0900    docusate sodium (COLACE) capsule 100 mg, 100 mg, Oral, BID, Dex George MD, 100 mg at 05/05/18 8063    insulin lispro (HUMALOG) injection, , SubCUTAneous, AC&HS, Dex George MD, Stopped at 05/04/18 2200    isosorbide mononitrate ER (IMDUR) tablet 60 mg, 60 mg, Oral, DAILY, Elvia Herrera III, DO, 60 mg at 05/05/18 8535    heparin (porcine) injection 5,000 Units, 5,000 Units, SubCUTAneous, Q12H, Annita Stringer III, DO, 5,000 Units at 05/05/18 0554    hydrALAZINE (APRESOLINE) tablet 25 mg, 25 mg, Oral, Q8H, Dex George MD, 25 mg at 05/05/18 0554    calcium carbonate (TUMS) chewable tablet 200 mg [elemental], 200 mg, Oral, QID WITH MEALS, Lang Redd MD, 200 mg at 05/05/18 9762    calcitRIOL (ROCALTROL) capsule 0.25 mcg, 0.25 mcg, Oral, DAILY, Lang Redd MD, Stopped at 05/04/18 0900    magnesium oxide (MAG-OX) tablet 400 mg, 400 mg, Oral, DAILY, Lang Redd MD, 400 mg at 05/05/18 0944    epoetin calos (EPOGEN;PROCRIT) injection 10,000 Units, 10,000 Units, SubCUTAneous, Once per day on Mon Thu, Lang Redd MD, 10,000 Units at 05/03/18 1712    iodixanol (VISIPAQUE) 320 mg iodine/mL contrast injection 0-100 mL, 0-100 mL, IntraarTERial, Multiple, Elvia Herrera III, DO, 50 mL at 04/30/18 1041    sodium chloride (NS) flush 5-10 mL, 5-10 mL, IntraVENous, Q8H, Ricardo Herrera III, DO, 10 mL at 05/04/18 0443    sodium chloride (NS) flush 5-10 mL, 5-10 mL, IntraVENous, PRN, Annita Stringer III, DO    sodium bicarbonate (8.4%) 150 mEq in sterile water 1,000 mL infusion, , IntraVENous, CONTINUOUS, Lang Redd MD, Last Rate: 75 mL/hr at 05/04/18 5311    B complex-vitaminC-folic acid (NEPHROCAP) cap, 1 Cap, Oral, Jason Barragan MD, 1 Cap at 05/05/18 4022    cloNIDine HCl (CATAPRES) tablet 0.1 mg, 0.1 mg, Oral, Q4H PRN, Marissa Baker MD, 0.1 mg at 05/01/18 1027    nitroglycerin (NITROSTAT) tablet 0.4 mg, 0.4 mg, SubLINGual, Q5MIN PRN, Juliana Duron MD, 0.4 mg at 04/29/18 0830    glucose chewable tablet 16 g, 4 Tab, Oral, PRN, Juliana Duron MD    dextrose (D50W) injection syrg 12.5-25 g, 12.5-25 g, IntraVENous, PRN, Juliana Duron MD    glucagon [facility]) injection 1 mg, 1 mg, IntraMUSCular, PRN, Juliana Duron MD    sodium chloride (NS) flush 5-10 mL, 5-10 mL, IntraVENous, Q8H, Juliana Duron MD, 10 mL at 05/05/18 0554    sodium chloride (NS) flush 5-10 mL, 5-10 mL, IntraVENous, PRN, Juliana Duron MD    metoprolol tartrate (LOPRESSOR) tablet 25 mg, 25 mg, Oral, Q6H, Juliana Duron MD, 25 mg at 05/05/18 0554    aspirin chewable tablet 81 mg, 81 mg, Oral, DAILY, Juliana Duron MD, 81 mg at 05/05/18 0943    atorvastatin (LIPITOR) tablet 80 mg, 80 mg, Oral, QPM, Juliana Duron MD, 80 mg at 05/04/18 1702    acetaminophen (TYLENOL) tablet 650 mg, 650 mg, Oral, Q4H PRN, Juliana Duron MD, 650 mg at 04/29/18 2151    oxyCODONE-acetaminophen (PERCOCET) 5-325 mg per tablet 1 Tab, 1 Tab, Oral, Q4H PRN, Juliana Duron MD, 1 Tab at 05/05/18 0554    hydrALAZINE (APRESOLINE) 20 mg/mL injection 10 mg, 10 mg, IntraVENous, Q2H PRN, Juliana Duron MD    naloxone [facility]) injection 0.4 mg, 0.4 mg, IntraVENous, PRN, Juliana Duron MD    diphenhydrAMINE (BENADRYL) injection 12.5 mg, 12.5 mg, IntraVENous, Q4H PRN, Juliana Duron MD, 12.5 mg at 05/02/18 4333    ondansetron [facility]) injection 4 mg, 4 mg, IntraVENous, Q4H PRN, Juliana Duron MD, 4 mg at 05/03/18 1254    bisacodyl (DULCOLAX) tablet 5 mg, 5 mg, Oral, DAILY PRN, Juliana Duron MD    clopidogrel (PLAVIX) tablet 75 mg, 75 mg, Oral, DAILY, Juliana Duron MD, 75 mg at 05/05/18 7623  ________________________________________________________________________  Care Plan discussed with:    Comments   Patient     Family  y Discussed with family in the room    RN y    Care Manager     Consultant                        Multidiciplinary team rounds were held today with , nursing, pharmacist and clinical coordinator. Patient's plan of care was discussed; medications were reviewed and discharge planning was addressed. ________________________________________________________________________  Total NON critical care TIME:  25  Minutes    Total CRITICAL CARE TIME Spent:   Minutes non procedure based      Comments   >50% of visit spent in counseling and coordination of care     ________________________________________________________________________  Jaky Brown MD     Procedures: see electronic medical records for all procedures/Xrays and details which were not copied into this note but were reviewed prior to creation of Plan. LABS:  I reviewed today's most current labs and imaging studies. Pertinent labs include:  Recent Labs      05/05/18 0327 05/02/18 2338   WBC  5.4  6.5   HGB  8.6*  8.8*   HCT  26.6*  27.2*   PLT  150  146*     Recent Labs      05/05/18 0327 05/04/18   0430  05/02/18 2338  05/02/18   1319   NA  141  142  143   --    K  3.2*  3.2*  3.5   --    CL  99  99  104   --    CO2  33*  35*  29   --    GLU  202*  155*  163*   --    BUN  38*  57*  58*   --    CREA  3.81*  5.03*  4.67*   --    CA  6.9*  6.8*  6.5*   --    MG  1.9  1.7   --    --    PHOS  3.6   --    --    --    ALB  2.1*   --   2.1*   --    TBILI  0.6   --   0.4   --    SGOT  18   --   15   --    ALT  13   --   11*   --    INR   --    --    --   1.1       Signed:  Jaky Brown MD

## 2018-05-05 NOTE — PROGRESS NOTES
Progress Note      5/5/2018 9:27 AM  NAME: Killian Tang   MRN:  [id number]   Admit Diagnosis: NSTEMI (non-ST elevated [facility] infarction[facility]  ARF (acute renal failure) ([facility]      Problem List:     1. Chest pain; cath 4/30 w/ RCA , 60% oLAD w/ neg FFR (0.88), patent LCx stent w/ mild ISR. 2. Right renal artery stenosis. 3. Occluded LCIA. 4. Elevated troponin  5. Acute on chronic kidney disease Stg 4  6. Coronary artery disease s/p multivessel stenting s/p PCI of LCx, PTCA of RCA 7/97, PTCA LAD 3/94. Lexiscan 2/08 w/ EF 62%, distal ineroapical infarct w/o ischemia  7. Peripheral vascular disease s/p left iliac stenting, left SFA stenting 3/00, right iliac stenting 11/99.  8. Status post LAKA  9. Bilateral carotid stenosis; 16-49% 9/13  10. Hypertension  11. Diabetes  12. Tobacco abuse  13. Chronic pain  14. Chronic anemia  15. Hyperlipidemia  16. Noncompliance  17. Peptic ulcer disease w/ GIB '15  18. Falls  19. Lives w/ sister in [address] [place] [address] now  21. Usual Cardiologist:  Dr. Kaela Garcia (last seen 8/16)    5/5  OOB in chair. Yelling at anybody who comes in the room. Throwing items. Pulled out IV . Refusing meds. Denies any pain. Cath site seems OK. Ted Herrera still in place. Hospitalist is addressing psychiatric issue for now . Likely needs med   BP is controlled. Assessment/Plan:   TnI peaked @ 0.16 w/ nml CK  sCr 4.7  EKG:  ST, LAFB, NSSTTWc    No CP overnight  HTNive; better  Echo w/ EF 55%, LAE, mild MR, AoSclerosis, mild TR    Kash and dialysis after procedure     21. Cont norvasc 10mg  22. Will plan for right renal artery stenting today  23. NPO  24. Continue ASA 81mg  25. Continue plavix 75mg  26. Continue atorva 80mg  27. Continue hydralazine 25mg TID  28. Continue metoprolol 25mg q12h  29. Continue ISMN 60mg daily  30. Will plan for medical therapy of his CAD. If fails, will require complex bifurcation left main stenting.          []       High complexity decision making was performed in this patient at high risk for decompensation with multiple organ involvement. Subjective:     Albaro Molina denies chest pain, dyspnea. Discussed with RN events overnight. Review of Systems:    Symptom Y/N Comments  Symptom Y/N Comments   Fever/Chills N   Chest Pain N    Poor Appetite N   Edema N    Cough N   Abdominal Pain N    Sputum N   Joint Pain N    SOB/COREA N   Pruritis/Rash N    Nausea/vomit N   Tolerating PT/OT Y    Diarrhea N   Tolerating Diet Y    Constipation N   Other       Could NOT obtain due to:      Objective:      Physical Exam:    Last 24hrs VS reviewed since prior progress note. Most recent are:    Visit Vitals    /58    Pulse 75    Temp 98.5 °F (36.9 °C)    Resp 17    Ht 5' 9\" (1.753 m)    Wt 74.3 kg (163 lb 12.8 oz)    SpO2 90%    BMI 24.19 kg/m2       Intake/Output Summary (Last 24 hours) at 05/05/18 1046  Last data filed at 05/04/18 2326   Gross per 24 hour   Intake                0 ml   Output             1050 ml   Net            -1050 ml        General Appearance: Well developed, well nourished, alert & oriented x 3,    no acute distress. Ears/Nose/Mouth/Throat: Hearing grossly normal.  Neck: Supple. Chest: Lungs clear to auscultation bilaterally. Cardiovascular: Regular rate and rhythm, S1S2 normal, no murmur. Abdomen: Soft, non-tender, bowel sounds are active. Extremities: No edema bilaterally. LAKA. 1+ RCFA pulses. Skin: Warm and dry. [x]         Post-cath site without hematoma, bruit, tenderness, or thrill. Distal pulses intact.     PMH/SH reviewed - no change compared to H&P    Data Review    Telemetry: sinus rhythm     EKG:   [x]  No new EKG for review    Lab Data Personally Reviewed:    Recent Labs      05/05/18   0327  05/02/18   2338   WBC  5.4  6.5   HGB  8.6*  8.8*   HCT  26.6*  27.2*   PLT  150  146*     Recent Labs      05/04/18   0850  05/02/18   2338  05/02/18   1319   INR   --    --   1.1   PTP   --    --   11.3* APTT  34.8*  27.2  33.7*      Recent Labs      05/05/18   0327  05/04/18   0430  05/02/18   2338   NA  141  142  143   K  3.2*  3.2*  3.5   CL  99  99  104   CO2  33*  35*  29   BUN  38*  57*  58*   CREA  3.81*  5.03*  4.67*   GLU  202*  155*  163*   CA  6.9*  6.8*  6.5*   MG  1.9  1.7   --      No results for input(s): CPK, CKNDX, TROIQ in the last 72 hours. No lab exists for component: CPKMB  Lab Results   Component Value Date/Time    Cholesterol, total 236 (H) 04/29/2018 06:00 AM    HDL Cholesterol 34 04/29/2018 06:00 AM    LDL,Direct 62 10/03/2014 05:50 AM    LDL, calculated 172.4 (H) 04/29/2018 06:00 AM    Triglyceride 148 04/29/2018 06:00 AM    CHOL/HDL Ratio 6.9 (H) 04/29/2018 06:00 AM       Recent Labs      05/05/18 0327  05/02/18   2338   SGOT  18  15   AP  164*  172*   TP  5.6*  5.8*   ALB  2.1*  2.1*   GLOB  3.5  3.7     No results for input(s): PH, PCO2, PO2 in the last 72 hours.     Medications Personally Reviewed:    Current Facility-Administered Medications   Medication Dose Route Frequency    [START ON 5/6/2018] famotidine (PEPCID) tablet 20 mg  20 mg Oral Q48H    heparin (porcine) pf 300 Units  300 Units InterCATHeter PRN    amLODIPine (NORVASC) tablet 10 mg  10 mg Oral DAILY    polyethylene glycol (MIRALAX) packet 17 g  17 g Oral DAILY    docusate sodium (COLACE) capsule 100 mg  100 mg Oral BID    insulin lispro (HUMALOG) injection   SubCUTAneous AC&HS    isosorbide mononitrate ER (IMDUR) tablet 60 mg  60 mg Oral DAILY    heparin (porcine) injection 5,000 Units  5,000 Units SubCUTAneous Q12H    hydrALAZINE (APRESOLINE) tablet 25 mg  25 mg Oral Q8H    calcium carbonate (TUMS) chewable tablet 200 mg [elemental]  200 mg Oral QID WITH MEALS    calcitRIOL (ROCALTROL) capsule 0.25 mcg  0.25 mcg Oral DAILY    magnesium oxide (MAG-OX) tablet 400 mg  400 mg Oral DAILY    epoetin calos (EPOGEN;PROCRIT) injection 10,000 Units  10,000 Units SubCUTAneous Once per day on Mon Thu    iodixanol (VISIPAQUE) 320 mg iodine/mL contrast injection 0-100 mL  0-100 mL IntraarTERial Multiple    sodium chloride (NS) flush 5-10 mL  5-10 mL IntraVENous Q8H    sodium chloride (NS) flush 5-10 mL  5-10 mL IntraVENous PRN    sodium bicarbonate (8.4%) 150 mEq in sterile water 1,000 mL infusion   IntraVENous CONTINUOUS    B complex-vitaminC-folic acid (NEPHROCAP) cap  1 Cap Oral DAILY    cloNIDine HCl (CATAPRES) tablet 0.1 mg  0.1 mg Oral Q4H PRN    nitroglycerin (NITROSTAT) tablet 0.4 mg  0.4 mg SubLINGual Q5MIN PRN    glucose chewable tablet 16 g  4 Tab Oral PRN    dextrose (D50W) injection syrg 12.5-25 g  12.5-25 g IntraVENous PRN    glucagon (GLUCAGEN) injection 1 mg  1 mg IntraMUSCular PRN    sodium chloride (NS) flush 5-10 mL  5-10 mL IntraVENous Q8H    sodium chloride (NS) flush 5-10 mL  5-10 mL IntraVENous PRN    metoprolol tartrate (LOPRESSOR) tablet 25 mg  25 mg Oral Q6H    aspirin chewable tablet 81 mg  81 mg Oral DAILY    atorvastatin (LIPITOR) tablet 80 mg  80 mg Oral QPM    acetaminophen (TYLENOL) tablet 650 mg  650 mg Oral Q4H PRN    oxyCODONE-acetaminophen (PERCOCET) 5-325 mg per tablet 1 Tab  1 Tab Oral Q4H PRN    hydrALAZINE (APRESOLINE) 20 mg/mL injection 10 mg  10 mg IntraVENous Q2H PRN    naloxone (NARCAN) injection 0.4 mg  0.4 mg IntraVENous PRN    diphenhydrAMINE (BENADRYL) injection 12.5 mg  12.5 mg IntraVENous Q4H PRN    ondansetron (ZOFRAN) injection 4 mg  4 mg IntraVENous Q4H PRN    bisacodyl (DULCOLAX) tablet 5 mg  5 mg Oral DAILY PRN    clopidogrel (PLAVIX) tablet 75 mg  75 mg Oral DAILY         Pratibha Mojica MD

## 2018-05-05 NOTE — PROGRESS NOTES
[facility] Progress Note        NAME: Albaro Molina       :  1955       MRN:  [id number]     Date/Time: 2018    Risk of deterioration: medium       Assessment:    Plan:  MINO on CKD V - ?ESRD  S/P CATH-2V cad, (R) ROSALEE--Right kidney measures 10.9 cm by US  HTN  PAD/PVD s/p (L) AKA-(L) iliac occlusion  ANemia  Secondary PTH Pt had a baseline creatinine of around 2-2.5 over the last few years with recent deterioration to 4-4.6--likely due to advancing ckd (+/-) ROSALEE    HD # 1 . HD#2 - -If he is deemed ESRD his son will transport him and is flexibile regarding units. Once established as ESRD, can start ACE/ARB. Appreciate vascular seeing pt.--looks like his left arm will be used for access with dr. Hina Rodriguez in the future    Replete K po.  4K bath on HD. Continue rocaltrol, epo       Subjective:     Chief Complaint: \"I did OK with the dialysis. \"  No N/V. No dyspnea. No pain. Review of Systems: no n/v/f/c    Objective:     VITALS:   Last 24hrs VS reviewed since prior progress note. Most recent are:  Visit Vitals    /58    Pulse 75    Temp 98.5 °F (36.9 °C)    Resp 17    Ht 5' 9\" (1.753 m)    Wt 74.3 kg (163 lb 12.8 oz)    SpO2 90%    BMI 24.19 kg/m2     SpO2 Readings from Last 6 Encounters:   18 90%   01/15/16 98%   16 100%   12/04/15 98%   10/13/15 97%   09/25/15 100%    O2 Flow Rate (L/min): 3 l/min       Intake/Output Summary (Last 24 hours) at 18 8252  Last data filed at 18 2326   Gross per 24 hour   Intake                0 ml   Output             1050 ml   Net            -1050 ml        Telemetry Reviewed    PHYSICAL EXAM:    General   well developed, well nourished, appears stated age, in no acute distress  EENT  NCAT, EOMI  Respiratory   Clear anteriorly  Cardiology  RRR  Abdominal  Soft, nt  Extremities  (L) AKA, (R) with brawny edema (his baseline)  Neurological  No focal neurological deficits noted  Psychological  Oriented x 3. Normal affect. Lab Data Reviewed: (see below)    Medications Reviewed: (see below)    PMH/SH reviewed - no change compared to H&P  ___________________________________________________  ___________    Attending Physician: Austyn Towsnend MD     ____________________________________________________  MEDICATIONS:  Current Facility-Administered Medications   Medication Dose Route Frequency    potassium chloride SR (KLOR-CON 10) tablet 40 mEq  40 mEq Oral NOW    [START ON 5/6/2018] famotidine (PEPCID) tablet 20 mg  20 mg Oral Q48H    heparin (porcine) pf 300 Units  300 Units InterCATHeter PRN    amLODIPine (NORVASC) tablet 10 mg  10 mg Oral DAILY    polyethylene glycol (MIRALAX) packet 17 g  17 g Oral DAILY    docusate sodium (COLACE) capsule 100 mg  100 mg Oral BID    insulin lispro (HUMALOG) injection   SubCUTAneous AC&HS    isosorbide mononitrate ER (IMDUR) tablet 60 mg  60 mg Oral DAILY    heparin (porcine) injection 5,000 Units  5,000 Units SubCUTAneous Q12H    hydrALAZINE (APRESOLINE) tablet 25 mg  25 mg Oral Q8H    calcium carbonate (TUMS) chewable tablet 200 mg [elemental]  200 mg Oral QID WITH MEALS    calcitRIOL (ROCALTROL) capsule 0.25 mcg  0.25 mcg Oral DAILY    magnesium oxide (MAG-OX) tablet 400 mg  400 mg Oral DAILY    epoetin calos (EPOGEN;PROCRIT) injection 10,000 Units  10,000 Units SubCUTAneous Once per day on Mon Thu    iodixanol (VISIPAQUE) 320 mg iodine/mL contrast injection 0-100 mL  0-100 mL IntraarTERial Multiple    sodium chloride (NS) flush 5-10 mL  5-10 mL IntraVENous Q8H    sodium chloride (NS) flush 5-10 mL  5-10 mL IntraVENous PRN    sodium bicarbonate (8.4%) 150 mEq in sterile water 1,000 mL infusion   IntraVENous CONTINUOUS    B complex-vitaminC-folic acid (NEPHROCAP) cap  1 Cap Oral DAILY    cloNIDine HCl (CATAPRES) tablet 0.1 mg  0.1 mg Oral Q4H PRN    nitroglycerin (NITROSTAT) tablet 0.4 mg  0.4 mg SubLINGual Q5MIN PRN    glucose chewable tablet 16 g  4 Tab Oral PRN    dextrose (D50W) injection syrg 12.5-25 g  12.5-25 g IntraVENous PRN    glucagon (GLUCAGEN) injection 1 mg  1 mg IntraMUSCular PRN    sodium chloride (NS) flush 5-10 mL  5-10 mL IntraVENous Q8H    sodium chloride (NS) flush 5-10 mL  5-10 mL IntraVENous PRN    metoprolol tartrate (LOPRESSOR) tablet 25 mg  25 mg Oral Q6H    aspirin chewable tablet 81 mg  81 mg Oral DAILY    atorvastatin (LIPITOR) tablet 80 mg  80 mg Oral QPM    acetaminophen (TYLENOL) tablet 650 mg  650 mg Oral Q4H PRN    oxyCODONE-acetaminophen (PERCOCET) 5-325 mg per tablet 1 Tab  1 Tab Oral Q4H PRN    hydrALAZINE (APRESOLINE) 20 mg/mL injection 10 mg  10 mg IntraVENous Q2H PRN    naloxone (NARCAN) injection 0.4 mg  0.4 mg IntraVENous PRN    diphenhydrAMINE (BENADRYL) injection 12.5 mg  12.5 mg IntraVENous Q4H PRN    ondansetron (ZOFRAN) injection 4 mg  4 mg IntraVENous Q4H PRN    bisacodyl (DULCOLAX) tablet 5 mg  5 mg Oral DAILY PRN    clopidogrel (PLAVIX) tablet 75 mg  75 mg Oral DAILY        LABS:  Recent Labs      05/05/18 0327 05/02/18 2338   WBC  5.4  6.5   HGB  8.6*  8.8*   HCT  26.6*  27.2*   PLT  150  146*     Recent Labs      05/05/18 0327  05/04/18   0430  05/02/18 2338   NA  141  142  143   K  3.2*  3.2*  3.5   CL  99  99  104   CO2  33*  35*  29   BUN  38*  57*  58*   CREA  3.81*  5.03*  4.67*   GLU  202*  155*  163*   CA  6.9*  6.8*  6.5*   MG  1.9  1.7   --    PHOS  3.6   --    --      Recent Labs      05/05/18 0327 05/02/18 2338   SGOT  18  15   ALT  13  11*   AP  164*  172*   TBILI  0.6  0.4   TP  5.6*  5.8*   ALB  2.1*  2.1*   GLOB  3.5  3.7     Recent Labs      05/04/18   0850  05/02/18   2338  05/02/18   1319   INR   --    --   1.1   PTP   --    --   11.3*   APTT  34.8*  27.2  33.7*      No results for input(s): FE, TIBC, PSAT, FERR in the last 72 hours. No results for input(s): PH, PCO2, PO2 in the last 72 hours. No results for input(s): CPK, CKNDX, TROIQ in the last 72 hours.     No lab exists for component: CPKMB  Lab Results   Component Value Date/Time    Glucose (POC) 252 (H) 05/05/2018 08:00 AM    Glucose (POC) 119 (H) 05/04/2018 10:43 PM    Glucose (POC) 227 (H) 05/04/2018 04:51 PM    Glucose (POC) 187 (H) 05/04/2018 11:24 AM    Glucose (POC) 174 (H) 05/04/2018 07:33 AM

## 2018-05-06 LAB
ALBUMIN SERPL-MCNC: 2.2 G/DL (ref 3.5–5)
ALBUMIN/GLOB SERPL: 0.6 {RATIO} (ref 1.1–2.2)
ALP SERPL-CCNC: 187 U/L (ref 45–117)
ALT SERPL-CCNC: 17 U/L (ref 12–78)
ANION GAP SERPL CALC-SCNC: 5 MMOL/L (ref 5–15)
AST SERPL-CCNC: 17 U/L (ref 15–37)
BASOPHILS # BLD: 0 K/UL (ref 0–0.1)
BASOPHILS NFR BLD: 0 % (ref 0–1)
BILIRUB SERPL-MCNC: 0.3 MG/DL (ref 0.2–1)
BUN SERPL-MCNC: 24 MG/DL (ref 6–20)
BUN/CREAT SERPL: 7 (ref 12–20)
CALCIUM SERPL-MCNC: 7.9 MG/DL (ref 8.5–10.1)
CHLORIDE SERPL-SCNC: 105 MMOL/L (ref 97–108)
CO2 SERPL-SCNC: 33 MMOL/L (ref 21–32)
CREAT SERPL-MCNC: 3.22 MG/DL (ref 0.7–1.3)
DIFFERENTIAL METHOD BLD: ABNORMAL
EOSINOPHIL # BLD: 0.2 K/UL (ref 0–0.4)
EOSINOPHIL NFR BLD: 3 % (ref 0–7)
ERYTHROCYTE [DISTWIDTH] IN BLOOD BY AUTOMATED COUNT: 15.3 % (ref 11.5–14.5)
GLOBULIN SER CALC-MCNC: 3.8 G/DL (ref 2–4)
GLUCOSE BLD STRIP.AUTO-MCNC: 112 MG/DL (ref 65–100)
GLUCOSE BLD STRIP.AUTO-MCNC: 179 MG/DL (ref 65–100)
GLUCOSE BLD STRIP.AUTO-MCNC: 215 MG/DL (ref 65–100)
GLUCOSE SERPL-MCNC: 137 MG/DL (ref 65–100)
HCT VFR BLD AUTO: 26.7 % (ref 36.6–50.3)
HGB BLD-MCNC: 8.2 G/DL (ref 12.1–17)
IMM GRANULOCYTES # BLD: 0.1 K/UL (ref 0–0.04)
IMM GRANULOCYTES NFR BLD AUTO: 1 % (ref 0–0.5)
LYMPHOCYTES # BLD: 1.5 K/UL (ref 0.8–3.5)
LYMPHOCYTES NFR BLD: 24 % (ref 12–49)
MCH RBC QN AUTO: 30.3 PG (ref 26–34)
MCHC RBC AUTO-ENTMCNC: 30.7 G/DL (ref 30–36.5)
MCV RBC AUTO: 98.5 FL (ref 80–99)
MONOCYTES # BLD: 0.9 K/UL (ref 0–1)
MONOCYTES NFR BLD: 15 % (ref 5–13)
NEUTS SEG # BLD: 3.4 K/UL (ref 1.8–8)
NEUTS SEG NFR BLD: 57 % (ref 32–75)
NRBC # BLD: 0.03 K/UL (ref 0–0.01)
NRBC BLD-RTO: 0.5 PER 100 WBC
PLATELET # BLD AUTO: 168 K/UL (ref 150–400)
PMV BLD AUTO: 10 FL (ref 8.9–12.9)
POTASSIUM SERPL-SCNC: 3.7 MMOL/L (ref 3.5–5.1)
PROT SERPL-MCNC: 6 G/DL (ref 6.4–8.2)
RBC # BLD AUTO: 2.71 M/UL (ref 4.1–5.7)
RBC MORPH BLD: ABNORMAL
SERVICE CMNT-IMP: ABNORMAL
SODIUM SERPL-SCNC: 143 MMOL/L (ref 136–145)
WBC # BLD AUTO: 6.1 K/UL (ref 4.1–11.1)

## 2018-05-06 PROCEDURE — 74011250636 HC RX REV CODE- 250/636: Performed by: INTERNAL MEDICINE

## 2018-05-06 PROCEDURE — 74011250637 HC RX REV CODE- 250/637: Performed by: HOSPITALIST

## 2018-05-06 PROCEDURE — 36415 COLL VENOUS BLD VENIPUNCTURE: CPT | Performed by: INTERNAL MEDICINE

## 2018-05-06 PROCEDURE — 74011250637 HC RX REV CODE- 250/637: Performed by: INTERNAL MEDICINE

## 2018-05-06 PROCEDURE — 80053 COMPREHEN METABOLIC PANEL: CPT | Performed by: INTERNAL MEDICINE

## 2018-05-06 PROCEDURE — 65660000000 HC RM CCU STEPDOWN

## 2018-05-06 PROCEDURE — 74011636637 HC RX REV CODE- 636/637: Performed by: INTERNAL MEDICINE

## 2018-05-06 PROCEDURE — 85025 COMPLETE CBC W/AUTO DIFF WBC: CPT | Performed by: INTERNAL MEDICINE

## 2018-05-06 PROCEDURE — 82962 GLUCOSE BLOOD TEST: CPT

## 2018-05-06 PROCEDURE — 74011250636 HC RX REV CODE- 250/636: Performed by: HOSPITALIST

## 2018-05-06 RX ADMIN — OXYCODONE HYDROCHLORIDE AND ACETAMINOPHEN 1 TABLET: 5; 325 TABLET ORAL at 13:13

## 2018-05-06 RX ADMIN — CALCIUM CARBONATE 200 MG: 500 TABLET, CHEWABLE ORAL at 08:59

## 2018-05-06 RX ADMIN — Medication 400 MG: at 09:00

## 2018-05-06 RX ADMIN — ONDANSETRON 4 MG: 2 INJECTION INTRAMUSCULAR; INTRAVENOUS at 10:03

## 2018-05-06 RX ADMIN — ASPIRIN 81 MG 81 MG: 81 TABLET ORAL at 08:59

## 2018-05-06 RX ADMIN — NEPHROCAP 1 CAPSULE: 1 CAP ORAL at 08:59

## 2018-05-06 RX ADMIN — Medication 10 ML: at 10:03

## 2018-05-06 RX ADMIN — ISOSORBIDE MONONITRATE 60 MG: 30 TABLET, EXTENDED RELEASE ORAL at 09:00

## 2018-05-06 RX ADMIN — Medication 10 ML: at 08:30

## 2018-05-06 RX ADMIN — Medication 10 ML: at 03:39

## 2018-05-06 RX ADMIN — FAMOTIDINE 20 MG: 20 TABLET, FILM COATED ORAL at 09:00

## 2018-05-06 RX ADMIN — METOPROLOL TARTRATE 25 MG: 25 TABLET ORAL at 19:34

## 2018-05-06 RX ADMIN — METOPROLOL TARTRATE 25 MG: 25 TABLET ORAL at 12:29

## 2018-05-06 RX ADMIN — CALCITRIOL 0.25 MCG: 0.25 CAPSULE, LIQUID FILLED ORAL at 09:02

## 2018-05-06 RX ADMIN — AMLODIPINE BESYLATE 10 MG: 5 TABLET ORAL at 09:00

## 2018-05-06 RX ADMIN — Medication 10 ML: at 13:08

## 2018-05-06 RX ADMIN — ATORVASTATIN CALCIUM 80 MG: 40 TABLET, FILM COATED ORAL at 19:34

## 2018-05-06 RX ADMIN — HEPARIN SODIUM 5000 UNITS: 5000 INJECTION, SOLUTION INTRAVENOUS; SUBCUTANEOUS at 19:34

## 2018-05-06 RX ADMIN — CLOPIDOGREL BISULFATE 75 MG: 75 TABLET ORAL at 08:59

## 2018-05-06 RX ADMIN — HEPARIN SODIUM 5000 UNITS: 5000 INJECTION, SOLUTION INTRAVENOUS; SUBCUTANEOUS at 05:42

## 2018-05-06 RX ADMIN — DOCUSATE SODIUM 100 MG: 100 CAPSULE, LIQUID FILLED ORAL at 09:00

## 2018-05-06 RX ADMIN — INSULIN LISPRO 3 UNITS: 100 INJECTION, SOLUTION INTRAVENOUS; SUBCUTANEOUS at 12:32

## 2018-05-06 RX ADMIN — HYDRALAZINE HYDROCHLORIDE 25 MG: 50 TABLET, FILM COATED ORAL at 13:09

## 2018-05-06 NOTE — PROGRESS NOTES
Problem: Falls - Risk of  Goal: *Absence of Falls  Document Momo Fall Risk and appropriate interventions in the flowsheet. Outcome: Progressing Towards Goal  Fall Risk Interventions:  Mobility Interventions: Communicate number of staff needed for ambulation/transfer, Patient to call before getting OOB, Strengthening exercises (ROM-active/passive), Utilize walker, cane, or other assitive device    Mentation Interventions: Adequate sleep, hydration, pain control, Door open when patient unattended, Evaluate medications/consider consulting pharmacy, Familiar objects from home, Family/sitter at bedside, Increase mobility, More frequent rounding, Reorient patient, Room close to nurse's station, Toileting rounds, Update white board    Medication Interventions: Assess postural VS orthostatic hypotension, Evaluate medications/consider consulting pharmacy, Patient to call before getting OOB, Teach patient to arise slowly    Elimination Interventions: Call light in reach, Patient to call for help with toileting needs, Toilet paper/wipes in reach, Toileting schedule/hourly rounds, Urinal in reach             Problem: Pressure Injury - Risk of  Goal: *Prevention of pressure injury  Document Mitchel Scale and appropriate interventions in the flowsheet. Outcome: Progressing Towards Goal  Pressure Injury Interventions:  Sensory Interventions: Assess changes in LOC, Assess need for specialty bed, Avoid rigorous massage over bony prominences, Chair cushion, Check visual cues for pain, Float heels, Keep linens dry and wrinkle-free, Maintain/enhance activity level, Minimize linen layers, Monitor skin under medical devices, Pad between skin to skin, Pressure redistribution bed/mattress (bed type), Sit a 90-degree angle/use footstool if needed, Turn and reposition approx.  every two hours (pillows and wedges if needed)         Activity Interventions: Chair cushion, Increase time out of bed, Pressure redistribution bed/mattress(bed type), Trapeze to reposition    Mobility Interventions: Chair cushion, Float heels, HOB 30 degrees or less, Pressure redistribution bed/mattress (bed type), Turn and reposition approx.  every two hours(pillow and wedges)    Nutrition Interventions: Document food/fluid/supplement intake, Discuss nutritional consult with provider, Offer support with meals,snacks and hydration    Friction and Shear Interventions: Apply protective barrier, creams and emollients, Feet elevated on foot rest, HOB 30 degrees or less, Lift sheet, Minimize layers, Sit at 90-degree angle

## 2018-05-06 NOTE — PROGRESS NOTES
Bedside report received from CAITLYN Williamson                 Assessment, Background, Procedure summary, Intake/Output, MAR, and recent results discussed. Care assumed. 0830 Patient resting in room. Patient's son and daughter in law present following code [place] overnight. Patient withdrawn but cooperative. Patient's family voices concerns about patient's repeated irate behavior currently and past experiences. Family also voices concerns about care following discharge and situation at home (Patient's son has 2 young children and voices concerns about how patient's behavior may affect them). Spoke with Dr. Margaret Stephen and psych consult obtained and called. Patient refuses breakfast despite multiple offers by RN and family to obtain food patient would prefer. Humalog refused by patient due to patient not eating. 8001 Dr. Dahlia Amaya in to assess patient. Spoke with patient and family. 0370 Dr. Karyle Guy in to assess patient. Spoke with patient and family. Dr. Margaret Stephen in to see patient. Spoke with family and informed family and patient of psych consult. Patient resting comfortably. 1206 Patient refuses lunch  despite multiple offers by RN to obtain food patient would prefer. Gave 3 units humalog for .    1313 Patient requests pain medication for non-specific generalized pain, patient refuses to rate pain. Percocet administered. After taking percocet patient questions why he can't have morphine. Explained that morphine order was discontinued and was only ordered for three doses for chest pain. Patient states \"Well then I have chest pain and I want morphine, How are you going to take away a medicine that works and give me one that doesn't\". Explained to patient that percocet make take 30 mins-1 hour to take effect. Patient became irritable and raised voice demanding morphine. Imformed patient that his attending would be notified of his complains and request for morphine. Adonis paged.     [place] 42 Dr. Gómez Medina paged again x 2 after no call back. 26 Spoke with Dr. Gómez Medina. No new orders for additional pain medications at this time. Will notify patient and continue to monitor. 0 Dr. Gómez Medina in to see patient. To reassess pain. MD spoke with patient's sister who voices concerns over patient's emotional lability and irritation. Requests ammonia level in AM and speaks with physician about discontinuing percocet. Patient refuses discontinuation of percocet. Order left in buit patient agrees to avoid taking unless necessary. [address]. Yehuda Davis in to assess patient     1800 Patient declines to eat food brought up from cafeteria. Patient's sister left to obtain outside food for patient. Refuses evening  medications at this time until he has \"food on my stomach. \" patient agreed to take medications after eating. Verbal report given to oncoming nurse Leta Navarro    Report consisted of patients Situation, Background, Assessment, and   Recommendations    Information was reviewed with the receiving nurse. Opportunity for questions and clarification was provided.       Mat Washington RN

## 2018-05-06 NOTE — PROGRESS NOTES
Progress Note      5/6/2018 9:27 AM  NAME: Austyn Mosquera   MRN:  [id number]   Admit Diagnosis: [facility]STEM[facility]non-ST elevated [facility]  ARF (acute renal failure) ([facility]      Problem List:     1. Chest pain; cath 4/30 w/ RCA , 60% oLAD w/ neg FFR (0.88), patent LCx stent w/ mild ISR. 2. Right renal artery stenosis. 3. Occluded LCIA. 4. Elevated troponin  5. Acute on chronic kidney disease Stg 4  6. Coronary artery disease s/p multivessel stenting s/p PCI of LCx, PTCA of RCA 7/97, PTCA LAD 3/94. Lexiscan 2/08 w/ EF 62%, distal ineroapical infarct w/o ischemia  7. Peripheral vascular disease s/p left iliac stenting, left SFA stenting 3/00, right iliac stenting 11/99.  8. Status post LAKA  9. Bilateral carotid stenosis; 16-49% 9/13  10. Hypertension  11. Diabetes  12. Tobacco abuse  13. Chronic pain  14. Chronic anemia  15. Hyperlipidemia  16. Noncompliance  17. Peptic ulcer disease w/ GIB '15  18. Falls  19. Lives w/ sister in [address] [place] [address] now  21. Usual Cardiologist:  Dr. Irby Son (last seen 8/16)  21. Mental status changes. Needs Psychiatric  consult / treatment. 5/5  OOB in chair. Yelling at anybody who comes in the room. Throwing items. Pulled out IV . Refusing meds. Denies any pain. Cath site seems OK. Lang Gomez still in place. Hospitalist is addressing psychiatric issue for now . Likely needs med   BP is controlled. 5/6  BP controlled. Cardiac status stable      Code [place] called last night for patient behavior          Assessment/Plan:   TnI peaked @ 0.16 w/ nml CK  sCr 4.7  EKG:  ST, LAFB, NSSTTWc    No CP overnight  HTNive; better  Echo w/ EF 55%, LAE, mild MR, AoSclerosis, mild TR    Kash and dialysis after procedure     22. Cont norvasc 10mg  23. Will plan for right renal artery stenting today  24. NPO  25. Continue ASA 81mg  26. Continue plavix 75mg  27. Continue atorva 80mg  28. Continue hydralazine 25mg TID  29.  Continue metoprolol 25mg q12h  30. Continue ISMN 60mg daily  31. Will plan for medical therapy of his CAD. If fails, will require complex bifurcation left main stenting. []       High complexity decision making was performed in this patient at high risk for decompensation with multiple organ involvement. Subjective:     Rosa Shaw denies chest pain, dyspnea. Discussed with RN events overnight. Review of Systems:    Symptom Y/N Comments  Symptom Y/N Comments   Fever/Chills N   Chest Pain N    Poor Appetite N   Edema N    Cough N   Abdominal Pain N    Sputum N   Joint Pain N    SOB/COREA N   Pruritis/Rash N    Nausea/vomit N   Tolerating PT/OT Y    Diarrhea N   Tolerating Diet Y    Constipation N   Other       Could NOT obtain due to:      Objective:      Physical Exam:    Last 24hrs VS reviewed since prior progress note. Most recent are:    Visit Vitals    /57    Pulse 72    Temp 98.2 °F (36.8 °C)    Resp 16    Ht 5' 9\" (1.753 m)    Wt 76.3 kg (168 lb 4.8 oz)    SpO2 97%    BMI 24.85 kg/m2       Intake/Output Summary (Last 24 hours) at 05/06/18 0957  Last data filed at 05/05/18 2200   Gross per 24 hour   Intake              460 ml   Output             2350 ml   Net            -1890 ml        General Appearance: Well developed, well nourished, alert & oriented x 3,    no acute distress. Ears/Nose/Mouth/Throat: Hearing grossly normal.  Neck: Supple. Chest: Lungs clear to auscultation bilaterally. Cardiovascular: Regular rate and rhythm, S1S2 normal, no murmur. Abdomen: Soft, non-tender, bowel sounds are active. Extremities: No edema bilaterally. LAKA. 1+ RCFA pulses. Skin: Warm and dry. [x]         Post-cath site without hematoma, bruit, tenderness, or thrill. Distal pulses intact.     PMH/SH reviewed - no change compared to H&P    Data Review    Telemetry: sinus rhythm     EKG:   [x]  No new EKG for review    Lab Data Personally Reviewed:    Recent Labs      05/06/18   0337  05/05/18   0327   WBC 6.1  5.4   HGB  8.2*  8.6*   HCT  26.7*  26.6*   PLT  168  150     Recent Labs      05/04/18   0850   APTT  34.8*      Recent Labs      05/06/18   0337  05/05/18   0327  05/04/18   0430   NA  143  141  142   K  3.7  3.2*  3.2*   CL  105  99  99   CO2  33*  33*  35*   BUN  24*  38*  57*   CREA  3.22*  3.81*  5.03*   GLU  137*  202*  155*   CA  7.9*  6.9*  6.8*   MG   --   1.9  1.7     No results for input(s): CPK, CKNDX, TROIQ in the last 72 hours. No lab exists for component: CPKMB  Lab Results   Component Value Date/Time    Cholesterol, total 236 (H) 04/29/2018 06:00 AM    HDL Cholesterol 34 04/29/2018 06:00 AM    LDL,Direct 62 10/03/2014 05:50 AM    LDL, calculated 172.4 (H) 04/29/2018 06:00 AM    Triglyceride 148 04/29/2018 06:00 AM    CHOL/HDL Ratio 6.9 (H) 04/29/2018 06:00 AM       Recent Labs      05/06/18   0337  05/05/18   0327   SGOT  17  18   AP  187*  164*   TP  6.0*  5.6*   ALB  2.2*  2.1*   GLOB  3.8  3.5     No results for input(s): PH, PCO2, PO2 in the last 72 hours.     Medications Personally Reviewed:    Current Facility-Administered Medications   Medication Dose Route Frequency    famotidine (PEPCID) tablet 20 mg  20 mg Oral Q48H    heparin (porcine) pf 300 Units  300 Units InterCATHeter PRN    amLODIPine (NORVASC) tablet 10 mg  10 mg Oral DAILY    polyethylene glycol (MIRALAX) packet 17 g  17 g Oral DAILY    docusate sodium (COLACE) capsule 100 mg  100 mg Oral BID    insulin lispro (HUMALOG) injection   SubCUTAneous AC&HS    isosorbide mononitrate ER (IMDUR) tablet 60 mg  60 mg Oral DAILY    heparin (porcine) injection 5,000 Units  5,000 Units SubCUTAneous Q12H    hydrALAZINE (APRESOLINE) tablet 25 mg  25 mg Oral Q8H    calcium carbonate (TUMS) chewable tablet 200 mg [elemental]  200 mg Oral QID WITH MEALS    calcitRIOL (ROCALTROL) capsule 0.25 mcg  0.25 mcg Oral DAILY    magnesium oxide (MAG-OX) tablet 400 mg  400 mg Oral DAILY    epoetin calos (EPOGEN;PROCRIT) injection 10,000 Units  10,000 Units SubCUTAneous Once per day on Mon Thu    iodixanol (VISIPAQUE) 320 mg iodine/mL contrast injection 0-100 mL  0-100 mL IntraarTERial Multiple    sodium chloride (NS) flush 5-10 mL  5-10 mL IntraVENous Q8H    sodium chloride (NS) flush 5-10 mL  5-10 mL IntraVENous PRN    sodium bicarbonate (8.4%) 150 mEq in sterile water 1,000 mL infusion   IntraVENous CONTINUOUS    B complex-vitaminC-folic acid (NEPHROCAP) cap  1 Cap Oral DAILY    cloNIDine HCl (CATAPRES) tablet 0.1 mg  0.1 mg Oral Q4H PRN    nitroglycerin (NITROSTAT) tablet 0.4 mg  0.4 mg SubLINGual Q5MIN PRN    glucose chewable tablet 16 g  4 Tab Oral PRN    dextrose (D50W) injection syrg 12.5-25 g  12.5-25 g IntraVENous PRN    glucagon (GLUCAGEN) injection 1 mg  1 mg IntraMUSCular PRN    sodium chloride (NS) flush 5-10 mL  5-10 mL IntraVENous Q8H    sodium chloride (NS) flush 5-10 mL  5-10 mL IntraVENous PRN    metoprolol tartrate (LOPRESSOR) tablet 25 mg  25 mg Oral Q6H    aspirin chewable tablet 81 mg  81 mg Oral DAILY    atorvastatin (LIPITOR) tablet 80 mg  80 mg Oral QPM    acetaminophen (TYLENOL) tablet 650 mg  650 mg Oral Q4H PRN    oxyCODONE-acetaminophen (PERCOCET) 5-325 mg per tablet 1 Tab  1 Tab Oral Q4H PRN    hydrALAZINE (APRESOLINE) 20 mg/mL injection 10 mg  10 mg IntraVENous Q2H PRN    naloxone (NARCAN) injection 0.4 mg  0.4 mg IntraVENous PRN    diphenhydrAMINE (BENADRYL) injection 12.5 mg  12.5 mg IntraVENous Q4H PRN    ondansetron (ZOFRAN) injection 4 mg  4 mg IntraVENous Q4H PRN    bisacodyl (DULCOLAX) tablet 5 mg  5 mg Oral DAILY PRN    clopidogrel (PLAVIX) tablet 75 mg  75 mg Oral DAILY         Patrick Rice MD

## 2018-05-06 NOTE — PROGRESS NOTES
Physical therapy:    Orders received and chart reviewed. Spoke with RN and reported that patient is not having a good day. Patient is angry that he does not have morphine ordered and awaiting a call back from MD. RN recommended deferring therapy for today as she feels he will not be participatory and make exacerbate his anger. Will defer and follow up tomorrow.  Thank you    Nayely Krause, PT, DPT

## 2018-05-06 NOTE — PROGRESS NOTES
Hospitalist Progress Note    NAME: Wille Harada   :  1955   MRN:  [id number]       Assessment / Plan:  Chest pain - borderline trop elevation; NSTEMI less likely. S/p  Cardiac cath report:  CAD, PAD w/ multiple stents - followed by Dr. Manuel Aviles  cont aspirin, Plavix. Lipitor  Will continue the medical management. Sever Renal artery stenosis  S/p Successful PTA and stenting of the R renal artery w/ a BMS  cont aspirin, Plavix. MINO on CKD stage 5   Upper extremity vein mapping done. Nephrology on board. Seen by Dr. Manuel Aviles who is known to patient. Will plan LUE access when appropriate. HD day 2 was yesterday , will follow up with nephrology. Hypertensive Urgency - blood pressure improved. cont lopressor, Imdur  cont scheduled hydralazine      DM-2 w/ nephropathy and neuropathy  SSI    Anemia of chronic renal disease    Chronic thrombocytopenia    Tobacco abuse    Hx of Depression, patient used to be on medications. Effexor end trazodone at night. In this admission patient was noted to have fighting with staffs and throwing staffs and seen very aggressive multiple times      Medical noncompliance      Body mass index is 24.41 kg/(m^2). Code status: Full  Prophylaxis: Hep SQ  Recommended Disposition: Home w/Family     Subjective:     Chief Complaint / Reason for Physician Visit  Patient was calm when I was talking to him in the round. And he said he does not have complaints. Review of Systems:  Symptom Y/N Comments  Symptom Y/N Comments   Fever/Chills n   Chest Pain n    Poor Appetite    Edema     Cough n   Abdominal Pain n    Sputum n   Joint Pain     SOB/COREA n   Pruritis/Rash     Nausea/vomit    Tolerating PT/OT     Diarrhea    Tolerating Diet     Constipation    Other       Could NOT obtain due to:      Objective:     VITALS:   Last 24hrs VS reviewed since prior progress note.  Most recent are:  Patient Vitals for the past 24 hrs:   Temp Pulse Resp BP SpO2   18 1222 98.2 °F (36.8 °C) 74 16 (!) 116/95 94 %   05/06/18 0832 97.9 °F (36.6 °C) 75 16 138/52 93 %   05/06/18 0542 98.2 °F (36.8 °C) 72 16 157/57 -   05/06/18 0332 98.3 °F (36.8 °C) 70 16 146/61 97 %   05/06/18 0331 - 70 - - (!) 75 %   05/05/18 2200 98 °F (36.7 °C) 69 16 132/65 97 %   05/05/18 2130 - 69 - 133/62 -   05/05/18 2100 - 68 - 135/68 -   05/05/18 2030 - 65 - 110/64 -   05/05/18 2000 - 65 - 123/63 -   05/05/18 1930 98.2 °F (36.8 °C) 69 16 133/61 -   05/05/18 1925 97.9 °F (36.6 °C) 72 16 108/80 96 %   05/05/18 1530 97.8 °F (36.6 °C) 76 16 121/59 91 %       Intake/Output Summary (Last 24 hours) at 05/06/18 1316  Last data filed at 05/05/18 2200   Gross per 24 hour   Intake              250 ml   Output             2100 ml   Net            -1850 ml        PHYSICAL EXAM:on the prior date   General: cooperative, no acute distress    EENT:  EOMI. Anicteric sclerae. MMM  Resp:  CTA bilaterally, no wheezing or rales. No accessory muscle use  CV:  Regular  rhythm,  No edema  GI:  Soft, Non distended, Non tender.  +Bowel sounds  Neurologic:  Alert and oriented X 3, normal speech,   Psych:   Not anxious nor agitated  Skin:  No rashes.   No jaundice  Ext:                  No edema right leg; left AKA noted     Reviewed most current lab test results and cultures  YES  Reviewed most current radiology test results   YES  Review and summation of old records today    NO  Reviewed patient's current orders and MAR    YES  PMH/SH reviewed - no change compared to H&P          Current Facility-Administered Medications:     famotidine (PEPCID) tablet 20 mg, 20 mg, Oral, Q48H, Keri Can MD, 20 mg at 05/06/18 0900    heparin (porcine) pf 300 Units, 300 Units, InterCATHeter, PRN, Denins Rudolph MD, 300 Units at 05/04/18 1416    amLODIPine (NORVASC) tablet 10 mg, 10 mg, Oral, DAILY, Garrett Herrera III, DO, 10 mg at 05/06/18 0900    polyethylene glycol (MIRALAX) packet 17 g, 17 g, Oral, DAILY, Catarina Bello MD, Stopped at 05/04/18 0900    docusate sodium (COLACE) capsule 100 mg, 100 mg, Oral, BID, Jm Flores MD, 100 mg at 05/06/18 0900    insulin lispro (HUMALOG) injection, , SubCUTAneous, AC&HS, Jm Flores MD, 3 Units at 05/06/18 1232    isosorbide mononitrate ER (IMDUR) tablet 60 mg, 60 mg, Oral, DAILY, Shavonne Herrera III, DO, 60 mg at 05/06/18 0900    heparin (porcine) injection 5,000 Units, 5,000 Units, SubCUTAneous, Q12H, David Cortez III, DO, 5,000 Units at 05/06/18 0542    hydrALAZINE (APRESOLINE) tablet 25 mg, 25 mg, Oral, Q8H, Jm Flores MD, 25 mg at 05/06/18 1309    calcium carbonate (TUMS) chewable tablet 200 mg [elemental], 200 mg, Oral, QID WITH MEALS, Hiwot Christian MD, 200 mg at 05/06/18 0859    calcitRIOL (ROCALTROL) capsule 0.25 mcg, 0.25 mcg, Oral, DAILY, Hiwot Christian MD, 0.25 mcg at 05/06/18 0902    magnesium oxide (MAG-OX) tablet 400 mg, 400 mg, Oral, DAILY, Hiwot Christian MD, 400 mg at 05/06/18 0900    epoetin calos (EPOGEN;PROCRIT) injection 10,000 Units, 10,000 Units, SubCUTAneous, Once per day on Mon Thu, Hiwot Christian MD, 10,000 Units at 05/03/18 1712    iodixanol (VISIPAQUE) 320 mg iodine/mL contrast injection 0-100 mL, 0-100 mL, IntraarTERial, Multiple, Shavonne Herrera III, DO, 50 mL at 04/30/18 1041    sodium chloride (NS) flush 5-10 mL, 5-10 mL, IntraVENous, Q8H, Ricardo Herrera III, DO, 10 mL at 05/06/18 1308    sodium chloride (NS) flush 5-10 mL, 5-10 mL, IntraVENous, PRN, Shavonne Herrera III, DO, 10 mL at 05/06/18 1003    sodium bicarbonate (8.4%) 150 mEq in sterile water 1,000 mL infusion, , IntraVENous, CONTINUOUS, Hiwot Christian MD, Stopped at 05/05/18 0730    B complex-vitaminC-folic acid (NEPHROCAP) cap, 1 Cap, Oral, DAILY, Hiwot Christian MD, 1 Cap at 05/06/18 0859    cloNIDine HCl (CATAPRES) tablet 0.1 mg, 0.1 mg, Oral, Q4H PRN, Hiwot Christian MD, 0.1 mg at 05/01/18 1027    nitroglycerin (NITROSTAT) tablet 0.4 mg, 0.4 mg, SubLINGual, Q5MIN PRN, Bro Holm MD, 0.4 mg at 04/29/18 0830    glucose chewable tablet 16 g, 4 Tab, Oral, PRN, Bro Holm MD    dextrose (D50W) injection syrg 12.5-25 g, 12.5-25 g, IntraVENous, PRN, Bro Holm MD    glucagon [facility]) injection 1 mg, 1 mg, IntraMUSCular, PRN, Bro Holm MD    sodium chloride (NS) flush 5-10 mL, 5-10 mL, IntraVENous, Q8H, Bro Holm MD, 10 mL at 05/05/18 0554    sodium chloride (NS) flush 5-10 mL, 5-10 mL, IntraVENous, PRN, Bro Holm MD    metoprolol tartrate (LOPRESSOR) tablet 25 mg, 25 mg, Oral, Q6H, Bro Holm MD, 25 mg at 05/06/18 1229    aspirin chewable tablet 81 mg, 81 mg, Oral, DAILY, Bro Holm MD, 81 mg at 05/06/18 0859    atorvastatin (LIPITOR) tablet 80 mg, 80 mg, Oral, QPM, Bro Holm MD, 80 mg at 05/05/18 1702    acetaminophen (TYLENOL) tablet 650 mg, 650 mg, Oral, Q4H PRN, Bro Holm MD, 650 mg at 04/29/18 2151    oxyCODONE-acetaminophen (PERCOCET) 5-325 mg per tablet 1 Tab, 1 Tab, Oral, Q4H PRN, Bor Holm MD, 1 Tab at 05/06/18 1313    hydrALAZINE (APRESOLINE) 20 mg/mL injection 10 mg, 10 mg, IntraVENous, Q2H PRN, Bro Holm MD    naloxone [facility]) injection 0.4 mg, 0.4 mg, IntraVENous, PRN, Bro Holm MD    diphenhydrAMINE (BENADRYL) injection 12.5 mg, 12.5 mg, IntraVENous, Q4H PRN, Bro Holm MD, 12.5 mg at 05/02/18 2253    ondansetron [facility]) injection 4 mg, 4 mg, IntraVENous, Q4H PRN, Bro Holm MD, 4 mg at 05/06/18 1003    bisacodyl (DULCOLAX) tablet 5 mg, 5 mg, Oral, DAILY PRN, Bro Holm MD    clopidogrel (PLAVIX) tablet 75 mg, 75 mg, Oral, DAILY, Bro Hlom MD, 75 mg at 05/06/18 0739  ________________________________________________________________________  Care Plan discussed with:    Comments   Patient     Family  y Discussed with family in the room    RN y    Care Manager     Consultant                        Multidiciplinary team rounds were held today with , nursing, pharmacist and clinical coordinator. Patient's plan of care was discussed; medications were reviewed and discharge planning was addressed. ________________________________________________________________________  Total NON critical care TIME:  25  Minutes    Total CRITICAL CARE TIME Spent:   Minutes non procedure based      Comments   >50% of visit spent in counseling and coordination of care     ________________________________________________________________________  Lyle Multani MD     Procedures: see electronic medical records for all procedures/Xrays and details which were not copied into this note but were reviewed prior to creation of Plan. LABS:  I reviewed today's most current labs and imaging studies. Pertinent labs include:  Recent Labs      05/06/18 0337 05/05/18   0327   WBC  6.1  5.4   HGB  8.2*  8.6*   HCT  26.7*  26.6*   PLT  168  150     Recent Labs      05/06/18 0337 05/05/18 0327  05/04/18   0430   NA  143  141  142   K  3.7  3.2*  3.2*   CL  105  99  99   CO2  33*  33*  35*   GLU  137*  202*  155*   BUN  24*  38*  57*   CREA  3.22*  3.81*  5.03*   CA  7.9*  6.9*  6.8*   MG   --   1.9  1.7   PHOS   --   3.6   --    ALB  2.2*  2.1*   --    TBILI  0.3  0.6   --    SGOT  17 18   --    ALT  17 13   --        Signed:  Lyle Multani MD

## 2018-05-06 NOTE — CONSULTS
PSYCHIATRIC CONSULTATION                         IDENTIFICATION:    Patient Name  Teja Bruner Sr   Date of Birth 1955   [facility] [id number]   Medical Record Number  [id number]      Age  58 y.o. PCP Mita Chacon, MD   Admit date:  4/28/2018    Room Number  2155/01  @ [facility]   Date of Service  5/6/2018            HISTORY         REASON FOR HOSPITALIZATION/CONSULTATION:  A psychiatric consultation was requested by Franny Rivera, * to evaluate or provide advice/opinion related to evaluating for agitation   CC: \"no complains \"    HISTORY OF PRESENT ILLNESS:    The patient, Evangelina Bennett, is a 58 y.o. WHITE [place]  male with no known past psychiatric history, who was admitted to the medical floor for the treatment of CAD , he stated he feels well today he just in pain all the time and he does not to have IV in his hand , he stated he did not sleep well last night and he does not like to stay in the hospital for long time , he denied feeling sad or depressed but stated he is easily frustrated and gets irritable , spoke with his sister that she stated he lived with her for one month and he was off his medications and she did not notice any mood change or anger  And he had anger issues when he was in nursing home and he was getting pain medication there , he was doing well here until he restarted on pain medications and she thinks that he gets irritable from it , he was able to engage well in talking and no aggressive behavior  . Pt seen today for evaluation. ALLERGIES:  Allergies   Allergen Reactions    Nubain [Nalbuphine] Other (comments)     Made me wild; Dysphoria      MEDICATIONS PRIOR TO ADMISSION:  Prescriptions Prior to Admission   Medication Sig    methadone (DOLOPHINE) 10 mg tablet Take 1 Tab by mouth two (2) times daily (with meals). Max Daily Amount: 60 mg.  One tab in am, 2 tab at lunch, 1 at dinner and 2 at bed    isosorbide mononitrate ER (IMDUR) 30 mg tablet Take 1 Tab by mouth daily.  ferrous gluconate 324 mg (37.5 mg iron) tablet Take 1 Tab by mouth three (3) times daily (with meals).  omeprazole (PRILOSEC) 40 mg capsule Take 1 Cap by mouth two (2) times a day.  metoprolol (LOPRESSOR) 25 mg tablet Take 1 Tab by mouth two (2) times a day.  venlafaxine (EFFEXOR) 100 mg tablet Take 1 Tab by mouth two (2) times a day.  gabapentin (NEURONTIN) 400 mg capsule Take 1 Cap by mouth three (3) times daily.  pravastatin (PRAVACHOL) 80 mg tablet Take 1 Tab by mouth nightly.  pioglitazone (ACTOS) 15 mg tablet Take 1 Tab by mouth daily.  diazepam (VALIUM) 10 mg tablet Take 0.5 Tabs by mouth every twelve (12) hours. Max Daily Amount: 10 mg.    doxazosin (CARDURA) 2 mg tablet Take 1 Tab by mouth nightly.  traZODone (DESYREL) 150 mg tablet Take 1 Tab by mouth nightly.  fenofibrate micronized (LOFIBRA) 200 mg capsule Take 1 Cap by mouth every morning.  finasteride (PROSCAR) 5 mg tablet Take 1 Tab by mouth daily.  food supplemt, lactose-reduced (ENSURE ACTIVE CLEAR) liqd Take 237 mL by mouth four (4) times daily.  neomycin-bacitracin-polymyxin (NEOSPORIN) 3.5mg-400 unit- 5,000 unit/gram ointment Apply  to affected area daily.  nitroglycerin (NITROSTAT) 0.4 mg SL tablet 1 Tab by SubLINGual route every five (5) minutes as needed for Chest Pain.  ergocalciferol (VITAMIN D2) 50,000 unit capsule Take 1 Cap by mouth every seven (7) days.  epoetin calos (EPOGEN;PROCRIT) 10,000 unit/mL injection 10,000 Units by SubCUTAneous route every thirty (30) days. PAST MEDICAL HISTORY:  Past Medical History:   Diagnosis Date    Anemia     Arthritis     back, hips    CAD (coronary artery disease)     Sees Dr. 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 Augustine Mcconnell for pain mgmt    Chronic pain     Confusion     Depression     Diabetes ([facility]     ED (erectile dysfunction)     GERD (gastroesophageal reflux disease) 11/2015    [facility] month    Hypercholesteremia     Hypertension     Psychiatric disorder     depression    [facility]ipheral [facility]     Thromboembolus ([facility]      Past Surgical History:   Procedure Laterality Date    CARDIAC SURG PROCEDURE UNLIST      PTCA    HX ABOVE KNEE AMPUTATION  2013    Left knee stump non-healing ulcer; Dr. Luz Flood HX APPENDECTOMY      HX Baby Clam      Left leg    HX CHOLECYSTECTOMY      HX GI      HX HIP REPLACEMENT      right hip replaced x 2    HX ORTHOPAEDIC  1990s    4 back surgeries    HX ORTHOPAEDIC      donna ankles pin placed    HX ORTHOPAEDIC      left leg 17 surgeries    UPPER GI ENDOSCOPY,BIOPSY  9/2/2015         UPPER GI ENDOSCOPY,BIOPSY  10/12/2015         VASCULAR SURGERY PROCEDURE UNLIST      Multiple revascularization procedures, toe amputations      SOCIAL HISTORY:   Social History     Social History    Marital status:      Spouse name: N/A    Number of children: N/A    Years of education: N/A     Occupational History    Retired       Social History Main Topics    Smoking status: Former Smoker    Smokeless tobacco: Never Used      Comment: 30 pack years; Occasional cigar    Alcohol use No      Comment: Former heavy drinker quit drinking about 17 years ago    Drug use: Yes     Special: Prescription    Sexual activity: Not Currently     Other Topics Concern    Not on file     Social History Narrative      FAMILY HISTORY: History reviewed. No pertinent family history.   Family History   Problem Relation Age of Onset    Lung Disease Mother     Alcohol abuse Father     Diabetes Sister     Cancer Maternal Grandfather      Head and neck    Cancer Paternal Grandmother      Stomach    Diabetes Sister        REVIEW of SYSTEMS:   History obtained from the patient and sister   Pertinent items are noted in the History of Present Illness. All other Systems reviewed and are considered negative. MENTAL STATUS EXAM & VITALS       MENTAL STATUS EXAM (MSE):    MSE FINDINGS ARE WITHIN NORMAL LIMITS (WNL) UNLESS OTHERWISE STATED BELOW. Orientation oriented to time, place and person   Vital Signs (BP,Pulse, Temp) See below (reviewed 5/6/2018); vital signs are WNL if not listed below.    Gait and Station Stable, no ataxia   Abnormal Muscular Movements/Tone/Behavior No EPS, no Tardive Dyskinesia, no abnormal muscular movements; wnl tone   Relations guarded   General Appearance:  age appropriate   Language No aphasia or dysarthria   Speech:  normal pitch and normal volume   Thought Processes logical, wnl rate of thoughts, good abstract reasoning and computation   Thought Associations goal directed   Thought Content free of delusions and free of hallucinations   Suicidal Ideations no plan  and no intention   Homicidal Ideations no plan  and no intention   Mood:  anxious   Affect:  anxious   Memory recent  adequate   Memory remote:  adequate   Concentration/Attention:  adequate   Fund of Knowledge average   Insight:  fair   Reliability fair   Judgment:  fair            VITALS:     Patient Vitals for the past 24 hrs:   Temp Pulse Resp BP SpO2   05/06/18 1517 (P) 98.1 °F (36.7 °C) 68 (P) 16 128/51 95 %   05/06/18 1222 98.2 °F (36.8 °C) 74 16 (!) 116/95 94 %   05/06/18 0832 97.9 °F (36.6 °C) 75 16 138/52 93 %   05/06/18 0542 98.2 °F (36.8 °C) 72 16 157/57 -   05/06/18 0332 98.3 °F (36.8 °C) 70 16 146/61 97 %   05/06/18 0331 - 70 - - (!) 75 %   05/05/18 2200 98 °F (36.7 °C) 69 16 132/65 97 %   05/05/18 2130 - 69 - 133/62 -   05/05/18 2100 - 68 - 135/68 -   05/05/18 2030 - 65 - 110/64 -   05/05/18 2000 - 65 - 123/63 -   05/05/18 1930 98.2 °F (36.8 °C) 69 16 133/61 -   05/05/18 1925 97.9 °F (36.6 °C) 72 16 108/80 96 %              DATA     LABORATORY DATA:  Labs Reviewed   CBC WITH AUTOMATED DIFF - Abnormal; Notable for the following: Result Value    RBC 3.33 (*)     HGB 10.3 (*)     HCT 31.1 (*)     PLATELET 930 (*)     NEUTROPHILS 76 (*)     All other components within normal limits   METABOLIC PANEL, COMPREHENSIVE - Abnormal; Notable for the following:     Chloride 115 (*)     CO2 18 (*)     Glucose 225 (*)     BUN 71 (*)     Creatinine 4.52 (*)     GFR est AA 16 (*)     GFR est non-AA 13 (*)     Calcium 6.9 (*)     AST (SGOT) 13 (*)     Alk. phosphatase 190 (*)     Protein, total 6.1 (*)     Albumin 2.5 (*)     A-G Ratio 0.7 (*)     All other components within normal limits   TROPONIN I - Abnormal; Notable for the following:     Troponin-I, Qt. 0.09 (*)     All other components within normal limits   CK W/ CKMB & INDEX - Abnormal; Notable for the following:     CK - MB 3.9 (*)     CK-MB Index 3.7 (*)     All other components within normal limits   TROPONIN I - Abnormal; Notable for the following:     Troponin-I, Qt. 0.12 (*)     All other components within normal limits   HEMOGLOBIN A1C WITH EAG - Abnormal; Notable for the following:     Hemoglobin A1c 7.5 (*)     All other components within normal limits   TROPONIN I - Abnormal; Notable for the following:     Troponin-I, Qt. 0.16 (*)     All other components within normal limits   CBC W/O DIFF - Abnormal; Notable for the following:     RBC 3.25 (*)     HGB 10.0 (*)     HCT 30.5 (*)     PLATELET 788 (*)     All other components within normal limits   METABOLIC PANEL, COMPREHENSIVE - Abnormal; Notable for the following:     Sodium 146 (*)     Chloride 116 (*)     CO2 19 (*)     Glucose 125 (*)     BUN 71 (*)     Creatinine 4.66 (*)     GFR est AA 15 (*)     GFR est non-AA 13 (*)     Calcium 6.9 (*)     AST (SGOT) 12 (*)     Alk.  phosphatase 177 (*)     Protein, total 6.1 (*)     Albumin 2.5 (*)     A-G Ratio 0.7 (*)     All other components within normal limits   CBC WITH AUTOMATED DIFF - Abnormal; Notable for the following:     RBC 3.18 (*)     HGB 9.7 (*)     HCT 30.3 (*)     PLATELET 579 (*)     All other components within normal limits   LIPID PANEL - Abnormal; Notable for the following:     Cholesterol, total 236 (*)     LDL, calculated 172.4 (*)     CHOL/HDL Ratio 6.9 (*)     All other components within normal limits   PTT - Abnormal; Notable for the following:     aPTT 44.3 (*)     All other components within normal limits   PTT - Abnormal; Notable for the following:     aPTT 42.7 (*)     All other components within normal limits   TROPONIN I - Abnormal; Notable for the following:     Troponin-I, Qt. 0.09 (*)     All other components within normal limits   PTT - Abnormal; Notable for the following:     aPTT 46.0 (*)     All other components within normal limits   PTT - Abnormal; Notable for the following:     aPTT 32.8 (*)     All other components within normal limits   METABOLIC PANEL, BASIC - Abnormal; Notable for the following:     Chloride 114 (*)     CO2 17 (*)     Glucose 236 (*)     BUN 63 (*)     Creatinine 4.51 (*)     GFR est AA 16 (*)     GFR est non-AA 13 (*)     Calcium 6.1 (*)     All other components within normal limits   MAGNESIUM - Abnormal; Notable for the following:     Magnesium 1.5 (*)     All other components within normal limits   PHOSPHORUS - Abnormal; Notable for the following:     Phosphorus 6.5 (*)     All other components within normal limits   CBC W/O DIFF - Abnormal; Notable for the following:     RBC 2.65 (*)     HGB 8.1 (*)     HCT 25.2 (*)     PLATELET 907 (*)     All other components within normal limits   URIC ACID - Abnormal; Notable for the following:     Uric acid 7.7 (*)     All other components within normal limits   IRON PROFILE - Abnormal; Notable for the following:     TIBC 161 (*)     Iron % saturation 61 (*)     All other components within normal limits   FERRITIN - Abnormal; Notable for the following:     Ferritin 744 (*)     All other components within normal limits   PTH INTACT - Abnormal; Notable for the following:     Calcium 6.1 (*)     PTH, Intact 705.1 (*)     All other components within normal limits   VITAMIN D, 25 HYDROXY - Abnormal; Notable for the following:     Vitamin D 25-Hydroxy 22.5 (*)     All other components within normal limits   CBC W/O DIFF - Abnormal; Notable for the following:     RBC 2.87 (*)     HGB 8.8 (*)     HCT 27.2 (*)     PLATELET 216 (*)     All other components within normal limits   METABOLIC PANEL, BASIC - Abnormal; Notable for the following:     Chloride 111 (*)     CO2 17 (*)     Glucose 104 (*)     BUN 60 (*)     Creatinine 4.57 (*)     GFR est AA 16 (*)     GFR est non-AA 13 (*)     Calcium 6.6 (*)     All other components within normal limits   METABOLIC PANEL, BASIC - Abnormal; Notable for the following:     Chloride 109 (*)     BUN 56 (*)     Creatinine 4.60 (*)     GFR est AA 16 (*)     GFR est non-AA 13 (*)     Calcium 6.6 (*)     All other components within normal limits   PHOSPHORUS - Abnormal; Notable for the following:     Phosphorus 5.5 (*)     All other components within normal limits   PTT - Abnormal; Notable for the following:     aPTT 33.7 (*)     All other components within normal limits   PROTHROMBIN TIME + INR - Abnormal; Notable for the following:     Prothrombin time 11.3 (*)     All other components within normal limits   METABOLIC PANEL, COMPREHENSIVE - Abnormal; Notable for the following:     Glucose 163 (*)     BUN 58 (*)     Creatinine 4.67 (*)     GFR est AA 15 (*)     GFR est non-AA 13 (*)     Calcium 6.5 (*)     ALT (SGPT) 11 (*)     Alk.  phosphatase 172 (*)     Protein, total 5.8 (*)     Albumin 2.1 (*)     A-G Ratio 0.6 (*)     All other components within normal limits   CBC W/O DIFF - Abnormal; Notable for the following:     RBC 2.90 (*)     HGB 8.8 (*)     HCT 27.2 (*)     PLATELET 227 (*)     All other components within normal limits   PTT - Abnormal; Notable for the following:     aPTT 34.8 (*)     All other components within normal limits   METABOLIC PANEL, BASIC - Abnormal; Notable for the following:     Potassium 3.2 (*)     CO2 35 (*)     Glucose 155 (*)     BUN 57 (*)     Creatinine 5.03 (*)     BUN/Creatinine ratio 11 (*)     GFR est AA 14 (*)     GFR est non-AA 12 (*)     Calcium 6.8 (*)     All other components within normal limits   CBC WITH AUTOMATED DIFF - Abnormal; Notable for the following:     RBC 2.77 (*)     HGB 8.6 (*)     HCT 26.6 (*)     RDW 14.7 (*)     All other components within normal limits   METABOLIC PANEL, COMPREHENSIVE - Abnormal; Notable for the following:     Potassium 3.2 (*)     CO2 33 (*)     Glucose 202 (*)     BUN 38 (*)     Creatinine 3.81 (*)     BUN/Creatinine ratio 10 (*)     GFR est AA 19 (*)     GFR est non-AA 16 (*)     Calcium 6.9 (*)     Alk. phosphatase 164 (*)     Protein, total 5.6 (*)     Albumin 2.1 (*)     A-G Ratio 0.6 (*)     All other components within normal limits   METABOLIC PANEL, COMPREHENSIVE - Abnormal; Notable for the following:     CO2 33 (*)     Glucose 137 (*)     BUN 24 (*)     Creatinine 3.22 (*)     BUN/Creatinine ratio 7 (*)     GFR est AA 24 (*)     GFR est non-AA 20 (*)     Calcium 7.9 (*)     Alk. phosphatase 187 (*)     Protein, total 6.0 (*)     Albumin 2.2 (*)     A-G Ratio 0.6 (*)     All other components within normal limits   CBC WITH AUTOMATED DIFF - Abnormal; Notable for the following:     RBC 2.71 (*)     HGB 8.2 (*)     HCT 26.7 (*)     RDW 15.3 (*)     NRBC 0.5 (*)     ABSOLUTE NRBC 0.03 (*)     MONOCYTES 15 (*)     IMMATURE GRANULOCYTES 1 (*)     ABS. IMM.  GRANS. 0.1 (*)     All other components within normal limits   GLUCOSE, POC - Abnormal; Notable for the following:     Glucose (POC) 170 (*)     All other components within normal limits   GLUCOSE, POC - Abnormal; Notable for the following:     Glucose (POC) 140 (*)     All other components within normal limits   GLUCOSE, POC - Abnormal; Notable for the following:     Glucose (POC) 180 (*)     All other components within normal limits   GLUCOSE, POC - Abnormal; Notable for the following:     Glucose (POC) 190 (*)     All other components within normal limits   GLUCOSE, POC - Abnormal; Notable for the following:     Glucose (POC) 173 (*)     All other components within normal limits   GLUCOSE, POC - Abnormal; Notable for the following:     Glucose (POC) 180 (*)     All other components within normal limits   GLUCOSE, POC - Abnormal; Notable for the following:     Glucose (POC) 126 (*)     All other components within normal limits   GLUCOSE, POC - Abnormal; Notable for the following:     Glucose (POC) 119 (*)     All other components within normal limits   GLUCOSE, POC - Abnormal; Notable for the following:     Glucose (POC) 215 (*)     All other components within normal limits   GLUCOSE, POC - Abnormal; Notable for the following:     Glucose (POC) 127 (*)     All other components within normal limits   GLUCOSE, POC - Abnormal; Notable for the following:     Glucose (POC) 129 (*)     All other components within normal limits   GLUCOSE, POC - Abnormal; Notable for the following:     Glucose (POC) 204 (*)     All other components within normal limits   GLUCOSE, POC - Abnormal; Notable for the following:     Glucose (POC) 162 (*)     All other components within normal limits   GLUCOSE, POC - Abnormal; Notable for the following:     Glucose (POC) 187 (*)     All other components within normal limits   GLUCOSE, POC - Abnormal; Notable for the following:     Glucose (POC) 145 (*)     All other components within normal limits   GLUCOSE, POC - Abnormal; Notable for the following:     Glucose (POC) 186 (*)     All other components within normal limits   GLUCOSE, POC - Abnormal; Notable for the following:     Glucose (POC) 211 (*)     All other components within normal limits   GLUCOSE, POC - Abnormal; Notable for the following:     Glucose (POC) 221 (*)     All other components within normal limits   GLUCOSE, POC - Abnormal; Notable for the following:     Glucose (POC) 174 (*) All other components within normal limits   GLUCOSE, POC - Abnormal; Notable for the following:     Glucose (POC) 187 (*)     All other components within normal limits   GLUCOSE, POC - Abnormal; Notable for the following:     Glucose (POC) 227 (*)     All other components within normal limits   GLUCOSE, POC - Abnormal; Notable for the following:     Glucose (POC) 119 (*)     All other components within normal limits   GLUCOSE, POC - Abnormal; Notable for the following:     Glucose (POC) 252 (*)     All other components within normal limits   GLUCOSE, POC - Abnormal; Notable for the following:     Glucose (POC) 256 (*)     All other components within normal limits   GLUCOSE, POC - Abnormal; Notable for the following:     Glucose (POC) 201 (*)     All other components within normal limits   GLUCOSE, POC - Abnormal; Notable for the following:     Glucose (POC) 179 (*)     All other components within normal limits   GLUCOSE, POC - Abnormal; Notable for the following:     Glucose (POC) 215 (*)     All other components within normal limits   GLUCOSE, POC - Abnormal; Notable for the following:     Glucose (POC) 112 (*)     All other components within normal limits   SAMPLES BEING HELD   MAGNESIUM   PROTHROMBIN TIME + INR   PTT   PTT   PTT   MAGNESIUM   PTT   MAGNESIUM   HEP B SURFACE AB   HEP B SURFACE AG   HEPATITIS C AB   MAGNESIUM   PHOSPHORUS   GLUCOSE, POC   GLUCOSE, POC     Admission on 04/28/2018   No results displayed because visit has over 200 results. RADIOLOGY REPORTS:    Results from Hospital Encounter encounter on 04/28/18   XR CHEST PORT   Narrative INDICATION:  Chest Pain with shortness of breath and nausea    COMPARISON: 9/25/2014    FINDINGS: Single AP portable view of the chest obtained at 1451 demonstrates a  stable cardiomediastinal silhouette. There is mild right basilar atelectasis. No  osseous abnormalities are seen. Impression IMPRESSION: Mild right basilar atelectasis.       Us Retroperitoneum Comp    Result Date: 4/29/2018  EXAM:  US RETROPERITONEUM COMP INDICATION:  Renal failure, chronic (kidney disease) COMPARISON: 2008 TECHNIQUE: Real-time sonography of the kidneys, retroperitoneum and bladder was performed with multiple static images obtained. FINDINGS: The kidneys have mildly heterogeneous echogenicity with no mass, stone or hydronephrosis. The right kidney measures 10.9 cm and the left kidney measures 9.6 cm in length. There are multiple bilateral anechoic renal cysts which measure up to 1.7 cm on the right and up to 1.9 cm on the left. Bilateral renal cortical thinning is seen. The aorta and iliac arteries and IVC are obscured by bowel gas. No retroperitoneal mass is identified. The urinary bladder is normal.     IMPRESSION: No evidence for renal obstruction. Xr Chest Port    Result Date: 4/28/2018  INDICATION:  Chest Pain with shortness of breath and nausea COMPARISON: 9/25/2014 FINDINGS: Single AP portable view of the chest obtained at 1451 demonstrates a stable cardiomediastinal silhouette. There is mild right basilar atelectasis. No osseous abnormalities are seen. IMPRESSION: Mild right basilar atelectasis. Ir Insert Non Tunl Cvc Over 5 Yrs    Result Date: 5/4/2018  PROCEDURE: Temporary dialysis catheter placement INDICATION: Hemodialysis OPERATING PHYSICIAN: Emma Reyes M.D. ESTIMATED BLOOD LOSS: None SPECIMENS REMOVED: None FLOURO TIME: 9.2 mGy COMPLICATIONS: None immediate. Procedure and findings: After informed consent was obtained, The right neck was then prepped and draped in utilizing maximal sterile barrier technique. Local anesthesia was obtained with 1% lidocaine injected subcutaneously. Sonographic images of the right  neck demonstrated a patent and compressible right  internal jugular vein. The right internal jugular vein was accessed under direct sonographic guidance using a 21 gauge micropuncture set.  A 0.018 wire was advanced into the right internal jugular vein and a 4 [place] sheath was placed. An ultrasonographic image was saved in the record. A 0.035 J wire was then advanced through the 4 [place] sheath into the superior vena cava. The tract was then serially dilated to 14 [place]. A 20 cm non tunneled catheter was then advanced over the wire under fluoroscopic guidance. The catheter extends to the proximal right atrium. The wire was removed, and each port flushed with heparin solution. The catheter was secured to the patient's skin using 2-0 silk suture. A dry sterile dressing was applied. The catheter works well and is ready for immediate use. IMPRESSION: Successful placement of right internal jugular 14 [place], 20 cm non tunneled dialysis catheter under fluoroscopic guidance. Catheter is ready for immediate use.               MEDICATIONS       ALL MEDICATIONS  Current Facility-Administered Medications   Medication Dose Route Frequency    famotidine (PEPCID) tablet 20 mg  20 mg Oral Q48H    heparin (porcine) pf 300 Units  300 Units InterCATHeter PRN    amLODIPine (NORVASC) tablet 10 mg  10 mg Oral DAILY    polyethylene glycol (MIRALAX) packet 17 g  17 g Oral DAILY    docusate sodium (COLACE) capsule 100 mg  100 mg Oral BID    insulin lispro (HUMALOG) injection   SubCUTAneous AC&HS    isosorbide mononitrate ER (IMDUR) tablet 60 mg  60 mg Oral DAILY    heparin (porcine) injection 5,000 Units  5,000 Units SubCUTAneous Q12H    hydrALAZINE (APRESOLINE) tablet 25 mg  25 mg Oral Q8H    calcium carbonate (TUMS) chewable tablet 200 mg [elemental]  200 mg Oral QID WITH MEALS    calcitRIOL (ROCALTROL) capsule 0.25 mcg  0.25 mcg Oral DAILY    magnesium oxide (MAG-OX) tablet 400 mg  400 mg Oral DAILY    epoetin calos (EPOGEN;PROCRIT) injection 10,000 Units  10,000 Units SubCUTAneous Once per day on Mon Thu    iodixanol (VISIPAQUE) 320 mg iodine/mL contrast injection 0-100 mL  0-100 mL IntraarTERial Multiple    sodium chloride (NS) flush 5-10 mL 5-10 mL IntraVENous Q8H    sodium chloride (NS) flush 5-10 mL  5-10 mL IntraVENous PRN    sodium bicarbonate (8.4%) 150 mEq in sterile water 1,000 mL infusion   IntraVENous CONTINUOUS    B complex-vitaminC-folic acid (NEPHROCAP) cap  1 Cap Oral DAILY    cloNIDine HCl (CATAPRES) tablet 0.1 mg  0.1 mg Oral Q4H PRN    nitroglycerin (NITROSTAT) tablet 0.4 mg  0.4 mg SubLINGual Q5MIN PRN    glucose chewable tablet 16 g  4 Tab Oral PRN    dextrose (D50W) injection syrg 12.5-25 g  12.5-25 g IntraVENous PRN    glucagon (GLUCAGEN) injection 1 mg  1 mg IntraMUSCular PRN    sodium chloride (NS) flush 5-10 mL  5-10 mL IntraVENous Q8H    sodium chloride (NS) flush 5-10 mL  5-10 mL IntraVENous PRN    metoprolol tartrate (LOPRESSOR) tablet 25 mg  25 mg Oral Q6H    aspirin chewable tablet 81 mg  81 mg Oral DAILY    atorvastatin (LIPITOR) tablet 80 mg  80 mg Oral QPM    acetaminophen (TYLENOL) tablet 650 mg  650 mg Oral Q4H PRN    oxyCODONE-acetaminophen (PERCOCET) 5-325 mg per tablet 1 Tab  1 Tab Oral Q4H PRN    hydrALAZINE (APRESOLINE) 20 mg/mL injection 10 mg  10 mg IntraVENous Q2H PRN    naloxone (NARCAN) injection 0.4 mg  0.4 mg IntraVENous PRN    diphenhydrAMINE (BENADRYL) injection 12.5 mg  12.5 mg IntraVENous Q4H PRN    ondansetron (ZOFRAN) injection 4 mg  4 mg IntraVENous Q4H PRN    bisacodyl (DULCOLAX) tablet 5 mg  5 mg Oral DAILY PRN    clopidogrel (PLAVIX) tablet 75 mg  75 mg Oral DAILY      SCHEDULED MEDICATIONS  Current Facility-Administered Medications   Medication Dose Route Frequency    famotidine (PEPCID) tablet 20 mg  20 mg Oral Q48H    amLODIPine (NORVASC) tablet 10 mg  10 mg Oral DAILY    polyethylene glycol (MIRALAX) packet 17 g  17 g Oral DAILY    docusate sodium (COLACE) capsule 100 mg  100 mg Oral BID    insulin lispro (HUMALOG) injection   SubCUTAneous AC&HS    isosorbide mononitrate ER (IMDUR) tablet 60 mg  60 mg Oral DAILY    heparin (porcine) injection 5,000 Units  5,000 Units SubCUTAneous Q12H    hydrALAZINE (APRESOLINE) tablet 25 mg  25 mg Oral Q8H    calcium carbonate (TUMS) chewable tablet 200 mg [elemental]  200 mg Oral QID WITH MEALS    calcitRIOL (ROCALTROL) capsule 0.25 mcg  0.25 mcg Oral DAILY    magnesium oxide (MAG-OX) tablet 400 mg  400 mg Oral DAILY    epoetin calos (EPOGEN;PROCRIT) injection 10,000 Units  10,000 Units SubCUTAneous Once per day on Mon Thu    sodium chloride (NS) flush 5-10 mL  5-10 mL IntraVENous Q8H    sodium bicarbonate (8.4%) 150 mEq in sterile water 1,000 mL infusion   IntraVENous CONTINUOUS    B complex-vitaminC-folic acid (NEPHROCAP) cap  1 Cap Oral DAILY    sodium chloride (NS) flush 5-10 mL  5-10 mL IntraVENous Q8H    metoprolol tartrate (LOPRESSOR) tablet 25 mg  25 mg Oral Q6H    aspirin chewable tablet 81 mg  81 mg Oral DAILY    atorvastatin (LIPITOR) tablet 80 mg  80 mg Oral QPM    clopidogrel (PLAVIX) tablet 75 mg  75 mg Oral DAILY                ASSESSMENT & PLAN        The patient Cary Layne is a 58 y.o.  male who presents at this time for treatment of the following diagnoses:  Patient Active Hospital Problem List:       Adjustment disorder   Assessment: easily frustrated and angry     Plan: continue medical care for his high Creatinine , Check Ammonia level , may use Ativan 0.5 mg po bid prn for anxiety     Disposition: no need for inpatient psychiatry   Thank you very much for the opportunity to participate in the care of your patient. I will continue to follow up with patient as deemed appropriate. I have reviewed admission (and previous/old) labs and medical tests in the EHR and or transferring hospital documents. I will continue to order blood tests/labs and diagnostic tests as deemed appropriate and review results as they become available (see orders for details). I have reviewed old psychiatric and medical records available in the EHR.  I Will order additional psychiatric records from other institutions to further elucidate the nature of patient's psychopathology and review once available. I will gather additional collateral information from friends, family and o/p treatment team to further elucidate the nature of patient's psychopathology and baselline level of psychiatric functioning.                      SIGNED:    Golden Youssef MD  5/6/2018

## 2018-05-06 NOTE — PROGRESS NOTES
Bedside report received from  Ambika Tinajero RN                 Assessment, Background, Procedure summary, Intake/Output, MAR, and recent results discussed. Care assumed. 0730 Patient IV out and patient's gown bloody. Assumed Iv accidentally removed by patient. No active bleeding from IV site . Patient refuses reinsertion of IV and gown change/ bath at this time. Patient agrees to allow for IV and bath later in the morning after breakfast.     0845 While assisting another patient down the kma observed patient screaming and yelling in room. Responded to patient yelling. Patient very upset and irate sitting up in wheelchair. Patient states he is upset at the way he has been treated and wants to leave. Patient states he is angry at not being able to order additional food for breakfast due to dietary restrictions and at the way he felt he was treated by the doctor. Patient continues to scream and throws drink on floor. Calmly listened and spoke with patient to attempt to educate and reassure patient. Patient calm. Reordered breakfast per patient orders. Offered to call family to which patient declines. 1030Patient initially states breakfast tray looks good and accepts tray from dietary. Patient then becomes very angry and throws tray to ground stating it looks terrible and he refuses to eat. Clean up contents and try as patient throws milk and nepro towards nurse / trash can tray then attempted to deescalate situation. Patient refuses additional food and medications at this time. Tells nurse to leave then knocks drink on the ground. Patient step daughter and son-in-law arrive shortly after and patient begins yelling at them. Family members  Calm patient to where he agrees to order lunch and take his medications. Continues to refuse IV and bath/gown change. Patient up in wheelchair and refuses to turn/ reposition self. Refuses chair cushion at this time. Patient eats 20% of lunch. Appears calm.      1500 Patient bathed/ gown changed, linens changed and new IV inserted. Problem: Falls - Risk of  Goal: *Absence of Falls  Document Momo Fall Risk and appropriate interventions in the flowsheet. Outcome: Progressing Towards Goal  Fall Risk Interventions:  Mobility Interventions: Communicate number of staff needed for ambulation/transfer, Patient to call before getting OOB, Strengthening exercises (ROM-active/passive), Utilize walker, cane, or other assitive device    Mentation Interventions: Adequate sleep, hydration, pain control, Door open when patient unattended, Evaluate medications/consider consulting pharmacy, Familiar objects from home, Family/sitter at bedside, Increase mobility, More frequent rounding, Reorient patient, Room close to nurse's station, Toileting rounds, Update white board    Medication Interventions: Assess postural VS orthostatic hypotension, Evaluate medications/consider consulting pharmacy, Patient to call before getting OOB, Teach patient to arise slowly    Elimination Interventions: Call light in reach, Patient to call for help with toileting needs, Toilet paper/wipes in reach, Toileting schedule/hourly rounds, Urinal in reach             Problem: Pressure Injury - Risk of  Goal: *Prevention of pressure injury  Document Mitchel Scale and appropriate interventions in the flowsheet. Outcome: Progressing Towards Goal  Pressure Injury Interventions:  Sensory Interventions: Assess changes in LOC, Assess need for specialty bed, Avoid rigorous massage over bony prominences, Chair cushion, Check visual cues for pain, Float heels, Keep linens dry and wrinkle-free, Maintain/enhance activity level, Minimize linen layers, Monitor skin under medical devices, Pad between skin to skin, Pressure redistribution bed/mattress (bed type), Sit a 90-degree angle/use footstool if needed, Turn and reposition approx.  every two hours (pillows and wedges if needed)         Activity Interventions: Chair cushion, Increase time out of bed, Pressure redistribution bed/mattress(bed type), Trapeze to reposition    Mobility Interventions: Chair cushion, Float heels, HOB 30 degrees or less, Pressure redistribution bed/mattress (bed type), Turn and reposition approx. every two hours(pillow and wedges)    Nutrition Interventions: Document food/fluid/supplement intake, Discuss nutritional consult with provider, Offer support with meals,snacks and hydration    Friction and Shear Interventions: Apply protective barrier, creams and emollients, Feet elevated on foot rest, HOB 30 degrees or less, Lift sheet, Minimize layers, Sit at 90-degree angle     . Verbal report given to oncoming nurse Ron Jolly RN  Report consisted of patients Situation, Background, Assessment, and   Recommendations    Information was reviewed with the receiving nurse. Opportunity for questions and clarification was provided.       June Bernal RN

## 2018-05-06 NOTE — DIALYSIS
[facility]  [phone]    Vitals   Pre   Post   Assessment   Pre   Post     Temp  Temp: 98.2 °F (36.8 °C) (05/05/18 1930)  98 LOC  A/OX3 A/OX3   HR   Pulse (Heart Rate): 69 (05/05/18 1930) 69 Lungs   CTA  CTA   B/P   BP: 133/61 (HD started) (05/05/18 1930) 132/65 Cardiac   NSR  NSR   Resp   Resp Rate: 16 (05/05/18 1930) 16 Skin   Warm and dry  warm and dry   Pain level  Pain Intensity 1: 7 (05/05/18 1707) 0/10 Edema  R leg +1     None    Orders:    Duration:   Start:   1930 End:   2200   Total:   2.5hrs   Dialyzer:   Dialyzer/Set Up Inspection: Marcelino Lee (05/05/18 1930)   K Bath:   Dialysate K (mEq/L): 4 (05/05/18 1930)   Ca Bath:   Dialysate CA (mEq/L): 2.5 (05/05/18 1930)   Na/Bicarb:   Dialysate NA (mEq/L): 140/35 (05/05/18 1930)   Target Fluid Removal:   2000 ml   Access     Type & Location:   RIJ CVC, + aspiration and flush, some drainage in site, dressing changed. Labs     Obtained/Reviewed   Critical Results Called   Date when labs were drawn-  Hgb-    HGB   Date Value Ref Range Status   05/05/2018 8.6 (L) 12.1 - 17.0 g/dL Final     K-    Potassium   Date Value Ref Range Status   05/05/2018 3.2 (L) 3.5 - 5.1 mmol/L Final     Ca-   Calcium   Date Value Ref Range Status   05/05/2018 6.9 (L) 8.5 - 10.1 MG/DL Final     Bun-   BUN   Date Value Ref Range Status   05/05/2018 38 (H) 6 - 20 MG/DL Final     Creat-   Creatinine   Date Value Ref Range Status   05/05/2018 3.81 (H) 0.70 - 1.30 MG/DL Final     Comment:     INVESTIGATED PER DELTA CHECK PROTOCOL        Medications/ Blood Products Given     Name   Dose   Route and Time     None                 Blood Volume Processed (BVP):    56.2 L Net Fluid   Removed:  2000 ml   Comments   Time Out Done: yes   Primary Nurse Rpt Pre:Mary Pineda, RN  Primary Nurse Rpt Post: RENATO Alba, CAITLYN  Pt Education:access care   Care Plan:continue HD tx per ordered   Tx Summary: tolerated tx well, BP stable, no acute distress, all possible blood returned  Admiting Diagnosis:  Pt's previous clinic-  Consent signed - Informed Consent Verified: Yes (05/05/18 1930)  [facility] Consent - yes   Hepatitis Status- neg 5/4/18  Machine #- Machine Number: [id number] (05/05/18 1930)  Telemetry status-none  Pre-dialysis wt. - Pre-Dialysis Weight: 74.3 kg (163 lb 12.8 oz) (05/04/18 1945)

## 2018-05-06 NOTE — PROGRESS NOTES
1930 Bedside shift change report given to me (oncoming nurse) by Hunter Srivastava (offgoing nurse). Report given with SBAR, MAR and Recent Results. Introduced self to pt , then pt off unit for scheduled dialysis. 2215 pt returned to floor from Dialysis via bed . Pt requesting pain medication . 2300 pt back to bed from bathroom , stated something is pulling in my arm . IV noted dislodged and unable to flush . IV removed . Pt refused to have another site inserted . Pt informed labs due in the morning , I will wait till then to insert new IV . Pt agreed . 0 Went to give pt scheduled meds pt began questioning why he is being stuck again , explained to pt why he is getting heparin . Pt although angry allowed and received the heparin injection . Pt acknowledged that his anger was not toward me the nurse  but wanted to know why he was still in the hospital and demanded to see a physician right now . A second nurse appeared in the room which seemed to agitate the pt since she was the one that  re started his IV . He began asking why each time we (  Nurses ) came in his room he got stuck . Why is the IV there if nothing is infusing through it . Pt reminded that he refused the Bicarb infusion this morning. Also reminded pt that he is diabetic and we need a way to treat him if his blood sugar gets low. . Pt became loud and aggressive , staff left room and code atlas called at 0600.     0610 [facility] supervisor  responded as well as security , family members called no answer at daughter number or wife's . Finally reached  Someone who identified herself as his daughter in law but stated his son was present. Spoke to the son at 80 .  , explained situation to him and asked him to come in since his father is refusing care, being aggressive toward the nurses and requesting to be released. Son agreed and stated he will call his mother since he is 39 min's away.   No further interaction with pt was made by staff , it was agreed to wait for family members to get here . 0700 pt 's son Raul Smoke here recanted events if the morning with him . Son and wife shared with me that this is a pattern of behavior for the patient , he has been discharged from several nursing homes for being aggressive with staff. son and wife spoke with pt who claimed he has no recollection of anything happening this morning. 0730 Bedside shift change report given to Wilfredo Cage  (oncoming nurse) by me (offgoing nurse). Report given with SBAR, MAR and Recent Results.

## 2018-05-06 NOTE — PROGRESS NOTES
[facility] Progress Note        NAME: Vladimir Syed       :  1955       MRN:  [id number]     Date/Time: 2018    Risk of deterioration: medium       Assessment:    Plan:  MINO on CKD V - ?ESRD  S/P CATH-2V cad, (R) ROSALEE--Right kidney measures 10.9 cm by US  HTN  PAD/PVD s/p (L) AKA-(L) iliac occlusion  ANemia  Secondary PTH Pt had a baseline creatinine of around 2-2.5 over the last few years with recent deterioration to 4-4.6--likely due to advancing ckd (+/-) ROSALEE    HD # 1 . HD#2  - Next HD Mon or Tues-If he is deemed ESRD his son will transport him and is flexibile regarding units. Once established as ESRD, can start ACE/ARB. Appreciate vascular seeing pt.--looks like his left arm will be used for access with dr. Javier Alarcon in the future    Potassium better. Continue rocaltrol, epo       Subjective:     Chief Complaint: Sleepy today. Son at bedside says he has been OK. Review of Systems: no n/v/f/c    Objective:     VITALS:   Last 24hrs VS reviewed since prior progress note.  Most recent are:  Visit Vitals    /57    Pulse 72    Temp 98.2 °F (36.8 °C)    Resp 16    Ht 5' 9\" (1.753 m)    Wt 76.3 kg (168 lb 4.8 oz)    SpO2 97%    BMI 24.85 kg/m2     SpO2 Readings from Last 6 Encounters:   18 97%   01/15/16 98%   16 100%   12/04/15 98%   10/13/15 97%   09/25/15 100%    O2 Flow Rate (L/min): 3 l/min       Intake/Output Summary (Last 24 hours) at 18 0850  Last data filed at 18 2200   Gross per 24 hour   Intake              460 ml   Output             2350 ml   Net            -1890 ml        Telemetry Reviewed    PHYSICAL EXAM:    General   NAD  No edema            Lab Data Reviewed: (see below)    Medications Reviewed: (see below)    PMH/SH reviewed - no change compared to H&P  ___________________________________________________  ___________    Attending Physician: Austyn Townsend MD ____________________________________________________  MEDICATIONS:  Current Facility-Administered Medications   Medication Dose Route Frequency    famotidine (PEPCID) tablet 20 mg  20 mg Oral Q48H    heparin (porcine) pf 300 Units  300 Units InterCATHeter PRN    amLODIPine (NORVASC) tablet 10 mg  10 mg Oral DAILY    polyethylene glycol (MIRALAX) packet 17 g  17 g Oral DAILY    docusate sodium (COLACE) capsule 100 mg  100 mg Oral BID    insulin lispro (HUMALOG) injection   SubCUTAneous AC&HS    isosorbide mononitrate ER (IMDUR) tablet 60 mg  60 mg Oral DAILY    heparin (porcine) injection 5,000 Units  5,000 Units SubCUTAneous Q12H    hydrALAZINE (APRESOLINE) tablet 25 mg  25 mg Oral Q8H    calcium carbonate (TUMS) chewable tablet 200 mg [elemental]  200 mg Oral QID WITH MEALS    calcitRIOL (ROCALTROL) capsule 0.25 mcg  0.25 mcg Oral DAILY    magnesium oxide (MAG-OX) tablet 400 mg  400 mg Oral DAILY    epoetin calos (EPOGEN;PROCRIT) injection 10,000 Units  10,000 Units SubCUTAneous Once per day on Mon Thu    iodixanol (VISIPAQUE) 320 mg iodine/mL contrast injection 0-100 mL  0-100 mL IntraarTERial Multiple    sodium chloride (NS) flush 5-10 mL  5-10 mL IntraVENous Q8H    sodium chloride (NS) flush 5-10 mL  5-10 mL IntraVENous PRN    sodium bicarbonate (8.4%) 150 mEq in sterile water 1,000 mL infusion   IntraVENous CONTINUOUS    B complex-vitaminC-folic acid (NEPHROCAP) cap  1 Cap Oral DAILY    cloNIDine HCl (CATAPRES) tablet 0.1 mg  0.1 mg Oral Q4H PRN    nitroglycerin (NITROSTAT) tablet 0.4 mg  0.4 mg SubLINGual Q5MIN PRN    glucose chewable tablet 16 g  4 Tab Oral PRN    dextrose (D50W) injection syrg 12.5-25 g  12.5-25 g IntraVENous PRN    glucagon (GLUCAGEN) injection 1 mg  1 mg IntraMUSCular PRN    sodium chloride (NS) flush 5-10 mL  5-10 mL IntraVENous Q8H    sodium chloride (NS) flush 5-10 mL  5-10 mL IntraVENous PRN    metoprolol tartrate (LOPRESSOR) tablet 25 mg  25 mg Oral Q6H  aspirin chewable tablet 81 mg  81 mg Oral DAILY    atorvastatin (LIPITOR) tablet 80 mg  80 mg Oral QPM    acetaminophen (TYLENOL) tablet 650 mg  650 mg Oral Q4H PRN    oxyCODONE-acetaminophen (PERCOCET) 5-325 mg per tablet 1 Tab  1 Tab Oral Q4H PRN    hydrALAZINE (APRESOLINE) 20 mg/mL injection 10 mg  10 mg IntraVENous Q2H PRN    naloxone (NARCAN) injection 0.4 mg  0.4 mg IntraVENous PRN    diphenhydrAMINE (BENADRYL) injection 12.5 mg  12.5 mg IntraVENous Q4H PRN    ondansetron (ZOFRAN) injection 4 mg  4 mg IntraVENous Q4H PRN    bisacodyl (DULCOLAX) tablet 5 mg  5 mg Oral DAILY PRN    clopidogrel (PLAVIX) tablet 75 mg  75 mg Oral DAILY        LABS:  Recent Labs      05/06/18 0337 05/05/18 0327   WBC  6.1  5.4   HGB  8.2*  8.6*   HCT  26.7*  26.6*   PLT  168  150     Recent Labs      05/06/18   0337  05/05/18   0327  05/04/18   0430   NA  143  141  142   K  3.7  3.2*  3.2*   CL  105  99  99   CO2  33*  33*  35*   BUN  24*  38*  57*   CREA  3.22*  3.81*  5.03*   GLU  137*  202*  155*   CA  7.9*  6.9*  6.8*   MG   --   1.9  1.7   PHOS   --   3.6   --      Recent Labs      05/06/18   0337  05/05/18   0327   SGOT  17  18   ALT  17  13   AP  187*  164*   TBILI  0.3  0.6   TP  6.0*  5.6*   ALB  2.2*  2.1*   GLOB  3.8  3.5     Recent Labs      05/04/18   0850   APTT  34.8*      No results for input(s): FE, TIBC, PSAT, FERR in the last 72 hours. No results for input(s): PH, PCO2, PO2 in the last 72 hours. No results for input(s): CPK, CKNDX, TROIQ in the last 72 hours.     No lab exists for component: CPKMB  Lab Results   Component Value Date/Time    Glucose (POC) 201 (H) 05/05/2018 10:37 PM    Glucose (POC) 100 05/05/2018 05:01 PM    Glucose (POC) 256 (H) 05/05/2018 12:01 PM    Glucose (POC) 252 (H) 05/05/2018 08:00 AM    Glucose (POC) 119 (H) 05/04/2018 10:43 PM

## 2018-05-07 LAB
ALBUMIN SERPL-MCNC: 2.4 G/DL (ref 3.5–5)
ALBUMIN/GLOB SERPL: 0.8 {RATIO} (ref 1.1–2.2)
ALP SERPL-CCNC: 183 U/L (ref 45–117)
ALT SERPL-CCNC: 14 U/L (ref 12–78)
AMMONIA PLAS-SCNC: <10 UMOL/L
ANION GAP SERPL CALC-SCNC: 9 MMOL/L (ref 5–15)
AST SERPL-CCNC: 14 U/L (ref 15–37)
BASOPHILS # BLD: 0 K/UL (ref 0–0.1)
BASOPHILS NFR BLD: 1 % (ref 0–1)
BILIRUB SERPL-MCNC: 0.5 MG/DL (ref 0.2–1)
BUN SERPL-MCNC: 32 MG/DL (ref 6–20)
BUN/CREAT SERPL: 8 (ref 12–20)
CALCIUM SERPL-MCNC: 7.9 MG/DL (ref 8.5–10.1)
CHLORIDE SERPL-SCNC: 107 MMOL/L (ref 97–108)
CO2 SERPL-SCNC: 29 MMOL/L (ref 21–32)
CREAT SERPL-MCNC: 4.25 MG/DL (ref 0.7–1.3)
DIFFERENTIAL METHOD BLD: ABNORMAL
EOSINOPHIL # BLD: 0.2 K/UL (ref 0–0.4)
EOSINOPHIL NFR BLD: 4 % (ref 0–7)
ERYTHROCYTE [DISTWIDTH] IN BLOOD BY AUTOMATED COUNT: 15.8 % (ref 11.5–14.5)
GLOBULIN SER CALC-MCNC: 3.2 G/DL (ref 2–4)
GLUCOSE BLD STRIP.AUTO-MCNC: 174 MG/DL (ref 65–100)
GLUCOSE BLD STRIP.AUTO-MCNC: 184 MG/DL (ref 65–100)
GLUCOSE BLD STRIP.AUTO-MCNC: 188 MG/DL (ref 65–100)
GLUCOSE BLD STRIP.AUTO-MCNC: 368 MG/DL (ref 65–100)
GLUCOSE SERPL-MCNC: 202 MG/DL (ref 65–100)
HCT VFR BLD AUTO: 25.2 % (ref 36.6–50.3)
HGB BLD-MCNC: 7.9 G/DL (ref 12.1–17)
IMM GRANULOCYTES # BLD: 0.1 K/UL (ref 0–0.04)
IMM GRANULOCYTES NFR BLD AUTO: 1 % (ref 0–0.5)
LYMPHOCYTES # BLD: 1.3 K/UL (ref 0.8–3.5)
LYMPHOCYTES NFR BLD: 23 % (ref 12–49)
MCH RBC QN AUTO: 31.3 PG (ref 26–34)
MCHC RBC AUTO-ENTMCNC: 31.3 G/DL (ref 30–36.5)
MCV RBC AUTO: 100 FL (ref 80–99)
MONOCYTES # BLD: 0.9 K/UL (ref 0–1)
MONOCYTES NFR BLD: 16 % (ref 5–13)
NEUTS SEG # BLD: 3.1 K/UL (ref 1.8–8)
NEUTS SEG NFR BLD: 56 % (ref 32–75)
NRBC # BLD: 0.03 K/UL (ref 0–0.01)
NRBC BLD-RTO: 0.5 PER 100 WBC
PLATELET # BLD AUTO: 184 K/UL (ref 150–400)
PMV BLD AUTO: 10.5 FL (ref 8.9–12.9)
POTASSIUM SERPL-SCNC: 4 MMOL/L (ref 3.5–5.1)
PROT SERPL-MCNC: 5.6 G/DL (ref 6.4–8.2)
RBC # BLD AUTO: 2.52 M/UL (ref 4.1–5.7)
SERVICE CMNT-IMP: ABNORMAL
SODIUM SERPL-SCNC: 145 MMOL/L (ref 136–145)
WBC # BLD AUTO: 5.5 K/UL (ref 4.1–11.1)

## 2018-05-07 PROCEDURE — 74011250636 HC RX REV CODE- 250/636: Performed by: INTERNAL MEDICINE

## 2018-05-07 PROCEDURE — 74011250637 HC RX REV CODE- 250/637: Performed by: INTERNAL MEDICINE

## 2018-05-07 PROCEDURE — 80053 COMPREHEN METABOLIC PANEL: CPT | Performed by: INTERNAL MEDICINE

## 2018-05-07 PROCEDURE — 97161 PT EVAL LOW COMPLEX 20 MIN: CPT

## 2018-05-07 PROCEDURE — 97530 THERAPEUTIC ACTIVITIES: CPT

## 2018-05-07 PROCEDURE — 36415 COLL VENOUS BLD VENIPUNCTURE: CPT | Performed by: INTERNAL MEDICINE

## 2018-05-07 PROCEDURE — 82140 ASSAY OF AMMONIA: CPT | Performed by: INTERNAL MEDICINE

## 2018-05-07 PROCEDURE — 85025 COMPLETE CBC W/AUTO DIFF WBC: CPT | Performed by: INTERNAL MEDICINE

## 2018-05-07 PROCEDURE — 65660000000 HC RM CCU STEPDOWN

## 2018-05-07 PROCEDURE — 82962 GLUCOSE BLOOD TEST: CPT

## 2018-05-07 PROCEDURE — 77030011256 HC DRSG MEPILEX <16IN NO BORD MOLN -A

## 2018-05-07 PROCEDURE — 74011636637 HC RX REV CODE- 636/637: Performed by: INTERNAL MEDICINE

## 2018-05-07 PROCEDURE — 74011250637 HC RX REV CODE- 250/637: Performed by: HOSPITALIST

## 2018-05-07 RX ORDER — PANTOPRAZOLE SODIUM 40 MG/1
40 TABLET, DELAYED RELEASE ORAL
Status: DISCONTINUED | OUTPATIENT
Start: 2018-05-08 | End: 2018-05-14 | Stop reason: HOSPADM

## 2018-05-07 RX ADMIN — Medication 10 ML: at 15:06

## 2018-05-07 RX ADMIN — HYDRALAZINE HYDROCHLORIDE 25 MG: 50 TABLET, FILM COATED ORAL at 08:33

## 2018-05-07 RX ADMIN — Medication 10 ML: at 22:39

## 2018-05-07 RX ADMIN — Medication 10 ML: at 08:35

## 2018-05-07 RX ADMIN — OXYCODONE HYDROCHLORIDE AND ACETAMINOPHEN 1 TABLET: 5; 325 TABLET ORAL at 20:40

## 2018-05-07 RX ADMIN — AMLODIPINE BESYLATE 10 MG: 5 TABLET ORAL at 08:32

## 2018-05-07 RX ADMIN — POLYETHYLENE GLYCOL 3350 17 G: 17 POWDER, FOR SOLUTION ORAL at 08:34

## 2018-05-07 RX ADMIN — CALCIUM CARBONATE 200 MG: 500 TABLET, CHEWABLE ORAL at 17:06

## 2018-05-07 RX ADMIN — OXYCODONE HYDROCHLORIDE AND ACETAMINOPHEN 1 TABLET: 5; 325 TABLET ORAL at 15:47

## 2018-05-07 RX ADMIN — HEPARIN SODIUM 5000 UNITS: 5000 INJECTION, SOLUTION INTRAVENOUS; SUBCUTANEOUS at 08:34

## 2018-05-07 RX ADMIN — METOPROLOL TARTRATE 25 MG: 25 TABLET ORAL at 08:33

## 2018-05-07 RX ADMIN — HYDRALAZINE HYDROCHLORIDE 25 MG: 50 TABLET, FILM COATED ORAL at 22:38

## 2018-05-07 RX ADMIN — INSULIN LISPRO 2 UNITS: 100 INJECTION, SOLUTION INTRAVENOUS; SUBCUTANEOUS at 08:35

## 2018-05-07 RX ADMIN — CALCIUM CARBONATE 200 MG: 500 TABLET, CHEWABLE ORAL at 08:32

## 2018-05-07 RX ADMIN — Medication 400 MG: at 08:32

## 2018-05-07 RX ADMIN — CALCIUM CARBONATE 200 MG: 500 TABLET, CHEWABLE ORAL at 11:51

## 2018-05-07 RX ADMIN — ISOSORBIDE MONONITRATE 60 MG: 30 TABLET, EXTENDED RELEASE ORAL at 08:33

## 2018-05-07 RX ADMIN — CALCIUM CARBONATE 200 MG: 500 TABLET, CHEWABLE ORAL at 22:37

## 2018-05-07 RX ADMIN — HYDRALAZINE HYDROCHLORIDE 25 MG: 50 TABLET, FILM COATED ORAL at 15:05

## 2018-05-07 RX ADMIN — CALCITRIOL 0.25 MCG: 0.25 CAPSULE, LIQUID FILLED ORAL at 08:54

## 2018-05-07 RX ADMIN — METOPROLOL TARTRATE 25 MG: 25 TABLET ORAL at 11:52

## 2018-05-07 RX ADMIN — INSULIN LISPRO 2 UNITS: 100 INJECTION, SOLUTION INTRAVENOUS; SUBCUTANEOUS at 17:08

## 2018-05-07 RX ADMIN — METOPROLOL TARTRATE 25 MG: 25 TABLET ORAL at 17:08

## 2018-05-07 RX ADMIN — CLOPIDOGREL BISULFATE 75 MG: 75 TABLET ORAL at 08:32

## 2018-05-07 RX ADMIN — INSULIN LISPRO 10 UNITS: 100 INJECTION, SOLUTION INTRAVENOUS; SUBCUTANEOUS at 11:51

## 2018-05-07 RX ADMIN — ERYTHROPOIETIN 10000 UNITS: 10000 INJECTION, SOLUTION INTRAVENOUS; SUBCUTANEOUS at 17:07

## 2018-05-07 RX ADMIN — DOCUSATE SODIUM 100 MG: 100 CAPSULE, LIQUID FILLED ORAL at 08:33

## 2018-05-07 RX ADMIN — METOPROLOL TARTRATE 25 MG: 25 TABLET ORAL at 22:38

## 2018-05-07 RX ADMIN — NEPHROCAP 1 CAPSULE: 1 CAP ORAL at 08:32

## 2018-05-07 RX ADMIN — HEPARIN SODIUM 5000 UNITS: 5000 INJECTION, SOLUTION INTRAVENOUS; SUBCUTANEOUS at 17:07

## 2018-05-07 RX ADMIN — ASPIRIN 81 MG 81 MG: 81 TABLET ORAL at 08:33

## 2018-05-07 RX ADMIN — ATORVASTATIN CALCIUM 80 MG: 40 TABLET, FILM COATED ORAL at 17:06

## 2018-05-07 NOTE — ROUTINE PROCESS
TRANSFER - OUT REPORT:    Verbal report given to [place] RN(name) on [facility]  being transferred to [facility](unit) for routine progression of care       Report consisted of patients Situation, Background, Assessment and   Recommendations(SBAR). Information from the following report(s) SBAR, Kardex, Intake/Output and MAR was reviewed with the receiving nurse. Lines:   Peripheral IV 05/06/18 Left Antecubital (Active)   Site Assessment Clean, dry, & intact 5/7/2018  9:18 AM   Phlebitis Assessment 0 5/7/2018  9:18 AM   Infiltration Assessment 0 5/7/2018  9:18 AM   Dressing Status Clean, dry, & intact 5/7/2018  9:18 AM   Dressing Type Tape;Transparent 5/7/2018  9:18 AM   Hub Color/Line Status Pink;Patent; Flushed 5/7/2018  9:18 AM   Action Taken Blood drawn 5/6/2018  3:55 AM   Alcohol Cap Used Yes 5/7/2018  9:18 AM        Opportunity for questions and clarification was provided.       Patient transported with:   Monitor, RN

## 2018-05-07 NOTE — PROGRESS NOTES
[facility] Progress Note        NAME: Wille Harada       :  1955       MRN:  [id number]     Date/Time: 2018    Risk of deterioration: medium       Assessment:    Plan:  MINO on CKD V - ?ESRD    S/P CATH-2V cad    (R) ROSALEE- s/p BMS of of R renal artery. HTN   PAD/PVD s/p (L) AKA-(L) iliac occlusion  Anemia  Secondary PTH Pt had a baseline creatinine of around 2-2.5 over the last few years with recent deterioration to 4-4.6--likely due to advancing ckd (+/-) ROSALEE    HD # 1 . HD#2 / with improved clearances    Plan 3rd HD tomm-if he remains HD dependent will need TDC and outpt HD unit spot for d/c planning    D/C HCO3 drip    Once established as ESRD, can start ACE/ARB. Appreciate vascular seeing pt.--looks like his left arm will be used for access with dr. Manuel Aviles in the future    Continue rocaltrol, epo, TUMS with meals       Subjective:     Chief Complaint: up in bed for PT. No cp or sob    Review of Systems: no n/v/f/c    Objective:     VITALS:   Last 24hrs VS reviewed since prior progress note.  Most recent are:  Visit Vitals    /62 (BP Patient Position: At rest)    Pulse 75    Temp 98.4 °F (36.9 °C)    Resp 20    Ht 5' 9\" (1.753 m)    Wt 72.6 kg (160 lb)    SpO2 94%    BMI 23.63 kg/m2     SpO2 Readings from Last 6 Encounters:   18 94%   01/15/16 98%   16 100%   12/04/15 98%   10/13/15 97%   09/25/15 100%    O2 Flow Rate (L/min): 3 l/min       Intake/Output Summary (Last 24 hours) at 18 1440  Last data filed at 18 0918   Gross per 24 hour   Intake              130 ml   Output              350 ml   Net             -220 ml        Telemetry Reviewed    PHYSICAL EXAM:  General   NAD  No icterus  Clear  RRR, no rub  R amanda intact  No edema on R. L AKA     Lab Data Reviewed: (see below)    Medications Reviewed: (see below)    PMH/SH reviewed - no change compared to H&P  ___________________________________________________  ___________    Attending Physician: Lolly Net Josue Dunaway MD     ____________________________________________________  MEDICATIONS:  Current Facility-Administered Medications   Medication Dose Route Frequency    [START ON 5/8/2018] pantoprazole (PROTONIX) tablet 40 mg  40 mg Oral ACB    heparin (porcine) pf 300 Units  300 Units InterCATHeter PRN    amLODIPine (NORVASC) tablet 10 mg  10 mg Oral DAILY    polyethylene glycol (MIRALAX) packet 17 g  17 g Oral DAILY    docusate sodium (COLACE) capsule 100 mg  100 mg Oral BID    insulin lispro (HUMALOG) injection   SubCUTAneous AC&HS    isosorbide mononitrate ER (IMDUR) tablet 60 mg  60 mg Oral DAILY    heparin (porcine) injection 5,000 Units  5,000 Units SubCUTAneous Q12H    hydrALAZINE (APRESOLINE) tablet 25 mg  25 mg Oral Q8H    calcium carbonate (TUMS) chewable tablet 200 mg [elemental]  200 mg Oral QID WITH MEALS    calcitRIOL (ROCALTROL) capsule 0.25 mcg  0.25 mcg Oral DAILY    magnesium oxide (MAG-OX) tablet 400 mg  400 mg Oral DAILY    epoetin calos (EPOGEN;PROCRIT) injection 10,000 Units  10,000 Units SubCUTAneous Once per day on Mon Thu    iodixanol (VISIPAQUE) 320 mg iodine/mL contrast injection 0-100 mL  0-100 mL IntraarTERial Multiple    sodium chloride (NS) flush 5-10 mL  5-10 mL IntraVENous Q8H    sodium chloride (NS) flush 5-10 mL  5-10 mL IntraVENous PRN    B complex-vitaminC-folic acid (NEPHROCAP) cap  1 Cap Oral DAILY    cloNIDine HCl (CATAPRES) tablet 0.1 mg  0.1 mg Oral Q4H PRN    nitroglycerin (NITROSTAT) tablet 0.4 mg  0.4 mg SubLINGual Q5MIN PRN    glucose chewable tablet 16 g  4 Tab Oral PRN    dextrose (D50W) injection syrg 12.5-25 g  12.5-25 g IntraVENous PRN    glucagon (GLUCAGEN) injection 1 mg  1 mg IntraMUSCular PRN    sodium chloride (NS) flush 5-10 mL  5-10 mL IntraVENous Q8H    sodium chloride (NS) flush 5-10 mL  5-10 mL IntraVENous PRN    metoprolol tartrate (LOPRESSOR) tablet 25 mg  25 mg Oral Q6H    aspirin chewable tablet 81 mg  81 mg Oral DAILY    atorvastatin (LIPITOR) tablet 80 mg  80 mg Oral QPM    acetaminophen (TYLENOL) tablet 650 mg  650 mg Oral Q4H PRN    oxyCODONE-acetaminophen (PERCOCET) 5-325 mg per tablet 1 Tab  1 Tab Oral Q4H PRN    hydrALAZINE (APRESOLINE) 20 mg/mL injection 10 mg  10 mg IntraVENous Q2H PRN    naloxone (NARCAN) injection 0.4 mg  0.4 mg IntraVENous PRN    diphenhydrAMINE (BENADRYL) injection 12.5 mg  12.5 mg IntraVENous Q4H PRN    ondansetron (ZOFRAN) injection 4 mg  4 mg IntraVENous Q4H PRN    bisacodyl (DULCOLAX) tablet 5 mg  5 mg Oral DAILY PRN    clopidogrel (PLAVIX) tablet 75 mg  75 mg Oral DAILY        LABS:  Recent Labs      05/07/18 0740  05/06/18 0337   WBC  5.5  6.1   HGB  7.9*  8.2*   HCT  25.2*  26.7*   PLT  184  168     Recent Labs      05/07/18   0837  05/06/18 0337  05/05/18   0327   NA  145  143  141   K  4.0  3.7  3.2*   CL  107  105  99   CO2  29  33*  33*   BUN  32*  24*  38*   CREA  4.25*  3.22*  3.81*   GLU  202*  137*  202*   CA  7.9*  7.9*  6.9*   MG   --    --   1.9   PHOS   --    --   3.6     Recent Labs      05/07/18   0837  05/06/18 0337  05/05/18   0327   SGOT  14*  17  18   ALT  14  17  13   AP  183*  187*  164*   TBILI  0.5  0.3  0.6   TP  5.6*  6.0*  5.6*   ALB  2.4*  2.2*  2.1*   GLOB  3.2  3.8  3.5     No results for input(s): INR, PTP, APTT in the last 72 hours. No lab exists for component: INREXT, INREXT   No results for input(s): FE, TIBC, PSAT, FERR in the last 72 hours. No results for input(s): PH, PCO2, PO2 in the last 72 hours. No results for input(s): CPK, CKNDX, TROIQ in the last 72 hours.     No lab exists for component: CPKMB  Lab Results   Component Value Date/Time    Glucose (POC) 368 (H) 05/07/2018 11:12 AM    Glucose (POC) 188 (H) 05/07/2018 08:02 AM    Glucose (POC) 112 (H) 05/06/2018 03:46 PM    Glucose (POC) 215 (H) 05/06/2018 12:24 PM    Glucose (POC) 179 (H) 05/06/2018 08:45 AM

## 2018-05-07 NOTE — ROUTINE PROCESS
Chuck morning labs and walked them down to lab.     4511: Received a call from lab stating that the yellow top and the green top had hemolyzed.  Will redraw and walk down again

## 2018-05-07 NOTE — PROGRESS NOTES
Problem: Falls - Risk of  Goal: *Absence of Falls  Document Momo Fall Risk and appropriate interventions in the flowsheet. Outcome: Progressing Towards Goal  Fall Risk Interventions:  Mobility Interventions: Assess mobility with egress test, Communicate number of staff needed for ambulation/transfer, Patient to call before getting OOB, PT Consult for mobility concerns, PT Consult for assist device competence, Strengthening exercises (ROM-active/passive), Utilize walker, cane, or other assitive device    Mentation Interventions: Adequate sleep, hydration, pain control, Door open when patient unattended, Evaluate medications/consider consulting pharmacy, Eyeglasses and hearing aids, Familiar objects from home, Increase mobility, More frequent rounding, Reorient patient, Room close to nurse's station, Toileting rounds, Update white board    Medication Interventions: Assess postural VS orthostatic hypotension, Evaluate medications/consider consulting pharmacy, Patient to call before getting OOB, Teach patient to arise slowly    Elimination Interventions: Bed/chair exit alarm, Call light in reach, Patient to call for help with toileting needs, Toileting schedule/hourly rounds, Urinal in reach             Problem: Pressure Injury - Risk of  Goal: *Prevention of pressure injury  Document Mitchel Scale and appropriate interventions in the flowsheet.    Pressure Injury Interventions:  Sensory Interventions: Assess changes in LOC, Avoid rigorous massage over bony prominences, Chair cushion, Check visual cues for pain, Discuss PT/OT consult with provider, Float heels, Keep linens dry and wrinkle-free, Maintain/enhance activity level, Minimize linen layers, Monitor skin under medical devices, Pad between skin to skin, Pressure redistribution bed/mattress (bed type), Sit a 90-degree angle/use footstool if needed         Activity Interventions: Chair cushion, Increase time out of bed, Pressure redistribution bed/mattress(bed type), PT/OT evaluation    Mobility Interventions: Assess need for specialty bed, Chair cushion, HOB 30 degrees or less, Pressure redistribution bed/mattress (bed type), PT/OT evaluation    Nutrition Interventions: Document food/fluid/supplement intake, Discuss nutritional consult with provider, Offer support with meals,snacks and hydration    Friction and Shear Interventions: HOB 30 degrees or less, Foam dressings/transparent film/skin sealants, Lift sheet, Minimize layers, Sit at 90-degree angle

## 2018-05-07 NOTE — PROGRESS NOTES
Progress Note      5/7/2018 9:27 AM  NAME: Reagan Collazo   MRN:  [id number]   Admit Diagnosis: NSTEMI (non-ST elevated myocardial infarction) ([facility]  ARF (acute renal failure) ([facility]      Problem List:     1. Chest pain; cath 4/30 w/ RCA , 60% oLAD w/ neg FFR (0.88), patent LCx stent w/ mild ISR. 2. Right renal artery stenosis s/p 7.0 x 19mm BMS 5/4/18  3. Occluded LCIA. 4. Elevated troponin  5. Acute on chronic kidney disease Stg 4  6. Coronary artery disease s/p multivessel stenting s/p PCI of LCx, PTCA of RCA 7/97, PTCA LAD 3/94. Lexiscan 2/08 w/ EF 62%, distal ineroapical infarct w/o ischemia  7. Peripheral vascular disease s/p left iliac stenting, left SFA stenting 3/00, right iliac stenting 11/99.  8. Status post LAKA  9. Bilateral carotid stenosis; 16-49% 9/13  10. Hypertension  11. Diabetes  12. Tobacco abuse  13. Chronic pain  14. Chronic anemia  15. Hyperlipidemia  16. Noncompliance  17. Peptic ulcer disease w/ GIB '15  18. Falls  19. Lives w/ sister in [address] [place] [address] now  21. Usual Cardiologist:  Dr. Jess Mendoza (last seen 8/16)  21. Mental status changes. Needs Psychiatric  consult / treatment. Assessment/Plan:   Echo w/ EF 55%, LAE, mild MR, AoSclerosis, mild TR     22. Cont norvasc 10mg  23. Continue ASA 81mg  24. Continue plavix 75mg  25. Continue atorva 80mg  26. Continue hydralazine 25mg TID  27. Continue metoprolol 25mg q12h  28. Continue ISMN 60mg daily  29. Will plan for medical therapy of his CAD. If fails, will require complex bifurcation left main stenting. 30. Will sign off  31. He can see me in the office 2 weeks post d/c         []       High complexity decision making was performed in this patient at high risk for decompensation with multiple organ involvement. Subjective:     Reagan Collazo denies chest pain, dyspnea. Discussed with RN events overnight.      Review of Systems:    Symptom Y/N Comments  Symptom Y/N Comments   Fever/Chills N   Chest Pain N    Poor Appetite N   Edema N    Cough N   Abdominal Pain N    Sputum N   Joint Pain N    SOB/COREA N   Pruritis/Rash N    Nausea/vomit N   Tolerating PT/OT Y    Diarrhea N   Tolerating Diet Y    Constipation N   Other       Could NOT obtain due to:      Objective:      Physical Exam:    Last 24hrs VS reviewed since prior progress note. Most recent are:    Visit Vitals    /59 (BP 1 Location: Right arm, BP Patient Position: Sitting)    Pulse 80    Temp 98.3 °F (36.8 °C)    Resp 18    Ht 5' 9\" (1.753 m)    Wt 75 kg (165 lb 4.8 oz)    SpO2 97%    BMI 24.41 kg/m2       Intake/Output Summary (Last 24 hours) at 05/07/18 2732  Last data filed at 05/07/18 0422   Gross per 24 hour   Intake              190 ml   Output              300 ml   Net             -110 ml        General Appearance: Well developed, well nourished, alert & oriented x 3,    no acute distress. Ears/Nose/Mouth/Throat: Hearing grossly normal.  Neck: Supple. Chest: Lungs clear to auscultation bilaterally. Cardiovascular: Regular rate and rhythm, S1S2 normal, no murmur. Abdomen: Soft, non-tender, bowel sounds are active. Extremities: No edema bilaterally. LAKA. 1+ RCFA pulses. Skin: Warm and dry. [x]         Post-cath site without hematoma, bruit, tenderness, or thrill. Distal pulses intact. PMH/SH reviewed - no change compared to H&P    Data Review    Telemetry: sinus rhythm     EKG:   [x]  No new EKG for review    Lab Data Personally Reviewed:    Recent Labs      05/07/18   0740  05/06/18   0337   WBC  5.5  6.1   HGB  7.9*  8.2*   HCT  25.2*  26.7*   PLT  184  168     Recent Labs      05/04/18   0850   APTT  34.8*      Recent Labs      05/06/18   0337  05/05/18   0327   NA  143  141   K  3.7  3.2*   CL  105  99   CO2  33*  33*   BUN  24*  38*   CREA  3.22*  3.81*   GLU  137*  202*   CA  7.9*  6.9*   MG   --   1.9     No results for input(s): CPK, CKNDX, TROIQ in the last 72 hours.     No lab exists for component: CPKMB  Lab Results Component Value Date/Time    Cholesterol, total 236 (H) 04/29/2018 06:00 AM    HDL Cholesterol 34 04/29/2018 06:00 AM    LDL,Direct 62 10/03/2014 05:50 AM    LDL, calculated 172.4 (H) 04/29/2018 06:00 AM    Triglyceride 148 04/29/2018 06:00 AM    CHOL/HDL Ratio 6.9 (H) 04/29/2018 06:00 AM       Recent Labs      05/06/18   0337  05/05/18   0327   SGOT  17  18   AP  187*  164*   TP  6.0*  5.6*   ALB  2.2*  2.1*   GLOB  3.8  3.5     No results for input(s): PH, PCO2, PO2 in the last 72 hours.     Medications Personally Reviewed:    Current Facility-Administered Medications   Medication Dose Route Frequency    famotidine (PEPCID) tablet 20 mg  20 mg Oral Q48H    heparin (porcine) pf 300 Units  300 Units InterCATHeter PRN    amLODIPine (NORVASC) tablet 10 mg  10 mg Oral DAILY    polyethylene glycol (MIRALAX) packet 17 g  17 g Oral DAILY    docusate sodium (COLACE) capsule 100 mg  100 mg Oral BID    insulin lispro (HUMALOG) injection   SubCUTAneous AC&HS    isosorbide mononitrate ER (IMDUR) tablet 60 mg  60 mg Oral DAILY    heparin (porcine) injection 5,000 Units  5,000 Units SubCUTAneous Q12H    hydrALAZINE (APRESOLINE) tablet 25 mg  25 mg Oral Q8H    calcium carbonate (TUMS) chewable tablet 200 mg [elemental]  200 mg Oral QID WITH MEALS    calcitRIOL (ROCALTROL) capsule 0.25 mcg  0.25 mcg Oral DAILY    magnesium oxide (MAG-OX) tablet 400 mg  400 mg Oral DAILY    epoetin calos (EPOGEN;PROCRIT) injection 10,000 Units  10,000 Units SubCUTAneous Once per day on Mon Thu    iodixanol (VISIPAQUE) 320 mg iodine/mL contrast injection 0-100 mL  0-100 mL IntraarTERial Multiple    sodium chloride (NS) flush 5-10 mL  5-10 mL IntraVENous Q8H    sodium chloride (NS) flush 5-10 mL  5-10 mL IntraVENous PRN    sodium bicarbonate (8.4%) 150 mEq in sterile water 1,000 mL infusion   IntraVENous CONTINUOUS    B complex-vitaminC-folic acid (NEPHROCAP) cap  1 Cap Oral DAILY    cloNIDine HCl (CATAPRES) tablet 0.1 mg  0.1 mg Oral Q4H PRN    nitroglycerin (NITROSTAT) tablet 0.4 mg  0.4 mg SubLINGual Q5MIN PRN    glucose chewable tablet 16 g  4 Tab Oral PRN    dextrose (D50W) injection syrg 12.5-25 g  12.5-25 g IntraVENous PRN    glucagon (GLUCAGEN) injection 1 mg  1 mg IntraMUSCular PRN    sodium chloride (NS) flush 5-10 mL  5-10 mL IntraVENous Q8H    sodium chloride (NS) flush 5-10 mL  5-10 mL IntraVENous PRN    metoprolol tartrate (LOPRESSOR) tablet 25 mg  25 mg Oral Q6H    aspirin chewable tablet 81 mg  81 mg Oral DAILY    atorvastatin (LIPITOR) tablet 80 mg  80 mg Oral QPM    acetaminophen (TYLENOL) tablet 650 mg  650 mg Oral Q4H PRN    oxyCODONE-acetaminophen (PERCOCET) 5-325 mg per tablet 1 Tab  1 Tab Oral Q4H PRN    hydrALAZINE (APRESOLINE) 20 mg/mL injection 10 mg  10 mg IntraVENous Q2H PRN    naloxone (NARCAN) injection 0.4 mg  0.4 mg IntraVENous PRN    diphenhydrAMINE (BENADRYL) injection 12.5 mg  12.5 mg IntraVENous Q4H PRN    ondansetron (ZOFRAN) injection 4 mg  4 mg IntraVENous Q4H PRN    bisacodyl (DULCOLAX) tablet 5 mg  5 mg Oral DAILY PRN    clopidogrel (PLAVIX) tablet 75 mg  75 mg Oral DAILY         Mellissa Magdaleno III, DO

## 2018-05-07 NOTE — PROGRESS NOTES
Hospitalist Progress Note    NAME: Laura Fuentes   :  1955   MRN:  [id number]       Assessment / Plan:  Chest pain - borderline trop elevation; NSTEMI less likely. S/p  Cardiac cath report:  CAD, PAD w/ multiple stents - followed by Dr. Sean Carr  cont aspirin, Plavix. Lipitor  Will continue the medical management. Sever Renal artery stenosis  S/p Successful PTA and stenting of the R renal artery w/ a BMS  cont aspirin, Plavix. MINO on CKD stage 5   Upper extremity vein mapping done. Nephrology on board. Seen by Dr. Sean Carr who is known to patient. Will plan LUE access when appropriate. HD probably today , will follow up with nephrology. Hypertensive Urgency - blood pressure improved. cont lopressor, Imdur  cont scheduled hydralazine      DM-2 w/ nephropathy and neuropathy  SSI    Anemia of chronic renal disease    Chronic thrombocytopenia    Tobacco abuse    Hx of Depression, patient used to be on medications. Effexor and trazodone at night. In this admission patient was noted to have fighting with staffs and throwing staffs and seen very aggressive multiple times   Adjustment disorder. may use Ativan 0.5 mg po bid prn for anxiety   psychiatry recommendations appreciated      Medical noncompliance      Body mass index is 23.63 kg/(m^2). Code status: Full  Prophylaxis: Hep SQ  Recommended Disposition: Home w/Family     Subjective:     Chief Complaint / Reason for Physician Visit  Patient was calm when I was talking to him in the round. And he said he does not have complaints. Review of Systems:  Symptom Y/N Comments  Symptom Y/N Comments   Fever/Chills n   Chest Pain n    Poor Appetite    Edema     Cough n   Abdominal Pain n    Sputum n   Joint Pain     SOB/COREA n   Pruritis/Rash     Nausea/vomit    Tolerating PT/OT     Diarrhea    Tolerating Diet     Constipation    Other       Could NOT obtain due to:      Objective:     VITALS:   Last 24hrs VS reviewed since prior progress note.  Most recent are:  Patient Vitals for the past 24 hrs:   Temp Pulse Resp BP SpO2   05/07/18 1130 98.4 °F (36.9 °C) 75 20 134/62 94 %   05/07/18 0803 98.3 °F (36.8 °C) 80 18 163/59 97 %   05/07/18 0400 - 71 - - -   05/06/18 1934 97.9 °F (36.6 °C) 71 16 133/52 92 %   05/06/18 1517 98.1 °F (36.7 °C) 68 16 128/51 95 %       Intake/Output Summary (Last 24 hours) at 05/07/18 1243  Last data filed at 05/07/18 0918   Gross per 24 hour   Intake              130 ml   Output              350 ml   Net             -220 ml        PHYSICAL EXAM:on the prior date   General: cooperative, no acute distress    EENT:  EOMI. Anicteric sclerae. MMM  Resp:  CTA bilaterally, no wheezing or rales. No accessory muscle use  CV:  Regular  rhythm,  No edema  GI:  Soft, Non distended, Non tender.  +Bowel sounds  Neurologic:  Alert and oriented X 3, normal speech,   Psych:   Not anxious nor agitated  Skin:  No rashes.   No jaundice  Ext:                  No edema right leg; left AKA noted     Reviewed most current lab test results and cultures  YES  Reviewed most current radiology test results   YES  Review and summation of old records today    NO  Reviewed patient's current orders and MAR    YES  PMH/SH reviewed - no change compared to H&P          Current Facility-Administered Medications:     [START ON 5/8/2018] pantoprazole (PROTONIX) tablet 40 mg, 40 mg, Oral, ACB, Keri Hung MD    heparin (porcine) pf 300 Units, 300 Units, InterCATHeter, PRN, Dennis Rudolph MD, 300 Units at 05/04/18 1416    amLODIPine (NORVASC) tablet 10 mg, 10 mg, Oral, DAILY, Antione Herrera III, DO, 10 mg at 05/07/18 6203    polyethylene glycol (MIRALAX) packet 17 g, 17 g, Oral, DAILY, Alo Mejia MD, 17 g at 05/07/18 0834    docusate sodium (COLACE) capsule 100 mg, 100 mg, Oral, BID, Alo Mejia MD, 100 mg at 05/07/18 8720    insulin lispro (HUMALOG) injection, , SubCUTAneous, AC&HS, Alo Mejia MD, 10 Units at 05/07/18 7710   isosorbide mononitrate ER (IMDUR) tablet 60 mg, 60 mg, Oral, DAILY, Sandi Face Herrera III, DO, 60 mg at 05/07/18 4846    heparin (porcine) injection 5,000 Units, 5,000 Units, SubCUTAneous, Q12H, Cliff Jameson III, DO, 5,000 Units at 05/07/18 2673    hydrALAZINE (APRESOLINE) tablet 25 mg, 25 mg, Oral, Q8H, Hayley Leigh MD, 25 mg at 05/07/18 1438    calcium carbonate (TUMS) chewable tablet 200 mg [elemental], 200 mg, Oral, QID WITH MEALS, Afsaneh Varela MD, 200 mg at 05/07/18 1151    calcitRIOL (ROCALTROL) capsule 0.25 mcg, 0.25 mcg, Oral, DAILY, Afsaneh Varela MD, 0.25 mcg at 05/07/18 0854    magnesium oxide (MAG-OX) tablet 400 mg, 400 mg, Oral, DAILY, Afsaneh Varela MD, 400 mg at 05/07/18 3302    epoetin calos (EPOGEN;PROCRIT) injection 10,000 Units, 10,000 Units, SubCUTAneous, Once per day on Mon Thu, Afsaneh Varela MD, 10,000 Units at 05/03/18 1712    iodixanol (VISIPAQUE) 320 mg iodine/mL contrast injection 0-100 mL, 0-100 mL, IntraarTERial, Multiple, Sandi Face Herrera III, DO, 50 mL at 04/30/18 1041    sodium chloride (NS) flush 5-10 mL, 5-10 mL, IntraVENous, Q8H, Ricardo H Herrera III, DO, 10 mL at 05/07/18 0835    sodium chloride (NS) flush 5-10 mL, 5-10 mL, IntraVENous, PRN, Sandi Face Herrera III, DO, 10 mL at 05/06/18 1003    sodium bicarbonate (8.4%) 150 mEq in sterile water 1,000 mL infusion, , IntraVENous, CONTINUOUS, Afsaneh Varela MD, Stopped at 05/05/18 0730    B complex-vitaminC-folic acid (NEPHROCAP) cap, 1 Cap, Oral, DAILY, Afsaneh Varela MD, 1 Cap at 05/07/18 3555    cloNIDine HCl (CATAPRES) tablet 0.1 mg, 0.1 mg, Oral, Q4H PRN, Afsaneh Varela MD, 0.1 mg at 05/01/18 1027    nitroglycerin (NITROSTAT) tablet 0.4 mg, 0.4 mg, SubLINGual, Q5MIN PRN, Mahamed Mojica MD, 0.4 mg at 04/29/18 0830    glucose chewable tablet 16 g, 4 Tab, Oral, PRN, Mahamed Mojica MD    dextrose (D50W) injection syrg 12.5-25 g, 12.5-25 g, IntraVENous, PRN, Mahamed Mojica MD    glucagon (GLUCAGEN) injection 1 mg, 1 mg, IntraMUSCular, PRN, Ashwin Diaz MD    sodium chloride (NS) flush 5-10 mL, 5-10 mL, IntraVENous, Q8H, Ashwin Diaz MD, 10 mL at 05/07/18 0835    sodium chloride (NS) flush 5-10 mL, 5-10 mL, IntraVENous, PRN, Ashwin Diaz MD    metoprolol tartrate (LOPRESSOR) tablet 25 mg, 25 mg, Oral, Q6H, Ashwin Diaz MD, 25 mg at 05/07/18 1152    aspirin chewable tablet 81 mg, 81 mg, Oral, DAILY, Ashwin Diaz MD, 81 mg at 05/07/18 9852    atorvastatin (LIPITOR) tablet 80 mg, 80 mg, Oral, QPM, Ashwin Diaz MD, 80 mg at 05/06/18 1934    acetaminophen (TYLENOL) tablet 650 mg, 650 mg, Oral, Q4H PRN, Ashwin Diaz MD, 650 mg at 04/29/18 2151    oxyCODONE-acetaminophen (PERCOCET) 5-325 mg per tablet 1 Tab, 1 Tab, Oral, Q4H PRN, Ashwin Diaz MD, 1 Tab at 05/06/18 1313    hydrALAZINE (APRESOLINE) 20 mg/mL injection 10 mg, 10 mg, IntraVENous, Q2H PRN, Ashwin Diaz MD    [facility]) injection 0.4 mg, 0.4 mg, IntraVENous, PRN, Ashwin Diaz MD    diphenhydrAMINE (BENADRYL) injection 12.5 mg, 12.5 mg, IntraVENous, Q4H PRN, Ashwin Diaz MD, 12.5 mg at 05/02/18 2253    ondansetron [facility]) injection 4 mg, 4 mg, IntraVENous, Q4H PRN, Ashwin Diaz MD, 4 mg at 05/06/18 1003    bisacodyl (DULCOLAX) tablet 5 mg, 5 mg, Oral, DAILY PRN, Ashwin Diaz MD    clopidogrel (PLAVIX) tablet 75 mg, 75 mg, Oral, DAILY, Ashwin Diaz MD, 75 mg at 05/07/18 1824  ________________________________________________________________________  Care Plan discussed with:    Comments   Patient     Family  y Discussed with family in the room    RN y    Care Manager     Consultant                        Multidiciplinary team rounds were held today with , nursing, pharmacist and clinical coordinator. Patient's plan of care was discussed; medications were reviewed and discharge planning was addressed. ________________________________________________________________________  Total NON critical care TIME:  25  Minutes    Total CRITICAL CARE TIME Spent:   Minutes non procedure based      Comments   >50% of visit spent in counseling and coordination of care     ________________________________________________________________________  Arianne Burns MD     Procedures: see electronic medical records for all procedures/Xrays and details which were not copied into this note but were reviewed prior to creation of Plan. LABS:  I reviewed today's most current labs and imaging studies. Pertinent labs include:  Recent Labs      05/07/18   0740  05/06/18 0337  05/05/18 0327   WBC  5.5  6.1  5.4   HGB  7.9*  8.2*  8.6*   HCT  25.2*  26.7*  26.6*   PLT  184  168  150     Recent Labs      05/07/18   0837  05/06/18 0337  05/05/18 0327   NA  145  143  141   K  4.0  3.7  3.2*   CL  107  105  99   CO2  29  33*  33*   GLU  202*  137*  202*   BUN  32*  24*  38*   CREA  4.25*  3.22*  3.81*   CA  7.9*  7.9*  6.9*   MG   --    --   1.9   PHOS   --    --   3.6   ALB  2.4*  2.2*  2.1*   TBILI  0.5  0.3  0.6   SGOT  14*  17  18   ALT  14  17  13       Signed:  Arianne Burns MD

## 2018-05-07 NOTE — DIABETES MGMT
DTC Progress Note    Recommendations/ Comments:  Pt's  mg/dL today. Plans for HD tomorrow. If appropriate, please consider:  1. Adding no concentrated sweets to when diet is resumed  2. Adding tradjenta 5 mg to help with elevated BG throughout the day  given PO intake erratic   3. Change correction to high sensitivity       Current hospital DM medication: lispro correction - normal correction scale     Chart reviewed on Ruthy Jay Sr.    Patient is a 58 y.o. male with known DM on Actos 15 mg at home  A1c:   Lab Results   Component Value Date/Time    Hemoglobin A1c 7.5 (H) 04/28/2018 10:17 PM    Hemoglobin A1c 5.5 04/03/2015 12:00 PM       Recent Glucose Results:   Lab Results   Component Value Date/Time     (H) 05/07/2018 08:37 AM    GLUCPOC 368 (H) 05/07/2018 11:12 AM    GLUCPOC 188 (H) 05/07/2018 08:02 AM    GLUCPOC 112 (H) 05/06/2018 03:46 PM        Lab Results   Component Value Date/Time    Creatinine 4.25 (H) 05/07/2018 08:37 AM     Estimated Creatinine Clearance: 17.6 mL/min (based on Cr of 4.25). Active Orders   Diet    DIET DIABETIC CONSISTENT CARB Regular; 2 GM NA (House Low NA); AHA-LOW-CHOL FAT        PO intake:   Patient Vitals for the past 72 hrs:   % Diet Eaten   05/07/18 1130 0 %   05/07/18 0918 5 %   05/06/18 1222 0 %   05/06/18 0832 0 %   05/05/18 1925 100 %   05/05/18 1148 20 %       Will continue to follow as needed.     Thank you    Chelsey Drummond, [address]    [phone]

## 2018-05-07 NOTE — DISCHARGE INSTRUCTIONS
[address] [facility], [address]   [phone]  [address]    [email or web address]    Patient Discharge Instructions    Bianca Soto Sr / [id number] : 1955    Admitted 2018 Discharged: 2018       · It is important that you take the medication exactly as they are prescribed. · Keep your medication in the bottles provided by the pharmacist and keep a list of the medication names, dosages, and times to be taken in your wallet. · Do not take other medications without consulting your doctor. BRING ALL OF YOUR MEDICINES TO YOUR OFFICE VISIT with Marycarmen Vigil III, DO. Follow-up with Marycarmen Vigil III, DO in 2 weeks. Cardiac Catheterization  Discharge Instructions    Transradial Catheterization Discharge Instructions (WRIST)    Discharge instructions: Your radial artery in your wrist was used for your cardiac catheterization. This site may be slightly bruised and sore following your procedure. Expect mild tingling or the hand and tenderness at the puncture site for up to 3 days. Excess movement of the wrist used should be avoided for the next 24-48 hours. 1. No lifting over 2 pounds (approximately a ½ gallon of milk) with this arm for 24 hours. 2. Keep the site of the procedure covered with a bandage for 24 hours. 3. You may shower the day after your procedure. Do not take a tub bath or submerge the puncture site in water for 48 hours. 4. No heavy impact activity/lifting > 30 pounds for 1 week. If bleeding of the wrist occurs at home:   If the site on your wrist where you had the catheterization procedure begins to bleed, do not panic. 1. Place 1 or 2 fingers over the puncture site and hold pressure to stop the bleeding. You may be able to feel your pulse as you hold pressure. 2. Lift your fingers after 5 minutes to see if the bleeding has stopped. 3. Once the bleeding has stopped, gently wipe the wrist area clean and cover with a bandage. If the bleeding from your wrist does not stop after 15 minutes, or if there is a large amount of bleeding or spurting, call 911 immediately (do not drive yourself to the hospital). Other concerns: The site may be slightly bruised and sore following your procedure. Should any of the following occur, contact your physician immediately:   1. Any cool or coldness of the arm, discoloration over a large area, ongoing numbness or any abnormal sensations , moderate to severe pain or swelling in the arm. 2. Redness, soreness, swelling, chills or fever, or colored drainage at the procedure site within 3-7 days after your procedure. If you have any further questions or concerns regarding your procedure please call the Cardiac Cath Lab office at [phone]. During regular business hours ask to speak to Dr. Laisha Hoyos. During non-business hours the answering service will answer. Ask to speak to the physician on call for [facility].     Transfemoral Catheterization Discharge Instructions Elio Alicea)     Do not drive, operate any machinery, or sign any legal documents for 24 hours after your procedure. You must have someone to drive you home.  You may take a shower 24 hours after your cardiac catheterization. Be sure to get the dressing wet and then remove it; gently wash the area with warm soapy water. Pat dry and leave open to air. To help prevent infections, be sure to keep the cath site clean and dry. No lotions, creams, powders, ointments, etc. in the cath site for approximately 1 week.  Do not take a tub bath, get in a hot tub or swimming pool for approximately 5 days or until the cath site is completely healed.  No strenuous activity or heavy lifting over 10 lbs. for 7 days.  Drink plenty of fluids for 24-48 hours after your cath to flush the contrast dye from your kidneys. No alcoholic beverages for 24 hours.   You may resume your previous diet (low fat, low cholesterol) after your cath.  After your cath, some bruising or discomfort is common during the healing process. Tylenol, 1-2 tablets every 6 hours as needed, is recommended if you experience any discomfort. If you experience any signs or symptoms of infection such as fever, chills, or poorly healing incision, persistent tenderness or swelling in the groin, redness and/or warmth to the touch, numbness, significant tingling or pain at the groin site or affected extremity, rash, drainage from the cath site, or if the leg feels tight or swollen, call your physician right away.  If bleeding at the cath site occurs, take a clean gauze pad and apply direct pressure to the groin just above the puncture site. Call 911 immediately, and continue to apply direct pressure until an ambulance gets to your location.  You may return to work  2  days after your cardiac cath if no groin bleeding. Information obtained by :  I understand that if any problems occur once I am at home I am to contact my physician. I understand and acknowledge receipt of the instructions indicated above. R.N.'s Signature                                                                  Date/Time                                                                                                                                              Patient or Representative Signature                                                          Date/Time      Júnior Serna III, DO             [address]    [phone]  [place] [address]    [email or web address]                [address]   [facility], [address]    [phone]  [address]    [email or web address]    Patient Discharge Instructions    Ruthy Jay Sr / [id number] : 1955    Admitted 2018 Discharged: 5/7/2018       · It is important that you take the medication exactly as they are prescribed. · Keep your medication in the bottles provided by the pharmacist and keep a list of the medication names, dosages, and times to be taken in your wallet. · Do not take other medications without consulting your doctor. BRING ALL OF YOUR MEDICINES TO YOUR OFFICE VISIT. Follow-up with Gwyn Muller III, DO in 2 weeks. Follow-up with your primary care physician in one week. Heart Attack: After Your Hospitalization     Your Care Instructions    A heart attack (myocardial infarction, or MI) occurs when one or more of the coronary arteries, which supply the heart with oxygen-rich blood, is blocked. A blockage usually occurs when plaque inside the artery breaks open and a blood clot forms in the artery. After a heart attack, you may be worried about your future. Over the next several weeks, your heart will start to heal. Although it is sometimes hard to break old habits, you can prevent another heart attack by making some lifestyle changes and by taking medicines. You may use the following information for ideas about what to do at home to speed your recovery. Follow-up care is a key part of your treatment and safety. Be sure to make and go to all appointments, and call your doctor if you are having problems. It is also a good idea to keep a list of the medicines you take, including dose. How can you care for yourself at home? Activity  Increase your activities slowly. Take short rest breaks when you get tired. As you are able, get more exercise. Walking is a good choice. Bit by bit, increase the amount you walk every day. Try for at least 30 minutes on most days of the week. You also may want to swim, bike, or do other activities. Cardiac rehabilitation (rehab) program is strongly recommended. Cardiac rehab includes supervised exercise, help with diet and lifestyle changes, and emotional support.  It may reduce your risk of future heart problems. Do not drive until your doctor says you can. You can have sex when you are strong enough. This usually means when you can easily walk around or climb stairs. Talk with your doctor if you have any concerns. Do not take sildenafil citrate (Viagra), tadalafil (Cialis), or vardenafil (Levitra) if you are taking nitroglycerin. Lifestyle changes  Do not smoke. Smoking increases your risk of another heart attack. If you need help quitting, talk to your doctor about stop-smoking programs and medicines. These can increase your chances of quitting for good. Eat a heart-healthy diet that is low in cholesterol, saturated fat, and salt, and is full of fruits, vegetables and whole-grains. Eat at least two servings of fish each week. You may get more details about how to eat healthy, but these tips can help you get started. Our website has more information:  www.[employer]. [employer]  Avoid colds and flu. Get a pneumococcal vaccine shot. If you have had one before, ask your primary care doctor whether you need a second dose. Get a flu shot every fall. If you must be around people with colds or flu, wash your hands often. Medicines  Take your medicines exactly as prescribed. Call your doctor if you think you are having a problem with your medicine. You may need several medicines. Angiotensin-converting enzyme (ACE) inhibitors, beta-blockers, and statins can help prevent another heart attack. ACE inhibitors control your blood pressure, and statins help lower cholesterol. Aspirin and other blood thinners help prevent blood clots. Blood clots can cause a stroke or heart attack. If Plavix is prescribed, you must take it to prevent your stent from clotting. You will likely need the plavix for at least 1 to 2 years. If your doctor has given you nitroglycerin, keep it with you at all times.  If you have chest pain, sit down and rest, and take the first dose of nitroglycerin if the discomfort does not resolve. If chest pain gets worse or is not getting better within 5 minutes, call 911 immediately. Stay on the phone with the emergency ; he or she will give you further instructions. Be sure to tell your doctor about any chest pain you have had, even if it went away. Do not take any over-the-counter medicines, vitamins, or herbal products without talking to your doctor first.    Mental health  Talk to your family, friends, or a counselor about your feelings. It is normal to feel frightened, angry, hopeless, helpless, and even guilty. Talking openly about bad feelings can help you cope. If the blues last, talk to your primary care doctor. When should you call for help? Call 911 anytime you think you may need emergency care. For example, call if:  You have signs of a heart attack. These may include:  Chest pain or pressure. Sweating. Shortness of breath. Nausea or vomiting. Pain that spreads from the chest to the neck, jaw, or one or both shoulders or arms. Dizziness or lightheadedness. A fast or irregular pulse. After calling 911, chew 1 adult-strength aspirin. Wait for an ambulance. Do not try to drive yourself. You passed out (lost consciousness). You feel like you are having another heart attack. Call your doctor now or seek immediate medical care if:  You have had any chest pain, even if it has gone away. You have new or increased shortness of breath. You are dizzy or lightheaded, or you feel like you may faint. Watch closely for changes in your health, and be sure to contact your doctor if you have any problems, including gaining 5 pounds or more. Cardiac Catheterization  Discharge Instructions     You may take a shower. Be sure to get the dressing wet and then remove it; gently wash the area with warm soapy water. Pat dry and leave open to air. To help prevent infections, be sure to keep the cath site clean and dry.   No lotions, creams, powders, ointments, etc. in the cath site for approximately 1 week.  Do not take a tub bath, get in a hot tub or swimming pool for approximately 5 days or until the cath site is completely healed.  No strenuous activity or heavy lifting over 10 lbs. for 7 days.  After your cath, some bruising or discomfort is common during the healing process. Tylenol, 1-2 tablets every 6 hours as needed, is recommended if you experience any discomfort. If you experience any signs or symptoms of infection such as fever, chills, or poorly healing incision, persistent tenderness or swelling in the groin, redness and/or warmth to the touch, numbness, significant tingling or pain at the groin site or affected extremity, rash, drainage from the cath site, or if the leg feels tight or swollen, call your physician right away.  If bleeding at the cath site occurs, take a clean gauze pad and apply direct pressure to the groin just above the puncture site. Call 911 immediately, and continue to apply direct pressure until an ambulance gets to your location. Information obtained by :  I understand that if any problems occur once I am at home I am to contact my physician. I understand and acknowledge receipt of the instructions indicated above. R.N.'s Signature                                                                  Date/Time                                                                                                                                              Patient or Representative Signature                                                          Date/Time      Det[place] Scale III, DO      Where can you learn more? Go to [email or web address]            [address]   [facility], [address]   [phone]  [facility] [address] [facility]    [email or web address]

## 2018-05-07 NOTE — PROGRESS NOTES
Bedside shift change report given to CAITLYN day (oncoming nurse). Report included the following information SBAR. SHIFT SUMMARY: patient transferred to [facility]. He is alert and oriented. He seems upset at times. Up in wheel chair, able to mobilize wheelchair in room. Family in to visit. Moderate po intake and poor solids, no appetite. Dialysis catheter right chest intact. Voided this am and has not voided since. [facility] NURSING NOTE   Admission Date 4/28/2018   Admission Diagnosis NSTEMI (non-ST elevated myocardial infarction) ([facility]  ARF (acute renal failure) ([facility] [facility]   Consults IP CONSULT TO CARDIOLOGY  IP CONSULT TO NEPHROLOGY  IP CONSULT TO PSYCHIATRY  IP CONSULT TO VASCULAR SURGERY      Cardiac Monitoring [] Yes [] No      Purposeful Hourly Rounding [] Yes    Momo Score Total Score: 2   Momo score 3 or > [] Bed Alarm [] Avasys [] 1:1 sitter [] Patient refused (Signed refusal form in chart)   Mitchel Score Mitchel Score: 19   Mitchel score 14 or < [] PMT consult [] Wound Care consult    []  Specialty bed  [] Nutrition consult      Influenza Vaccine Received Flu Vaccine for Current Season (usually Sept-March): Not Flu Season           Oxygen needs? [] Room air Oxygen @  []1L    []2L    []3L   []4L    []5L   []6L via  NC   Chronic home O2 use? [] Yes [] No  Perform O2 challenge test and document in progress note using smartphrase (.Homeoxygen)      Last bowel movement Last Bowel Movement Date: 05/07/18      Urinary Catheter             LDAs               Peripheral IV 05/06/18 Left Antecubital (Active)   Site Assessment Clean, dry, & intact 5/7/2018  9:18 AM   Phlebitis Assessment 0 5/7/2018  9:18 AM   Infiltration Assessment 0 5/7/2018  9:18 AM   Dressing Status Clean, dry, & intact 5/7/2018  9:18 AM   Dressing Type Tape;Transparent 5/7/2018  9:18 AM   Hub Color/Line Status Pink;Patent; Flushed 5/7/2018  9:18 AM   Action Taken Blood drawn 5/6/2018  3:55 AM   Alcohol Cap Used Yes 5/7/2018  9:18 AM Hemodialysis Access 05/04/18 (Active)   Central Line Being Utilized No 5/7/2018  9:18 AM   Criteria for Appropriate Use Dialysis/apheresis 5/7/2018  9:18 AM   Date Accessed  05/05/18 5/7/2018  9:18 AM   Site Assessment Ecchymotic (bruised) 5/7/2018  9:18 AM   Date of Last Dressing Change 05/04/18 5/7/2018  9:18 AM   Dressing Status Clean, dry, & intact 5/7/2018  9:18 AM   Dressing Type Tape;Transparent 5/7/2018  9:18 AM   Proximal Hub Color/Line Status Blue;Capped 5/7/2018  9:18 AM   Distal Hub Color/Line Status Red;Capped 5/7/2018  9:18 AM                     Readmission Risk Assessment Tool Score High Risk            28       Total Score        3 Has Seen PCP in Last 6 Months (Yes=3, No=0)    3 Patient Length of Stay (>5 days = 3)    9 Pt. Coverage (Medicare=5 , Medicaid, or Self-Pay=4)    13 Charlson Comorbidity Score (Age + Comorbid Conditions)        Criteria that do not apply:    . Living with Significant Other. Assisted Living. LTAC. SNF.  or   Rehab    IP Visits Last 12 Months (1-3=4, 4=9, >4=11)       Expected Length of Stay 5d 9h   Actual Length of Stay 9

## 2018-05-07 NOTE — PROGRESS NOTES
Bedside report received from [facility] and care assumed. Report given with SBAR , recent labs,  and MAR . Pt in room sitting up . denies c/o pain but appears drowsy . Receptive to care pleasant for now . Discussed POC for tonight , BS , labs in the morning no complaints from pt .     2230 attempted to check BS pt refused states \"I don't want it now \" asked pt if he would take his meds at this time . He also refused     0430 pt awake , asked if vitals or labs could be done , pt did not respond . Emptied urinal for pt , heard pt mumble something when asked to repeat it pt stated \" I am talking to myself \" left room without further interaction with pt .     0630 pt still refusing care . 0730 Bedside shift change report given to Will RN (oncoming nurse) by me (offgoing nurse). Report given with SBAR, MAR and Recent Results.

## 2018-05-07 NOTE — PROGRESS NOTES
1915-Bedside shift change report given to SeanRN/SusanaRN  by EnglishRN . Report included the following information SBAR.     1935- Attempted to reinforce bed alarm under bed sheet, Pt. Refused. Threw alarm pad onto the floor. 2350- PCT alerted that pt. O2 sat. dipped to low 80s. Instructed pt. To take deep breaths, O2 sat. increased to 94. Pt. Refused supplemental O2. Will continue to monitor. 0550- Pt refused lab draw x 2. Stated \"if you're not getting it from the IV, you won't be sticking me again\". 0550- Pt. Refused another attempt to reinforce bed alarm.

## 2018-05-07 NOTE — PROGRESS NOTES
TRANSFER - IN REPORT:    Verbal report received from Will (name) on [facility]  being received from [facility] ([facility]) for routine progression of care      Report consisted of patients Situation, Background, Assessment and   Recommendations(SBAR). Information from the following report(s) SBAR was reviewed with the receiving nurse. Opportunity for questions and clarification was provided. Assessment completed upon patients arrival to unit and care assumed.

## 2018-05-07 NOTE — PROGRESS NOTES
physical Therapy EVALUATION/DISCHARGE  Patient: Ivelisse Bullard (15 y.o. male)  Date: 5/7/2018  Primary Diagnosis: NSTEMI (non-ST elevated myocardial infarction) ([facility] [address]  ARF (acute renal failure) ([facility])  ESRD  Procedure(s) (LRB):  CREATION LEFT ARM ARTERIO VENOUS FISTULA (AXILLARY BLOCK) (Left)     Precautions:   Fall  ASSESSMENT :  Based on the objective data described below, the patient presents with near baseline level of Mod I at the wheelchair level following 8 day hospital stay for NSTEMI and ARF. PTA pt was living with family members, mobilizing in a wheelchair and Independent with transfers. Pt reports he has a prosthesis which he does not use. Pt displayed independence with bed mobility, good seated balance and ability to transfer in/out of wheelchair without physical assist. Navigated wheelchair within room without difficulty. Reports he is supposed to be receiving an electric wheelchair however has been in limbo with insurance and the supplier. Pt and family members report he is at his baseline. Will sign off at this time. Skilled physical therapy is not indicated at this time. PLAN :  Discharge Recommendations: None  Further Equipment Recommendations for Discharge: None     SUBJECTIVE:   Patient stated I can get into the chair. Let me set it up.     OBJECTIVE DATA SUMMARY:   HISTORY:    Past Medical History:   Diagnosis Date    Anemia     Arthritis     back, hips    CAD (coronary artery disease)     Sees Dr. 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 Von Butts for pain mgmt    Chronic pain     Confusion     Depression     Diabetes ([facility]     ED (erectile dysfunction)     GERD (gastroesophageal reflux disease) 11/2015    Diagn[facility] last month    Hypercholesteremia     Hypertension     Psychiatric disorder     depression    [facility]     [facility]romboembolus [facility])      Past Surgical History:   Procedure Laterality Date    CARDIAC SURG PROCEDURE UNLIST      PTCA    HX ABOVE KNEE AMPUTATION  2013    Left knee stump non-healing ulcer; Dr. Joshua Ferreira    HX APPENDECTOMY      HX James Matte      Left leg    HX CHOLECYSTECTOMY      HX GI      HX HIP REPLACEMENT      right hip replaced x 2    HX ORTHOPAEDIC  1990s    4 back surgeries    HX ORTHOPAEDIC      donna ankles pin placed    HX ORTHOPAEDIC      left leg 17 surgeries    UPPER GI ENDOSCOPY,BIOPSY  9/2/2015         UPPER GI ENDOSCOPY,BIOPSY  10/12/2015         VASCULAR SURGERY PROCEDURE UNLIST      Multiple revascularization procedures, toe amputations     Prior Level of Function/Home Situation: Mod I at the wheelchair level  Personal factors and/or comorbidities impacting plan of care: L AKA, CAD, stent placement    Home Situation  Home Environment: Private residence  # Steps to Enter: 2  One/Two Story Residence: Two story, live on 1st floor  Living Alone: No  Support Systems: Family member(s)  Patient Expects to be Discharged to[de-identified] Private residence  Current DME Used/Available at Home: Wheelchair    EXAMINATION/PRESENTATION/DECISION MAKING:   Critical Behavior:              Hearing: Auditory  Auditory Impairment: None    Range Of Motion:  AROM: Within functional limits           PROM: Within functional limits           Strength:    Strength:  Within functional limits                    Tone & Sensation:   Tone: Normal              Sensation: Impaired               Coordination:  Coordination: Within functional limits  Vision:      Functional Mobility:  Bed Mobility:  Rolling: Independent  Supine to Sit: Independent        Transfers:              Bed to Chair: Modified independent              Balance:   Sitting: Intact  Ambulation/Gait Training:                            Functional Measure:  Functional Reach:    Completed:  [] standing       [x] seated           Average: 11.67 Functional Reach Test and G-code impairment scale: For inches: Measure in cm and divide by 2.54  Percentage of Impairment CH    0%   CI    1-19% CJ    20-39% CK    40-59% CL    60-79% CM    80-99% CN     100%   Functional Reach  Score 15-25 cm 25 24 22-23 20-21 18-19 16-17 < 15   Functional Reach  Score 6-10 in 10      < 6        Functional reach: (standing)  Reaches £  6 in = High Fall Risk  Reaches 6-10 in = Moderate Fall Risk    Reaches ³  10 in = Low Fall Risk  Lucy Klein et al. Functional reach: a new clinical measure of balance. J [facility] Cinthia Luis; 39: [id number]. Modified Functional Reach: (seated)  Age: Men: Women:   21-39 17.9\" 17.6\"   40-59 17.5\" 15.9\"   60-79 14.4\" 13.2\"   80-97 14.0\" 12.5\"       Daiana Burkett. Forward and Lateral Sitting Functional Reach in Younger, Middle-aged, and Older Adults. J Geriatric Phys Ther. 2007; 30:43-48         G codes: In compliance with CMSs Claims Based Outcome Reporting, the following G-code set was chosen for this patient based on their primary functional limitation being treated: The outcome measure chosen to determine the severity of the functional limitation was the Functional Reach with a score of 11.67 inches which was correlated with the impairment scale. ?  Changing and Maintaining Body Position:     - CURRENT STATUS: CH - 0% impaired, limited or restricted    - GOAL STATUS: CH - 0% impaired, limited or restricted    - D/C STATUS:  CH - 0% impaired, limited or restricted        Physical Therapy Evaluation Charge Determination   History Examination Presentation Decision-Making   MEDIUM  Complexity : 1-2 comorbidities / personal factors will impact the outcome/ POC  LOW Complexity : 1-2 Standardized tests and measures addressing body structure, function, activity limitation and / or participation in recreation  LOW Complexity : Stable, uncomplicated  Other outcome measures Functional Reach  LOW       Based on the above components, the patient evaluation is determined to be of the following complexity level: LOW     Pain:  Pain Scale 1: Numeric (0 - 10)  Pain Intensity 1: 9  Pain Location 1: Back  Activity Tolerance:   Good  Please refer to the flowsheet for vital signs taken during this treatment. After treatment:   [x]   Patient left in no apparent distress sitting up in chair  []   Patient left in no apparent distress in bed  [x]   Call bell left within reach  [x]   Nursing notified  [x]   Caregiver present  []   Bed alarm activated    COMMUNICATION/EDUCATION:   Communication/Collaboration:  [x]   Fall prevention education was provided and the patient/caregiver indicated understanding. [x]   Patient/family have participated as able and agree with findings and recommendations. []   Patient is unable to participate in plan of care at this time.   Findings and recommendations were discussed with: Registered Nurse    Thank you for this referral.  Maura Anderson, PT   Time Calculation: 23 mins

## 2018-05-08 LAB
ALBUMIN SERPL-MCNC: 2.3 G/DL (ref 3.5–5)
ALBUMIN/GLOB SERPL: 0.6 {RATIO} (ref 1.1–2.2)
ALP SERPL-CCNC: 167 U/L (ref 45–117)
ALT SERPL-CCNC: 13 U/L (ref 12–78)
ANION GAP SERPL CALC-SCNC: 9 MMOL/L (ref 5–15)
AST SERPL-CCNC: 12 U/L (ref 15–37)
BASOPHILS # BLD: 0 K/UL (ref 0–0.1)
BASOPHILS NFR BLD: 0 % (ref 0–1)
BILIRUB SERPL-MCNC: 0.4 MG/DL (ref 0.2–1)
BUN SERPL-MCNC: 42 MG/DL (ref 6–20)
BUN/CREAT SERPL: 9 (ref 12–20)
CALCIUM SERPL-MCNC: 8.1 MG/DL (ref 8.5–10.1)
CHLORIDE SERPL-SCNC: 104 MMOL/L (ref 97–108)
CO2 SERPL-SCNC: 28 MMOL/L (ref 21–32)
CREAT SERPL-MCNC: 4.65 MG/DL (ref 0.7–1.3)
DIFFERENTIAL METHOD BLD: ABNORMAL
EOSINOPHIL # BLD: 0.3 K/UL (ref 0–0.4)
EOSINOPHIL NFR BLD: 5 % (ref 0–7)
ERYTHROCYTE [DISTWIDTH] IN BLOOD BY AUTOMATED COUNT: 16.6 % (ref 11.5–14.5)
GLOBULIN SER CALC-MCNC: 3.8 G/DL (ref 2–4)
GLUCOSE BLD STRIP.AUTO-MCNC: 146 MG/DL (ref 65–100)
GLUCOSE BLD STRIP.AUTO-MCNC: 171 MG/DL (ref 65–100)
GLUCOSE BLD STRIP.AUTO-MCNC: 261 MG/DL (ref 65–100)
GLUCOSE BLD STRIP.AUTO-MCNC: 265 MG/DL (ref 65–100)
GLUCOSE SERPL-MCNC: 206 MG/DL (ref 65–100)
HCT VFR BLD AUTO: 26.8 % (ref 36.6–50.3)
HGB BLD-MCNC: 8.2 G/DL (ref 12.1–17)
IMM GRANULOCYTES # BLD: 0.1 K/UL (ref 0–0.04)
IMM GRANULOCYTES NFR BLD AUTO: 1 % (ref 0–0.5)
LYMPHOCYTES # BLD: 1.2 K/UL (ref 0.8–3.5)
LYMPHOCYTES NFR BLD: 18 % (ref 12–49)
MCH RBC QN AUTO: 31.1 PG (ref 26–34)
MCHC RBC AUTO-ENTMCNC: 30.6 G/DL (ref 30–36.5)
MCV RBC AUTO: 101.5 FL (ref 80–99)
MONOCYTES # BLD: 0.9 K/UL (ref 0–1)
MONOCYTES NFR BLD: 13 % (ref 5–13)
NEUTS SEG # BLD: 4.2 K/UL (ref 1.8–8)
NEUTS SEG NFR BLD: 63 % (ref 32–75)
NRBC # BLD: 0 K/UL (ref 0–0.01)
NRBC BLD-RTO: 0 PER 100 WBC
PLATELET # BLD AUTO: 176 K/UL (ref 150–400)
PMV BLD AUTO: 10.2 FL (ref 8.9–12.9)
POTASSIUM SERPL-SCNC: 3.5 MMOL/L (ref 3.5–5.1)
PROT SERPL-MCNC: 6.1 G/DL (ref 6.4–8.2)
RBC # BLD AUTO: 2.64 M/UL (ref 4.1–5.7)
SERVICE CMNT-IMP: ABNORMAL
SODIUM SERPL-SCNC: 141 MMOL/L (ref 136–145)
WBC # BLD AUTO: 6.6 K/UL (ref 4.1–11.1)

## 2018-05-08 PROCEDURE — 36415 COLL VENOUS BLD VENIPUNCTURE: CPT | Performed by: INTERNAL MEDICINE

## 2018-05-08 PROCEDURE — 5A1D70Z PERFORMANCE OF URINARY FILTRATION, INTERMITTENT, LESS THAN 6 HOURS PER DAY: ICD-10-PCS | Performed by: HOSPITALIST

## 2018-05-08 PROCEDURE — 74011636637 HC RX REV CODE- 636/637: Performed by: INTERNAL MEDICINE

## 2018-05-08 PROCEDURE — 74011250637 HC RX REV CODE- 250/637: Performed by: INTERNAL MEDICINE

## 2018-05-08 PROCEDURE — 80053 COMPREHEN METABOLIC PANEL: CPT | Performed by: INTERNAL MEDICINE

## 2018-05-08 PROCEDURE — 90935 HEMODIALYSIS ONE EVALUATION: CPT

## 2018-05-08 PROCEDURE — 65270000015 HC RM PRIVATE ONCOLOGY

## 2018-05-08 PROCEDURE — 82962 GLUCOSE BLOOD TEST: CPT

## 2018-05-08 PROCEDURE — 74011250637 HC RX REV CODE- 250/637: Performed by: HOSPITALIST

## 2018-05-08 PROCEDURE — 74011250636 HC RX REV CODE- 250/636: Performed by: INTERNAL MEDICINE

## 2018-05-08 PROCEDURE — 85025 COMPLETE CBC W/AUTO DIFF WBC: CPT | Performed by: INTERNAL MEDICINE

## 2018-05-08 RX ORDER — HEPARIN SODIUM 5000 [USP'U]/ML
5000 INJECTION, SOLUTION INTRAVENOUS; SUBCUTANEOUS EVERY 12 HOURS
Status: DISCONTINUED | OUTPATIENT
Start: 2018-05-09 | End: 2018-05-09

## 2018-05-08 RX ADMIN — Medication 10 ML: at 14:00

## 2018-05-08 RX ADMIN — Medication 10 ML: at 23:06

## 2018-05-08 RX ADMIN — HYDRALAZINE HYDROCHLORIDE 25 MG: 50 TABLET, FILM COATED ORAL at 23:05

## 2018-05-08 RX ADMIN — INSULIN LISPRO 5 UNITS: 100 INJECTION, SOLUTION INTRAVENOUS; SUBCUTANEOUS at 17:36

## 2018-05-08 RX ADMIN — METOPROLOL TARTRATE 25 MG: 25 TABLET ORAL at 17:37

## 2018-05-08 RX ADMIN — METOPROLOL TARTRATE 25 MG: 25 TABLET ORAL at 05:41

## 2018-05-08 RX ADMIN — HYDRALAZINE HYDROCHLORIDE 25 MG: 50 TABLET, FILM COATED ORAL at 05:40

## 2018-05-08 RX ADMIN — ATORVASTATIN CALCIUM 80 MG: 40 TABLET, FILM COATED ORAL at 17:37

## 2018-05-08 RX ADMIN — NEPHROCAP 1 CAPSULE: 1 CAP ORAL at 08:03

## 2018-05-08 RX ADMIN — OXYCODONE HYDROCHLORIDE AND ACETAMINOPHEN 1 TABLET: 5; 325 TABLET ORAL at 13:34

## 2018-05-08 RX ADMIN — METOPROLOL TARTRATE 25 MG: 25 TABLET ORAL at 12:22

## 2018-05-08 RX ADMIN — HYDRALAZINE HYDROCHLORIDE 25 MG: 50 TABLET, FILM COATED ORAL at 13:34

## 2018-05-08 RX ADMIN — ASPIRIN 81 MG 81 MG: 81 TABLET ORAL at 08:03

## 2018-05-08 RX ADMIN — POLYETHYLENE GLYCOL 3350 17 G: 17 POWDER, FOR SOLUTION ORAL at 08:03

## 2018-05-08 RX ADMIN — AMLODIPINE BESYLATE 10 MG: 5 TABLET ORAL at 12:22

## 2018-05-08 RX ADMIN — METOPROLOL TARTRATE 25 MG: 25 TABLET ORAL at 23:05

## 2018-05-08 RX ADMIN — PANTOPRAZOLE SODIUM 40 MG: 40 TABLET, DELAYED RELEASE ORAL at 08:03

## 2018-05-08 RX ADMIN — DOCUSATE SODIUM 100 MG: 100 CAPSULE, LIQUID FILLED ORAL at 08:03

## 2018-05-08 RX ADMIN — CALCIUM CARBONATE 200 MG: 500 TABLET, CHEWABLE ORAL at 12:22

## 2018-05-08 RX ADMIN — CALCIUM CARBONATE 200 MG: 500 TABLET, CHEWABLE ORAL at 17:37

## 2018-05-08 RX ADMIN — Medication 400 MG: at 08:03

## 2018-05-08 RX ADMIN — ISOSORBIDE MONONITRATE 60 MG: 30 TABLET, EXTENDED RELEASE ORAL at 12:22

## 2018-05-08 RX ADMIN — CALCITRIOL 0.25 MCG: 0.25 CAPSULE, LIQUID FILLED ORAL at 08:03

## 2018-05-08 RX ADMIN — DOCUSATE SODIUM 100 MG: 100 CAPSULE, LIQUID FILLED ORAL at 17:37

## 2018-05-08 RX ADMIN — OXYCODONE HYDROCHLORIDE AND ACETAMINOPHEN 1 TABLET: 5; 325 TABLET ORAL at 17:40

## 2018-05-08 RX ADMIN — CALCIUM CARBONATE 200 MG: 500 TABLET, CHEWABLE ORAL at 23:06

## 2018-05-08 RX ADMIN — Medication 10 ML: at 05:41

## 2018-05-08 RX ADMIN — INSULIN LISPRO 2 UNITS: 100 INJECTION, SOLUTION INTRAVENOUS; SUBCUTANEOUS at 12:22

## 2018-05-08 RX ADMIN — CALCIUM CARBONATE 200 MG: 500 TABLET, CHEWABLE ORAL at 08:03

## 2018-05-08 RX ADMIN — CLOPIDOGREL BISULFATE 75 MG: 75 TABLET ORAL at 08:03

## 2018-05-08 RX ADMIN — HEPARIN SODIUM 5000 UNITS: 5000 INJECTION, SOLUTION INTRAVENOUS; SUBCUTANEOUS at 05:40

## 2018-05-08 RX ADMIN — OXYCODONE HYDROCHLORIDE AND ACETAMINOPHEN 1 TABLET: 5; 325 TABLET ORAL at 21:34

## 2018-05-08 NOTE — PROGRESS NOTES
Hospitalist Progress Note    NAME: Yvonne Martines   :  1955   MRN:  [id number]       Assessment / Plan:  Chest pain - borderline trop elevation; NSTEMI less likely. S/p  Cardiac cath report:  CAD, PAD w/ multiple stents - followed by Dr. Kallie Kurtz  cont aspirin, Plavix. Lipitor  Will continue the medical management. Sever Renal artery stenosis  S/p Successful PTA and stenting of the R renal artery w/ a BMS  cont aspirin, Plavix. MINO on CKD stage 5   Upper extremity vein mapping done. Nephrology on board. Seen by Dr. Kallie Kurtz who is known to patient. Will plan LUE access when appropriate. HD today   Keep NPO after MN, holding heparin sc for procedure. Consult IR in AM for permacath as per nephrology.       Hypertensive Urgency - blood pressure improved. cont lopressor, Imdur  cont scheduled hydralazine      DM-2 w/ nephropathy and neuropathy  SSI    Anemia of chronic renal disease    Chronic thrombocytopenia    Tobacco abuse    Hx of Depression, patient used to be on medications. Effexor and trazodone at night. In this admission patient was noted to have fighting with staffs and throwing staffs and seen very aggressive multiple times   Adjustment disorder. may use Ativan 0.5 mg po bid prn for anxiety   psychiatry recommendations appreciated      Medical noncompliance      Body mass index is 23.63 kg/(m^2). Code status: Full  Prophylaxis: Hep SQ  Recommended Disposition: Home w/Family     Subjective:     Chief Complaint / Reason for Physician Visit  He was at HD today     Review of Systems:  Symptom Y/N Comments  Symptom Y/N Comments   Fever/Chills n   Chest Pain n    Poor Appetite    Edema     Cough n   Abdominal Pain n    Sputum n   Joint Pain     SOB/COREA n   Pruritis/Rash     Nausea/vomit    Tolerating PT/OT     Diarrhea    Tolerating Diet     Constipation    Other       Could NOT obtain due to:      Objective:     VITALS:   Last 24hrs VS reviewed since prior progress note.  Most recent are:  Patient Vitals for the past 24 hrs:   Temp Pulse Resp BP SpO2   05/08/18 0757 97.6 °F (36.4 °C) 65 18 157/61 92 %   05/08/18 0303 97.6 °F (36.4 °C) 61 20 138/57 94 %   05/07/18 2342 97.9 °F (36.6 °C) 67 20 135/68 94 %   05/07/18 1918 97.9 °F (36.6 °C) 71 20 128/59 90 %   05/07/18 1601 98 °F (36.7 °C) 69 21 136/75 92 %   05/07/18 1506 - 65 - 138/63 -   05/07/18 1130 98.4 °F (36.9 °C) 75 20 134/62 94 %       Intake/Output Summary (Last 24 hours) at 05/08/18 8206  Last data filed at 05/08/18 0057   Gross per 24 hour   Intake              325 ml   Output              425 ml   Net             -100 ml        PHYSICAL EXAM:on the prior date   General: cooperative, no acute distress    EENT:  EOMI. Anicteric sclerae. MMM  Resp:  CTA bilaterally, no wheezing or rales. No accessory muscle use  CV:  Regular  rhythm,  No edema  GI:  Soft, Non distended, Non tender.  +Bowel sounds  Neurologic:  Alert and oriented X 3, normal speech,   Psych:   Not anxious nor agitated  Skin:  No rashes.   No jaundice  Ext:                  No edema right leg; left AKA noted     Reviewed most current lab test results and cultures  YES  Reviewed most current radiology test results   YES  Review and summation of old records today    NO  Reviewed patient's current orders and MAR    YES  PMH/SH reviewed - no change compared to H&P          Current Facility-Administered Medications:     pantoprazole (PROTONIX) tablet 40 mg, 40 mg, Oral, ACB, Keri Gore MD, 40 mg at 05/08/18 0803    heparin (porcine) pf 300 Units, 300 Units, InterCATHeter, PRN, Dennis Rudolph MD, 300 Units at 05/04/18 1416    amLODIPine (NORVASC) tablet 10 mg, 10 mg, Oral, DAILY, Shavonne Herrera III, DO, 10 mg at 05/07/18 9739    polyethylene glycol (MIRALAX) packet 17 g, 17 g, Oral, DAILY, Jm Flores MD, 17 g at 05/08/18 0803    docusate sodium (COLACE) capsule 100 mg, 100 mg, Oral, BID, Jm Flores MD, 100 mg at 05/08/18 0803    insulin lispro (HUMALOG) injection, , SubCUTAneous, AC&HS, Alberto Urias MD, Stopped at 05/07/18 2200    isosorbide mononitrate ER (IMDUR) tablet 60 mg, 60 mg, Oral, DAILY, Bramwellcarli Herrera III, DO, 60 mg at 05/07/18 6171    heparin (porcine) injection 5,000 Units, 5,000 Units, SubCUTAneous, Q12H, Nehemias Liliana III, DO, 5,000 Units at 05/08/18 0540    hydrALAZINE (APRESOLINE) tablet 25 mg, 25 mg, Oral, Q8H, Alberto Urias MD, 25 mg at 05/08/18 0540    calcium carbonate (TUMS) chewable tablet 200 mg [elemental], 200 mg, Oral, QID WITH MEALS, Cristine Jones MD, 200 mg at 05/08/18 0803    calcitRIOL (ROCALTROL) capsule 0.25 mcg, 0.25 mcg, Oral, DAILY, Cristine Jones MD, 0.25 mcg at 05/08/18 0803    magnesium oxide (MAG-OX) tablet 400 mg, 400 mg, Oral, DAILY, Cristine Jones MD, 400 mg at 05/08/18 0803    epoetin calos (EPOGEN;PROCRIT) injection 10,000 Units, 10,000 Units, SubCUTAneous, Once per day on Mon Thu, Cristine Jones MD, 10,000 Units at 05/07/18 1707    iodixanol (VISIPAQUE) 320 mg iodine/mL contrast injection 0-100 mL, 0-100 mL, IntraarTERial, Multiple, Martin Herrera III, DO, 50 mL at 04/30/18 1041    sodium chloride (NS) flush 5-10 mL, 5-10 mL, IntraVENous, Q8H, Ricardo H Javier III, DO, 10 mL at 05/08/18 0541    sodium chloride (NS) flush 5-10 mL, 5-10 mL, IntraVENous, PRN, Martin Herrera III, DO, 10 mL at 05/06/18 1003    B complex-vitaminC-folic acid (NEPHROCAP) cap, 1 Cap, Oral, DAILY, Cristine Jones MD, 1 Cap at 05/08/18 0803    cloNIDine HCl (CATAPRES) tablet 0.1 mg, 0.1 mg, Oral, Q4H PRN, Cristine Jones MD, 0.1 mg at 05/01/18 1027    nitroglycerin (NITROSTAT) tablet 0.4 mg, 0.4 mg, SubLINGual, Q5MIN PRN, Kia Doss MD, 0.4 mg at 04/29/18 0830    glucose chewable tablet 16 g, 4 Tab, Oral, PRN, Kia Doss MD    dextrose (D50W) injection syrg 12.5-25 g, 12.5-25 g, IntraVENous, PRN, Kia Doss MD    glucagon [facility]) injection 1 mg, 1 mg, IntraMUSCular, PRN, Kimi Witt MD    sodium chloride (NS) flush 5-10 mL, 5-10 mL, IntraVENous, Q8H, Kimi Witt MD, 10 mL at 05/07/18 1506    sodium chloride (NS) flush 5-10 mL, 5-10 mL, IntraVENous, PRN, Kimi Witt MD    metoprolol tartrate (LOPRESSOR) tablet 25 mg, 25 mg, Oral, Q6H, Kimi Witt MD, 25 mg at 05/08/18 0541    aspirin chewable tablet 81 mg, 81 mg, Oral, DAILY, Kimi Witt MD, 81 mg at 05/08/18 0803    atorvastatin (LIPITOR) tablet 80 mg, 80 mg, Oral, QPM, Kimi Witt MD, 80 mg at 05/07/18 1706    acetaminophen (TYLENOL) tablet 650 mg, 650 mg, Oral, Q4H PRN, Kimi Witt MD, 650 mg at 04/29/18 2151    oxyCODONE-acetaminophen (PERCOCET) 5-325 mg per tablet 1 Tab, 1 Tab, Oral, Q4H PRN, Kimi Witt MD, 1 Tab at 05/07/18 2040    hydrALAZINE (APRESOLINE) 20 mg/mL injection 10 mg, 10 mg, IntraVENous, Q2H PRN, Kimi Witt MD    [facility]) injection 0.4 mg, 0.4 mg, IntraVENous, PRN, Kimi Witt MD    diphenhydrAMINE (BENADRYL) injection 12.5 mg, 12.5 mg, IntraVENous, Q4H PRN, Kimi Witt MD, 12.5 mg at 05/02/18 2253    ondansetron [facility]) injection 4 mg, 4 mg, IntraVENous, Q4H PRN, Kimi Witt MD, 4 mg at 05/06/18 1003    bisacodyl (DULCOLAX) tablet 5 mg, 5 mg, Oral, DAILY PRN, Kimi Witt MD    clopidogrel (PLAVIX) tablet 75 mg, 75 mg, Oral, DAILY, Kimi Witt MD, 75 mg at 05/08/18 6096  ________________________________________________________________________  Care Plan discussed with:    Comments   Patient     Family  y Discussed with family in the room    RN y    Care Manager     Consultant                        Multidiciplinary team rounds were held today with , nursing, pharmacist and clinical coordinator. Patient's plan of care was discussed; medications were reviewed and discharge planning was addressed.      ________________________________________________________________________  Total NON critical care TIME:  25  Minutes    Total CRITICAL CARE TIME Spent:   Minutes non procedure based      Comments   >50% of visit spent in counseling and coordination of care     ________________________________________________________________________  Lyle Multani MD     Procedures: see electronic medical records for all procedures/Xrays and details which were not copied into this note but were reviewed prior to creation of Plan. LABS:  I reviewed today's most current labs and imaging studies. Pertinent labs include:  Recent Labs      05/07/18   0740  05/06/18   0337   WBC  5.5  6.1   HGB  7.9*  8.2*   HCT  25.2*  26.7*   PLT  184  168     Recent Labs      05/07/18   0837  05/06/18   0337   NA  145  143   K  4.0  3.7   CL  107  105   CO2  29  33*   GLU  202*  137*   BUN  32*  24*   CREA  4.25*  3.22*   CA  7.9*  7.9*   ALB  2.4*  2.2*   TBILI  0.5  0.3   SGOT  14*  17   ALT  14  17       Signed:  Lyle Multani MD

## 2018-05-08 NOTE — PROGRESS NOTES
TRANSFER - IN REPORT:    Verbal report received from Kindra(name) on [facility]  being received from [facility](unit) for routine progression of care      Report consisted of patients Situation, Background, Assessment and   Recommendations(SBAR). Information from the following report(s) SBAR, Kardex, Procedure Summary, Intake/Output, MAR, Accordion and Recent Results was reviewed with the receiving nurse. Opportunity for questions and clarification was provided. Assessment completed upon patients arrival to unit and care assumed.

## 2018-05-08 NOTE — PROGRESS NOTES
Oncology Nursing Communication Tool  6:57 PM  5/8/2018     Bedside and Verbal shift change report given to Lida Jenkins RN (incoming nurse) by Mayra Boyd (outgoing nurse) on Tracey Mya . Report included the following information SBAR, Kardex, Procedure Summary, Intake/Output, MAR, Accordion and Recent Results. Shift Summary:       Issues for physician to address: Oncology Shift Note   Admission Date 4/28/2018   Admission Diagnosis NSTEMI (non-ST elevated myocardial infarction) ([facility] [address]  ARF (acute renal failure) ([facility] [address]  ESRD   Code Status Full Code   Consults IP CONSULT TO CARDIOLOGY  IP CONSULT TO NEPHROLOGY  IP CONSULT TO PSYCHIATRY  IP CONSULT TO VASCULAR SURGERY      Cardiac Monitoring [] Yes [] No      Purposeful Hourly Rounding [] Yes    Momo Score Total Score: 3   Momo score 3 or > [] Bed Alarm [] Avasys [] 1:1 sitter [] Patient refused (Place signed refusal form in chart)      Pain Managed [] Yes [] No    Key Pain Meds             methadone (DOLOPHINE) 10 mg tablet Take 1 Tab by mouth two (2) times daily (with meals). Max Daily Amount: 60 mg. One tab in am, 2 tab at lunch, 1 at dinner and 2 at bed            Influenza Vaccine Received Flu Vaccine for Current Season (usually Sept-March): Not Flu Season           Oxygen needs?  [] Room air Oxygen @  []1L    []2L    []3L   []4L    []5L   []6L     Use home O2? [] Yes [] No  Perform O2 challenge test using  smartphrase (.oxygenchallenge)      Last bowel movement Last Bowel Movement Date: 05/07/18  bowel movement      Urinary Catheter             LDAs               Peripheral IV 05/06/18 Left Antecubital (Active)   Site Assessment Clean, dry, & intact 5/8/2018  2:44 PM   Phlebitis Assessment 0 5/8/2018  2:44 PM   Infiltration Assessment 0 5/8/2018  2:44 PM   Dressing Status Clean, dry, & intact 5/8/2018  2:44 PM   Dressing Type Transparent 5/8/2018  2:44 PM   Hub Color/Line Status Pink;Flushed;Patent 5/8/2018  2:44 PM   Action Taken Blood drawn 5/6/2018  3:55 AM   Alcohol Cap Used Yes 5/7/2018  9:18 AM        Hemodialysis Access 05/04/18 (Active)   Central Line Being Utilized Yes 5/8/2018  2:44 PM   Criteria for Appropriate Use Dialysis/apheresis 5/8/2018  2:44 PM   Date Accessed  05/05/18 5/7/2018  9:18 AM   Site Assessment Clean, dry, & intact 5/8/2018  2:44 PM   Date of Last Dressing Change 05/04/18 5/8/2018  2:44 PM   Dressing Status Clean, dry, & intact 5/8/2018  2:44 PM   Dressing Type Transparent 5/8/2018  2:44 PM   Proximal Hub Color/Line Status Blue;Capped 5/7/2018  7:50 PM   Distal Hub Color/Line Status Capped; Red 5/7/2018  7:50 PM                     Readmission Risk Assessment Tool Score High Risk            28       Total Score        3 Has Seen PCP in Last 6 Months (Yes=3, No=0)    3 Patient Length of Stay (>5 days = 3)    9 Pt. Coverage (Medicare=5 , Medicaid, or Self-Pay=4)    13 Charlson Comorbidity Score (Age + Comorbid Conditions)        Criteria that do not apply:    . Living with Significant Other. Assisted Living. LTAC. SNF.  or   Rehab    IP Visits Last 12 Months (1-3=4, 4=9, >4=11)       Expected Length of Stay 5d 9h   Actual Length of Stay [id number] [address] [facility]

## 2018-05-08 NOTE — PROGRESS NOTES
0700: Received bedside report from Sai Pritchett, off going nurse. Assumed care of patient. 1800: Attempted to call report to Oncology nurse receiving patient, she will call back. Patient is being transferred to room 1113.     1830: TRANSFER - OUT REPORT:    Verbal report given to Nicole(name) ravinder Whitney Sr  being transferred to Oncology(unit) for routine progression of care       Report consisted of patients Situation, Background, Assessment and   Recommendations(SBAR). Information from the following report(s) SBAR, Kardex, MAR, Recent Results and Cardiac Rhythm NSR was reviewed with the receiving nurse. Lines:   Peripheral IV 05/06/18 Left Antecubital (Active)   Site Assessment Clean, dry, & intact 5/8/2018  2:44 PM   Phlebitis Assessment 0 5/8/2018  2:44 PM   Infiltration Assessment 0 5/8/2018  2:44 PM   Dressing Status Clean, dry, & intact 5/8/2018  2:44 PM   Dressing Type Transparent 5/8/2018  2:44 PM   Hub Color/Line Status Pink;Flushed;Patent 5/8/2018  2:44 PM   Action Taken Blood drawn 5/6/2018  3:55 AM   Alcohol Cap Used Yes 5/7/2018  9:18 AM        Opportunity for questions and clarification was provided.       Patient transported with:   [employer]

## 2018-05-08 NOTE — PROGRESS NOTES
Nutrition Assessment:    INTERVENTIONS/RECOMMENDATIONS:   Meals/Snacks: General/healthful diet:  Continue CCD for BG management. Low Na+ and monitor K+/Phos for restrictions as needed. Supplements: Commercial supplement:  RD cancelled Nepro as pt reports that he did not like it. Interested in trying [id number]. ASSESSMENT:   5/8:  Chart reviewed; med noted for MINO on CKD, HD. Will need Outpatient HD on discharge. Pt reports that he ate a good lunch today but was upset because he left for dialysis before breakfast arrived. Reports that he does not care much for the Nepro Shakes and would like to discontinue. Plans are NPO after midnight but willing to try Glucerna once diet resumes. Noted weight history inconsistent. Would re-recheck. Diet Order: Consistent carb, Cardiac  % Eaten:  Patient Vitals for the past 72 hrs:   % Diet Eaten   05/07/18 1130 0 %   05/07/18 0918 5 %   05/06/18 1222 0 %   05/06/18 0832 0 %   05/05/18 1925 100 %     Pertinent Medications: [x] Reviewed []Other:  Pertinent Labs: [x]Reviewed  []Other:  Food Allergies: [x]None []Other:     Last BM:    [x]Active     []Hyperactive  []Hypoactive       [] Absent  BS  Skin:    [] Intact   [] Incision  [] Breakdown   []Edema   []Other:    Anthropometrics: Height: 5' 9\" (175.3 cm) Weight: 72.6 kg (160 lb)    IBW (%IBW):   ( ) UBW (%UBW):   (  %)    BMI: Body mass index is 23.63 kg/(m^2). This BMI is indicative of:  []Underweight   [x]Normal   []Overweight   [] Obesity   [] Extreme Obesity (BMI>40)  Last Weight Metrics:  Weight Loss Metrics 5/7/2018 1/15/2016 1/6/2016 12/4/2015 10/11/2015 9/25/2015 9/1/2015   Today's Wt 160 lb - 110 lb - 110 lb - 125 lb   BMI 23.63 kg/m2 - 16.24 kg/m2 - 16.24 kg/m2 - 18.45 kg/m2       Estimated Nutrition Needs (Based on): 1975 Kcals/day (BMR: 1525 x 1.3) , 60 g (0.8 g/kg) Protein  Carbohydrate:  At Least 130 g/day  Fluids: per Nephrology mL/day    NUTRITION DIAGNOSES:   Problem:  Inadequate protein-energy intake      Etiology: related to decreased appetite     Signs/Symptoms: as evidenced by pt report of poor PO intake PTA and currently     Previous Nutrition Dx:  [] Resolved  [] Unresolved           [x] Progressing    NUTRITION INTERVENTIONS:  Meals/Snacks: General/healthful diet   Supplements: Commercial supplement              GOAL:   consume >50% of meals and ONS in 2-4 days    NUTRITION MONITORING AND EVALUATION      Food/Nutrient Intake Outcomes: Total energy intake  Physical Signs/Symptoms Outcomes: Weight/weight change, Electrolyte and renal profile, Glucose profile, GI    Previous Goal Met:   [] Met              [x] Progressing Towards Goal              [] Not Progressing Towards Goal   Previous Recommendations:   [x] Implemented          [] Not Implemented          [] Not Applicable    LEARNING NEEDS (Diet, Food/Nutrient-Drug Interaction):    [x] None Identified   [] Identified and Education Provided/Documented   [] Identified and Pt declined/was not appropriate     Cultural, Restoration, OR Ethnic Dietary Needs:    [x] None Identified   [] Identified and Addressed     [x] Interdisciplinary Care Plan Reviewed/Documented    [x] Discharge Planning:  Continue CCD for BG management; renal diet restrictions as needed (2gm Na+ with Phos and K+ restrictions as needed pending lab monitoring). No protein restriction.      [] Participated in Interdisciplinary Rounds    NUTRITION RISK:    [x] Patient At Nutritional Risk    [] High              [x] Moderate/Mild           []  Low     [] Patient Not At [address] [place] [address], [address] [place] [address] Street  Pager [phone]  Weekend Pager [phone]

## 2018-05-08 NOTE — PROGRESS NOTES
Per Nephrology, pt will likely need OP HD at discharge. Pt is going to discuss and inform Nephrologist of unit that he would like to be setup with once he makes a decision. [facility] Unit would be closest for pt to stay with Nephrologist team at this time.      Pham Stone, MSW Supervisee in Social Work, [employer]  [phone]

## 2018-05-08 NOTE — PROCEDURES
[facility]  [phone]    Vitals   Pre   Post   Assessment   Pre   Post     Temp  Temp: 97.8 °F (36.6 °C) (05/08/18 0830)  97.7 LOC  A&OX4 A&OX4   HR   Pulse (Heart Rate): 61 (05/08/18 0830) 63 Lungs   clear  clear   B/P   BP: 176/61 (05/08/18 0830) 148/49 Cardiac   NSR  NSR   Resp   Resp Rate: 18 (05/08/18 0830) 16 Skin   intact  intact   Pain level  Pain Intensity 1: 3 (05/08/18 0755) 0 Edema    2+LUCHO   2+LUCHO   Orders:    Duration:   Start:   0830 End:   1130 Total:   3 hours   Dialyzer:   Dialyzer/Set Up Inspection: Revaclear (05/08/18 0830)   K Bath:   Dialysate K (mEq/L): 3 (05/08/18 0830)   Ca Bath:   Dialysate CA (mEq/L): 2.5 (05/08/18 0830)   Na/Bicarb:   Dialysate NA (mEq/L): 140/35 (05/08/18 0830)   Target Fluid Removal:   Goal/Amount of Fluid to Remove (mL): 2000 mL (05/08/18 0830)   Access     Type & Location:   AYE patel. Ports of Kash/permcath disinfected with Alcohol per policy. Each lumen aspirated for blood return and flushed with Normal Saline per policy. Dialysis initiated. Central line catheter flushed with normal saline per policy. Ports disinfected with Alcohol per policy and lines disconnected and capped using aseptic technique. See LDA docflowsheet for dressing information.      Labs     Obtained/Reviewed   Critical Results Called   Date when labs were drawn-  Hgb-    HGB   Date Value Ref Range Status   05/07/2018 7.9 (L) 12.1 - 17.0 g/dL Final     K-    Potassium   Date Value Ref Range Status   05/07/2018 4.0 3.5 - 5.1 mmol/L Final     Ca-   Calcium   Date Value Ref Range Status   05/07/2018 7.9 (L) 8.5 - 10.1 MG/DL Final     Bun-   BUN   Date Value Ref Range Status   05/07/2018 32 (H) 6 - 20 MG/DL Final     Creat-   Creatinine   Date Value Ref Range Status   05/07/2018 4.25 (H) 0.70 - 1.30 MG/DL Final     Comment:     INVESTIGATED PER DELTA CHECK PROTOCOL        Medications/ Blood Products Given     Name   Dose   Route and Time     none Blood Volume Processed (BVP):    66.8 liters Net Fluid   Removed:  2000 ml   Comments Patient got angry at one point during the tx and started shouting about how he didn't want to do dialysis anymore. He was angry, but not threatening. I calmly talked with him and explained how outpatient dialysis works and answered his questions. He quickly calmed down and finished the rest of his treatment. No other problems. Report called to [facility] nurse, Cy Mead, using SBAR. Time Out Done: yes   Primary Nurse Rpt Pre: Loy Borden RN  Primary Nurse Rpt Post: Lydia Rabago RN  Pt Education: fluid restrictions  Care Plan: HD with volume removal  Tx Summary: 3 hours on 3 K 2.5 Ca removed 2000 ml. Admiting Diagnosis:  Pt's previous clinic-n/a  Consent signed - Informed Consent Verified: Yes (05/08/18 0830)  Sudheerita Consent - yes  Hepatitis Status- antigen negative 5-4-18  Machine #- Machine Number: [id number] (05/08/18 0830)  Telemetry status-yes  Pre-dialysis wt. - Pre-Dialysis Weight: 74.3 kg (163 lb 12.8 oz) (05/04/18 1945)

## 2018-05-08 NOTE — PROGRESS NOTES
patient refused bed alarm. Education regarding measures to prevent fall and risk for serious injury related to fall were provided to the patient by [employer], and [employer]. patient verbalized understanding of the risk from refusing the bed alarm. Informed refusal form was signed by the patient and placed in the chart. Nurse leader (Charge Nurse, SRN, CCL, Nurse Manager) was informed of patient  refusal and spoke with the patient.

## 2018-05-08 NOTE — PROGRESS NOTES
[facility] Progress Note        NAME: Vladimir Syed       :  1955       MRN:  [id number]     Date/Time: 2018    Risk of deterioration: medium       Assessment:    Plan:  MINO on CKD V - ?ESRD    S/P CATH-2V cad    (R) ROSALEE- s/p BMS of of R renal artery. HTN   PAD/PVD s/p (L) AKA-(L) iliac occlusion  Anemia  Secondary PTH Pt had a baseline creatinine of around 2-2.5 over the last few years with recent deterioration to 4-4.6--likely due to advancing ckd (+/-) ROSALEE    HD # 1 . HD#2  and 3rd Rx today    Using Revaclear, 400 QB, R Kash access  3K, 2.5 Ca, Na of 140  UF of 2 Kg as tolerated    Keep NPO after MN  Consult IR in AM for permacath  D/C Heparin SC. He needs to ct Plavix    He will need OP HD Unit spot before d/c. He used to live at nursing facility. Not sure, where he plans to go after d/c. Most likely to live with son. [facility] unit would be closer to him to stay with us. He will let me know and we can start looking for outpt HD spot. Appreciate vascular seeing pt.--looks like his left arm will be used for access with dr. Javier Alarcon in the future    Continue rocaltrol, epo, TUMS with meals       Subjective:     Chief Complaint: seen on HD around 10:05 AM. Tolerating well. No c/o    Review of Systems: no n/v/f/c    Objective:     VITALS:   Last 24hrs VS reviewed since prior progress note.  Most recent are:  Visit Vitals    /61    Pulse 64    Temp 97.8 °F (36.6 °C)    Resp 16    Ht 5' 9\" (1.753 m)    Wt 72.6 kg (160 lb)    SpO2 92%    BMI 23.63 kg/m2     SpO2 Readings from Last 6 Encounters:   18 92%   01/15/16 98%   16 100%   12/04/15 98%   10/13/15 97%   09/25/15 100%    O2 Flow Rate (L/min): 3 l/min       Intake/Output Summary (Last 24 hours) at 18 1110  Last data filed at 18 0830   Gross per 24 hour   Intake              150 ml   Output              225 ml   Net              -75 ml        Telemetry Reviewed    PHYSICAL EXAM:  General   NAD  Clear  RRR, no rub  R amanda intact  No edema on R. L AKA     Lab Data Reviewed: (see below)    Medications Reviewed: (see below)    PMH/SH reviewed - no change compared to H&P  ___________________________________________________  ___________    Attending Physician: Renetta Lyons MD     ____________________________________________________  MEDICATIONS:  Current Facility-Administered Medications   Medication Dose Route Frequency    pantoprazole (PROTONIX) tablet 40 mg  40 mg Oral ACB    heparin (porcine) pf 300 Units  300 Units InterCATHeter PRN    amLODIPine (NORVASC) tablet 10 mg  10 mg Oral DAILY    polyethylene glycol (MIRALAX) packet 17 g  17 g Oral DAILY    docusate sodium (COLACE) capsule 100 mg  100 mg Oral BID    insulin lispro (HUMALOG) injection   SubCUTAneous AC&HS    isosorbide mononitrate ER (IMDUR) tablet 60 mg  60 mg Oral DAILY    heparin (porcine) injection 5,000 Units  5,000 Units SubCUTAneous Q12H    hydrALAZINE (APRESOLINE) tablet 25 mg  25 mg Oral Q8H    calcium carbonate (TUMS) chewable tablet 200 mg [elemental]  200 mg Oral QID WITH MEALS    calcitRIOL (ROCALTROL) capsule 0.25 mcg  0.25 mcg Oral DAILY    magnesium oxide (MAG-OX) tablet 400 mg  400 mg Oral DAILY    epoetin calos (EPOGEN;PROCRIT) injection 10,000 Units  10,000 Units SubCUTAneous Once per day on Mon Thu    iodixanol (VISIPAQUE) 320 mg iodine/mL contrast injection 0-100 mL  0-100 mL IntraarTERial Multiple    sodium chloride (NS) flush 5-10 mL  5-10 mL IntraVENous Q8H    sodium chloride (NS) flush 5-10 mL  5-10 mL IntraVENous PRN    B complex-vitaminC-folic acid (NEPHROCAP) cap  1 Cap Oral DAILY    cloNIDine HCl (CATAPRES) tablet 0.1 mg  0.1 mg Oral Q4H PRN    nitroglycerin (NITROSTAT) tablet 0.4 mg  0.4 mg SubLINGual Q5MIN PRN    glucose chewable tablet 16 g  4 Tab Oral PRN    dextrose (D50W) injection syrg 12.5-25 g  12.5-25 g IntraVENous PRN    glucagon (GLUCAGEN) injection 1 mg  1 mg IntraMUSCular PRN    metoprolol tartrate (LOPRESSOR) tablet 25 mg  25 mg Oral Q6H    aspirin chewable tablet 81 mg  81 mg Oral DAILY    atorvastatin (LIPITOR) tablet 80 mg  80 mg Oral QPM    acetaminophen (TYLENOL) tablet 650 mg  650 mg Oral Q4H PRN    oxyCODONE-acetaminophen (PERCOCET) 5-325 mg per tablet 1 Tab  1 Tab Oral Q4H PRN    hydrALAZINE (APRESOLINE) 20 mg/mL injection 10 mg  10 mg IntraVENous Q2H PRN    naloxone (NARCAN) injection 0.4 mg  0.4 mg IntraVENous PRN    diphenhydrAMINE (BENADRYL) injection 12.5 mg  12.5 mg IntraVENous Q4H PRN    ondansetron (ZOFRAN) injection 4 mg  4 mg IntraVENous Q4H PRN    bisacodyl (DULCOLAX) tablet 5 mg  5 mg Oral DAILY PRN    clopidogrel (PLAVIX) tablet 75 mg  75 mg Oral DAILY        LABS:  Recent Labs      05/08/18   0905 05/07/18   0740   WBC  6.6  5.5   HGB  8.2*  7.9*   HCT  26.8*  25.2*   PLT  176  184     Recent Labs      05/08/18   0905 05/07/18   0837  05/06/18   0337   NA  141  145  143   K  3.5  4.0  3.7   CL  104  107  105   CO2  28  29  33*   BUN  42*  32*  24*   CREA  4.65*  4.25*  3.22*   GLU  206*  202*  137*   CA  8.1*  7.9*  7.9*     Recent Labs      05/08/18   0905 05/07/18   0837  05/06/18   0337   SGOT  12*  14*  17   ALT  13  14  17   AP  167*  183*  187*   TBILI  0.4  0.5  0.3   TP  6.1*  5.6*  6.0*   ALB  2.3*  2.4*  2.2*   GLOB  3.8  3.2  3.8     No results for input(s): INR, PTP, APTT in the last 72 hours. No lab exists for component: INREXT, INREXT   No results for input(s): FE, TIBC, PSAT, FERR in the last 72 hours. No results for input(s): PH, PCO2, PO2 in the last 72 hours. No results for input(s): CPK, CKNDX, TROIQ in the last 72 hours.     No lab exists for component: CPKMB  Lab Results   Component Value Date/Time    Glucose (POC) 265 (H) 05/08/2018 08:01 AM    Glucose (POC) 184 (H) 05/07/2018 08:47 PM    Glucose (POC) 174 (H) 05/07/2018 04:21 PM    Glucose (POC) 368 (H) 05/07/2018 11:12 AM    Glucose (POC) 188 (H) 05/07/2018 08:02 AM

## 2018-05-09 LAB
ACT BLD: 274 SECS (ref 79–138)
GLUCOSE BLD STRIP.AUTO-MCNC: 156 MG/DL (ref 65–100)
GLUCOSE BLD STRIP.AUTO-MCNC: 170 MG/DL (ref 65–100)
GLUCOSE BLD STRIP.AUTO-MCNC: 172 MG/DL (ref 65–100)
GLUCOSE BLD STRIP.AUTO-MCNC: 204 MG/DL (ref 65–100)
SERVICE CMNT-IMP: ABNORMAL

## 2018-05-09 PROCEDURE — 65270000015 HC RM PRIVATE ONCOLOGY

## 2018-05-09 PROCEDURE — 74011250637 HC RX REV CODE- 250/637: Performed by: HOSPITALIST

## 2018-05-09 PROCEDURE — 74011636637 HC RX REV CODE- 636/637: Performed by: INTERNAL MEDICINE

## 2018-05-09 PROCEDURE — 74011250637 HC RX REV CODE- 250/637: Performed by: INTERNAL MEDICINE

## 2018-05-09 PROCEDURE — 82962 GLUCOSE BLOOD TEST: CPT

## 2018-05-09 RX ORDER — HEPARIN SODIUM 5000 [USP'U]/ML
5000 INJECTION, SOLUTION INTRAVENOUS; SUBCUTANEOUS EVERY 12 HOURS
Status: DISCONTINUED | OUTPATIENT
Start: 2018-05-10 | End: 2018-05-14 | Stop reason: HOSPADM

## 2018-05-09 RX ADMIN — METOPROLOL TARTRATE 25 MG: 25 TABLET ORAL at 06:01

## 2018-05-09 RX ADMIN — DOCUSATE SODIUM 100 MG: 100 CAPSULE, LIQUID FILLED ORAL at 08:07

## 2018-05-09 RX ADMIN — DOCUSATE SODIUM 100 MG: 100 CAPSULE, LIQUID FILLED ORAL at 18:09

## 2018-05-09 RX ADMIN — OXYCODONE HYDROCHLORIDE AND ACETAMINOPHEN 1 TABLET: 5; 325 TABLET ORAL at 07:56

## 2018-05-09 RX ADMIN — Medication 10 ML: at 16:59

## 2018-05-09 RX ADMIN — CALCIUM CARBONATE 200 MG: 500 TABLET, CHEWABLE ORAL at 11:29

## 2018-05-09 RX ADMIN — HYDRALAZINE HYDROCHLORIDE 25 MG: 50 TABLET, FILM COATED ORAL at 21:10

## 2018-05-09 RX ADMIN — CALCITRIOL 0.25 MCG: 0.25 CAPSULE, LIQUID FILLED ORAL at 08:07

## 2018-05-09 RX ADMIN — INSULIN LISPRO 2 UNITS: 100 INJECTION, SOLUTION INTRAVENOUS; SUBCUTANEOUS at 11:28

## 2018-05-09 RX ADMIN — ISOSORBIDE MONONITRATE 60 MG: 30 TABLET, EXTENDED RELEASE ORAL at 08:07

## 2018-05-09 RX ADMIN — AMLODIPINE BESYLATE 10 MG: 5 TABLET ORAL at 08:07

## 2018-05-09 RX ADMIN — HYDRALAZINE HYDROCHLORIDE 25 MG: 50 TABLET, FILM COATED ORAL at 15:20

## 2018-05-09 RX ADMIN — CALCIUM CARBONATE 200 MG: 500 TABLET, CHEWABLE ORAL at 16:59

## 2018-05-09 RX ADMIN — Medication 10 ML: at 06:01

## 2018-05-09 RX ADMIN — NEPHROCAP 1 CAPSULE: 1 CAP ORAL at 08:07

## 2018-05-09 RX ADMIN — Medication 10 ML: at 21:10

## 2018-05-09 RX ADMIN — ATORVASTATIN CALCIUM 80 MG: 40 TABLET, FILM COATED ORAL at 18:09

## 2018-05-09 RX ADMIN — PANTOPRAZOLE SODIUM 40 MG: 40 TABLET, DELAYED RELEASE ORAL at 07:56

## 2018-05-09 RX ADMIN — INSULIN LISPRO 2 UNITS: 100 INJECTION, SOLUTION INTRAVENOUS; SUBCUTANEOUS at 08:06

## 2018-05-09 RX ADMIN — METOPROLOL TARTRATE 25 MG: 25 TABLET ORAL at 16:59

## 2018-05-09 RX ADMIN — Medication 400 MG: at 15:23

## 2018-05-09 RX ADMIN — METOPROLOL TARTRATE 25 MG: 25 TABLET ORAL at 11:29

## 2018-05-09 RX ADMIN — HYDRALAZINE HYDROCHLORIDE 25 MG: 50 TABLET, FILM COATED ORAL at 06:00

## 2018-05-09 RX ADMIN — CALCIUM CARBONATE 200 MG: 500 TABLET, CHEWABLE ORAL at 07:56

## 2018-05-09 RX ADMIN — METOPROLOL TARTRATE 25 MG: 25 TABLET ORAL at 22:07

## 2018-05-09 RX ADMIN — CLOPIDOGREL BISULFATE 75 MG: 75 TABLET ORAL at 08:07

## 2018-05-09 RX ADMIN — INSULIN LISPRO 3 UNITS: 100 INJECTION, SOLUTION INTRAVENOUS; SUBCUTANEOUS at 16:58

## 2018-05-09 RX ADMIN — OXYCODONE HYDROCHLORIDE AND ACETAMINOPHEN 1 TABLET: 5; 325 TABLET ORAL at 20:18

## 2018-05-09 RX ADMIN — ASPIRIN 81 MG 81 MG: 81 TABLET ORAL at 08:07

## 2018-05-09 NOTE — DIABETES MGMT
DTC Progress Note    Recommendations/ Comments:  Pt's BG > 250 mg/dL yesterday. BG trending downward 156 - 172 mg/dL today. Plans for HD tomorrow. If appropriate, please consider:  1. Adding tradjenta 5 mg to help with elevated post-prandial BG throughout the day given PO not consistent  2. Change correction to high sensitivity given renal status    Current hospital DM medication: lispro correction - normal correction scale     Chart reviewed on Tammie Whitney Sr.    Patient is a 58 y.o. male with known DM on Actos 15 mg at home  A1c:   Lab Results   Component Value Date/Time    Hemoglobin A1c 7.5 (H) 04/28/2018 10:17 PM    Hemoglobin A1c 5.5 04/03/2015 12:00 PM       Recent Glucose Results:   Lab Results   Component Value Date/Time    GLUCPOC 156 (H) 05/09/2018 11:06 AM    GLUCPOC 172 (H) 05/09/2018 07:29 AM    GLUCPOC 171 (H) 05/08/2018 08:52 PM        Lab Results   Component Value Date/Time    Creatinine 4.65 (H) 05/08/2018 09:05 AM     Estimated Creatinine Clearance: 15.9 mL/min (based on Cr of 4.65). Active Orders   Diet    DIET NPO Except Meds        PO intake:   Patient Vitals for the past 72 hrs:   % Diet Eaten   05/07/18 1130 0 %   05/07/18 0918 5 %   05/06/18 1222 0 %       Will continue to follow as needed.     Thank you    Nigel Collado, [facility]    [phone]

## 2018-05-09 NOTE — PROGRESS NOTES
[facility] Progress Note        NAME: Marlo Steel       :  1955       MRN:  [id number]     Date/Time: 2018    Risk of deterioration: medium       Assessment:    Plan:  MINO on CKD V - ?ESRD    S/P CATH-2V cad    (R) ROSALEE- s/p BMS of of R renal artery. HTN   PAD/PVD s/p (L) AKA-(L) iliac occlusion  Anemia  Secondary PTH Pt had a baseline creatinine of around 2-2.5 over the last few years with recent deterioration to 4-4.6--likely due to advancing ckd (+/-) ROSALEE    HD # 1 . HD#2      Remains HD dependent. Keep NPO   For  permacath today  Resume Heparin SC from tom after permacath    He will need OP HD Unit spot before d/c. He plans to live with his son. He thinks [place] unit would be closer for him. Will ask CM to start looking for spot at [facility] unit    HD tomm    Appreciate vascular seeing pt.--looks like his left arm will be used for access with dr. Arin Gonzalez in the future    Continue rocaltrol, epo, TUMS with meals       Subjective:     Chief Complaint: no acute c/o    Review of Systems: as above    Objective:     VITALS:   Last 24hrs VS reviewed since prior progress note.  Most recent are:  Visit Vitals    /55 (BP 1 Location: Right arm, BP Patient Position: Sitting)    Pulse 64    Temp 97.9 °F (36.6 °C)    Resp 18    Ht 5' 9\" (1.753 m)    Wt 70.2 kg (154 lb 11.2 oz)    SpO2 94%    BMI 22.85 kg/m2     SpO2 Readings from Last 6 Encounters:   18 94%   01/15/16 98%   16 100%   12/04/15 98%   10/13/15 97%   09/25/15 100%    O2 Flow Rate (L/min): 3 l/min       Intake/Output Summary (Last 24 hours) at 18 0913  Last data filed at 18 0417   Gross per 24 hour   Intake              200 ml   Output             2500 ml   Net            -2300 ml        Telemetry Reviewed    PHYSICAL EXAM:  General   NAD  Clear  RRR  R amanda intact  trace edema on R. L AKA     Lab Data Reviewed: (see below)    Medications Reviewed: (see below)    PMH/SH reviewed - no change compared to H&P  ___________________________________________________  ___________    Attending Physician: Sherida Cowden, MD     ____________________________________________________  MEDICATIONS:  Current Facility-Administered Medications   Medication Dose Route Frequency    heparin (porcine) injection 5,000 Units  5,000 Units SubCUTAneous Q12H    pantoprazole (PROTONIX) tablet 40 mg  40 mg Oral ACB    heparin (porcine) pf 300 Units  300 Units InterCATHeter PRN    amLODIPine (NORVASC) tablet 10 mg  10 mg Oral DAILY    polyethylene glycol (MIRALAX) packet 17 g  17 g Oral DAILY    docusate sodium (COLACE) capsule 100 mg  100 mg Oral BID    insulin lispro (HUMALOG) injection   SubCUTAneous AC&HS    isosorbide mononitrate ER (IMDUR) tablet 60 mg  60 mg Oral DAILY    hydrALAZINE (APRESOLINE) tablet 25 mg  25 mg Oral Q8H    calcium carbonate (TUMS) chewable tablet 200 mg [elemental]  200 mg Oral QID WITH MEALS    calcitRIOL (ROCALTROL) capsule 0.25 mcg  0.25 mcg Oral DAILY    magnesium oxide (MAG-OX) tablet 400 mg  400 mg Oral DAILY    epoetin calos (EPOGEN;PROCRIT) injection 10,000 Units  10,000 Units SubCUTAneous Once per day on Mon Thu    iodixanol (VISIPAQUE) 320 mg iodine/mL contrast injection 0-100 mL  0-100 mL IntraarTERial Multiple    sodium chloride (NS) flush 5-10 mL  5-10 mL IntraVENous Q8H    sodium chloride (NS) flush 5-10 mL  5-10 mL IntraVENous PRN    B complex-vitaminC-folic acid (NEPHROCAP) cap  1 Cap Oral DAILY    cloNIDine HCl (CATAPRES) tablet 0.1 mg  0.1 mg Oral Q4H PRN    nitroglycerin (NITROSTAT) tablet 0.4 mg  0.4 mg SubLINGual Q5MIN PRN    glucose chewable tablet 16 g  4 Tab Oral PRN    dextrose (D50W) injection syrg 12.5-25 g  12.5-25 g IntraVENous PRN    glucagon (GLUCAGEN) injection 1 mg  1 mg IntraMUSCular PRN    metoprolol tartrate (LOPRESSOR) tablet 25 mg  25 mg Oral Q6H    aspirin chewable tablet 81 mg  81 mg Oral DAILY    atorvastatin (LIPITOR) tablet 80 mg  80 mg Oral QPM    acetaminophen (TYLENOL) tablet 650 mg  650 mg Oral Q4H PRN    oxyCODONE-acetaminophen (PERCOCET) 5-325 mg per tablet 1 Tab  1 Tab Oral Q4H PRN    hydrALAZINE (APRESOLINE) 20 mg/mL injection 10 mg  10 mg IntraVENous Q2H PRN    naloxone (NARCAN) injection 0.4 mg  0.4 mg IntraVENous PRN    diphenhydrAMINE (BENADRYL) injection 12.5 mg  12.5 mg IntraVENous Q4H PRN    ondansetron (ZOFRAN) injection 4 mg  4 mg IntraVENous Q4H PRN    bisacodyl (DULCOLAX) tablet 5 mg  5 mg Oral DAILY PRN    clopidogrel (PLAVIX) tablet 75 mg  75 mg Oral DAILY        LABS:  Recent Labs      05/08/18 0905 05/07/18   0740   WBC  6.6  5.5   HGB  8.2*  7.9*   HCT  26.8*  25.2*   PLT  176  184     Recent Labs      05/08/18 0905 05/07/18   0837   NA  141  145   K  3.5  4.0   CL  104  107   CO2  28  29   BUN  42*  32*   CREA  4.65*  4.25*   GLU  206*  202*   CA  8.1*  7.9*     Recent Labs      05/08/18 0905 05/07/18   0837   SGOT  12*  14*   ALT  13  14   AP  167*  183*   TBILI  0.4  0.5   TP  6.1*  5.6*   ALB  2.3*  2.4*   GLOB  3.8  3.2     No results for input(s): INR, PTP, APTT in the last 72 hours. No lab exists for component: INREXT, INREXT   No results for input(s): FE, TIBC, PSAT, FERR in the last 72 hours. No results for input(s): PH, PCO2, PO2 in the last 72 hours. No results for input(s): CPK, CKNDX, TROIQ in the last 72 hours.     No lab exists for component: CPKMB  Lab Results   Component Value Date/Time    Glucose (POC) 172 (H) 05/09/2018 07:29 AM    Glucose (POC) 171 (H) 05/08/2018 08:52 PM    Glucose (POC) 261 (H) 05/08/2018 04:00 PM    Glucose (POC) 146 (H) 05/08/2018 12:10 PM    Glucose (POC) 265 (H) 05/08/2018 08:01 AM

## 2018-05-09 NOTE — PROGRESS NOTES
Oncology Nursing Communication Tool  7:08 PM  5/9/2018     Bedside shift change report given to Heather eng RN (incoming nurse) by Lisseth Unger RN (outgoing nurse) on Sundra Fiscal Sr. Report included the following information SBAR. Shift Summary:       Issues for physician to address: Oncology Shift Note   Admission Date 4/28/2018   Admission Diagnosis NSTEMI (non-ST elevated myocardial infarction) ([facility] [address]  ARF (acute renal failure) ([facility] [address]  ESRD   Code Status Full Code   Consults IP CONSULT TO CARDIOLOGY  IP CONSULT TO NEPHROLOGY  IP CONSULT TO PSYCHIATRY  IP CONSULT TO VASCULAR SURGERY      Cardiac Monitoring [] Yes [x] No      Purposeful Hourly Rounding [x] Yes    Momo Score Total Score: 3   Momo score 3 or > [] Bed Alarm [] Avasys [] 1:1 sitter [] Patient refused (Place signed refusal form in chart)      Pain Managed [] Yes [] No    Key Pain Meds             methadone (DOLOPHINE) 10 mg tablet Take 1 Tab by mouth two (2) times daily (with meals). Max Daily Amount: 60 mg. One tab in am, 2 tab at lunch, 1 at dinner and 2 at bed            Influenza Vaccine Received Flu Vaccine for Current Season (usually Sept-March): Not Flu Season           Oxygen needs?  [] Room air Oxygen @  []1L    []2L    []3L   []4L    []5L   []6L     Use home O2? [] Yes [] No  Perform O2 challenge test using  smartphrase (.oxygenchallenge)      Last bowel movement Last Bowel Movement Date: 05/07/18  bowel movement      Urinary Catheter             LDAs               Peripheral IV 05/06/18 Left Antecubital (Active)   Site Assessment Clean, dry, & intact 5/9/2018  9:00 AM   Phlebitis Assessment 0 5/9/2018  9:00 AM   Infiltration Assessment 0 5/9/2018  9:00 AM   Dressing Status Clean, dry, & intact 5/9/2018  9:00 AM   Dressing Type Tape;Transparent 5/9/2018  9:00 AM   Hub Color/Line Status Pink 5/9/2018  9:00 AM   Action Taken Blood drawn 5/6/2018  3:55 AM   Alcohol Cap Used Yes 5/9/2018  9:00 AM        Hemodialysis Access 05/04/18 (Active)   Central Line Being Utilized Yes 5/9/2018  2:35 AM   Criteria for Appropriate Use Dialysis/apheresis 5/9/2018  2:35 AM   Date Accessed  05/04/18 5/9/2018  2:35 AM   Site Assessment Clean, dry, & intact 5/9/2018  2:35 AM   Date of Last Dressing Change 05/04/18 5/9/2018  2:35 AM   Dressing Status Clean, dry, & intact 5/9/2018  2:35 AM   Dressing Type Transparent 5/9/2018  2:35 AM   Proximal Hub Color/Line Status Blue;Capped 5/9/2018  2:35 AM   Distal Hub Color/Line Status Red;Capped 5/9/2018  2:35 AM                     Readmission Risk Assessment Tool Score High Risk            28       Total Score        3 Has Seen PCP in Last 6 Months (Yes=3, No=0)    3 Patient Length of Stay (>5 days = 3)    9 Pt. Coverage (Medicare=5 , Medicaid, or Self-Pay=4)    13 Charlson Comorbidity Score (Age + Comorbid Conditions)        Criteria that do not apply:    . Living with Significant Other. Assisted Living. LTAC. SNF.  or   Rehab    IP Visits Last 12 Months (1-3=4, 4=9, >4=11)       Expected Length of Stay 5d 9h   Actual Length of Stay [address], RN

## 2018-05-09 NOTE — INTERDISCIPLINARY ROUNDS
Oncology Interdisciplinary rounds were held today to discuss patient plan of care and outcomes. The following members were present: Nursing, Case Management, Pharmacy and Dietary.     Actual Length of Stay: 11    DRG GLOS: 5.4    Expected Length of Stay: 5d 9h                Plan for Day        Mobility        Plan for Stay      Plan for Way   Dialysis T, TH, SAT  Permicath placed today Up in chair for meals Continue current TX TBD

## 2018-05-09 NOTE — PROGRESS NOTES
Hospitalist Progress Note    NAME: Deb Valentin   :  1955   MRN:  [id number]       Assessment / Plan:  Chest pain - borderline trop elevation; NSTEMI less likely. S/p  Cardiac cath report:  CAD, PAD w/ multiple stents - followed by Dr. Pina Dinero  cont aspirin, Plavix. Lipitor  Will continue the medical management. Sever Renal artery stenosis  S/p Successful PTA and stenting of the R renal artery w/ a BMS  cont aspirin, Plavix. MINO on CKD stage 5   Upper extremity vein mapping done. Nephrology on board. Seen by Dr. Pina Dinero who is known to patient. Will plan LUE access when appropriate. Keep NPO for [facility] cath today. Consult IR in AM for permacath as per nephrology.       Hypertensive Urgency - blood pressure improved. cont lopressor, Imdur  cont scheduled hydralazine      DM-2 w/ nephropathy and neuropathy  SSI    Anemia of chronic renal disease    Chronic thrombocytopenia    Tobacco abuse    Hx of Depression, patient used to be on medications. Effexor and trazodone at night. In this admission patient was noted to have fighting with staffs and throwing staffs and seen very aggressive multiple times   Adjustment disorder. may use Ativan 0.5 mg po bid prn for anxiety   psychiatry recommendations appreciated      Medical noncompliance      Body mass index is 22.85 kg/(m^2). Code status: Full  Prophylaxis: Hep SQ  Recommended Disposition: Home w/Family     Subjective:     Chief Complaint / Reason for Physician Visit  He was seen and examined , no complaints for me today. Review of Systems:  Symptom Y/N Comments  Symptom Y/N Comments   Fever/Chills n   Chest Pain n    Poor Appetite    Edema     Cough n   Abdominal Pain n    Sputum n   Joint Pain     SOB/COREA n   Pruritis/Rash     Nausea/vomit    Tolerating PT/OT     Diarrhea    Tolerating Diet     Constipation    Other       Could NOT obtain due to:      Objective:     VITALS:   Last 24hrs VS reviewed since prior progress note.  Most recent are:  Patient Vitals for the past 24 hrs:   Temp Pulse Resp BP SpO2   05/09/18 0741 97.9 °F (36.6 °C) 64 18 144/55 94 %   05/09/18 0555 - - - 158/52 -   05/08/18 2257 98 °F (36.7 °C) 65 18 161/66 95 %   05/08/18 1850 98.3 °F (36.8 °C) 65 18 124/47 95 %   05/08/18 1831 98.1 °F (36.7 °C) 66 16 132/63 96 %   05/08/18 1550 97.9 °F (36.6 °C) 72 18 139/61 92 %       Intake/Output Summary (Last 24 hours) at 05/09/18 1323  Last data filed at 05/09/18 0417   Gross per 24 hour   Intake              200 ml   Output              500 ml   Net             -300 ml        PHYSICAL EXAM:on the prior date   General: cooperative, no acute distress    EENT:  EOMI. Anicteric sclerae. MMM  Resp:  CTA bilaterally, no wheezing or rales. No accessory muscle use  CV:  Regular  rhythm,  No edema  GI:  Soft, Non distended, Non tender.  +Bowel sounds  Neurologic:  Alert and oriented X 3, normal speech,   Psych:   Not anxious nor agitated  Skin:  No rashes.   No jaundice  Ext:                  No edema right leg; left AKA noted     Reviewed most current lab test results and cultures  YES  Reviewed most current radiology test results   YES  Review and summation of old records today    NO  Reviewed patient's current orders and MAR    YES  PMH/SH reviewed - no change compared to H&P          Current Facility-Administered Medications:     [START ON 5/10/2018] heparin (porcine) injection 5,000 Units, 5,000 Units, SubCUTAneous, Q12H, Bonifacio Huston MD    pantoprazole (PROTONIX) tablet 40 mg, 40 mg, Oral, ACB, Keri Oseguera MD, 40 mg at 05/09/18 0756    heparin (porcine) pf 300 Units, 300 Units, InterCATHeter, PRN, Dennis Rudolph MD, 300 Units at 05/04/18 1416    amLODIPine (NORVASC) tablet 10 mg, 10 mg, Oral, DAILY, Major Riedel Herrera III, DO, 10 mg at 05/09/18 0807    polyethylene glycol (MIRALAX) packet 17 g, 17 g, Oral, DAILY, Zunilda Goode MD, 17 g at 05/08/18 0803    docusate sodium (COLACE) capsule 100 mg, 100 mg, Oral, BID, Alyson Ronquillo MD, 100 mg at 05/09/18 0807    insulin lispro (HUMALOG) injection, , SubCUTAneous, AC&HS, Alyson Ronquillo MD, 2 Units at 05/09/18 1128    isosorbide mononitrate ER (IMDUR) tablet 60 mg, 60 mg, Oral, DAILY, Pk Herrera III, DO, 60 mg at 05/09/18 0807    hydrALAZINE (APRESOLINE) tablet 25 mg, 25 mg, Oral, Q8H, Alyson Ronquillo MD, 25 mg at 05/09/18 0600    calcium carbonate (TUMS) chewable tablet 200 mg [elemental], 200 mg, Oral, QID WITH MEALS, Quiana Hooks MD, 200 mg at 05/09/18 1129    calcitRIOL (ROCALTROL) capsule 0.25 mcg, 0.25 mcg, Oral, DAILY, Quiana Hooks MD, 0.25 mcg at 05/09/18 0807    magnesium oxide (MAG-OX) tablet 400 mg, 400 mg, Oral, DAILY, Quiana Hooks MD, 400 mg at 05/08/18 0803    epoetin calos (EPOGEN;PROCRIT) injection 10,000 Units, 10,000 Units, SubCUTAneous, Once per day on Mon Thu, Quiana Hooks MD, 10,000 Units at 05/07/18 1707    iodixanol (VISIPAQUE) 320 mg iodine/mL contrast injection 0-100 mL, 0-100 mL, IntraarTERial, Multiple, Pk Herrera III, DO, 50 mL at 04/30/18 1041    sodium chloride (NS) flush 5-10 mL, 5-10 mL, IntraVENous, Q8H, Ricardo Herrera III, DO, 10 mL at 05/09/18 0601    sodium chloride (NS) flush 5-10 mL, 5-10 mL, IntraVENous, PRN, Pk Herrera III, DO, 10 mL at 05/06/18 1003    B complex-vitaminC-folic acid (NEPHROCAP) cap, 1 Cap, Oral, DAILY, Quiana Hooks MD, 1 Cap at 05/09/18 0807    cloNIDine HCl (CATAPRES) tablet 0.1 mg, 0.1 mg, Oral, Q4H PRN, Quiana Hooks MD, 0.1 mg at 05/01/18 1027    nitroglycerin (NITROSTAT) tablet 0.4 mg, 0.4 mg, SubLINGual, Q5MIN PRN, Ashwin Diaz MD, 0.4 mg at 04/29/18 0830    glucose chewable tablet 16 g, 4 Tab, Oral, PRN, Ashwin Diaz MD    dextrose (D50W) injection syrg 12.5-25 g, 12.5-25 g, IntraVENous, PRN, Ashwin Diaz MD    glucagon [facility]) injection 1 mg, 1 mg, IntraMUSCular, PRN, Ashwin Diaz MD    metoprolol tartrate (LOPRESSOR) tablet 25 mg, 25 mg, Oral, Q6H, Aurora Moreira MD, 25 mg at 05/09/18 1129    aspirin chewable tablet 81 mg, 81 mg, Oral, DAILY, Aurora Moreira MD, 81 mg at 05/09/18 0807    atorvastatin (LIPITOR) tablet 80 mg, 80 mg, Oral, QPM, Aurora Moreira MD, 80 mg at 05/08/18 1737    acetaminophen (TYLENOL) tablet 650 mg, 650 mg, Oral, Q4H PRN, Aurora Moreira MD, 650 mg at 04/29/18 2151    oxyCODONE-acetaminophen (PERCOCET) 5-325 mg per tablet 1 Tab, 1 Tab, Oral, Q4H PRN, Aurora Moreira MD, 1 Tab at 05/09/18 0756    hydrALAZINE (APRESOLINE) 20 mg/mL injection 10 mg, 10 mg, IntraVENous, Q2H PRN, Aurora Moreira MD    [facility]) injection 0.4 mg, 0.4 mg, IntraVENous, PRN, Aurora Moreira MD    diphenhydrAMINE (BENADRYL) injection 12.5 mg, 12.5 mg, IntraVENous, Q4H PRN, Aurora Moreira MD, 12.5 mg at 05/02/18 2253    ondansetron [facility]) injection 4 mg, 4 mg, IntraVENous, Q4H PRN, Aurora Moreira MD, 4 mg at 05/06/18 1003    bisacodyl (DULCOLAX) tablet 5 mg, 5 mg, Oral, DAILY PRN, Aurora Moreira MD    clopidogrel (PLAVIX) tablet 75 mg, 75 mg, Oral, DAILY, Aurora Moreira MD, 75 mg at 05/09/18 0639  ________________________________________________________________________  Care Plan discussed with:    Comments   Patient     Family  y Discussed with family in the room    RN y    Care Manager     Consultant                        Multidiciplinary team rounds were held today with , nursing, pharmacist and clinical coordinator. Patient's plan of care was discussed; medications were reviewed and discharge planning was addressed.      ________________________________________________________________________  Total NON critical care TIME:  25  Minutes    Total CRITICAL CARE TIME Spent:   Minutes non procedure based      Comments   >50% of visit spent in counseling and coordination of care     ________________________________________________________________________  Josue Andrews Rosalina Aivles MD     Procedures: see electronic medical records for all procedures/Xrays and details which were not copied into this note but were reviewed prior to creation of Plan. LABS:  I reviewed today's most current labs and imaging studies. Pertinent labs include:  Recent Labs      05/08/18   0905 05/07/18   0740   WBC  6.6  5.5   HGB  8.2*  7.9*   HCT  26.8*  25.2*   PLT  176  184     Recent Labs      05/08/18   0905 05/07/18   0837   NA  141  145   K  3.5  4.0   CL  104  107   CO2  28  29   GLU  206*  202*   BUN  42*  32*   CREA  4.65*  4.25*   CA  8.1*  7.9*   ALB  2.3*  2.4*   TBILI  0.4  0.5   SGOT  12*  14*   ALT  13  14       Signed:  Alysha Childers MD

## 2018-05-09 NOTE — PROGRESS NOTES
Oncology Nursing Communication Tool  7:40 AM  5/9/2018     Bedside shift change report given to Kali Villarreal RN (incoming nurse) by Neal Timmons (outgoing nurse) [facility] Report included the following information SBAR, Kardex, Intake/Output, MAR and Recent Results. Shift Summary: is INDEPENDENT with transfers to [facility] and back to bed, DOES NOT WANT HELP! Uses urinal, needs occult blood stool. NPO since midnight, permacath placement today      Issues for physician to address: d/c? Oncology Shift Note   Admission Date 4/28/2018   Admission Diagnosis NSTEMI (non-ST elevated myocardial infarction) ([facility] [address]  ARF (acute renal failure) ([facility] [address]  ESRD   Code Status Full Code   Consults IP CONSULT TO CARDIOLOGY  IP CONSULT TO NEPHROLOGY  IP CONSULT TO PSYCHIATRY  IP CONSULT TO VASCULAR SURGERY      Cardiac Monitoring [] Yes [x] No      Purposeful Hourly Rounding [x] Yes    Momo Score Total Score: 3   Momo score 3 or > [] Bed Alarm [] Avasys [] 1:1 sitter [] Patient refused (Place signed refusal form in chart)      Pain Managed [x] Yes [] No    Key Pain Meds             methadone (DOLOPHINE) 10 mg tablet Take 1 Tab by mouth two (2) times daily (with meals). Max Daily Amount: 60 mg. One tab in am, 2 tab at lunch, 1 at dinner and 2 at bed            Influenza Vaccine Received Flu Vaccine for Current Season (usually Sept-March): Not Flu Season           Oxygen needs?  [x] Room air Oxygen @  []1L    []2L    []3L   []4L    []5L   []6L     Use home O2? [] Yes [] No  Perform O2 challenge test using  smartphrase (.oxygenchallenge)      Last bowel movement Last Bowel Movement Date: 05/07/18  bowel movement      Urinary Catheter             LDAs               Peripheral IV 05/06/18 Left Antecubital (Active)   Site Assessment Clean, dry, & intact 5/9/2018  2:35 AM   Phlebitis Assessment 0 5/9/2018  2:35 AM   Infiltration Assessment 0 5/9/2018  2:35 AM   Dressing Status Clean, dry, & intact 5/9/2018  2:35 AM Dressing Type Tape;Transparent 5/9/2018  2:35 AM   Hub Color/Line Status Pink;Flushed;Patent 5/9/2018  2:35 AM   Action Taken Blood drawn 5/6/2018  3:55 AM   Alcohol Cap Used Yes 5/7/2018  9:18 AM        Hemodialysis Access 05/04/18 (Active)   Central Line Being Utilized Yes 5/9/2018  2:35 AM   Criteria for Appropriate Use Dialysis/apheresis 5/9/2018  2:35 AM   Date Accessed  05/04/18 5/9/2018  2:35 AM   Site Assessment Clean, dry, & intact 5/9/2018  2:35 AM   Date of Last Dressing Change 05/04/18 5/9/2018  2:35 AM   Dressing Status Clean, dry, & intact 5/9/2018  2:35 AM   Dressing Type Transparent 5/9/2018  2:35 AM   Proximal Hub Color/Line Status Blue;Capped 5/9/2018  2:35 AM   Distal Hub Color/Line Status Red;Capped 5/9/2018  2:35 AM                     Readmission Risk Assessment Tool Score High Risk            28       Total Score        3 Has Seen PCP in Last 6 Months (Yes=3, No=0)    3 Patient Length of Stay (>5 days = 3)    9 Pt. Coverage (Medicare=5 , Medicaid, or Self-Pay=4)    13 Charlson Comorbidity Score (Age + Comorbid Conditions)        Criteria that do not apply:    . Living with Significant Other. Assisted Living. LTAC. SNF.  or   Rehab    IP Visits Last 12 Months (1-3=4, 4=9, >4=11)       Expected Length of Stay 5d 9h   Actual Length of Stay [address]

## 2018-05-10 ENCOUNTER — APPOINTMENT (OUTPATIENT)
Dept: INTERVENTIONAL RADIOLOGY/VASCULAR | Age: 63
DRG: 674 | End: 2018-05-10
Attending: INTERNAL MEDICINE
Payer: MEDICARE

## 2018-05-10 LAB
ANION GAP SERPL CALC-SCNC: 10 MMOL/L (ref 5–15)
BUN SERPL-MCNC: 33 MG/DL (ref 6–20)
BUN/CREAT SERPL: 8 (ref 12–20)
CALCIUM SERPL-MCNC: 7.9 MG/DL (ref 8.5–10.1)
CHLORIDE SERPL-SCNC: 107 MMOL/L (ref 97–108)
CO2 SERPL-SCNC: 27 MMOL/L (ref 21–32)
CREAT SERPL-MCNC: 4.22 MG/DL (ref 0.7–1.3)
ERYTHROCYTE [DISTWIDTH] IN BLOOD BY AUTOMATED COUNT: 17.4 % (ref 11.5–14.5)
GLUCOSE BLD STRIP.AUTO-MCNC: 106 MG/DL (ref 65–100)
GLUCOSE BLD STRIP.AUTO-MCNC: 165 MG/DL (ref 65–100)
GLUCOSE BLD STRIP.AUTO-MCNC: 193 MG/DL (ref 65–100)
GLUCOSE SERPL-MCNC: 162 MG/DL (ref 65–100)
HCT VFR BLD AUTO: 27.6 % (ref 36.6–50.3)
HGB BLD-MCNC: 8.5 G/DL (ref 12.1–17)
MCH RBC QN AUTO: 31.8 PG (ref 26–34)
MCHC RBC AUTO-ENTMCNC: 30.8 G/DL (ref 30–36.5)
MCV RBC AUTO: 103.4 FL (ref 80–99)
NRBC # BLD: 0 K/UL (ref 0–0.01)
NRBC BLD-RTO: 0 PER 100 WBC
PLATELET # BLD AUTO: 144 K/UL (ref 150–400)
PMV BLD AUTO: 9.6 FL (ref 8.9–12.9)
POTASSIUM SERPL-SCNC: 3.7 MMOL/L (ref 3.5–5.1)
RBC # BLD AUTO: 2.67 M/UL (ref 4.1–5.7)
SERVICE CMNT-IMP: ABNORMAL
SODIUM SERPL-SCNC: 144 MMOL/L (ref 136–145)
WBC # BLD AUTO: 5.4 K/UL (ref 4.1–11.1)

## 2018-05-10 PROCEDURE — 02PA33Z REMOVAL OF INFUSION DEVICE FROM HEART, PERCUTANEOUS APPROACH: ICD-10-PCS | Performed by: RADIOLOGY

## 2018-05-10 PROCEDURE — 74011000250 HC RX REV CODE- 250: Performed by: RADIOLOGY

## 2018-05-10 PROCEDURE — 82962 GLUCOSE BLOOD TEST: CPT

## 2018-05-10 PROCEDURE — 74011250637 HC RX REV CODE- 250/637: Performed by: INTERNAL MEDICINE

## 2018-05-10 PROCEDURE — 85027 COMPLETE CBC AUTOMATED: CPT | Performed by: INTERNAL MEDICINE

## 2018-05-10 PROCEDURE — C1750 CATH, HEMODIALYSIS,LONG-TERM: HCPCS

## 2018-05-10 PROCEDURE — 74011636637 HC RX REV CODE- 636/637: Performed by: INTERNAL MEDICINE

## 2018-05-10 PROCEDURE — 74011250636 HC RX REV CODE- 250/636: Performed by: INTERNAL MEDICINE

## 2018-05-10 PROCEDURE — 02H633Z INSERTION OF INFUSION DEVICE INTO RIGHT ATRIUM, PERCUTANEOUS APPROACH: ICD-10-PCS | Performed by: RADIOLOGY

## 2018-05-10 PROCEDURE — 90935 HEMODIALYSIS ONE EVALUATION: CPT

## 2018-05-10 PROCEDURE — 0JH63XZ INSERTION OF TUNNELED VASCULAR ACCESS DEVICE INTO CHEST SUBCUTANEOUS TISSUE AND FASCIA, PERCUTANEOUS APPROACH: ICD-10-PCS | Performed by: RADIOLOGY

## 2018-05-10 PROCEDURE — 77001 FLUOROGUIDE FOR VEIN DEVICE: CPT

## 2018-05-10 PROCEDURE — 36415 COLL VENOUS BLD VENIPUNCTURE: CPT | Performed by: INTERNAL MEDICINE

## 2018-05-10 PROCEDURE — 65270000015 HC RM PRIVATE ONCOLOGY

## 2018-05-10 PROCEDURE — 74011250636 HC RX REV CODE- 250/636: Performed by: RADIOLOGY

## 2018-05-10 PROCEDURE — 80048 BASIC METABOLIC PNL TOTAL CA: CPT | Performed by: INTERNAL MEDICINE

## 2018-05-10 PROCEDURE — 74011250637 HC RX REV CODE- 250/637: Performed by: HOSPITALIST

## 2018-05-10 PROCEDURE — 77030018719 HC DRSG PTCH ANTIMIC J&J -A

## 2018-05-10 PROCEDURE — 77030031139 HC SUT VCRL2 J&J -A

## 2018-05-10 RX ORDER — FENTANYL CITRATE 50 UG/ML
100 INJECTION, SOLUTION INTRAMUSCULAR; INTRAVENOUS
Status: DISCONTINUED | OUTPATIENT
Start: 2018-05-10 | End: 2018-05-10

## 2018-05-10 RX ORDER — HEPARIN SODIUM 1000 [USP'U]/ML
10000 INJECTION, SOLUTION INTRAVENOUS; SUBCUTANEOUS ONCE
Status: COMPLETED | OUTPATIENT
Start: 2018-05-10 | End: 2018-05-10

## 2018-05-10 RX ORDER — MIDAZOLAM HYDROCHLORIDE 1 MG/ML
5 INJECTION, SOLUTION INTRAMUSCULAR; INTRAVENOUS
Status: DISCONTINUED | OUTPATIENT
Start: 2018-05-10 | End: 2018-05-10

## 2018-05-10 RX ORDER — HYDRALAZINE HYDROCHLORIDE 50 MG/1
50 TABLET, FILM COATED ORAL EVERY 8 HOURS
Status: DISCONTINUED | OUTPATIENT
Start: 2018-05-10 | End: 2018-05-14 | Stop reason: HOSPADM

## 2018-05-10 RX ORDER — CEFAZOLIN SODIUM/WATER 2 G/20 ML
2 SYRINGE (ML) INTRAVENOUS ONCE
Status: COMPLETED | OUTPATIENT
Start: 2018-05-10 | End: 2018-05-10

## 2018-05-10 RX ORDER — SODIUM CHLORIDE 9 MG/ML
25 INJECTION, SOLUTION INTRAVENOUS CONTINUOUS
Status: DISCONTINUED | OUTPATIENT
Start: 2018-05-10 | End: 2018-05-10

## 2018-05-10 RX ORDER — LIDOCAINE HYDROCHLORIDE 20 MG/ML
20 INJECTION, SOLUTION INFILTRATION; PERINEURAL ONCE
Status: COMPLETED | OUTPATIENT
Start: 2018-05-10 | End: 2018-05-10

## 2018-05-10 RX ADMIN — METOPROLOL TARTRATE 25 MG: 25 TABLET ORAL at 06:09

## 2018-05-10 RX ADMIN — Medication 2 G: at 09:08

## 2018-05-10 RX ADMIN — DOCUSATE SODIUM 100 MG: 100 CAPSULE, LIQUID FILLED ORAL at 10:22

## 2018-05-10 RX ADMIN — ERYTHROPOIETIN 10000 UNITS: 10000 INJECTION, SOLUTION INTRAVENOUS; SUBCUTANEOUS at 18:10

## 2018-05-10 RX ADMIN — METOPROLOL TARTRATE 25 MG: 25 TABLET ORAL at 23:28

## 2018-05-10 RX ADMIN — Medication 10 ML: at 21:39

## 2018-05-10 RX ADMIN — HYDRALAZINE HYDROCHLORIDE 25 MG: 50 TABLET, FILM COATED ORAL at 06:09

## 2018-05-10 RX ADMIN — CLOPIDOGREL BISULFATE 75 MG: 75 TABLET ORAL at 10:22

## 2018-05-10 RX ADMIN — OXYCODONE HYDROCHLORIDE AND ACETAMINOPHEN 1 TABLET: 5; 325 TABLET ORAL at 21:41

## 2018-05-10 RX ADMIN — HEPARIN SODIUM 5000 UNITS: 5000 INJECTION, SOLUTION INTRAVENOUS; SUBCUTANEOUS at 10:26

## 2018-05-10 RX ADMIN — HEPARIN SODIUM 3800 UNITS: 1000 INJECTION, SOLUTION INTRAVENOUS; SUBCUTANEOUS at 09:47

## 2018-05-10 RX ADMIN — LIDOCAINE HYDROCHLORIDE 400 MG: 20 INJECTION, SOLUTION INFILTRATION; PERINEURAL at 09:46

## 2018-05-10 RX ADMIN — HEPARIN SODIUM 5000 UNITS: 5000 INJECTION, SOLUTION INTRAVENOUS; SUBCUTANEOUS at 21:38

## 2018-05-10 RX ADMIN — MIDAZOLAM 1 MG: 1 INJECTION INTRAMUSCULAR; INTRAVENOUS at 09:34

## 2018-05-10 RX ADMIN — MIDAZOLAM 1 MG: 1 INJECTION INTRAMUSCULAR; INTRAVENOUS at 09:36

## 2018-05-10 RX ADMIN — NEPHROCAP 1 CAPSULE: 1 CAP ORAL at 10:21

## 2018-05-10 RX ADMIN — ASPIRIN 81 MG 81 MG: 81 TABLET ORAL at 10:21

## 2018-05-10 RX ADMIN — Medication 400 MG: at 10:22

## 2018-05-10 RX ADMIN — DOCUSATE SODIUM 100 MG: 100 CAPSULE, LIQUID FILLED ORAL at 18:06

## 2018-05-10 RX ADMIN — CALCITRIOL 0.25 MCG: 0.25 CAPSULE, LIQUID FILLED ORAL at 10:21

## 2018-05-10 RX ADMIN — Medication 10 ML: at 06:08

## 2018-05-10 RX ADMIN — CALCIUM CARBONATE 200 MG: 500 TABLET, CHEWABLE ORAL at 10:22

## 2018-05-10 RX ADMIN — FENTANYL CITRATE 50 MCG: 50 INJECTION, SOLUTION INTRAMUSCULAR; INTRAVENOUS at 09:35

## 2018-05-10 RX ADMIN — OXYCODONE HYDROCHLORIDE AND ACETAMINOPHEN 1 TABLET: 5; 325 TABLET ORAL at 18:06

## 2018-05-10 RX ADMIN — OXYCODONE HYDROCHLORIDE AND ACETAMINOPHEN 1 TABLET: 5; 325 TABLET ORAL at 07:46

## 2018-05-10 RX ADMIN — ATORVASTATIN CALCIUM 80 MG: 40 TABLET, FILM COATED ORAL at 18:06

## 2018-05-10 RX ADMIN — SODIUM CHLORIDE 25 ML/HR: 900 INJECTION, SOLUTION INTRAVENOUS at 08:33

## 2018-05-10 RX ADMIN — HYDRALAZINE HYDROCHLORIDE 50 MG: 50 TABLET, FILM COATED ORAL at 21:38

## 2018-05-10 RX ADMIN — CALCIUM CARBONATE 200 MG: 500 TABLET, CHEWABLE ORAL at 18:06

## 2018-05-10 RX ADMIN — PANTOPRAZOLE SODIUM 40 MG: 40 TABLET, DELAYED RELEASE ORAL at 10:22

## 2018-05-10 RX ADMIN — INSULIN LISPRO 2 UNITS: 100 INJECTION, SOLUTION INTRAVENOUS; SUBCUTANEOUS at 11:48

## 2018-05-10 RX ADMIN — METOPROLOL TARTRATE 25 MG: 25 TABLET ORAL at 18:06

## 2018-05-10 NOTE — PERIOP NOTES
Phoned RN & updated patient is scheduled for surgery tomorrow at 1000. Requested consents & CHG wipes be completed as ordered.   NPO after MN

## 2018-05-10 NOTE — ROUTINE PROCESS
0900- Pt in for permcath placement. Dr Lauren Carrillo aware pt on plavix and heparin. Pt aware of risks and benefits and wishes to proceed. 1000- Report called to primary nurse Olena Jack. Pt transported to unit. Pt alert and denies pain. Permcath to right chest. Brice D/I.

## 2018-05-10 NOTE — PROGRESS NOTES
CM spoke to patient about ongoing plans for hemodialysis. Patient confirms that he will be moving in with his son that lives in [facility] and patient and his son would like to arrange outpatient dialysis in [place]. Per Dr. Harry Lynn note, CM has called , Chrissie Vasques, at [employer] - [phone] - and [address], Nephrology and Hospitalist notes FAX'd to her at [phone]. Patient had perma-cath placed yesterday and is for dialysis today. He is planned for AVF in [facility] 5/11/2018.       Ez Leach, RN, BSN, ACM   - Medical Oncology  [phone]

## 2018-05-10 NOTE — PROCEDURES
[facility]  [phone]    Vitals   Pre   Post   Assessment   Pre   Post     Temp  Temp: 97.7 °F (36.5 °C) (05/10/18 1420)  97.8 LOC  A&OX4 A&OX4   HR   Pulse (Heart Rate): 64 (05/10/18 1420) 69 Lungs   clear  clear   B/P   BP: 184/86 (05/10/18 1420) 154/81 Cardiac   HRR  HRR   Resp   Resp Rate: 16 (05/10/18 1420) 16 Skin   intact  intact   Pain level  Pain Intensity 1: 6 (05/10/18 0745) 0 Edema    1-2+LUCHO   1-2+LUCHO   Orders:    Duration:   Start:   0372 End:   1720 Total:   3 hours   Dialyzer:   Dialyzer/Set Up Inspection: Revaclear (05/10/18 1420)   K Bath:   Dialysate K (mEq/L): 3 (05/10/18 1420)   Ca Bath:   Dialysate CA (mEq/L): 2.5 (05/10/18 1420)   Na/Bicarb:   Dialysate NA (mEq/L): 140 (05/10/18 1420)   Target Fluid Removal:   Goal/Amount of Fluid to Remove (mL): 2000 mL (05/10/18 1420)   Access     Type & Location:   RIJ permacath. Ports  Kash/permcath disinfected with Alcohol per policy. Each lumen aspirated for blood return and flushed with Normal Saline per policy. Dialysis initiated. Central line catheter flushed with normal saline per policy. Ports disinfected with Alcohol per policy and lines disconnected and capped using aseptic technique. See LDA docflowsheet for dressing information.      Labs     Obtained/Reviewed   Critical Results Called   Date when labs were drawn-  Hgb-    HGB   Date Value Ref Range Status   05/10/2018 8.5 (L) 12.1 - 17.0 g/dL Final     K-    Potassium   Date Value Ref Range Status   05/08/2018 3.5 3.5 - 5.1 mmol/L Final     Ca-   Calcium   Date Value Ref Range Status   05/08/2018 8.1 (L) 8.5 - 10.1 MG/DL Final     Bun-   BUN   Date Value Ref Range Status   05/08/2018 42 (H) 6 - 20 MG/DL Final     Creat-   Creatinine   Date Value Ref Range Status   05/08/2018 4.65 (H) 0.70 - 1.30 MG/DL Final        Medications/ Blood Products Given     Name   Dose   Route and Time     none                Blood Volume Processed (BVP):    64.9 liters Net Fluid   Removed:  2000 ml   Comments Tolerated tx well, no complaints during or after. Report called to Oncology nurse, Dylan Lyons RN, using SBAR. Time Out Done: yes, [address]  Primary Nurse Rpt Pre: Dotty Crane RN  Primary Nurse Rpt Post: Dotty Crane RN Pt Education:  Care Plan:HD as ordered  Tx Summary: 3 hours, 3 K 2.5 Ca removed 2000 ml  Admiting Diagnosis:  Pt's previous clinic-new start  Consent signed - Informed Consent Verified: Yes (05/10/18 1420)  Sudheerita Consent -yes   Hepatitis Status- antigen negative 5-4-18  Machine #- Machine Number: [id number] (05/10/18 1420)  Telemetry status-no  Pre-dialysis wt. - Pre-Dialysis Weight: 74.3 kg (163 lb 12.8 oz) (05/04/18 1945)

## 2018-05-10 NOTE — INTERDISCIPLINARY ROUNDS
Oncology Interdisciplinary rounds were held today to discuss patient plan of care and outcomes. The following members were present: Nursing, Case Management, Pharmacy and Dietary. Actual Length of Stay: 12    DRG GLOS: 5.4    Expected Length of Stay: 5d 9h                Plan for Day        Mobility        Plan for Stay      Plan for Way   Dialysis today   Fistula placement tomorrow Up in wheelchair  Continue current [address] D/C Monday? ?

## 2018-05-10 NOTE — PROGRESS NOTES
IR called to inform nurse that they would be sending for patient shortly. RN medicated pt per request with percocet. Pt provided CHG wipes to wipe down with, new gown and new socks provided and linens were changed. Pt taken from floor before 8, RN unable to give any medications due to time restraints on EMAR.

## 2018-05-10 NOTE — PROGRESS NOTES
Bedside and Verbal shift change report given to Betty Obrien rn (oncoming nurse) by Kalee Machuca (offgoing nurse). Report included the following information SBAR, Procedure Summary, Intake/Output, MAR and Recent Results.

## 2018-05-10 NOTE — PROGRESS NOTES
Oncology Nursing Communication Tool  6:51 PM  5/10/2018     Bedside and Verbal shift change report given to CAITLYN Rodriguez (incoming nurse) by Randee Jane (outgoing nurse) on [employer] . Report included the following information SBAR, Kardex, Procedure Summary, Intake/Output, MAR, Accordion and Recent Results. Shift Summary:       Issues for physician to address: Oncology Shift Note   Admission Date 4/28/2018   Admission Diagnosis NSTEMI (non-ST elevated myocardial infarction) ([facility] [address]  ARF (acute renal failure) ([facility] [address]  ESRD   Code Status Full Code   Consults IP CONSULT TO CARDIOLOGY  IP CONSULT TO NEPHROLOGY  IP CONSULT TO PSYCHIATRY  IP CONSULT TO VASCULAR SURGERY      Cardiac Monitoring [] Yes [] No      Purposeful Hourly Rounding [] Yes    Momo Score Total Score: 3   Momo score 3 or > [] Bed Alarm [] Avasys [] 1:1 sitter [] Patient refused (Place signed refusal form in chart)      Pain Managed [] Yes [] No    Key Pain Meds             methadone (DOLOPHINE) 10 mg tablet Take 1 Tab by mouth two (2) times daily (with meals). Max Daily Amount: 60 mg. One tab in am, 2 tab at lunch, 1 at dinner and 2 at bed            Influenza Vaccine Received Flu Vaccine for Current Season (usually Sept-March): Not Flu Season           Oxygen needs?  [] Room air Oxygen @  []1L    []2L    []3L   []4L    []5L   []6L     Use home O2? [] Yes [] No  Perform O2 challenge test using  smartphrase (.oxygenchallenge)      Last bowel movement Last Bowel Movement Date: 05/07/18  bowel movement      Urinary Catheter             LDAs             Venous Access Device chronic dual lumen catheter LOT [id number] 05/10/18 Upper chest (subclavicular area, right (Active)   Central Line Being Utilized Yes 5/10/2018  6:06 PM   Criteria for Appropriate Use Dialysis/apheresis 5/10/2018  6:06 PM   Site Assessment Clean, dry, & intact 5/10/2018  6:06 PM   Date of Last Dressing Change 05/10/18 5/10/2018  6:06 PM   Dressing Type Other (comments) 5/10/2018  9:40 AM   Positive Blood Return (Medial Site) Yes 5/10/2018  9:40 AM   Positive Blood Return (Lateral Site) Yes 5/10/2018  9:40 AM   Alcohol Cap Used Yes 5/10/2018  6:06 PM      Peripheral IV 05/06/18 Left Antecubital (Active)   Site Assessment Clean, dry, & intact 5/10/2018  6:06 PM   Phlebitis Assessment 0 5/10/2018  6:06 PM   Infiltration Assessment 0 5/10/2018  6:06 PM   Dressing Status Clean, dry, & intact 5/10/2018  6:06 PM   Dressing Type Transparent;Tape 5/10/2018  6:06 PM   Hub Color/Line Status Pink;Capped; Patent 5/10/2018  6:06 PM   Action Taken Blood drawn 5/6/2018  3:55 AM   Alcohol Cap Used Yes 5/9/2018  9:00 AM        Hemodialysis Access 05/04/18 (Active)   Central Line Being Utilized Yes 5/10/2018  7:46 AM   Criteria for Appropriate Use Dialysis/apheresis 5/10/2018  7:46 AM   Date Accessed  05/04/18 5/10/2018  7:46 AM   Site Assessment Clean, dry, & intact 5/10/2018  7:46 AM   Date of Last Dressing Change 05/04/18 5/10/2018  7:46 AM   Dressing Status Clean, dry, & intact 5/10/2018  7:46 AM   Dressing Type Transparent;Tape 5/10/2018  7:46 AM   Proximal Hub Color/Line Status Blue;Capped 5/10/2018  7:46 AM   Distal Hub Color/Line Status Red;Capped 5/10/2018  7:46 AM                     Readmission Risk Assessment Tool Score High Risk            28       Total Score        3 Has Seen PCP in Last 6 Months (Yes=3, No=0)    3 Patient Length of Stay (>5 days = 3)    9 Pt. Coverage (Medicare=5 , Medicaid, or Self-Pay=4)    13 Charlson Comorbidity Score (Age + Comorbid Conditions)        Criteria that do not apply:    . Living with Significant Other. Assisted Living. LTAC. SNF.  or   Rehab    IP Visits Last 12 Months (1-3=4, 4=9, >4=11)       Expected Length of Stay 5d 9h   Actual Length of Stay [address]

## 2018-05-10 NOTE — PROGRESS NOTES
Oncology Nursing Communication Tool  0715 AM  5/10/2018     Bedside shift change report given to Clarence Mathis RN (incoming nurse) by Elmo Barker RN (outgoing nurse) on [facility]. Report included the following information SBAR. Shift Summary: Pt rested comfortably throughout the night. NPO since 0000. Issues for physician to address: Permacath placement in AM         Oncology Shift Note   Admission Date 4/28/2018   Admission Diagnosis NSTEMI (non-ST elevated myocardial infarction) ([facility] [address]  ARF (acute renal failure) ([facility] [address]  ESRD   Code Status Full Code   Consults IP CONSULT TO CARDIOLOGY  IP CONSULT TO NEPHROLOGY  IP CONSULT TO PSYCHIATRY  IP CONSULT TO VASCULAR SURGERY      Cardiac Monitoring [] Yes [] No      Purposeful Hourly Rounding [] Yes    Momo Score Total Score: 3   Momo score 3 or > [] Bed Alarm [] Avasys [] 1:1 sitter [] Patient refused (Place signed refusal form in chart)      Pain Managed [] Yes [] No    Key Pain Meds             methadone (DOLOPHINE) 10 mg tablet Take 1 Tab by mouth two (2) times daily (with meals). Max Daily Amount: 60 mg. One tab in am, 2 tab at lunch, 1 at dinner and 2 at bed            Influenza Vaccine Received Flu Vaccine for Current Season (usually Sept-March): Not Flu Season           Oxygen needs?  [] Room air Oxygen @  []1L    []2L    []3L   []4L    []5L   []6L     Use home O2? [] Yes [] No  Perform O2 challenge test using  smartphrase (.oxygenchallenge)      Last bowel movement Last Bowel Movement Date: 05/07/18  bowel movement      Urinary Catheter             LDAs               Peripheral IV 05/06/18 Left Antecubital (Active)   Site Assessment Clean, dry, & intact 5/10/2018  2:14 AM   Phlebitis Assessment 0 5/10/2018  2:14 AM   Infiltration Assessment 0 5/10/2018  2:14 AM   Dressing Status Clean, dry, & intact 5/10/2018  2:14 AM   Dressing Type Tape;Transparent 5/10/2018  2:14 AM   Hub Color/Line Status Pink;Capped;Flushed;Patent 5/10/2018  2:14 AM   Action Taken Blood drawn 5/6/2018  3:55 AM   Alcohol Cap Used Yes 5/9/2018  9:00 AM        Hemodialysis Access 05/04/18 (Active)   Central Line Being Utilized Yes 5/10/2018  2:14 AM   Criteria for Appropriate Use Dialysis/apheresis 5/10/2018  2:14 AM   Date Accessed  05/04/18 5/10/2018  2:14 AM   Site Assessment Clean, dry, & intact 5/10/2018  2:14 AM   Date of Last Dressing Change 05/04/18 5/10/2018  2:14 AM   Dressing Status Clean, dry, & intact 5/10/2018  2:14 AM   Dressing Type Tape;Transparent 5/10/2018  2:14 AM   Proximal Hub Color/Line Status Blue;Capped 5/10/2018  2:14 AM   Distal Hub Color/Line Status Red;Capped 5/10/2018  2:14 AM                     Readmission Risk Assessment Tool Score High Risk            28       Total Score        3 Has Seen PCP in Last 6 Months (Yes=3, No=0)    3 Patient Length of Stay (>5 days = 3)    9 Pt. Coverage (Medicare=5 , Medicaid, or Self-Pay=4)    13 Charlson Comorbidity Score (Age + Comorbid Conditions)        Criteria that do not apply:    . Living with Significant Other. Assisted Living. LTAC. SNF.  or   Rehab    IP Visits Last 12 Months (1-3=4, 4=9, >4=11)       Expected Length of Stay 5d 9h   Actual Length of Stay 12          Shine Damon RN

## 2018-05-10 NOTE — PROGRESS NOTES
[facility] Progress Note        NAME: Killian Tang       :  1955       MRN:  [id number]     Date/Time: 5/10/2018    Risk of deterioration: medium       Assessment:    Plan:  MINO on CKD V - ?ESRD    S/P CATH-2V cad    (R) ROSALEE- s/p BMS of of R renal artery. HTN   PAD/PVD s/p (L) AKA-(L) iliac occlusion  Anemia  Secondary PTH Pt had a baseline creatinine of around 2-2.5 over the last few years with recent deterioration to 4-4.6--likely due to advancing ckd (+/-) ROSALEE    HD # 1 . HD#2      Remains HD dependent. Seen on HD  Using new R Permacath (Placed today), 400 QB, Revaclear dialyzer, 2.5 Ca, 140 Na    UF of 2 Kg as tolerated  Plans for AVF tomm by Vascular surgery  revd catheter care    Increase Hydralazine to 50 mg TID as persistent HTN    Send labs- which were drawn Pre-HD today    Continue rocaltrol, epo, TUMS with meals       Subjective:     Chief Complaint: s/p permacath this am. Seen on HD around 2:30 PM.   No acute c/o    Review of Systems: as above    Objective:     VITALS:   Last 24hrs VS reviewed since prior progress note.  Most recent are:  Visit Vitals    /86    Pulse 64    Temp 97.7 °F (36.5 °C)    Resp 16    Ht 5' 9\" (1.753 m)    Wt 70.2 kg (154 lb 11.2 oz)    SpO2 97%    BMI 22.85 kg/m2     SpO2 Readings from Last 6 Encounters:   05/10/18 97%   01/15/16 98%   16 100%   12/04/15 98%   10/13/15 97%   09/25/15 100%    O2 Flow Rate (L/min): 2 l/min       Intake/Output Summary (Last 24 hours) at 05/10/18 1441  Last data filed at 05/10/18 1420   Gross per 24 hour   Intake                0 ml   Output              805 ml   Net             -805 ml        Telemetry Reviewed    PHYSICAL EXAM:  General   NAD  R TDC intact  Clear  RRR  trace edema on R. L AKA     Lab Data Reviewed: (see below)    Medications Reviewed: (see below)    PMH/SH reviewed - no change compared to H&P  ___________________________________________________  ___________    Attending Physician: Zeinab Martin MD     ____________________________________________________  MEDICATIONS:  Current Facility-Administered Medications   Medication Dose Route Frequency    hydrALAZINE (APRESOLINE) tablet 50 mg  50 mg Oral Q8H    heparin (porcine) injection 5,000 Units  5,000 Units SubCUTAneous Q12H    pantoprazole (PROTONIX) tablet 40 mg  40 mg Oral ACB    heparin (porcine) pf 300 Units  300 Units InterCATHeter PRN    amLODIPine (NORVASC) tablet 10 mg  10 mg Oral DAILY    polyethylene glycol (MIRALAX) packet 17 g  17 g Oral DAILY    docusate sodium (COLACE) capsule 100 mg  100 mg Oral BID    insulin lispro (HUMALOG) injection   SubCUTAneous AC&HS    isosorbide mononitrate ER (IMDUR) tablet 60 mg  60 mg Oral DAILY    calcium carbonate (TUMS) chewable tablet 200 mg [elemental]  200 mg Oral QID WITH MEALS    calcitRIOL (ROCALTROL) capsule 0.25 mcg  0.25 mcg Oral DAILY    magnesium oxide (MAG-OX) tablet 400 mg  400 mg Oral DAILY    epoetin calos (EPOGEN;PROCRIT) injection 10,000 Units  10,000 Units SubCUTAneous Once per day on Mon Thu    sodium chloride (NS) flush 5-10 mL  5-10 mL IntraVENous Q8H    sodium chloride (NS) flush 5-10 mL  5-10 mL IntraVENous PRN    B complex-vitaminC-folic acid (NEPHROCAP) cap  1 Cap Oral DAILY    cloNIDine HCl (CATAPRES) tablet 0.1 mg  0.1 mg Oral Q4H PRN    nitroglycerin (NITROSTAT) tablet 0.4 mg  0.4 mg SubLINGual Q5MIN PRN    glucose chewable tablet 16 g  4 Tab Oral PRN    dextrose (D50W) injection syrg 12.5-25 g  12.5-25 g IntraVENous PRN    glucagon (GLUCAGEN) injection 1 mg  1 mg IntraMUSCular PRN    metoprolol tartrate (LOPRESSOR) tablet 25 mg  25 mg Oral Q6H    aspirin chewable tablet 81 mg  81 mg Oral DAILY    atorvastatin (LIPITOR) tablet 80 mg  80 mg Oral QPM    acetaminophen (TYLENOL) tablet 650 mg  650 mg Oral Q4H PRN    oxyCODONE-acetaminophen (PERCOCET) 5-325 mg per tablet 1 Tab  1 Tab Oral Q4H PRN    hydrALAZINE (APRESOLINE) 20 mg/mL injection 10 mg  10 mg IntraVENous Q2H PRN    naloxone (NARCAN) injection 0.4 mg  0.4 mg IntraVENous PRN    diphenhydrAMINE (BENADRYL) injection 12.5 mg  12.5 mg IntraVENous Q4H PRN    ondansetron (ZOFRAN) injection 4 mg  4 mg IntraVENous Q4H PRN    bisacodyl (DULCOLAX) tablet 5 mg  5 mg Oral DAILY PRN    clopidogrel (PLAVIX) tablet 75 mg  75 mg Oral DAILY        LABS:  Recent Labs      05/10/18   0610  05/08/18   0905   WBC  5.4  6.6   HGB  8.5*  8.2*   HCT  27.6*  26.8*   PLT  144*  176     Recent Labs      05/08/18 0905   NA  141   K  3.5   CL  104   CO2  28   BUN  42*   CREA  4.65*   GLU  206*   CA  8.1*     Recent Labs      05/08/18 0905   SGOT  12*   ALT  13   AP  167*   TBILI  0.4   TP  6.1*   ALB  2.3*   GLOB  3.8     No results for input(s): INR, PTP, APTT in the last 72 hours. No lab exists for component: INREXT, INREXT   No results for input(s): FE, TIBC, PSAT, FERR in the last 72 hours. No results for input(s): PH, PCO2, PO2 in the last 72 hours. No results for input(s): CPK, CKNDX, TROIQ in the last 72 hours.     No lab exists for component: CPKMB  Lab Results   Component Value Date/Time    Glucose (POC) 193 (H) 05/10/2018 11:17 AM    Glucose (POC) 170 (H) 05/09/2018 08:49 PM    Glucose (POC) 204 (H) 05/09/2018 04:43 PM    Glucose (POC) 156 (H) 05/09/2018 11:06 AM    Glucose (POC) 172 (H) 05/09/2018 07:29 AM

## 2018-05-10 NOTE — PROGRESS NOTES
Hospitalist Progress Note    NAME: Reagan Collazo   :  1955   MRN:  [id number]       Assessment / Plan:  No much changes from previous day visit. Chest pain - borderline trop elevation; NSTEMI less likely. S/p  Cardiac cath report:  CAD, PAD w/ multiple stents - followed by Dr. Krissy Regalado  cont aspirin, Plavix. Lipitor  Will continue the medical management. Sever Renal artery stenosis  S/p Successful PTA and stenting of the R renal artery w/ a BMS  cont aspirin, Plavix. MINO on CKD stage 5   Upper extremity vein mapping done. Nephrology on board. Seen by Dr. Krissy Regalado who is known to patient. vascular to do AVF LUE Friday am and need OP HD set up. Expected DC on Saturday.       Hypertensive Urgency - blood pressure improved. cont lopressor, Imdur  cont scheduled hydralazine      DM-2 w/ nephropathy and neuropathy  SSI    Anemia of chronic renal disease    Chronic thrombocytopenia    Tobacco abuse    Hx of Depression, patient used to be on medications. Effexor and trazodone at night. In this admission patient was noted to have fighting with staffs and throwing staffs and seen very aggressive multiple times   Adjustment disorder. may use Ativan 0.5 mg po bid prn for anxiety   psychiatry recommendations appreciated      Medical noncompliance      Body mass index is 22.85 kg/(m^2). Code status: Full  Prophylaxis: Hep SQ  Recommended Disposition: Home w/Family     Subjective:     Chief Complaint / Reason for Physician Visit  He was seen and examined , no complaints for me today. Review of Systems:  Symptom Y/N Comments  Symptom Y/N Comments   Fever/Chills n   Chest Pain n    Poor Appetite    Edema     Cough n   Abdominal Pain n    Sputum n   Joint Pain     SOB/COREA n   Pruritis/Rash     Nausea/vomit    Tolerating PT/OT     Diarrhea    Tolerating Diet     Constipation    Other       Could NOT obtain due to:      Objective:     VITALS:   Last 24hrs VS reviewed since prior progress note.  Most recent are:  Patient Vitals for the past 24 hrs:   Temp Pulse Resp BP SpO2   05/10/18 0940 - 68 16 146/56 98 %   05/10/18 0935 - 70 13 157/60 98 %   05/10/18 0931 - 71 14 157/61 99 %   05/10/18 0730 98.3 °F (36.8 °C) 65 18 158/64 96 %   05/10/18 0609 - - - 137/71 -   05/09/18 2234 97.9 °F (36.6 °C) 70 18 146/60 96 %   05/09/18 1952 98.3 °F (36.8 °C) 69 18 152/67 97 %   05/09/18 1456 98.9 °F (37.2 °C) 70 18 128/64 99 %       Intake/Output Summary (Last 24 hours) at 05/10/18 0947  Last data filed at 05/10/18 0730   Gross per 24 hour   Intake                0 ml   Output              565 ml   Net             -565 ml        PHYSICAL EXAM:on the prior date   General: cooperative, no acute distress    EENT:  EOMI. Anicteric sclerae. MMM  Resp:  CTA bilaterally, no wheezing or rales. No accessory muscle use  CV:  Regular  rhythm,  No edema  GI:  Soft, Non distended, Non tender.  +Bowel sounds  Neurologic:  Alert and oriented X 3, normal speech,   Psych:   Not anxious nor agitated  Skin:  No rashes.   No jaundice  Ext:                  No edema right leg; left AKA noted     Reviewed most current lab test results and cultures  YES  Reviewed most current radiology test results   YES  Review and summation of old records today    NO  Reviewed patient's current orders and MAR    YES  PMH/SH reviewed - no change compared to H&P          Current Facility-Administered Medications:     lidocaine (XYLOCAINE) 20 mg/mL (2 %) injection 400 mg, 20 mL, SubCUTAneous, ONCE, Sandhya Lee MD    heparin (porcine) 1,000 unit/mL injection 10,000 Units, 10,000 Units, IntraVENous, ONCE, Sandhya Lee MD    0.9% sodium chloride infusion, 25 mL/hr, IntraVENous, CONTINUOUS, Sandhya Lee MD, Last Rate: 25 mL/hr at 05/10/18 0833, 25 mL/hr at 05/10/18 0833    fentaNYL citrate (PF) injection 100 mcg, 100 mcg, IntraVENous, Multiple, Sandhya Lee MD, 50 mcg at 05/10/18 0935    midazolam (VERSED) injection 5 mg, 5 mg, IntraVENous, Multiple, Sandhya Lee, MD, 1 mg at 05/10/18 0936    heparin (porcine) injection 5,000 Units, 5,000 Units, SubCUTAneous, Q12H, Michoacano Loaiza MD    pantoprazole (PROTONIX) tablet 40 mg, 40 mg, Oral, ACB, Michoacano Loaiza MD, 40 mg at 05/09/18 0756    heparin (porcine) pf 300 Units, 300 Units, InterCATHeter, PRN, Emma Reyes MD, 300 Units at 05/04/18 1416    amLODIPine (NORVASC) tablet 10 mg, 10 mg, Oral, DAILY, Elvia Herrera III, DO, 10 mg at 05/09/18 0807    polyethylene glycol (MIRALAX) packet 17 g, 17 g, Oral, DAILY, Dex George MD, 17 g at 05/08/18 0803    docusate sodium (COLACE) capsule 100 mg, 100 mg, Oral, BID, Dex George MD, 100 mg at 05/09/18 1809    insulin lispro (HUMALOG) injection, , SubCUTAneous, AC&HS, Dex George MD, Stopped at 05/09/18 2200    isosorbide mononitrate ER (IMDUR) tablet 60 mg, 60 mg, Oral, DAILY, Elvia Herrera III, DO, 60 mg at 05/09/18 0807    hydrALAZINE (APRESOLINE) tablet 25 mg, 25 mg, Oral, Q8H, Dex George MD, 25 mg at 05/10/18 0609    calcium carbonate (TUMS) chewable tablet 200 mg [elemental], 200 mg, Oral, QID WITH MEALS, Lang Redd MD, 200 mg at 05/09/18 1659    calcitRIOL (ROCALTROL) capsule 0.25 mcg, 0.25 mcg, Oral, DAILY, Lang Redd MD, 0.25 mcg at 05/09/18 0807    magnesium oxide (MAG-OX) tablet 400 mg, 400 mg, Oral, DAILY, Lang Redd MD, 400 mg at 05/09/18 1523    epoetin calos (EPOGEN;PROCRIT) injection 10,000 Units, 10,000 Units, SubCUTAneous, Once per day on Mon Thu, Lang Redd MD, 10,000 Units at 05/07/18 1707    sodium chloride (NS) flush 5-10 mL, 5-10 mL, IntraVENous, Q8H, Elvia Herrera III, DO, 10 mL at 05/10/18 0608    sodium chloride (NS) flush 5-10 mL, 5-10 mL, IntraVENous, PRN, Elvia Herrera III, DO, 10 mL at 05/06/18 1003    B complex-vitaminC-folic acid (NEPHROCAP) cap, 1 Cap, Oral, DAILY, Lang Redd MD, 1 Cap at 05/09/18 0807    cloNIDine HCl (CATAPRES) tablet 0.1 mg, 0.1 mg, Oral, Q4H PRN, Marissa Baker MD, 0.1 mg at 05/01/18 1027    nitroglycerin (NITROSTAT) tablet 0.4 mg, 0.4 mg, SubLINGual, Q5MIN PRN, Juliana Duron MD, 0.4 mg at 04/29/18 0830    glucose chewable tablet 16 g, 4 Tab, Oral, PRN, Juliana Duron MD    dextrose (D50W) injection syrg 12.5-25 g, 12.5-25 g, IntraVENous, PRN, Juliana Duron MD    glucagon [facility]) injection 1 mg, 1 mg, IntraMUSCular, PRN, Juliana Duron MD    metoprolol tartrate (LOPRESSOR) tablet 25 mg, 25 mg, Oral, Q6H, Juliana Duron MD, 25 mg at 05/10/18 0609    aspirin chewable tablet 81 mg, 81 mg, Oral, DAILY, Juliana Duron MD, 81 mg at 05/09/18 0807    atorvastatin (LIPITOR) tablet 80 mg, 80 mg, Oral, QPM, Juliana Duron MD, 80 mg at 05/09/18 1809    acetaminophen (TYLENOL) tablet 650 mg, 650 mg, Oral, Q4H PRN, Juliana Duron MD, 650 mg at 04/29/18 2151    oxyCODONE-acetaminophen (PERCOCET) 5-325 mg per tablet 1 Tab, 1 Tab, Oral, Q4H PRN, Juliana Duron MD, 1 Tab at 05/10/18 0746    hydrALAZINE (APRESOLINE) 20 mg/mL injection 10 mg, 10 mg, IntraVENous, Q2H PRN, Juliana Duron MD    [facility]) injection 0.4 mg, 0.4 mg, IntraVENous, PRN, Juliana Duron MD    diphenhydrAMINE (BENADRYL) injection 12.5 mg, 12.5 mg, IntraVENous, Q4H PRN, Juliana Duron MD, 12.5 mg at 05/02/18 2253    ondansetron [facility]) injection 4 mg, 4 mg, IntraVENous, Q4H PRN, Juliana Duron MD, 4 mg at 05/06/18 1003    bisacodyl (DULCOLAX) tablet 5 mg, 5 mg, Oral, DAILY PRN, Juliana Duron MD    clopidogrel (PLAVIX) tablet 75 mg, 75 mg, Oral, DAILY, Juliana Duron MD, 75 mg at 05/09/18 1310  ________________________________________________________________________  Care Plan discussed with:    Comments   Patient     Family  y Discussed with family in the room    RN y    Care Manager     Consultant                        Multidiciplinary team rounds were held today with , nursing, pharmacist and clinical coordinator. Patient's plan of care was discussed; medications were reviewed and discharge planning was addressed. ________________________________________________________________________  Total NON critical care TIME:  25  Minutes    Total CRITICAL CARE TIME Spent:   Minutes non procedure based      Comments   >50% of visit spent in counseling and coordination of care     ________________________________________________________________________  Mortimer Pro, MD     Procedures: see electronic medical records for all procedures/Xrays and details which were not copied into this note but were reviewed prior to creation of Plan. LABS:  I reviewed today's most current labs and imaging studies. Pertinent labs include:  Recent Labs      05/10/18   0610  05/08/18   0905   WBC  5.4  6.6   HGB  8.5*  8.2*   HCT  27.6*  26.8*   PLT  144*  176     Recent Labs      05/08/18   0905   NA  141   K  3.5   CL  104   CO2  28   GLU  206*   BUN  42*   CREA  4.65*   CA  8.1*   ALB  2.3*   TBILI  0.4   SGOT  12*   ALT  13       Signed:  Mortimer Pro, MD

## 2018-05-11 ENCOUNTER — ANESTHESIA (OUTPATIENT)
Dept: SURGERY | Age: 63
DRG: 674 | End: 2018-05-11
Payer: MEDICARE

## 2018-05-11 ENCOUNTER — ANESTHESIA EVENT (OUTPATIENT)
Dept: SURGERY | Age: 63
DRG: 674 | End: 2018-05-11
Payer: MEDICARE

## 2018-05-11 LAB
ALBUMIN SERPL-MCNC: 2.4 G/DL (ref 3.5–5)
ALBUMIN/GLOB SERPL: 0.6 {RATIO} (ref 1.1–2.2)
ALP SERPL-CCNC: 185 U/L (ref 45–117)
ALT SERPL-CCNC: 11 U/L (ref 12–78)
ANION GAP SERPL CALC-SCNC: 8 MMOL/L (ref 5–15)
AST SERPL-CCNC: 16 U/L (ref 15–37)
BASOPHILS # BLD: 0 K/UL (ref 0–0.1)
BASOPHILS NFR BLD: 1 % (ref 0–1)
BILIRUB SERPL-MCNC: 0.4 MG/DL (ref 0.2–1)
BUN SERPL-MCNC: 19 MG/DL (ref 6–20)
BUN/CREAT SERPL: 6 (ref 12–20)
CALCIUM SERPL-MCNC: 8.2 MG/DL (ref 8.5–10.1)
CHLORIDE SERPL-SCNC: 105 MMOL/L (ref 97–108)
CO2 SERPL-SCNC: 27 MMOL/L (ref 21–32)
CREAT SERPL-MCNC: 3.14 MG/DL (ref 0.7–1.3)
DIFFERENTIAL METHOD BLD: ABNORMAL
EOSINOPHIL # BLD: 0.2 K/UL (ref 0–0.4)
EOSINOPHIL NFR BLD: 4 % (ref 0–7)
ERYTHROCYTE [DISTWIDTH] IN BLOOD BY AUTOMATED COUNT: 18.1 % (ref 11.5–14.5)
GLOBULIN SER CALC-MCNC: 4.2 G/DL (ref 2–4)
GLUCOSE BLD STRIP.AUTO-MCNC: 188 MG/DL (ref 65–100)
GLUCOSE BLD STRIP.AUTO-MCNC: 195 MG/DL (ref 65–100)
GLUCOSE BLD STRIP.AUTO-MCNC: 208 MG/DL (ref 65–100)
GLUCOSE BLD STRIP.AUTO-MCNC: 216 MG/DL (ref 65–100)
GLUCOSE BLD STRIP.AUTO-MCNC: 219 MG/DL (ref 65–100)
GLUCOSE SERPL-MCNC: 204 MG/DL (ref 65–100)
HCT VFR BLD AUTO: 31.1 % (ref 36.6–50.3)
HGB BLD-MCNC: 9.5 G/DL (ref 12.1–17)
IMM GRANULOCYTES # BLD: 0 K/UL (ref 0–0.04)
IMM GRANULOCYTES NFR BLD AUTO: 1 % (ref 0–0.5)
LYMPHOCYTES # BLD: 1.1 K/UL (ref 0.8–3.5)
LYMPHOCYTES NFR BLD: 22 % (ref 12–49)
MCH RBC QN AUTO: 31.6 PG (ref 26–34)
MCHC RBC AUTO-ENTMCNC: 30.5 G/DL (ref 30–36.5)
MCV RBC AUTO: 103.3 FL (ref 80–99)
MONOCYTES # BLD: 0.7 K/UL (ref 0–1)
MONOCYTES NFR BLD: 15 % (ref 5–13)
NEUTS SEG # BLD: 3 K/UL (ref 1.8–8)
NEUTS SEG NFR BLD: 59 % (ref 32–75)
NRBC # BLD: 0 K/UL (ref 0–0.01)
NRBC BLD-RTO: 0 PER 100 WBC
PHOSPHATE SERPL-MCNC: 2.8 MG/DL (ref 2.6–4.7)
PLATELET # BLD AUTO: 166 K/UL (ref 150–400)
PMV BLD AUTO: 9.8 FL (ref 8.9–12.9)
POTASSIUM SERPL-SCNC: 3.8 MMOL/L (ref 3.5–5.1)
PROT SERPL-MCNC: 6.6 G/DL (ref 6.4–8.2)
RBC # BLD AUTO: 3.01 M/UL (ref 4.1–5.7)
SERVICE CMNT-IMP: ABNORMAL
SODIUM SERPL-SCNC: 140 MMOL/L (ref 136–145)
WBC # BLD AUTO: 5.1 K/UL (ref 4.1–11.1)

## 2018-05-11 PROCEDURE — 74011000250 HC RX REV CODE- 250

## 2018-05-11 PROCEDURE — 77030008463 HC STPLR SKN PROX J&J -B: Performed by: SURGERY

## 2018-05-11 PROCEDURE — 74011250636 HC RX REV CODE- 250/636: Performed by: SURGERY

## 2018-05-11 PROCEDURE — 82962 GLUCOSE BLOOD TEST: CPT

## 2018-05-11 PROCEDURE — 77030002986 HC SUT PROL J&J -A: Performed by: SURGERY

## 2018-05-11 PROCEDURE — 65270000015 HC RM PRIVATE ONCOLOGY

## 2018-05-11 PROCEDURE — 76010000149 HC OR TIME 1 TO 1.5 HR: Performed by: SURGERY

## 2018-05-11 PROCEDURE — 80053 COMPREHEN METABOLIC PANEL: CPT | Performed by: INTERNAL MEDICINE

## 2018-05-11 PROCEDURE — 77030020256 HC SOL INJ NACL 0.9%  500ML: Performed by: SURGERY

## 2018-05-11 PROCEDURE — 76060000033 HC ANESTHESIA 1 TO 1.5 HR: Performed by: SURGERY

## 2018-05-11 PROCEDURE — 74011250637 HC RX REV CODE- 250/637: Performed by: INTERNAL MEDICINE

## 2018-05-11 PROCEDURE — 74011000272 HC RX REV CODE- 272: Performed by: SURGERY

## 2018-05-11 PROCEDURE — 03180ZD BYPASS LEFT BRACHIAL ARTERY TO UPPER ARM VEIN, OPEN APPROACH: ICD-10-PCS | Performed by: SURGERY

## 2018-05-11 PROCEDURE — 77030031139 HC SUT VCRL2 J&J -A: Performed by: SURGERY

## 2018-05-11 PROCEDURE — 74011000250 HC RX REV CODE- 250: Performed by: SURGERY

## 2018-05-11 PROCEDURE — 77030011640 HC PAD GRND REM COVD -A: Performed by: SURGERY

## 2018-05-11 PROCEDURE — 77030002987 HC SUT PROL J&J -B: Performed by: SURGERY

## 2018-05-11 PROCEDURE — 74011250637 HC RX REV CODE- 250/637: Performed by: HOSPITALIST

## 2018-05-11 PROCEDURE — 76210000006 HC OR PH I REC 0.5 TO 1 HR: Performed by: SURGERY

## 2018-05-11 PROCEDURE — 74011250636 HC RX REV CODE- 250/636

## 2018-05-11 PROCEDURE — 85025 COMPLETE CBC W/AUTO DIFF WBC: CPT | Performed by: INTERNAL MEDICINE

## 2018-05-11 PROCEDURE — 77030002996 HC SUT SLK J&J -A: Performed by: SURGERY

## 2018-05-11 PROCEDURE — 77030020153 HC PRB DOPLR DISP MIZU -C: Performed by: SURGERY

## 2018-05-11 PROCEDURE — 74011636637 HC RX REV CODE- 636/637: Performed by: INTERNAL MEDICINE

## 2018-05-11 PROCEDURE — 77030002916 HC SUT ETHLN J&J -A: Performed by: SURGERY

## 2018-05-11 PROCEDURE — 36415 COLL VENOUS BLD VENIPUNCTURE: CPT | Performed by: INTERNAL MEDICINE

## 2018-05-11 PROCEDURE — 84100 ASSAY OF PHOSPHORUS: CPT | Performed by: INTERNAL MEDICINE

## 2018-05-11 PROCEDURE — 74011250636 HC RX REV CODE- 250/636: Performed by: INTERNAL MEDICINE

## 2018-05-11 PROCEDURE — 77030018836 HC SOL IRR NACL ICUM -A: Performed by: SURGERY

## 2018-05-11 PROCEDURE — 77010033678 HC OXYGEN DAILY

## 2018-05-11 RX ORDER — SODIUM CHLORIDE 0.9 % (FLUSH) 0.9 %
5-10 SYRINGE (ML) INJECTION EVERY 8 HOURS
Status: DISCONTINUED | OUTPATIENT
Start: 2018-05-11 | End: 2018-05-14 | Stop reason: HOSPADM

## 2018-05-11 RX ORDER — SODIUM CHLORIDE, SODIUM LACTATE, POTASSIUM CHLORIDE, CALCIUM CHLORIDE 600; 310; 30; 20 MG/100ML; MG/100ML; MG/100ML; MG/100ML
25 INJECTION, SOLUTION INTRAVENOUS CONTINUOUS
Status: DISPENSED | OUTPATIENT
Start: 2018-05-11 | End: 2018-05-12

## 2018-05-11 RX ORDER — FENTANYL CITRATE 50 UG/ML
INJECTION, SOLUTION INTRAMUSCULAR; INTRAVENOUS AS NEEDED
Status: DISCONTINUED | OUTPATIENT
Start: 2018-05-11 | End: 2018-05-11 | Stop reason: HOSPADM

## 2018-05-11 RX ORDER — HEPARIN SODIUM 1000 [USP'U]/ML
INJECTION, SOLUTION INTRAVENOUS; SUBCUTANEOUS AS NEEDED
Status: DISCONTINUED | OUTPATIENT
Start: 2018-05-11 | End: 2018-05-11 | Stop reason: HOSPADM

## 2018-05-11 RX ORDER — ONDANSETRON 2 MG/ML
4 INJECTION INTRAMUSCULAR; INTRAVENOUS AS NEEDED
Status: DISCONTINUED | OUTPATIENT
Start: 2018-05-11 | End: 2018-05-11 | Stop reason: HOSPADM

## 2018-05-11 RX ORDER — DIPHENHYDRAMINE HYDROCHLORIDE 50 MG/ML
12.5 INJECTION, SOLUTION INTRAMUSCULAR; INTRAVENOUS
Status: DISCONTINUED | OUTPATIENT
Start: 2018-05-11 | End: 2018-05-11 | Stop reason: HOSPADM

## 2018-05-11 RX ORDER — CEFAZOLIN SODIUM/WATER 2 G/20 ML
2 SYRINGE (ML) INTRAVENOUS ONCE
Status: COMPLETED | OUTPATIENT
Start: 2018-05-11 | End: 2018-05-11

## 2018-05-11 RX ORDER — FENTANYL CITRATE 50 UG/ML
25 INJECTION, SOLUTION INTRAMUSCULAR; INTRAVENOUS
Status: DISCONTINUED | OUTPATIENT
Start: 2018-05-11 | End: 2018-05-11 | Stop reason: HOSPADM

## 2018-05-11 RX ORDER — LIDOCAINE HYDROCHLORIDE 10 MG/ML
0.1 INJECTION, SOLUTION EPIDURAL; INFILTRATION; INTRACAUDAL; PERINEURAL AS NEEDED
Status: DISCONTINUED | OUTPATIENT
Start: 2018-05-11 | End: 2018-05-14 | Stop reason: HOSPADM

## 2018-05-11 RX ORDER — SODIUM CHLORIDE 9 MG/ML
INJECTION, SOLUTION INTRAVENOUS
Status: DISCONTINUED | OUTPATIENT
Start: 2018-05-11 | End: 2018-05-11 | Stop reason: HOSPADM

## 2018-05-11 RX ORDER — PROPOFOL 10 MG/ML
INJECTION, EMULSION INTRAVENOUS
Status: DISCONTINUED | OUTPATIENT
Start: 2018-05-11 | End: 2018-05-11 | Stop reason: HOSPADM

## 2018-05-11 RX ORDER — SODIUM CHLORIDE, SODIUM LACTATE, POTASSIUM CHLORIDE, CALCIUM CHLORIDE 600; 310; 30; 20 MG/100ML; MG/100ML; MG/100ML; MG/100ML
25 INJECTION, SOLUTION INTRAVENOUS CONTINUOUS
Status: DISCONTINUED | OUTPATIENT
Start: 2018-05-11 | End: 2018-05-11 | Stop reason: HOSPADM

## 2018-05-11 RX ORDER — MORPHINE SULFATE 10 MG/ML
2 INJECTION, SOLUTION INTRAMUSCULAR; INTRAVENOUS
Status: DISCONTINUED | OUTPATIENT
Start: 2018-05-11 | End: 2018-05-11 | Stop reason: HOSPADM

## 2018-05-11 RX ORDER — SODIUM CHLORIDE 0.9 % (FLUSH) 0.9 %
5-10 SYRINGE (ML) INJECTION AS NEEDED
Status: DISCONTINUED | OUTPATIENT
Start: 2018-05-11 | End: 2018-05-14 | Stop reason: HOSPADM

## 2018-05-11 RX ORDER — MIDAZOLAM HYDROCHLORIDE 1 MG/ML
INJECTION, SOLUTION INTRAMUSCULAR; INTRAVENOUS AS NEEDED
Status: DISCONTINUED | OUTPATIENT
Start: 2018-05-11 | End: 2018-05-11 | Stop reason: HOSPADM

## 2018-05-11 RX ORDER — SODIUM CHLORIDE 0.9 % (FLUSH) 0.9 %
5-10 SYRINGE (ML) INJECTION AS NEEDED
Status: DISCONTINUED | OUTPATIENT
Start: 2018-05-11 | End: 2018-05-11 | Stop reason: HOSPADM

## 2018-05-11 RX ORDER — LABETALOL HYDROCHLORIDE 5 MG/ML
INJECTION, SOLUTION INTRAVENOUS AS NEEDED
Status: DISCONTINUED | OUTPATIENT
Start: 2018-05-11 | End: 2018-05-11 | Stop reason: HOSPADM

## 2018-05-11 RX ORDER — ONDANSETRON 2 MG/ML
INJECTION INTRAMUSCULAR; INTRAVENOUS AS NEEDED
Status: DISCONTINUED | OUTPATIENT
Start: 2018-05-11 | End: 2018-05-11 | Stop reason: HOSPADM

## 2018-05-11 RX ADMIN — OXYCODONE HYDROCHLORIDE AND ACETAMINOPHEN 1 TABLET: 5; 325 TABLET ORAL at 17:42

## 2018-05-11 RX ADMIN — Medication 2 G: at 09:35

## 2018-05-11 RX ADMIN — DOCUSATE SODIUM 100 MG: 100 CAPSULE, LIQUID FILLED ORAL at 18:00

## 2018-05-11 RX ADMIN — SODIUM CHLORIDE: 9 INJECTION, SOLUTION INTRAVENOUS at 08:30

## 2018-05-11 RX ADMIN — HEPARIN SODIUM 5000 UNITS: 1000 INJECTION, SOLUTION INTRAVENOUS; SUBCUTANEOUS at 09:53

## 2018-05-11 RX ADMIN — METOPROLOL TARTRATE 25 MG: 25 TABLET ORAL at 05:19

## 2018-05-11 RX ADMIN — HYDRALAZINE HYDROCHLORIDE 50 MG: 50 TABLET, FILM COATED ORAL at 05:19

## 2018-05-11 RX ADMIN — HYDRALAZINE HYDROCHLORIDE 50 MG: 50 TABLET, FILM COATED ORAL at 15:05

## 2018-05-11 RX ADMIN — ATORVASTATIN CALCIUM 80 MG: 40 TABLET, FILM COATED ORAL at 17:33

## 2018-05-11 RX ADMIN — PROPOFOL 75 MCG/KG/MIN: 10 INJECTION, EMULSION INTRAVENOUS at 09:30

## 2018-05-11 RX ADMIN — CALCIUM CARBONATE 200 MG: 500 TABLET, CHEWABLE ORAL at 12:25

## 2018-05-11 RX ADMIN — ONDANSETRON 4 MG: 2 INJECTION INTRAMUSCULAR; INTRAVENOUS at 09:35

## 2018-05-11 RX ADMIN — LABETALOL HYDROCHLORIDE 7.5 MG: 5 INJECTION, SOLUTION INTRAVENOUS at 09:40

## 2018-05-11 RX ADMIN — INSULIN LISPRO 3 UNITS: 100 INJECTION, SOLUTION INTRAVENOUS; SUBCUTANEOUS at 12:25

## 2018-05-11 RX ADMIN — HEPARIN SODIUM 5000 UNITS: 5000 INJECTION, SOLUTION INTRAVENOUS; SUBCUTANEOUS at 21:15

## 2018-05-11 RX ADMIN — Medication 10 ML: at 05:20

## 2018-05-11 RX ADMIN — METOPROLOL TARTRATE 25 MG: 25 TABLET ORAL at 17:34

## 2018-05-11 RX ADMIN — MIDAZOLAM HYDROCHLORIDE 1 MG: 1 INJECTION, SOLUTION INTRAMUSCULAR; INTRAVENOUS at 08:45

## 2018-05-11 RX ADMIN — METOPROLOL TARTRATE 25 MG: 25 TABLET ORAL at 23:01

## 2018-05-11 RX ADMIN — Medication 10 ML: at 15:05

## 2018-05-11 RX ADMIN — MIDAZOLAM HYDROCHLORIDE 1 MG: 1 INJECTION, SOLUTION INTRAMUSCULAR; INTRAVENOUS at 09:22

## 2018-05-11 RX ADMIN — INSULIN LISPRO 2 UNITS: 100 INJECTION, SOLUTION INTRAVENOUS; SUBCUTANEOUS at 21:27

## 2018-05-11 RX ADMIN — OXYCODONE HYDROCHLORIDE AND ACETAMINOPHEN 1 TABLET: 5; 325 TABLET ORAL at 05:22

## 2018-05-11 RX ADMIN — FENTANYL CITRATE 50 MCG: 50 INJECTION, SOLUTION INTRAMUSCULAR; INTRAVENOUS at 08:46

## 2018-05-11 RX ADMIN — OXYCODONE HYDROCHLORIDE AND ACETAMINOPHEN 1 TABLET: 5; 325 TABLET ORAL at 22:57

## 2018-05-11 RX ADMIN — CALCIUM CARBONATE 200 MG: 500 TABLET, CHEWABLE ORAL at 17:00

## 2018-05-11 RX ADMIN — INSULIN LISPRO 2 UNITS: 100 INJECTION, SOLUTION INTRAVENOUS; SUBCUTANEOUS at 17:34

## 2018-05-11 RX ADMIN — Medication 10 ML: at 21:16

## 2018-05-11 RX ADMIN — FENTANYL CITRATE 50 MCG: 50 INJECTION, SOLUTION INTRAMUSCULAR; INTRAVENOUS at 09:31

## 2018-05-11 RX ADMIN — METOPROLOL TARTRATE 25 MG: 25 TABLET ORAL at 12:25

## 2018-05-11 NOTE — PERIOP NOTES
Patient to pre op area with IV in left arm, PIV removed due to surgery being on patients left arm. Unable to obtain IV access. Dr. Farnaz Martínez in to see patient and stated to use patients Kash for access. Blue port on patients Kash catheter cleaned, flushed and accessed for surgery. 3267 - pre anesthesia block done on right arm @ 0844. Patient tolerated well, no complaints of chest pain or SOB. Patient alert and answering questions appropriately. VSS.

## 2018-05-11 NOTE — PERIOP NOTES
TRANSFER - IN REPORT:    Verbal report received from [facility] on [facility]  being received from [id number] for ordered procedure      Report consisted of patients Situation, Background, Assessment and   Recommendations(SBAR). Information from the following report(s) SBAR, ED Summary, Intake/Output and MAR was reviewed with the receiving nurse. Opportunity for questions and clarification was provided. Assessment completed upon patients arrival to unit and care assumed.

## 2018-05-11 NOTE — ROUTINE PROCESS
PCP DAVID appt scheduled with Dr. Xenia Lesches on May 17 at 10:30am. Appt added to [address] CM Specialist

## 2018-05-11 NOTE — PERIOP NOTES
TRANSFER - OUT REPORT:    Verbal report given to Juan Joy RN(name) on [facility]  being transferred to Oncology/1113(unit) for routine post - op       Report consisted of patients Situation, Background, Assessment and   Recommendations(SBAR). Information from the following report(s) SBAR, OR Summary, Intake/Output, MAR and Recent Results was reviewed with the receiving nurse. Opportunity for questions and clarification was provided.       Patient transported with:   O2 @ 2 liters  Tech

## 2018-05-11 NOTE — BRIEF OP NOTE
BRIEF OPERATIVE NOTE    Date of Procedure: 5/11/2018   Preoperative Diagnosis: ESRD  Postoperative Diagnosis: ESRD    Procedure(s): CREATION LEFT ARM ARTERIO VENOUS FISTULA (brachiocephalic)  Surgeon: Brooks Bardales MD   Anesthesia: block   Estimated Blood Loss: minimal  Specimens: none   Findings: nice cephalic vein   Complications: none  Implants: * No implants in log *

## 2018-05-11 NOTE — PROGRESS NOTES
Nutrition Assessment:    INTERVENTIONS/RECOMMENDATIONS:   · Will add cardiac restriction back on diet order  · Trial of glucerna shakes, did not like nepro  · If last documented BM is accurate consider more aggressive bowel regimen     ASSESSMENT:   Chart reviewed, left AV fistula placed today. Pt reports poor PO intake. Will try glucerna shakes. Noted for last documented BM 5/8. Diet Order: Consistent carb  % Eaten:  Patient Vitals for the past 72 hrs:   % Diet Eaten   05/11/18 1130 0 %     Pertinent Medications: [x]Reviewed: Nephrocap, tums, calcitriol, colace, humalog, mag-ox, PPI,   Pertinent Labs: [x]Reviewed: BG>200  Food Allergies: [x]NKFA  []Other   Last BM:  5/8  Edema:      []RUE   []LUE   []RLE   []LLE      Pressure Ulcer:      [] Stage I   [] Stage II   [] Stage III   [] Stage IV      Anthropometrics: Height: 5' 9\" (175.3 cm) Weight: 70.2 kg (154 lb 11.2 oz)    IBW (%IBW):   ( ) UBW (%UBW):   (  %)    BMI: Body mass index is 22.85 kg/(m^2). This BMI is indicative of:  []Underweight   [x]Normal   []Overweight   [] Obesity   [] Extreme Obesity (BMI>40)  Last Weight Metrics:  Weight Loss Metrics 5/8/2018 1/15/2016 1/6/2016 12/4/2015 10/11/2015 9/25/2015 9/1/2015   Today's Wt 154 lb 11.2 oz - 110 lb - 110 lb - 125 lb   BMI 22.85 kg/m2 - 16.24 kg/m2 - 16.24 kg/m2 - 18.45 kg/m2       Estimated Nutrition Needs (Based on): 1975 Kcals/day (BMR: 1525 x 1.3) , 60 g (0.8 g/kg) Protein  Carbohydrate:  At Least 130 g/day  Fluids: 1975 mL/day or per primary team    NUTRITION DIAGNOSES:   Problem:  Inadequate protein-energy intake      Etiology: related to decreased appetite     Signs/Symptoms: as evidenced by pt report of poor PO intake PTA and currently     Previous Nutrition Dx:  [] Resolved  [] Unresolved           [x] Progressing    NUTRITION INTERVENTIONS:  Meals/Snacks: General/healthful diet   Supplements: Commercial supplement              GOAL:   consume >50% of meals and ONS in 2-4 days    NUTRITION MONITORING AND EVALUATION      Food/Nutrient Intake Outcomes:  Total energy intake  Physical Signs/Symptoms Outcomes: Weight/weight change, Electrolyte and renal profile, Glucose profile, GI    Previous Goal Met:   [] Met              [x] Progressing Towards Goal              [] Not Progressing Towards Goal   Previous Recommendations:   [] Implemented          [] Not Implemented          [x] Not Applicable    LEARNING NEEDS (Diet, Food/Nutrient-Drug Interaction):    [x] None Identified   [] Identified and Education Provided/Documented   [] Identified and Pt declined/was not appropriate     Cultural, Congregational, OR Ethnic Dietary Needs:    [x] None Identified   [] Identified and Addressed     [x] Interdisciplinary Care Plan Reviewed/Documented    [x] Discharge Planning: Consistent carb, heart healthy diet and limit foods high in K+ and PHos   [] Participated in Interdisciplinary Rounds    NUTRITION RISK:    [x] High              [] Moderate           []  Low  []  Minimal/Uncompromised      Estrellita Peacockt, RDN  Pager [phone]  Weekend Pager [phone]

## 2018-05-11 NOTE — ANESTHESIA PROCEDURE NOTES
Peripheral Block    Start time: 5/11/2018 8:44 AM  End time: 5/11/2018 8:57 AM  Performed by: Esme Resendiz  Authorized by: Esme Resendiz       Pre-procedure:    Indications: primary anesthetic    Preanesthetic Checklist: patient identified, risks and benefits discussed, site marked, timeout performed and patient being monitored    Timeout Time: 08:44          Block Type:   Block Type:  Supraclavicular  Monitoring:  Standard ASA monitoring, continuous pulse ox, frequent vital sign checks, heart rate, oxygen and responsive to questions  Injection Technique:  Single shot  Procedures: ultrasound guided    Patient Position: supine  Prep: DuraPrep    Location:  Supraclavicular  Needle Type:  Stimuplex  Needle Gauge:  20 G  Needle Localization:  Ultrasound guidance  Medication Injected:  0.5%  bupivacaine  Volume (mL):  30    Assessment:  Number of attempts:  1  Injection Assessment:  Incremental injection every 5 mL, local visualized surrounding nerve on ultrasound, negative aspiration for blood, no intravascular symptoms, negative aspiration for CSF, no paresthesia and ultrasound image on chart  Patient tolerance:  Patient tolerated the procedure well with no immediate complications

## 2018-05-11 NOTE — PROGRESS NOTES
Oncology Nursing Communication Tool  7:24 PM  5/11/2018     Bedside shift change report given to Tank Nettles RN (incoming nurse) by Jessika Bowling (outgoing nurse) on [facility]. Report included the following information SBAR, Kardex, Intake/Output, MAR and Recent Results. Shift Summary:       Issues for physician to address: Oncology Shift Note   Admission Date 4/28/2018   Admission Diagnosis NSTEMI (non-ST elevated myocardial infarction) ([facility]  ARF (acute renal failure) ([facility]  ESRD   Code Status Full Code   Consults IP CONSULT TO CARDIOLOGY  IP CONSULT TO NEPHROLOGY  IP CONSULT TO PSYCHIATRY  IP CONSULT TO VASCULAR SURGERY      Cardiac Monitoring [] Yes [] No      Purposeful Hourly Rounding [] Yes    Momo Score Total Score: 4   Momo score 3 or > [] Bed Alarm [] Avasys [] 1:1 sitter [] Patient refused (Place signed refusal form in chart)      Pain Managed [] Yes [] No    Key Pain Meds             methadone (DOLOPHINE) 10 mg tablet Take 1 Tab by mouth two (2) times daily (with meals). Max Daily Amount: 60 mg. One tab in am, 2 tab at lunch, 1 at dinner and 2 at bed            Influenza Vaccine Received Flu Vaccine for Current Season (usually Sept-March): Not Flu Season           Oxygen needs?  [] Room air Oxygen @  []1L    []2L    []3L   []4L    []5L   []6L     Use home O2? [] Yes [] No  Perform O2 challenge test using  smartphrase (.oxygenchallenge)      Last bowel movement Last Bowel Movement Date: 05/08/18  bowel movement      Urinary Catheter             LDAs             Venous Access Device chronic dual lumen catheter LOT [id number] 05/10/18 Upper chest (subclavicular area, right (Active)   Central Line Being Utilized Yes 5/11/2018  3:00 PM   Criteria for Appropriate Use Dialysis/apheresis 5/11/2018  3:00 PM   Site Assessment Clean, dry, & intact 5/11/2018  3:00 PM   Date of Last Dressing Change 05/10/18 5/11/2018  3:00 PM   Dressing Status Clean, dry, & intact 5/11/2018  3:00 PM Dressing Type Other (comments) 5/11/2018  8:35 AM   Positive Blood Return (Medial Site) Yes 5/11/2018  8:35 AM   Action Taken (Medial Site) Infusing;Flushed 5/11/2018  8:35 AM   Positive Blood Return (Lateral Site) Yes 5/10/2018  9:40 AM   Alcohol Cap Used Yes 5/10/2018  6:06 PM      Peripheral IV 05/11/18 Right Antecubital (Active)   Site Assessment Clean, dry, & intact 5/11/2018  3:38 PM   Phlebitis Assessment 0 5/11/2018  3:38 PM   Infiltration Assessment 0 5/11/2018  3:38 PM   Dressing Status Clean, dry, & intact 5/11/2018  3:38 PM   Dressing Type Tape;Transparent 5/11/2018  3:38 PM   Hub Color/Line Status Blue;Patent 5/11/2018  3:38 PM                         Readmission Risk Assessment Tool Score High Risk            28       Total Score        3 Has Seen PCP in Last 6 Months (Yes=3, No=0)    3 Patient Length of Stay (>5 days = 3)    9 Pt. Coverage (Medicare=5 , Medicaid, or Self-Pay=4)    13 Charlson Comorbidity Score (Age + Comorbid Conditions)        Criteria that do not apply:    . Living with Significant Other. Assisted Living. LTAC. SNF.  or   Rehab    IP Visits Last 12 Months (1-3=4, 4=9, >4=11)       Expected Length of Stay 5d 9h   Actual Length of Stay [address] [facility]

## 2018-05-11 NOTE — OP NOTES
[facility]  MR#: [id number]  : 1955  ACCOUNT #: [de-identified]   DATE OF SERVICE: 2018    PREOPERATIVE DIAGNOSIS:  End-stage renal disease. POSTOPERATIVE DIAGNOSIS:  End-stage renal disease. PROCEDURE PERFORMED:  Creation of a left arm brachiocephalic AV fistula. SURGEON:  Ani Rodriguez MD    ANESTHESIA:  Regional block. INDICATIONS:  The patient is a 21-year-old gentleman recently started on hemodialysis through a temporary catheter. He is in need of permanent dialysis access. Vein mapping suggested a usable cephalic vein in the left upper arm. DESCRIPTION OF PROCEDURE:  After obtaining informed consent, the patient placed supine on the operating table. After adequate induction of regional block anesthesia, site and patient confirmation, administration of prophylactic antibiotics, the left arm was prepped and draped in usual sterile fashion. A transverse incision was made just below the antecubital crease and the cephalic vein dissected free and controlled. Dissection was continued medially and deeper until the distal brachial artery was dissected free and controlled. The patient was systemically heparinized. The cephalic vein was ligated, rotated medially and anastomosed end-to-side to the distalmost brachial artery using continuous Perry-Homar suture technique. At the completion of the anastomosis, suture line was hemostatic. There was a good thrill and Doppler signal present in the fistula. The wound was closed in 2 layers with absorbable suture. The patient tolerated the procedure well. COMPLICATIONS:  No complications. ESTIMATED BLOOD LOSS:  Minimal.    SPECIMENS REMOVED:  None.       MD JOSE Montoya / [place]  D: 2018 14:44     T: 2018 17:02  JOB #: [id number]  CC: Osmar Real MD

## 2018-05-11 NOTE — PERIOP NOTES
Handoff Report from Operating Room to PACU    Report received from Neena Miller RN and Umang Ontiveros CRNA regarding [facility]      Surgeon(s):  Jennifer Ivory MD  And Procedure(s) (LRB):  CREATION LEFT ARM ARTERIO VENOUS FISTULA  (Left)  confirmed   with allergies and dressings discussed. Anesthesia type, drugs, patient history, complications, estimated blood loss, vital signs, intake and output, and last pain medication, lines, temperature and regional block were reviewed.

## 2018-05-11 NOTE — PROGRESS NOTES
Oncology Nursing Communication Tool  7:24 AM  5/11/2018     Bedside shift change report given to Sierra Enrique RN (incoming nurse) by eMlvin Villar RN (outgoing nurse) on [facility] Report included the following information SBAR, Kardex, [facility] and Accordion. Shift Summary: NPO since midnight, percocet for chronic back pain      Issues for physician to address: No order for consent for AV fistula         Oncology Shift Note   Admission Date 4/28/2018   Admission Diagnosis NSTEMI (non-ST elevated myocardial infarction) ([facility] [address]  ARF (acute renal failure) ([facility] [address]  ESRD   Code Status Full Code   Consults IP CONSULT TO CARDIOLOGY  IP CONSULT TO NEPHROLOGY  IP CONSULT TO PSYCHIATRY  IP CONSULT TO VASCULAR SURGERY      Cardiac Monitoring [] Yes [x] No      Purposeful Hourly Rounding [x] Yes    Momo Score Total Score: 3   Momo score 3 or > [] Bed Alarm [] Avasys [] 1:1 sitter [] Patient refused (Place signed refusal form in chart)      Pain Managed [x] Yes [] No    Key Pain Meds             methadone (DOLOPHINE) 10 mg tablet Take 1 Tab by mouth two (2) times daily (with meals). Max Daily Amount: 60 mg. One tab in am, 2 tab at lunch, 1 at dinner and 2 at bed            Influenza Vaccine Received Flu Vaccine for Current Season (usually Sept-March): Not Flu Season           Oxygen needs?  [x] Room air Oxygen @  []1L    []2L    []3L   []4L    []5L   []6L     Use home O2? [] Yes [x] No  Perform O2 challenge test using  smartphrase (.oxygenchallenge)      Last bowel movement Last Bowel Movement Date: 05/07/18  bowel movement      Urinary Catheter             LDAs             Venous Access Device chronic dual lumen catheter LOT [id number] 05/10/18 Upper chest (subclavicular area, right (Active)   Central Line Being Utilized Yes 5/11/2018  3:17 AM   Criteria for Appropriate Use Dialysis/apheresis 5/11/2018  3:17 AM   Site Assessment Clean, dry, & intact 5/11/2018  3:17 AM   Date of Last Dressing Change 05/10/18 5/11/2018  3:17 AM   Dressing Status Clean, dry, & intact 5/11/2018  3:17 AM   Dressing Type Other (comments) 5/10/2018  9:40 AM   Positive Blood Return (Medial Site) Yes 5/10/2018  9:40 AM   Positive Blood Return (Lateral Site) Yes 5/10/2018  9:40 AM   Alcohol Cap Used Yes 5/10/2018  6:06 PM      Peripheral IV 05/06/18 Left Antecubital (Active)   Site Assessment Clean, dry, & intact 5/11/2018  3:17 AM   Phlebitis Assessment 0 5/11/2018  3:17 AM   Infiltration Assessment 0 5/11/2018  3:17 AM   Dressing Status Clean, dry, & intact 5/11/2018  3:17 AM   Dressing Type Transparent;Tape 5/11/2018  3:17 AM   Hub Color/Line Status Pink;Capped 5/11/2018  3:17 AM   Action Taken Blood drawn 5/6/2018  3:55 AM   Alcohol Cap Used Yes 5/9/2018  9:00 AM        Hemodialysis Access 05/04/18 (Active)   Central Line Being Utilized Yes 5/10/2018  7:46 AM   Criteria for Appropriate Use Dialysis/apheresis 5/10/2018  7:46 AM   Date Accessed  05/04/18 5/10/2018  7:46 AM   Site Assessment Clean, dry, & intact 5/10/2018  7:46 AM   Date of Last Dressing Change 05/04/18 5/10/2018  7:46 AM   Dressing Status Clean, dry, & intact 5/10/2018  7:46 AM   Dressing Type Transparent;Tape 5/10/2018  7:46 AM   Proximal Hub Color/Line Status Blue;Capped 5/10/2018  7:46 AM   Distal Hub Color/Line Status Red;Capped 5/10/2018  7:46 AM                     Readmission Risk Assessment Tool Score High Risk            28       Total Score        3 Has Seen PCP in Last 6 Months (Yes=3, No=0)    3 Patient Length of Stay (>5 days = 3)    9 Pt. Coverage (Medicare=5 , Medicaid, or Self-Pay=4)    13 Charlson Comorbidity Score (Age + Comorbid Conditions)        Criteria that do not apply:    . Living with Significant Other. Assisted Living. LTAC. SNF.  or   Rehab    IP Visits Last 12 Months (1-3=4, 4=9, >4=11)       Expected Length of Stay 5d 9h   Actual Length of Stay 61 Maxwell Castro RN

## 2018-05-11 NOTE — ANESTHESIA POSTPROCEDURE EVALUATION
Post-Anesthesia Evaluation and Assessment    Patient: Joo Betts MRN: [id number]  SSN: [id number]    YOB: 1955  Age: 58 y.o. Sex: male       Cardiovascular Function/Vital Signs  Visit Vitals    /53    Pulse 61    Temp 36.7 °C (98.1 °F)    Resp 16    Ht 5' 9\" (1.753 m)    Wt 70.2 kg (154 lb 11.2 oz)    SpO2 100%    BMI 22.85 kg/m2       Patient is status post general anesthesia for Procedure(s):  CREATION LEFT ARM ARTERIO VENOUS FISTULA . Nausea/Vomiting: None    Postoperative hydration reviewed and adequate. Pain:  Pain Scale 1: Numeric (0 - 10) (05/11/18 1100)  Pain Intensity 1: 0 (05/11/18 1100)   Managed    Neurological Status:   Neuro (WDL): Exceptions to WDL (05/11/18 1035)  Neuro  Neurologic State: Drowsy; Eyes open to voice (05/11/18 1035)  Orientation Level: Oriented to person;Oriented to place;Oriented to situation (05/11/18 1035)  Cognition: Appropriate for age attention/concentration; Follows commands (05/11/18 1035)  Speech: Delayed responses; Appropriate for age;Clear (05/11/18 1035)  LUE Motor Response: Purposeful;Weak (Numb r/t Axillary block) (05/11/18 1035)  LLE Motor Response: Purposeful (05/11/18 1035)  RUE Motor Response: Purposeful (05/11/18 1035)  RLE Motor Response: Purposeful (05/11/18 1035)   At baseline    Mental Status and Level of Consciousness: Arousable    Pulmonary Status:   O2 Device: Nasal cannula (05/11/18 1038)   Adequate oxygenation and airway patent    Complications related to anesthesia: None    Post-anesthesia assessment completed.  No concerns    Signed By: Kasi Elena MD     May 11, 2018

## 2018-05-11 NOTE — ANESTHESIA PREPROCEDURE EVALUATION
Anesthetic History   No history of anesthetic complications            Review of Systems / Medical History  Patient summary reviewed, nursing notes reviewed and pertinent labs reviewed    Pulmonary          Smoker (EX)         Neuro/Psych         Psychiatric history (Depression)    Comments: Depression Cardiovascular    Hypertension: well controlled          Past MI, CAD, PAD, cardiac stents and hyperlipidemia    Exercise tolerance: <4 METS  Comments: ECHO from last month showed a 55% EF with mild MR and TR    Had his beta blocker at 05:19 today.    GI/Hepatic/Renal         Renal disease (CKD): CRI  PUD    Comments: Hematemesis  Hx of PUD 9-2-15 on EGD Endo/Other    Diabetes: poorly controlled, type 2, using insulin    Arthritis (Back and hips)     Other Findings   Comments: Chronic Low Back Pain with implanted stimulator  Hx of Thromboembolism           Physical Exam    Airway  Mallampati: I  TM Distance: > 6 cm  Neck ROM: normal range of motion   Mouth opening: Normal     Cardiovascular    Rhythm: regular  Rate: normal    Murmur     Dental    Dentition: Edentulous     Pulmonary  Breath sounds clear to auscultation               Abdominal  GI exam deferred      Comments: Puffy edema around left eye only, denies injury Other Findings            Anesthetic Plan    ASA: 3  Anesthesia type: general    Monitoring Plan: BIS      Induction: Intravenous  Anesthetic plan and risks discussed with: Patient

## 2018-05-11 NOTE — PROGRESS NOTES
[facility] Progress Note        NAME: Angelina Jesus       :  1955       MRN:  [id number]     Date/Time: 2018    Risk of deterioration: medium       Assessment:    Plan:  MINO on CKD V - ?ESRD    S/P CATH-2V cad    (R) ROSALEE- s/p BMS of of R renal artery. HTN   PAD/PVD s/p (L) AKA-(L) iliac occlusion  Anemia  Secondary PTH Pt had a baseline creatinine of around 2-2.5 over the last few years with recent deterioration to 4-4.6--likely due to advancing ckd (+/-) ROSALEE    Initiated HD on . Got new L AV access (Dr. Yomi Miramontes on )    Plan for HD tomm. He is set up to go to [facility] unit on MWF schedule    Check AM labs  If stable, then can be d/c over the weekend as his outpt HD spot is cofirmed    Continue rocaltrol, epo, TUMS with meals       Subjective:     Chief Complaint: got new L AV access surgery. No acute c/o    Review of Systems: as above    Objective:     VITALS:   Last 24hrs VS reviewed since prior progress note. Most recent are:  Visit Vitals    /51 (BP 1 Location: Right arm, BP Patient Position: At rest)    Pulse 62    Temp 97.8 °F (36.6 °C)    Resp 18    Ht 5' 9\" (1.753 m)    Wt 70.2 kg (154 lb 11.2 oz)    SpO2 96%    BMI 22.85 kg/m2     SpO2 Readings from Last 6 Encounters:   18 96%   01/15/16 98%   16 100%   12/04/15 98%   10/13/15 97%   09/25/15 100%    O2 Flow Rate (L/min): 2 l/min       Intake/Output Summary (Last 24 hours) at 18 1607  Last data filed at 18 1130   Gross per 24 hour   Intake              200 ml   Output             2010 ml   Net            -1810 ml        Telemetry Reviewed    PHYSICAL EXAM:  General   NAD  R TDC intact  trace edema on R. L AKA  L AV access with drsg in place.  bruit+     Lab Data Reviewed: (see below)    Medications Reviewed: (see below)    PMH/SH reviewed - no change compared to H&P  ___________________________________________________  ___________    Attending Physician: Fito Harding MD ____________________________________________________  MEDICATIONS:  Current Facility-Administered Medications   Medication Dose Route Frequency    lactated Ringers infusion  25 mL/hr IntraVENous CONTINUOUS    sodium chloride (NS) flush 5-10 mL  5-10 mL IntraVENous Q8H    sodium chloride (NS) flush 5-10 mL  5-10 mL IntraVENous PRN    lidocaine (PF) (XYLOCAINE) 10 mg/mL (1 %) injection 0.1 mL  0.1 mL SubCUTAneous PRN    hydrALAZINE (APRESOLINE) tablet 50 mg  50 mg Oral Q8H    heparin (porcine) injection 5,000 Units  5,000 Units SubCUTAneous Q12H    pantoprazole (PROTONIX) tablet 40 mg  40 mg Oral ACB    heparin (porcine) pf 300 Units  300 Units InterCATHeter PRN    amLODIPine (NORVASC) tablet 10 mg  10 mg Oral DAILY    polyethylene glycol (MIRALAX) packet 17 g  17 g Oral DAILY    docusate sodium (COLACE) capsule 100 mg  100 mg Oral BID    insulin lispro (HUMALOG) injection   SubCUTAneous AC&HS    isosorbide mononitrate ER (IMDUR) tablet 60 mg  60 mg Oral DAILY    calcium carbonate (TUMS) chewable tablet 200 mg [elemental]  200 mg Oral QID WITH MEALS    calcitRIOL (ROCALTROL) capsule 0.25 mcg  0.25 mcg Oral DAILY    magnesium oxide (MAG-OX) tablet 400 mg  400 mg Oral DAILY    epoetin calos (EPOGEN;PROCRIT) injection 10,000 Units  10,000 Units SubCUTAneous Once per day on Mon Thu    B complex-vitaminC-folic acid (NEPHROCAP) cap  1 Cap Oral DAILY    cloNIDine HCl (CATAPRES) tablet 0.1 mg  0.1 mg Oral Q4H PRN    nitroglycerin (NITROSTAT) tablet 0.4 mg  0.4 mg SubLINGual Q5MIN PRN    glucose chewable tablet 16 g  4 Tab Oral PRN    dextrose (D50W) injection syrg 12.5-25 g  12.5-25 g IntraVENous PRN    glucagon (GLUCAGEN) injection 1 mg  1 mg IntraMUSCular PRN    metoprolol tartrate (LOPRESSOR) tablet 25 mg  25 mg Oral Q6H    aspirin chewable tablet 81 mg  81 mg Oral DAILY    atorvastatin (LIPITOR) tablet 80 mg  80 mg Oral QPM    acetaminophen (TYLENOL) tablet 650 mg  650 mg Oral Q4H PRN    oxyCODONE-acetaminophen (PERCOCET) 5-325 mg per tablet 1 Tab  1 Tab Oral Q4H PRN    hydrALAZINE (APRESOLINE) 20 mg/mL injection 10 mg  10 mg IntraVENous Q2H PRN    naloxone (NARCAN) injection 0.4 mg  0.4 mg IntraVENous PRN    diphenhydrAMINE (BENADRYL) injection 12.5 mg  12.5 mg IntraVENous Q4H PRN    ondansetron (ZOFRAN) injection 4 mg  4 mg IntraVENous Q4H PRN    bisacodyl (DULCOLAX) tablet 5 mg  5 mg Oral DAILY PRN    clopidogrel (PLAVIX) tablet 75 mg  75 mg Oral DAILY        LABS:  Recent Labs      05/11/18   0512  05/10/18   0610   WBC  5.1  5.4   HGB  9.5*  8.5*   HCT  31.1*  27.6*   PLT  166  144*     Recent Labs      05/11/18   0512  05/10/18   1452   NA  140  144   K  3.8  3.7   CL  105  107   CO2  27  27   BUN  19  33*   CREA  3.14*  4.22*   GLU  204*  162*   CA  8.2*  7.9*   PHOS  2.8   --      Recent Labs      05/11/18   0512   SGOT  16   ALT  11*   AP  185*   TBILI  0.4   TP  6.6   ALB  2.4*   GLOB  4.2*     No results for input(s): INR, PTP, APTT in the last 72 hours. No lab exists for component: INREXT, INREXT   No results for input(s): FE, TIBC, PSAT, FERR in the last 72 hours. No results for input(s): PH, PCO2, PO2 in the last 72 hours. No results for input(s): CPK, CKNDX, TROIQ in the last 72 hours.     No lab exists for component: CPKMB  Lab Results   Component Value Date/Time    Glucose (POC) 216 (H) 05/11/2018 11:53 AM    Glucose (POC) 208 (H) 05/11/2018 11:24 AM    Glucose (POC) 195 (H) 05/11/2018 07:53 AM    Glucose (POC) 165 (H) 05/10/2018 09:16 PM    Glucose (POC) 106 (H) 05/10/2018 06:04 PM

## 2018-05-11 NOTE — PROGRESS NOTES
FLOYD spoke to Tara at [facility] - [phone]. She has requested additional information to be FAX'd to [phone] - tlqn report and dialysis sheets fax'd. Per Tara, patient has been accepted for a Mon-Wed-Fri dialysis schedule. Patient could report to [facility] on Monday, 5/14/2018 at 11:35AM for a 12:15 chair time. Nursing and patient were updated by FLOYD.     Danya Byrne RN, BSN, ACM   - Medical Oncology  [phone]

## 2018-05-12 LAB
GLUCOSE BLD STRIP.AUTO-MCNC: 131 MG/DL (ref 65–100)
GLUCOSE BLD STRIP.AUTO-MCNC: 143 MG/DL (ref 65–100)
GLUCOSE BLD STRIP.AUTO-MCNC: 238 MG/DL (ref 65–100)
GLUCOSE BLD STRIP.AUTO-MCNC: 248 MG/DL (ref 65–100)
SERVICE CMNT-IMP: ABNORMAL

## 2018-05-12 PROCEDURE — 74011250637 HC RX REV CODE- 250/637: Performed by: INTERNAL MEDICINE

## 2018-05-12 PROCEDURE — 74011250637 HC RX REV CODE- 250/637: Performed by: HOSPITALIST

## 2018-05-12 PROCEDURE — 82962 GLUCOSE BLOOD TEST: CPT

## 2018-05-12 PROCEDURE — 74011250636 HC RX REV CODE- 250/636: Performed by: INTERNAL MEDICINE

## 2018-05-12 PROCEDURE — 65270000015 HC RM PRIVATE ONCOLOGY

## 2018-05-12 PROCEDURE — 74011636637 HC RX REV CODE- 636/637: Performed by: INTERNAL MEDICINE

## 2018-05-12 RX ADMIN — HYDRALAZINE HYDROCHLORIDE 50 MG: 50 TABLET, FILM COATED ORAL at 06:21

## 2018-05-12 RX ADMIN — OXYCODONE HYDROCHLORIDE AND ACETAMINOPHEN 1 TABLET: 5; 325 TABLET ORAL at 22:20

## 2018-05-12 RX ADMIN — OXYCODONE HYDROCHLORIDE AND ACETAMINOPHEN 1 TABLET: 5; 325 TABLET ORAL at 05:08

## 2018-05-12 RX ADMIN — CALCIUM CARBONATE 200 MG: 500 TABLET, CHEWABLE ORAL at 16:00

## 2018-05-12 RX ADMIN — Medication 10 ML: at 05:04

## 2018-05-12 RX ADMIN — METOPROLOL TARTRATE 25 MG: 25 TABLET ORAL at 05:03

## 2018-05-12 RX ADMIN — OXYCODONE HYDROCHLORIDE AND ACETAMINOPHEN 1 TABLET: 5; 325 TABLET ORAL at 18:02

## 2018-05-12 RX ADMIN — AMLODIPINE BESYLATE 10 MG: 5 TABLET ORAL at 08:27

## 2018-05-12 RX ADMIN — HEPARIN SODIUM 5000 UNITS: 5000 INJECTION, SOLUTION INTRAVENOUS; SUBCUTANEOUS at 08:26

## 2018-05-12 RX ADMIN — CLOPIDOGREL BISULFATE 75 MG: 75 TABLET ORAL at 08:26

## 2018-05-12 RX ADMIN — NEPHROCAP 1 CAPSULE: 1 CAP ORAL at 08:27

## 2018-05-12 RX ADMIN — INSULIN LISPRO 3 UNITS: 100 INJECTION, SOLUTION INTRAVENOUS; SUBCUTANEOUS at 16:30

## 2018-05-12 RX ADMIN — HEPARIN SODIUM 5000 UNITS: 5000 INJECTION, SOLUTION INTRAVENOUS; SUBCUTANEOUS at 20:37

## 2018-05-12 RX ADMIN — CALCIUM CARBONATE 200 MG: 500 TABLET, CHEWABLE ORAL at 08:26

## 2018-05-12 RX ADMIN — Medication 400 MG: at 08:27

## 2018-05-12 RX ADMIN — CALCITRIOL 0.25 MCG: 0.25 CAPSULE, LIQUID FILLED ORAL at 08:26

## 2018-05-12 RX ADMIN — ASPIRIN 81 MG 81 MG: 81 TABLET ORAL at 08:27

## 2018-05-12 RX ADMIN — POLYETHYLENE GLYCOL 3350 17 G: 17 POWDER, FOR SOLUTION ORAL at 08:26

## 2018-05-12 RX ADMIN — INSULIN LISPRO 2 UNITS: 100 INJECTION, SOLUTION INTRAVENOUS; SUBCUTANEOUS at 08:25

## 2018-05-12 RX ADMIN — CALCIUM CARBONATE 200 MG: 500 TABLET, CHEWABLE ORAL at 11:00

## 2018-05-12 RX ADMIN — HYDRALAZINE HYDROCHLORIDE 50 MG: 50 TABLET, FILM COATED ORAL at 15:52

## 2018-05-12 RX ADMIN — PANTOPRAZOLE SODIUM 40 MG: 40 TABLET, DELAYED RELEASE ORAL at 08:28

## 2018-05-12 RX ADMIN — METOPROLOL TARTRATE 25 MG: 25 TABLET ORAL at 16:00

## 2018-05-12 RX ADMIN — METOPROLOL TARTRATE 25 MG: 25 TABLET ORAL at 11:00

## 2018-05-12 RX ADMIN — INSULIN LISPRO 2 UNITS: 100 INJECTION, SOLUTION INTRAVENOUS; SUBCUTANEOUS at 22:13

## 2018-05-12 RX ADMIN — DOCUSATE SODIUM 100 MG: 100 CAPSULE, LIQUID FILLED ORAL at 09:00

## 2018-05-12 RX ADMIN — ISOSORBIDE MONONITRATE 60 MG: 30 TABLET, EXTENDED RELEASE ORAL at 08:26

## 2018-05-12 RX ADMIN — METOPROLOL TARTRATE 25 MG: 25 TABLET ORAL at 22:20

## 2018-05-12 NOTE — DIALYSIS
Attempted to get patient for dialysis when transport tech called to state that patient is refusing to come to dialysis. Primary nurse Von Tucker RN) called to ask why patient is refusing. Nurse stated that patient was not going to come to dialysis until he ate breakfast but the nurse has had several breakfast trays ordered and given to patient but pt stated that food wasn't edible. According to nurse, pt has thrown milk across the room and is very angry and yelling at staff. Nurse informed that if patient cant come to dialysis now that patient will have to run on night shift dialysis. Nurse stated that will probably be for the best right now.  Acute  informed about patient current state at this time

## 2018-05-12 NOTE — PROGRESS NOTES
Oncology Nursing Communication Tool  7:03 AM  5/12/2018     Bedside shift change report given to Michael RN (incoming nurse) by Gisella Lambert RN (outgoing nurse) on [facility]. Report included the following information SBAR, Kardex and MAR. Shift Summary: gave prn percoecet 2x      Issues for physician to address: Oncology Shift Note   Admission Date 4/28/2018   Admission Diagnosis NSTEMI (non-ST elevated myocardial infarction) ([facility] [address]  ARF (acute renal failure) ([facility] [address]  ESRD   Code Status Full Code   Consults IP CONSULT TO CARDIOLOGY  IP CONSULT TO NEPHROLOGY  IP CONSULT TO PSYCHIATRY  IP CONSULT TO VASCULAR SURGERY      Cardiac Monitoring [] Yes [] No      Purposeful Hourly Rounding [] Yes    Momo Score Total Score: 4   Momo score 3 or > [] Bed Alarm [] Avasys [] 1:1 sitter [] Patient refused (Place signed refusal form in chart)      Pain Managed [] Yes [] No    Key Pain Meds             methadone (DOLOPHINE) 10 mg tablet Take 1 Tab by mouth two (2) times daily (with meals). Max Daily Amount: 60 mg. One tab in am, 2 tab at lunch, 1 at dinner and 2 at bed            Influenza Vaccine Received Flu Vaccine for Current Season (usually Sept-March): Not Flu Season           Oxygen needs?  [] Room air Oxygen @  []1L    []2L    []3L   []4L    []5L   []6L     Use home O2? [] Yes [] No  Perform O2 challenge test using  smartphrase (.oxygenchallenge)      Last bowel movement Last Bowel Movement Date: 05/08/18  bowel movement      Urinary Catheter             LDAs             Venous Access Device chronic dual lumen catheter LOT [id number] 05/10/18 Upper chest (subclavicular area, right (Active)   Central Line Being Utilized Yes 5/12/2018  3:25 AM   Criteria for Appropriate Use Dialysis/apheresis 5/12/2018  3:25 AM   Site Assessment Clean, dry, & intact 5/12/2018  3:25 AM   Date of Last Dressing Change 05/10/18 5/12/2018  3:25 AM   Dressing Status Clean, dry, & intact 5/12/2018  3:25 AM   Dressing Type Other (comments) 5/11/2018  8:35 AM   Positive Blood Return (Medial Site) Yes 5/11/2018  8:35 AM   Action Taken (Medial Site) Infusing;Flushed 5/11/2018  8:35 AM   Positive Blood Return (Lateral Site) Yes 5/10/2018  9:40 AM   Alcohol Cap Used Yes 5/10/2018  6:06 PM      Peripheral IV 05/11/18 Right Antecubital (Active)   Site Assessment Clean, dry, & intact 5/12/2018  3:25 AM   Phlebitis Assessment 0 5/12/2018  3:25 AM   Infiltration Assessment 0 5/12/2018  3:25 AM   Dressing Status Clean, dry, & intact 5/12/2018  3:25 AM   Dressing Type Tape 5/12/2018  3:25 AM   Hub Color/Line Status Blue 5/12/2018  3:25 AM                         Readmission Risk Assessment Tool Score High Risk            28       Total Score        3 Has Seen PCP in Last 6 Months (Yes=3, No=0)    3 Patient Length of Stay (>5 days = 3)    9 Pt. Coverage (Medicare=5 , Medicaid, or Self-Pay=4)    13 Charlson Comorbidity Score (Age + Comorbid Conditions)        Criteria that do not apply:    . Living with Significant Other. Assisted Living. LTAC. SNF.  or   Rehab    IP Visits Last 12 Months (1-3=4, 4=9, >4=11)       Expected Length of Stay 5d 9h   Actual Length of Stay [address]

## 2018-05-12 NOTE — PROGRESS NOTES
[facility] Progress Note        NAME: Adia Hackett       :  1955       MRN:  [id number]     Date/Time: 2018    Risk of deterioration: medium       Assessment:    Plan:  MINO on CKD V - likely new ESRD    S/P CATH-2V cad    (R) ROSALEE- s/p BMS of of R renal artery. HTN   PAD/PVD s/p (L) AKA-(L) iliac occlusion  Anemia  Secondary PTH Pt had a baseline creatinine of around 2-2.5 over the last few years with recent deterioration to 4-4.6--likely due to advancing ckd (+/-) ROSALEE    Initiated HD on . Got new L AV access (Dr. Leandra Stuart on )    He was planned for HD today. But pt refused, was angry/upset and threw food in the room. He got 4 different trays and he did not like any of them. He earlier refused permacath other day because he could not stay NPO long enough. This has been recurrent over last few days. He has been difficult to deal with. He has been OK with me. He was [facility] unit, but will have to hold off, given his behavioral issues. Would hold off d/c until we seen pt has no agitation or behavior issue. D/W Dr. Reymundo Nunez    Tentatively plan HD tonight or tomm    Continue rocaltrol, epo, TUMS with meals       Subjective:     Up in bed. Was upset in am.   Threw food around the room. Angry and agitated with RN  Refused to come to HD unit in AM    Review of Systems: as above    Objective:     VITALS:   Last 24hrs VS reviewed since prior progress note.  Most recent are:  Visit Vitals    /50 (BP 1 Location: Right arm, BP Patient Position: At rest)    Pulse 62    Temp 98.9 °F (37.2 °C)    Resp 16    Ht 5' 9\" (1.753 m)    Wt 68.6 kg (151 lb 4.8 oz)    SpO2 94%    BMI 22.34 kg/m2     SpO2 Readings from Last 6 Encounters:   18 94%   01/15/16 98%   16 100%   12/04/15 98%   10/13/15 97%   09/25/15 100%    O2 Flow Rate (L/min): 2 l/min       Intake/Output Summary (Last 24 hours) at 18 6530  Last data filed at 18 3357   Gross per 24 hour   Intake 0 ml   Output              400 ml   Net             -400 ml        Telemetry Reviewed      PHYSICAL EXAM:  General   NAD  occ rhonchi+  RRR  R TDC intact  trace edema on R. L AKA  L AV access with drsg in place.  bruit+     Lab Data Reviewed: (see below)    Medications Reviewed: (see below)    PMH/SH reviewed - no change compared to H&P  ___________________________________________________  ___________    Attending Physician: Martha Roberts MD     ____________________________________________________  MEDICATIONS:  Current Facility-Administered Medications   Medication Dose Route Frequency    sodium chloride (NS) flush 5-10 mL  5-10 mL IntraVENous Q8H    sodium chloride (NS) flush 5-10 mL  5-10 mL IntraVENous PRN    lidocaine (PF) (XYLOCAINE) 10 mg/mL (1 %) injection 0.1 mL  0.1 mL SubCUTAneous PRN    hydrALAZINE (APRESOLINE) tablet 50 mg  50 mg Oral Q8H    heparin (porcine) injection 5,000 Units  5,000 Units SubCUTAneous Q12H    pantoprazole (PROTONIX) tablet 40 mg  40 mg Oral ACB    heparin (porcine) pf 300 Units  300 Units InterCATHeter PRN    amLODIPine (NORVASC) tablet 10 mg  10 mg Oral DAILY    polyethylene glycol (MIRALAX) packet 17 g  17 g Oral DAILY    docusate sodium (COLACE) capsule 100 mg  100 mg Oral BID    insulin lispro (HUMALOG) injection   SubCUTAneous AC&HS    isosorbide mononitrate ER (IMDUR) tablet 60 mg  60 mg Oral DAILY    calcium carbonate (TUMS) chewable tablet 200 mg [elemental]  200 mg Oral QID WITH MEALS    calcitRIOL (ROCALTROL) capsule 0.25 mcg  0.25 mcg Oral DAILY    magnesium oxide (MAG-OX) tablet 400 mg  400 mg Oral DAILY    epoetin calos (EPOGEN;PROCRIT) injection 10,000 Units  10,000 Units SubCUTAneous Once per day on Mon Thu    B complex-vitaminC-folic acid (NEPHROCAP) cap  1 Cap Oral DAILY    cloNIDine HCl (CATAPRES) tablet 0.1 mg  0.1 mg Oral Q4H PRN    nitroglycerin (NITROSTAT) tablet 0.4 mg  0.4 mg SubLINGual Q5MIN PRN    glucose chewable tablet 16 g  4 Tab Oral PRN    dextrose (D50W) injection syrg 12.5-25 g  12.5-25 g IntraVENous PRN    glucagon (GLUCAGEN) injection 1 mg  1 mg IntraMUSCular PRN    metoprolol tartrate (LOPRESSOR) tablet 25 mg  25 mg Oral Q6H    aspirin chewable tablet 81 mg  81 mg Oral DAILY    atorvastatin (LIPITOR) tablet 80 mg  80 mg Oral QPM    acetaminophen (TYLENOL) tablet 650 mg  650 mg Oral Q4H PRN    oxyCODONE-acetaminophen (PERCOCET) 5-325 mg per tablet 1 Tab  1 Tab Oral Q4H PRN    hydrALAZINE (APRESOLINE) 20 mg/mL injection 10 mg  10 mg IntraVENous Q2H PRN    naloxone (NARCAN) injection 0.4 mg  0.4 mg IntraVENous PRN    diphenhydrAMINE (BENADRYL) injection 12.5 mg  12.5 mg IntraVENous Q4H PRN    ondansetron (ZOFRAN) injection 4 mg  4 mg IntraVENous Q4H PRN    bisacodyl (DULCOLAX) tablet 5 mg  5 mg Oral DAILY PRN    clopidogrel (PLAVIX) tablet 75 mg  75 mg Oral DAILY        LABS:  Recent Labs      05/11/18   0512  05/10/18   0610   WBC  5.1  5.4   HGB  9.5*  8.5*   HCT  31.1*  27.6*   PLT  166  144*     Recent Labs      05/11/18   0512  05/10/18   1452   NA  140  144   K  3.8  3.7   CL  105  107   CO2  27  27   BUN  19  33*   CREA  3.14*  4.22*   GLU  204*  162*   CA  8.2*  7.9*   PHOS  2.8   --      Recent Labs      05/11/18 0512   SGOT  16   ALT  11*   AP  185*   TBILI  0.4   TP  6.6   ALB  2.4*   GLOB  4.2*     No results for input(s): INR, PTP, APTT in the last 72 hours. No lab exists for component: INREXT, INREXT   No results for input(s): FE, TIBC, PSAT, FERR in the last 72 hours. No results for input(s): PH, PCO2, PO2 in the last 72 hours. No results for input(s): CPK, CKNDX, TROIQ in the last 72 hours.     No lab exists for component: CPKMB  Lab Results   Component Value Date/Time    Glucose (POC) 131 (H) 05/12/2018 11:05 AM    Glucose (POC) 143 (H) 05/12/2018 08:16 AM    Glucose (POC) 219 (H) 05/11/2018 09:09 PM    Glucose (POC) 188 (H) 05/11/2018 04:13 PM    Glucose (POC) 216 (H) 05/11/2018 11:53 AM

## 2018-05-12 NOTE — PROGRESS NOTES
Hospitalist Progress Note    NAME: Killian Tang   :  1955   MRN:  [id number]       Assessment / Plan:  Plan is to DO HD tomorrow and DC , no any other changes , he is s/p AVF. Chest pain - borderline trop elevation; NSTEMI less likely. S/p  Cardiac cath report:  CAD, PAD w/ multiple stents - followed by Dr. Vanessa Escoto  cont aspirin, Plavix. Lipitor  Will continue the medical management. Sever Renal artery stenosis  S/p Successful PTA and stenting of the R renal artery w/ a BMS  cont aspirin, Plavix. MINO on CKD stage 5   Upper extremity vein mapping done. Nephrology on board. Seen by Dr. Vanessa Escoto who is known to patient. S/p  AVF LUE . will be [facility] tomorrow. after HD. Hx of Depression, patient used to be on medications. Effexor and trazodone at night. In this admission patient was noted to have fighting with staffs and throwing staffs and seen very aggressive multiple times   Adjustment disorder. may use Ativan 0.5 mg po bid prn for anxiety   psychiatry recommendations appreciated        Hypertensive Urgency - blood pressure improved. cont lopressor, Imdur  cont scheduled hydralazine      DM-2 w/ nephropathy and neuropathy  SSI    Anemia of chronic renal disease    Chronic thrombocytopenia    Tobacco abuse         Medical noncompliance      Body mass index is 22.34 kg/(m^2). Code status: Full  Prophylaxis: Hep SQ  Recommended Disposition: Home w/Family     Subjective:     Chief Complaint / Reason for Physician Visit  He was seen and examined , no complaints for me today. he was calm talking.     Review of Systems:  Symptom Y/N Comments  Symptom Y/N Comments   Fever/Chills n   Chest Pain n    Poor Appetite    Edema     Cough n   Abdominal Pain n    Sputum n   Joint Pain     SOB/COREA n   Pruritis/Rash     Nausea/vomit    Tolerating PT/OT     Diarrhea    Tolerating Diet     Constipation    Other       Could NOT obtain due to:      Objective:     VITALS:   Last 24hrs VS reviewed since prior progress note. Most recent are:  Patient Vitals for the past 24 hrs:   Temp Pulse Resp BP SpO2   05/12/18 0715 98.9 °F (37.2 °C) 62 16 147/50 94 %   05/12/18 0607 - 65 - 159/50 95 %   05/12/18 0504 - - - - 95 %   05/12/18 0457 - - - 161/72 93 %   05/11/18 2257 98.4 °F (36.9 °C) 71 16 133/49 95 %   05/11/18 2112 - 60 - 128/45 -   05/11/18 1928 98.6 °F (37 °C) 66 18 123/62 97 %   05/11/18 1500 97.8 °F (36.6 °C) 62 18 127/51 96 %       Intake/Output Summary (Last 24 hours) at 05/12/18 1409  Last data filed at 05/11/18 2258   Gross per 24 hour   Intake                0 ml   Output              400 ml   Net             -400 ml        PHYSICAL EXAM:on the prior date   General: cooperative, no acute distress    EENT:  EOMI. Anicteric sclerae. MMM  Resp:  CTA bilaterally, no wheezing or rales. No accessory muscle use  CV:  Regular  rhythm,  No edema  GI:  Soft, Non distended, Non tender.  +Bowel sounds  Neurologic:  Alert and oriented X 3, normal speech,   Psych:   Not anxious nor agitated  Skin:  No rashes.   No jaundice  Ext:                  No edema right leg; left AKA noted     Reviewed most current lab test results and cultures  YES  Reviewed most current radiology test results   YES  Review and summation of old records today    NO  Reviewed patient's current orders and MAR    YES  PMH/SH reviewed - no change compared to H&P          Current Facility-Administered Medications:     sodium chloride (NS) flush 5-10 mL, 5-10 mL, IntraVENous, Q8H, SCOTT Michaels MD, 10 mL at 05/12/18 0504    sodium chloride (NS) flush 5-10 mL, 5-10 mL, IntraVENous, PRN, Claudette Mortimer, MD    lidocaine (PF) (XYLOCAINE) 10 mg/mL (1 %) injection 0.1 mL, 0.1 mL, SubCUTAneous, PRN, Claudette Mortimer, MD    hydrALAZINE (APRESOLINE) tablet 50 mg, 50 mg, Oral, Q8H, Fito Harding MD, 50 mg at 05/12/18 1150    heparin (porcine) injection 5,000 Units, 5,000 Units, SubCUTAneous, Q12H, Pelon Márquez MD, 5,000 Units at 05/12/18 0826    pantoprazole (PROTONIX) tablet 40 mg, 40 mg, Oral, ACB, Travis Romero MD, 40 mg at 05/12/18 0828    heparin (porcine) pf 300 Units, 300 Units, InterCATHeter, PRN, Doroteo Keen MD, 300 Units at 05/04/18 1416    amLODIPine (NORVASC) tablet 10 mg, 10 mg, Oral, DAILY, Pk Herrera III, DO, 10 mg at 05/12/18 0827    polyethylene glycol (MIRALAX) packet 17 g, 17 g, Oral, DAILY, Alyson Ronquillo MD, 17 g at 05/12/18 4741    docusate sodium (COLACE) capsule 100 mg, 100 mg, Oral, BID, Alyson Ronquillo MD, 100 mg at 05/12/18 0900    insulin lispro (HUMALOG) injection, , SubCUTAneous, AC&HS, Alyson Ronquillo MD, Stopped at 05/12/18 1130    isosorbide mononitrate ER (IMDUR) tablet 60 mg, 60 mg, Oral, DAILY, Pk Herrera III, DO, 60 mg at 05/12/18 9906    calcium carbonate (TUMS) chewable tablet 200 mg [elemental], 200 mg, Oral, QID WITH MEALS, Quiana Hooks MD, 200 mg at 05/12/18 1100    calcitRIOL (ROCALTROL) capsule 0.25 mcg, 0.25 mcg, Oral, DAILY, Quiana Hooks MD, 0.25 mcg at 05/12/18 0826    magnesium oxide (MAG-OX) tablet 400 mg, 400 mg, Oral, DAILY, Quiana Hooks MD, 400 mg at 05/12/18 0827    epoetin calos (EPOGEN;PROCRIT) injection 10,000 Units, 10,000 Units, SubCUTAneous, Once per day on Mon Thu, Quiana Hooks MD, 10,000 Units at 05/10/18 1810    B complex-vitaminC-folic acid (NEPHROCAP) cap, 1 Cap, Oral, DAILY, Quiana Hooks MD, 1 Cap at 05/12/18 0827    cloNIDine HCl (CATAPRES) tablet 0.1 mg, 0.1 mg, Oral, Q4H PRN, Quiana Hooks MD, 0.1 mg at 05/01/18 1027    nitroglycerin (NITROSTAT) tablet 0.4 mg, 0.4 mg, SubLINGual, Q5MIN PRN, Ashwin Diaz MD, 0.4 mg at 04/29/18 0830    glucose chewable tablet 16 g, 4 Tab, Oral, PRN, Ashwin Diaz MD    dextrose (D50W) injection syrg 12.5-25 g, 12.5-25 g, IntraVENous, PRN, Ashwin Diaz MD    glucagon [facility]) injection 1 mg, 1 mg, IntraMUSCular, PRN, Ashwin Diaz MD    metoprolol tartrate (LOPRESSOR) tablet 25 mg, 25 mg, Oral, Q6H, Bro Holm MD, 25 mg at 05/12/18 1100    aspirin chewable tablet 81 mg, 81 mg, Oral, DAILY, Bro Holm MD, 81 mg at 05/12/18 0827    atorvastatin (LIPITOR) tablet 80 mg, 80 mg, Oral, QPM, Bro Holm MD, 80 mg at 05/11/18 1733    acetaminophen (TYLENOL) tablet 650 mg, 650 mg, Oral, Q4H PRN, Bro Holm MD, 650 mg at 04/29/18 2151    oxyCODONE-acetaminophen (PERCOCET) 5-325 mg per tablet 1 Tab, 1 Tab, Oral, Q4H PRN, Bro Holm MD, 1 Tab at 05/12/18 0508    hydrALAZINE (APRESOLINE) 20 mg/mL injection 10 mg, 10 mg, IntraVENous, Q2H PRN, Bro Holm MD    [facility]) injection 0.4 mg, 0.4 mg, IntraVENous, PRN, Bro Holm MD    diphenhydrAMINE (BENADRYL) injection 12.5 mg, 12.5 mg, IntraVENous, Q4H PRN, Bro Holm MD, 12.5 mg at 05/02/18 2253    ondansetron [facility]) injection 4 mg, 4 mg, IntraVENous, Q4H PRN, Bro Holm MD, 4 mg at 05/06/18 1003    bisacodyl (DULCOLAX) tablet 5 mg, 5 mg, Oral, DAILY PRN, Bro Holm MD    clopidogrel (PLAVIX) tablet 75 mg, 75 mg, Oral, DAILY, Bro Holm MD, 75 mg at 05/12/18 2890  ________________________________________________________________________  Care Plan discussed with:    Comments   Patient     Family  y Discussed with family in the room    RN y    Care Manager     Consultant                        Multidiciplinary team rounds were held today with , nursing, pharmacist and clinical coordinator. Patient's plan of care was discussed; medications were reviewed and discharge planning was addressed.      ________________________________________________________________________  Total NON critical care TIME:  25  Minutes    Total CRITICAL CARE TIME Spent:   Minutes non procedure based      Comments   >50% of visit spent in counseling and coordination of care     ________________________________________________________________________  Gerardo Jose MD Procedures: see electronic medical records for all procedures/Xrays and details which were not copied into this note but were reviewed prior to creation of Plan. LABS:  I reviewed today's most current labs and imaging studies. Pertinent labs include:  Recent Labs      05/11/18   0512  05/10/18   0610   WBC  5.1  5.4   HGB  9.5*  8.5*   HCT  31.1*  27.6*   PLT  166  144*     Recent Labs      05/11/18   0512  05/10/18   1452   NA  140  144   K  3.8  3.7   CL  105  107   CO2  27  27   GLU  204*  162*   BUN  19  33*   CREA  3.14*  4.22*   CA  8.2*  7.9*   PHOS  2.8   --    ALB  2.4*   --    TBILI  0.4   --    SGOT  16   --    ALT  11*   --        Signed:  Perla Enriquez MD

## 2018-05-12 NOTE — PROGRESS NOTES
Oncology Nursing Communication Tool  10:27 AM  5/12/2018     Bedside shift change report given to [facility] AT KRISTAN WILEY RN (incoming nurse) by Amado Sweeney RN (outgoing nurse) on [place] Report included the following information SBAR, Kardex, Intake/Output, MAR, Accordion and Recent Results. Shift Summary: Patient hostile and verbally aggressive to nursing staff this morning secondary to breakfast tray not being what he had ordered. Patient refused to go to dialysis this AM.  Dialysis nurse made aware and will add him to evening dialysis shift. Plan to discharge pt in AM.    Issues for physician to address: Oncology Shift Note   Admission Date 4/28/2018   Admission Diagnosis NSTEMI (non-ST elevated myocardial infarction) ([facility] [address]  ARF (acute renal failure) ([facility] [address]  ESRD   Code Status Full Code   Consults IP CONSULT TO CARDIOLOGY  IP CONSULT TO NEPHROLOGY  IP CONSULT TO PSYCHIATRY  IP CONSULT TO VASCULAR SURGERY      Cardiac Monitoring [] Yes [x] No      Purposeful Hourly Rounding [x] Yes    Momo Score Total Score: 4   Momo score 3 or > [] Bed Alarm [] Avasys [] 1:1 sitter [] Patient refused (Place signed refusal form in chart)      Pain Managed [x] Yes [] No    Key Pain Meds             methadone (DOLOPHINE) 10 mg tablet Take 1 Tab by mouth two (2) times daily (with meals). Max Daily Amount: 60 mg. One tab in am, 2 tab at lunch, 1 at dinner and 2 at bed            Influenza Vaccine Received Flu Vaccine for Current Season (usually Sept-March): Not Flu Season           Oxygen needs?  [] Room air Oxygen @  []1L    []2L    []3L   []4L    []5L   []6L     Use home O2? [] Yes [] No  Perform O2 challenge test using  smartphrase (.oxygenchallenge)      Last bowel movement Last Bowel Movement Date: 05/08/18  bowel movement      Urinary Catheter             LDAs             Venous Access Device chronic dual lumen catheter LOT [id number] 05/10/18 Upper chest (subclavicular area, right (Active)   Central Line Being Utilized Yes 5/12/2018  7:15 AM   Criteria for Appropriate Use Dialysis/apheresis 5/12/2018  7:15 AM   Site Assessment Clean, dry, & intact 5/12/2018  7:15 AM   Date of Last Dressing Change 05/10/18 5/12/2018  7:15 AM   Dressing Status Clean, dry, & intact 5/12/2018  7:15 AM   Dressing Type Other (comments) 5/12/2018  7:15 AM   Positive Blood Return (Medial Site) Yes 5/11/2018  8:35 AM   Action Taken (Medial Site) Infusing;Flushed 5/11/2018  8:35 AM   Positive Blood Return (Lateral Site) Yes 5/10/2018  9:40 AM   Alcohol Cap Used Yes 5/10/2018  6:06 PM      Peripheral IV 05/11/18 Right Antecubital (Active)   Site Assessment Clean, dry, & intact 5/12/2018  7:15 AM   Phlebitis Assessment 0 5/12/2018  7:15 AM   Infiltration Assessment 0 5/12/2018  7:15 AM   Dressing Status Clean, dry, & intact 5/12/2018  7:15 AM   Dressing Type Transparent;Tape 5/12/2018  7:15 AM   Hub Color/Line Status Blue 5/12/2018  7:15 AM   Action Taken Other (comment) 5/12/2018  7:15 AM                         Readmission Risk Assessment Tool Score High Risk            28       Total Score        3 Has Seen PCP in Last 6 Months (Yes=3, No=0)    3 Patient Length of Stay (>5 days = 3)    9 Pt. Coverage (Medicare=5 , Medicaid, or Self-Pay=4)    13 Charlson Comorbidity Score (Age + Comorbid Conditions)        Criteria that do not apply:    . Living with Significant Other. Assisted Living. LTAC. SNF.  or   Rehab    IP Visits Last 12 Months (1-3=4, 4=9, >4=11)       Expected Length of Stay 5d 9h   Actual Length of Stay Maral Shaffer RN

## 2018-05-12 NOTE — PROGRESS NOTES
Hospitalist Progress Note    NAME: Stacey Her   :  1955   MRN:  [id number]       Assessment / Plan:  Plan was to DO HD today  and DC him today . Patient refused HD in the morning ,saying he wants to eat his breakfast before HD , and now his HD will be at around 7 pm as I heard from staffs. He will be discharged tomorrow in the morning after HD. Chest pain - borderline trop elevation; NSTEMI less likely. S/p  Cardiac cath report:  CAD, PAD w/ multiple stents - followed by Dr. Michell Vallejo  cont aspirin, Plavix. Lipitor  Will continue the medical management. Sever Renal artery stenosis  S/p Successful PTA and stenting of the R renal artery w/ a BMS  cont aspirin, Plavix. MINO on CKD stage 5   Upper extremity vein mapping done. Nephrology on board. Seen by Dr. Michell Vallejo who is known to patient. S/p  AVF LUE . Hx of Depression, patient used to be on medications. Effexor and trazodone at night. In this admission patient was noted to have fighting with staffs and throwing staffs and seen very aggressive multiple times   Adjustment disorder. may use Ativan 0.5 mg po bid prn for anxiety   psychiatry recommendations appreciated        Hypertensive Urgency - blood pressure improved. cont lopressor, Imdur  cont scheduled hydralazine      DM-2 w/ nephropathy and neuropathy  SSI    Anemia of chronic renal disease    Chronic thrombocytopenia    Tobacco abuse         Medical noncompliance      Body mass index is 22.34 kg/(m^2).     Code status: Full  Prophylaxis: Hep SQ  Recommended Disposition: Home w/Family     Subjective:     Chief Complaint / Reason for Physician Visit  Patient was calm for me but he said \"they gave me wrong food and when I finally get the right dish they want to take me to HD before my meal.then I said no\"    Review of Systems:  Symptom Y/N Comments  Symptom Y/N Comments   Fever/Chills n   Chest Pain n    Poor Appetite    Edema     Cough n   Abdominal Pain n    Sputum n   Joint Pain SOB/COREA n   Pruritis/Rash     Nausea/vomit    Tolerating PT/OT     Diarrhea    Tolerating Diet     Constipation    Other       Could NOT obtain due to:      Objective:     VITALS:   Last 24hrs VS reviewed since prior progress note. Most recent are:  Patient Vitals for the past 24 hrs:   Temp Pulse Resp BP SpO2   05/12/18 0715 98.9 °F (37.2 °C) 62 16 147/50 94 %   05/12/18 0607 - 65 - 159/50 95 %   05/12/18 0504 - - - - 95 %   05/12/18 0457 - - - 161/72 93 %   05/11/18 2257 98.4 °F (36.9 °C) 71 16 133/49 95 %   05/11/18 2112 - 60 - 128/45 -   05/11/18 1928 98.6 °F (37 °C) 66 18 123/62 97 %   05/11/18 1500 97.8 °F (36.6 °C) 62 18 127/51 96 %       Intake/Output Summary (Last 24 hours) at 05/12/18 1411  Last data filed at 05/11/18 2258   Gross per 24 hour   Intake                0 ml   Output              400 ml   Net             -400 ml        PHYSICAL EXAM:on the prior date   General: cooperative, no acute distress    EENT:  EOMI. Anicteric sclerae. MMM  Resp:  CTA bilaterally, no wheezing or rales. No accessory muscle use  CV:  Regular  rhythm,  No edema  GI:  Soft, Non distended, Non tender.  +Bowel sounds  Neurologic:  Alert and oriented X 3, normal speech,   Psych:   Not anxious nor agitated  Skin:  No rashes.   No jaundice  Ext:                  No edema right leg; left AKA noted     Reviewed most current lab test results and cultures  YES  Reviewed most current radiology test results   YES  Review and summation of old records today    NO  Reviewed patient's current orders and MAR    YES  PMH/SH reviewed - no change compared to H&P          Current Facility-Administered Medications:     sodium chloride (NS) flush 5-10 mL, 5-10 mL, IntraVENous, Q8H, SCOTT Chávez MD, 10 mL at 05/12/18 0504    sodium chloride (NS) flush 5-10 mL, 5-10 mL, IntraVENous, PRN, Lindsey Rain MD    lidocaine (PF) (XYLOCAINE) 10 mg/mL (1 %) injection 0.1 mL, 0.1 mL, SubCUTAneous, PRN, Lindsey Rain MD    hydrALAZINE (APRESOLINE) tablet 50 mg, 50 mg, Oral, Q8H, Rosa Zhang MD, 50 mg at 05/12/18 1265    heparin (porcine) injection 5,000 Units, 5,000 Units, SubCUTAneous, Q12H, Kari Pond MD, 5,000 Units at 05/12/18 0826    pantoprazole (PROTONIX) tablet 40 mg, 40 mg, Oral, ACB, Kari Pond MD, 40 mg at 05/12/18 0828    heparin (porcine) pf 300 Units, 300 Units, InterCATHeter, PRN, Mirela Nino MD, 300 Units at 05/04/18 1416    amLODIPine (NORVASC) tablet 10 mg, 10 mg, Oral, DAILY, Kaylene Herrera III, DO, 10 mg at 05/12/18 0827    polyethylene glycol (MIRALAX) packet 17 g, 17 g, Oral, DAILY, Nash Fierro MD, 17 g at 05/12/18 4002    docusate sodium (COLACE) capsule 100 mg, 100 mg, Oral, BID, Nash Fierro MD, 100 mg at 05/12/18 0900    insulin lispro (HUMALOG) injection, , SubCUTAneous, AC&HS, Nash Fierro MD, Stopped at 05/12/18 1130    isosorbide mononitrate ER (IMDUR) tablet 60 mg, 60 mg, Oral, DAILY, Kaylene Herrera III, DO, 60 mg at 05/12/18 9830    calcium carbonate (TUMS) chewable tablet 200 mg [elemental], 200 mg, Oral, QID WITH MEALS, Jacqueline Wallace MD, 200 mg at 05/12/18 1100    calcitRIOL (ROCALTROL) capsule 0.25 mcg, 0.25 mcg, Oral, DAILY, Jacqueline Wallace MD, 0.25 mcg at 05/12/18 0826    magnesium oxide (MAG-OX) tablet 400 mg, 400 mg, Oral, DAILY, Jacqueline Wallace MD, 400 mg at 05/12/18 0827    epoetin calos (EPOGEN;PROCRIT) injection 10,000 Units, 10,000 Units, SubCUTAneous, Once per day on Mon Thu, Jacqueline Wallace MD, 10,000 Units at 05/10/18 1810    B complex-vitaminC-folic acid (NEPHROCAP) cap, 1 Cap, Oral, DAILY, Jacqueline Wallace MD, 1 Cap at 05/12/18 0827    cloNIDine HCl (CATAPRES) tablet 0.1 mg, 0.1 mg, Oral, Q4H PRN, Jacqueline Wallace MD, 0.1 mg at 05/01/18 1027    nitroglycerin (NITROSTAT) tablet 0.4 mg, 0.4 mg, SubLINGual, Q5MIN PRN, Maegan Carver MD, 0.4 mg at 04/29/18 0830    glucose chewable tablet 16 g, 4 Tab, Oral, PRN, MD Mercedes Giraldo dextrose (D50W) injection syrg 12.5-25 g, 12.5-25 g, IntraVENous, PRN, Juliana Duron MD    glucagon [facility]) injection 1 mg, 1 mg, IntraMUSCular, PRN, Juliana Duron MD    metoprolol tartrate (LOPRESSOR) tablet 25 mg, 25 mg, Oral, Q6H, Juliana Duron MD, 25 mg at 05/12/18 1100    aspirin chewable tablet 81 mg, 81 mg, Oral, DAILY, Juliana Duron MD, 81 mg at 05/12/18 0827    atorvastatin (LIPITOR) tablet 80 mg, 80 mg, Oral, QPM, Juliana Duron MD, 80 mg at 05/11/18 1733    acetaminophen (TYLENOL) tablet 650 mg, 650 mg, Oral, Q4H PRN, Juliana Duron MD, 650 mg at 04/29/18 2151    oxyCODONE-acetaminophen (PERCOCET) 5-325 mg per tablet 1 Tab, 1 Tab, Oral, Q4H PRN, Juliana Duron MD, 1 Tab at 05/12/18 0508    hydrALAZINE (APRESOLINE) 20 mg/mL injection 10 mg, 10 mg, IntraVENous, Q2H PRN, Juliana Duron MD    naloxone [facility]) injection 0.4 mg, 0.4 mg, IntraVENous, PRN, Juliana Duron MD    diphenhydrAMINE (BENADRYL) injection 12.5 mg, 12.5 mg, IntraVENous, Q4H PRN, Juliana Duron MD, 12.5 mg at 05/02/18 2253    ondansetron [facility]) injection 4 mg, 4 mg, IntraVENous, Q4H PRN, Juliana Duron MD, 4 mg at 05/06/18 1003    bisacodyl (DULCOLAX) tablet 5 mg, 5 mg, Oral, DAILY PRN, Juliana Duron MD    clopidogrel (PLAVIX) tablet 75 mg, 75 mg, Oral, DAILY, Juliana Duron MD, 75 mg at 05/12/18 2132  ________________________________________________________________________  Care Plan discussed with:    Comments   Patient     Family  y Discussed with family in the room    RN y    Care Manager     Consultant                        Multidiciplinary team rounds were held today with , nursing, pharmacist and clinical coordinator. Patient's plan of care was discussed; medications were reviewed and discharge planning was addressed.      ________________________________________________________________________  Total NON critical care TIME:  25  Minutes    Total CRITICAL CARE TIME Spent:   Minutes non procedure based      Comments   >50% of visit spent in counseling and coordination of care     ________________________________________________________________________  Mic Jones MD     Procedures: see electronic medical records for all procedures/Xrays and details which were not copied into this note but were reviewed prior to creation of Plan. LABS:  I reviewed today's most current labs and imaging studies. Pertinent labs include:  Recent Labs      05/11/18   0512  05/10/18   0610   WBC  5.1  5.4   HGB  9.5*  8.5*   HCT  31.1*  27.6*   PLT  166  144*     Recent Labs      05/11/18   0512  05/10/18   1452   NA  140  144   K  3.8  3.7   CL  105  107   CO2  27  27   GLU  204*  162*   BUN  19  33*   CREA  3.14*  4.22*   CA  8.2*  7.9*   PHOS  2.8   --    ALB  2.4*   --    TBILI  0.4   --    SGOT  16   --    ALT  11*   --        Signed:  Mic Jones MD

## 2018-05-13 LAB
GLUCOSE BLD STRIP.AUTO-MCNC: 112 MG/DL (ref 65–100)
GLUCOSE BLD STRIP.AUTO-MCNC: 146 MG/DL (ref 65–100)
GLUCOSE BLD STRIP.AUTO-MCNC: 160 MG/DL (ref 65–100)
GLUCOSE BLD STRIP.AUTO-MCNC: 163 MG/DL (ref 65–100)
SERVICE CMNT-IMP: ABNORMAL

## 2018-05-13 PROCEDURE — 74011250637 HC RX REV CODE- 250/637: Performed by: INTERNAL MEDICINE

## 2018-05-13 PROCEDURE — 82962 GLUCOSE BLOOD TEST: CPT

## 2018-05-13 PROCEDURE — 74011636637 HC RX REV CODE- 636/637: Performed by: INTERNAL MEDICINE

## 2018-05-13 PROCEDURE — 65270000015 HC RM PRIVATE ONCOLOGY

## 2018-05-13 PROCEDURE — 74011250637 HC RX REV CODE- 250/637: Performed by: HOSPITALIST

## 2018-05-13 PROCEDURE — 74011250636 HC RX REV CODE- 250/636: Performed by: INTERNAL MEDICINE

## 2018-05-13 RX ADMIN — INSULIN LISPRO 2 UNITS: 100 INJECTION, SOLUTION INTRAVENOUS; SUBCUTANEOUS at 16:30

## 2018-05-13 RX ADMIN — AMLODIPINE BESYLATE 10 MG: 5 TABLET ORAL at 08:30

## 2018-05-13 RX ADMIN — Medication 400 MG: at 08:30

## 2018-05-13 RX ADMIN — HYDRALAZINE HYDROCHLORIDE 50 MG: 50 TABLET, FILM COATED ORAL at 22:27

## 2018-05-13 RX ADMIN — INSULIN LISPRO 2 UNITS: 100 INJECTION, SOLUTION INTRAVENOUS; SUBCUTANEOUS at 08:29

## 2018-05-13 RX ADMIN — CALCIUM CARBONATE 200 MG: 500 TABLET, CHEWABLE ORAL at 12:00

## 2018-05-13 RX ADMIN — DOCUSATE SODIUM 100 MG: 100 CAPSULE, LIQUID FILLED ORAL at 09:00

## 2018-05-13 RX ADMIN — NEPHROCAP 1 CAPSULE: 1 CAP ORAL at 08:29

## 2018-05-13 RX ADMIN — CALCIUM CARBONATE 200 MG: 500 TABLET, CHEWABLE ORAL at 17:09

## 2018-05-13 RX ADMIN — HEPARIN SODIUM 5000 UNITS: 5000 INJECTION, SOLUTION INTRAVENOUS; SUBCUTANEOUS at 22:27

## 2018-05-13 RX ADMIN — METOPROLOL TARTRATE 25 MG: 25 TABLET ORAL at 22:27

## 2018-05-13 RX ADMIN — HYDRALAZINE HYDROCHLORIDE 50 MG: 50 TABLET, FILM COATED ORAL at 05:38

## 2018-05-13 RX ADMIN — ISOSORBIDE MONONITRATE 60 MG: 30 TABLET, EXTENDED RELEASE ORAL at 08:29

## 2018-05-13 RX ADMIN — OXYCODONE HYDROCHLORIDE AND ACETAMINOPHEN 1 TABLET: 5; 325 TABLET ORAL at 22:37

## 2018-05-13 RX ADMIN — DOCUSATE SODIUM 100 MG: 100 CAPSULE, LIQUID FILLED ORAL at 18:00

## 2018-05-13 RX ADMIN — Medication 10 ML: at 14:00

## 2018-05-13 RX ADMIN — OXYCODONE HYDROCHLORIDE AND ACETAMINOPHEN 1 TABLET: 5; 325 TABLET ORAL at 17:23

## 2018-05-13 RX ADMIN — INSULIN LISPRO 2 UNITS: 100 INJECTION, SOLUTION INTRAVENOUS; SUBCUTANEOUS at 11:58

## 2018-05-13 RX ADMIN — Medication 10 ML: at 22:29

## 2018-05-13 RX ADMIN — CALCIUM CARBONATE 200 MG: 500 TABLET, CHEWABLE ORAL at 08:29

## 2018-05-13 RX ADMIN — PANTOPRAZOLE SODIUM 40 MG: 40 TABLET, DELAYED RELEASE ORAL at 08:29

## 2018-05-13 RX ADMIN — HEPARIN SODIUM 5000 UNITS: 5000 INJECTION, SOLUTION INTRAVENOUS; SUBCUTANEOUS at 08:00

## 2018-05-13 RX ADMIN — OXYCODONE HYDROCHLORIDE AND ACETAMINOPHEN 1 TABLET: 5; 325 TABLET ORAL at 08:30

## 2018-05-13 RX ADMIN — CLOPIDOGREL BISULFATE 75 MG: 75 TABLET ORAL at 08:30

## 2018-05-13 RX ADMIN — POLYETHYLENE GLYCOL 3350 17 G: 17 POWDER, FOR SOLUTION ORAL at 09:00

## 2018-05-13 RX ADMIN — ATORVASTATIN CALCIUM 80 MG: 40 TABLET, FILM COATED ORAL at 18:00

## 2018-05-13 RX ADMIN — METOPROLOL TARTRATE 25 MG: 25 TABLET ORAL at 17:00

## 2018-05-13 RX ADMIN — METOPROLOL TARTRATE 25 MG: 25 TABLET ORAL at 05:38

## 2018-05-13 RX ADMIN — ASPIRIN 81 MG 81 MG: 81 TABLET ORAL at 08:31

## 2018-05-13 RX ADMIN — CALCITRIOL 0.25 MCG: 0.25 CAPSULE, LIQUID FILLED ORAL at 08:29

## 2018-05-13 NOTE — PROGRESS NOTES
Oncology Nursing Communication Tool  2:14 AM  5/13/2018     Bedside shift change report given to Michael RN (incoming nurse) by Callie Bell RN (outgoing nurse) on Annalee Loser Sr. Report included the following information SBAR, Kardex and MAR. Shift Summary:  Dialysis 5/13. Patient made aware its in his best interest to go . Issues for physician to address: Oncology Shift Note   Admission Date 4/28/2018   Admission Diagnosis NSTEMI (non-ST elevated myocardial infarction) ([facility] [address]  ARF (acute renal failure) ([facility] [address]  ESRD   Code Status Full Code   Consults IP CONSULT TO CARDIOLOGY  IP CONSULT TO NEPHROLOGY  IP CONSULT TO PSYCHIATRY  IP CONSULT TO VASCULAR SURGERY      Cardiac Monitoring [] Yes [] No      Purposeful Hourly Rounding [] Yes    Momo Score Total Score: 4   Momo score 3 or > [] Bed Alarm [] Avasys [] 1:1 sitter [] Patient refused (Place signed refusal form in chart)      Pain Managed [] Yes [] No    Key Pain Meds             methadone (DOLOPHINE) 10 mg tablet Take 1 Tab by mouth two (2) times daily (with meals). Max Daily Amount: 60 mg. One tab in am, 2 tab at lunch, 1 at dinner and 2 at bed            Influenza Vaccine Received Flu Vaccine for Current Season (usually Sept-March): Not Flu Season           Oxygen needs?  [] Room air Oxygen @  []1L    []2L    []3L   []4L    []5L   []6L     Use home O2? [] Yes [] No  Perform O2 challenge test using  smartphrase (.oxygenchallenge)      Last bowel movement Last Bowel Movement Date: 05/08/18  bowel movement      Urinary Catheter             LDAs             Venous Access Device chronic dual lumen catheter LOT [id number] 05/10/18 Upper chest (subclavicular area, right (Active)   Central Line Being Utilized Yes 5/13/2018  2:03 AM   Criteria for Appropriate Use Dialysis/apheresis 5/13/2018  2:03 AM   Site Assessment Clean 5/13/2018  2:03 AM   Date of Last Dressing Change 05/10/18 5/13/2018  2:03 AM   Dressing Status Clean, dry, & intact 5/13/2018  2:03 AM   Dressing Type Other (comments) 5/12/2018  7:15 AM   Positive Blood Return (Medial Site) Yes 5/11/2018  8:35 AM   Action Taken (Medial Site) Infusing;Flushed 5/11/2018  8:35 AM   Positive Blood Return (Lateral Site) Yes 5/10/2018  9:40 AM   Alcohol Cap Used Yes 5/10/2018  6:06 PM      Peripheral IV 05/11/18 Right Antecubital (Active)   Site Assessment Clean, dry, & intact 5/13/2018  2:03 AM   Phlebitis Assessment 0 5/13/2018  2:03 AM   Infiltration Assessment 0 5/13/2018  2:03 AM   Dressing Status Clean, dry, & intact 5/13/2018  2:03 AM   Dressing Type Tape 5/13/2018  2:03 AM   Hub Color/Line Status Blue 5/13/2018  2:03 AM   Action Taken Other (comment) 5/12/2018  7:15 AM                         Readmission Risk Assessment Tool Score High Risk            28       Total Score        3 Has Seen PCP in Last 6 Months (Yes=3, No=0)    3 Patient Length of Stay (>5 days = 3)    9 Pt. Coverage (Medicare=5 , Medicaid, or Self-Pay=4)    13 Charlson Comorbidity Score (Age + Comorbid Conditions)        Criteria that do not apply:    . Living with Significant Other. Assisted Living. LTAC. SNF.  or   Rehab    IP Visits Last 12 Months (1-3=4, 4=9, >4=11)       Expected Length of Stay 5d 9h   Actual Length of Stay [address] [place] [address]

## 2018-05-13 NOTE — PROGRESS NOTES
[facility] Progress Note        NAME: Jennifer Turner       :  1955       MRN:  [id number]     Date/Time: 2018    Risk of deterioration: medium       Assessment:    Plan:  MINO on CKD V - likely new ESRD    S/P CATH-2V cad    (R) ROSALEE- s/p BMS of of R renal artery. HTN   PAD/PVD s/p (L) AKA-(L) iliac occlusion  Anemia  Secondary PTH Pt had a baseline creatinine of around 2-2.5 over the last few years with recent deterioration to 4-4.6--likely due to advancing ckd (+/-) ROSALEE    Initiated HD on . Got new L AV access (Dr. Dave Siu on )    Having issues with anger/agitation. See note from . Had discussion with him today about behaviour. His main c/o is with food- type of food, hard to cut some food types, not able to wait if delivered late>>which upsets him the most. Counseled to resolve issues in amicable way. Has been co-operative with me usually but mostly upset by issues with diet/food. He was set up at [facility], but will will need to hold off d/c until we seen pt has no agitation or behavior issue or acts appropriately    Plan for HD tomm (since he is planned for MWF as outpt)    Continue rocaltrol, epo, TUMS with meals       Subjective:   Eating BF this am. Seems happy this am  No acute c/o    Review of Systems: as above    Objective:     VITALS:   Last 24hrs VS reviewed since prior progress note.  Most recent are:  Visit Vitals    /51 (BP 1 Location: Right arm, BP Patient Position: At rest)    Pulse (!) 59    Temp 98.7 °F (37.1 °C)    Resp 16    Ht 5' 9\" (1.753 m)    Wt 68.6 kg (151 lb 3.8 oz)    SpO2 98%    BMI 22.33 kg/m2     SpO2 Readings from Last 6 Encounters:   18 98%   01/15/16 98%   16 100%   12/04/15 98%   10/13/15 97%   09/25/15 100%    O2 Flow Rate (L/min): 2 l/min       Intake/Output Summary (Last 24 hours) at 18 0931  Last data filed at 18 0700   Gross per 24 hour   Intake                0 ml   Output              600 ml   Net -600 ml        Telemetry Reviewed      PHYSICAL EXAM:  General   NAD  occ rhonchi+  RRR  R TDC intact  trace edema on R. L AKA  L AV access with drsg in place.  Bruit+  AOx3     Lab Data Reviewed: (see below)    Medications Reviewed: (see below)    PMH/SH reviewed - no change compared to H&P  ___________________________________________________  ___________    Attending Physician: Martha Roberts MD     ____________________________________________________  MEDICATIONS:  Current Facility-Administered Medications   Medication Dose Route Frequency    sodium chloride (NS) flush 5-10 mL  5-10 mL IntraVENous Q8H    sodium chloride (NS) flush 5-10 mL  5-10 mL IntraVENous PRN    lidocaine (PF) (XYLOCAINE) 10 mg/mL (1 %) injection 0.1 mL  0.1 mL SubCUTAneous PRN    hydrALAZINE (APRESOLINE) tablet 50 mg  50 mg Oral Q8H    heparin (porcine) injection 5,000 Units  5,000 Units SubCUTAneous Q12H    pantoprazole (PROTONIX) tablet 40 mg  40 mg Oral ACB    heparin (porcine) pf 300 Units  300 Units InterCATHeter PRN    amLODIPine (NORVASC) tablet 10 mg  10 mg Oral DAILY    polyethylene glycol (MIRALAX) packet 17 g  17 g Oral DAILY    docusate sodium (COLACE) capsule 100 mg  100 mg Oral BID    insulin lispro (HUMALOG) injection   SubCUTAneous AC&HS    isosorbide mononitrate ER (IMDUR) tablet 60 mg  60 mg Oral DAILY    calcium carbonate (TUMS) chewable tablet 200 mg [elemental]  200 mg Oral QID WITH MEALS    calcitRIOL (ROCALTROL) capsule 0.25 mcg  0.25 mcg Oral DAILY    magnesium oxide (MAG-OX) tablet 400 mg  400 mg Oral DAILY    epoetin calos (EPOGEN;PROCRIT) injection 10,000 Units  10,000 Units SubCUTAneous Once per day on Mon Thu    B complex-vitaminC-folic acid (NEPHROCAP) cap  1 Cap Oral DAILY    cloNIDine HCl (CATAPRES) tablet 0.1 mg  0.1 mg Oral Q4H PRN    nitroglycerin (NITROSTAT) tablet 0.4 mg  0.4 mg SubLINGual Q5MIN PRN    glucose chewable tablet 16 g  4 Tab Oral PRN    dextrose (D50W) injection syrg 12.5-25 g  12.5-25 g IntraVENous PRN    glucagon (GLUCAGEN) injection 1 mg  1 mg IntraMUSCular PRN    metoprolol tartrate (LOPRESSOR) tablet 25 mg  25 mg Oral Q6H    aspirin chewable tablet 81 mg  81 mg Oral DAILY    atorvastatin (LIPITOR) tablet 80 mg  80 mg Oral QPM    acetaminophen (TYLENOL) tablet 650 mg  650 mg Oral Q4H PRN    oxyCODONE-acetaminophen (PERCOCET) 5-325 mg per tablet 1 Tab  1 Tab Oral Q4H PRN    hydrALAZINE (APRESOLINE) 20 mg/mL injection 10 mg  10 mg IntraVENous Q2H PRN    naloxone (NARCAN) injection 0.4 mg  0.4 mg IntraVENous PRN    diphenhydrAMINE (BENADRYL) injection 12.5 mg  12.5 mg IntraVENous Q4H PRN    ondansetron (ZOFRAN) injection 4 mg  4 mg IntraVENous Q4H PRN    bisacodyl (DULCOLAX) tablet 5 mg  5 mg Oral DAILY PRN    clopidogrel (PLAVIX) tablet 75 mg  75 mg Oral DAILY        LABS:  Recent Labs      05/11/18 0512   WBC  5.1   HGB  9.5*   HCT  31.1*   PLT  166     Recent Labs      05/11/18   0512  05/10/18   1452   NA  140  144   K  3.8  3.7   CL  105  107   CO2  27  27   BUN  19  33*   CREA  3.14*  4.22*   GLU  204*  162*   CA  8.2*  7.9*   PHOS  2.8   --      Recent Labs      05/11/18 0512   SGOT  16   ALT  11*   AP  185*   TBILI  0.4   TP  6.6   ALB  2.4*   GLOB  4.2*     No results for input(s): INR, PTP, APTT in the last 72 hours. No lab exists for component: INREXT, INREXT   No results for input(s): FE, TIBC, PSAT, FERR in the last 72 hours. No results for input(s): PH, PCO2, PO2 in the last 72 hours. No results for input(s): CPK, CKNDX, TROIQ in the last 72 hours.     No lab exists for component: CPKMB  Lab Results   Component Value Date/Time    Glucose (POC) 160 (H) 05/13/2018 08:02 AM    Glucose (POC) 238 (H) 05/12/2018 08:57 PM    Glucose (POC) 248 (H) 05/12/2018 04:34 PM    Glucose (POC) 131 (H) 05/12/2018 11:05 AM    Glucose (POC) 143 (H) 05/12/2018 08:16 AM

## 2018-05-13 NOTE — PROGRESS NOTES
2000: spoke to Brandon Fang from dialysis, said they will come get him in the morning 5/12.  Said if patient refuses again they will not be able to take him until Monday

## 2018-05-13 NOTE — DIALYSIS
Spoke with Dr Mora Cleverly about patient receiving dialysis today since patient had not received dialysis yesterday. Per Dr Mora Cleverly, patient can receive dialysis tomorrow to get pt on a MWF schedule. \"Plan for HD tomm (since he is planned for MWF as outpt)\" From Dr Mora Clesudheerly note from today

## 2018-05-13 NOTE — PROGRESS NOTES
Hospitalist Progress Note    NAME: Vladimir Syed   :  1955   MRN:  [id number]     Interim Hospital Summary: 58 y.o. male whom presented on 2018 with      Assessment / Plan:  1. MINO on CKD stage 5: Upper extremity vein mapping done. Nephrology on board. Initiated HD on . Had refused HD yday. Plan is to get HD tomorrow. 2.  Chest pain - borderline trop elevation; NSTEMI less likely. S/p  Cardiac cath    CAD, PAD w/ multiple stents - followed by Dr. Javier Alarcon  cont aspirin, Plavix. Lipitor  Will continue the medical management. 3.  Severe Renal artery stenosis  S/p Successful PTA and stenting of the R renal artery w/ a BMS  cont aspirin, Plavix. 4. H/o of Depression, patient used to be on medications. Effexor and trazodone at night. In this admission patient was noted to have fighting with staffs and throwing staffs and seen very aggressive multiple times   Adjustment disorder. may use Ativan 0.5 mg po bid prn for anxiety   psychiatry recommendations appreciated    5. Hypertensive Urgency - blood pressure improved. cont lopressor, Imdur  cont scheduled hydralazine  6. DM-2 w/ nephropathy and neuropathy: on  SSI  7. Anemia of chronic renal disease   8. Chronic thrombocytopenia   9. Tobacco abuse   10. Medical noncompliance        Body mass index is 22.34 kg/(m^2).     Code status: Full  Prophylaxis: Hep SQ  Recommended Disposition: Home w/Family. Plan for HD in am trow. Subjective:     Chief Complaint / Reason for Physician Visit  Says he is feeling better. Denies any CP/SOB/N/V  Discussed with RN events overnight.      Review of Systems:  Symptom Y/N Comments  Symptom Y/N Comments   Fever/Chills n   Chest Pain n    Poor Appetite n   Edema n    Cough n   Abdominal Pain n    Sputum n   Joint Pain     SOB/COREA n   Pruritis/Rash     Nausea/vomit n   Tolerating PT/OT     Diarrhea    Tolerating Diet     Constipation    Other         Objective: VITALS:   Last 24hrs VS reviewed since prior progress note. Most recent are:  Patient Vitals for the past 24 hrs:   Temp Pulse Resp BP SpO2   05/13/18 1104 98.7 °F (37.1 °C) 60 16 122/46 99 %   05/13/18 0712 98.7 °F (37.1 °C) (!) 59 16 141/51 98 %   05/13/18 0533 - 66 - 158/56 95 %   05/12/18 2327 98.7 °F (37.1 °C) 67 16 136/55 95 %   05/12/18 2216 - 60 - 135/51 -   05/12/18 2200 - 60 - 135/51 -   05/12/18 1915 98.7 °F (37.1 °C) 68 16 122/46 96 %   05/12/18 1513 98.6 °F (37 °C) 70 16 132/48 94 %       Intake/Output Summary (Last 24 hours) at 05/13/18 1326  Last data filed at 05/13/18 0700   Gross per 24 hour   Intake                0 ml   Output              600 ml   Net             -600 ml        PHYSICAL EXAM:  General: WD, WN. Alert, cooperative, no acute distress    EENT:  EOMI. Anicteric sclerae. MMM  Resp:  CTA bilaterally, no wheezing or rales. No accessory muscle use  CV:  Regular  rhythm,  No edema  GI:  Soft, Non distended, Non tender.  +Bowel sounds  Neurologic:  Alert and oriented X 3, normal speech,   Psych:   Good insight. Not anxious nor agitated  Skin:  no rashes or ulcers.   No jaundice    Reviewed most current lab test results and cultures  YES  Reviewed most current radiology test results   YES  Review and summation of old records today    NO  Reviewed patient's current orders and MAR    YES  PMH/SH reviewed - no change compared to H&P  ________________________________________________________________________  Care Plan discussed with:    Comments   Patient x    Family      RN x    Care Manager                    Consultant:      ________________________________________________________________________  Total NON critical care TIME: 25  Minutes    Total CRITICAL CARE TIME Spent:   Minutes non procedure based      Comments   >50% of visit spent in counseling and coordination of care     ________________________________________________________________________  Donna Carson MD     Procedures: see electronic medical records for all procedures/Xrays and details which were not copied into this note but were reviewed prior to creation of Plan. LABS:  I reviewed today's most current labs and imaging studies.   Pertinent labs include:  Recent Labs      05/11/18 0512   WBC  5.1   HGB  9.5*   HCT  31.1*   PLT  166     Recent Labs      05/11/18   0512  05/10/18   1452   NA  140  144   K  3.8  3.7   CL  105  107   CO2  27  27   GLU  204*  162*   BUN  19  33*   CREA  3.14*  4.22*   CA  8.2*  7.9*   PHOS  2.8   --    ALB  2.4*   --    TBILI  0.4   --    SGOT  16   --    ALT  11*   --

## 2018-05-13 NOTE — PROGRESS NOTES
Oncology Nursing Communication Tool  1:23 PM  5/13/2018     Bedside shift change report given to Eligio Mauro RN (incoming nurse) by Winston Maynard RN (outgoing nurse) on [facility]. Report included the following information SBAR, Kardex, Intake/Output, MAR, Accordion and Recent Results. Shift Summary: Patient to receive dialysis first thing in AM prior to discharge. Issues for physician to address: Oncology Shift Note   Admission Date 4/28/2018   Admission Diagnosis NSTEMI (non-ST elevated myocardial infarction) ([facility] [address]  ARF (acute renal failure) ([facility] [address]  ESRD   Code Status Full Code   Consults IP CONSULT TO CARDIOLOGY  IP CONSULT TO NEPHROLOGY  IP CONSULT TO PSYCHIATRY  IP CONSULT TO VASCULAR SURGERY      Cardiac Monitoring [] Yes [x] No      Purposeful Hourly Rounding [x] Yes    Momo Score Total Score: 3   Momo score 3 or > [] Bed Alarm [] Avasys [] 1:1 sitter [] Patient refused (Place signed refusal form in chart)      Pain Managed [x] Yes [] No    Key Pain Meds             methadone (DOLOPHINE) 10 mg tablet Take 1 Tab by mouth two (2) times daily (with meals). Max Daily Amount: 60 mg. One tab in am, 2 tab at lunch, 1 at dinner and 2 at bed            Influenza Vaccine Received Flu Vaccine for Current Season (usually Sept-March): Not Flu Season           Oxygen needs?  [x] Room air Oxygen @  []1L    []2L    []3L   []4L    []5L   []6L     Use home O2? [] Yes [] No  Perform O2 challenge test using  smartphrase (.oxygenchallenge)      Last bowel movement Last Bowel Movement Date: 05/08/18  bowel movement      Urinary Catheter             LDAs             Venous Access Device chronic dual lumen catheter LOT [id number] 05/10/18 Upper chest (subclavicular area, right (Active)   Central Line Being Utilized Yes 5/13/2018  2:03 AM   Criteria for Appropriate Use Dialysis/apheresis 5/13/2018  2:03 AM   Site Assessment Clean 5/13/2018  2:03 AM   Date of Last Dressing Change 05/10/18 5/13/2018 2:03 AM   Dressing Status Clean, dry, & intact 5/13/2018  2:03 AM   Dressing Type Other (comments) 5/12/2018  7:15 AM   Positive Blood Return (Medial Site) Yes 5/11/2018  8:35 AM   Action Taken (Medial Site) Infusing;Flushed 5/11/2018  8:35 AM   Positive Blood Return (Lateral Site) Yes 5/10/2018  9:40 AM   Alcohol Cap Used Yes 5/10/2018  6:06 PM      Peripheral IV 05/11/18 Right Antecubital (Active)   Site Assessment Clean, dry, & intact 5/13/2018  7:12 AM   Phlebitis Assessment 0 5/13/2018  7:12 AM   Infiltration Assessment 0 5/13/2018  7:12 AM   Dressing Status Clean, dry, & intact 5/13/2018  7:12 AM   Dressing Type Transparent;Tape 5/13/2018  7:12 AM   Hub Color/Line Status Blue 5/13/2018  7:12 AM   Action Taken Other (comment) 5/13/2018  7:12 AM                         Readmission Risk Assessment Tool Score High Risk            28       Total Score        3 Has Seen PCP in Last 6 Months (Yes=3, No=0)    3 Patient Length of Stay (>5 days = 3)    9 Pt. Coverage (Medicare=5 , Medicaid, or Self-Pay=4)    13 Charlson Comorbidity Score (Age + Comorbid Conditions)        Criteria that do not apply:    . Living with Significant Other. Assisted Living. LTAC. SNF.  or   Rehab    IP Visits Last 12 Months (1-3=4, 4=9, >4=11)       Expected Length of Stay 5d 9h   Actual Length of Stay [address], RN

## 2018-05-13 NOTE — PROGRESS NOTES
[facility] to [facility] when [facility] would be dialyzed. Nurse was told roughly 6pm this evening. Nurse to hold blood pressure meds prior to dialysis this evening. RN to be updated with time frame. 1321: RN Spoke with Kathy Keyes Dialysis Nurse.    Patient will be receiving Dialysis first thing in the AM prior to discharge per Nephrology MD   Patient made aware

## 2018-05-14 ENCOUNTER — HOME HEALTH ADMISSION (OUTPATIENT)
Dept: HOME HEALTH SERVICES | Facility: HOME HEALTH | Age: 63
End: 2018-05-14

## 2018-05-14 VITALS
HEIGHT: 69 IN | OXYGEN SATURATION: 96 % | HEART RATE: 69 BPM | SYSTOLIC BLOOD PRESSURE: 167 MMHG | DIASTOLIC BLOOD PRESSURE: 69 MMHG | BODY MASS INDEX: 22.51 KG/M2 | WEIGHT: 152 LBS | TEMPERATURE: 98.2 F | RESPIRATION RATE: 16 BRPM

## 2018-05-14 LAB
ANION GAP SERPL CALC-SCNC: 11 MMOL/L (ref 5–15)
BUN SERPL-MCNC: 40 MG/DL (ref 6–20)
BUN/CREAT SERPL: 9 (ref 12–20)
CALCIUM SERPL-MCNC: 8 MG/DL (ref 8.5–10.1)
CHLORIDE SERPL-SCNC: 108 MMOL/L (ref 97–108)
CO2 SERPL-SCNC: 23 MMOL/L (ref 21–32)
CREAT SERPL-MCNC: 4.56 MG/DL (ref 0.7–1.3)
ERYTHROCYTE [DISTWIDTH] IN BLOOD BY AUTOMATED COUNT: 18.6 % (ref 11.5–14.5)
GLUCOSE BLD STRIP.AUTO-MCNC: 119 MG/DL (ref 65–100)
GLUCOSE SERPL-MCNC: 169 MG/DL (ref 65–100)
HCT VFR BLD AUTO: 29.4 % (ref 36.6–50.3)
HGB BLD-MCNC: 9 G/DL (ref 12.1–17)
MAGNESIUM SERPL-MCNC: 2.4 MG/DL (ref 1.6–2.4)
MCH RBC QN AUTO: 32.3 PG (ref 26–34)
MCHC RBC AUTO-ENTMCNC: 30.6 G/DL (ref 30–36.5)
MCV RBC AUTO: 105.4 FL (ref 80–99)
NRBC # BLD: 0 K/UL (ref 0–0.01)
NRBC BLD-RTO: 0 PER 100 WBC
PLATELET # BLD AUTO: 224 K/UL (ref 150–400)
PMV BLD AUTO: 9.7 FL (ref 8.9–12.9)
POTASSIUM SERPL-SCNC: 4.1 MMOL/L (ref 3.5–5.1)
RBC # BLD AUTO: 2.79 M/UL (ref 4.1–5.7)
SERVICE CMNT-IMP: ABNORMAL
SODIUM SERPL-SCNC: 142 MMOL/L (ref 136–145)
WBC # BLD AUTO: 5.4 K/UL (ref 4.1–11.1)

## 2018-05-14 PROCEDURE — 90935 HEMODIALYSIS ONE EVALUATION: CPT

## 2018-05-14 PROCEDURE — 36415 COLL VENOUS BLD VENIPUNCTURE: CPT | Performed by: INTERNAL MEDICINE

## 2018-05-14 PROCEDURE — 80048 BASIC METABOLIC PNL TOTAL CA: CPT | Performed by: INTERNAL MEDICINE

## 2018-05-14 PROCEDURE — 74011250637 HC RX REV CODE- 250/637: Performed by: INTERNAL MEDICINE

## 2018-05-14 PROCEDURE — 85027 COMPLETE CBC AUTOMATED: CPT | Performed by: INTERNAL MEDICINE

## 2018-05-14 PROCEDURE — 74011250636 HC RX REV CODE- 250/636: Performed by: INTERNAL MEDICINE

## 2018-05-14 PROCEDURE — 74011250637 HC RX REV CODE- 250/637: Performed by: HOSPITALIST

## 2018-05-14 PROCEDURE — 83735 ASSAY OF MAGNESIUM: CPT | Performed by: INTERNAL MEDICINE

## 2018-05-14 PROCEDURE — 82962 GLUCOSE BLOOD TEST: CPT

## 2018-05-14 RX ORDER — HEPARIN SODIUM 1000 [USP'U]/ML
1900 INJECTION, SOLUTION INTRAVENOUS; SUBCUTANEOUS
Status: DISCONTINUED | OUTPATIENT
Start: 2018-05-14 | End: 2018-05-14 | Stop reason: HOSPADM

## 2018-05-14 RX ORDER — CLOPIDOGREL BISULFATE 75 MG/1
75 TABLET ORAL DAILY
Qty: 30 TAB | Refills: 2 | Status: SHIPPED | OUTPATIENT
Start: 2018-05-15 | End: 2018-06-01 | Stop reason: SDUPTHER

## 2018-05-14 RX ORDER — HYDRALAZINE HYDROCHLORIDE 50 MG/1
50 TABLET, FILM COATED ORAL EVERY 8 HOURS
Qty: 30 TAB | Refills: 2 | Status: SHIPPED | OUTPATIENT
Start: 2018-05-14 | End: 2018-06-01 | Stop reason: SDUPTHER

## 2018-05-14 RX ORDER — GUAIFENESIN 100 MG/5ML
81 LIQUID (ML) ORAL DAILY
Qty: 30 TAB | Refills: 2 | Status: SHIPPED | OUTPATIENT
Start: 2018-05-15 | End: 2018-06-01 | Stop reason: SDUPTHER

## 2018-05-14 RX ADMIN — OXYCODONE HYDROCHLORIDE AND ACETAMINOPHEN 1 TABLET: 5; 325 TABLET ORAL at 08:40

## 2018-05-14 RX ADMIN — CLOPIDOGREL BISULFATE 75 MG: 75 TABLET ORAL at 08:16

## 2018-05-14 RX ADMIN — CALCITRIOL 0.25 MCG: 0.25 CAPSULE, LIQUID FILLED ORAL at 08:16

## 2018-05-14 RX ADMIN — HEPARIN SODIUM 5000 UNITS: 5000 INJECTION, SOLUTION INTRAVENOUS; SUBCUTANEOUS at 08:15

## 2018-05-14 RX ADMIN — CALCIUM CARBONATE 200 MG: 500 TABLET, CHEWABLE ORAL at 08:16

## 2018-05-14 RX ADMIN — NEPHROCAP 1 CAPSULE: 1 CAP ORAL at 08:16

## 2018-05-14 RX ADMIN — HYDRALAZINE HYDROCHLORIDE 50 MG: 50 TABLET, FILM COATED ORAL at 05:37

## 2018-05-14 RX ADMIN — HEPARIN SODIUM 2000 UNITS: 1000 INJECTION, SOLUTION INTRAVENOUS; SUBCUTANEOUS at 12:05

## 2018-05-14 RX ADMIN — METOPROLOL TARTRATE 25 MG: 25 TABLET ORAL at 05:38

## 2018-05-14 RX ADMIN — ASPIRIN 81 MG 81 MG: 81 TABLET ORAL at 08:16

## 2018-05-14 RX ADMIN — OXYCODONE HYDROCHLORIDE AND ACETAMINOPHEN 1 TABLET: 5; 325 TABLET ORAL at 13:56

## 2018-05-14 RX ADMIN — Medication 400 MG: at 09:00

## 2018-05-14 RX ADMIN — PANTOPRAZOLE SODIUM 40 MG: 40 TABLET, DELAYED RELEASE ORAL at 08:16

## 2018-05-14 RX ADMIN — Medication 10 ML: at 05:36

## 2018-05-14 NOTE — DISCHARGE SUMMARY
Discharge Summary       PATIENT ID: Deneise Mortimer  MRN: [id number]   YOB: 1955    DATE OF ADMISSION: 4/28/2018  2:22 PM    DATE OF DISCHARGE: 5/14/2018  PRIMARY CARE PROVIDER: Pedro Jimenes MD       DISCHARGING PHYSICIAN: Yobany Ramírez MD    To contact this individual call [phone] and ask the  to page. If unavailable ask to be transferred the Adult Hospitalist Department. CONSULTATIONS: IP CONSULT TO CARDIOLOGY  IP CONSULT TO NEPHROLOGY  IP CONSULT TO PSYCHIATRY  IP CONSULT TO VASCULAR SURGERY    PROCEDURES/SURGERIES: Procedure(s):  CREATION LEFT ARM ARTERIO VENOUS FISTULA     ADMITTING DIAGNOSES & HOSPITAL COURSE:     Deneise Mortimer is a 58 y.o. male with history that includes CAD and MI x 3 with stents all three times, DM and CKD presents with gradual onset of severe substernal chest pain that radiated into the neck and into the left arm. Patient was admitted for chest pain secondary to NSTEMI and acute on chronic kidney injury stage 5. Patient was started on heparin drip and loaded with plavix and cardiology was consulted. He subsequently underwent cardiac catheterization with stenting of the right renal artery stenosis. DISCHARGE DIAGNOSES / PLAN:      1. MINO on CKD stage 5: Upper extremity vein mapping done. Nephrology on board. Initiated HD on 5/4. For HD MWF which is already set up. F/u with nephrology as an out-patient    2. Chest pain - borderline trop elevation; NSTEMI less likely. S/p  Cardiac cath    CAD, PAD w/ multiple stents - followed by Dr. Hayes Risk  cont aspirin, Plavix. Lipitor  Will continue the medical management. 3.  Severe Renal artery stenosis  S/p Successful PTA and stenting of the R renal artery w/ a BMS  cont aspirin, Plavix.     4. H/o of Depression, patient used to be on medications. Effexor and trazodone at night.   In this admission patient was noted to have fighting with staffs and throwing staffs and seen very aggressive multiple times   Adjustment disorder. may use Ativan 0.5 mg po bid prn for anxiety   psychiatry recommendations appreciated    5. Hypertensive Urgency - blood pressure improved. cont lopressor, Imdur  cont scheduled hydralazine  6. DM-2 w/ nephropathy and neuropathy: on  SSI. Resume home actose. 7.  Anemia of chronic renal disease   8. Chronic thrombocytopenia   9. Tobacco abuse   10. Medical noncompliance          Body mass index is 22.34 kg/(m^2).     Code status: Full  Prophylaxis: Hep SQ  Disposition prior to discharge: Patient is hemodynamically stable and has improved. Luis Enrique Roth Discharge to home with self care/family care          PENDING TEST RESULTS:   At the time of discharge the following test results are still pending:     FOLLOW UP APPOINTMENTS:    Follow-up Information     Follow up With Details Comments [address], DO Go on 5/17/2018 Hospital follow-up scheduled at 10:30am (If you have questions or need to reschedule please call [phone] [address]  [address] [phone]       On 5/17/2018 Hospital follow-up scheduled at time (If you have questions or need to reschedule please call      Mita Chacon MD   Patient can only remember the practice name and not the physician             ADDITIONAL CARE RECOMMENDATIONS:     DIET: Renal Diet    ACTIVITY: Activity as tolerated    WOUND CARE: none    EQUIPMENT needed:       DISCHARGE MEDICATIONS:  Current Discharge Medication List      START taking these medications    Details   aspirin 81 mg chewable tablet Take 1 Tab by mouth daily. Indications: myocardial infarction prevention  Qty: 30 Tab, Refills: 2      clopidogrel (PLAVIX) 75 mg tab Take 1 Tab by mouth daily. Qty: 30 Tab, Refills: 2      hydrALAZINE (APRESOLINE) 50 mg tablet Take 1 Tab by mouth every eight (8) hours.  Indications: hypertension  Qty: 30 Tab, Refills: 2         CONTINUE these medications which have NOT CHANGED    Details   methadone (DOLOPHINE) 10 mg tablet Take 1 Tab by mouth two (2) times daily (with meals). Max Daily Amount: 60 mg. One tab in am, 2 tab at lunch, 1 at dinner and 2 at bed  Qty: 180 Tab, Refills: 0      isosorbide mononitrate ER (IMDUR) 30 mg tablet Take 1 Tab by mouth daily. Qty: 30 Tab, Refills: 0    Associated Diagnoses: Coronary artery disease due to lipid rich plaque      ferrous gluconate 324 mg (37.5 mg iron) tablet Take 1 Tab by mouth three (3) times daily (with meals). Qty: 80 Tab, Refills: 1    Associated Diagnoses: CKD (chronic kidney disease), stage 4 (severe); Anemia, chronic renal failure, stage 4 (severe) ([facility])      omeprazole (PRILOSEC) 40 mg capsule Take 1 Cap by mouth two (2) times a day. Qty: 60 Cap, Refills: 0    Associated Diagnoses: Gastroesophageal reflux disease without esophagitis      metoprolol (LOPRESSOR) 25 mg tablet Take 1 Tab by mouth two (2) times a day. Qty: 30 Tab, Refills: 1    Associated Diagnoses: Essential hypertension      venlafaxine (EFFEXOR) 100 mg tablet Take 1 Tab by mouth two (2) times a day. Qty: 60 Tab, Refills: 3    Associated Diagnoses: Depression      gabapentin (NEURONTIN) 400 mg capsule Take 1 Cap by mouth three (3) times daily. Qty: 90 Cap, Refills: 1      pravastatin (PRAVACHOL) 80 mg tablet Take 1 Tab by mouth nightly. Qty: 30 Tab, Refills: 6      pioglitazone (ACTOS) 15 mg tablet Take 1 Tab by mouth daily. Qty: 90 Tab, Refills: 0      diazepam (VALIUM) 10 mg tablet Take 0.5 Tabs by mouth every twelve (12) hours. Max Daily Amount: 10 mg.  Qty: 60 Tab, Refills: 0      doxazosin (CARDURA) 2 mg tablet Take 1 Tab by mouth nightly. Qty: 90 Tab, Refills: 0      traZODone (DESYREL) 150 mg tablet Take 1 Tab by mouth nightly. Qty: 90 Tab, Refills: 0    Associated Diagnoses: Depression      fenofibrate micronized (LOFIBRA) 200 mg capsule Take 1 Cap by mouth every morning.   Qty: 90 Cap, Refills: 0    Comments: **Patient requests 90 day supply**      finasteride (PROSCAR) 5 mg tablet Take 1 Tab by mouth daily. Qty: 90 Tab, Refills: 1      food supplemt, lactose-reduced (ENSURE ACTIVE CLEAR) liqd Take 237 mL by mouth four (4) times daily. Qty: 12 Can, Refills: 4      neomycin-bacitracin-polymyxin (NEOSPORIN) 3.5mg-400 unit- 5,000 unit/gram ointment Apply  to affected area daily. Qty: 1 Tube, Refills: 1      nitroglycerin (NITROSTAT) 0.4 mg SL tablet 1 Tab by SubLINGual route every five (5) minutes as needed for Chest Pain. Qty: 20 Tab, Refills: 0      ergocalciferol (VITAMIN D2) 50,000 unit capsule Take 1 Cap by mouth every seven (7) days. Qty: 8 Cap, Refills: 2      epoetin calos (EPOGEN;PROCRIT) 10,000 unit/mL injection 10,000 Units by SubCUTAneous route every thirty (30) days. NOTIFY YOUR PHYSICIAN FOR ANY OF THE FOLLOWING:   Fever over 101 degrees for 24 hours. Chest pain, shortness of breath, fever, chills, nausea, vomiting, diarrhea, change in mentation, falling, weakness, bleeding. Severe pain or pain not relieved by medications. Or, any other signs or symptoms that you may have questions about.     DISPOSITION:  x  Home With:   OT  PT  HH  RN       Long term SNF/Inpatient Rehab    Independent/assisted living    Hospice    Other:       PATIENT CONDITION AT DISCHARGE:     Functional status    Poor     Deconditioned    x Independent      Cognition   x  Lucid     Forgetful     Dementia      Catheters/lines (plus indication)    Gonzalez     PICC     PEG    x None      Code status    x Full code     DNR      PHYSICAL EXAMINATION AT DISCHARGE:   Refer to Progress Note      CHRONIC MEDICAL DIAGNOSES:  Problem List as of 5/14/2018  Date Reviewed: 5/11/2018          Codes Class Noted - Resolved    ARF (acute renal failure) ([facility] [address] ICD-10-CM: N17.9  ICD-9-CM: 584.9  4/28/2018 - Present        NSTEMI (non-ST elevated myocardial infarction) [facility]) ICD-10-CM: I21.4  ICD-9-CM: 410.70  4/28/2018 - Present        ACP (advance care planning) ICD-10-CM: Z71.89  ICD-9-CM: V65.49 1/6/2016 - Present    Overview Signed 1/6/2016  3:59 PM by Dennis George MD     Seems to have a doc on file             Upper GI bleeding ICD-10-CM: K92.2  ICD-9-CM: 578.9  10/11/2015 - Present        Acute upper GI bleed ICD-10-CM: K92.2  ICD-9-CM: 578.9  9/1/2015 - Present        Pelvic fracture (HCC) ICD-10-CM: S32. 9XXA  ICD-9-CM: 808.8  9/26/2014 - Present        Non-healing wound of amputation stump (HCC) ICD-10-CM: T87.89  ICD-9-CM: 997.69  5/3/2013 - Present        Diabetes mellitus ([facility] ICD-10-CM: E11.9  ICD-9-CM: 250.00  12/21/2011 - Present        Hypertension ICD-10-CM: I10  ICD-9-CM: 401.9  Unknown - Present        Hypercholesteremia ICD-10-CM: E78.00  ICD-9-CM: 272.0  Unknown - Present        PVD (peripheral vascular disease) ([facility] ICD-10-CM: I73.9  ICD-9-CM: 443.9  Unknown - Present        Chronic kidney disease ICD-10-CM: N18.9  ICD-9-CM: 397. 9  Unknown - Present        Depression ICD-10-CM: F32.9  ICD-9-CM: 434  Unknown - Present        ED (erectile dysfunction) ICD-10-CM: N52.9  ICD-9-CM: 607.84  Unknown - Present              Greater than 30 minutes were spent with the patient on counseling and coordination of care    Signed:   Hollie Jordan MD  5/14/2018  8:19 AM

## 2018-05-14 NOTE — PROGRESS NOTES
Oncology Nursing Communication Tool  6:33 AM  5/14/2018     Bedside shift change report given to iMchael RN (incoming nurse) by Santos Valdes RN (outgoing nurse) on [facility]. Report included the following information SBAR, Kardex and MAR. Shift Summary:  Patient pleasant through out shift. Gave percocet 1x      Issues for physician to address: Oncology Shift Note   Admission Date 4/28/2018   Admission Diagnosis NSTEMI (non-ST elevated myocardial infarction) ([facility] [address]  ARF (acute renal failure) ([facility] [address]  ESRD   Code Status Full Code   Consults IP CONSULT TO CARDIOLOGY  IP CONSULT TO NEPHROLOGY  IP CONSULT TO PSYCHIATRY  IP CONSULT TO VASCULAR SURGERY      Cardiac Monitoring [] Yes [] No      Purposeful Hourly Rounding [] Yes    Momo Score Total Score: 4   Momo score 3 or > [] Bed Alarm [] Avasys [] 1:1 sitter [] Patient refused (Place signed refusal form in chart)      Pain Managed [] Yes [] No    Key Pain Meds             methadone (DOLOPHINE) 10 mg tablet Take 1 Tab by mouth two (2) times daily (with meals). Max Daily Amount: 60 mg. One tab in am, 2 tab at lunch, 1 at dinner and 2 at bed            Influenza Vaccine Received Flu Vaccine for Current Season (usually Sept-March): Not Flu Season           Oxygen needs?  [] Room air Oxygen @  []1L    []2L    []3L   []4L    []5L   []6L     Use home O2? [] Yes [] No  Perform O2 challenge test using  smartphrase (.oxygenchallenge)      Last bowel movement Last Bowel Movement Date: 05/08/18  bowel movement      Urinary Catheter             LDAs             Venous Access Device chronic dual lumen catheter LOT [id number] 05/10/18 Upper chest (subclavicular area, right (Active)   Central Line Being Utilized Yes 5/14/2018  2:06 AM   Criteria for Appropriate Use Dialysis/apheresis 5/14/2018  2:06 AM   Site Assessment Clean 5/14/2018  2:06 AM   Date of Last Dressing Change 05/10/18 5/14/2018  2:06 AM   Dressing Status Clean, dry, & intact 5/14/2018 2:06 AM   Dressing Type Other (comments) 5/12/2018  7:15 AM   Positive Blood Return (Medial Site) Yes 5/11/2018  8:35 AM   Action Taken (Medial Site) Infusing;Flushed 5/11/2018  8:35 AM   Positive Blood Return (Lateral Site) Yes 5/10/2018  9:40 AM   Alcohol Cap Used Yes 5/10/2018  6:06 PM      Peripheral IV 05/11/18 Right Antecubital (Active)   Site Assessment Clean, dry, & intact 5/14/2018  2:06 AM   Phlebitis Assessment 0 5/14/2018  2:06 AM   Infiltration Assessment 0 5/14/2018  2:06 AM   Dressing Status Clean, dry, & intact 5/14/2018  2:06 AM   Dressing Type Tape 5/14/2018  2:06 AM   Hub Color/Line Status Blue 5/14/2018  2:06 AM   Action Taken Other (comment) 5/13/2018  7:12 AM                         Readmission Risk Assessment Tool Score High Risk            28       Total Score        3 Has Seen PCP in Last 6 Months (Yes=3, No=0)    3 Patient Length of Stay (>5 days = 3)    9 Pt. Coverage (Medicare=5 , Medicaid, or Self-Pay=4)    13 Charlson Comorbidity Score (Age + Comorbid Conditions)        Criteria that do not apply:    . Living with Significant Other. Assisted Living. LTAC. SNF.  or   Rehab    IP Visits Last 12 Months (1-3=4, 4=9, >4=11)       Expected Length of Stay 5d 9h   Actual Length of Stay [address] [facility], [facility]

## 2018-05-14 NOTE — PROGRESS NOTES
[facility] Progress Note        NAME: Ivelisse Bullard       :  1955       MRN:  [id number]     Date/Time: 2018    Risk of deterioration: medium       Assessment:    Plan:  MINO on CKD V - likely new ESRD    S/P CATH-2V cad    (R) ROSALEE- s/p BMS of of R renal artery. HTN   PAD/PVD s/p (L) AKA-(L) iliac occlusion  Anemia  Secondary PTH Pt had a baseline creatinine of around 2-2.5 over the last few years with recent deterioration to 4-4.6--likely due to advancing ckd (+/-) ROSALEE    Initiated HD on . s/p L AV access (Dr. Mandie Frye on )    Seems stable for discharge (agitation seemed to have been situational). Calm now. Outpatient HD at [facility]    HD today and then MWF (he is planned for MWF as outpt)    Continue rocaltrol, TUMS with meals    hgb at goal (/w -); epo 10 k sc biw       Subjective:   Seems happy this am. We discussed the above. The patient was seen on dialysis at 10:20 AM .  BP is stable. Catheter is functioning well. No acute c/o    Review of Systems: as above    Objective:     VITALS:   Last 24hrs VS reviewed since prior progress note. Most recent are:  Visit Vitals    /64    Pulse 62    Temp 97.9 °F (36.6 °C)    Resp 16    Ht 5' 9\" (1.753 m)    Wt 68.6 kg (151 lb 3.8 oz)    SpO2 95%    BMI 22.33 kg/m2     SpO2 Readings from Last 6 Encounters:   18 95%   01/15/16 98%   16 100%   12/04/15 98%   10/13/15 97%   09/25/15 100%    O2 Flow Rate (L/min): 2 l/min       Intake/Output Summary (Last 24 hours) at 18 0603  Last data filed at 18 0700   Gross per 24 hour   Intake                0 ml   Output              600 ml   Net             -600 ml        Telemetry Reviewed      PHYSICAL EXAM:  General   NAD  occ rhonchi+  RRR  R TDC intact  trace edema on R. L AKA  L AV access with drsg in place.  Bruit+  AOx3     Lab Data Reviewed: (see below)    Medications Reviewed: (see below)    PMH/SH reviewed - no change compared to H&P  ___________________________________________________  ___________    Attending Physician:  Valente Parr MD     ____________________________________________________  MEDICATIONS:  Current Facility-Administered Medications   Medication Dose Route Frequency    sodium chloride (NS) flush 5-10 mL  5-10 mL IntraVENous Q8H    sodium chloride (NS) flush 5-10 mL  5-10 mL IntraVENous PRN    lidocaine (PF) (XYLOCAINE) 10 mg/mL (1 %) injection 0.1 mL  0.1 mL SubCUTAneous PRN    hydrALAZINE (APRESOLINE) tablet 50 mg  50 mg Oral Q8H    heparin (porcine) injection 5,000 Units  5,000 Units SubCUTAneous Q12H    pantoprazole (PROTONIX) tablet 40 mg  40 mg Oral ACB    heparin (porcine) pf 300 Units  300 Units InterCATHeter PRN    amLODIPine (NORVASC) tablet 10 mg  10 mg Oral DAILY    polyethylene glycol (MIRALAX) packet 17 g  17 g Oral DAILY    docusate sodium (COLACE) capsule 100 mg  100 mg Oral BID    insulin lispro (HUMALOG) injection   SubCUTAneous AC&HS    isosorbide mononitrate ER (IMDUR) tablet 60 mg  60 mg Oral DAILY    calcium carbonate (TUMS) chewable tablet 200 mg [elemental]  200 mg Oral QID WITH MEALS    calcitRIOL (ROCALTROL) capsule 0.25 mcg  0.25 mcg Oral DAILY    magnesium oxide (MAG-OX) tablet 400 mg  400 mg Oral DAILY    epoetin calos (EPOGEN;PROCRIT) injection 10,000 Units  10,000 Units SubCUTAneous Once per day on Mon Thu    B complex-vitaminC-folic acid (NEPHROCAP) cap  1 Cap Oral DAILY    cloNIDine HCl (CATAPRES) tablet 0.1 mg  0.1 mg Oral Q4H PRN    nitroglycerin (NITROSTAT) tablet 0.4 mg  0.4 mg SubLINGual Q5MIN PRN    glucose chewable tablet 16 g  4 Tab Oral PRN    dextrose (D50W) injection syrg 12.5-25 g  12.5-25 g IntraVENous PRN    glucagon (GLUCAGEN) injection 1 mg  1 mg IntraMUSCular PRN    metoprolol tartrate (LOPRESSOR) tablet 25 mg  25 mg Oral Q6H    aspirin chewable tablet 81 mg  81 mg Oral DAILY    atorvastatin (LIPITOR) tablet 80 mg  80 mg Oral QPM    acetaminophen (TYLENOL) tablet 650 mg  650 mg Oral Q4H PRN    oxyCODONE-acetaminophen (PERCOCET) 5-325 mg per tablet 1 Tab  1 Tab Oral Q4H PRN    hydrALAZINE (APRESOLINE) 20 mg/mL injection 10 mg  10 mg IntraVENous Q2H PRN    naloxone (NARCAN) injection 0.4 mg  0.4 mg IntraVENous PRN    diphenhydrAMINE (BENADRYL) injection 12.5 mg  12.5 mg IntraVENous Q4H PRN    ondansetron (ZOFRAN) injection 4 mg  4 mg IntraVENous Q4H PRN    bisacodyl (DULCOLAX) tablet 5 mg  5 mg Oral DAILY PRN    clopidogrel (PLAVIX) tablet 75 mg  75 mg Oral DAILY        LABS:  No results for input(s): WBC, HGB, HCT, PLT, HGBEXT, HCTEXT, PLTEXT, HGBEXT, HCTEXT, PLTEXT in the last 72 hours. No results for input(s): NA, K, CL, CO2, BUN, CREA, GLU, CA, MG, PHOS, URICA, PTH, XPTHCT, PTHN, XPTHNT in the last 72 hours. No lab exists for component: IPTH  No results for input(s): SGOT, GPT, ALT, AP, TBIL, TBILI, TP, ALB, GLOB, GGT, AML, LPSE in the last 72 hours. No lab exists for component: AMYP, HLPSE  No results for input(s): INR, PTP, APTT in the last 72 hours. No lab exists for component: INREXT, INREXT   No results for input(s): FE, TIBC, PSAT, FERR in the last 72 hours. No results for input(s): PH, PCO2, PO2 in the last 72 hours. No results for input(s): CPK, CKNDX, TROIQ in the last 72 hours.     No lab exists for component: CPKMB  Lab Results   Component Value Date/Time    Glucose (POC) 112 (H) 05/13/2018 09:30 PM    Glucose (POC) 163 (H) 05/13/2018 04:36 PM    Glucose (POC) 146 (H) 05/13/2018 10:59 AM    Glucose (POC) 160 (H) 05/13/2018 08:02 AM    Glucose (POC) 238 (H) 05/12/2018 08:57 PM

## 2018-05-14 NOTE — ROUTINE PROCESS
New pt apt scheduled with Dr. Darrian Ryan on 5/21/18 at 11:00AM.  Apt placed on AVS.  Cheryle Brier, [facility] Specialist

## 2018-05-14 NOTE — PROGRESS NOTES
Patient verbalized to oncoming RN he will not go to Dialysis this morning until he receives his breakfast.    RN called Dietary Services to make staff aware patient needs to receive his breakfast as early as possible. RN gave report to [facility] from Dialysis. Patient currently eating breakfast.  RN instructed to hold cardiac meds prior to dialysis. Patient will be put into transport for dialysis by Dialysis nurse. Labs to be drawn by Dialysis this morning.

## 2018-05-14 NOTE — PROGRESS NOTES
Patient ready for discharge home with family. Patient would benefit from skilled nursing to assess his medical condition and provide medication management. FOC offered and referral sent to [address] for review - referral sent via CC. FOC form signed and placed on chart. Patient has outpatient dialysis arranged at [facility] \[facility].\"" - [phone] - on a M-W-F schedule. Family to transport patient to and from dialysis until final chair time determined, then family will work with dialysis center  to arrange [facility] transportation. Patient has follow-up cardiology and PCP appts on his AVS and explained to patient and family. Family to provide transportation. Care Management Interventions  PCP Verified by CM: Yes (Dr. Yovani Khan)  Mode of Transport at Discharge:  Other (see comment) (Family - private vehicle)  Transition of Care Consult (CM Consult): Discharge Planning, Other, [address] ([address] and outpatient dialysis with [facility]. \[facility].\"")  [address] Yes  MyChart Signup: No  Discharge Durable Medical Equipment: No  Physical Therapy Consult: No  Occupational Therapy Consult: No  Speech Therapy Consult: No  Current Support Network: Relative's Home, Family Lives Nearby (Patient going to live with his son and DIL - wife to be his  - )  Confirm Follow Up Transport: Family (Family and [facility] transportation if needed)  Plan discussed with Pt/Family/Caregiver: Yes (Patient, wife and son present for discharge planning)  Freedom of Choice Offered: Yes ([address] form signed and placed on chart)  Discharge Location  Discharge Placement: Home with home health (Home with [address]    Suresh Grimes, RN, BSN, ACM   - Medical Oncology  [phone]

## 2018-05-14 NOTE — PROGRESS NOTES
CM called [facility] - [phone] - and spoke to  - Lupe Bell - who confirms that patient has been arranged for dialysis on Wednesday, 5/16/2018 - to arrive at 11:35 for paperwork and chair time of 12:15pm.      CM has attempted to reach family by telephone to confirm they will  patient after dialysis and transport to and from outpatient dialysis. CM left messages with emergency contacts.     Liz Sanchez RN, BSN, ACM   - Medical Oncology  [phone]

## 2018-05-14 NOTE — PROGRESS NOTES
CM spoke to patient's son regarding discharge for today. He will come to hospital to transport patient home at discharge. Patient's son, Karis Dillard, will be transporting patient to and from dialysis this week with plan to work with  at [employer] to set up Medicaid transportation in the future. JOZEF is concerned about patient compliance, so CM will also arrange Home Health for medication education and assessment. FOC to be offered and signed form placed on chart.       Annabelle Cohn RN, BSN, ACM   - Medical Oncology  [phone]

## 2018-05-14 NOTE — PROCEDURES
[facility] Team [facility]  [phone]    Vitals   Pre   Post   Assessment   Pre   Post     Temp  Temp: 97.9 °F (36.6 °C) (05/14/18 0940)  97.6 LOC  Alert and oriented Alert and oriented    HR   Pulse (Heart Rate): 65 (05/14/18 0940) 77 Lungs   Clear   unlabored    B/P   BP: 161/69 (05/14/18 0940) 171/79 Cardiac   Regular   no chest pain   Resp   Resp Rate: 18 (05/14/18 0940) 18 Skin   Warm and dry   dressing left antecubital dry and intact    Pain level  Pain Intensity 1: 0 (05/14/18 0714) 0 Edema    None detected    None detected    Orders:    Duration:   Start:   0940 End:   1226 Total:   2.75   Dialyzer:   Dialyzer/Set Up Inspection: Revaclear (05/14/18 0940)   K Bath:   Dialysate K (mEq/L): 4 (05/14/18 0940)   Ca Bath:   Dialysate CA (mEq/L): 2.5 (05/14/18 0940)   Na/Bicarb:   Dialysate NA (mEq/L): 140 (05/14/18 0940)   Target Fluid Removal:   Goal/Amount of Fluid to Remove (mL): 1000 mL (05/14/18 0940)   Access     Type & Location:   Right permcath disinfected with Alcohol per policy. Each lumen aspirated for blood return, prelabs drawn and flushed with Normal Saline per policy. Dialysis initiated.  + thrill/bruit left antecubital fistula creation on 05/10/18       Labs     Obtained/Reviewed   Critical Results Called   Date when labs were drawn-  Hgb-    HGB   Date Value Ref Range Status   05/11/2018 9.5 (L) 12.1 - 17.0 g/dL Final     K-    Potassium   Date Value Ref Range Status   05/11/2018 3.8 3.5 - 5.1 mmol/L Final     Ca-   Calcium   Date Value Ref Range Status   05/11/2018 8.2 (L) 8.5 - 10.1 MG/DL Final     Bun-   BUN   Date Value Ref Range Status   05/11/2018 19 6 - 20 MG/DL Final     Creat-   Creatinine   Date Value Ref Range Status   05/11/2018 3.14 (H) 0.70 - 1.30 MG/DL Final     Comment:     INVESTIGATED PER DELTA CHECK PROTOCOL        Medications/ Blood Products Given     Name   Dose   Route and Time     None                 Blood Volume Processed (BVP):    61 Net Fluid Removed:  1000   Comments   Time Out Done: 6966  Primary Nurse Rpt Pre: Le Padilla RN   Primary Nurse Rpt Post:  Pt Education: CVC infection control to include not to shower, keep dressing clean, dry and intact   Care Plan: continue current hemodialysis plan of care   Tx Summary: 0940 prelabs drawn and HD initiated. 10:15 Dr. Zina Santos in to see patient on dialysis. 10:30 pt states he needs to move his bowels, blood pressure 176/83 offered patient bedpan he stated he would rather shit in the bed, pt is going to try and wait until he gets to his room for the bedside commode, I told him if he changed his mind about the bedpan please let me know. 12:15 pt assisted on bedpan. 12:26 treatment discontinued 15 minutes early patient needing to go back to room it get on bedside commode, he did have a large hard stool which was report to his primary nurse, all possible blood returned, dressing to rij changed biopatch kit per policy, site without redness, drainage, or warmth, ports cleansed with alcohol, flushed, heparin, and capped. SBAR report called to primary nurse. Admiting Diagnosis: chest pain/arf on ckd  Pt's previous clinic- TBD  Consent signed - Informed Consent Verified: Yes (05/14/18 0940)  [facility] Consent - verified   Hepatitis Status- 05/04/18 antigen negative AB <3 suspectipble   Machine #- Machine Number: [id number] (05/14/18 0940)  Telemetry status-n/a  Pre-dialysis wt. - Pre-Dialysis Weight: 74.3 kg (163 lb 12.8 oz) (05/04/18 1945)

## 2018-05-14 NOTE — PROGRESS NOTES
Patient discharged home with family. Prescriptions reviewed, as well as follow up appointments. Dialysis next scheduled for this Wednesday. Opportunity for questions and clarification provided.

## 2018-05-17 ENCOUNTER — HOME CARE VISIT (OUTPATIENT)
Dept: SCHEDULING | Facility: HOME HEALTH | Age: 63
End: 2018-05-17

## 2018-05-17 PROCEDURE — G0299 HHS/HOSPICE OF RN EA 15 MIN: HCPCS

## 2018-05-25 ENCOUNTER — HOSPITAL ENCOUNTER (EMERGENCY)
Age: 63
Discharge: HOME | End: 2018-05-26
Attending: EMERGENCY MEDICINE
Payer: MEDICARE

## 2018-05-25 ENCOUNTER — APPOINTMENT (OUTPATIENT)
Dept: GENERAL RADIOLOGY | Age: 63
End: 2018-05-25
Attending: EMERGENCY MEDICINE
Payer: MEDICARE

## 2018-05-25 DIAGNOSIS — R06.09 DYSPNEA ON EXERTION: ICD-10-CM

## 2018-05-25 DIAGNOSIS — J98.01 ACUTE BRONCHOSPASM: Primary | ICD-10-CM

## 2018-05-25 LAB
BASOPHILS # BLD: 0 K/UL (ref 0–0.1)
BASOPHILS NFR BLD: 1 % (ref 0–1)
DIFFERENTIAL METHOD BLD: ABNORMAL
EOSINOPHIL # BLD: 0.1 K/UL (ref 0–0.4)
EOSINOPHIL NFR BLD: 3 % (ref 0–7)
ERYTHROCYTE [DISTWIDTH] IN BLOOD BY AUTOMATED COUNT: 16.5 % (ref 11.5–14.5)
HCT VFR BLD AUTO: 30.5 % (ref 36.6–50.3)
HGB BLD-MCNC: 9.4 G/DL (ref 12.1–17)
IMM GRANULOCYTES # BLD: 0 K/UL (ref 0–0.04)
IMM GRANULOCYTES NFR BLD AUTO: 1 % (ref 0–0.5)
LYMPHOCYTES # BLD: 1 K/UL (ref 0.8–3.5)
LYMPHOCYTES NFR BLD: 28 % (ref 12–49)
MCH RBC QN AUTO: 31.9 PG (ref 26–34)
MCHC RBC AUTO-ENTMCNC: 30.8 G/DL (ref 30–36.5)
MCV RBC AUTO: 103.4 FL (ref 80–99)
MONOCYTES # BLD: 0.6 K/UL (ref 0–1)
MONOCYTES NFR BLD: 16 % (ref 5–13)
NEUTS SEG # BLD: 1.8 K/UL (ref 1.8–8)
NEUTS SEG NFR BLD: 52 % (ref 32–75)
NRBC # BLD: 0 K/UL (ref 0–0.01)
NRBC BLD-RTO: 0 PER 100 WBC
PLATELET # BLD AUTO: 113 K/UL (ref 150–400)
PMV BLD AUTO: 11 FL (ref 8.9–12.9)
RBC # BLD AUTO: 2.95 M/UL (ref 4.1–5.7)
WBC # BLD AUTO: 3.5 K/UL (ref 4.1–11.1)

## 2018-05-25 PROCEDURE — 77030029684 HC NEB SM VOL KT MONA -A

## 2018-05-25 PROCEDURE — 99285 EMERGENCY DEPT VISIT HI MDM: CPT

## 2018-05-25 PROCEDURE — 71045 X-RAY EXAM CHEST 1 VIEW: CPT

## 2018-05-25 PROCEDURE — 82550 ASSAY OF CK (CPK): CPT | Performed by: EMERGENCY MEDICINE

## 2018-05-25 PROCEDURE — 83880 ASSAY OF NATRIURETIC PEPTIDE: CPT | Performed by: EMERGENCY MEDICINE

## 2018-05-25 PROCEDURE — 85025 COMPLETE CBC W/AUTO DIFF WBC: CPT | Performed by: EMERGENCY MEDICINE

## 2018-05-25 PROCEDURE — 93005 ELECTROCARDIOGRAM TRACING: CPT

## 2018-05-25 PROCEDURE — 36415 COLL VENOUS BLD VENIPUNCTURE: CPT | Performed by: EMERGENCY MEDICINE

## 2018-05-25 PROCEDURE — 84484 ASSAY OF TROPONIN QUANT: CPT | Performed by: EMERGENCY MEDICINE

## 2018-05-25 PROCEDURE — 80048 BASIC METABOLIC PNL TOTAL CA: CPT | Performed by: EMERGENCY MEDICINE

## 2018-05-26 ENCOUNTER — APPOINTMENT (OUTPATIENT)
Dept: CT IMAGING | Age: 63
End: 2018-05-26
Attending: EMERGENCY MEDICINE
Payer: MEDICARE

## 2018-05-26 VITALS
DIASTOLIC BLOOD PRESSURE: 75 MMHG | TEMPERATURE: 99.5 F | OXYGEN SATURATION: 94 % | SYSTOLIC BLOOD PRESSURE: 185 MMHG | HEIGHT: 69 IN | RESPIRATION RATE: 18 BRPM | BODY MASS INDEX: 22.22 KG/M2 | WEIGHT: 150 LBS | HEART RATE: 101 BPM

## 2018-05-26 LAB
ANION GAP SERPL CALC-SCNC: 6 MMOL/L (ref 5–15)
ATRIAL RATE: 95 BPM
BNP SERPL-MCNC: ABNORMAL PG/ML (ref 0–125)
BUN SERPL-MCNC: 22 MG/DL (ref 6–20)
BUN/CREAT SERPL: 9 (ref 12–20)
CALCIUM SERPL-MCNC: 8.1 MG/DL (ref 8.5–10.1)
CALCULATED P AXIS, ECG09: 67 DEGREES
CALCULATED R AXIS, ECG10: -54 DEGREES
CALCULATED T AXIS, ECG11: 65 DEGREES
CHLORIDE SERPL-SCNC: 107 MMOL/L (ref 97–108)
CK MB CFR SERPL CALC: 2.2 % (ref 0–2.5)
CK MB SERPL-MCNC: 1.5 NG/ML (ref 5–25)
CK SERPL-CCNC: 68 U/L (ref 39–308)
CO2 SERPL-SCNC: 30 MMOL/L (ref 21–32)
CREAT SERPL-MCNC: 2.58 MG/DL (ref 0.7–1.3)
DIAGNOSIS, 93000: NORMAL
GLUCOSE SERPL-MCNC: 141 MG/DL (ref 65–100)
P-R INTERVAL, ECG05: 142 MS
POTASSIUM SERPL-SCNC: 3.9 MMOL/L (ref 3.5–5.1)
Q-T INTERVAL, ECG07: 390 MS
QRS DURATION, ECG06: 84 MS
QTC CALCULATION (BEZET), ECG08: 490 MS
SODIUM SERPL-SCNC: 143 MMOL/L (ref 136–145)
TROPONIN I SERPL-MCNC: 0.09 NG/ML
VENTRICULAR RATE, ECG03: 95 BPM

## 2018-05-26 PROCEDURE — 74011000250 HC RX REV CODE- 250: Performed by: EMERGENCY MEDICINE

## 2018-05-26 PROCEDURE — 74011636637 HC RX REV CODE- 636/637: Performed by: EMERGENCY MEDICINE

## 2018-05-26 PROCEDURE — 74176 CT ABD & PELVIS W/O CONTRAST: CPT

## 2018-05-26 PROCEDURE — 94640 AIRWAY INHALATION TREATMENT: CPT

## 2018-05-26 PROCEDURE — 74011250637 HC RX REV CODE- 250/637: Performed by: EMERGENCY MEDICINE

## 2018-05-26 PROCEDURE — 96374 THER/PROPH/DIAG INJ IV PUSH: CPT

## 2018-05-26 RX ORDER — AZITHROMYCIN 250 MG/1
TABLET, FILM COATED ORAL
Qty: 6 TAB | Refills: 0 | Status: SHIPPED | OUTPATIENT
Start: 2018-05-26 | End: 2018-05-31

## 2018-05-26 RX ORDER — BUMETANIDE 0.25 MG/ML
1 INJECTION INTRAMUSCULAR; INTRAVENOUS
Status: COMPLETED | OUTPATIENT
Start: 2018-05-26 | End: 2018-05-26

## 2018-05-26 RX ORDER — ONDANSETRON 4 MG/1
4 TABLET, FILM COATED ORAL
COMMUNITY
End: 2018-06-01 | Stop reason: SDUPTHER

## 2018-05-26 RX ORDER — METOLAZONE 2.5 MG/1
2.5 TABLET ORAL EVERY OTHER DAY
COMMUNITY
End: 2018-06-01 | Stop reason: SDUPTHER

## 2018-05-26 RX ORDER — LOPERAMIDE HYDROCHLORIDE 2 MG/1
2 CAPSULE ORAL
Status: COMPLETED | OUTPATIENT
Start: 2018-05-26 | End: 2018-05-26

## 2018-05-26 RX ORDER — MIRTAZAPINE 30 MG/1
30 TABLET, FILM COATED ORAL
COMMUNITY
End: 2018-06-01 | Stop reason: SDUPTHER

## 2018-05-26 RX ORDER — AZITHROMYCIN 250 MG/1
500 TABLET, FILM COATED ORAL
Status: COMPLETED | OUTPATIENT
Start: 2018-05-26 | End: 2018-05-26

## 2018-05-26 RX ORDER — LANOLIN ALCOHOL/MO/W.PET/CERES
3 CREAM (GRAM) TOPICAL
COMMUNITY
End: 2019-01-21

## 2018-05-26 RX ORDER — IPRATROPIUM BROMIDE AND ALBUTEROL SULFATE 2.5; .5 MG/3ML; MG/3ML
3 SOLUTION RESPIRATORY (INHALATION) ONCE
Status: COMPLETED | OUTPATIENT
Start: 2018-05-26 | End: 2018-05-26

## 2018-05-26 RX ORDER — ALBUTEROL SULFATE 90 UG/1
1 AEROSOL, METERED RESPIRATORY (INHALATION)
Status: COMPLETED | OUTPATIENT
Start: 2018-05-26 | End: 2018-05-26

## 2018-05-26 RX ORDER — PREDNISONE 20 MG/1
60 TABLET ORAL
Status: COMPLETED | OUTPATIENT
Start: 2018-05-26 | End: 2018-05-26

## 2018-05-26 RX ORDER — DIPHENHYDRAMINE HCL 25 MG
25 TABLET ORAL
COMMUNITY
End: 2019-09-04

## 2018-05-26 RX ORDER — PREDNISONE 20 MG/1
20 TABLET ORAL DAILY
Qty: 4 TAB | Refills: 0 | Status: SHIPPED | OUTPATIENT
Start: 2018-05-26 | End: 2018-06-01 | Stop reason: ALTCHOICE

## 2018-05-26 RX ORDER — ASPIRIN 325 MG
50000 TABLET, DELAYED RELEASE (ENTERIC COATED) ORAL
COMMUNITY
End: 2018-06-01 | Stop reason: SDUPTHER

## 2018-05-26 RX ORDER — LOPERAMIDE HYDROCHLORIDE 2 MG/1
2 CAPSULE ORAL
Qty: 12 CAP | Refills: 0 | Status: SHIPPED | OUTPATIENT
Start: 2018-05-26 | End: 2019-09-04

## 2018-05-26 RX ORDER — BENZONATATE 100 MG/1
100 CAPSULE ORAL
Status: DISCONTINUED | OUTPATIENT
Start: 2018-05-26 | End: 2018-05-26 | Stop reason: HOSPADM

## 2018-05-26 RX ORDER — BUMETANIDE 1 MG/1
1 TABLET ORAL 2 TIMES DAILY
COMMUNITY
End: 2018-06-01 | Stop reason: SDUPTHER

## 2018-05-26 RX ORDER — SILDENAFIL 25 MG/1
25 TABLET, FILM COATED ORAL AS NEEDED
COMMUNITY
End: 2018-06-01 | Stop reason: SDUPTHER

## 2018-05-26 RX ORDER — BENZONATATE 100 MG/1
100 CAPSULE ORAL
Qty: 12 CAP | Refills: 0 | Status: SHIPPED | OUTPATIENT
Start: 2018-05-26 | End: 2018-06-02

## 2018-05-26 RX ORDER — PREGABALIN 50 MG/1
50 CAPSULE ORAL 3 TIMES DAILY
COMMUNITY
End: 2018-06-01 | Stop reason: SDUPTHER

## 2018-05-26 RX ORDER — DOCUSATE SODIUM 100 MG/1
100 CAPSULE, LIQUID FILLED ORAL DAILY
COMMUNITY
End: 2018-06-01 | Stop reason: SDUPTHER

## 2018-05-26 RX ADMIN — LOPERAMIDE HYDROCHLORIDE 2 MG: 2 CAPSULE ORAL at 03:29

## 2018-05-26 RX ADMIN — AZITHROMYCIN 500 MG: 250 TABLET, FILM COATED ORAL at 03:28

## 2018-05-26 RX ADMIN — BUMETANIDE 1 MG: 0.25 INJECTION INTRAMUSCULAR; INTRAVENOUS at 03:30

## 2018-05-26 RX ADMIN — PREDNISONE 60 MG: 20 TABLET ORAL at 03:29

## 2018-05-26 RX ADMIN — IPRATROPIUM BROMIDE AND ALBUTEROL SULFATE 3 ML: .5; 3 SOLUTION RESPIRATORY (INHALATION) at 01:00

## 2018-05-26 RX ADMIN — BENZONATATE 100 MG: 100 CAPSULE ORAL at 03:27

## 2018-05-26 RX ADMIN — ALBUTEROL SULFATE 1 PUFF: 90 AEROSOL, METERED RESPIRATORY (INHALATION) at 03:33

## 2018-05-26 NOTE — ED TRIAGE NOTES
Patient arriving via EMS with complaints of SOB and dry cough for a couple of days. Patient also reports bad chest pain last night, but never came to the ER. Patient had dialysis today. Patient has no other complaints at this time other than chronic back pain. Patient updated on plan of care and call bell within reach.

## 2018-05-26 NOTE — ED NOTES
Discharge instructions reviewed with patient. Discharge instructions given to patient per Dr. Garrett Hendrix. Patient able to return/verbalize discharge instructions. Copy of discharge instructions given. Patient condition stable, respiratory status within normal limits, neuro status intact. Wheeled out of ER, accompanied by family.

## 2018-05-26 NOTE — ED PROVIDER NOTES
EMERGENCY DEPARTMENT HISTORY AND PHYSICAL EXAM      Date: 5/25/2018  Patient Name: Valeri Pool Sr    History of Presenting Illness     Chief Complaint   Patient presents with    Shortness of Breath       History Provided By: Patient    HPI: Valeri Pool Sr, 58 y.o. male with PMHx significant for CKD on dialysis MWF, DM, HTN, GERD, CAD, presents via EMS to the ED with c/o persistent SOB and dry cough for the past couple of days. Pt conveys his symptoms have been worsening, which prompted him to come to the ED for evaluation. He reports exacerbation in SOB with exertion. Pt states he received a full dialysis treatment earlier today. Pt further adds experiencing an episode of severe CP last night that resolved with any treatment, but denies any CP today. He reports he is followed regularly by Dr. Naya Child (just seen this week) for hx of MI, 3 stents, and known heart blockage. Pt specifically denies any recent fevers, chills. Chief Complaint: SOB  Duration: couple of days   Timing:  Worsening  Location: N/A  Quality: N/A  Severity: Moderate  Modifying Factors: SOB worse with exertion  Associated Symptoms: nonproductive cough and episode of CP last night      There are no other complaints, changes, or physical findings at this time. Current Facility-Administered Medications   Medication Dose Route Frequency Provider Last Rate Last Dose    benzonatate (TESSALON) capsule 100 mg  100 mg Oral TID PRN Radha Ravi MD   100 mg at 05/26/18 0327     Current Outpatient Prescriptions   Medication Sig Dispense Refill    cholecalciferol (D3-50 CHOLECALCIFEROL) 50,000 unit capsule Take 50,000 Units by mouth every seven (7) days.  bumetanide (BUMEX) 1 mg tablet Take 1 mg by mouth two (2) times a day.  melatonin 3 mg tablet Take 3 mg by mouth nightly. Give 2 Tablets by mouth at bedtime      metOLazone (ZAROXOLYN) 2.5 mg tablet Take 2.5 mg by mouth every other day.       linagliptin (TRADJENTA) 5 mg tablet Take 5 mg by mouth daily.  pregabalin (LYRICA) 50 mg capsule Take 50 mg by mouth three (3) times daily.  docusate sodium (COLACE) 100 mg capsule Take 100 mg by mouth daily. Give 100 mg by mouth in the morning      mirtazapine (REMERON) 30 mg tablet Take 30 mg by mouth nightly.  ESOMEPRAZOLE MAGNESIUM PO Take 40 mg by mouth daily.  predniSONE (DELTASONE) 20 mg tablet Take 1 Tab by mouth daily. 4 Tab 0    azithromycin (ZITHROMAX) 250 mg tablet 1 tab daily x4 days 6 Tab 0    loperamide (IMODIUM) 2 mg capsule Take 1 Cap by mouth four (4) times daily as needed for Diarrhea. 12 Cap 0    benzonatate (TESSALON) 100 mg capsule Take 1 Cap by mouth three (3) times daily as needed for Cough for up to 7 days. 12 Cap 0    aspirin 81 mg chewable tablet Take 1 Tab by mouth daily. Indications: myocardial infarction prevention 30 Tab 2    clopidogrel (PLAVIX) 75 mg tab Take 1 Tab by mouth daily. 30 Tab 2    hydrALAZINE (APRESOLINE) 50 mg tablet Take 1 Tab by mouth every eight (8) hours. Indications: hypertension 30 Tab 2    ferrous gluconate 324 mg (37.5 mg iron) tablet Take 1 Tab by mouth three (3) times daily (with meals). 90 Tab 1    omeprazole (PRILOSEC) 40 mg capsule Take 1 Cap by mouth two (2) times a day. 60 Cap 0    pravastatin (PRAVACHOL) 80 mg tablet Take 1 Tab by mouth nightly. (Patient taking differently: Take 40 mg by mouth nightly.) 30 Tab 6    doxazosin (CARDURA) 2 mg tablet Take 1 Tab by mouth nightly. 90 Tab 0    traZODone (DESYREL) 150 mg tablet Take 1 Tab by mouth nightly. (Patient taking differently: Take 100 mg by mouth nightly.) 90 Tab 0    finasteride (PROSCAR) 5 mg tablet Take 1 Tab by mouth daily. 90 Tab 1    sildenafil citrate (VIAGRA) 25 mg tablet Take 25 mg by mouth as needed.  diphenhydrAMINE (BANOPHEN) 25 mg tablet Take 25 mg by mouth every six (6) hours as needed for Sleep.       ondansetron hcl (ZOFRAN) 4 mg tablet Take 4 mg by mouth every six (6) hours as needed for Nausea.  neomycin-bacitracin-polymyxin (NEOSPORIN) 3.5mg-400 unit- 5,000 unit/gram ointment Apply  to affected area daily. 1 Tube 1    nitroglycerin (NITROSTAT) 0.4 mg SL tablet 1 Tab by SubLINGual route every five (5) minutes as needed for Chest Pain. 20 Tab 0       Past History     Past Medical History:  Past Medical History:   Diagnosis Date    Anemia     Arthritis     back, hips    CAD (coronary artery disease)     Sees Dr. 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 Doris Burkitt for pain mgmt    Chronic pain     Confusion     Depression     Diabetes [facility] [address]     ED (erectile dysfunction)     GERD (gastroesophageal reflux disease) 11/2015    [facility]osed [facility] last month    Hypercholesteremia     Hypertension     Psychiatric disorder     depression    PVD (peripheral vascular disease) ([facility] [address]     Thromboembolus ([facility] [address]        Past Surgical History:  Past Surgical History:   Procedure Laterality Date    CARDIAC SURG PROCEDURE UNLIST      PTCA    HX ABOVE KNEE AMPUTATION  2013    Left knee stump non-healing ulcer; Dr. Luis Enrique Gotti HX APPENDECTOMY      HX March Radon      Left leg    HX CHOLECYSTECTOMY      HX GI      HX HIP REPLACEMENT      right hip replaced x 2    HX ORTHOPAEDIC  1990s    4 back surgeries    HX ORTHOPAEDIC      donna ankles pin placed    HX ORTHOPAEDIC      left leg 17 surgeries    UPPER GI ENDOSCOPY,BIOPSY  9/2/2015         UPPER GI ENDOSCOPY,BIOPSY  10/12/2015         VASCULAR SURGERY PROCEDURE UNLIST      Multiple revascularization procedures, toe amputations       Family History:  Family History   Problem Relation Age of Onset    Alcohol abuse Father     Diabetes Sister     Diabetes Sister     Lung Disease Mother     Cancer Maternal Grandfather      Head and neck    Cancer Paternal Grandmother      Stomach       Social History:  Social History Substance Use Topics    Smoking status: Former Smoker    Smokeless tobacco: Never Used      Comment: 30 pack years; Occasional cigar    Alcohol use No      Comment: Former heavy drinker quit drinking about 17 years ago       Allergies: Allergies   Allergen Reactions    Nubain [Nalbuphine] Other (comments)     Made me wild; Dysphoria         Review of Systems   Review of Systems   Constitutional: Negative for activity change, appetite change, chills, fever and unexpected weight change. HENT: Negative for congestion. Eyes: Negative for pain and visual disturbance. Respiratory: Positive for cough and shortness of breath. Cardiovascular: Positive for chest pain. Gastrointestinal: Negative for abdominal pain, diarrhea, nausea and vomiting. Genitourinary: Negative for dysuria. Musculoskeletal: Negative for back pain. Skin: Negative for rash. Neurological: Negative for headaches. Physical Exam   Physical Exam   Constitutional: He is oriented to person, place, and time. He appears well-developed and well-nourished. HENT:   Head: Normocephalic and atraumatic. Mouth/Throat: Oropharynx is clear and moist.   Eyes: Conjunctivae and EOM are normal. Pupils are equal, round, and reactive to light. Right eye exhibits no discharge. Left eye exhibits no discharge. Neck: Normal range of motion. Neck supple. Cardiovascular: Normal rate, regular rhythm and normal heart sounds. No murmur heard. Pulmonary/Chest: Breath sounds normal. No respiratory distress. He has no wheezes. He has no rales. Tunnel catheter R chest  Bronchospasm on exam with mild accessory muscle usage  Normal lung sounds   Abdominal: Soft. Bowel sounds are normal. There is no tenderness. Mildly distended, nontender   Musculoskeletal: He exhibits no edema. L AKA   Neurological: He is alert and oriented to person, place, and time. No cranial nerve deficit. He exhibits normal muscle tone. Skin: Skin is warm and dry.  No rash noted. He is not diaphoretic. Diffuse bruising bilateral upper extremities   Nursing note and vitals reviewed. Diagnostic Study Results     Labs -     Recent Results (from the past 12 hour(s))   EKG, 12 LEAD, INITIAL    Collection Time: 05/25/18 11:17 PM   Result Value Ref Range    Ventricular Rate 95 BPM    Atrial Rate 95 BPM    P-R Interval 142 ms    QRS Duration 84 ms    Q-T Interval 390 ms    QTC Calculation (Bezet) 490 ms    Calculated P Axis 67 degrees    Calculated R Axis -54 degrees    Calculated T Axis 65 degrees    Diagnosis       Normal sinus rhythm  Left anterior fascicular block  Possible Anterior infarct (cited on or before 04-MAY-2018)  Abnormal ECG  When compared with ECG of 04-MAY-2018 11:26,  Vent. rate has increased BY  33 BPM  Questionable change in initial forces of Anterior leads  Nonspecific T wave abnormality no longer evident in Inferior leads     CBC WITH AUTOMATED DIFF    Collection Time: 05/25/18 11:18 PM   Result Value Ref Range    WBC 3.5 (L) 4.1 - 11.1 K/uL    RBC 2.95 (L) 4.10 - 5.70 M/uL    HGB 9.4 (L) 12.1 - 17.0 g/dL    HCT 30.5 (L) 36.6 - 50.3 %    .4 (H) 80.0 - 99.0 FL    MCH 31.9 26.0 - 34.0 PG    MCHC 30.8 30.0 - 36.5 g/dL    RDW 16.5 (H) 11.5 - 14.5 %    PLATELET 465 (L) 476 - 400 K/uL    MPV 11.0 8.9 - 12.9 FL    NRBC 0.0 0  WBC    ABSOLUTE NRBC 0.00 0.00 - 0.01 K/uL    NEUTROPHILS 52 32 - 75 %    LYMPHOCYTES 28 12 - 49 %    MONOCYTES 16 (H) 5 - 13 %    EOSINOPHILS 3 0 - 7 %    BASOPHILS 1 0 - 1 %    IMMATURE GRANULOCYTES 1 (H) 0.0 - 0.5 %    ABS. NEUTROPHILS 1.8 1.8 - 8.0 K/UL    ABS. LYMPHOCYTES 1.0 0.8 - 3.5 K/UL    ABS. MONOCYTES 0.6 0.0 - 1.0 K/UL    ABS. EOSINOPHILS 0.1 0.0 - 0.4 K/UL    ABS. BASOPHILS 0.0 0.0 - 0.1 K/UL    ABS. IMM.  GRANS. 0.0 0.00 - 0.04 K/UL    DF AUTOMATED     METABOLIC PANEL, BASIC    Collection Time: 05/25/18 11:18 PM   Result Value Ref Range    Sodium 143 136 - 145 mmol/L    Potassium 3.9 3.5 - 5.1 mmol/L    Chloride 107 97 - 108 mmol/L    CO2 30 21 - 32 mmol/L    Anion gap 6 5 - 15 mmol/L    Glucose 141 (H) 65 - 100 mg/dL    BUN 22 (H) 6 - 20 MG/DL    Creatinine 2.58 (H) 0.70 - 1.30 MG/DL    BUN/Creatinine ratio 9 (L) 12 - 20      GFR est AA 31 (L) >60 ml/min/1.73m2    GFR est non-AA 25 (L) >60 ml/min/1.73m2    Calcium 8.1 (L) 8.5 - 10.1 MG/DL   NT-PRO BNP    Collection Time: 05/25/18 11:18 PM   Result Value Ref Range    NT pro-BNP >13155 (H) 0 - 125 PG/ML   TROPONIN I    Collection Time: 05/25/18 11:18 PM   Result Value Ref Range    Troponin-I, Qt. 0.09 (H) <0.05 ng/mL   CK W/ CKMB & INDEX    Collection Time: 05/25/18 11:18 PM   Result Value Ref Range    CK 68 39 - 308 U/L    CK - MB 1.5 <3.6 NG/ML    CK-MB Index 2.2 0 - 2.5         Radiologic Studies -   CT ABD PELV WO CONT   Final Result      XR CHEST PORT   Final Result        CT Results  (Last 48 hours)               05/26/18 0159  CT ABD PELV WO CONT Final result    Impression:  IMPRESSION:   Multifocal bibasilar subsegmental atelectasis   Ectatic CBD in patient with prior cholecystectomy   Nonspecific gas pattern   Bilateral renal lesions, too small to further characterize by CT. Recommend   elective ultrasound. Narrative:  EXAM:  CT ABD PELV WO CONT       INDICATION: Abdominal pain; Diarrhea; Diverticulitis       COMPARISON: 10/11/2015       CONTRAST:  None. TECHNIQUE:    Thin axial images were obtained through the abdomen and pelvis. Coronal and   sagittal reconstructions were generated. Oral contrast was not administered. CT   dose reduction was achieved through use of a standardized protocol tailored for   this examination and automatic exposure control for dose modulation. The absence of intravenous contrast material reduces the sensitivity for   evaluation of the solid parenchymal organs of the abdomen. FINDINGS:    LUNG BASES: Multifocal bibasilar subsegmental atelectasis. Small bilateral   pleural effusions, asymmetric to the left. INCIDENTALLY IMAGED HEART AND MEDIASTINUM: Unremarkable. LIVER: No mass or biliary dilatation. GALLBLADDER: Cholecystectomy clips are present. Distended CBD. SPLEEN: No mass. PANCREAS: No mass or ductal dilatation. 2 mm calcification in the proximal   pancreas. ADRENALS: Unremarkable. KIDNEYS/URETERS: Bilateral subcentimeter hyperdensities, isodense structures,   and hypodensities are present, some of which are new. A right renal stent is   noted. STOMACH: Unremarkable. SMALL BOWEL: No dilatation or wall thickening. COLON: No dilatation or wall thickening. APPENDIX: Unremarkable. A normal appendix is seen on axial image 47   PERITONEUM: No ascites or pneumoperitoneum. RETROPERITONEUM: No lymphadenopathy or aortic aneurysm. REPRODUCTIVE ORGANS: Small prostate   URINARY BLADDER: No mass or calculus. BONES: No destructive bone lesion. A right hip replacement is present. Posterior   fusion L4, L5, and the sacrum. Multilevel disc space narrowing. Bone graft   stimulator device in place   ADDITIONAL COMMENTS: N/A               CXR Results  (Last 48 hours)               05/25/18 2339  XR CHEST PORT Final result    Impression:  IMPRESSION: Stable right basilar subsegmental atelectasis or scar. No acute   cardiopulmonary process seen               Narrative:  EXAM:  XR CHEST PORT       INDICATION:  Chest Pain       COMPARISON:  4/24/2018       FINDINGS: A portable AP radiograph of the chest was obtained at 2333 hours. The   patient is on a cardiac monitor. The right dual-lumen catheter terminates at   the caval atrial junction. There is a linear opacity at the right lung base with   elevated right hemidiaphragm. The cardiac and mediastinal contours and pulmonary   vascularity are normal.  The bones and soft tissues are grossly within normal   limits. Medical Decision Making   I am the first provider for this patient.     I reviewed the vital signs, available nursing notes, past medical history, past surgical history, family history and social history. Vital Signs-Reviewed the patient's vital signs. Patient Vitals for the past 12 hrs:   Temp Pulse Resp BP SpO2   05/26/18 0145 - (!) 101 18 185/75 94 %   05/26/18 0100 - 94 28 181/84 93 %   05/26/18 0015 - 93 22 171/65 96 %   05/25/18 2315 - 93 29 171/77 93 %   05/25/18 2303 99.5 °F (37.5 °C) 97 16 171/74 93 %       Pulse Oximetry Analysis - 93% on RA    EKG interpretation: (Preliminary) 23:17  Rhythm: normal sinus rhythm; and regular . Rate (approx.): 95; Axis: normal; MI interval: normal; QRS interval: normal ; ST/T wave: normal; Other findings: left anterior fascicular block. Written by Burgess Jiménez ED Scribe, as dictated by Pily Arizmendi MD.    Records Reviewed: Nursing Notes, Old Medical Records, Previous Radiology Studies and Previous Laboratory Studies    Provider Notes (Medical Decision Making):   SOB without obvious fluid overload on exam. Just finished dialysis today. Differentials include PNA, pulmonary edema, CHF, COPD    ED Course:   Initial assessment performed. The patients presenting problems have been discussed, and they are in agreement with the care plan formulated and outlined with them. I have encouraged them to ask questions as they arise throughout their visit. 1:32 AM  Family at bedside. They report pt has profuse water pouring out of his bottom (diarrhea) and is unable to control it due to incontinence, which has been going on for the past couple of days. They deny any change in medication. Medications and chart again reviewed without obvious identification of source. CT abd r/o enteritis, diverticulitis ordered. 03:00 Discussed CT results. Further medications ordered for symptoms management. Reviewed home medications, home care as well as return precautions. D/C home tonight with recheck this week as previously arranged to Nephro and PCP.     Discharge Note:  3:02 AM  The patient has been re-evaluated and is ready for discharge. Reviewed available results with patient. Counseled patient on diagnosis and care plan. Patient has expressed understanding, and all questions have been answered. Patient agrees with plan and agrees to follow up as recommended, or to return to the ED if their symptoms worsen. Discharge instructions have been provided and explained to the patient, along with reasons to return to the ED. PLAN:  1. Discharge Medication List as of 5/26/2018  3:02 AM      START taking these medications    Details   predniSONE (DELTASONE) 20 mg tablet Take 1 Tab by mouth daily. , Normal, Disp-4 Tab, R-0      azithromycin (ZITHROMAX) 250 mg tablet 1 tab daily x4 days, Normal, Disp-6 Tab, R-0      loperamide (IMODIUM) 2 mg capsule Take 1 Cap by mouth four (4) times daily as needed for Diarrhea., Normal, Disp-12 Cap, R-0      benzonatate (TESSALON) 100 mg capsule Take 1 Cap by mouth three (3) times daily as needed for Cough for up to 7 days. , Normal, Disp-12 Cap, R-0         CONTINUE these medications which have NOT CHANGED    Details   cholecalciferol (D3-50 CHOLECALCIFEROL) 50,000 unit capsule Take 50,000 Units by mouth every seven (7) days. , Historical Med      bumetanide (BUMEX) 1 mg tablet Take 1 mg by mouth two (2) times a day., Historical Med      melatonin 3 mg tablet Take 3 mg by mouth nightly. Give 2 Tablets by mouth at bedtime, Historical Med      metOLazone (ZAROXOLYN) 2.5 mg tablet Take 2.5 mg by mouth every other day., Historical Med      linagliptin (TRADJENTA) 5 mg tablet Take 5 mg by mouth daily. , Historical Med      pregabalin (LYRICA) 50 mg capsule Take 50 mg by mouth three (3) times daily. , Historical Med      docusate sodium (COLACE) 100 mg capsule Take 100 mg by mouth daily. Give 100 mg by mouth in the morning, Historical Med      mirtazapine (REMERON) 30 mg tablet Take 30 mg by mouth nightly., Historical Med      ESOMEPRAZOLE MAGNESIUM PO Take 40 mg by mouth daily. , Historical Med      aspirin 81 mg chewable tablet Take 1 Tab by mouth daily. Indications: myocardial infarction prevention, Print, Disp-30 Tab, R-2      clopidogrel (PLAVIX) 75 mg tab Take 1 Tab by mouth daily. , Print, Disp-30 Tab, R-2      hydrALAZINE (APRESOLINE) 50 mg tablet Take 1 Tab by mouth every eight (8) hours. Indications: hypertension, Normal, Disp-30 Tab, R-2      ferrous gluconate 324 mg (37.5 mg iron) tablet Take 1 Tab by mouth three (3) times daily (with meals). , Normal, Disp-90 Tab, R-1      omeprazole (PRILOSEC) 40 mg capsule Take 1 Cap by mouth two (2) times a day., Normal, Disp-60 Cap, R-0      pravastatin (PRAVACHOL) 80 mg tablet Take 1 Tab by mouth nightly., Normal, Disp-30 Tab, R-6      doxazosin (CARDURA) 2 mg tablet Take 1 Tab by mouth nightly., Normal, Disp-90 Tab, R-0      traZODone (DESYREL) 150 mg tablet Take 1 Tab by mouth nightly., Normal, Disp-90 Tab, R-0      finasteride (PROSCAR) 5 mg tablet Take 1 Tab by mouth daily. , Normal, Disp-90 Tab, R-1      sildenafil citrate (VIAGRA) 25 mg tablet Take 25 mg by mouth as needed., Historical Med      diphenhydrAMINE (BANOPHEN) 25 mg tablet Take 25 mg by mouth every six (6) hours as needed for Sleep., Historical Med      ondansetron hcl (ZOFRAN) 4 mg tablet Take 4 mg by mouth every six (6) hours as needed for Nausea., Historical Med      neomycin-bacitracin-polymyxin (NEOSPORIN) 3.5mg-400 unit- 5,000 unit/gram ointment Apply  to affected area daily. , Print, Disp-1 Tube, R-1      nitroglycerin (NITROSTAT) 0.4 mg SL tablet 1 Tab by SubLINGual route every five (5) minutes as needed for Chest Pain., Normal, Disp-20 Tab, R-0           2. Follow-up Information     Follow up With Details Comments Contact Info    [facility] EMERGENCY DEPT  If symptoms worsen [address] [facility] [id number] [phone]        Return to ED if worse     Diagnosis     Clinical Impression:   1. Acute bronchospasm    2.  Dyspnea on exertion Attestations: This note is prepared by Henny Cortez, acting as Scribe for Nilda Miller MD.    The scribe's documentation has been prepared under my direction and personally reviewed by me in its entirety. I confirm that the note above accurately reflects all work, treatment, procedures, and medical decision making performed by me. Nilda Miller MD        This note will not be viewable in [address]

## 2018-05-26 NOTE — DISCHARGE INSTRUCTIONS
Wheezing or Bronchoconstriction: Care Instructions  Your Care Instructions  Wheezing is a whistling noise made during breathing. It occurs when the small airways, or bronchial tubes, that lead to your lungs swell or contract (spasm) and become narrow. This narrowing is called bronchoconstriction. When your airways constrict, it is hard for air to pass through and this makes it hard for you to breathe. Wheezing and bronchoconstriction can be caused by many problems, including:  · An infection such as the flu or a cold. · Allergies such as hay fever. · Diseases such as asthma or chronic obstructive pulmonary disease. · Smoking. Treatment for your wheezing depends on what is causing the problem. Your wheezing may get better without treatment. But you may need to pay attention to things that cause your wheezing and avoid them. Or you may need medicine to help treat the wheezing and to reduce the swelling or to relieve spasms in your lungs. Follow-up care is a key part of your treatment and safety. Be sure to make and go to all appointments, and call your doctor if you are having problems. It is also a good idea to know your test results and keep a list of the medicines you take. How can you care for yourself at home? · Take your medicine exactly as prescribed. Call your doctor if you think you are having a problem with your medicine. You will get more details on the specific medicine your doctor prescribes. · If your doctor prescribed antibiotics, take them as directed. Do not stop taking them just because you feel better. You need to take the full course of antibiotics. · Breathe moist air from a humidifier, hot shower, or sink filled with hot water. This may help ease your symptoms and make it easier for you to breathe. · If you have congestion in your nose and throat, drinking plenty of fluids, especially hot fluids, may help relieve your symptoms.  If you have kidney, heart, or liver disease and have to limit fluids, talk with your doctor before you increase the amount of fluids you drink. · If you have mucus in your airways, it may help to breathe deeply and cough. · Do not smoke or allow others to smoke around you. Smoking can make your wheezing worse. If you need help quitting, talk to your doctor about stop-smoking programs and medicines. These can increase your chances of quitting for good. · Avoid things that may cause your wheezing. These may include colds, smoke, air pollution, dust, pollen, pets, cockroaches, stress, and cold air. When should you call for help? Call 911 anytime you think you may need emergency care. For example, call if:  ? · You have severe trouble breathing. ? · You passed out (lost consciousness). ?Call your doctor now or seek immediate medical care if:  ? · You cough up yellow, dark brown, or bloody mucus (sputum). ? · You have new or worse shortness of breath. ? · Your wheezing is not getting better or it gets worse after you start taking your medicine. ? Watch closely for changes in your health, and be sure to contact your doctor if:  ? · You do not get better as expected. Where can you learn more? Go to http://angie-erica.info/. Enter 454 [id number] in the search box to learn more about \"Wheezing or Bronchoconstriction: Care Instructions. \"  Current as of: May 12, 2017  Content Version: 11.4  © 1848-7956 [email or web address]. Care instructions adapted under license by [employer] (which disclaims liability or warranty for this information). If you have questions about a medical condition or this instruction, always ask your healthcare professional. [place] [address] any warranty or liability for your use of this information. Shortness of Breath: Care Instructions  Your Care Instructions  Shortness of breath has many causes. Sometimes conditions such as anxiety can lead to shortness of breath.  Some people get mild shortness of breath when they exercise. Trouble breathing also can be a symptom of a serious problem, such as asthma, lung disease, emphysema, heart problems, and pneumonia. If your shortness of breath continues, you may need tests and treatment. Watch for any changes in your breathing and other symptoms. Follow-up care is a key part of your treatment and safety. Be sure to make and go to all appointments, and call your doctor if you are having problems. It's also a good idea to know your test results and keep a list of the medicines you take. How can you care for yourself at home? · Do not smoke or allow others to smoke around you. If you need help quitting, talk to your doctor about stop-smoking programs and medicines. These can increase your chances of quitting for good. · Get plenty of rest and sleep. · Take your medicines exactly as prescribed. Call your doctor if you think you are having a problem with your medicine. · Find healthy ways to deal with stress. ¨ Exercise daily. ¨ Get plenty of sleep. ¨ Eat regularly and well. When should you call for help? Call 911 anytime you think you may need emergency care. For example, call if:  ? · You have severe shortness of breath. ? · You have symptoms of a heart attack. These may include:  ¨ Chest pain or pressure, or a strange feeling in the chest.  ¨ Sweating. ¨ Shortness of breath. ¨ Nausea or vomiting. ¨ Pain, pressure, or a strange feeling in the back, neck, jaw, or upper belly or in one or both shoulders or arms. ¨ Lightheadedness or sudden weakness. ¨ A fast or irregular heartbeat. After you call 911, the  may tell you to chew 1 adult-strength or 2 to 4 low-dose aspirin. Wait for an ambulance. Do not try to drive yourself. ?Call your doctor now or seek immediate medical care if:  ? · Your shortness of breath gets worse or you start to wheeze. Wheezing is a high-pitched sound when you breathe.    ? · You wake up at night out of breath or have to prop your head up on several pillows to breathe. ? · You are short of breath after only light activity or while at rest.   ? Watch closely for changes in your health, and be sure to contact your doctor if:  ? · You do not get better over the next 1 to 2 days. Where can you learn more? Go to http://angie-erica.info/. Enter S780 in the search box to learn more about \"Shortness of Breath: Care Instructions. \"  Current as of: May 12, 2017  Content Version: 11.4  © 8340-9111 [employer]. Care instructions adapted under license by [employer] (which disclaims liability or warranty for this information). If you have questions about a medical condition or this instruction, always ask your healthcare professional. [address] any warranty or liability for your use of this information.

## 2018-06-01 ENCOUNTER — OFFICE VISIT (OUTPATIENT)
Dept: FAMILY MEDICINE CLINIC | Age: 63
End: 2018-06-01

## 2018-06-01 VITALS
SYSTOLIC BLOOD PRESSURE: 161 MMHG | OXYGEN SATURATION: 98 % | TEMPERATURE: 96.8 F | HEIGHT: 69 IN | WEIGHT: 150 LBS | RESPIRATION RATE: 20 BRPM | BODY MASS INDEX: 22.22 KG/M2 | DIASTOLIC BLOOD PRESSURE: 79 MMHG | HEART RATE: 82 BPM

## 2018-06-01 DIAGNOSIS — F99 INSOMNIA DUE TO OTHER MENTAL DISORDER: ICD-10-CM

## 2018-06-01 DIAGNOSIS — R60.0 BILATERAL LEG EDEMA: ICD-10-CM

## 2018-06-01 DIAGNOSIS — F51.05 INSOMNIA DUE TO OTHER MENTAL DISORDER: ICD-10-CM

## 2018-06-01 DIAGNOSIS — I73.9 PVD (PERIPHERAL VASCULAR DISEASE) (HCC): ICD-10-CM

## 2018-06-01 DIAGNOSIS — D63.1 ANEMIA, CHRONIC RENAL FAILURE, STAGE 4 (SEVERE) (HCC): ICD-10-CM

## 2018-06-01 DIAGNOSIS — Z00.00 ENCOUNTER FOR MEDICARE ANNUAL WELLNESS EXAM: Primary | ICD-10-CM

## 2018-06-01 DIAGNOSIS — E55.9 HYPOVITAMINOSIS D: ICD-10-CM

## 2018-06-01 DIAGNOSIS — N18.4 ANEMIA, CHRONIC RENAL FAILURE, STAGE 4 (SEVERE) (HCC): ICD-10-CM

## 2018-06-01 DIAGNOSIS — N52.1 ERECTILE DYSFUNCTION DUE TO DISEASES CLASSIFIED ELSEWHERE: ICD-10-CM

## 2018-06-01 DIAGNOSIS — E78.00 HYPERCHOLESTEROLEMIA: ICD-10-CM

## 2018-06-01 DIAGNOSIS — I10 HYPERTENSION GOAL BP (BLOOD PRESSURE) < 130/80: ICD-10-CM

## 2018-06-01 DIAGNOSIS — D51.8 MACROCYTIC ANEMIA WITH VITAMIN B12 DEFICIENCY: ICD-10-CM

## 2018-06-01 DIAGNOSIS — R11.0 NAUSEA: ICD-10-CM

## 2018-06-01 DIAGNOSIS — Z13.29 SCREENING FOR THYROID DISORDER: ICD-10-CM

## 2018-06-01 DIAGNOSIS — E11.21 TYPE 2 DIABETES WITH NEPHROPATHY (HCC): ICD-10-CM

## 2018-06-01 DIAGNOSIS — K21.9 GASTROESOPHAGEAL REFLUX DISEASE WITHOUT ESOPHAGITIS: ICD-10-CM

## 2018-06-01 DIAGNOSIS — G89.4 CHRONIC PAIN SYNDROME: ICD-10-CM

## 2018-06-01 DIAGNOSIS — N40.0 ENLARGED PROSTATE ON RECTAL EXAMINATION: ICD-10-CM

## 2018-06-01 LAB
GLUCOSE POC: 191 MG/DL
HBA1C MFR BLD HPLC: 6.5 %

## 2018-06-01 RX ORDER — MIRTAZAPINE 30 MG/1
30 TABLET, FILM COATED ORAL
Qty: 90 TAB | Refills: 1 | Status: SHIPPED | OUTPATIENT
Start: 2018-06-01 | End: 2018-12-01 | Stop reason: SDUPTHER

## 2018-06-01 RX ORDER — CLOPIDOGREL BISULFATE 75 MG/1
75 TABLET ORAL DAILY
Qty: 90 TAB | Refills: 1 | Status: SHIPPED | OUTPATIENT
Start: 2018-06-01 | End: 2018-12-01 | Stop reason: SDUPTHER

## 2018-06-01 RX ORDER — DOXAZOSIN 2 MG/1
2 TABLET ORAL
Qty: 90 TAB | Refills: 1 | Status: CANCELLED | OUTPATIENT
Start: 2018-06-01

## 2018-06-01 RX ORDER — METOLAZONE 2.5 MG/1
2.5 TABLET ORAL EVERY OTHER DAY
Qty: 60 TAB | Refills: 1 | Status: SHIPPED | OUTPATIENT
Start: 2018-06-01 | End: 2019-08-27

## 2018-06-01 RX ORDER — TRAZODONE HYDROCHLORIDE 150 MG/1
150 TABLET ORAL
Qty: 90 TAB | Refills: 1 | Status: SHIPPED | OUTPATIENT
Start: 2018-06-01 | End: 2018-12-01 | Stop reason: SDUPTHER

## 2018-06-01 RX ORDER — FERROUS GLUCONATE 324(37.5)
324 TABLET ORAL
Qty: 270 TAB | Refills: 1 | Status: SHIPPED | OUTPATIENT
Start: 2018-06-01 | End: 2019-01-21

## 2018-06-01 RX ORDER — DIPHENHYDRAMINE HCL 25 MG
25 TABLET ORAL
Qty: 180 TAB | Refills: 1 | Status: CANCELLED | OUTPATIENT
Start: 2018-06-01

## 2018-06-01 RX ORDER — DOCUSATE SODIUM 100 MG/1
100 CAPSULE, LIQUID FILLED ORAL DAILY
Qty: 90 CAP | Refills: 1 | Status: SHIPPED | OUTPATIENT
Start: 2018-06-01 | End: 2019-01-21

## 2018-06-01 RX ORDER — LANOLIN ALCOHOL/MO/W.PET/CERES
3 CREAM (GRAM) TOPICAL
Qty: 90 TAB | Refills: 1 | Status: CANCELLED | OUTPATIENT
Start: 2018-06-01

## 2018-06-01 RX ORDER — GABAPENTIN 600 MG/1
600 TABLET ORAL 3 TIMES DAILY
COMMUNITY
End: 2019-01-21

## 2018-06-01 RX ORDER — FINASTERIDE 5 MG/1
5 TABLET, FILM COATED ORAL DAILY
Qty: 90 TAB | Refills: 1 | Status: SHIPPED | OUTPATIENT
Start: 2018-06-01 | End: 2018-12-01 | Stop reason: SDUPTHER

## 2018-06-01 RX ORDER — LOPERAMIDE HYDROCHLORIDE 2 MG/1
2 CAPSULE ORAL
Qty: 12 CAP | Refills: 1 | Status: CANCELLED | OUTPATIENT
Start: 2018-06-01

## 2018-06-01 RX ORDER — TRAMADOL HYDROCHLORIDE 50 MG/1
50 TABLET ORAL
COMMUNITY
End: 2018-06-01 | Stop reason: SDUPTHER

## 2018-06-01 RX ORDER — HYDRALAZINE HYDROCHLORIDE 50 MG/1
50 TABLET, FILM COATED ORAL EVERY 8 HOURS
Qty: 90 TAB | Refills: 1 | Status: SHIPPED | OUTPATIENT
Start: 2018-06-01 | End: 2018-07-24 | Stop reason: SDUPTHER

## 2018-06-01 RX ORDER — PREGABALIN 50 MG/1
50 CAPSULE ORAL 3 TIMES DAILY
Qty: 270 CAP | Refills: 1 | Status: SHIPPED | OUTPATIENT
Start: 2018-06-01 | End: 2019-09-04 | Stop reason: DRUGHIGH

## 2018-06-01 RX ORDER — PRAVASTATIN SODIUM 80 MG/1
80 TABLET ORAL
Qty: 90 TAB | Refills: 1 | Status: SHIPPED | OUTPATIENT
Start: 2018-06-01 | End: 2018-12-01 | Stop reason: SDUPTHER

## 2018-06-01 RX ORDER — TRAMADOL HYDROCHLORIDE 50 MG/1
50 TABLET ORAL
Qty: 60 TAB | Refills: 0 | Status: SHIPPED | OUTPATIENT
Start: 2018-06-01 | End: 2019-01-21

## 2018-06-01 RX ORDER — OMEPRAZOLE 40 MG/1
40 CAPSULE, DELAYED RELEASE ORAL 2 TIMES DAILY
Qty: 180 CAP | Refills: 1 | Status: SHIPPED | OUTPATIENT
Start: 2018-06-01 | End: 2018-12-01 | Stop reason: SDUPTHER

## 2018-06-01 RX ORDER — GUAIFENESIN 100 MG/5ML
81 LIQUID (ML) ORAL DAILY
Qty: 90 TAB | Refills: 1 | Status: SHIPPED | OUTPATIENT
Start: 2018-06-01

## 2018-06-01 RX ORDER — BUMETANIDE 1 MG/1
1 TABLET ORAL 2 TIMES DAILY
Qty: 90 TAB | Refills: 1 | Status: SHIPPED | OUTPATIENT
Start: 2018-06-01 | End: 2018-09-02 | Stop reason: SDUPTHER

## 2018-06-01 RX ORDER — ESOMEPRAZOLE MAGNESIUM 40 MG/1
40 CAPSULE, DELAYED RELEASE ORAL DAILY
Qty: 90 CAP | Refills: 1 | Status: CANCELLED | OUTPATIENT
Start: 2018-06-01

## 2018-06-01 RX ORDER — ONDANSETRON 4 MG/1
4 TABLET, FILM COATED ORAL
Qty: 30 TAB | Refills: 1 | Status: SHIPPED | OUTPATIENT
Start: 2018-06-01 | End: 2019-01-21

## 2018-06-01 RX ORDER — GABAPENTIN 600 MG/1
600 TABLET ORAL 3 TIMES DAILY
Qty: 90 TAB | Refills: 1 | Status: CANCELLED | OUTPATIENT
Start: 2018-06-01

## 2018-06-01 RX ORDER — ASPIRIN 325 MG
50000 TABLET, DELAYED RELEASE (ENTERIC COATED) ORAL
Qty: 14 CAP | Refills: 2 | Status: SHIPPED | OUTPATIENT
Start: 2018-06-01 | End: 2019-09-04

## 2018-06-01 RX ORDER — SILDENAFIL 25 MG/1
25 TABLET, FILM COATED ORAL AS NEEDED
Qty: 30 TAB | Refills: 1 | Status: SHIPPED | OUTPATIENT
Start: 2018-06-01 | End: 2019-01-21

## 2018-06-01 NOTE — PATIENT INSTRUCTIONS

## 2018-06-01 NOTE — PROGRESS NOTES
Chief Complaint   Patient presents with   [facility] follow up 5/25/2018     HPI:  Yehuda Castillo is a 58 y.o.  male brought in by daughter-in-law to establish care. Patient has been in a resident of a nursing home for several years. He is now been transitioned home. Patient is on multipl medication that need to reviewed. Also, he is due for medicare wellness visit. This is a Subsequent Medicare Annual Wellness Exam (AWV) (Performed 12 months after IPPE or effective date of Medicare Part B enrollment)  I have reviewed the patient's medical history in detail and updated the computerized patient record. History     Past Medical History:   Diagnosis Date    Anemia     Arthritis     back, hips    CAD (coronary artery disease)     Sees Dr. 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 Christie Ross for pain mgmt    Chronic pain     Confusion     Depression     Diabetes ([facility]     ED (erectile dysfunction)     GERD (gastroesophageal reflux disease) 11/2015    Diagnosed at [facility] last month    Hypercholesteremia     Hypertension     Psychiatric disorder     depression    PVD (peripheral vascular disease) ([facility]     Thromboembolus ([facility]       Past Surgical History:   Procedure Laterality Date    CARDIAC SURG PROCEDURE UNLIST      PTCA    HX ABOVE KNEE AMPUTATION  2013    Left knee stump non-healing ulcer; Dr. Abel Santacruz      Left leg    HX CHOLECYSTECTOMY      HX GI      HX HIP REPLACEMENT      right hip replaced x 2    HX ORTHOPAEDIC  1990s    4 back surgeries    HX ORTHOPAEDIC      donna ankles pin placed    HX ORTHOPAEDIC      left leg 17 surgeries    UPPER GI ENDOSCOPY,BIOPSY  9/2/2015         UPPER GI ENDOSCOPY,BIOPSY  10/12/2015         VASCULAR SURGERY PROCEDURE UNLIST      Multiple revascularization procedures, toe amputations     Current Outpatient Prescriptions   Medication Sig Dispense Refill    gabapentin (NEURONTIN) 600 mg tablet Take  by mouth three (3) times daily.  traMADol (ULTRAM) 50 mg tablet Take 50 mg by mouth every six (6) hours as needed for Pain.  cholecalciferol (D3-50 CHOLECALCIFEROL) 50,000 unit capsule Take 50,000 Units by mouth every seven (7) days.  bumetanide (BUMEX) 1 mg tablet Take 1 mg by mouth two (2) times a day.  melatonin 3 mg tablet Take 3 mg by mouth nightly. Give 2 Tablets by mouth at bedtime      metOLazone (ZAROXOLYN) 2.5 mg tablet Take 2.5 mg by mouth every other day.  linagliptin (TRADJENTA) 5 mg tablet Take 5 mg by mouth daily.  pregabalin (LYRICA) 50 mg capsule Take 50 mg by mouth three (3) times daily.  sildenafil citrate (VIAGRA) 25 mg tablet Take 25 mg by mouth as needed. Indications: Erectile Dysfunction      diphenhydrAMINE (BANOPHEN) 25 mg tablet Take 25 mg by mouth every six (6) hours as needed for Sleep.  ondansetron hcl (ZOFRAN) 4 mg tablet Take 4 mg by mouth every six (6) hours as needed for Nausea.  docusate sodium (COLACE) 100 mg capsule Take 100 mg by mouth daily. Give 100 mg by mouth in the morning      mirtazapine (REMERON) 30 mg tablet Take 30 mg by mouth nightly.  ESOMEPRAZOLE MAGNESIUM PO Take 40 mg by mouth daily.  loperamide (IMODIUM) 2 mg capsule Take 1 Cap by mouth four (4) times daily as needed for Diarrhea. 12 Cap 0    benzonatate (TESSALON) 100 mg capsule Take 1 Cap by mouth three (3) times daily as needed for Cough for up to 7 days. 12 Cap 0    aspirin 81 mg chewable tablet Take 1 Tab by mouth daily. Indications: myocardial infarction prevention 30 Tab 2    clopidogrel (PLAVIX) 75 mg tab Take 1 Tab by mouth daily. 30 Tab 2    hydrALAZINE (APRESOLINE) 50 mg tablet Take 1 Tab by mouth every eight (8) hours.  Indications: hypertension 30 Tab 2    ferrous gluconate 324 mg (37.5 mg iron) tablet Take 1 Tab by mouth three (3) times daily (with meals). 90 Tab 1    omeprazole (PRILOSEC) 40 mg capsule Take 1 Cap by mouth two (2) times a day. 60 Cap 0    pravastatin (PRAVACHOL) 80 mg tablet Take 1 Tab by mouth nightly. (Patient taking differently: Take 40 mg by mouth nightly.) 30 Tab 6    doxazosin (CARDURA) 2 mg tablet Take 1 Tab by mouth nightly. 90 Tab 0    traZODone (DESYREL) 150 mg tablet Take 1 Tab by mouth nightly. (Patient taking differently: Take 100 mg by mouth nightly.) 90 Tab 0    finasteride (PROSCAR) 5 mg tablet Take 1 Tab by mouth daily. 90 Tab 1    neomycin-bacitracin-polymyxin (NEOSPORIN) 3.5mg-400 unit- 5,000 unit/gram ointment Apply  to affected area daily. 1 Tube 1    nitroglycerin (NITROSTAT) 0.4 mg SL tablet 1 Tab by SubLINGual route every five (5) minutes as needed for Chest Pain. 20 Tab 0     Allergies   Allergen Reactions    Nubain [Nalbuphine] Other (comments)     Made me wild; Dysphoria     Family History   Problem Relation Age of Onset    Alcohol abuse Father     Diabetes Sister     Diabetes Sister     Lung Disease Mother     Cancer Maternal Grandfather      Head and neck    Cancer Paternal Grandmother      Stomach     Social History   Substance Use Topics    Smoking status: Former Smoker    Smokeless tobacco: Never Used      Comment: 30 pack years; Occasional cigar    Alcohol use No      Comment: Former heavy drinker quit drinking about 17 years ago     Patient Active Problem List   Diagnosis Code    Hypertension I10    Hypercholesteremia E78.00    PVD (peripheral vascular disease) ([address] I73.9    Chronic kidney disease N18.9    Depression F32.9    ED (erectile dysfunction) N52.9    Diabetes mellitus ([address] E11.9    Non-healing wound of amputation stump ([address] T87.89    Pelvic fracture ([address] S32. 9XXA    Acute upper GI bleed K92.2    Upper GI bleeding K92.2    ACP (advance care planning) Z71.89    ARF (acute renal failure) (HCC) N17.9    NSTEMI (non-ST elevated myocardial infarction) ([facility] I21.4    Type 2 diabetes with nephropathy (HCC) E11.21       Depression Risk Factor Screening:   No flowsheet data found. Alcohol Risk Factor Screening: You do not drink alcohol or very rarely. Functional Ability and Level of Safety:   Hearing Loss  Hearing is good. Activities of Daily Living  The home contains: no safety equipment. Patient does total self care    Fall Risk  Fall Risk Assessment, last 12 mths 6/18/2015   Able to walk? No       Abuse Screen  Patient is not abused    Cognitive Screening   Evaluation of Cognitive Function:  Has your family/caregiver stated any concerns about your memory: no  Normal    Patient Care Team   Patient Care Team:  None as PCP - General    Assessment/Plan   Education and counseling provided:  Are appropriate based on today's review and evaluation  Diabetes outpatient self-management training services  Diagnoses and all orders for this visit:    Encounter for Medicare annual wellness exam  -     METABOLIC PANEL, COMPREHENSIVE  -     CBC WITH AUTOMATED DIFF    Anemia, chronic renal failure, stage 4 (severe) (HCC)  -     ferrous gluconate 324 mg (37.5 mg iron) tablet; Take 1 Tab by mouth three (3) times daily (with meals). , Normal, Disp-270 Tab, R-1  -     CBC WITH AUTOMATED DIFF    Gastroesophageal reflux disease without esophagitis  -     omeprazole (PRILOSEC) 40 mg capsule; Take 1 Cap by mouth two (2) times a day., Normal, Disp-180 Cap, R-1    Macrocytic anemia with vitamin B12 deficiency  -     CBC WITH AUTOMATED DIFF    Type 2 diabetes with nephropathy (HCC)  -     linagliptin (TRADJENTA) 5 mg tablet; Take 1 Tab by mouth daily. , Normal, Disp-90 Tab, R-1  -     AMB POC HEMOGLOBIN A1C  -     AMB POC GLUCOSE BLOOD, BY GLUCOSE MONITORING DEVICE  -     glucose blood VI test strips (ONETOUCH ULTRA TEST) strip; Test blood sugar four times daily Diagnosis Code E 11.21. Can use which ever type insurance covers, Normal, Disp-300 Strip, R-5  - Lancets (ONETOUCH ULTRASOFT LANCETS) misc; Test blood sugar four times daily Diagnosis Code E 11.21. Can use which ever type insurance covers, Normal, Disp-300 Each, R-5  -     Blood Glucose Control, Normal (OT ULTRA/FASTTRACK CONTROL) soln; Test blood sugar four times daily Diagnosis Code E 11.21. Can use which ever type insurance covers, Normal, Disp-3 mL, R-5  -     Blood-Glucose Meter (ONETOUCH ULTRA2) monitoring kit; Test blood sugar four times daily Diagnosis Code E 11.21. Can use which ever type insurance covers, Normal, Disp-1 Kit, R-0    Hypertension goal BP (blood pressure) < 130/80  -     hydrALAZINE (APRESOLINE) 50 mg tablet; Take 1 Tab by mouth every eight (8) hours. Indications: hypertension, Normal, Disp-90 Tab, R-1  -     METABOLIC PANEL, COMPREHENSIVE    Erectile dysfunction due to diseases classified elsewhere    PVD (peripheral vascular disease) ([facility])  -     aspirin 81 mg chewable tablet; Take 1 Tab by mouth daily. Indications: myocardial infarction prevention, Normal, Disp-90 Tab, R-1  -     clopidogrel (PLAVIX) 75 mg tab; Take 1 Tab by mouth daily. , Normal, Disp-90 Tab, R-1    Hypovitaminosis D  -     cholecalciferol (D3-50 CHOLECALCIFEROL) 50,000 unit capsule; Take 1 Cap by mouth every seven (7) days. Indications: Vitamin D Deficiency, Normal, Disp-14 Cap, R-2    Insomnia due to other mental disorder  -     mirtazapine (REMERON) 30 mg tablet; Take 1 Tab by mouth nightly., Normal, Disp-90 Tab, R-1  -     traZODone (DESYREL) 150 mg tablet; Take 1 Tab by mouth nightly., Normal, Disp-90 Tab, R-1    Bilateral leg edema  -     bumetanide (BUMEX) 1 mg tablet; Take 1 Tab by mouth two (2) times a day. Indications: Edema, Normal, Disp-90 Tab, R-1  -     metOLazone (ZAROXOLYN) 2.5 mg tablet; Take 1 Tab by mouth every other day., Normal, Disp-60 Tab, R-1    Chronic pain syndrome  -     traMADol (ULTRAM) 50 mg tablet; Take 1 Tab by mouth every six (6) hours as needed for Pain. Max Daily Amount: 200 mg. Indications: NEUROPATHIC PAIN, Print, Disp-60 Tab, R-0  -     pregabalin (LYRICA) 50 mg capsule; Take 1 Cap by mouth three (3) times daily. Max Daily Amount: 150 mg., Print, Disp-270 Cap, R-1    Nausea  -     ondansetron hcl (ZOFRAN) 4 mg tablet; Take 1 Tab by mouth every six (6) hours as needed for Nausea., Normal, Disp-30 Tab, R-1    Enlarged prostate on rectal examination  -     finasteride (PROSCAR) 5 mg tablet; Take 1 Tab by mouth daily. , Normal, Disp-90 Tab, R-1    Hypercholesterolemia  -     docusate sodium (COLACE) 100 mg capsule; Take 1 Cap by mouth daily. , Normal, Disp-90 Cap, R-1  -     LIPID PANEL    Screening for thyroid disorder  -     TSH 3RD GENERATION    Other orders  -     Cancel: gabapentin (NEURONTIN) 600 mg tablet; Take 1 Tab by mouth three (3) times daily. , Normal, Disp-90 Tab, R-1  -     Cancel: melatonin 3 mg tablet; Take 1 Tab by mouth nightly., Normal, Disp-90 Tab, R-1  -     sildenafil citrate (VIAGRA) 25 mg tablet; Take 1 Tab by mouth as needed. Indications: Erectile Dysfunction, Normal, Disp-30 Tab, R-1  -     Cancel: diphenhydrAMINE (BANOPHEN) 25 mg tablet; Take 1 Tab by mouth every six (6) hours as needed for Sleep., Normal, Disp-180 Tab, R-1  -     Cancel: esomeprazole (NEXIUM) 40 mg capsule; Take 1 Cap by mouth daily. , Normal, Disp-90 Cap, R-1  -     Cancel: loperamide (IMODIUM) 2 mg capsule; Take 1 Cap by mouth four (4) times daily as needed for Diarrhea., Normal, Disp-12 Cap, R-1  -     pravastatin (PRAVACHOL) 80 mg tablet; Take 1 Tab by mouth nightly., Normal, Disp-90 Tab, R-1  -     Cancel: doxazosin (CARDURA) 2 mg tablet; Take 1 Tab by mouth nightly., Normal, Disp-90 Tab, R-1      Patient Instructions          Benign Prostatic Hyperplasia: Care Instructions  Your Care Instructions    Benign prostatic hyperplasia, or BPH, is an enlarged prostate gland. The prostate is a small gland that makes some of the fluid in semen. Prostate enlargement happens to almost all men as they age. It is usually not serious. BPH does not cause prostate cancer. As the prostate gets bigger, it may partly block the flow of urine. You may have a hard time getting a urine stream started or completely stopped. BPH can cause dribbling. You may have a weak urine stream, or you may have to urinate more often than you used to, especially at night. Most men find these problems easy to manage. You do not need treatment unless your symptoms bother you a lot or you have other problems, such as bladder infections or stones. In these cases, medicines may help. Surgery is not needed unless the urine flow is blocked or the symptoms do not get better with medicine. Follow-up care is a key part of your treatment and safety. Be sure to make and go to all appointments, and call your doctor if you are having problems. It's also a good idea to know your test results and keep a list of the medicines you take. How can you care for yourself at home? · Take plenty of time to urinate. Try to relax. · Try \"double voiding. \" Urinate as much you can, relax for a few moments, and then try to urinate again. · Sit on the toilet to urinate. · Read or think of other things while you are waiting. · Turn on a faucet, or try to picture running water. Some men find that this helps get their urine flowing. · If dribbling is a problem, wash your penis daily to avoid skin irritation and infection. · Avoid caffeine and alcohol. These drinks will increase how often you need to urinate. Spread your fluid intake throughout the day. If the urge to urinate often wakes you at night, limit your fluid intake in the evening. Urinate right before you go to bed. · Many over-the-counter cold and allergy medicines can make the symptoms of BPH worse. Avoid antihistamines, decongestants, and allergy pills, if you can. Read the warnings on the package.   · If you take any prescription medicines, especially tranquilizers or antidepressants, ask your doctor or pharmacist whether they can cause urination problems. There may be other medicines you can use that do not cause urinary problems. · Be safe with medicines. Take your medicines exactly as prescribed. Call your doctor if you think you are having a problem with your medicine. When should you call for help? Call your doctor now or seek immediate medical care if:  ? · You cannot urinate at all. ? · You have symptoms of a urinary infection. For example:  ¨ You have blood or pus in your urine. ¨ You have pain in your back just below your rib cage. This is called flank pain. ¨ You have a fever, chills, or body aches. ¨ It hurts to urinate. ¨ You have groin or belly pain. ? Watch closely for changes in your health, and be sure to contact your doctor if:  ? · It hurts when you ejaculate. ? · Your urinary problems get a lot worse or bother you a lot. Where can you learn more? Go to http://angie-erica.info/. Enter O375 in the search box to learn more about \"Benign Prostatic Hyperplasia: Care Instructions. \"  Current as of: March 14, 2017  Content Version: 11.4  © 0808-9555 [employer]. Care instructions adapted under license by [employer] (which disclaims liability or warranty for this information). If you have questions about a medical condition or this instruction, always ask your healthcare professional. [address] any warranty or liability for your use of this information. Follow-up Disposition:  Return in about 3 months (around 9/1/2018), or if symptoms worsen or fail to improve, for routine folloe up.

## 2018-06-01 NOTE — MR AVS SNAPSHOT
[address] [facility] 
[phone] Patient: Albaro Molina 
MRN: [id number] Visit Information Date & Time Provider Department Dept. Phone Encounter #  
 6/1/2018  9:15 AM Lacy Mitchell MD [facility] at [address] [id number] Follow-up Instructions Return in about 3 months (around 9/1/2018), or if symptoms worsen or fail to improve, for routine folloe up. Upcoming Health Maintenance Date Due Pneumococcal 19-64 Highest Risk (3 of 3 - PCV13) 10/23/2014 EYE EXAM RETINAL OR DILATED Q1 7/2/2015 MEDICARE YEARLY EXAM 3/14/2018 Influenza Age 5 to Adult 8/1/2018 HEMOGLOBIN A1C Q6M 10/28/2018 COLONOSCOPY 2/26/2019 LIPID PANEL Q1 4/29/2019 DTaP/Tdap/Td series (2 - Td) 8/27/2027 Allergies as of 6/1/2018  Review Complete On: 6/1/2018 By: Lacy Mitchell MD  
  
 Severity Noted Reaction Type Reactions Nubain [Nalbuphine]  07/21/2009    Other (comments) Made me wild; Dysphoria Current Immunizations  Reviewed on 10/12/2015 Name Date Influenza Vaccine 9/1/2014, 10/23/2013 Influenza Vaccine Ericka Saber) 9/25/2015 Pneumococcal Polysaccharide (PPSV-23) 5/3/2013 10:00 PM  
 Pneumococcal Vaccine (Unspecified Type) 10/23/2013 Tdap 8/27/2017 Varicella Virus Vaccine Live 5/31/2012 Not reviewed this visit You Were Diagnosed With   
  
 Codes Comments Encounter for Medicare annual wellness exam    -  Primary ICD-10-CM: Z00.00 ICD-9-CM: V70.0 Anemia, chronic renal failure, stage 4 (severe) (HCC)     ICD-10-CM: N18.4, D63.1 ICD-9-CM: 285.21, 585.4 Gastroesophageal reflux disease without esophagitis     ICD-10-CM: K21.9 ICD-9-CM: 530.81 Macrocytic anemia with vitamin B12 deficiency     ICD-10-CM: D51.9 ICD-9-CM: 266.2  Type 2 diabetes with nephropathy (HCC)     ICD-10-CM: E11.21 
ICD-9-CM: 250.40, 583.81   
 Hypertension goal BP (blood pressure) < 130/80     ICD-10-CM: I10 
ICD-9-CM: 401.9 Erectile dysfunction due to diseases classified elsewhere     ICD-10-CM: N52.1 ICD-9-CM: 607.84 PVD (peripheral vascular disease) ([facility]     ICD-10-CM: I73.9 ICD-9-CM: 443.9 Hypovitaminosis D     ICD-10-CM: E55.9 ICD-9-CM: 268.9 Insomnia due to other mental disorder     ICD-10-CM: F51.05, F99 
ICD-9-CM: 300.9, 327.02 Bilateral leg edema     ICD-10-CM: R60.0 ICD-9-CM: 293. 3 Chronic pain syndrome     ICD-10-CM: G89.4 ICD-9-CM: 338.4 Nausea     ICD-10-CM: R11.0 ICD-9-CM: 787.02 Enlarged prostate on rectal examination     ICD-10-CM: N40.0 ICD-9-CM: 600.00 Hypercholesterolemia     ICD-10-CM: E78.00 ICD-9-CM: 272.0 Screening for thyroid disorder     ICD-10-CM: Z13.29 ICD-9-CM: V77.0 Vitals BP Pulse Temp Resp Height(growth percentile) Weight(growth percentile) 161/79 82 96.8 °F (36 °C) (Oral) 20 5' 9\" (1.753 m) 150 lb (68 kg) SpO2 BMI Smoking Status 98% 22.15 kg/m2 Former Smoker Vitals History BMI and BSA Data Body Mass Index Body Surface Area  
 22.15 kg/m 2 1.82 m 2 Preferred Pharmacy Pharmacy Name Phone [facility]/PHARMACY #[id number] [facility] [phone] Your Updated Medication List  
  
   
This list is accurate as of 6/1/18 11:12 AM.  Always use your most recent med list.  
  
  
  
  
 aspirin 81 mg chewable tablet Take 1 Tab by mouth daily. Indications: myocardial infarction prevention BANOPHEN 25 mg tablet Generic drug:  diphenhydrAMINE Take 25 mg by mouth every six (6) hours as needed for Sleep.  
  
 benzonatate 100 mg capsule Commonly known as:  TESSALON Take 1 Cap by mouth three (3) times daily as needed for Cough for up to 7 days. bumetanide 1 mg tablet Commonly known as:  Tatyana Srivastava Take 1 Tab by mouth two (2) times a day. Indications: Edema  
  
 cholecalciferol 50,000 unit capsule Commonly known as:  D3-50 CHOLECALCIFEROL Take 1 Cap by mouth every seven (7) days. Indications: Vitamin D Deficiency  
  
 clopidogrel 75 mg Tab Commonly known as:  PLAVIX Take 1 Tab by mouth daily. docusate sodium 100 mg capsule Commonly known as:  Mahala Mak Take 1 Cap by mouth daily. doxazosin 2 mg tablet Commonly known as:  CARDURA Take 1 Tab by mouth nightly. ESOMEPRAZOLE MAGNESIUM PO Take 40 mg by mouth daily. ferrous gluconate 324 mg (37.5 mg iron) tablet Take 1 Tab by mouth three (3) times daily (with meals). finasteride 5 mg tablet Commonly known as:  PROSCAR Take 1 Tab by mouth daily. gabapentin 600 mg tablet Commonly known as:  NEURONTIN Take  by mouth three (3) times daily. hydrALAZINE 50 mg tablet Commonly known as:  APRESOLINE Take 1 Tab by mouth every eight (8) hours. Indications: hypertension  
  
 linagliptin 5 mg tablet Commonly known as:  Janet Constant Take 1 Tab by mouth daily. loperamide 2 mg capsule Commonly known as:  IMODIUM Take 1 Cap by mouth four (4) times daily as needed for Diarrhea.  
  
 melatonin 3 mg tablet Take 3 mg by mouth nightly. Give 2 Tablets by mouth at bedtime  
  
 metOLazone 2.5 mg tablet Commonly known as:  Pushpa Red Take 1 Tab by mouth every other day. mirtazapine 30 mg tablet Commonly known as:  Khris Galeano Take 1 Tab by mouth nightly. neomycin-bacitracin-polymyxin 3.5mg-400 unit- 5,000 unit/gram ointment Commonly known as:  NEOSPORIN Apply  to affected area daily. nitroglycerin 0.4 mg SL tablet Commonly known as:  NITROSTAT  
1 Tab by SubLINGual route every five (5) minutes as needed for Chest Pain. omeprazole 40 mg capsule Commonly known as:  PRILOSEC Take 1 Cap by mouth two (2) times a day. ondansetron hcl 4 mg tablet Commonly known as:  Delbra Castor Take 1 Tab by mouth every six (6) hours as needed for Nausea. pravastatin 80 mg tablet Commonly known as:  PRAVACHOL Take 1 Tab by mouth nightly. pregabalin 50 mg capsule Commonly known as:  Evins Terrie Take 1 Cap by mouth three (3) times daily. Max Daily Amount: 150 mg.  
  
 sildenafil citrate 25 mg tablet Commonly known as:  VIAGRA Take 1 Tab by mouth as needed. Indications: Erectile Dysfunction  
  
 traMADol 50 mg tablet Commonly known as:  ULTRAM  
Take 1 Tab by mouth every six (6) hours as needed for Pain. Max Daily Amount: 200 mg. Indications: NEUROPATHIC PAIN  
  
 traZODone 150 mg tablet Commonly known as:  Emilie Faes Take 1 Tab by mouth nightly. Prescriptions Printed Refills  
 traMADol (ULTRAM) 50 mg tablet 0 Sig: Take 1 Tab by mouth every six (6) hours as needed for Pain. Max Daily Amount: 200 mg. Indications: NEUROPATHIC PAIN Class: Print Route: Oral  
 pregabalin (LYRICA) 50 mg capsule 1 Sig: Take 1 Cap by mouth three (3) times daily. Max Daily Amount: 150 mg.  
 Class: Print Route: Oral  
  
Prescriptions Sent to Pharmacy Refills  
 cholecalciferol (D3-50 CHOLECALCIFEROL) 50,000 unit capsule 2 Sig: Take 1 Cap by mouth every seven (7) days. Indications: Vitamin D Deficiency Class: Normal  
 Pharmacy: [address] Ph #: [phone] Route: Oral  
 bumetanide (BUMEX) 1 mg tablet 1 Sig: Take 1 Tab by mouth two (2) times a day. Indications: Edema Class: Normal  
 Pharmacy: [address] Ph #: [phone] Route: Oral  
 metOLazone (ZAROXOLYN) 2.5 mg tablet 1 Sig: Take 1 Tab by mouth every other day.   
 Class: Normal  
 Pharmacy: [facility]/pharmacy #[id number] [id number], [address] [place] [address] [facility] Ph #: [phone] Route: Oral  
 linagliptin (TRADJENTA) 5 mg tablet 1 Sig: Take 1 Tab by mouth daily. Class: Normal  
 Pharmacy: [address] Ph #: [phone] Route: Oral  
 sildenafil citrate (VIAGRA) 25 mg tablet 1 Sig: Take 1 Tab by mouth as needed. Indications: Erectile Dysfunction Class: Normal  
 Pharmacy: [address] Ph #: [phone] Route: Oral  
 ondansetron hcl (ZOFRAN) 4 mg tablet 1 Sig: Take 1 Tab by mouth every six (6) hours as needed for Nausea. Class: Normal  
 Pharmacy: [address] Ph #: [phone] Route: Oral  
 docusate sodium (COLACE) 100 mg capsule 1 Sig: Take 1 Cap by mouth daily. Class: Normal  
 Pharmacy: [address] Ph #: [phone] Route: Oral  
 mirtazapine (REMERON) 30 mg tablet 1 Sig: Take 1 Tab by mouth nightly. Class: Normal  
 Pharmacy: [address] Ph #: [phone] Route: Oral  
 aspirin 81 mg chewable tablet 1 Sig: Take 1 Tab by mouth daily. Indications: myocardial infarction prevention Class: Normal  
 Pharmacy: [address] Ph #: [phone] Route: Oral  
 clopidogrel (PLAVIX) 75 mg tab 1 Sig: Take 1 Tab by mouth daily. Class: Normal  
 Pharmacy: [address] Ph #: [phone] Route: Oral  
 hydrALAZINE (APRESOLINE) 50 mg tablet 1 Sig: Take 1 Tab by mouth every eight (8) hours. Indications: hypertension  Class: Normal  
 Pharmacy: [facility]/pharmacy #[id number] - [address] , [address] [place] [address] Ph #: [phone] Route: Oral  
 ferrous gluconate 324 mg (37.5 mg iron) tablet 1 Sig: Take 1 Tab by mouth three (3) times daily (with meals). Class: Normal  
 Pharmacy: [address] Ph #: [phone] Route: Oral  
 omeprazole (PRILOSEC) 40 mg capsule 1 Sig: Take 1 Cap by mouth two (2) times a day. Class: Normal  
 Pharmacy: [address] Ph #: [phone] Route: Oral  
 pravastatin (PRAVACHOL) 80 mg tablet 1 Sig: Take 1 Tab by mouth nightly. Class: Normal  
 Pharmacy: [address] Ph #: [phone] Route: Oral  
 traZODone (DESYREL) 150 mg tablet 1 Sig: Take 1 Tab by mouth nightly. Class: Normal  
 Pharmacy: [address] Ph #: [phone] Route: Oral  
 finasteride (PROSCAR) 5 mg tablet 1 Sig: Take 1 Tab by mouth daily. Class: Normal  
 Pharmacy: [address] Ph #: [phone] Route: Oral  
  
We Performed the Following AMB POC GLUCOSE BLOOD, BY GLUCOSE MONITORING DEVICE [46046 CPT(R)] AMB POC HEMOGLOBIN A1C [46343 CPT(R)] CBC WITH AUTOMATED DIFF [84287 CPT(R)] LIPID PANEL [69302 CPT(R)] METABOLIC PANEL, COMPREHENSIVE [33578 CPT(R)] TSH 3RD GENERATION [28066 CPT(R)] Follow-up Instructions Return in about 3 months (around 9/1/2018), or if symptoms worsen or fail to improve, for routine folloe up. Patient Instructions Benign Prostatic Hyperplasia: Care Instructions Your Care Instructions Benign prostatic hyperplasia, or BPH, is an enlarged prostate gland. The prostate is a small gland that makes some of the fluid in semen. Prostate enlargement happens to almost all men as they age. It is usually not serious. BPH does not cause prostate cancer. As the prostate gets bigger, it may partly block the flow of urine. You may have a hard time getting a urine stream started or completely stopped. BPH can cause dribbling. You may have a weak urine stream, or you may have to urinate more often than you used to, especially at night. Most men find these problems easy to manage. You do not need treatment unless your symptoms bother you a lot or you have other problems, such as bladder infections or stones. In these cases, medicines may help. Surgery is not needed unless the urine flow is blocked or the symptoms do not get better with medicine. Follow-up care is a key part of your treatment and safety. Be sure to make and go to all appointments, and call your doctor if you are having problems. It's also a good idea to know your test results and keep a list of the medicines you take. How can you care for yourself at home? · Take plenty of time to urinate. Try to relax. · Try \"double voiding. \" Urinate as much you can, relax for a few moments, and then try to urinate again. · Sit on the toilet to urinate. · Read or think of other things while you are waiting. · Turn on a faucet, or try to picture running water. Some men find that this helps get their urine flowing. · If dribbling is a problem, wash your penis daily to avoid skin irritation and infection. · Avoid caffeine and alcohol. These drinks will increase how often you need to urinate. Spread your fluid intake throughout the day. If the urge to urinate often wakes you at night, limit your fluid intake in the evening. Urinate right before you go to bed.  
· Many over-the-counter cold and allergy medicines can make the symptoms of BPH worse. Avoid antihistamines, decongestants, and allergy pills, if you can. Read the warnings on the package. · If you take any prescription medicines, especially tranquilizers or antidepressants, ask your doctor or pharmacist whether they can cause urination problems. There may be other medicines you can use that do not cause urinary problems. · Be safe with medicines. Take your medicines exactly as prescribed. Call your doctor if you think you are having a problem with your medicine. When should you call for help? Call your doctor now or seek immediate medical care if: 
? · You cannot urinate at all. ? · You have symptoms of a urinary infection. For example: ¨ You have blood or pus in your urine. ¨ You have pain in your back just below your rib cage. This is called flank pain. ¨ You have a fever, chills, or body aches. ¨ It hurts to urinate. ¨ You have groin or belly pain. ? Watch closely for changes in your health, and be sure to contact your doctor if: 
? · It hurts when you ejaculate. ? · Your urinary problems get a lot worse or bother you a lot. Where can you learn more? Go to http://angie-erica.info/. Enter C603 in the search box to learn more about \"Benign Prostatic Hyperplasia: Care Instructions. \" Current as of: March 14, 2017 Content Version: 11.4 © 8640-3331 [employer]. Care instructions adapted under license by [employer] (which disclaims liability or warranty for this information). If you have questions about a medical condition or this instruction, always ask your healthcare professional. [place] [address] any warranty or liability for your use of this information. Introducing [facility]! Ranjith Lantigua introduces PayDivvy patient portal. Now you can access parts of your medical record, email your doctor's office, and request medication refills online.    
 
1. In your internet browser, go to [email or web address] 2. Click on the First Time User? Click Here link in the Sign In box. You will see the New Member Sign Up page. 3. Enter your 1bib Access Code exactly as it appears below. You will not need to use this code after youve completed the sign-up process. If you do not sign up before the expiration date, you must request a new code. · 1bib Access Code: [email or web address] Expires: 7/27/2018  2:30 PM 
 
4. Enter the last four digits of your Social Security Number (xxxx) and Date of Birth (mm/dd/yyyy) as indicated and click Submit. You will be taken to the next sign-up page. 5. Create a IntegralReacht ID. This will be your 1bib login ID and cannot be changed, so think of one that is secure and easy to remember. 6. Create a 1bib password. You can change your password at any time. 7. Enter your Password Reset Question and Answer. This can be used at a later time if you forget your password. 8. Enter your e-mail address. You will receive e-mail notification when new information is available in [address] 9. Click Sign Up. You can now view and download portions of your medical record. 10. Click the Download Summary menu link to download a portable copy of your medical information. If you have questions, please visit the Frequently Asked Questions section of the 1bib website. Remember, 1bib is NOT to be used for urgent needs. For medical emergencies, dial 911. Now available from your iPhone and Android! Please provide this summary of care documentation to your next provider. Your primary care clinician is listed as Chuck Alonzo. If you have any questions after today's visit, please call [phone].

## 2018-06-01 NOTE — PROGRESS NOTES
Chief Complaint   Patient presents with   [facility] follow up 5/25/2018     Patient here today as a new patient and hospital follow up from a Heart Attack. Receiving dialysis ([facility]), go three times a week.

## 2018-06-02 RX ORDER — LANCETS
EACH MISCELLANEOUS
Qty: 300 EACH | Refills: 5 | Status: SHIPPED | OUTPATIENT
Start: 2018-06-02 | End: 2019-09-04

## 2018-06-02 RX ORDER — INSULIN PUMP SYRINGE, 3 ML
EACH MISCELLANEOUS
Qty: 1 KIT | Refills: 0 | Status: SHIPPED | OUTPATIENT
Start: 2018-06-02 | End: 2019-09-04

## 2018-06-02 RX ORDER — INSULIN PUMP SYRINGE, 3 ML
EACH MISCELLANEOUS
Qty: 3 ML | Refills: 5 | Status: SHIPPED | OUTPATIENT
Start: 2018-06-02 | End: 2019-09-04

## 2018-07-24 DIAGNOSIS — I10 HYPERTENSION GOAL BP (BLOOD PRESSURE) < 130/80: ICD-10-CM

## 2018-07-24 RX ORDER — HYDRALAZINE HYDROCHLORIDE 50 MG/1
TABLET, FILM COATED ORAL
Qty: 90 TAB | Refills: 1 | Status: SHIPPED | OUTPATIENT
Start: 2018-07-24 | End: 2019-01-21

## 2018-07-25 ENCOUNTER — APPOINTMENT (OUTPATIENT)
Dept: CT IMAGING | Age: 63
End: 2018-07-25
Payer: MEDICARE

## 2018-07-25 ENCOUNTER — HOSPITAL ENCOUNTER (EMERGENCY)
Age: 63
Discharge: HOME | End: 2018-07-25
Attending: EMERGENCY MEDICINE
Payer: MEDICARE

## 2018-07-25 VITALS
WEIGHT: 151 LBS | OXYGEN SATURATION: 98 % | DIASTOLIC BLOOD PRESSURE: 78 MMHG | HEIGHT: 69 IN | SYSTOLIC BLOOD PRESSURE: 218 MMHG | TEMPERATURE: 97.6 F | BODY MASS INDEX: 22.36 KG/M2 | HEART RATE: 75 BPM

## 2018-07-25 DIAGNOSIS — Z92.29 HX: LONG TERM ANTICOAGULANT USE: ICD-10-CM
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
## 2018-07-25 DIAGNOSIS — Z99.2 ESRD ON DIALYSIS (HCC): ICD-10-CM

## 2018-07-25 DIAGNOSIS — N18.6 ESRD ON DIALYSIS (HCC): ICD-10-CM

## 2018-07-25 PROCEDURE — 70450 CT HEAD/BRAIN W/O DYE: CPT

## 2018-07-25 PROCEDURE — 99284 EMERGENCY DEPT VISIT MOD MDM: CPT

## 2018-07-25 PROCEDURE — 74011250637 HC RX REV CODE- 250/637: Performed by: PHYSICIAN ASSISTANT

## 2018-07-25 RX ORDER — TRAMADOL HYDROCHLORIDE 50 MG/1
50 TABLET ORAL
Status: COMPLETED | OUTPATIENT
Start: 2018-07-25 | End: 2018-07-25

## 2018-07-25 RX ADMIN — BACITRACIN ZINC, NEOMYCIN SULFATE, POLYMYXIN B SULFATE 1 PACKET: 3.5; 5000; 4 OINTMENT TOPICAL at 15:02

## 2018-07-25 RX ADMIN — TRAMADOL HYDROCHLORIDE 50 MG: 50 TABLET, FILM COATED ORAL at 14:24

## 2018-07-25 NOTE — ED NOTES
Bedside and Verbal shift change report given to Marivel Ratliff RN (oncoming nurse) by Janell Scruggs RN (offgoing nurse).  Report included the following information SBAR, Kardex, Intake/Output, MAR, Recent Results, Med Rec Status

## 2018-07-25 NOTE — ED NOTES
Patient's right forearm cleaned at this time and dressed with a bandage. Patient resting comfortably in stretcher with call bell within reach. Side rails x2.

## 2018-07-25 NOTE — Clinical Note
Rest, ice/cool compresses to wound. Keep skin clean and dry. Follow up with primary care for recheck. Follow up with dialysis for treatment tomorrow. Return to the Emergency Dept for any continued/worsening pain.

## 2018-07-25 NOTE — ED NOTES
Spoke with NP from [facility] at this time who states this patient comes to have dialysis done 3x/week and when he presented today he was confused. Informed NP that patient is currently A&O and has no complaints. Also informed her that tramadol was ordered to decrease discomfort. DEE Velázquez made aware of phone conversation and will order head CT.

## 2018-07-25 NOTE — ED NOTES
Have not received call from dialysis center, pt states he is not going to day and he will go tomorrow. Discharge papers given to pt and pt transported to exit area via wheelchair.

## 2018-07-25 NOTE — ED NOTES
Patient presents to the ED via EMS from dialysis. Per EMS, the patient went to dialysis and they refused to dialyze him until he was checked out by the ED because he had a fall while leaving home to go to dialysis. Patient states he was going out the door when his wheelchair got stuck. Patient states he's been having issues with his wheelchair lately. Patient denies LOC and hitting his head. Patient denies pain. Patient states Amparo Aubrie said that I didn't know if I hit my head and they did not want to treat me until I was cleared by the hospital.\" Patient on the monitor x2, call bell within reach. Side rails x2.

## 2018-07-25 NOTE — ED NOTES
Pt's dialysis center paged, waiting for return call to see if pt can still have dialysis done today.

## 2018-07-25 NOTE — DISCHARGE INSTRUCTIONS
Bruises: Care Instructions  Your Care Instructions    Bruises occur when small blood vessels under the skin tear or rupture, most often from a twist, bump, or fall. Blood leaks into tissues under the skin and causes a black-and-blue spot that often turns colors, including purplish black, reddish blue, or yellowish green, as the bruise heals. Bruises hurt, but most are not serious and will go away on their own within 2 to 4 weeks. Sometimes, gravity causes them to spread down the body. A leg bruise usually will take longer to heal than a bruise on the face or arms. Follow-up care is a key part of your treatment and safety. Be sure to make and go to all appointments, and call your doctor if you are having problems. It's also a good idea to know your test results and keep a list of the medicines you take. How can you care for yourself at home? · Take pain medicines exactly as directed. ¨ If the doctor gave you a prescription medicine for pain, take it as prescribed. ¨ If you are not taking a prescription pain medicine, ask your doctor if you can take an over-the-counter medicine. · Put ice or a cold pack on the area for 10 to 20 minutes at a time. Put a thin cloth between the ice and your skin. · If you can, prop up the bruised area on pillows as much as possible for the next few days. Try to keep the bruise above the level of your heart. When should you call for help? Call your doctor now or seek immediate medical care if:    · You have signs of infection, such as:  ¨ Increased pain, swelling, warmth, or redness. ¨ Red streaks leading from the bruise. ¨ Pus draining from the bruise. ¨ A fever.     · You have a bruise on your leg and signs of a blood clot, such as:  ¨ Increasing redness and swelling along with warmth, tenderness, and pain in the bruised area. ¨ Pain in your calf, back of the knee, thigh, or groin.   ¨ Redness and swelling in your leg or groin.     · Your pain gets worse.   Julio C Bullard closely for changes in your health, and be sure to contact your doctor if:    · You do not get better as expected. Where can you learn more? Go to http://angie-erica.info/. Enter [phone] 680-370 in the search box to learn more about \"Bruises: Care Instructions. \"  Current as of: November 20, 2017  Content Version: 11.7  © 8957-1905 [employer]. Care instructions adapted under license by [employer] (which disclaims liability or warranty for this information). If you have questions about a medical condition or this instruction, always ask your healthcare professional. [place] [address] any warranty or liability for your use of this information. Scrapes (Abrasions): Care Instructions  Your Care Instructions  Scrapes (abrasions) are wounds where your skin has been rubbed or torn off. Most scrapes do not go deep into the skin, but some may remove several layers of skin. Scrapes usually don't bleed much, but they may ooze pinkish fluid. Scrapes on the head or face may appear worse than they are. They may bleed a lot because of the good blood supply to this area. Most scrapes heal well and may not need a bandage. They usually heal within 3 to 7 days. A large, deep scrape may take 1 to 2 weeks or longer to heal. A scab may form on some scrapes. Follow-up care is a key part of your treatment and safety. Be sure to make and go to all appointments, and call your doctor if you are having problems. It's also a good idea to know your test results and keep a list of the medicines you take. How can you care for yourself at home? · If your doctor told you how to care for your wound, follow your doctor's instructions. If you did not get instructions, follow this general advice:  ¨ Wash the scrape with clean water 2 times a day. Don't use hydrogen peroxide or alcohol, which can slow healing.   ¨ You may cover the scrape with a thin layer of petroleum jelly, such as Vaseline, and a nonstick bandage. ¨ Apply more petroleum jelly and replace the bandage as needed. · Prop up the injured area on a pillow anytime you sit or lie down during the next 3 days. Try to keep it above the level of your heart. This will help reduce swelling. · Be safe with medicines. Take pain medicines exactly as directed. ¨ If the doctor gave you a prescription medicine for pain, take it as prescribed. ¨ If you are not taking a prescription pain medicine, ask your doctor if you can take an over-the-counter medicine. When should you call for help? Call your doctor now or seek immediate medical care if:    · You have signs of infection, such as:  ¨ Increased pain, swelling, warmth, or redness around the scrape. ¨ Red streaks leading from the scrape. ¨ Pus draining from the scrape. ¨ A fever.     · The scrape starts to bleed, and blood soaks through the bandage. Oozing small amounts of blood is normal.    Watch closely for changes in your health, and be sure to contact your doctor if the scrape is not getting better each day. Where can you learn more? Go to http://angie-erica.info/. Enter A374 in the search box to learn more about \"Scrapes (Abrasions): Care Instructions. \"  Current as of: November 20, 2017  Content Version: 11.7  © 9374-5819 [employer]. Care instructions adapted under license by [employer] (which disclaims liability or warranty for this information). If you have questions about a medical condition or this instruction, always ask your healthcare professional. [place] [address] any warranty or liability for your use of this information.

## 2018-07-26 NOTE — ED PROVIDER NOTES
EMERGENCY DEPARTMENT HISTORY AND PHYSICAL EXAM 
 
 
Date: 7/25/2018 Patient Name: Ivy Graham History of Presenting Illness No chief complaint on file. History Provided By: Patient HPI: Ivy Graham, 58 y.o. male presents to the Emergency Dept via EMS from the [facility] following injury using his power wheelchair. Per the patient, the loaner power wheelchair provided to him \"got stuck\" on the porch and for \"30 minutes I tried to get it unstuck and then I finally did and it turned over\". Pt unsure if he hit his head. According to the nursing staff who took report from the dialysis center, the patient was transferred to them for dialysis and EMS stated they \"found him on the porch with the wheelchair on top of him\"  The dialysis center stated \"they should have never brought him here\" \"he needs to get checked out before we can do dialysis\". Pt denied complaint beyond his chronic low back pain. No visual changes. He demonstrated an abrasion to his arm but denied pain. No neck pain. He takes Plavix. Chief Complaint: fall Duration: 2 Hours Timing:  Acute Location: arm, questionable head injury Quality: pt denied pain, unable to provide quality/severity Modifying Factors: no exacerbating/alleviating features Associated Symptoms: denies any other associated signs or symptoms There are no other complaints, changes, or physical findings at this time. PCP: Lisa Palacio MD 
 
Current Outpatient Prescriptions Medication Sig Dispense Refill  hydrALAZINE (APRESOLINE) 50 mg tablet TAKE 1 TABLET BY MOUTH EVERY 8 HOURS 90 Tab 1  
 glucose blood VI test strips (ONETOUCH ULTRA TEST) strip Test blood sugar four times daily Diagnosis Code E 11.21. Can use which ever type insurance covers 300 Strip 5  Lancets (ONETOUCH ULTRASOFT LANCETS) misc Test blood sugar four times daily Diagnosis Code E 11.21. Can use which ever type insurance covers 300 Each 5  Blood Glucose Control, Normal (OT ULTRA/FASTTRACK CONTROL) soln Test blood sugar four times daily Diagnosis Code E 11.21. Can use which ever type insurance covers 3 mL 5  Blood-Glucose Meter (ONETOUCH ULTRA2) monitoring kit Test blood sugar four times daily Diagnosis Code E 11.21. Can use which ever type insurance covers 1 Kit 0  
 gabapentin (NEURONTIN) 600 mg tablet Take  by mouth three (3) times daily.  traMADol (ULTRAM) 50 mg tablet Take 1 Tab by mouth every six (6) hours as needed for Pain. Max Daily Amount: 200 mg. Indications: NEUROPATHIC PAIN 60 Tab 0  cholecalciferol (D3-50 CHOLECALCIFEROL) 50,000 unit capsule Take 1 Cap by mouth every seven (7) days. Indications: Vitamin D Deficiency 14 Cap 2  
 bumetanide (BUMEX) 1 mg tablet Take 1 Tab by mouth two (2) times a day. Indications: Edema 90 Tab 1  
 metOLazone (ZAROXOLYN) 2.5 mg tablet Take 1 Tab by mouth every other day. 60 Tab 1  
 linagliptin (TRADJENTA) 5 mg tablet Take 1 Tab by mouth daily. 90 Tab 1  pregabalin (LYRICA) 50 mg capsule Take 1 Cap by mouth three (3) times daily. Max Daily Amount: 150 mg. 270 Cap 1  sildenafil citrate (VIAGRA) 25 mg tablet Take 1 Tab by mouth as needed. Indications: Erectile Dysfunction 30 Tab 1  
 ondansetron hcl (ZOFRAN) 4 mg tablet Take 1 Tab by mouth every six (6) hours as needed for Nausea. 30 Tab 1  
 docusate sodium (COLACE) 100 mg capsule Take 1 Cap by mouth daily. 90 Cap 1  mirtazapine (REMERON) 30 mg tablet Take 1 Tab by mouth nightly. 90 Tab 1  
 aspirin 81 mg chewable tablet Take 1 Tab by mouth daily. Indications: myocardial infarction prevention 90 Tab 1  clopidogrel (PLAVIX) 75 mg tab Take 1 Tab by mouth daily. 90 Tab 1  
 ferrous gluconate 324 mg (37.5 mg iron) tablet Take 1 Tab by mouth three (3) times daily (with meals). 270 Tab 1  
 omeprazole (PRILOSEC) 40 mg capsule Take 1 Cap by mouth two (2) times a day.  180 Cap 1  
 pravastatin (PRAVACHOL) 80 mg tablet Take 1 Tab by mouth nightly. 90 Tab 1  
 traZODone (DESYREL) 150 mg tablet Take 1 Tab by mouth nightly. 90 Tab 1  
 finasteride (PROSCAR) 5 mg tablet Take 1 Tab by mouth daily. 90 Tab 1  
 melatonin 3 mg tablet Take 3 mg by mouth nightly. Give 2 Tablets by mouth at bedtime  diphenhydrAMINE (BANOPHEN) 25 mg tablet Take 25 mg by mouth every six (6) hours as needed for Sleep.  ESOMEPRAZOLE MAGNESIUM PO Take 40 mg by mouth daily.  loperamide (IMODIUM) 2 mg capsule Take 1 Cap by mouth four (4) times daily as needed for Diarrhea. 12 Cap 0  
 doxazosin (CARDURA) 2 mg tablet Take 1 Tab by mouth nightly. 90 Tab 0  
 neomycin-bacitracin-polymyxin (NEOSPORIN) 3.5mg-400 unit- 5,000 unit/gram ointment Apply  to affected area daily. 1 Tube 1  
 nitroglycerin (NITROSTAT) 0.4 mg SL tablet 1 Tab by SubLINGual route every five (5) minutes as needed for Chest Pain. 20 Tab 0 Past History Past Medical History: 
Past Medical History:  
Diagnosis Date  Anemia  Arthritis   
 back, hips  CAD (coronary artery disease) Sees Dr. Melvin Craig  Chronic kidney disease Dr. Lupe Jo  Chronic LBP 1990 Slipped on gravel and fell at work; Multiple surgeries; Sees Dr. Janene Degroot for pain mgmt  Chronic pain  Confusion  Depression  Diabetes ([facility] [address]  ED (erectile dysfunction)  GERD (gastroesophageal reflux disease) 11/2015 Diagnosed at [facility] last month  Hypercholesteremia  Hypertension  Psychiatric disorder   
 depression  PVD (peripheral vascular disease) ([facility] [address]  Thromboembolus ([facility] [address] Past Surgical History: 
Past Surgical History:  
Procedure Laterality Date  CARDIAC SURG PROCEDURE UNLIST    
 PTCA  HX ABOVE KNEE AMPUTATION  2013 Left knee stump non-healing ulcer; Dr. Maria Elena Kaufman  HX APPENDECTOMY  HX BELOW KNEE AMPUTATION Left leg  HX CHOLECYSTECTOMY  HX GI    
 HX HIP REPLACEMENT    
 right hip replaced x 2  
 HX ORTHOPAEDIC  1990s  
 4 back surgeries  HX ORTHOPAEDIC    
 donna ankles pin placed  HX ORTHOPAEDIC    
 left leg 17 surgeries  UPPER GI ENDOSCOPY,BIOPSY  9/2/2015  UPPER GI ENDOSCOPY,BIOPSY  10/12/2015  VASCULAR SURGERY PROCEDURE UNLIST Multiple revascularization procedures, toe amputations Family History: 
Family History Problem Relation Age of Onset  Alcohol abuse Father  Diabetes Sister  Diabetes Sister  Lung Disease Mother  Cancer Maternal Grandfather Head and neck  Cancer Paternal Grandmother Stomach Social History: 
Social History Substance Use Topics  Smoking status: Former Smoker  Smokeless tobacco: Never Used Comment: 30 pack years; Occasional cigar  Alcohol use No  
   Comment: Former heavy drinker quit drinking about 17 years ago Allergies: Allergies Allergen Reactions  Nubain [Nalbuphine] Other (comments) Made me wild; Dysphoria Review of Systems Review of Systems Constitutional: Negative for chills and fever. HENT: Negative for congestion, rhinorrhea and sore throat. Eyes: Negative for photophobia and visual disturbance. Respiratory: Negative for cough and shortness of breath. Cardiovascular: Negative for chest pain and palpitations. Gastrointestinal: Negative for abdominal pain, diarrhea, nausea and vomiting. Genitourinary: Negative for dysuria and hematuria. Musculoskeletal: Negative for neck pain and neck stiffness. Skin: Positive for wound. Negative for rash. Allergic/Immunologic: Positive for immunocompromised state. Negative for food allergies. Neurological: Negative for dizziness, weakness, numbness and headaches. Psychiatric/Behavioral: Negative for agitation, confusion, decreased concentration and self-injury. The patient is not nervous/anxious. Physical Exam  
Physical Exam  
Constitutional: He is oriented to person, place, and time.  He appears well-developed and well-nourished. No distress. WDWN elderly male, alert, in NAD HENT:  
Head: Normocephalic and atraumatic. Nose: Nose normal.  
Eyes: Conjunctivae and EOM are normal. Pupils are equal, round, and reactive to light. Right eye exhibits no discharge. Left eye exhibits no discharge. No scleral icterus. Neck: Normal range of motion. Neck supple. No JVD present. No tracheal deviation present. No thyromegaly present. No cervical spinal tenderness Cardiovascular: Normal rate, regular rhythm and normal heart sounds. Pulmonary/Chest: Effort normal and breath sounds normal. No respiratory distress. He has no wheezes. Abdominal: Soft. There is no tenderness. No CVAT Musculoskeletal: Normal range of motion. He exhibits tenderness. He exhibits no edema. Pt with L AKA Tender to R UE at site of superficial abrasion, no bony tenderness, full A/P ROM noted, 2+ radial pulse, NVI. Lymphadenopathy:  
  He has no cervical adenopathy. Neurological: He is alert and oriented to person, place, and time. No cranial nerve deficit. He exhibits normal muscle tone. Coordination normal.  
Skin: Skin is warm and dry. He is not diaphoretic.  
2cm superficial abrasion noted to R forearm, bleeding controlled Psychiatric: He has a normal mood and affect. His behavior is normal. Judgment normal.  
Nursing note and vitals reviewed. Diagnostic Study Results Labs - No results found for this or any previous visit (from the past 12 hour(s)). Radiologic Studies -  
CT HEAD WO CONT Final Result CT Results  (Last 48 hours) 07/25/18 1449  CT HEAD WO CONT Final result Impression:  IMPRESSION:  
   
No evidence for acute intracranial hemorrhage. Mild bilateral subcortical and periventricular areas of patchy low attenuation  
is nonspecific but likely related to changes of chronic small vessel ischemic  
disease.    
   
Focal hypoattenuation volume loss in the left cerebellum likely due to chronic  
injury. Small focal cortical hypoattenuation in the left occipital lobe is age  
indeterminate but likely subacute or chronic Narrative:  INDICATION: Acute head trauma with headache, on anticoagulation EXAM:  HEAD CT WITHOUT CONTRAST  
   
COMPARISON:  None TECHNIQUE:  Routine noncontrast axial head CT was performed. Coronal and  
sagittal multiplanar reconstructions were obtained. CT dose reduction was  
achieved through use of a standardized protocol tailored for this examination  
and automatic exposure control for dose modulation. FINDINGS:  
   
The ventricles and cortical sulci are within normal limits. No evidence for acute intracranial hemorrhage, midline shift, or mass effect. Mild bilateral subcortical and periventricular areas of patchy low attenuation  
is nonspecific but likely related to changes of chronic small vessel ischemic  
disease. Focal hypoattenuation volume loss in the left cerebellum may be chronic  
injury. Small focal cortical hypoattenuation in the left occipital lobe is age  
indeterminate but likely subacute or chronic The basal cisterns are patent. The visualized paranasal sinuses and mastoid air cells are clear. No calvarial abnormality. Medical Decision Making I am the first provider for this patient. I reviewed the vital signs, available nursing notes, past medical history, past surgical history, family history and social history. Vital Signs-Reviewed the patient's vital signs. Patient Vitals for the past 12 hrs: 
 Temp Pulse BP SpO2  
07/25/18 1600 - 75 (!) 218/78 98 %  
07/25/18 1500 - - (!) 218/82 98 %  
07/25/18 1415 - - (!) 202/84 97 %  
07/25/18 1327 97.6 °F (36.4 °C) 68 197/88 99 % Records Reviewed: Nursing Notes, Old Medical Records and Ambulance Run Sheet Provider Notes (Medical Decision Making): Abrasion, head injury, concussion, SDH 
 
ED Course:  
Initial assessment performed. The patients presenting problems have been discussed, and they are in agreement with the care plan formulated and outlined with them. I have encouraged them to ask questions as they arise throughout their visit. DISCHARGE NOTE: 
9227 The care plan has been outline with the patient and/or family, who verbally conveyed understanding and agreement. Available results have been reviewed. Patient and/or family understand the follow up plan as outlined and discharge instructions. Should their condition deterioration at any time after discharge the patient agrees to return, follow up sooner than outlined or seek medical assistance at the closest Emergency Room as soon as possible. Questions have been answered. Discharge instructions and educational information regarding the patient's diagnosis as well a list of reasons why the patient would want to seek immediate medical attention, should their condition change, were reviewed directly with the patient/family PLAN: 
1. Discharge Medication List as of 7/25/2018  3:03 PM  
  
 
2. Follow-up Information Follow up With Details Comments Contact Info Yaw Michael MD   [address] [place] [address] 
[phone] [facility] EMERGENCY DEPT  If symptoms worsen [address] [place] [address] [address] [facility] 
[phone] Return to ED if worse Diagnosis Clinical Impression: 1. Fall, initial encounter 2. ESRD on dialysis [facility]) 3. HX: long term anticoagulant use

## 2018-09-02 DIAGNOSIS — R60.0 BILATERAL LEG EDEMA: ICD-10-CM

## 2018-09-02 RX ORDER — BUMETANIDE 1 MG/1
TABLET ORAL
Qty: 90 TAB | Refills: 1 | Status: SHIPPED | OUTPATIENT
Start: 2018-09-02 | End: 2018-12-18 | Stop reason: SDUPTHER

## 2018-12-01 DIAGNOSIS — N40.0 ENLARGED PROSTATE ON RECTAL EXAMINATION: ICD-10-CM

## 2018-12-01 DIAGNOSIS — F51.05 INSOMNIA DUE TO OTHER MENTAL DISORDER: ICD-10-CM

## 2018-12-01 DIAGNOSIS — I73.9 PVD (PERIPHERAL VASCULAR DISEASE) (HCC): ICD-10-CM

## 2018-12-01 DIAGNOSIS — E11.21 TYPE 2 DIABETES WITH NEPHROPATHY (HCC): ICD-10-CM

## 2018-12-01 DIAGNOSIS — K21.9 GASTROESOPHAGEAL REFLUX DISEASE WITHOUT ESOPHAGITIS: ICD-10-CM

## 2018-12-01 DIAGNOSIS — F99 INSOMNIA DUE TO OTHER MENTAL DISORDER: ICD-10-CM

## 2018-12-01 RX ORDER — PRAVASTATIN SODIUM 80 MG/1
TABLET ORAL
Qty: 90 TAB | Refills: 1 | Status: SHIPPED | OUTPATIENT
Start: 2018-12-01 | End: 2019-05-25 | Stop reason: SDUPTHER

## 2018-12-01 RX ORDER — LINAGLIPTIN 5 MG/1
TABLET, FILM COATED ORAL
Qty: 90 TAB | Refills: 1 | Status: SHIPPED | OUTPATIENT
Start: 2018-12-01 | End: 2019-09-04

## 2018-12-01 RX ORDER — MIRTAZAPINE 30 MG/1
TABLET, FILM COATED ORAL
Qty: 90 TAB | Refills: 1 | Status: SHIPPED | OUTPATIENT
Start: 2018-12-01

## 2018-12-01 RX ORDER — CLOPIDOGREL BISULFATE 75 MG/1
TABLET ORAL
Qty: 90 TAB | Refills: 1 | Status: SHIPPED | OUTPATIENT
Start: 2018-12-01 | End: 2019-09-14

## 2018-12-01 RX ORDER — FINASTERIDE 5 MG/1
TABLET, FILM COATED ORAL
Qty: 90 TAB | Refills: 1 | Status: SHIPPED | OUTPATIENT
Start: 2018-12-01

## 2018-12-01 RX ORDER — TRAZODONE HYDROCHLORIDE 150 MG/1
TABLET ORAL
Qty: 90 TAB | Refills: 1 | Status: SHIPPED | OUTPATIENT
Start: 2018-12-01

## 2018-12-01 RX ORDER — OMEPRAZOLE 40 MG/1
CAPSULE, DELAYED RELEASE ORAL
Qty: 180 CAP | Refills: 1 | Status: SHIPPED | OUTPATIENT
Start: 2018-12-01

## 2018-12-18 DIAGNOSIS — R60.0 BILATERAL LEG EDEMA: ICD-10-CM

## 2018-12-19 RX ORDER — BUMETANIDE 1 MG/1
1 TABLET ORAL DAILY
Qty: 90 TAB | Refills: 1 | Status: SHIPPED | OUTPATIENT
Start: 2018-12-19 | End: 2019-07-29 | Stop reason: SDUPTHER

## 2019-01-03 ENCOUNTER — TELEPHONE (OUTPATIENT)
Dept: FAMILY MEDICINE CLINIC | Age: 64
End: 2019-01-03

## 2019-01-21 RX ORDER — CYCLOBENZAPRINE HCL 5 MG
5 TABLET ORAL
COMMUNITY
End: 2019-09-14

## 2019-01-21 RX ORDER — ISOSORBIDE MONONITRATE 30 MG/1
30 TABLET, EXTENDED RELEASE ORAL
COMMUNITY
End: 2019-09-14

## 2019-01-21 RX ORDER — CARVEDILOL 6.25 MG/1
6.25 TABLET ORAL 2 TIMES DAILY WITH MEALS
COMMUNITY
End: 2019-09-14

## 2019-01-21 RX ORDER — FLUOXETINE HYDROCHLORIDE 20 MG/1
20 CAPSULE ORAL DAILY
COMMUNITY

## 2019-01-21 RX ORDER — LISINOPRIL 20 MG/1
20 TABLET ORAL DAILY
COMMUNITY
End: 2019-09-14

## 2019-01-21 NOTE — PERIOP NOTES
Spoke to Luana at Dr Chamberlain Copping office and no Vancomycin is needed per Dr Yaquelin Mitchell.

## 2019-01-21 NOTE — PERIOP NOTES
Called Dr Felicita Fields office and left voice message for nurse regarding patient's history of MRSA in past. Requested call back for clarification regarding pre-op antibiotics with HX of MRSA. Per Medora Goldmann at Dr Felicita Fields office- she will call patients daughter in law and give instructions regarding plavix. Called for labs at [facility]. Called for last office notes and testing with Dr Rosaura Self. Office to fax.

## 2019-01-22 ENCOUNTER — ANESTHESIA (OUTPATIENT)
Dept: SURGERY | Age: 64
End: 2019-01-22
Payer: MEDICARE

## 2019-01-22 ENCOUNTER — ANESTHESIA EVENT (OUTPATIENT)
Dept: SURGERY | Age: 64
End: 2019-01-22
Payer: MEDICARE

## 2019-01-22 ENCOUNTER — HOSPITAL ENCOUNTER (OUTPATIENT)
Age: 64
Setting detail: OUTPATIENT SURGERY
Discharge: HOME | End: 2019-01-22
Attending: SURGERY | Admitting: SURGERY
Payer: MEDICARE

## 2019-01-22 VITALS
TEMPERATURE: 97.5 F | DIASTOLIC BLOOD PRESSURE: 49 MMHG | WEIGHT: 153.66 LBS | HEIGHT: 69 IN | HEART RATE: 67 BPM | OXYGEN SATURATION: 93 % | RESPIRATION RATE: 18 BRPM | BODY MASS INDEX: 22.76 KG/M2 | SYSTOLIC BLOOD PRESSURE: 109 MMHG

## 2019-01-22 DIAGNOSIS — N18.4 STAGE 4 CHRONIC KIDNEY DISEASE (HCC): Primary | ICD-10-CM

## 2019-01-22 LAB
ANION GAP BLD CALC-SCNC: 18 MMOL/L (ref 10–20)
BUN BLD-MCNC: 44 MG/DL (ref 9–20)
CA-I BLD-MCNC: 1.04 MMOL/L (ref 1.12–1.32)
CHLORIDE BLD-SCNC: 102 MMOL/L (ref 98–107)
CO2 BLD-SCNC: 26 MMOL/L (ref 21–32)
CREAT BLD-MCNC: 5.4 MG/DL (ref 0.6–1.3)
GLUCOSE BLD STRIP.AUTO-MCNC: 110 MG/DL (ref 65–100)
GLUCOSE BLD-MCNC: 125 MG/DL (ref 65–100)
HCT VFR BLD CALC: 31 % (ref 36.6–50.3)
POTASSIUM BLD-SCNC: 4.1 MMOL/L (ref 3.5–5.1)
SERVICE CMNT-IMP: ABNORMAL
SERVICE CMNT-IMP: ABNORMAL
SODIUM BLD-SCNC: 141 MMOL/L (ref 136–145)

## 2019-01-22 PROCEDURE — 82962 GLUCOSE BLOOD TEST: CPT

## 2019-01-22 PROCEDURE — 77030011640 HC PAD GRND REM COVD -A: Performed by: SURGERY

## 2019-01-22 PROCEDURE — 74011250636 HC RX REV CODE- 250/636: Performed by: SURGERY

## 2019-01-22 PROCEDURE — 77030031139 HC SUT VCRL2 J&J -A: Performed by: SURGERY

## 2019-01-22 PROCEDURE — 74011000272 HC RX REV CODE- 272: Performed by: SURGERY

## 2019-01-22 PROCEDURE — 77030010938 HC CLP LIG TELE -A: Performed by: SURGERY

## 2019-01-22 PROCEDURE — 77030020153 HC PRB DOPLR DISP MIZU -C: Performed by: SURGERY

## 2019-01-22 PROCEDURE — 76210000006 HC OR PH I REC 0.5 TO 1 HR: Performed by: SURGERY

## 2019-01-22 PROCEDURE — 77030002987 HC SUT PROL J&J -B: Performed by: SURGERY

## 2019-01-22 PROCEDURE — 77030018836 HC SOL IRR NACL ICUM -A: Performed by: SURGERY

## 2019-01-22 PROCEDURE — 77030020782 HC GWN BAIR PAWS FLX 3M -B

## 2019-01-22 PROCEDURE — 76010000149 HC OR TIME 1 TO 1.5 HR: Performed by: SURGERY

## 2019-01-22 PROCEDURE — 77030002996 HC SUT SLK J&J -A: Performed by: SURGERY

## 2019-01-22 PROCEDURE — 74011250636 HC RX REV CODE- 250/636

## 2019-01-22 PROCEDURE — 77030002916 HC SUT ETHLN J&J -A: Performed by: SURGERY

## 2019-01-22 PROCEDURE — 77030014008 HC SPNG HEMSTAT J&J -C: Performed by: SURGERY

## 2019-01-22 PROCEDURE — 74011250636 HC RX REV CODE- 250/636: Performed by: ANESTHESIOLOGY

## 2019-01-22 PROCEDURE — 77030013837 HC NERV BLK KT BBMI -B: Performed by: ANESTHESIOLOGY

## 2019-01-22 PROCEDURE — 77030008463 HC STPLR SKN PROX J&J -B: Performed by: SURGERY

## 2019-01-22 PROCEDURE — 76060000033 HC ANESTHESIA 1 TO 1.5 HR: Performed by: SURGERY

## 2019-01-22 PROCEDURE — 76210000021 HC REC RM PH II 0.5 TO 1 HR: Performed by: SURGERY

## 2019-01-22 PROCEDURE — 80047 BASIC METABLC PNL IONIZED CA: CPT

## 2019-01-22 PROCEDURE — 77030002924 HC SUT GORTX WLGO -B: Performed by: SURGERY

## 2019-01-22 PROCEDURE — 74011000250 HC RX REV CODE- 250: Performed by: SURGERY

## 2019-01-22 PROCEDURE — 77030020256 HC SOL INJ NACL 0.9%  500ML: Performed by: SURGERY

## 2019-01-22 PROCEDURE — 74011000250 HC RX REV CODE- 250

## 2019-01-22 PROCEDURE — 77030002986 HC SUT PROL J&J -A: Performed by: SURGERY

## 2019-01-22 RX ORDER — LIDOCAINE HYDROCHLORIDE 10 MG/ML
0.1 INJECTION, SOLUTION EPIDURAL; INFILTRATION; INTRACAUDAL; PERINEURAL AS NEEDED
Status: DISCONTINUED | OUTPATIENT
Start: 2019-01-22 | End: 2019-01-22 | Stop reason: HOSPADM

## 2019-01-22 RX ORDER — MIDAZOLAM HYDROCHLORIDE 1 MG/ML
INJECTION, SOLUTION INTRAMUSCULAR; INTRAVENOUS AS NEEDED
Status: DISCONTINUED | OUTPATIENT
Start: 2019-01-22 | End: 2019-01-22 | Stop reason: HOSPADM

## 2019-01-22 RX ORDER — ACETAMINOPHEN 10 MG/ML
INJECTION, SOLUTION INTRAVENOUS AS NEEDED
Status: DISCONTINUED | OUTPATIENT
Start: 2019-01-22 | End: 2019-01-22 | Stop reason: HOSPADM

## 2019-01-22 RX ORDER — FENTANYL CITRATE 50 UG/ML
INJECTION, SOLUTION INTRAMUSCULAR; INTRAVENOUS AS NEEDED
Status: DISCONTINUED | OUTPATIENT
Start: 2019-01-22 | End: 2019-01-22 | Stop reason: HOSPADM

## 2019-01-22 RX ORDER — FENTANYL CITRATE 50 UG/ML
25 INJECTION, SOLUTION INTRAMUSCULAR; INTRAVENOUS
Status: CANCELLED | OUTPATIENT
Start: 2019-01-22

## 2019-01-22 RX ORDER — SODIUM CHLORIDE 0.9 % (FLUSH) 0.9 %
5-40 SYRINGE (ML) INJECTION AS NEEDED
Status: CANCELLED | OUTPATIENT
Start: 2019-01-22

## 2019-01-22 RX ORDER — LIDOCAINE HYDROCHLORIDE 20 MG/ML
INJECTION, SOLUTION EPIDURAL; INFILTRATION; INTRACAUDAL; PERINEURAL
Status: COMPLETED | OUTPATIENT
Start: 2019-01-22 | End: 2019-01-22

## 2019-01-22 RX ORDER — GLYCOPYRROLATE 0.2 MG/ML
INJECTION INTRAMUSCULAR; INTRAVENOUS AS NEEDED
Status: DISCONTINUED | OUTPATIENT
Start: 2019-01-22 | End: 2019-01-22 | Stop reason: HOSPADM

## 2019-01-22 RX ORDER — DIPHENHYDRAMINE HYDROCHLORIDE 50 MG/ML
12.5 INJECTION, SOLUTION INTRAMUSCULAR; INTRAVENOUS AS NEEDED
Status: CANCELLED | OUTPATIENT
Start: 2019-01-22 | End: 2019-01-22

## 2019-01-22 RX ORDER — HYDROCODONE BITARTRATE AND ACETAMINOPHEN 7.5; 325 MG/1; MG/1
1 TABLET ORAL
Qty: 12 TAB | Refills: 0 | Status: SHIPPED | OUTPATIENT
Start: 2019-01-22 | End: 2019-08-27

## 2019-01-22 RX ORDER — SODIUM CHLORIDE 9 MG/ML
25 INJECTION, SOLUTION INTRAVENOUS CONTINUOUS
Status: DISCONTINUED | OUTPATIENT
Start: 2019-01-22 | End: 2019-01-22 | Stop reason: HOSPADM

## 2019-01-22 RX ORDER — ROPIVACAINE HYDROCHLORIDE 5 MG/ML
INJECTION, SOLUTION EPIDURAL; INFILTRATION; PERINEURAL
Status: COMPLETED | OUTPATIENT
Start: 2019-01-22 | End: 2019-01-22

## 2019-01-22 RX ORDER — FENTANYL CITRATE 50 UG/ML
50 INJECTION, SOLUTION INTRAMUSCULAR; INTRAVENOUS AS NEEDED
Status: DISCONTINUED | OUTPATIENT
Start: 2019-01-22 | End: 2019-01-22 | Stop reason: HOSPADM

## 2019-01-22 RX ORDER — ONDANSETRON 2 MG/ML
4 INJECTION INTRAMUSCULAR; INTRAVENOUS AS NEEDED
Status: CANCELLED | OUTPATIENT
Start: 2019-01-22

## 2019-01-22 RX ORDER — ONDANSETRON 2 MG/ML
INJECTION INTRAMUSCULAR; INTRAVENOUS AS NEEDED
Status: DISCONTINUED | OUTPATIENT
Start: 2019-01-22 | End: 2019-01-22 | Stop reason: HOSPADM

## 2019-01-22 RX ORDER — CEFAZOLIN SODIUM/WATER 2 G/20 ML
2 SYRINGE (ML) INTRAVENOUS ONCE
Status: COMPLETED | OUTPATIENT
Start: 2019-01-22 | End: 2019-01-22

## 2019-01-22 RX ORDER — KETAMINE HYDROCHLORIDE 10 MG/ML
INJECTION, SOLUTION INTRAMUSCULAR; INTRAVENOUS AS NEEDED
Status: DISCONTINUED | OUTPATIENT
Start: 2019-01-22 | End: 2019-01-22 | Stop reason: HOSPADM

## 2019-01-22 RX ORDER — PROPOFOL 10 MG/ML
INJECTION, EMULSION INTRAVENOUS
Status: DISCONTINUED | OUTPATIENT
Start: 2019-01-22 | End: 2019-01-22 | Stop reason: HOSPADM

## 2019-01-22 RX ORDER — HYDROMORPHONE HYDROCHLORIDE 1 MG/ML
.2-.5 INJECTION, SOLUTION INTRAMUSCULAR; INTRAVENOUS; SUBCUTANEOUS
Status: CANCELLED | OUTPATIENT
Start: 2019-01-22

## 2019-01-22 RX ORDER — SODIUM CHLORIDE 0.9 % (FLUSH) 0.9 %
5-40 SYRINGE (ML) INJECTION EVERY 8 HOURS
Status: CANCELLED | OUTPATIENT
Start: 2019-01-22

## 2019-01-22 RX ORDER — MIDAZOLAM HYDROCHLORIDE 1 MG/ML
0.5 INJECTION, SOLUTION INTRAMUSCULAR; INTRAVENOUS
Status: CANCELLED | OUTPATIENT
Start: 2019-01-22

## 2019-01-22 RX ADMIN — MIDAZOLAM HYDROCHLORIDE 1 MG: 1 INJECTION, SOLUTION INTRAMUSCULAR; INTRAVENOUS at 11:56

## 2019-01-22 RX ADMIN — ROPIVACAINE HYDROCHLORIDE 20 ML: 5 INJECTION, SOLUTION EPIDURAL; INFILTRATION; PERINEURAL at 11:59

## 2019-01-22 RX ADMIN — KETAMINE HYDROCHLORIDE 25 MG: 10 INJECTION, SOLUTION INTRAMUSCULAR; INTRAVENOUS at 12:41

## 2019-01-22 RX ADMIN — PROPOFOL 25 MCG/KG/MIN: 10 INJECTION, EMULSION INTRAVENOUS at 12:46

## 2019-01-22 RX ADMIN — MIDAZOLAM HYDROCHLORIDE 2 MG: 1 INJECTION, SOLUTION INTRAMUSCULAR; INTRAVENOUS at 11:45

## 2019-01-22 RX ADMIN — SODIUM CHLORIDE: 900 INJECTION, SOLUTION INTRAVENOUS at 11:14

## 2019-01-22 RX ADMIN — ACETAMINOPHEN 1000 MG: 10 INJECTION, SOLUTION INTRAVENOUS at 12:44

## 2019-01-22 RX ADMIN — FENTANYL CITRATE 50 MCG: 50 INJECTION, SOLUTION INTRAMUSCULAR; INTRAVENOUS at 11:52

## 2019-01-22 RX ADMIN — MIDAZOLAM HYDROCHLORIDE 1 MG: 1 INJECTION, SOLUTION INTRAMUSCULAR; INTRAVENOUS at 11:48

## 2019-01-22 RX ADMIN — ONDANSETRON 4 MG: 2 INJECTION INTRAMUSCULAR; INTRAVENOUS at 12:58

## 2019-01-22 RX ADMIN — Medication 2 G: at 12:43

## 2019-01-22 RX ADMIN — GLYCOPYRROLATE 0.2 MG: 0.2 INJECTION INTRAMUSCULAR; INTRAVENOUS at 12:41

## 2019-01-22 RX ADMIN — LIDOCAINE HYDROCHLORIDE 20 ML: 20 INJECTION, SOLUTION EPIDURAL; INFILTRATION; INTRACAUDAL; PERINEURAL at 11:59

## 2019-01-22 NOTE — ROUTINE PROCESS
Patient: Moo Paul MRN: [id number]  SSN: [id number] YOB: 1955  Age: 58 y.o. Sex: male Patient is status post Procedure(s): REVISION LEFT ARTERIO VENOUS FISTULA (AXILLARY BLOCK). Surgeon(s) and Role: Luis Felipe Saldana MD - Primary Venous Access Device chronic dual lumen catheter LOT [id number] 05/10/18 Upper chest (subclavicular area, right (Active) Dressing/Packing:  Wound Arm Left-DRESSING TYPE: Staples;4 x 4;Gauze(staples, 4x4's and kerlix) (01/22/19 1324) Splint/Cast:  ]

## 2019-01-22 NOTE — PERIOP NOTES
For dc home. Vswnl. Reports chronic back pain as 5/10 which is tolerable per pt. Denies pain in arm. Denies nausea. dsg to L arm d&.  Radial pulse palpable. + thrill, bruit. Went over [employer] instructions w/pt and daughter-in-law including Rx and f/up. Verbalized understanding. dc'd home.

## 2019-01-22 NOTE — OP NOTES
[address] [facility]  MR#: [id number]  : 1955  ACCOUNT #: [de-identified]   DATE OF SERVICE: 2019    PREOPERATIVE DIAGNOSES:    1.  End-stage renal disease with a nonusable left arm arteriovenous fistula. 2.  Ischemic steal, left hand. POSTOPERATIVE DIAGNOSES:   1.   End-stage renal disease with a nonusable left arm arteriovenous fistula. 2.   Ischemic steal, left hand. PROCEDURE PERFORMED:  Revision left arm arteriovenous fistula in the form of super fistulization and ligation of radial artery. SURGEON:  Jerad Lopez MD    ANESTHESIA:  Regional block. INDICATIONS:  The patient is a 79-year-old hemodialysis patient who has a high caliber high flow brachiocephalic fistula in the left upper arm, which is a little too deep for a reliable access. In addition, he has symptomatic steal of the left hand. Catheter based angiography revealed that the hand filled rapidly antegrade through the ulnar artery and had reversal of flow in the radial artery through an intact palmar arch and had retrograde flow up the radial artery to the fistula consistent with steal.  Additionally, noninvasive testing had confirmed ischemic steal as well. DESCRIPTION OF PROCEDURE:  After obtaining informed consent, the patient placed in supine on the operating table. After adequate induction of regional block anesthesia, site and patient confirmation and administration of prophylactic antibiotics,  the left arm was prepped and draped in sterile fashion. Limited longitudinal incision was made in the forearm just below the antecubital crease to allow access to the proximal radial artery just distal to the fistula. It was dissected free and controlled and occluded with a single medium Hemoclip. Attention was turned to the upper part of the arm where the very large caliber cephalic vein was dissected free, all side branches were doubly ligated and divided.   The fistula was elevated. A lateral flap was created just below the dermis. The vein was rotated laterally placed under this flap, tacked into position with Prolene sutures. The subcutaneous tissue was then reapproximated over the fistula with interrupted buried  Vicryl suture. Skin was closed with standard skin staples. Attention was returned to the incision below the elbow. This wound was irrigated, deemed hemostatic. A Doppler was used to interrogate the wrist where there was a strong antegrade signal in the ulnar artery and a truncated signal in the radial artery in contradistinction to the half flow reversal of flow in the radial artery preop. The hand was warm and pink. The wound below the elbow was then closed in 2 layers. The patient tolerated the procedure well. COMPLICATIONS:  No complications. ESTIMATED BLOOD LOSS:  Minimal.    SPECIMENS REMOVED:  None.       MD JOSE Turcios / LINDA  D: 01/22/2019 16:09     T: 01/22/2019 17:01  JOB #: [id number]  CC: Niru Morales MD

## 2019-01-22 NOTE — ANESTHESIA PREPROCEDURE EVALUATION
Anesthetic History No history of anesthetic complications Review of Systems / Medical History Patient summary reviewed, nursing notes reviewed and pertinent labs reviewed Pulmonary Smoker (EX) Comments: Former Smoker - 35 pack years Neuro/Psych Psychiatric history (Depression) Comments: Depression Cardiovascular Hypertension: well controlled Past MI, CAD, PAD, cardiac stents and hyperlipidemia Exercise tolerance: <4 METS Comments: ECHO 2018: showed a 55% EF with mild MR and TR Hx NSTEMI 
  
GI/Hepatic/Renal 
  
GERD Renal disease (CKD): CRI 
PUD Comments: Hematemesis Hx of PUD 9-2-15 on EGD Endo/Other Diabetes: poorly controlled, type 2, using insulin Arthritis (Back and hips) and anemia Other Findings Comments: Chronic Low Back Pain with implanted stimulator Hx of Thromboembolism Physical Exam 
 
Airway Mallampati: I 
TM Distance: > 6 cm Neck ROM: normal range of motion Mouth opening: Normal 
 
 Cardiovascular Rhythm: regular Rate: normal 
 
[facility] 
 
Dentition: Edentulous Pulmonary Breath sounds clear to auscultation Abdominal 
GI exam deferred Other Findings Anesthetic Plan ASA: 3 Anesthesia type: regional - supraclavicular block Anesthetic plan and risks discussed with: Patient

## 2019-01-22 NOTE — ANESTHESIA PROCEDURE NOTES
Left Supraclavicular Block Start time: 1/22/2019 11:43 AM 
End time: 1/22/2019 11:56 AM 
Performed by: Randee Gore MD 
Authorized by: Ranede Gore MD  
 
 
Pre-procedure: Indications: at surgeon's request and post-op pain management Preanesthetic Checklist: risks and benefits discussed, site marked and timeout performed Timeout Time: 11:43 Block Type:  
Block Type:  Supraclavicular Laterality:  Left Monitoring:  Standard ASA monitoring, continuous pulse ox, frequent vital sign checks, heart rate and oxygen Injection Technique:  Single shot Procedures: nerve stimulator Patient Position: supine Prep: chlorhexidine Location:  Supraclavicular Needle Type:  Stimuplex Needle Gauge:  22 G Needle Localization:  Nerve stimulator Assessment: 
Number of attempts:  2 Injection Assessment:  Incremental injection every 5 mL, no intravascular symptoms and no paresthesia Patient tolerance:  Patient tolerated the procedure well with no immediate complications Left supraclavicular block performed. + aspiration for blood approximately care home through injection. Injection terminated, needle removed and repositioned. Extremity ring block performed with 5 ml 0.5% ropivacaine w/epi + 5 ml 2% lidocaine w/ epi.

## 2019-01-22 NOTE — PERIOP NOTES
Patient has 3 small scabbed areas  on right lower leg, and 2 on right great and second toe. Lower right leg is flaky/scaley. Patient has deep purple bruise on right groin. States Dr. Erin Walton in my groin to get clot out of my leg\". While doing nerve block, blood was obtained while doing aspiration. Needle immediately removed by Dr. Sulaiman Caraballo and repositioned. Aspiration was performed every 5 cc prior to and after needle removal.  After block, patient's oxygen saturation dropped to 87%. Resp effort decreases; 5 sec apneic episode x 3 noted. .  Jaw thrust performed, patient stimulated, and oxygen increased to 6 L/M with return to spontaneous resp and saturations to 100%. Dr. Sulaiman Caraballo at bedside. Patient now responding verbally to RN, though still very drowsy.

## 2019-01-22 NOTE — PROGRESS NOTES
Handoff Report from Operating Room to PACU Report received from ROMAN Yoder RN and RENATO Mc CRNA regarding [facility]   
 
Surgeon(s): 
Markell Escalona MD  And Procedure(s) (LRB): 
REVISION LEFT ARTERIO VENOUS FISTULA (AXILLARY BLOCK) (Left)  confirmed  
with allergies, drains and dressings discussed. Anesthesia type, drugs, patient history, complications, estimated blood loss, vital signs, intake and output, and last pain medication, lines, reversal medications and temperature were reviewed.

## 2019-01-22 NOTE — ANESTHESIA POSTPROCEDURE EVALUATION
Procedure(s): REVISION LEFT ARTERIO VENOUS FISTULA (AXILLARY BLOCK). Anesthesia Post Evaluation Patient location during evaluation: PACU Note status: Adequate. Level of consciousness: responsive to verbal stimuli and sleepy but conscious Pain management: satisfactory to patient Airway patency: patent Anesthetic complications: no 
Cardiovascular status: acceptable Respiratory status: acceptable Hydration status: acceptable Comments: +Post-Anesthesia Evaluation and Assessment Patient: Angelina Jesus MRN: [id number]  SSN: [id number] YOB: 1955  Age: 58 y.o. Sex: male Cardiovascular Function/Vital Signs /55   Pulse 68   Temp 36.3 °C (97.4 °F)   Resp 17   Ht 5' 9\" (1.753 m)   Wt 69.7 kg (153 lb 10.6 oz)   SpO2 92%   BMI 22.69 kg/m² Patient is status post Procedure(s): REVISION LEFT ARTERIO VENOUS FISTULA (AXILLARY BLOCK). Nausea/Vomiting: Controlled. Postoperative hydration reviewed and adequate. Pain: 
Pain Scale 1: Numeric (0 - 10) (01/22/19 1355) Pain Intensity 1: 5 (01/22/19 1335) Managed. Neurological Status:  
Neuro (WDL): Exceptions to WDL (01/22/19 1335) At baseline. Mental Status and Level of Consciousness: Arousable. Pulmonary Status:  
O2 Device: Room air (01/22/19 1355) Adequate oxygenation and airway patent. Complications related to anesthesia: None Post-anesthesia assessment completed. No concerns. Signed By: Bon Davila MD  
 1/22/2019 Post anesthesia nausea and vomiting:  controlled Visit Vitals /55 Pulse 68 Temp 36.3 °C (97.4 °F) Resp 17 Ht 5' 9\" (1.753 m) Wt 69.7 kg (153 lb 10.6 oz) SpO2 92% BMI 22.69 kg/m²

## 2019-01-22 NOTE — H&P
[facility] HISTORY AND PHYSICAL Name:BARBI ROTH SR 
MR#: [id number] : 1955 ACCOUNT #: [de-identified] ADMIT DATE: 2019 ADMITTING DIAGNOSIS:  Ischemic steal, left arm. HISTORY OF PRESENT ILLNESS:  The patient is a 48-year-old hemodialysis patient with a left brachiocephalic AV fistula, which has both clinical and radiographic and noninvasive evidence of ischemic steal.  His catheter-based angiography reveals filling of the left hand via the ulnar and interosseous arteries and retrograde flow up the radial artery from the hand to the fistula consistent with steal.  He is admitted for ligation of the radial artery just below the fistula. PAST MEDICAL AND SURGICAL HISTORY:  Significant for chronic pain with long-term chronic prescription narcotic usage, chronic kidney disease requiring hemodialysis, hypertension, coronary artery disease. He has lower extremity ischemia with a prior left above-knee amputation. He has had appendectomy, cholecystectomy, extensive back surgery, has had a total hip replacement, dialysis access for dialysis as mentioned above. He has noninsulin dependent diabetes and a long history of tobacco abuse. MEDICATIONS:  Detailed in the [address]. ALLERGIES:  DRUG ALLERGIES ARE NUBAIN. SOCIAL HISTORY:  He now lives at home with his family, having been in a skilled nursing facility for some time. FAMILY HISTORY:  Noncontributory. REVIEW OF SYSTEMS:  Negative for weight change, fever, headache. No chest pain, palpitation, shortness of breath. No cough or wheezing. No change in bowel or bladder habits. No neurologic or psychiatric symptoms, or easy bruising. PHYSICAL EXAMINATION: 
GENERAL:  He is a debilitated, chronically ill-appearing gentleman in no distress. HEAD AND NECK:  Significant for a right-sided dialysis catheter. LUNGS:  Clear. HEART:  Regular. ABDOMEN:  Soft. EXTREMITIES:  Upper extremity examination reveals a patent brachiocephalic fistula in the left upper arm. He has a well-healed left above-knee amputation. IMPRESSION:  Ischemic steal, left arm. PLAN:  Revision of left arm fistula. MD JOSE Mesa/ELY 
D: 01/22/2019 11:52    
T: 01/22/2019 12:06 JOB #: [id number] CC: Lloyd Conroy MD

## 2019-01-22 NOTE — BRIEF OP NOTE
BRIEF OPERATIVE NOTE Date of Procedure: 1/22/2019 Preoperative Diagnosis: Unusable LUE AVF Ischemic steal L arm AVF Postoperative Diagnosis: same Procedure(s): Revision left arm AVF with superficialization and ligation of radial artery Surgeon: Timmy Coe MD  
Anesthesia: block Estimated Blood Loss: none Specimens: none Findings: radial artery with reversal of flow Complications: none Implants: none

## 2019-01-22 NOTE — DISCHARGE INSTRUCTIONS
Patient Discharge Instructions    Austyn Mosquera / [id number] : 1955    Admitted 2019 Discharged: 2019     What to do at Home  Recommended activity: Elevate arm   No needle sticks or BP checks in affected arm  Leave dressing for 72-96 hours. FOLLOW UP VISIT Appointment in: Two Weeks        Information obtained by :  I understand that if any problems occur once I am at home I am to contact my physician. I understand and acknowledge receipt of the instructions indicated above. R.N.'s Signature                                                                  Date/Time                                                                                                                                              Patient or Representative Signature                                                          Date/Time      Ellen John MD       DISCHARGE SUMMARY from Nurse    PATIENT INSTRUCTIONS:    After general anesthesia or intravenous sedation, for 24 hours or while taking prescription Narcotics:  · Limit your activities  · Do not drive and operate hazardous machinery  · Do not make important personal or business decisions  · Do  not drink alcoholic beverages  · If you have not urinated within 8 hours after discharge, please contact your surgeon on call. Report the following to your surgeon:  · Excessive pain, swelling, redness or odor of or around the surgical area  · Temperature over 100.5  · Nausea and vomiting lasting longer than 4 hours or if unable to take medications  · Any signs of decreased circulation or nerve impairment to extremity: change in color, persistent  numbness, tingling, coldness or increase pain  · Any questions    What to do at Home:    *  Please give a list of your current medications to your Primary Care Provider.     *  Please update this list whenever your medications are discontinued, doses are      changed, or new medications (including over-the-counter products) are added. *  Please carry medication information at all times in case of emergency situations. These are general instructions for a healthy lifestyle:    No smoking/ No tobacco products/ Avoid exposure to second hand smoke  Surgeon General's Warning:  Quitting smoking now greatly reduces serious risk to your health. Obesity, smoking, and sedentary lifestyle greatly increases your risk for illness    A healthy diet, regular physical exercise & weight monitoring are important for maintaining a healthy lifestyle    You may be retaining fluid if you have a history of heart failure or if you experience any of the following symptoms:  Weight gain of 3 pounds or more overnight or 5 pounds in a week, increased swelling in our hands or feet or shortness of breath while lying flat in bed. Please call your doctor as soon as you notice any of these symptoms; do not wait until your next office visit. Recognize signs and symptoms of STROKE:    F-face looks uneven    A-arms unable to move or move unevenly    S-speech slurred or non-existent    T-time-call 911 as soon as signs and symptoms begin-DO NOT go       Back to bed or wait to see if you get better-TIME IS BRAIN. Warning Signs of HEART ATTACK     Call 911 if you have these symptoms:   Chest discomfort. Most heart attacks involve discomfort in the center of the chest that lasts more than a few minutes, or that goes away and comes back. It can feel like uncomfortable pressure, squeezing, fullness, or pain.  Discomfort in other areas of the upper body. Symptoms can include pain or discomfort in one or both arms, the back, neck, jaw, or stomach.  Shortness of breath with or without chest discomfort.  Other signs may include breaking out in a cold sweat, nausea, or lightheadedness.   Don't wait more than five minutes to call [address]! Fast action can save your life. Calling 911 is almost always the fastest way to get lifesaving treatment. Emergency Medical Services staff can begin treatment when they arrive -- up to an hour sooner than if someone gets to the hospital by car. The discharge information has been reviewed with the patient and caregiver. The patient and caregiver verbalized understanding. Discharge medications reviewed with the patient and caregiver and appropriate educational materials and side effects teaching were provided. ___________________________________________________________________________________________________________________________________    A common side effect of anesthesia following surgery is nausea and/or vomiting. In order to decrease symptoms, it is wise to avoid foods that are high in fat, greasy foods, milk products, and spicy foods for the first 24 hours. Acceptable foods for the first 24 hours following surgery include but are not limited to:     soup   broth    toast    crackers    applesauce    bananas    mashed potatoes,   soft or scrambled eggs   oatmeal    jello    It is important to eat when taking your pain medication. This will help to prevent nausea. If possible, please try to time your meals with your medications. It is very important to stay hydrated following surgery. Sip fluids frequently while awake. Avoid acidic drinks such as citrus juices and soda for 24 hours. Carbonated beverages may cause bloating and gas. Acceptable fluids include:    - water (flavor packets may add variety)  - coffee or tea (in moderation)  - Gatorade  - Mauricio-aid  - apple juice  - cranberry juice    You are encouraged to cough and deep breathe every hour when awake. This will help to prevent respiratory complications following anesthesia.  You may want to hug a pillow when coughing and sneezing to add additional support to the surgical area and to decrease discomfort if you had abdominal or chest surgery. If you are discharged home with support stockings, you may remove them after 24 hours. Support stockings are used to help prevent blood clots in the legs following surgery. Please take time to review all of your Home Care Instructions and Medication Information sheets provided in your discharge packet. If you have any questions, please contact your surgeons office. Thank you. Patient Education   Narcotic-Analgesic/Acetaminophen (By mouth)   Relieves pain. Brand Name(s): Capital w/Codeine, [place], [place]cht, [place], Lorcet HD, Lorcet Plus, Lortab 10/325, Lortab 5/325, Lortab 7.5/325, Lortab Elixir, [place], [place], [place], Trezix, Tylenol With Codeine No. 4   There may be other brand names for this medicine. When This Medicine Should Not Be Used: You should not use this medicine if you have had an allergic reaction to acetaminophen, codeine, hydrocodone, propoxyphene, or sulfites. You should not use this medicine if you have had an allergic reaction to any other opioid pain medicine. How to Use This Medicine:   Liquid, Tablet, Capsule  · Your doctor will tell you how much medicine to use. Do not use more than directed. · This medicine contains acetaminophen. Read the labels of all other medicines you are using to see if they also contain acetaminophen, or ask your doctor or pharmacist. Ani Salinaselsen not use more than 4 grams (4,000 milligrams) total of acetaminophen in one day. · Drink plenty of liquids to help avoid constipation. If a dose is missed:   · Some of these medicines need to be used on a fixed schedule. If you miss a dose or forget to use your medicine, call your doctor pharmacist for instructions. Do not use extra medicine to make up for a missed dose. How to Store and Dispose of This Medicine:   · Store the medicine in a closed container at room temperature, away from heat, moisture, and direct light. Do not refrigerate or freeze the medicine.   · Ask your pharmacist, doctor, or health caregiver about the best way to dispose of any outdated medicine or medicine no longer needed. · Keep all medicine out of the reach of children. Never share your medicine with anyone. Drugs and Foods to Avoid:   Ask your doctor or pharmacist before using any other medicine, including over-the-counter medicines, vitamins, and herbal products. · Make sure your doctor knows if you are using a monoamine oxidase inhibitor (MAOI) medicine, such as Eldepryl®, Marplan®, [place], or Parnate®. Make sure your doctor knows if you are also using a medicine to treat depression, such as amitriptyline, doxepin, nortriptyline, Elavil®, Pamelor®, or Sinequan®. Make sure your doctor knows if you are taking an anticholinergic medicine, such as atropine, methscopolamine, or scopolamine. · Tell your doctor if you use anything else that makes you sleepy. Some examples are allergy medicine, narcotic pain medicine, and alcohol. · Do not drink alcohol while you are using this medicine. Warnings While Using This Medicine:   · Make sure your doctor knows if you are pregnant or breast feeding, or if you have a head injury, or other conditions that may cause an increase in intercranial pressure (pressure inside your head). Make sure your doctor knows if you have severe kidney problems or severe liver problems, or if you have hypothyroidism (lack of thyroid function). Make sure your doctor knows if you have Bond's disease (adrenal gland disease), or if you have enlarged prostate or urethral stricture. Make sure your doctor knows if you have any abdominal problems, or if you have lung disease or asthma. · This medicine may make you dizzy or drowsy. Avoid driving, using machines, or anything else that could be dangerous if you are not alert. · This medicine can be habit-forming. Do not use more than your prescribed dose. Call your doctor if you think your medicine is not working.   · Tell any doctor or dentist who treats you that you are using this medicine. This medicine may affect certain medical test results. · This medicine may cause constipation, especially with long-term use. Ask your doctor if you should use a laxative to prevent and treat constipation. · When a mother is breastfeeding and takes codeine, there is a very small chance that this medicine could cause serious side effects in the baby. This is because codeine works differently in a few women, so their breast milk contains too much medicine. If you take codeine, be alert for these signs of overdose in your nursing baby: sleeping more than usual, trouble breastfeeding, trouble breathing, or being limp and weak. Call the baby's doctor right away if you think there is a problem. If you cannot talk to the doctor, take the baby to the emergency room or call 911. Possible Side Effects While Using This Medicine:   Call your doctor right away if you notice any of these side effects:  · Allergic reaction: Itching or hives, swelling in your face or hands, swelling or tingling in your mouth or throat, chest tightness, trouble breathing  · Dizziness. · Seeing or hearing things that are not there. · Very slow heartbeat. If you notice these less serious side effects, talk with your doctor:   · Change in how much or how often you urinate. · Cold, clammy skin. · Feeling unusually anxious, excited, fearful, or tired. · Nausea, vomiting, constipation, stomach pain or upset, or heartburn. · Skin rash. · Vision changes. If you notice other side effects that you think are caused by this medicine, tell your doctor. Call your doctor for medical advice about side effects. You may report side effects to FDA at 9-589-FDA-1855  © 2017 [facility] Information is for End User's use only and may not be sold, redistributed or otherwise used for commercial purposes. The above information is an  only.  It is not intended as medical advice for individual conditions or treatments. Talk to your doctor, nurse or pharmacist before following any medical regimen to see if it is safe and effective for you.

## 2019-01-22 NOTE — PERIOP NOTES
TRANSFER - OUT REPORT: 
 
Verbal report given to CAITLYN Almeida(name) on Delia Almeida Sr  being transferred to Phase II(unit) for routine post - op Report consisted of patients Situation, Background, Assessment and  
Recommendations(SBAR). Information from the following report(s) SBAR, Kardex, OR Summary, Procedure Summary and MAR was reviewed with the receiving nurse. Lines:  
Venous Access Device chronic dual lumen catheter LOT [id number] 05/10/18 Upper chest (subclavicular area, right (Active) Peripheral IV 01/22/19 Anterior;Distal;Right Forearm (Active) Site Assessment Clean, dry, & intact 1/22/2019  2:00 PM  
Phlebitis Assessment 0 1/22/2019  2:00 PM  
Infiltration Assessment 0 1/22/2019  2:00 PM  
Dressing Status Clean, dry, & intact 1/22/2019  2:00 PM  
Dressing Type Transparent 1/22/2019  2:00 PM  
Hub Color/Line Status Pink;Capped 1/22/2019  2:00 PM  
  
 
Opportunity for questions and clarification was provided. Patient transported with: 
 Registered Nurse

## 2019-05-05 ENCOUNTER — HOSPITAL ENCOUNTER (OUTPATIENT)
Dept: ULTRASOUND IMAGING | Age: 64
Discharge: HOME | End: 2019-05-05
Attending: INTERNAL MEDICINE
Payer: MEDICARE

## 2019-05-05 DIAGNOSIS — D69.6 THROMBOCYTOPENIA, UNSPECIFIED (HCC): ICD-10-CM

## 2019-05-05 PROCEDURE — 76700 US EXAM ABDOM COMPLETE: CPT

## 2019-05-25 RX ORDER — PRAVASTATIN SODIUM 80 MG/1
TABLET ORAL
Qty: 90 TAB | Refills: 1 | Status: SHIPPED | OUTPATIENT
Start: 2019-05-25

## 2019-06-08 ENCOUNTER — HOSPITAL ENCOUNTER (OUTPATIENT)
Dept: ULTRASOUND IMAGING | Age: 64
Discharge: HOME | End: 2019-06-08
Attending: INTERNAL MEDICINE
Payer: MEDICARE

## 2019-06-08 DIAGNOSIS — D69.6 THROMBOCYTOPENIA, UNSPECIFIED (HCC): ICD-10-CM

## 2019-06-08 DIAGNOSIS — N28.1 ACQUIRED CYST OF KIDNEY: ICD-10-CM

## 2019-06-08 PROCEDURE — 76770 US EXAM ABDO BACK WALL COMP: CPT

## 2019-08-27 NOTE — PERIOP NOTES
[facility]  Preoperative Instructions        Surgery Date 09/17/19          Time of Arrival 1030    1. On the day of your surgery, please report to the Surgical Services Registration Desk and sign in at your designated time. The Surgery Center is located to the right of the Emergency Room. 2. You must have someone with you to drive you home. You should not drive a car for 24 hours following surgery. Please make arrangements for a friend or family member to stay with you for the first 24 hours after your surgery. 3. Do not have anything to eat or drink (including water, gum, mints, coffee, juice) after midnight  09/16/19?? [place] ? This may not apply to medications prescribed by your physician. ?(Please note below the special instructions with medications to take the morning of your procedure.)    4. We recommend you do not drink any alcoholic beverages for 24 hours before and after your surgery. 5. Contact your surgeons office for instructions on the following medications: non-steroidal anti-inflammatory drugs (i.e. Advil, Aleve), vitamins, and supplements. (Some surgeons will want you to stop these medications prior to surgery and others may allow you to take them)  **If you are currently taking Plavix, Coumadin, Aspirin and/or other blood-thinning agents, contact your surgeon for instructions. ** Your surgeon will partner with the physician prescribing these medications to determine if it is safe to stop or if you need to continue taking. Please do not stop taking these medications without instructions from your surgeon    6. Wear comfortable clothes. Wear glasses instead of contacts. Do not bring any money or jewelry. Please bring picture ID, insurance card, and any prearranged co-payment or hospital payment. Do not wear make-up, particularly mascara the morning of your surgery. Do not wear nail polish, particularly if you are having foot /hand surgery.   Wear your hair loose or down, no ponytails, buns, germán pins or clips. All body piercings must be removed. Please shower with antibacterial soap for three consecutive days before and on the morning of surgery, but do not apply any lotions, powders or deodorants after the shower on the day of surgery. Please use a fresh towels after each shower. Please sleep in clean clothes and change bed linens the night before surgery. Please do not shave for 48 hours prior to surgery. Shaving of the face is acceptable. 7. You should understand that if you do not follow these instructions your surgery may be cancelled. If your physical condition changes (I.e. fever, cold or flu) please contact your surgeon as soon as possible. 8. It is important that you be on time. If a situation occurs where you may be late, please call [phone] (OR Holding Area). 9. If you have any questions and or problems, please call [phone] (Pre-admission Testing). 10. Your surgery time may be subject to change. You will receive a phone call the evening prior if your time changes. 11.  If having outpatient surgery, you must have someone to drive you here, stay with you during the duration of your stay, and to drive you home at time of discharge. 12.   In an effort to improve the efficiency, privacy, and safety for all of our Pre-op patients visitors are not allowed in the Holding area. Once you arrive and are registered your family/visitors will be asked to remain in the waiting room. The Pre-op staff will get you from the Surgical Waiting Area and will explain to you and your family/visitors that the Pre-op phase is beginning. The staff will answer any questions and provide instructions for tracking of the patient, by use of the existing tracking number and color-coded status board in the waiting room.   At this time the staff will also ask for your designated spokesperson information in the event that the physician or staff need to provide an update or obtain any pertinent information. The designated spokesperson will be notified if the physician needs to speak to family during the pre-operative phase. If at any time your family/visitors has questions or concerns they may approach the volunteer desk in the waiting area for assistance. Special Instructions: Aspirin and Plavix per Dr. Mekhi Miranda:  Bumex, coreg, flexeril if needed, proscar, prozac, imdur, prilosec, lyrica if needed, nitrostat if needed      I understand a pre-operative phone call will be made to verify my surgery time. In the event that I am not available, I give permission for a message to be left on my answering service and/or with another person?   Yes [phone] (daughter-in-law)         ___________________      __________   _________    (Signature of Patient)             (Witness)                (Date and Time)

## 2019-08-28 NOTE — PERIOP NOTES
Called [facility] for HD labs, Shereen Santoyo stated they are drawing new labs on 9/4 and suggested calling back 9/6 for most recent results.

## 2019-09-04 ENCOUNTER — APPOINTMENT (OUTPATIENT)
Dept: GENERAL RADIOLOGY | Age: 64
DRG: 215 | End: 2019-09-04
Attending: EMERGENCY MEDICINE
Payer: MEDICARE

## 2019-09-04 ENCOUNTER — HOSPITAL ENCOUNTER (INPATIENT)
Age: 64
LOS: 10 days | Discharge: HOME HEALTH | DRG: 215 | End: 2019-09-14
Attending: EMERGENCY MEDICINE | Admitting: THORACIC SURGERY (CARDIOTHORACIC VASCULAR SURGERY)
Payer: MEDICARE

## 2019-09-04 DIAGNOSIS — E11.21 TYPE 2 DIABETES WITH NEPHROPATHY (HCC): ICD-10-CM

## 2019-09-04 DIAGNOSIS — I20.0 UNSTABLE ANGINA (HCC): Primary | ICD-10-CM

## 2019-09-04 DIAGNOSIS — I21.4 NON-STEMI (NON-ST ELEVATED MYOCARDIAL INFARCTION) (HCC): ICD-10-CM

## 2019-09-04 LAB
ALBUMIN SERPL-MCNC: 3.2 G/DL (ref 3.5–5)
ALBUMIN/GLOB SERPL: 0.8 {RATIO} (ref 1.1–2.2)
ALP SERPL-CCNC: 91 U/L (ref 45–117)
ALT SERPL-CCNC: 13 U/L (ref 12–78)
ANION GAP SERPL CALC-SCNC: 10 MMOL/L (ref 5–15)
AST SERPL-CCNC: 16 U/L (ref 15–37)
ATRIAL RATE: 89 BPM
ATRIAL RATE: 92 BPM
BASOPHILS # BLD: 0.1 K/UL (ref 0–0.1)
BASOPHILS NFR BLD: 1 % (ref 0–1)
BILIRUB SERPL-MCNC: 0.6 MG/DL (ref 0.2–1)
BNP SERPL-MCNC: ABNORMAL PG/ML
BUN SERPL-MCNC: 69 MG/DL (ref 6–20)
BUN/CREAT SERPL: 10 (ref 12–20)
CALCIUM SERPL-MCNC: 8.8 MG/DL (ref 8.5–10.1)
CALCULATED P AXIS, ECG09: 39 DEGREES
CALCULATED P AXIS, ECG09: 67 DEGREES
CALCULATED R AXIS, ECG10: -55 DEGREES
CALCULATED R AXIS, ECG10: -67 DEGREES
CALCULATED T AXIS, ECG11: -28 DEGREES
CALCULATED T AXIS, ECG11: 26 DEGREES
CHLORIDE SERPL-SCNC: 104 MMOL/L (ref 97–108)
CK SERPL-CCNC: 52 U/L (ref 39–308)
CO2 SERPL-SCNC: 25 MMOL/L (ref 21–32)
CREAT SERPL-MCNC: 7.22 MG/DL (ref 0.7–1.3)
DIAGNOSIS, 93000: NORMAL
DIAGNOSIS, 93000: NORMAL
DIFFERENTIAL METHOD BLD: ABNORMAL
EOSINOPHIL # BLD: 0.3 K/UL (ref 0–0.4)
EOSINOPHIL NFR BLD: 4 % (ref 0–7)
ERYTHROCYTE [DISTWIDTH] IN BLOOD BY AUTOMATED COUNT: 17.4 % (ref 11.5–14.5)
GLOBULIN SER CALC-MCNC: 4.2 G/DL (ref 2–4)
GLUCOSE BLD STRIP.AUTO-MCNC: 114 MG/DL (ref 65–100)
GLUCOSE SERPL-MCNC: 140 MG/DL (ref 65–100)
HCT VFR BLD AUTO: 38.4 % (ref 36.6–50.3)
HEMOCCULT STL QL: NEGATIVE
HGB BLD-MCNC: 11.7 G/DL (ref 12.1–17)
IMM GRANULOCYTES # BLD AUTO: 0 K/UL (ref 0–0.04)
IMM GRANULOCYTES NFR BLD AUTO: 1 % (ref 0–0.5)
LYMPHOCYTES # BLD: 1.3 K/UL (ref 0.8–3.5)
LYMPHOCYTES NFR BLD: 21 % (ref 12–49)
MCH RBC QN AUTO: 30.9 PG (ref 26–34)
MCHC RBC AUTO-ENTMCNC: 30.5 G/DL (ref 30–36.5)
MCV RBC AUTO: 101.3 FL (ref 80–99)
MONOCYTES # BLD: 0.8 K/UL (ref 0–1)
MONOCYTES NFR BLD: 13 % (ref 5–13)
NEUTS SEG # BLD: 3.6 K/UL (ref 1.8–8)
NEUTS SEG NFR BLD: 60 % (ref 32–75)
NRBC # BLD: 0 K/UL (ref 0–0.01)
NRBC BLD-RTO: 0 PER 100 WBC
P-R INTERVAL, ECG05: 204 MS
P-R INTERVAL, ECG05: 208 MS
PLATELET # BLD AUTO: 55 K/UL (ref 150–400)
PMV BLD AUTO: 12.5 FL (ref 8.9–12.9)
POTASSIUM SERPL-SCNC: 4.2 MMOL/L (ref 3.5–5.1)
PROT SERPL-MCNC: 7.4 G/DL (ref 6.4–8.2)
Q-T INTERVAL, ECG07: 414 MS
Q-T INTERVAL, ECG07: 442 MS
QRS DURATION, ECG06: 150 MS
QRS DURATION, ECG06: 164 MS
QTC CALCULATION (BEZET), ECG08: 511 MS
QTC CALCULATION (BEZET), ECG08: 537 MS
RBC # BLD AUTO: 3.79 M/UL (ref 4.1–5.7)
SERVICE CMNT-IMP: ABNORMAL
SODIUM SERPL-SCNC: 139 MMOL/L (ref 136–145)
TROPONIN I SERPL-MCNC: 0.09 NG/ML
TROPONIN I SERPL-MCNC: 0.09 NG/ML
VENTRICULAR RATE, ECG03: 89 BPM
VENTRICULAR RATE, ECG03: 92 BPM
WBC # BLD AUTO: 6 K/UL (ref 4.1–11.1)

## 2019-09-04 PROCEDURE — 82550 ASSAY OF CK (CPK): CPT

## 2019-09-04 PROCEDURE — 65660000000 HC RM CCU STEPDOWN

## 2019-09-04 PROCEDURE — 71045 X-RAY EXAM CHEST 1 VIEW: CPT

## 2019-09-04 PROCEDURE — 80053 COMPREHEN METABOLIC PANEL: CPT

## 2019-09-04 PROCEDURE — 93005 ELECTROCARDIOGRAM TRACING: CPT

## 2019-09-04 PROCEDURE — 85025 COMPLETE CBC W/AUTO DIFF WBC: CPT

## 2019-09-04 PROCEDURE — 86900 BLOOD TYPING SEROLOGIC ABO: CPT

## 2019-09-04 PROCEDURE — 99285 EMERGENCY DEPT VISIT HI MDM: CPT

## 2019-09-04 PROCEDURE — 36415 COLL VENOUS BLD VENIPUNCTURE: CPT

## 2019-09-04 PROCEDURE — 82962 GLUCOSE BLOOD TEST: CPT

## 2019-09-04 PROCEDURE — 84484 ASSAY OF TROPONIN QUANT: CPT

## 2019-09-04 PROCEDURE — 82272 OCCULT BLD FECES 1-3 TESTS: CPT

## 2019-09-04 PROCEDURE — 86923 COMPATIBILITY TEST ELECTRIC: CPT

## 2019-09-04 PROCEDURE — 74011250637 HC RX REV CODE- 250/637: Performed by: INTERNAL MEDICINE

## 2019-09-04 PROCEDURE — B24BZZ4 ULTRASONOGRAPHY OF HEART WITH AORTA, TRANSESOPHAGEAL: ICD-10-PCS | Performed by: ANESTHESIOLOGY

## 2019-09-04 PROCEDURE — 83880 ASSAY OF NATRIURETIC PEPTIDE: CPT

## 2019-09-04 PROCEDURE — 74011250637 HC RX REV CODE- 250/637: Performed by: EMERGENCY MEDICINE

## 2019-09-04 RX ORDER — ISOSORBIDE MONONITRATE 30 MG/1
30 TABLET, EXTENDED RELEASE ORAL
Status: DISCONTINUED | OUTPATIENT
Start: 2019-09-05 | End: 2019-09-04

## 2019-09-04 RX ORDER — PRAVASTATIN SODIUM 40 MG/1
80 TABLET ORAL DAILY
Status: DISCONTINUED | OUTPATIENT
Start: 2019-09-05 | End: 2019-09-04

## 2019-09-04 RX ORDER — MIRTAZAPINE 15 MG/1
30 TABLET, FILM COATED ORAL
Status: DISCONTINUED | OUTPATIENT
Start: 2019-09-04 | End: 2019-09-14 | Stop reason: HOSPADM

## 2019-09-04 RX ORDER — LISINOPRIL 20 MG/1
20 TABLET ORAL DAILY
Status: DISCONTINUED | OUTPATIENT
Start: 2019-09-05 | End: 2019-09-07

## 2019-09-04 RX ORDER — PANTOPRAZOLE SODIUM 40 MG/1
40 TABLET, DELAYED RELEASE ORAL 2 TIMES DAILY
Status: DISCONTINUED | OUTPATIENT
Start: 2019-09-04 | End: 2019-09-06

## 2019-09-04 RX ORDER — FLUOXETINE HYDROCHLORIDE 20 MG/1
20 CAPSULE ORAL DAILY
Status: DISCONTINUED | OUTPATIENT
Start: 2019-09-05 | End: 2019-09-14 | Stop reason: HOSPADM

## 2019-09-04 RX ORDER — CLOPIDOGREL BISULFATE 75 MG/1
75 TABLET ORAL DAILY
Status: DISCONTINUED | OUTPATIENT
Start: 2019-09-05 | End: 2019-09-04

## 2019-09-04 RX ORDER — ISOSORBIDE MONONITRATE 30 MG/1
30 TABLET, EXTENDED RELEASE ORAL DAILY
Status: DISCONTINUED | OUTPATIENT
Start: 2019-09-04 | End: 2019-09-07

## 2019-09-04 RX ORDER — CARVEDILOL 6.25 MG/1
6.25 TABLET ORAL 2 TIMES DAILY WITH MEALS
Status: DISCONTINUED | OUTPATIENT
Start: 2019-09-04 | End: 2019-09-06

## 2019-09-04 RX ORDER — MAGNESIUM SULFATE 100 %
4 CRYSTALS MISCELLANEOUS AS NEEDED
Status: DISCONTINUED | OUTPATIENT
Start: 2019-09-04 | End: 2019-09-10 | Stop reason: SDUPTHER

## 2019-09-04 RX ORDER — PREGABALIN 150 MG/1
150 CAPSULE ORAL 3 TIMES DAILY
COMMUNITY

## 2019-09-04 RX ORDER — BUMETANIDE 1 MG/1
1 TABLET ORAL DAILY
Status: DISCONTINUED | OUTPATIENT
Start: 2019-09-05 | End: 2019-09-08

## 2019-09-04 RX ORDER — NITROGLYCERIN 0.4 MG/1
0.4 TABLET SUBLINGUAL
Status: DISCONTINUED | OUTPATIENT
Start: 2019-09-04 | End: 2019-09-14 | Stop reason: HOSPADM

## 2019-09-04 RX ORDER — ASPIRIN 81 MG/1
81 TABLET ORAL DAILY
Status: DISCONTINUED | OUTPATIENT
Start: 2019-09-05 | End: 2019-09-07

## 2019-09-04 RX ORDER — CYCLOBENZAPRINE HCL 10 MG
5 TABLET ORAL
Status: DISCONTINUED | OUTPATIENT
Start: 2019-09-04 | End: 2019-09-14 | Stop reason: HOSPADM

## 2019-09-04 RX ORDER — DOCUSATE SODIUM 100 MG/1
100 CAPSULE, LIQUID FILLED ORAL DAILY
COMMUNITY

## 2019-09-04 RX ORDER — GUAIFENESIN 100 MG/5ML
81 LIQUID (ML) ORAL DAILY
Status: DISCONTINUED | OUTPATIENT
Start: 2019-09-05 | End: 2019-09-04

## 2019-09-04 RX ORDER — BUMETANIDE 1 MG/1
1 TABLET ORAL DAILY
Status: DISCONTINUED | OUTPATIENT
Start: 2019-09-05 | End: 2019-09-04

## 2019-09-04 RX ORDER — GUAIFENESIN 100 MG/5ML
243 LIQUID (ML) ORAL
Status: COMPLETED | OUTPATIENT
Start: 2019-09-04 | End: 2019-09-04

## 2019-09-04 RX ORDER — PRAVASTATIN SODIUM 40 MG/1
80 TABLET ORAL
Status: DISCONTINUED | OUTPATIENT
Start: 2019-09-04 | End: 2019-09-14 | Stop reason: HOSPADM

## 2019-09-04 RX ORDER — PREGABALIN 25 MG/1
50 CAPSULE ORAL 3 TIMES DAILY
Status: DISCONTINUED | OUTPATIENT
Start: 2019-09-04 | End: 2019-09-14 | Stop reason: HOSPADM

## 2019-09-04 RX ORDER — DEXTROSE MONOHYDRATE 100 MG/ML
0-250 INJECTION, SOLUTION INTRAVENOUS AS NEEDED
Status: DISCONTINUED | OUTPATIENT
Start: 2019-09-04 | End: 2019-09-10 | Stop reason: SDUPTHER

## 2019-09-04 RX ORDER — NITROGLYCERIN 0.4 MG/1
0.4 TABLET SUBLINGUAL
Status: DISCONTINUED | OUTPATIENT
Start: 2019-09-04 | End: 2019-09-05 | Stop reason: SDUPTHER

## 2019-09-04 RX ORDER — INSULIN LISPRO 100 [IU]/ML
INJECTION, SOLUTION INTRAVENOUS; SUBCUTANEOUS
Status: DISCONTINUED | OUTPATIENT
Start: 2019-09-04 | End: 2019-09-09

## 2019-09-04 RX ORDER — FINASTERIDE 5 MG/1
5 TABLET, FILM COATED ORAL DAILY
Status: DISCONTINUED | OUTPATIENT
Start: 2019-09-05 | End: 2019-09-14 | Stop reason: HOSPADM

## 2019-09-04 RX ADMIN — CARVEDILOL 6.25 MG: 6.25 TABLET, FILM COATED ORAL at 17:46

## 2019-09-04 RX ADMIN — CYCLOBENZAPRINE HYDROCHLORIDE 5 MG: 10 TABLET, FILM COATED ORAL at 17:46

## 2019-09-04 RX ADMIN — NITROGLYCERIN 0.4 MG: 0.4 TABLET, ORALLY DISINTEGRATING SUBLINGUAL at 14:24

## 2019-09-04 RX ADMIN — PANTOPRAZOLE SODIUM 40 MG: 40 TABLET, DELAYED RELEASE ORAL at 20:15

## 2019-09-04 RX ADMIN — ASPIRIN 81 MG 243 MG: 81 TABLET ORAL at 13:47

## 2019-09-04 RX ADMIN — TRAZODONE HYDROCHLORIDE 150 MG: 50 TABLET ORAL at 22:22

## 2019-09-04 RX ADMIN — ISOSORBIDE MONONITRATE 30 MG: 30 TABLET, EXTENDED RELEASE ORAL at 17:46

## 2019-09-04 RX ADMIN — PREGABALIN 50 MG: 50 CAPSULE ORAL at 22:21

## 2019-09-04 RX ADMIN — MIRTAZAPINE 30 MG: 15 TABLET, FILM COATED ORAL at 22:21

## 2019-09-04 RX ADMIN — PRAVASTATIN SODIUM 80 MG: 40 TABLET ORAL at 22:22

## 2019-09-04 NOTE — Clinical Note
Lesion located in the Ostium Cx. Balloon inflated using single inflation technique. Pressure = 12 anna; Duration = 20 sec.

## 2019-09-04 NOTE — ACP (ADVANCE CARE PLANNING)
Advance Care Planning Note      NAME: Bridget Gomez Sr.   :  1955   MRN:  [id number]     Date/Time:  2019 5:50 PM    Active Diagnoses:  Hospital Problems  Date Reviewed: 2019          Codes Class Noted POA    Unstable angina ([facility] [address] ICD-10-CM: I20.0  ICD-9-CM: 411.1  2019 Unknown          CAD with multiple stents  ESRD on HD  DM type 2  HTN  PAD    These active diagnoses are of sufficient risk that focused discussion on advance care planning is indicated in order to allow the patient to thoughtfully consider personal goals of care, and if situations arise that prevent the ability to personally give input, to ensure appropriate representation of their personal desires for different levels and aggressiveness of care. Discussion:   Code status addressed and wants to be a Full Code. Patient wants central line and vasopressors if needed. .  Patient  would like to assign wife Brittany Vizcarra  as the surrogate decision maker. Persons present and participating in discussion: Liliana Agudelo, Janene Mart MD.      Time Spent:   Total time spent face-to-face in education and discussion:  16   minutes.          Janene Mart MD   Hospitalist

## 2019-09-04 NOTE — Clinical Note
Lesion: Located in the Ostium LAD. Stent deployed. Single technique used. First inflation pressure = 12 anna; inflation time: 20 sec.

## 2019-09-04 NOTE — Clinical Note
Lesion: Located in the Ostium OM 1. Stent inserted. Single technique used. First inflation pressure = 12 anna; inflation time: 15 sec.

## 2019-09-04 NOTE — Clinical Note
Lesion located in the Mid LM. Balloon inflated using kissing inflation technique. A CATH BLLN COR DIL 3.5X15MM -- NC TREK RAPID-EXCHANGE was also used. Pressure = 10 anna; Duration = 15 sec. Pressure = 10 anna; Duration = 15 sec.

## 2019-09-04 NOTE — Clinical Note
Lesion located in the Ostium LAD. Balloon inserted. Balloon inflated using single inflation technique. Pressure = 12 anna; Duration = 15 sec.  ON PROWATER  OSTIAL LAD TO LEFT MAIN

## 2019-09-04 NOTE — Clinical Note
Sheath #1: Sheath: left in place. Site secured by Tegaderm and suture. Sheath connected to heparin pressure bag.

## 2019-09-04 NOTE — Clinical Note
Lesion located in the Ostium OM 1. Balloon inflated using single inflation technique. Pressure = 12 anna; Duration = 15 sec.

## 2019-09-04 NOTE — Clinical Note
TRANSFER - OUT REPORT:  
 
Verbal report given to: CAITLYN AHUMADA. Report consisted of patient's Situation, Background, Assessment and  
Recommendations(SBAR). Opportunity for questions and clarification was provided. Patient transported with a Registered Nurse, [address] [place] [address] / Patient Care Tech, Monitor and Oxygen. Oxygen used for patient = nasal cannula. Patient transported to: CCU.

## 2019-09-04 NOTE — Clinical Note
Entiat-Jose catheter left in place and secured by: suturing in place and tegaderm.  [facility]

## 2019-09-04 NOTE — Clinical Note
Single view of the left ventricle obtained using power injection. Total volume = 24 mL. Rate = 12 mL/sec. Pressure = 900 PSI. Rate of rise = 0 sec.

## 2019-09-04 NOTE — Clinical Note
TRANSFER - OUT REPORT:  
 
Verbal report given to: CAITLYN PERLA. Report consisted of patient's Situation, Background, Assessment and  
Recommendations(SBAR). Opportunity for questions and clarification was provided. Patient transported with a Registered Nurse, [address] [place] [address] / Patient Care Tech, Monitor and Oxygen. Patient transported to: ICU.

## 2019-09-04 NOTE — PROGRESS NOTES
Reason for Admission:   Unstable angina               RRAT Score:     32 high risk             Resources/supports as identified by patient/family:   family                Top Challenges facing patient (as identified by patient/family and CM): Finances/Medication cost?      No challenges reported              Transportation? Uses Medicaid transportatoin              Support system or lack thereof?  family                     Living arrangements? Lives with son and daughter in law and his wife, 2 story house, outside ramp           Self-care/ADLs/Cognition? Daughter in law is caregiver, assists with all ADLs, patient currently alert and oriented          Current Advanced Directive/Advance Care Plan:  none                          Plan for utilizing home health:    Has used home health in the past                 Transition of Care Plan:                  1.  Patient in ED bed waiting for inpatient admission  2. Patient will need 2nd IM letter at discharge  3. Patient prefers to discharge home with family assistance/daughter in law as caregiver and follow up appointments    Patient is a 71year old male admitted 9/4 for unstable angina. Patient alert and oriented, resting in bed, no visitors present in room. Demographic information verified and correct. Insurance information verified and correct. Patient and his wife live with son, daughter in law and their children, no home oxygen, has used home health in the past.  Patient uses a power chair and does not walk. Patient uses [facility] pharmacy in [place]  Patient states his daughter is his caregiver and assists with all ADLs. Patient does not drive, but uses his Medicaid transportation for appointments.     Patient states a nurse comes to his house for wound care on his foot on Tuesdays and Thursdays    Patient states he is a MWF dialysis patient at [facility], chair time 12 noon      Care Management Interventions  PCP Verified by CM: Yes(Jeremy George DO)  Mode of Transport at Discharge:  Other (see comment)(pt has Medicaid transportation)  Transition of Care Consult (CM Consult): Discharge Planning  Discharge Durable Medical Equipment: No  Physical Therapy Consult: No  Occupational Therapy Consult: No  Speech Therapy Consult: No  Current Support Network: Relative's Home(lives with son and daughter in law and wife, 2 story house, outside ramp)  Confirm Follow Up Transport: Other (see comment)  Plan discussed with Pt/Family/Caregiver: Yes  Discharge Location  Discharge Placement: [address] Foster, RN, 24 [facility]  [phone]

## 2019-09-04 NOTE — Clinical Note
Lesion: Located in the Ostium Cx. Stent deployed. Single technique used. First inflation pressure = 12 anna; inflation time: 20 sec.

## 2019-09-04 NOTE — Clinical Note
Lesion located in the Ostium LAD. Balloon inflated using single inflation technique. Pressure = 12 anna; Duration = 20 sec.

## 2019-09-04 NOTE — Clinical Note
Vessel: bilateral AA w/ Runoff, Power injection to the artery. Single view taken. PSI = 900. Rate of rise = 0 sec. Injection rate = 10 mL/sec. Total injected volume = 40 mL.

## 2019-09-04 NOTE — PROGRESS NOTES
Pharmacy Clarification of the Prior to Admission Medication Regimen Retrospective to the Admission Medication Reconciliation-Follow Up Needed    The patient was interviewed regarding clarification of the prior to admission medication regimen and stated he did not know the names of the medication he takes, that his daughter-in-law manages his medications. T called the patient's daughter-in-law, Tenisha Fry, [phone], and left a voicemail. Patient's daughter-in-law called T back and was able to verify the patient's PTA medications. Information Obtained From: Patient's daughter-in-law, med list, RX Query    Recommendations/Findings: The following amendments were made to the patient's active medication list on file at [facility]:     1) Additions:   ferric citrate (AURYXIA) 210 mg iron tablet    2) Removals:   cholecalciferol  clopidogrel  diphenhydramine  loperamide  tradjenta    3) Changes:  pregabalin (LYRICA) (Old regimen: (strength 50 mg) 50 mg TID /New regimen: (strength 150 mg) 150 mg TID)    4) Pertinent Pharmacy Findings:  Patient's daughter-in-law, Tenisha Fry, manages the patient's medications and fills his weekly pill organizer. Per patient's daughter-in-law, the patient refuses to take his evening and QHS medications, and patient will pick and choose which medications to take out of his mornings medications at random. The patient's daughter-in-law is unaware of which medications were taken when. Calcitriol 0.25 mg and Magnesium oxide 400 mg: Per the patient's daughter-in-law, the patient is also prescribed these agents, but the patient is refusing to take them. The medication history will need to be re-evaluated at a later time during admission when patient is willing/able to participate or if more information is provided. PTA medication list was corrected to the following:     Prior to Admission Medications   Prescriptions Last Dose Informant Patient Reported? Taking?    FLUoxetine (PROZAC) 20 mg capsule Unknown at Unknown time Child Yes No   Sig: Take 20 mg by mouth daily. aspirin 81 mg chewable tablet Unknown at Unknown time Child No No   Sig: Take 1 Tab by mouth daily. Indications: myocardial infarction prevention   bumetanide (BUMEX) 1 mg tablet Unknown at Unknown time Child No No   Sig: TAKE 1 TABLET BY MOUTH EVERY DAY   carvedilol (COREG) 6.25 mg tablet Unknown at Unknown time Child Yes No   Sig: Take 6.25 mg by mouth two (2) times daily (with meals). clopidogrel (PLAVIX) 75 mg tab Unknown at Unknown time Child No No   Sig: TAKE 1 TABLET BY MOUTH EVERY DAY   cyclobenzaprine (FLEXERIL) 5 mg tablet Unknown at Unknown time Child Yes No   Sig: Take 5 mg by mouth three (3) times daily as needed for Muscle Spasm(s). docusate sodium (COLACE) 100 mg capsule Unknown at Unknown time Child Yes No   Sig: Take 100 mg by mouth daily. ferric citrate (AURYXIA) 210 mg iron tablet Unknown at Unknown time Child Yes No   Sig: Take 420 mg by mouth Before breakfast, lunch, and dinner. finasteride (PROSCAR) 5 mg tablet Unknown at Unknown time Child No No   Sig: TAKE 1 TABLET BY MOUTH EVERY DAY   isosorbide mononitrate ER (IMDUR) 30 mg tablet Unknown at Unknown time Child Yes No   Sig: Take 30 mg by mouth every morning. lisinopril (PRINIVIL, ZESTRIL) 20 mg tablet Unknown at Unknown time Child Yes No   Sig: Take 20 mg by mouth daily. mirtazapine (REMERON) 30 mg tablet Not Taking at Unknown time Child No No   Sig: TAKE 1 TABLET BY MOUTH EVERY DAY AT NIGHT   nitroglycerin (NITROSTAT) 0.4 mg SL tablet 9/3/2019 at Unknown time Child No Yes   Si Tab by SubLINGual route every five (5) minutes as needed for Chest Pain.    omeprazole (PRILOSEC) 40 mg capsule Unknown at Unknown time Child No No   Sig: TAKE 1 CAPSULE BY MOUTH TWICE A DAY   pravastatin (PRAVACHOL) 80 mg tablet Not Taking at Unknown time Child No No   Sig: TAKE 1 TABLET BY MOUTH EVERY DAY AT NIGHT   pregabalin (LYRICA) 150 mg capsule Unknown at Unknown time Child Yes No   Sig: Take 150 mg by mouth three (3) times daily.    traZODone (DESYREL) 150 mg tablet Not Taking at Unknown time Child No No   Sig: TAKE 1 TABLET BY MOUTH EVERY DAY AT NIGHT      Facility-Administered Medications: None          Thank you,  Carolina Carter, [facility]  Medication History Pharmacy Technician

## 2019-09-04 NOTE — Clinical Note
TRANSFER - OUT REPORT:  
 
Verbal report given to: Mae Pollock RN. Report consisted of patient's Situation, Background, Assessment and  
Recommendations(SBAR). Opportunity for questions and clarification was provided. Patient transported with a Registered Nurse and [address] [place] [address] / [facility]. Patient transported to: CCU.   
Kristina Ortiz RN with follow SBAR

## 2019-09-04 NOTE — ED NOTES
TRANSFER - OUT REPORT:    Verbal report given to CAITLYN Garcia(name) on [facility]  being transferred to [phone] [facility](unit) for routine progression of care       Report consisted of patients Situation, Background, Assessment and   Recommendations(SBAR). Information from the following report(s) SBAR, Kardex, ED Summary, MAR and Med Rec Status was reviewed with the receiving nurse. Lines:   Venous Access Device chronic dual lumen catheter LOT [id number] 05/10/18 Upper chest (subclavicular area, right (Active)       Peripheral IV 09/04/19 Right Antecubital (Active)   Site Assessment Clean, dry, & intact 9/4/2019  1:42 PM   Phlebitis Assessment 0 9/4/2019  1:42 PM   Infiltration Assessment 0 9/4/2019  1:42 PM   Dressing Status Clean, dry, & intact 9/4/2019  1:42 PM   Dressing Type Transparent;Tape 9/4/2019  1:42 PM        Opportunity for questions and clarification was provided.       Patient transported with: Tech/Transport

## 2019-09-04 NOTE — Clinical Note
DR CASTRO [facility] / OR TEAM STILL PRESENT CLOSING RIGHT SUBCLAVIAN IMPELLA SITE.  ORDERED PLATELETS INFUSING

## 2019-09-04 NOTE — Clinical Note
Lesion located in the Ostium LAD. Balloon inflated using kissing inflation technique. A CATH BLLN COR DIL 3.5X15MM -- NC TREK RAPID-EXCHANGE was also used. Pressure = 14 anna; Duration = 20 sec. Pressure = 14 anna; Duration = 20 sec.  OSTIAL CIRC INTO LEFT MAIN

## 2019-09-04 NOTE — Clinical Note
Lesion: Located in the Proximal LAD. Stent inserted. Stent deployed. Single technique used. First inflation pressure = 12 anna; inflation time: 25 sec.

## 2019-09-04 NOTE — CONSULTS
Consult/Admission    NAME: Surinder Glez   :  1955   MRN:  [id number]     Date/Time:  2019 4:23 PM    Patient PCP: Lois Guzman DO  ________________________________________________________________________     Assessment:     May 2018:      1. Chest pain; cath  w/ RCA , 60% oLAD w/ neg FFR (0.88), patent LCx stent w/ mild ISR. 2. Right renal artery stenosis s/p 7.0 x 19mm BMS 18  3. Occluded LCIA. 4. Elevated troponin  5. ESRD on dialysis. Dr Kaylene Wilson   6. Long term hx of Coronary artery disease s/p multivessel stenting s/p PCI of LCx, PTCA of RCA , PTCA LAD 3/94.  Lexiscan  w/ EF 62%, distal ineroapical infarct w/o ischemia  7. Cath 2018  No intervention. Lv EF  -  55%   8. Peripheral vascular disease s/p left iliac stenting, left SFA stenting 3/00, right iliac stenting .  9. Status post L AKA  10. Bilateral carotid stenosis; 16-49%   11. Hypertension  12. Diabetes  13. Chronic Tobacco abuse 1-2 PPD . 14. Chronic pain  15. Chronic anemia  16. Hyperlipidemia  17. Noncompliance  18. Peptic ulcer disease w/ GIB '15  19. Falls  20. Lives w/ sister in [facility] now  24. Usual Cardiologist:  Dr. Elenita Barragan / Yelena Hickey. EKG  NSR  RBBB. Troponin normal.    Pro BNP > 30,000.                Plan: Will make sure he is on cardiac meds. May need to do cath to reassess coronaries. [x]           High complexity decision making was performed        Subjective:   CHIEF COMPLAINT: \" I don't feel worth a shit \"  Which is about how he felt at last office visit in November. HISTORY OF PRESENT ILLNESS:     Michelle Bowen is a 71 y.o.  male who has above diagnoses. He had cath here a year ago and did not require any intervention at that time. He did have tight ROSALEE and had a R renal stent done. He is on dialysis with Dr Kaylene Wilson. He went to dialysis today, but did not get dialyzed. Sent to ER instead.       Has been having a feeling of heaviness in the chest for the last few weeks. Takes SL NTG for relief, sometimes requiring several for relief. Went to dialysis center today but was sent over here instead. EKG NSR RBBB           Past Medical History:   Diagnosis Date    Anemia     Arthritis     back, hips    CAD (coronary artery disease)     Sees Dr. Ronnie Choi, 4 heart attacks    [facility]     Chronic LBP 1990    Slipped on gravel and fell at work; Multiple surgeries;  Sees Dr. May Castleman for pain mgmt    Chronic pain     Confusion     Depression     [facility]     ED (erectile dysfunction)     GERD (gastroesophageal reflux disease) 11/2015    Diagnosed at [facility] last month    Hypercholesteremia     Hypertension     Liver disease     Psychiatric disorder     depression    PVD (peripheral vascular disease) ([facility]     Thromboembolus ([facility] [address]     DVT in right leg      Past Surgical History:   Procedure Laterality Date    CARDIAC SURG PROCEDURE UNLIST      PTCA    HX ABOVE KNEE AMPUTATION Left 2013    Left knee stump non-healing ulcer; Dr. Naren Rodriguez Left     HX APPENDECTOMY      HX BELOW KNEE AMPUTATION      Left leg    HX CHOLECYSTECTOMY      HX GI      HX HIP REPLACEMENT      right hip replaced x 2    HX ORTHOPAEDIC  1990s    4 back surgeries    HX ORTHOPAEDIC      donna ankles pin placed    HX ORTHOPAEDIC      left leg 17 surgeries    HX VASCULAR ACCESS Left     port left arm    UPPER GI ENDOSCOPY,BIOPSY  9/2/2015         UPPER GI ENDOSCOPY,BIOPSY  10/12/2015         VASCULAR SURGERY PROCEDURE UNLIST      Multiple revascularization procedures, toe amputations     Allergies   Allergen Reactions    Nubain [Nalbuphine] Other (comments)     Made me wild; Dysphoria      Meds:  See below  Social History     Tobacco Use    Smoking status: Current Every Day Smoker     Packs/day: 1.00     Years: 35.00     Pack years: 35.00  Smokeless tobacco: Never Used    Tobacco comment: 30 pack years; Occasional cigar   Substance Use Topics    Alcohol use: No     Comment: Former heavy drinker quit drinking about 17 years ago      Family History   Problem Relation Age of Onset    Alcohol abuse Father     Diabetes Sister     Diabetes Sister     Lung Disease Mother     Cancer Maternal Grandfather         Head and neck    Cancer Paternal Grandmother         Stomach       REVIEW OF SYSTEMS:     []            Unable to obtain  ROS due to ---   [x]            Total of 12 systems reviewed as follows:    Constitutional: negative fever, negative chills, negative weight loss  Eyes:   negative visual changes  ENT:   negative sore throat, tongue or lip swelling  Respiratory:  negative cough, negative dyspnea  Cards:  negative for chest pain, palpitations, lower extremity edema  GI:   negative for nausea, vomiting, diarrhea, and abdominal pain  Genitourinary: negative for frequency, dysuria  Integument:  negative for rash   Hematologic:  negative for easy bruising and gum/nose bleeding  Musculoskel: negative for myalgias,  back pain  Neurological:  negative for headaches, dizziness, vertigo, weakness  Behavl/Psych: negative for feelings of anxiety, depression     Pertinent Positives include :    Objective:      Physical Exam:    Last 24hrs VS reviewed since prior progress note. Most recent are:    Visit Vitals  /82   Pulse 87   Temp 97.4 °F (36.3 °C)   Resp 20   Ht 5' 7\" (1.702 m)   Wt 77.1 kg (170 lb)   SpO2 97%   BMI 26.63 kg/m²     No intake or output data in the 24 hours ending 09/04/19 1623     General Appearance: Well developed, well nourished, alert & oriented x 3,    no acute distress. Ears/Nose/Mouth/Throat: Pupils equal and round, Hearing grossly normal.  Neck: Supple. JVP within normal limits. Carotids good upstrokes, with no bruit. Chest: Lungs clear to auscultation bilaterally.   Cardiovascular: Regular rate and rhythm, S1S2 normal, no murmur, rubs, gallops. Abdomen: Soft, non-tender, bowel sounds are active. No organomegaly. Extremities: No edema bilaterally. Femoral pulses +2, Distal Pulses +1. Skin: Warm and dry. Neuro: CN II-XII grossly intact, Strength and sensation grossly intact. Data:      Prior to Admission medications    Medication Sig Start Date End Date Taking? Authorizing Provider   bumetanide (BUMEX) 1 mg tablet TAKE 1 TABLET BY MOUTH EVERY DAY 7/29/19   Jarad Ndiaye MD   pravastatin (PRAVACHOL) 80 mg tablet TAKE 1 TABLET BY MOUTH EVERY DAY AT NIGHT 5/25/19   Ruben Ndiaye MD   isosorbide mononitrate ER (IMDUR) 30 mg tablet Take 30 mg by mouth every morning. Provider, Historical   FLUoxetine (PROZAC) 20 mg capsule Take 20 mg by mouth daily. Provider, Historical   lisinopril (PRINIVIL, ZESTRIL) 20 mg tablet Take 20 mg by mouth daily. Provider, Historical   carvedilol (COREG) 6.25 mg tablet Take 6.25 mg by mouth two (2) times daily (with meals). Provider, Historical   cyclobenzaprine (FLEXERIL) 5 mg tablet Take 5 mg by mouth three (3) times daily as needed for Muscle Spasm(s). Provider, Historical   traZODone (DESYREL) 150 mg tablet TAKE 1 TABLET BY MOUTH EVERY DAY AT NIGHT 12/1/18   August MARKS MD   mirtazapine (REMERON) 30 mg tablet TAKE 1 TABLET BY MOUTH EVERY DAY AT NIGHT 12/1/18   Ruben Ndiaye MD   TRADJENTA 5 mg tablet TAKE 1 TABLET BY MOUTH EVERY DAY 12/1/18   Ruben Ndiaye MD   omeprazole (PRILOSEC) 40 mg capsule TAKE 1 CAPSULE BY MOUTH TWICE A DAY 12/1/18   Jarad Ndiaye MD   finasteride (PROSCAR) 5 mg tablet TAKE 1 TABLET BY MOUTH EVERY DAY 12/1/18   Sherif Ndiaye MD   clopidogrel (PLAVIX) 75 mg tab TAKE 1 TABLET BY MOUTH EVERY DAY 12/1/18   Jarad Ndiaye MD   glucose blood VI test strips (ONETOUCH ULTRA TEST) strip Test blood sugar four times daily Diagnosis Code E 11.21. Can use which ever type insurance covers 6/2/18   Governor Titus MD   Lancets [facility] ULTRASOFT LANCETS) [facility] Test blood sugar four times daily Diagnosis Code E 11.21. Can use which ever type insurance covers 6/2/18   Rohit Salazar MD   Blood Glucose Control, Normal (OT ULTRA/FASTTRACK CONTROL) soln Test blood sugar four times daily Diagnosis Code E 11.21. Can use which ever type insurance covers 6/2/18   Rohit Salazar MD   Blood-Glucose Meter [facility]) monitoring kit Test blood sugar four times daily Diagnosis Code E 11.21. Can use which ever type insurance covers 6/2/18   Rohit Salazar MD   cholecalciferol (D3-50 CHOLECALCIFEROL) 50,000 unit capsule Take 1 Cap by mouth every seven (7) days. Indications: Vitamin D Deficiency 6/1/18   Yamile Ndiaye MD   pregabalin (LYRICA) 50 mg capsule Take 1 Cap by mouth three (3) times daily. Max Daily Amount: 150 mg. Patient taking differently: Take 150 mg by mouth daily. 6/1/18   Yamile Ndiaye MD   aspirin 81 mg chewable tablet Take 1 Tab by mouth daily. Indications: myocardial infarction prevention 6/1/18   Yamile Ndiyae MD   diphenhydrAMINE (BANOPHEN) 25 mg tablet Take 25 mg by mouth every six (6) hours as needed for Sleep. Ines, MD Mita   loperamide (IMODIUM) 2 mg capsule Take 1 Cap by mouth four (4) times daily as needed for Diarrhea. 5/26/18   Alejandra Ravi MD   nitroglycerin (NITROSTAT) 0.4 mg SL tablet 1 Tab by SubLINGual route every five (5) minutes as needed for Chest Pain. Patient taking differently: 0.4 mg by SubLINGual route every five (5) minutes as needed for Chest Pain (takes approximately 3x week).  6/18/15   Pasquale Escalona MD       Recent Results (from the past 24 hour(s))   EKG, 12 LEAD, INITIAL    Collection Time: 09/04/19 12:56 PM   Result Value Ref Range    Ventricular Rate 92 BPM    Atrial Rate 92 BPM    P-R Interval 204 ms    QRS Duration 150 ms    Q-T Interval 414 ms    QTC Calculation (Bezet) 511 ms    Calculated P Axis 39 degrees    Calculated R Axis -55 degrees    Calculated T Axis 26 degrees    Diagnosis       Normal sinus rhythm  Left axis deviation  Right bundle branch block  Left ventricular hypertrophy with repolarization abnormality  Inferior infarct , age undetermined    Confirmed by Twyla Juares ([id number]) on 9/4/2019 3:08:42 PM     CBC WITH AUTOMATED DIFF    Collection Time: 09/04/19  1:33 PM   Result Value Ref Range    WBC 6.0 4.1 - 11.1 K/uL    RBC 3.79 (L) 4.10 - 5.70 M/uL    HGB 11.7 (L) 12.1 - 17.0 g/dL    HCT 38.4 36.6 - 50.3 %    .3 (H) 80.0 - 99.0 FL    MCH 30.9 26.0 - 34.0 PG    MCHC 30.5 30.0 - 36.5 g/dL    RDW 17.4 (H) 11.5 - 14.5 %    PLATELET 55 (L) 234 - 400 K/uL    MPV 12.5 8.9 - 12.9 FL    NRBC 0.0 0  WBC    ABSOLUTE NRBC 0.00 0.00 - 0.01 K/uL    NEUTROPHILS 60 32 - 75 %    LYMPHOCYTES 21 12 - 49 %    MONOCYTES 13 5 - 13 %    EOSINOPHILS 4 0 - 7 %    BASOPHILS 1 0 - 1 %    IMMATURE GRANULOCYTES 1 (H) 0.0 - 0.5 %    ABS. NEUTROPHILS 3.6 1.8 - 8.0 K/UL    ABS. LYMPHOCYTES 1.3 0.8 - 3.5 K/UL    ABS. MONOCYTES 0.8 0.0 - 1.0 K/UL    ABS. EOSINOPHILS 0.3 0.0 - 0.4 K/UL    ABS. BASOPHILS 0.1 0.0 - 0.1 K/UL    ABS. IMM. GRANS. 0.0 0.00 - 0.04 K/UL    DF AUTOMATED     METABOLIC PANEL, COMPREHENSIVE    Collection Time: 09/04/19  1:33 PM   Result Value Ref Range    Sodium 139 136 - 145 mmol/L    Potassium 4.2 3.5 - 5.1 mmol/L    Chloride 104 97 - 108 mmol/L    CO2 25 21 - 32 mmol/L    Anion gap 10 5 - 15 mmol/L    Glucose 140 (H) 65 - 100 mg/dL    BUN 69 (H) 6 - 20 MG/DL    Creatinine 7.22 (H) 0.70 - 1.30 MG/DL    BUN/Creatinine ratio 10 (L) 12 - 20      GFR est AA 9 (L) >60 ml/min/1.73m2    GFR est non-AA 8 (L) >60 ml/min/1.73m2    Calcium 8.8 8.5 - 10.1 MG/DL    Bilirubin, total 0.6 0.2 - 1.0 MG/DL    ALT (SGPT) 13 12 - 78 U/L    AST (SGOT) 16 15 - 37 U/L    Alk.  phosphatase 91 45 - 117 U/L    Protein, total 7.4 6.4 - 8.2 g/dL    Albumin 3.2 (L) 3.5 - 5.0 g/dL    Globulin 4.2 (H) 2.0 - 4.0 g/dL    A-G Ratio 0.8 (L) 1.1 - 2.2     CK W/ REFLX CKMB    Collection Time: 09/04/19  1:33 PM   Result Value Ref Range    CK 52 39 - 308 U/L   TYPE & SCREEN    Collection Time: 09/04/19  1:33 PM   Result Value Ref Range    Crossmatch Expiration 09/07/2019     ABO/Rh(D) A POSITIVE     Antibody screen NEG    NT-PRO BNP    Collection Time: 09/04/19  1:33 PM   Result Value Ref Range    NT pro-BNP >35,000 (H) <125 PG/ML   TROPONIN I    Collection Time: 09/04/19  1:33 PM   Result Value Ref Range    Troponin-I, Qt. 0.09 (H) <0.05 ng/mL   OCCULT BLOOD, STOOL    Collection Time: 09/04/19  2:06 PM   Result Value Ref Range    Occult blood, stool NEGATIVE  NEG     EKG, 12 LEAD, SUBSEQUENT    Collection Time: 09/04/19  2:12 PM   Result Value Ref Range    Ventricular Rate 89 BPM    Atrial Rate 89 BPM    P-R Interval 208 ms    QRS Duration 164 ms    Q-T Interval 442 ms    QTC Calculation (Bezet) 537 ms    Calculated P Axis 67 degrees    Calculated R Axis -67 degrees    Calculated T Axis -28 degrees    Diagnosis       Normal sinus rhythm  Right bundle branch block  Left anterior fascicular block  ** Bifascicular block **  Possible Inferior infarct , age undetermined  Anterior infarct , age undetermined  Nonspecific ST-T wave changes    Confirmed by Sharyn Driscoll ([id number]) on 9/4/2019 3:09:58 PM

## 2019-09-04 NOTE — Clinical Note
IABP size = 50 cc. IABP ratio = 1:1. IABP trigger = EKG. IABP augmented pressure = 176. IABP unassisted systolic pressure = 282. IABP unassisted diastolic pressure = 44.

## 2019-09-04 NOTE — Clinical Note
Sheath #1: Sheath: left in place. Site secured by Tegaderm and suture. Hemostasis achieved.  9 FR SHEATH RFA

## 2019-09-04 NOTE — PROGRESS NOTES
May 2018:     1. Chest pain; cath 4/30 w/ RCA , 60% oLAD w/ neg FFR (0.88), patent LCx stent w/ mild ISR. 2. Right renal artery stenosis s/p 7.0 x 19mm BMS 5/4/18  3. Occluded LCIA. 4. Elevated troponin  5. ESRD on dialysis. 6. Coronary artery disease s/p multivessel stenting s/p PCI of LCx, PTCA of RCA 7/97, PTCA LAD 3/94.  Lexiscan 2/08 w/ EF 62%, distal ineroapical infarct w/o ischemia  7. Peripheral vascular disease s/p left iliac stenting, left SFA stenting 3/00, right iliac stenting 11/99.  8. Status post LAKA  9. Bilateral carotid stenosis; 16-49% 9/13  10. Hypertension  11. Diabetes  12. Tobacco abuse  13. Chronic pain  14. Chronic anemia  15. Hyperlipidemia  16. Noncompliance  17. Peptic ulcer disease w/ GIB '15  18. Falls  19. Lives w/ sister in [address] [place] [address] now  21. Usual Cardiologist:  Dr. Harper Creed  21. Mental status changes. Needs Psychiatric  consult / treatment.

## 2019-09-04 NOTE — H&P
Hospitalist Admission Note    NAME: Tyrone Pagan   :  1955   MRN:  [id number]     Date/Time:  2019 5:34 PM    Patient PCP: Margarita Villagran, DO  ________________________________________________________________________    My assessment of this patient's clinical condition and my plan of care is as follows.     Assessment / Plan:  Chest pain could be related to unstable angina  History of coronary artery disease status post multiple stents  -EKG shows normal sinus rhythm with nonspecific ST-T wave changes  -First troponin is 0.09  -CXR shows possible atelectasis and most likley not a Pneumonia since he has no fever or leucocytosis  -Patient was evaluated by cardiology and they are planning on coronary angiogram in a.m. and recommended to continue aspirin, statin and beta-blocker along with Plavix for now  -Check troponins x2  -Pain control  Telemetry monitoring  -npo from mid nite    History of end-stage renal disease on hemodialysis  -Renal consulted and they are planning on hemodialysis today    Diabetes mellitus type 2 with nephropathy  -Hold home Tradjenta and start insulin sliding scale with blood sugar checks    History of severe peripheral arterial disease  Multiple small wounds all   -Patient is currently being scheduled to have a right second toe amputation on 10/1/2019  -Continue aspirin and Plavix  -consult wound care    Hypertension  -Resume home Coreg and lisinopril    Depression  -Resume home trazodone    History of right renal artery stenosis status post stent placement in May 2018  -Continue aspirin    Thrombocytopenia  -base line platelets are normal and now they are 55  -not a known alcoholic  -check cbc in am  -check peripheral smear  -hold heparin                        Code Status: full   Surrogate Decision Maker: Wife Leonor Dewitt    DVT Prophylaxis: Scd's      Baseline: From home independent        Subjective:   CHIEF COMPLAINT: Chest pain     HISTORY OF PRESENT ILLNESS:     This is a 39-year-old male with past medical history of end-stage renal disease on hemodialysis, coronary artery disease status post multiple stents, diabetes mellitus is coming to the hospital with chief complaints of chest pressure while at dialysis center. Jolynn Qiu reports pain is in the precordial and also substernal region, pressure-like, radiating to the back, without any aggravating and partially relieved with nitroglycerin.  He also reports chronic cough and also shortness of breath without any acute worsening.  He reports mild cough but no phlegm.  Denies fever or chills.  Denies abdominal pain, nausea or vomiting.  He reports a chronic right foot pain for which she is supposed to get an amputation of his second toe. On arrival to the hospital his vital signs were within normal limits.  On lab work he was noted to have a hemoglobin of 11.7, platelets of 55.  BMP showed a creatinine of 7.22.  ALT AST within normal limits.  First troponin was 0.09.  He had a chest x-ray which showed mild cardiomegaly with possible atelectasis.      We were asked to admit for work up and evaluation of the above problems. Past Medical History:   Diagnosis Date    Anemia     Arthritis     back, hips    CAD (coronary artery disease)     Sees Dr. Dena Pineda, 4 heart attacks    [facility]     [facility]     Chronic LBP 1990    Slipped on gravel and fell at work; Multiple surgeries;  Sees Dr. Deep Murray for pain mgmt    Chronic pain     Confusion     Depression     Diabetes ([address]     ED (erectile dysfunction)     GERD (gastroesophageal reflux disease) 11/2015    [facility]    Hypercholesteremia     Hypertension     Liver disease     Psychiatric disorder     depression    PVD (peripheral vascular disease) ([address]     Thromboembolus ([address]     DVT in right leg        Past Surgical History:   Procedure Laterality Date    CARDIAC SURG PROCEDURE UNLIST      PTCA    HX ABOVE KNEE AMPUTATION Left 2013    Left knee stump non-healing ulcer; Dr. Yordy Hagan Left     HX APPENDECTOMY      HX BELOW KNEE AMPUTATION      Left leg    HX CHOLECYSTECTOMY      HX GI      HX HIP REPLACEMENT      right hip replaced x 2    HX ORTHOPAEDIC  1990s    4 back surgeries    HX ORTHOPAEDIC      donna ankles pin placed    HX ORTHOPAEDIC      left leg 17 surgeries    HX VASCULAR ACCESS Left     port left arm    UPPER GI ENDOSCOPY,BIOPSY  9/2/2015         UPPER GI ENDOSCOPY,BIOPSY  10/12/2015         VASCULAR SURGERY PROCEDURE UNLIST      Multiple revascularization procedures, toe amputations       Social History     Tobacco Use    Smoking status: Current Every Day Smoker     Packs/day: 1.00     Years: 35.00     Pack years: 35.00    Smokeless tobacco: Never Used    Tobacco comment: 30 pack years; Occasional cigar   Substance Use Topics    Alcohol use: No     Comment: Former heavy drinker quit drinking about 17 years ago        Family History   Problem Relation Age of Onset    Alcohol abuse Father     Diabetes Sister     Diabetes Sister     Lung Disease Mother     Cancer Maternal Grandfather         Head and neck    Cancer Paternal Grandmother         Stomach     Allergies   Allergen Reactions    Nubain [Nalbuphine] Other (comments)     Made me wild; Dysphoria        Prior to Admission medications    Medication Sig Start Date End Date Taking? Authorizing Provider   bumetanide (BUMEX) 1 mg tablet TAKE 1 TABLET BY MOUTH EVERY DAY 7/29/19   Jarad Ndiaye MD   pravastatin (PRAVACHOL) 80 mg tablet TAKE 1 TABLET BY MOUTH EVERY DAY AT NIGHT 5/25/19   Domah, Nicklas Crigler, MD   isosorbide mononitrate ER (IMDUR) 30 mg tablet Take 30 mg by mouth every morning. Provider, Historical   FLUoxetine (PROZAC) 20 mg capsule Take 20 mg by mouth daily. Provider, Historical   lisinopril (PRINIVIL, ZESTRIL) 20 mg tablet Take 20 mg by mouth daily. Provider, Historical   carvedilol (COREG) 6.25 mg tablet Take 6.25 mg by mouth two (2) times daily (with meals). Provider, Historical   cyclobenzaprine (FLEXERIL) 5 mg tablet Take 5 mg by mouth three (3) times daily as needed for Muscle Spasm(s). Provider, Historical   traZODone (DESYREL) 150 mg tablet TAKE 1 TABLET BY MOUTH EVERY DAY AT NIGHT 12/1/18   Theresa MARKS MD   mirtazapine (REMERON) 30 mg tablet TAKE 1 TABLET BY MOUTH EVERY DAY AT NIGHT 12/1/18   Cody Ndiaye MD   TRADJENTA 5 mg tablet TAKE 1 TABLET BY MOUTH EVERY DAY 12/1/18   Cody Ndiaye MD   omeprazole (PRILOSEC) 40 mg capsule TAKE 1 CAPSULE BY MOUTH TWICE A DAY 12/1/18   Jarad Ndiaye MD   finasteride (PROSCAR) 5 mg tablet TAKE 1 TABLET BY MOUTH EVERY DAY 12/1/18   Garrett Ndiaye MD   clopidogrel (PLAVIX) 75 mg tab TAKE 1 TABLET BY MOUTH EVERY DAY 12/1/18   Theresa MARKS MD   cholecalciferol (D3-50 CHOLECALCIFEROL) 50,000 unit capsule Take 1 Cap by mouth every seven (7) days. Indications: Vitamin D Deficiency 6/1/18   Cody Ndiaye MD   pregabalin (LYRICA) 50 mg capsule Take 1 Cap by mouth three (3) times daily. Max Daily Amount: 150 mg. Patient taking differently: Take 150 mg by mouth daily. 6/1/18   Cody Ndiaye MD   aspirin 81 mg chewable tablet Take 1 Tab by mouth daily. Indications: myocardial infarction prevention 6/1/18   Cody Ndiaye MD   diphenhydrAMINE (BANOPHEN) 25 mg tablet Take 25 mg by mouth every six (6) hours as needed for Sleep. Ines, MD Mita   loperamide (IMODIUM) 2 mg capsule Take 1 Cap by mouth four (4) times daily as needed for Diarrhea. 5/26/18   Jeannine Ravi MD   nitroglycerin (NITROSTAT) 0.4 mg SL tablet 1 Tab by SubLINGual route every five (5) minutes as needed for Chest Pain. Patient taking differently: 0.4 mg by SubLINGual route every five (5) minutes as needed for Chest Pain (takes approximately 3x week).  6/18/15   Syed Rubin MD       REVIEW OF SYSTEMS:     I am not able to complete the review of systems because: The patient is intubated and sedated    The patient has altered mental status due to his acute medical problems    The patient has baseline aphasia from prior stroke(s)    The patient has baseline dementia and is not reliable historian    The patient is in acute medical distress and unable to provide information           Total of 12 systems reviewed as follows:       POSITIVE= underlined text  Negative = text not underlined  General:  fever, chills, sweats, generalized weakness, weight loss/gain,      loss of appetite   Eyes:    blurred vision, eye pain, loss of vision, double vision  ENT:    rhinorrhea, pharyngitis   Respiratory:   cough, sputum production, SOB, COREA, wheezing, pleuritic pain   Cardiology:   chest pain, palpitations, orthopnea, PND, edema, syncope   Gastrointestinal:  abdominal pain , N/V, diarrhea, dysphagia, constipation, bleeding   Genitourinary:  frequency, urgency, dysuria, hematuria, incontinence   Muskuloskeletal :  arthralgia, myalgia, back pain  Hematology:  easy bruising, nose or gum bleeding, lymphadenopathy   Dermatological: rash, ulceration, pruritis, color change / jaundice  Endocrine:   hot flashes or polydipsia   Neurological:  headache, dizziness, confusion, focal weakness, paresthesia,     Speech difficulties, memory loss, gait difficulty  Psychological: Feelings of anxiety, depression, agitation    Objective:   VITALS:    Visit Vitals  /82   Pulse 87   Temp 97.4 °F (36.3 °C)   Resp 20   Ht 5' 7\" (1.702 m)   Wt 77.1 kg (170 lb)   SpO2 97%   BMI 26.63 kg/m²       PHYSICAL EXAM:    General:    Alert, cooperative, no distress, appears stated age. HEENT: Atraumatic, anicteric sclerae, pink conjunctivae     No oral ulcers, mucosa moist  Neck:  Supple, symmetrical,  thyroid: non tender  Lungs:   Clear to auscultation bilaterally. No Wheezing or Rhonchi. No rales. Chest wall:  No tenderness  No Accessory muscle use.   Heart:   Regular  rhythm,  No murmur   No edema  Abdomen:   Soft, non-tender. Not distended. Bowel sounds normal  Extremities: No cyanosis. No clubbing,      Rt 2 nd toe small wound, decreased sensation and area of redness +   Skin:     Not pale. Not Jaundiced  No rashes   Psych:  Not anxious or agitated. Neurologic: EOMs intact. No facial asymmetry. No aphasia or slurred speech. Symmetrical strength, Sensation grossly intact. Alert and oriented X 4.     _______________________________________________________________________  Care Plan discussed with:    Comments   Patient t    Family      RN y    Care Manager                    Consultant:      _______________________________________________________________________  Expected  Disposition:   Home with Family y   HH/PT/OT/RN    SNF/LTC    RED    ________________________________________________________________________  TOTAL TIME:  61  Minutes    Critical Care Provided     Minutes non procedure based      Comments    y Reviewed previous records   >50% of visit spent in counseling and coordination of care y Discussion with patient and/or family and questions answered       ________________________________________________________________________  Signed: Janey Mills MD    Procedures: see electronic medical records for all procedures/Xrays and details which were not copied into this note but were reviewed prior to creation of Plan.     LAB DATA REVIEWED:    Recent Results (from the past 24 hour(s))   EKG, 12 LEAD, INITIAL    Collection Time: 09/04/19 12:56 PM   Result Value Ref Range    Ventricular Rate 92 BPM    Atrial Rate 92 BPM    P-R Interval 204 ms    QRS Duration 150 ms    Q-T Interval 414 ms    QTC Calculation (Bezet) 511 ms    Calculated P Axis 39 degrees    Calculated R Axis -55 degrees    Calculated T Axis 26 degrees    Diagnosis       Normal sinus rhythm  Left axis deviation  Right bundle branch block  Left ventricular hypertrophy with repolarization abnormality  Inferior infarct , age undetermined    Confirmed by Homero Smith ([id number]) on 9/4/2019 3:08:42 PM     CBC WITH AUTOMATED DIFF    Collection Time: 09/04/19  1:33 PM   Result Value Ref Range    WBC 6.0 4.1 - 11.1 K/uL    RBC 3.79 (L) 4.10 - 5.70 M/uL    HGB 11.7 (L) 12.1 - 17.0 g/dL    HCT 38.4 36.6 - 50.3 %    .3 (H) 80.0 - 99.0 FL    MCH 30.9 26.0 - 34.0 PG    MCHC 30.5 30.0 - 36.5 g/dL    RDW 17.4 (H) 11.5 - 14.5 %    PLATELET 55 (L) 969 - 400 K/uL    MPV 12.5 8.9 - 12.9 FL    NRBC 0.0 0  WBC    ABSOLUTE NRBC 0.00 0.00 - 0.01 K/uL    NEUTROPHILS 60 32 - 75 %    LYMPHOCYTES 21 12 - 49 %    MONOCYTES 13 5 - 13 %    EOSINOPHILS 4 0 - 7 %    BASOPHILS 1 0 - 1 %    IMMATURE GRANULOCYTES 1 (H) 0.0 - 0.5 %    ABS. NEUTROPHILS 3.6 1.8 - 8.0 K/UL    ABS. LYMPHOCYTES 1.3 0.8 - 3.5 K/UL    ABS. MONOCYTES 0.8 0.0 - 1.0 K/UL    ABS. EOSINOPHILS 0.3 0.0 - 0.4 K/UL    ABS. BASOPHILS 0.1 0.0 - 0.1 K/UL    ABS. IMM. GRANS. 0.0 0.00 - 0.04 K/UL    DF AUTOMATED     METABOLIC PANEL, COMPREHENSIVE    Collection Time: 09/04/19  1:33 PM   Result Value Ref Range    Sodium 139 136 - 145 mmol/L    Potassium 4.2 3.5 - 5.1 mmol/L    Chloride 104 97 - 108 mmol/L    CO2 25 21 - 32 mmol/L    Anion gap 10 5 - 15 mmol/L    Glucose 140 (H) 65 - 100 mg/dL    BUN 69 (H) 6 - 20 MG/DL    Creatinine 7.22 (H) 0.70 - 1.30 MG/DL    BUN/Creatinine ratio 10 (L) 12 - 20      GFR est AA 9 (L) >60 ml/min/1.73m2    GFR est non-AA 8 (L) >60 ml/min/1.73m2    Calcium 8.8 8.5 - 10.1 MG/DL    Bilirubin, total 0.6 0.2 - 1.0 MG/DL    ALT (SGPT) 13 12 - 78 U/L    AST (SGOT) 16 15 - 37 U/L    Alk.  phosphatase 91 45 - 117 U/L    Protein, total 7.4 6.4 - 8.2 g/dL    Albumin 3.2 (L) 3.5 - 5.0 g/dL    Globulin 4.2 (H) 2.0 - 4.0 g/dL    A-G Ratio 0.8 (L) 1.1 - 2.2     CK W/ REFLX CKMB    Collection Time: 09/04/19  1:33 PM   Result Value Ref Range    CK 52 39 - 308 U/L   TYPE & SCREEN    Collection Time: 09/04/19  1:33 PM   Result Value Ref Range    Crossmatch Expiration 09/07/2019 ABO/Rh(D) A POSITIVE     Antibody screen NEG    NT-PRO BNP    Collection Time: 09/04/19  1:33 PM   Result Value Ref Range    NT pro-BNP >35,000 (H) <125 PG/ML   TROPONIN I    Collection Time: 09/04/19  1:33 PM   Result Value Ref Range    Troponin-I, Qt. 0.09 (H) <0.05 ng/mL   OCCULT BLOOD, STOOL    Collection Time: 09/04/19  2:06 PM   Result Value Ref Range    Occult blood, stool NEGATIVE  NEG     EKG, 12 LEAD, SUBSEQUENT    Collection Time: 09/04/19  2:12 PM   Result Value Ref Range    Ventricular Rate 89 BPM    Atrial Rate 89 BPM    P-R Interval 208 ms    QRS Duration 164 ms    Q-T Interval 442 ms    QTC Calculation (Bezet) 537 ms    Calculated P Axis 67 degrees    Calculated R Axis -67 degrees    Calculated T Axis -28 degrees    Diagnosis       Normal sinus rhythm  Right bundle branch block  Left anterior fascicular block  ** Bifascicular block **  Possible Inferior infarct , age undetermined  Anterior infarct , age undetermined  Nonspecific ST-T wave changes    Confirmed by Radha Rendon ([id number]) on 9/4/2019 3:09:58 PM

## 2019-09-04 NOTE — Clinical Note
Lesion located in the Ostium Cx. Balloon inflated using single inflation technique. Pressure = 12 anna; Duration = 15 sec.

## 2019-09-04 NOTE — Clinical Note
Aspirin Registry Question:  
Aspirin was given prior to the procedure. Patient's HR briefly dropped to 37 on tele monitor

## 2019-09-04 NOTE — CONSULTS
Consultation Note    NAME: Dang Horowitz Sr.   :  1955   MRN:  [id number]     Date/Time:  2019 4:51 PM       Assessment :    Plan:  [id number]  Anemia  CP dyspnea  CAD Plan for Hd today. Pull fluid as tolerated. Holding OWEN       Subjective:   CHIEF COMPLAINT:  \"They sent me here from dialysis. HISTORY OF PRESENT ILLNESS:     Safia Sanford is a 71 y.o.   male who has a history of ESRD admitted with CP. He dialyzes MWF and says that he had HD on Monday. He says that he went to HD today and had an elevated heart rate and c/o CP so he was sent to the ER. He says that he has been having SSCP over the past several weeks. He says that he is on a long acting nitro that usually takes care of the pain. He says that recently he has had to use SL NTG. Today he used 4 which is quite unusual.  He says that he called his cardiologist today and is scheduled to see him soon. Past Medical History:   Diagnosis Date    Anemia     Arthritis     back, hips    CAD (coronary artery disease)     Sees Dr. Lukas Peralta, 4 heart attacks    Chronic kidney disease     Dr. Sailaja Hightower, [facility]     Chronic LBP     Slipped on gravel and fell at work; Multiple surgeries;  Sees Dr. Fabian Nguyen for pain mgmt    Chronic pain     Confusion     Depression     Diabetes ([facility] [address]     ED (erectile dysfunction)     GERD (gastroesophageal reflux disease) 2015    Diagnosed at [facility] last month    Hypercholesteremia     Hypertension     Liver disease     Psychiatric disorder     depression    PVD (peripheral vascular disease) ([facility] [address]     Thromboembolus ([facility] [address]     DVT in right leg      Past Surgical History:   Procedure Laterality Date    CARDIAC SURG PROCEDURE UNLIST      PTCA    HX ABOVE KNEE AMPUTATION Left 2013    Left knee stump non-healing ulcer; Dr. Chip Hoyos Left     HX APPENDECTOMY      HX BELOW KNEE AMPUTATION      Left leg    HX CHOLECYSTECTOMY      HX GI      HX HIP REPLACEMENT      right hip replaced x 2    HX ORTHOPAEDIC  1990s    4 back surgeries    HX ORTHOPAEDIC      donna ankles pin placed    HX ORTHOPAEDIC      left leg 17 surgeries    HX VASCULAR ACCESS Left     port left arm    UPPER GI ENDOSCOPY,BIOPSY  9/2/2015         UPPER GI ENDOSCOPY,BIOPSY  10/12/2015         VASCULAR SURGERY PROCEDURE UNLIST      Multiple revascularization procedures, toe amputations     Social History     Tobacco Use    Smoking status: Current Every Day Smoker     Packs/day: 1.00     Years: 35.00     Pack years: 35.00    Smokeless tobacco: Never Used    Tobacco comment: 30 pack years; Occasional cigar   Substance Use Topics    Alcohol use: No     Comment: Former heavy drinker quit drinking about 17 years ago      Family History   Problem Relation Age of Onset    Alcohol abuse Father     Diabetes Sister     Diabetes Sister     Lung Disease Mother     Cancer Maternal Grandfather         Head and neck    Cancer Paternal Grandmother         Stomach      Allergies   Allergen Reactions    Nubain [Nalbuphine] Other (comments)     Made me wild; Dysphoria      Prior to Admission medications    Medication Sig Start Date End Date Taking? Authorizing Provider   bumetanide (BUMEX) 1 mg tablet TAKE 1 TABLET BY MOUTH EVERY DAY 7/29/19   Jarad Ndiaye MD   pravastatin (PRAVACHOL) 80 mg tablet TAKE 1 TABLET BY MOUTH EVERY DAY AT NIGHT 5/25/19   Cruz Ndiaye MD   isosorbide mononitrate ER (IMDUR) 30 mg tablet Take 30 mg by mouth every morning. Provider, Historical   FLUoxetine (PROZAC) 20 mg capsule Take 20 mg by mouth daily. Provider, Historical   lisinopril (PRINIVIL, ZESTRIL) 20 mg tablet Take 20 mg by mouth daily. Provider, Historical   carvedilol (COREG) 6.25 mg tablet Take 6.25 mg by mouth two (2) times daily (with meals).     Provider, Historical   cyclobenzaprine (FLEXERIL) 5 mg tablet Take 5 mg by mouth three (3) times daily as needed for Muscle Spasm(s). Provider, Historical   traZODone (DESYREL) 150 mg tablet TAKE 1 TABLET BY MOUTH EVERY DAY AT NIGHT 12/1/18   Risa MARKS MD   mirtazapine (REMERON) 30 mg tablet TAKE 1 TABLET BY MOUTH EVERY DAY AT NIGHT 12/1/18   Lucina Ndiaye MD   TRADJENTA 5 mg tablet TAKE 1 TABLET BY MOUTH EVERY DAY 12/1/18   Lucina Ndiaye MD   omeprazole (PRILOSEC) 40 mg capsule TAKE 1 CAPSULE BY MOUTH TWICE A DAY 12/1/18   Jarad Ndiaye MD   finasteride (PROSCAR) 5 mg tablet TAKE 1 TABLET BY MOUTH EVERY DAY 12/1/18   Anthony Ndiaye MD   clopidogrel (PLAVIX) 75 mg tab TAKE 1 TABLET BY MOUTH EVERY DAY 12/1/18   Risa MARKS MD   cholecalciferol (D3-50 CHOLECALCIFEROL) 50,000 unit capsule Take 1 Cap by mouth every seven (7) days. Indications: Vitamin D Deficiency 6/1/18   Lucina Ndiaye MD   pregabalin (LYRICA) 50 mg capsule Take 1 Cap by mouth three (3) times daily. Max Daily Amount: 150 mg. Patient taking differently: Take 150 mg by mouth daily. 6/1/18   Lucina Ndiaye MD   aspirin 81 mg chewable tablet Take 1 Tab by mouth daily. Indications: myocardial infarction prevention 6/1/18   Lucina Ndiaye MD   diphenhydrAMINE (BANOPHEN) 25 mg tablet Take 25 mg by mouth every six (6) hours as needed for Sleep. Other, MD Mita   loperamide (IMODIUM) 2 mg capsule Take 1 Cap by mouth four (4) times daily as needed for Diarrhea. 5/26/18   Carlos Ravi MD   nitroglycerin (NITROSTAT) 0.4 mg SL tablet 1 Tab by SubLINGual route every five (5) minutes as needed for Chest Pain. Patient taking differently: 0.4 mg by SubLINGual route every five (5) minutes as needed for Chest Pain (takes approximately 3x week).  6/18/15   Claudia Souza MD     REVIEW OF SYSTEMS:     []  Unable to obtain reliable ROS due to  [] mental status  [] sedated   [] intubated   [x] Total of 12 systems reviewed as follows:  Constitutional: negative fever, negative chills, negative weight loss  Eyes:   negative visual changes  ENT:   negative sore throat, tongue or lip swelling  Respiratory:  negative cough, pos dyspnea  Cards:  pos for chest pain, no palpitations, lower extremity edema  GI:   negative for nausea, vomiting, diarrhea, and abdominal pain  :  negative for frequency, dysuria  Integument:  negative for rash and pruritus  Heme:  negative for easy bruising and gum/nose bleeding  Musculoskel: negative for myalgias,  back pain and muscle weakness  Neuro:  negative for headaches, dizziness, vertigo  Psych:  negative for feelings of anxiety, depression   Travel?: none    Objective:   VITALS:    Visit Vitals  /82   Pulse 87   Temp 97.4 °F (36.3 °C)   Resp 20   Ht 5' 7\" (1.702 m)   Wt 77.1 kg (170 lb)   SpO2 97%   BMI 26.63 kg/m²     PHYSICAL EXAM:  Gen:  []  WD []  WN  [] cachectic []  thin []  obese []  disheveled             []  ill apearing  []   Critical  [x]   Chronic    [x]  No acute distress    HEENT:   [x] NC/AT/PERRL    [x] pink conjunctivae      [] pale conjunctivae                  PERRL  [] yes  [] no      [] moist mucosa    [] dry mucosa    hearing intact to voice [] yes  [] No                 NECK:   supple [x] yes  [] no        masses [] yes  [x] No               thyroid  []  non tender  []  tender    RESP:   [x] CTA bilaterally/no wheezing/rhonchi/rales/crackles    [] rhonchi bilaterally - no dullness  [] wheezing   [] rhonchi   [] crackles     use of accessory muscles [] yes [] no    CARD:   [x]  regular rate and rhythm/No murmurs/rubs/gallops    murmur  [] yes ()  [] no      Rubs  [] yes  [] no       Gallops [] yes  [] no    Rate []  regular  []  irregular        carotid bruits  [] Right  []  Left                 LE edema [] yes  [x] no           JVP  []  yes   []  no    ABD:    [x] soft/non distended/non tender/+bowel sounds/no HSM    []  Rigid    tenderness [] yes [] no   Liver enlargement  []  yes []  no                Spleen enlargement  []  yes []  no     distended []  yes [] no     bowel sound  [] hypoactive   [] hyperactive    LYMPH:    Neck []  yes [x]  no       Axillae []  yes [x]  no    SKIN:   Rashes []  yes   [x]  no    Ulcers []  yes   [x]  no               [] tight to palpitation    skin turgor []  good  [] poor  [] decreased               Cyanosis/clubbing []  yes []  no    NEUR:   [x] cranial nerves II-XII grossly intact       [] Cranial nerves deficit                 []  facial droop    []  slurred speech   [] aphasic     [] Strength normal     []  weakness  []  LUE  []   RUE/ []  LLE  []   RLE    follows commands  [x]  yes []  no           PSYCH:   insight [] poor [x] good   Alert and Oriented to  [x] person  [x] place  [x]  time                    [] depressed [] anxious [] agitated  [] lethargic [] stuporous  [] sedated     LAB DATA REVIEWED:    Recent Labs     09/04/19  1333   WBC 6.0   HGB 11.7*   HCT 38.4   PLT 55*     Recent Labs     09/04/19  1333      K 4.2      CO2 25   BUN 69*   CREA 7.22*   *   CA 8.8     Recent Labs     09/04/19  1333   SGOT 16   ALT 13   AP 91   TBILI 0.6   ALB 3.2*   GLOB 4.2*     No results for input(s): INR, PTP, APTT in the last 72 hours. No lab exists for component: INREXT   No results for input(s): FE, TIBC, PSAT, FERR in the last 72 hours. No results for input(s): PH, PCO2, PO2 in the last 72 hours. No results for input(s): CPK, CKMB in the last 72 hours. No lab exists for component: TROPONINI  Lab Results   Component Value Date/Time    Glucose (POC) 110 (H) 01/22/2019 01:57 PM    Glucose (POC) 125 (H) 01/22/2019 11:40 AM    Glucose (POC) 119 (H) 05/14/2018 07:43 AM    Glucose (POC) 112 (H) 05/13/2018 09:30 PM    Glucose (POC) 163 (H) 05/13/2018 04:36 PM    Glucose (POC) 146 (H) 05/13/2018 10:59 AM    Glucose  06/01/2018 11:05 AM       Procedures: see electronic medical records for all procedures/Xrays and details which were not copied into this note but were reviewed prior to creation of Plan. ________________________________________________________________________       ___________________________________________________  Consulting Physician: Zaid Christinee, MD

## 2019-09-04 NOTE — ED NOTES
Pt complaining of chest pressure 4/10 and nausea at this time. Will repeat EKG and notify MD Milton John.

## 2019-09-04 NOTE — ED NOTES
Assumed care of pt via triage. Pt comes to the ED with chest pain that has been going on for a couple weeks now. Pt states he has had 4 heart attacks in the past. Pt takes a long acting nitro and took 1 this morning along with 1 ASA. Pt also states he has been having nausea and dark stools. Pt denies any chest pain or SOB at this time. Pt resting comfortably on stretcher in position of comfort. Pt in no apparent distress at this time. Call bell within reach. Side rails x2. Connected to monitor x3. Pt a/o x4. Stretcher locked in lowest position. Pt aware of plan to await for MD/PA-C/NP assessment, and pt/family verbalizes understanding. Will continue to monitor pt condition.

## 2019-09-04 NOTE — Clinical Note
Lesion located in the Mid LM. Balloon inflated using kissing inflation technique. A CATH BLLN COR DIL 3.5X15MM -- NC TREK RAPID-EXCHANGE was also used. Pressure = 12 anna; Duration = 15 sec. Pressure = 12 anna; Duration = 15 sec.

## 2019-09-04 NOTE — Clinical Note
DR CASTRO [facility] PRESENT WITH OR TEAM, ANESTHESIA DR Hanny Mackenzie ALSO PRESENT TO DO WADE.

## 2019-09-04 NOTE — Clinical Note
Lesion located in the Mid LM. Balloon balloon re-inflated. Balloon inflated using kissing and multiple inflations inflation technique. A CATH BLLN RX MINI TREK 1.5X15 --  was also used. Pressure = 12 anna; Duration = 20 sec. Pressure = 12 anna; Duration = 20 sec.

## 2019-09-04 NOTE — Clinical Note
Lesion located in the Mid LM. Balloon inflated using multiple inflations inflation technique. Pressure = 12 anna; Duration = 20 sec. Inflation 2: Pressure: 18 anna; Duration: 20 sec.

## 2019-09-04 NOTE — ED NOTES
Assessed pt for hourly rounds at this time. Pt resting comfortably at this time. Pt updated on continuous plan of care. All concerns & needs addressed by RN as requested by patient/family. No further needs expressed at this time. Will continue to monitor patient condition.

## 2019-09-04 NOTE — ED PROVIDER NOTES
EMERGENCY DEPARTMENT HISTORY AND PHYSICAL EXAM      Date: 9/4/2019  Patient Name: Mundo Roblero Sr.  Patient Age and Sex: 71 y.o. male     History of Presenting Illness     Chief Complaint   Patient presents with    Chest Pain     complaining with chest pain at dialysis and took nitro while at dislysis,        History Provided By: Patient     HPI: Mundo Roblero Sr. Is a 25-year-old male with past medical history of end-stage renal disease on dialysis Monday Wednesday Friday, peripheral vascular disease, and CAD presenting today with chest pain or shortness of breath. Patient reports that he has been having intermittent periods of chest pain and shortness of breath over the past week. He takes a long-acting [address] and has been taking multiple doses of sublingual nitro throughout the day. He reports that he took 4 and 1 day. He did not want come to the but has plans to follow with his cardiologist in the 16th. Today he went to dialysis they stated that he was too tachycardic and had blood pressure issues and was referred to the emergency department. Patient does endorse dark stools recently and also nausea. He says he had a episode of nausea this morning that has since resolved. Currently the patient endorses minimal shortness of breath no chest pain. There are no other complaints, changes, or physical findings at this time. PCP: Amira Moffett, DO    No current facility-administered medications on file prior to encounter. Current Outpatient Medications on File Prior to Encounter   Medication Sig Dispense Refill    nitroglycerin (NITROSTAT) 0.4 mg SL tablet 1 Tab by SubLINGual route every five (5) minutes as needed for Chest Pain. 20 Tab 0    pregabalin (LYRICA) 150 mg capsule Take 150 mg by mouth three (3) times daily.  ferric citrate (AURYXIA) 210 mg iron tablet Take 420 mg by mouth Before breakfast, lunch, and dinner.       docusate sodium (COLACE) 100 mg capsule Take 100 mg by mouth daily.  bumetanide (BUMEX) 1 mg tablet TAKE 1 TABLET BY MOUTH EVERY DAY 90 Tab 0    pravastatin (PRAVACHOL) 80 mg tablet TAKE 1 TABLET BY MOUTH EVERY DAY AT NIGHT 90 Tab 1    isosorbide mononitrate ER (IMDUR) 30 mg tablet Take 30 mg by mouth every morning.  FLUoxetine (PROZAC) 20 mg capsule Take 20 mg by mouth daily.  lisinopril (PRINIVIL, ZESTRIL) 20 mg tablet Take 20 mg by mouth daily.  carvedilol (COREG) 6.25 mg tablet Take 6.25 mg by mouth two (2) times daily (with meals).  cyclobenzaprine (FLEXERIL) 5 mg tablet Take 5 mg by mouth three (3) times daily as needed for Muscle Spasm(s).  traZODone (DESYREL) 150 mg tablet TAKE 1 TABLET BY MOUTH EVERY DAY AT NIGHT 90 Tab 1    mirtazapine (REMERON) 30 mg tablet TAKE 1 TABLET BY MOUTH EVERY DAY AT NIGHT 90 Tab 1    omeprazole (PRILOSEC) 40 mg capsule TAKE 1 CAPSULE BY MOUTH TWICE A  Cap 1    finasteride (PROSCAR) 5 mg tablet TAKE 1 TABLET BY MOUTH EVERY DAY 90 Tab 1    clopidogrel (PLAVIX) 75 mg tab TAKE 1 TABLET BY MOUTH EVERY DAY 90 Tab 1    aspirin 81 mg chewable tablet Take 1 Tab by mouth daily. Indications: myocardial infarction prevention 90 Tab 1       Past History     Past Medical History:  Past Medical History:   Diagnosis Date    Anemia     Arthritis     back, hips    CAD (coronary artery disease)     Sees Dr. Dena Pineda, 4 heart attacks    Chronic kidney disease     Dr. Gema Barr, [facility]     Chronic LBP 1990    Slipped on gravel and fell at work; Multiple surgeries;  Sees Dr. Deep Murray for pain mgmt    Chronic pain     Confusion     Depression     [facility] [address]     ED (erectile dysfunction)     GERD (gastroesophageal reflux disease) 11/2015    [facility] month    Hypercholesteremia     Hypertension     Liver disease     Psychiatric disorder     depression    PVD (peripheral vascular disease) ([facility] [address]     Thromboembolus ([facility] [address]     DVT in right leg Past Surgical History:  Past Surgical History:   Procedure Laterality Date    CARDIAC SURG PROCEDURE UNLIST      PTCA    HX ABOVE KNEE AMPUTATION Left 2013    Left knee stump non-healing ulcer; Dr. Esme Levi Left     HX APPENDECTOMY      HX BELOW KNEE AMPUTATION      Left leg    HX CHOLECYSTECTOMY      HX GI      HX HIP REPLACEMENT      right hip replaced x 2    HX ORTHOPAEDIC  1990s    4 back surgeries    HX ORTHOPAEDIC      donna ankles pin placed    HX ORTHOPAEDIC      left leg 17 surgeries    HX VASCULAR ACCESS Left     port left arm    UPPER GI ENDOSCOPY,BIOPSY  9/2/2015         UPPER GI ENDOSCOPY,BIOPSY  10/12/2015         VASCULAR SURGERY PROCEDURE UNLIST      Multiple revascularization procedures, toe amputations       Family History:  Family History   Problem Relation Age of Onset    Alcohol abuse Father     Diabetes Sister     Diabetes Sister     Lung Disease Mother     Cancer Maternal Grandfather         Head and neck    Cancer Paternal Grandmother         Stomach       Social History:  Social History     Tobacco Use    Smoking status: Current Every Day Smoker     Packs/day: 1.00     Years: 35.00     Pack years: 35.00    Smokeless tobacco: Never Used    Tobacco comment: 30 pack years; Occasional cigar   Substance Use Topics    Alcohol use: No     Comment: Former heavy drinker quit drinking about 17 years ago    Drug use: No     Types: Prescription       Allergies: Allergies   Allergen Reactions    Nubain [Nalbuphine] Other (comments)     Made me wild; Dysphoria         Review of Systems   Constitutional: No  fever,  No  headache  Skin: No  rash, No  jaundice  HEENT: No  nasal congestion, No  eye drainage.    Resp: + cough,  No  wheezing  CV: + chest pain, No  palpitations  GI: No vomiting,  No  diarrhea.,  No  constipation  : No dysuria,  No  hematuria  MSK: No joint pain,  No  trauma  Neuro: No numbness, No  tingling  Psych: No suicidal, No paranoid      Physical Exam     Patient Vitals for the past 12 hrs:   Temp Pulse Resp BP SpO2   09/04/19 2036 97.7 °F (36.5 °C) 90 18 131/76 97 %   09/04/19 1915 98.1 °F (36.7 °C) 91 19 124/62 98 %   09/04/19 1830  92 17 135/73 100 %   09/04/19 1815  88 17 131/76 100 %   09/04/19 1800  87 16 134/73 100 %   09/04/19 1746  87  124/66    09/04/19 1745  88 16 124/66 100 %   09/04/19 1730  (!) 101 15 128/63 98 %   09/04/19 1715  86 18 125/50 99 %   09/04/19 1700  85 21 121/65 99 %   09/04/19 1645  86 18 128/64 99 %   09/04/19 1630  85 20 131/68 100 %   09/04/19 1615  84 17 119/71 99 %   09/04/19 1600  83 16 119/63 96 %   09/04/19 1545  79 15 111/57 97 %   09/04/19 1530  82 18 117/60 100 %   09/04/19 1515  81 18 122/68 98 %   09/04/19 1500  83 19 121/82 97 %   09/04/19 1445  81 15 116/60 97 %   09/04/19 1430  83 21 110/63 97 %   09/04/19 1415  87 20 124/82 97 %   09/04/19 1400  90 22 136/75 99 %   09/04/19 1345  90 17 140/74 99 %   09/04/19 1336  90 27 126/82 99 %   09/04/19 1330  93 19 147/74 99 %   09/04/19 1315  93 18 140/75 100 %   09/04/19 1253 97.4 °F (36.3 °C) 94 18 140/85 98 %     General: alert, No acute distress  Eyes: EOMI, normal conjunctiva  ENT: moist mucous membranes. Neck: Active, full ROM of neck. Skin: No rashes. no jaundice              Lungs: Equal chest expansion. no respiratory distress. decreased lung sounds in the bases No accessory muscle usage  Heart: regular rate     no peripheral edema   2+ radial pulses and 1+ DP on the Right. Abd:  non distended soft, nontender. No rebound tenderness. No guarding  Back: Full ROM  MSK: Full, active ROM in all 4 extremities.  Left AKA  Neuro: Person, Place, Time and Situation; normal speech;   Psych: Cooperative with exam; Appropriate mood and affect             Diagnostic Study Results     Labs -     Recent Results (from the past 12 hour(s))   EKG, 12 LEAD, INITIAL    Collection Time: 09/04/19 12:56 PM   Result Value Ref Range Ventricular Rate 92 BPM    Atrial Rate 92 BPM    P-R Interval 204 ms    QRS Duration 150 ms    Q-T Interval 414 ms    QTC Calculation (Bezet) 511 ms    Calculated P Axis 39 degrees    Calculated R Axis -55 degrees    Calculated T Axis 26 degrees    Diagnosis       Normal sinus rhythm  Left axis deviation  Right bundle branch block  Left ventricular hypertrophy with repolarization abnormality  Inferior infarct , age undetermined    Confirmed by Radha Rendon ([id number]) on 9/4/2019 3:08:42 PM     CBC WITH AUTOMATED DIFF    Collection Time: 09/04/19  1:33 PM   Result Value Ref Range    WBC 6.0 4.1 - 11.1 K/uL    RBC 3.79 (L) 4.10 - 5.70 M/uL    HGB 11.7 (L) 12.1 - 17.0 g/dL    HCT 38.4 36.6 - 50.3 %    .3 (H) 80.0 - 99.0 FL    MCH 30.9 26.0 - 34.0 PG    MCHC 30.5 30.0 - 36.5 g/dL    RDW 17.4 (H) 11.5 - 14.5 %    PLATELET 55 (L) 958 - 400 K/uL    MPV 12.5 8.9 - 12.9 FL    NRBC 0.0 0  WBC    ABSOLUTE NRBC 0.00 0.00 - 0.01 K/uL    NEUTROPHILS 60 32 - 75 %    LYMPHOCYTES 21 12 - 49 %    MONOCYTES 13 5 - 13 %    EOSINOPHILS 4 0 - 7 %    BASOPHILS 1 0 - 1 %    IMMATURE GRANULOCYTES 1 (H) 0.0 - 0.5 %    ABS. NEUTROPHILS 3.6 1.8 - 8.0 K/UL    ABS. LYMPHOCYTES 1.3 0.8 - 3.5 K/UL    ABS. MONOCYTES 0.8 0.0 - 1.0 K/UL    ABS. EOSINOPHILS 0.3 0.0 - 0.4 K/UL    ABS. BASOPHILS 0.1 0.0 - 0.1 K/UL    ABS. IMM.  GRANS. 0.0 0.00 - 0.04 K/UL    DF AUTOMATED     METABOLIC PANEL, COMPREHENSIVE    Collection Time: 09/04/19  1:33 PM   Result Value Ref Range    Sodium 139 136 - 145 mmol/L    Potassium 4.2 3.5 - 5.1 mmol/L    Chloride 104 97 - 108 mmol/L    CO2 25 21 - 32 mmol/L    Anion gap 10 5 - 15 mmol/L    Glucose 140 (H) 65 - 100 mg/dL    BUN 69 (H) 6 - 20 MG/DL    Creatinine 7.22 (H) 0.70 - 1.30 MG/DL    BUN/Creatinine ratio 10 (L) 12 - 20      GFR est AA 9 (L) >60 ml/min/1.73m2    GFR est non-AA 8 (L) >60 ml/min/1.73m2    Calcium 8.8 8.5 - 10.1 MG/DL    Bilirubin, total 0.6 0.2 - 1.0 MG/DL    ALT (SGPT) 13 12 - 78 U/L    AST (SGOT) 16 15 - 37 U/L    Alk. phosphatase 91 45 - 117 U/L    Protein, total 7.4 6.4 - 8.2 g/dL    Albumin 3.2 (L) 3.5 - 5.0 g/dL    Globulin 4.2 (H) 2.0 - 4.0 g/dL    A-G Ratio 0.8 (L) 1.1 - 2.2     CK W/ REFLX CKMB    Collection Time: 09/04/19  1:33 PM   Result Value Ref Range    CK 52 39 - 308 U/L   TYPE & SCREEN    Collection Time: 09/04/19  1:33 PM   Result Value Ref Range    Crossmatch Expiration 09/07/2019     ABO/Rh(D) A POSITIVE     Antibody screen NEG    NT-PRO BNP    Collection Time: 09/04/19  1:33 PM   Result Value Ref Range    NT pro-BNP >35,000 (H) <125 PG/ML   TROPONIN I    Collection Time: 09/04/19  1:33 PM   Result Value Ref Range    Troponin-I, Qt. 0.09 (H) <0.05 ng/mL   OCCULT BLOOD, STOOL    Collection Time: 09/04/19  2:06 PM   Result Value Ref Range    Occult blood, stool NEGATIVE  NEG     EKG, 12 LEAD, SUBSEQUENT    Collection Time: 09/04/19  2:12 PM   Result Value Ref Range    Ventricular Rate 89 BPM    Atrial Rate 89 BPM    P-R Interval 208 ms    QRS Duration 164 ms    Q-T Interval 442 ms    QTC Calculation (Bezet) 537 ms    Calculated P Axis 67 degrees    Calculated R Axis -67 degrees    Calculated T Axis -28 degrees    Diagnosis       Normal sinus rhythm  Right bundle branch block  Left anterior fascicular block  ** Bifascicular block **  Possible Inferior infarct , age undetermined  Anterior infarct , age undetermined  Nonspecific ST-T wave changes    Confirmed by Abril Melendez ([id number]) on 9/4/2019 3:09:58 PM     TROPONIN I    Collection Time: 09/04/19  6:17 PM   Result Value Ref Range    Troponin-I, Qt. 0.09 (H) <0.05 ng/mL       Radiologic Studies -   XR CHEST PORT   Final Result   IMPRESSION: Mild cardiomegaly. Decreased lung volumes with patchy opacity in the   right lung base which is relatively linear and may represent atelectasis. Infection is not excluded.         CT Results  (Last 48 hours)    None        CXR Results  (Last 48 hours)               09/04/19 1348  XR CHEST PORT Final result    Impression:  IMPRESSION: Mild cardiomegaly. Decreased lung volumes with patchy opacity in the   right lung base which is relatively linear and may represent atelectasis. Infection is not excluded. Narrative:  EXAM: XR CHEST PORT       INDICATION: chest pain       COMPARISON: 9/25/2014       FINDINGS: A portable AP radiograph of the chest was obtained at 1322 hours. The   patient is on a cardiac monitor. Lung volumes are low. There is linear opacity   in the right midlung zone. Otherwise no airspace disease or interstitial edema   is identified. Vel[facility] Sis Heart size is mildly enlarged. .  The bones and soft tissues are   grossly within normal limits. Medical Decision Making     Differential Diagnosis: ACS, unstable angina, pneumonia, pneumothorax    I reviewed the vital signs, available nursing notes, past medical history, past surgical history, family history and social history and old medical records. On my interpretation, Laboratory workup is significant for hemoglobin is 11.7, unremarkable CBC otherwise, electrolytes with a creatinine of 7.22, troponin is 0.09 which is at his baseline, negative Hemoccult  On my interpretation of the radiology studies chest x-ray with decreased lung volumes, and infiltrates in the bases  On my interpretation of the EKG initial EKG with a ventricular rate of 92, normal sinus rhythm isolated ST elevation in lead III, T wave inversions in the precordial leads. On the patient subsequent EKG the ventricular rate is 89, QTc is 537, normal sinus rhythm, bifascicular block with resolved ST elevation in lead III    Management/ED course: Patient presents today with chest pain that is concerning for unstable angina. I gave him full dose aspirin, and his EKG revealed one lead with ST elevation that resolved on subsequent EKG. He had an elevated troponin but it was at the baseline that he typically is at.   He is been taking multiple doses of sublingual nitroglycerin at home. Here this did improve his symptoms. Ultimately patient is admitted to the hospital service for possible stress test tomorrow. I did speak with on-call cardiology Dr. Christopher Kirby who came and evaluated the patient in the emergency department. ED Course:   Initial assessment performed. The patients presenting problems have been discussed, and they are in agreement with the care plan formulated and outlined with them. I have encouraged them to ask questions as they arise throughout their visit. Procedures:  0    Disposition: Admitted    Admission Note:  Patient is being admitted to the hospital by Service: Hospitalist.  The results of their tests and reasons for their admission have been discussed with them and available family. They convey agreement and understanding for the need to be admitted and for their admission diagnosis. Diagnosis     Clinical Impression:   1. Unstable angina ([address]    2. Type 2 diabetes with nephropathy ([facility] [address]        Attestations:  Jose Laguna MD        Please note that this dictation was completed with [email or web address], the computer voice recognition software. Quite often unanticipated grammatical, syntax, homophones, and other interpretive errors are inadvertently transcribed by the computer software. Please disregard these errors. Please excuse any errors that have escaped final proofreading. Thank you.

## 2019-09-05 ENCOUNTER — ANESTHESIA EVENT (OUTPATIENT)
Dept: CARDIOTHORACIC SURGERY | Age: 64
DRG: 215 | End: 2019-09-05
Payer: MEDICARE

## 2019-09-05 ENCOUNTER — APPOINTMENT (OUTPATIENT)
Dept: VASCULAR SURGERY | Age: 64
DRG: 215 | End: 2019-09-05
Attending: NURSE PRACTITIONER
Payer: MEDICARE

## 2019-09-05 ENCOUNTER — APPOINTMENT (OUTPATIENT)
Dept: GENERAL RADIOLOGY | Age: 64
DRG: 215 | End: 2019-09-05
Attending: INTERNAL MEDICINE
Payer: MEDICARE

## 2019-09-05 ENCOUNTER — APPOINTMENT (OUTPATIENT)
Dept: VASCULAR SURGERY | Age: 64
DRG: 215 | End: 2019-09-05
Attending: INTERNAL MEDICINE
Payer: MEDICARE

## 2019-09-05 LAB
ALBUMIN SERPL-MCNC: 2.7 G/DL (ref 3.5–5)
ALBUMIN/GLOB SERPL: 0.8 {RATIO} (ref 1.1–2.2)
ALP SERPL-CCNC: 68 U/L (ref 45–117)
ALT SERPL-CCNC: 10 U/L (ref 12–78)
ANION GAP SERPL CALC-SCNC: 11 MMOL/L (ref 5–15)
ANION GAP SERPL CALC-SCNC: 8 MMOL/L (ref 5–15)
APPEARANCE UR: CLEAR
APTT PPP: 35.6 SEC (ref 22.1–32)
APTT PPP: 82 SEC (ref 22.1–32)
ARTERIAL PATENCY WRIST A: ABNORMAL
AST SERPL-CCNC: 15 U/L (ref 15–37)
BACTERIA URNS QL MICRO: NEGATIVE /HPF
BASE EXCESS BLD CALC-SCNC: 0 MMOL/L
BDY SITE: ABNORMAL
BILIRUB SERPL-MCNC: 0.9 MG/DL (ref 0.2–1)
BILIRUB UR QL: NEGATIVE
BUN SERPL-MCNC: 26 MG/DL (ref 6–20)
BUN SERPL-MCNC: 75 MG/DL (ref 6–20)
BUN/CREAT SERPL: 10 (ref 12–20)
BUN/CREAT SERPL: 7 (ref 12–20)
CALCIUM SERPL-MCNC: 7.8 MG/DL (ref 8.5–10.1)
CALCIUM SERPL-MCNC: 8.2 MG/DL (ref 8.5–10.1)
CHLORIDE SERPL-SCNC: 106 MMOL/L (ref 97–108)
CHLORIDE SERPL-SCNC: 98 MMOL/L (ref 97–108)
CO2 SERPL-SCNC: 22 MMOL/L (ref 21–32)
CO2 SERPL-SCNC: 27 MMOL/L (ref 21–32)
COHGB MFR BLD: 2.3 % (ref 1–2)
COLOR UR: ABNORMAL
COMMENT, HOLDF: NORMAL
CREAT SERPL-MCNC: 3.8 MG/DL (ref 0.7–1.3)
CREAT SERPL-MCNC: 7.77 MG/DL (ref 0.7–1.3)
EPITH CASTS URNS QL MICRO: ABNORMAL /LPF
ERYTHROCYTE [DISTWIDTH] IN BLOOD BY AUTOMATED COUNT: 17.2 % (ref 11.5–14.5)
EST. AVERAGE GLUCOSE BLD GHB EST-MCNC: 143 MG/DL
GAS FLOW.O2 O2 DELIVERY SYS: ABNORMAL L/MIN
GLOBULIN SER CALC-MCNC: 3.6 G/DL (ref 2–4)
GLUCOSE BLD STRIP.AUTO-MCNC: 113 MG/DL (ref 65–100)
GLUCOSE BLD STRIP.AUTO-MCNC: 154 MG/DL (ref 65–100)
GLUCOSE BLD STRIP.AUTO-MCNC: 71 MG/DL (ref 65–100)
GLUCOSE BLD STRIP.AUTO-MCNC: 78 MG/DL (ref 65–100)
GLUCOSE BLD STRIP.AUTO-MCNC: 92 MG/DL (ref 65–100)
GLUCOSE SERPL-MCNC: 79 MG/DL (ref 65–100)
GLUCOSE SERPL-MCNC: 90 MG/DL (ref 65–100)
GLUCOSE UR STRIP.AUTO-MCNC: NEGATIVE MG/DL
HBA1C MFR BLD: 6.6 % (ref 4.2–6.3)
HCO3 BLD-SCNC: 24.8 MMOL/L (ref 22–26)
HCT VFR BLD AUTO: 34.9 % (ref 36.6–50.3)
HGB BLD OXIMETRY-MCNC: 11 G/DL (ref 14–17)
HGB BLD-MCNC: 10.8 G/DL (ref 12.1–17)
HGB UR QL STRIP: ABNORMAL
INR PPP: 1.2 (ref 0.9–1.1)
KETONES UR QL STRIP.AUTO: NEGATIVE MG/DL
LEFT CCA DIST DIAS: 16.8 CM/S
LEFT CCA DIST SYS: 68.5 CM/S
LEFT CCA PROX DIAS: 26.5 CM/S
LEFT CCA PROX SYS: 68.5 CM/S
LEFT ECA DIAS: 0 CM/S
LEFT ECA SYS: 77.8 CM/S
LEFT ICA DIST DIAS: 27 CM/S
LEFT ICA DIST SYS: 87.4 CM/S
LEFT ICA MID DIAS: 31.9 CM/S
LEFT ICA MID SYS: 87.4 CM/S
LEFT ICA PROX DIAS: 39.4 CM/S
LEFT ICA PROX SYS: 113.8 CM/S
LEFT ICA/CCA SYS: 1.7
LEFT SUBCLAVIAN DIAS: 0 CM/S
LEFT SUBCLAVIAN SYS: 226.9 CM/S
LEFT VERTEBRAL DIAS: 10.1 CM/S
LEFT VERTEBRAL SYS: 51.2 CM/S
LEUKOCYTE ESTERASE UR QL STRIP.AUTO: NEGATIVE
MCH RBC QN AUTO: 31.1 PG (ref 26–34)
MCHC RBC AUTO-ENTMCNC: 30.9 G/DL (ref 30–36.5)
MCV RBC AUTO: 100.6 FL (ref 80–99)
METHGB MFR BLD: 0 % (ref 0–1.4)
NITRITE UR QL STRIP.AUTO: NEGATIVE
NRBC # BLD: 0 K/UL (ref 0–0.01)
NRBC BLD-RTO: 0 PER 100 WBC
OXYHGB MFR BLD: 64.8 % (ref 94–97)
PCO2 BLD: 39.1 MMHG (ref 35–45)
PH BLD: 7.41 [PH] (ref 7.35–7.45)
PH UR STRIP: 7 [PH] (ref 5–8)
PLATELET # BLD AUTO: 53 K/UL (ref 150–400)
PMV BLD AUTO: 12.9 FL (ref 8.9–12.9)
PO2 BLD: 66 MMHG (ref 80–100)
POTASSIUM SERPL-SCNC: 3.3 MMOL/L (ref 3.5–5.1)
POTASSIUM SERPL-SCNC: 4 MMOL/L (ref 3.5–5.1)
PROT SERPL-MCNC: 6.3 G/DL (ref 6.4–8.2)
PROT UR STRIP-MCNC: 100 MG/DL
PROTHROMBIN TIME: 12.3 SEC (ref 9–11.1)
RBC # BLD AUTO: 3.47 M/UL (ref 4.1–5.7)
RBC #/AREA URNS HPF: ABNORMAL /HPF (ref 0–5)
RIGHT 3RD DIGIT BP: 133 MMHG
RIGHT 3RD DIGIT COMP BP: 112 MMHG
RIGHT CCA DIST DIAS: 20.5 CM/S
RIGHT CCA DIST SYS: 69.8 CM/S
RIGHT CCA PROX DIAS: 18.6 CM/S
RIGHT CCA PROX SYS: 62.5 CM/S
RIGHT ECA DIAS: 0 CM/S
RIGHT ECA SYS: 99 CM/S
RIGHT GSV ANKLE DIAM: 0.17 CM
RIGHT GSV AT KNEE DIAM: 0.32 CM
RIGHT GSV BK MID DIAM: 0.18 CM
RIGHT GSV BK PROX DIAM: 0.28 CM
RIGHT GSV THIGH DIST DIAM: 0.28 CM
RIGHT GSV THIGH MID DIAM: 0.25 CM
RIGHT GSV THIGH PROX DIAM: 0.32 CM
RIGHT ICA DIST DIAS: 29.6 CM/S
RIGHT ICA DIST SYS: 84.4 CM/S
RIGHT ICA MID DIAS: 27.8 CM/S
RIGHT ICA MID SYS: 122.7 CM/S
RIGHT ICA PROX DIAS: 35.1 CM/S
RIGHT ICA PROX SYS: 110 CM/S
RIGHT ICA/CCA SYS: 1.8
RIGHT SSV DIST DIAM: 0.07 CM
RIGHT SSV MID DIAM: 0.15 CM
RIGHT SSV PROX DIAM: 0.2 CM
RIGHT SUBCLAVIAN DIAS: 18.6 CM/S
RIGHT SUBCLAVIAN SYS: 99 CM/S
RIGHT VERTEBRAL DIAS: 9.5 CM/S
RIGHT VERTEBRAL SYS: 36.9 CM/S
SAMPLES BEING HELD,HOLD: NORMAL
SAO2 % BLD: 66 % (ref 95–99)
SAO2 % BLD: 93 % (ref 92–97)
SERVICE CMNT-IMP: ABNORMAL
SERVICE CMNT-IMP: ABNORMAL
SERVICE CMNT-IMP: NORMAL
SODIUM SERPL-SCNC: 133 MMOL/L (ref 136–145)
SODIUM SERPL-SCNC: 139 MMOL/L (ref 136–145)
SP GR UR REFRACTOMETRY: 1.03 (ref 1–1.03)
SPECIMEN TYPE: ABNORMAL
THERAPEUTIC RANGE,PTTT: ABNORMAL SECS (ref 58–77)
THERAPEUTIC RANGE,PTTT: ABNORMAL SECS (ref 58–77)
TOTAL RESP. RATE, ITRR: 18
TROPONIN I SERPL-MCNC: 0.1 NG/ML
UA: UC IF INDICATED,UAUC: ABNORMAL
UROBILINOGEN UR QL STRIP.AUTO: 1 EU/DL (ref 0.2–1)
WBC # BLD AUTO: 4.9 K/UL (ref 4.1–11.1)
WBC URNS QL MICRO: ABNORMAL /HPF (ref 0–4)

## 2019-09-05 PROCEDURE — 36600 WITHDRAWAL OF ARTERIAL BLOOD: CPT

## 2019-09-05 PROCEDURE — 77030016754 HC CATH BLN INTAORT1 GTNG -G: Performed by: INTERNAL MEDICINE

## 2019-09-05 PROCEDURE — 85730 THROMBOPLASTIN TIME PARTIAL: CPT

## 2019-09-05 PROCEDURE — 86022 PLATELET ANTIBODIES: CPT

## 2019-09-05 PROCEDURE — 74011250636 HC RX REV CODE- 250/636: Performed by: NURSE PRACTITIONER

## 2019-09-05 PROCEDURE — 74011250637 HC RX REV CODE- 250/637: Performed by: INTERNAL MEDICINE

## 2019-09-05 PROCEDURE — 75630 X-RAY AORTA LEG ARTERIES: CPT | Performed by: INTERNAL MEDICINE

## 2019-09-05 PROCEDURE — C1894 INTRO/SHEATH, NON-LASER: HCPCS | Performed by: INTERNAL MEDICINE

## 2019-09-05 PROCEDURE — 5A0221D ASSISTANCE WITH CARDIAC OUTPUT USING IMPELLER PUMP, CONTINUOUS: ICD-10-PCS | Performed by: INTERNAL MEDICINE

## 2019-09-05 PROCEDURE — B2111ZZ FLUOROSCOPY OF MULTIPLE CORONARY ARTERIES USING LOW OSMOLAR CONTRAST: ICD-10-PCS | Performed by: INTERNAL MEDICINE

## 2019-09-05 PROCEDURE — 33967 INSERT I-AORT PERCUT DEVICE: CPT | Performed by: INTERNAL MEDICINE

## 2019-09-05 PROCEDURE — 93503 INSERT/PLACE HEART CATHETER: CPT | Performed by: INTERNAL MEDICINE

## 2019-09-05 PROCEDURE — 90935 HEMODIALYSIS ONE EVALUATION: CPT

## 2019-09-05 PROCEDURE — 74011250636 HC RX REV CODE- 250/636

## 2019-09-05 PROCEDURE — 74011250637 HC RX REV CODE- 250/637: Performed by: NURSE PRACTITIONER

## 2019-09-05 PROCEDURE — 65620000000 HC RM CCU GENERAL

## 2019-09-05 PROCEDURE — 77030004549 HC CATH ANGI DX PRF MRTM -A: Performed by: INTERNAL MEDICINE

## 2019-09-05 PROCEDURE — 93971 EXTREMITY STUDY: CPT

## 2019-09-05 PROCEDURE — 5A1D70Z PERFORMANCE OF URINARY FILTRATION, INTERMITTENT, LESS THAN 6 HOURS PER DAY: ICD-10-PCS | Performed by: GENERAL ACUTE CARE HOSPITAL

## 2019-09-05 PROCEDURE — B2161ZZ FLUOROSCOPY OF RIGHT AND LEFT HEART USING LOW OSMOLAR CONTRAST: ICD-10-PCS | Performed by: INTERNAL MEDICINE

## 2019-09-05 PROCEDURE — 4A023N8 MEASUREMENT OF CARDIAC SAMPLING AND PRESSURE, BILATERAL, PERCUTANEOUS APPROACH: ICD-10-PCS | Performed by: INTERNAL MEDICINE

## 2019-09-05 PROCEDURE — B41D1ZZ FLUOROSCOPY OF AORTA AND BILATERAL LOWER EXTREMITY ARTERIES USING LOW OSMOLAR CONTRAST: ICD-10-PCS | Performed by: INTERNAL MEDICINE

## 2019-09-05 PROCEDURE — 74011000250 HC RX REV CODE- 250: Performed by: NURSE PRACTITIONER

## 2019-09-05 PROCEDURE — 74011636320 HC RX REV CODE- 636/320: Performed by: INTERNAL MEDICINE

## 2019-09-05 PROCEDURE — 74011250636 HC RX REV CODE- 250/636: Performed by: INTERNAL MEDICINE

## 2019-09-05 PROCEDURE — 99152 MOD SED SAME PHYS/QHP 5/>YRS: CPT | Performed by: INTERNAL MEDICINE

## 2019-09-05 PROCEDURE — 02HQ32Z INSERTION OF MONITORING DEVICE INTO RIGHT PULMONARY ARTERY, PERCUTANEOUS APPROACH: ICD-10-PCS | Performed by: INTERNAL MEDICINE

## 2019-09-05 PROCEDURE — 74011000258 HC RX REV CODE- 258: Performed by: NURSE PRACTITIONER

## 2019-09-05 PROCEDURE — 36415 COLL VENOUS BLD VENIPUNCTURE: CPT

## 2019-09-05 PROCEDURE — 82803 BLOOD GASES ANY COMBINATION: CPT

## 2019-09-05 PROCEDURE — 02HA3RZ INSERTION OF SHORT-TERM EXTERNAL HEART ASSIST SYSTEM INTO HEART, PERCUTANEOUS APPROACH: ICD-10-PCS | Performed by: THORACIC SURGERY (CARDIOTHORACIC VASCULAR SURGERY)

## 2019-09-05 PROCEDURE — 77030005518 HC CATH URETH FOL 2W BARD -B

## 2019-09-05 PROCEDURE — 84484 ASSAY OF TROPONIN QUANT: CPT

## 2019-09-05 PROCEDURE — 99153 MOD SED SAME PHYS/QHP EA: CPT | Performed by: INTERNAL MEDICINE

## 2019-09-05 PROCEDURE — 03U50JZ SUPPLEMENT RIGHT AXILLARY ARTERY WITH SYNTHETIC SUBSTITUTE, OPEN APPROACH: ICD-10-PCS | Performed by: THORACIC SURGERY (CARDIOTHORACIC VASCULAR SURGERY)

## 2019-09-05 PROCEDURE — 77030002996 HC SUT SLK J&J -A: Performed by: INTERNAL MEDICINE

## 2019-09-05 PROCEDURE — C1769 GUIDE WIRE: HCPCS | Performed by: INTERNAL MEDICINE

## 2019-09-05 PROCEDURE — 82962 GLUCOSE BLOOD TEST: CPT

## 2019-09-05 PROCEDURE — 85610 PROTHROMBIN TIME: CPT

## 2019-09-05 PROCEDURE — 77030019697 HC SYR ANGI INFL MRTM -B: Performed by: INTERNAL MEDICINE

## 2019-09-05 PROCEDURE — 93880 EXTRACRANIAL BILAT STUDY: CPT

## 2019-09-05 PROCEDURE — 76937 US GUIDE VASCULAR ACCESS: CPT | Performed by: INTERNAL MEDICINE

## 2019-09-05 PROCEDURE — 77030034850: Performed by: INTERNAL MEDICINE

## 2019-09-05 PROCEDURE — 4A1239Z MONITORING OF CARDIAC OUTPUT, PERCUTANEOUS APPROACH: ICD-10-PCS | Performed by: INTERNAL MEDICINE

## 2019-09-05 PROCEDURE — 81001 URINALYSIS AUTO W/SCOPE: CPT

## 2019-09-05 PROCEDURE — 82375 ASSAY CARBOXYHB QUANT: CPT

## 2019-09-05 PROCEDURE — 4A133B3 MONITORING OF ARTERIAL PRESSURE, PULMONARY, PERCUTANEOUS APPROACH: ICD-10-PCS | Performed by: INTERNAL MEDICINE

## 2019-09-05 PROCEDURE — 71045 X-RAY EXAM CHEST 1 VIEW: CPT

## 2019-09-05 PROCEDURE — 74011000258 HC RX REV CODE- 258: Performed by: INTERNAL MEDICINE

## 2019-09-05 PROCEDURE — 80053 COMPREHEN METABOLIC PANEL: CPT

## 2019-09-05 PROCEDURE — B2151ZZ FLUOROSCOPY OF LEFT HEART USING LOW OSMOLAR CONTRAST: ICD-10-PCS | Performed by: INTERNAL MEDICINE

## 2019-09-05 PROCEDURE — 83036 HEMOGLOBIN GLYCOSYLATED A1C: CPT

## 2019-09-05 PROCEDURE — 77030028837 HC SYR ANGI PWR INJ COEU -A: Performed by: INTERNAL MEDICINE

## 2019-09-05 PROCEDURE — C1887 CATHETER, GUIDING: HCPCS | Performed by: INTERNAL MEDICINE

## 2019-09-05 PROCEDURE — 93922 UPR/L XTREMITY ART 2 LEVELS: CPT

## 2019-09-05 PROCEDURE — 85027 COMPLETE CBC AUTOMATED: CPT

## 2019-09-05 PROCEDURE — C1751 CATH, INF, PER/CENT/MIDLINE: HCPCS | Performed by: INTERNAL MEDICINE

## 2019-09-05 RX ORDER — HEPARIN SODIUM 200 [USP'U]/100ML
INJECTION, SOLUTION INTRAVENOUS
Status: COMPLETED | OUTPATIENT
Start: 2019-09-05 | End: 2019-09-05

## 2019-09-05 RX ORDER — ALBUMIN HUMAN 250 G/1000ML
12.5 SOLUTION INTRAVENOUS
Status: DISCONTINUED | OUTPATIENT
Start: 2019-09-05 | End: 2019-09-14 | Stop reason: HOSPADM

## 2019-09-05 RX ORDER — MORPHINE SULFATE 2 MG/ML
2-4 INJECTION, SOLUTION INTRAMUSCULAR; INTRAVENOUS
Status: DISCONTINUED | OUTPATIENT
Start: 2019-09-05 | End: 2019-09-08

## 2019-09-05 RX ORDER — OXYCODONE AND ACETAMINOPHEN 5; 325 MG/1; MG/1
1-2 TABLET ORAL
Status: DISCONTINUED | OUTPATIENT
Start: 2019-09-05 | End: 2019-09-14 | Stop reason: HOSPADM

## 2019-09-05 RX ORDER — LIDOCAINE HYDROCHLORIDE 20 MG/ML
JELLY TOPICAL ONCE
Status: DISCONTINUED | OUTPATIENT
Start: 2019-09-05 | End: 2019-09-05 | Stop reason: HOSPADM

## 2019-09-05 RX ORDER — AMIODARONE HYDROCHLORIDE 200 MG/1
400 TABLET ORAL EVERY 12 HOURS
Status: DISCONTINUED | OUTPATIENT
Start: 2019-09-05 | End: 2019-09-07

## 2019-09-05 RX ORDER — NITROGLYCERIN 20 MG/100ML
0-200 INJECTION INTRAVENOUS
Status: DISCONTINUED | OUTPATIENT
Start: 2019-09-05 | End: 2019-09-08

## 2019-09-05 RX ORDER — HEPARIN SODIUM 10000 [USP'U]/100ML
12-25 INJECTION, SOLUTION INTRAVENOUS
Status: DISCONTINUED | OUTPATIENT
Start: 2019-09-05 | End: 2019-09-05

## 2019-09-05 RX ORDER — CEFAZOLIN SODIUM/WATER 2 G/20 ML
2 SYRINGE (ML) INTRAVENOUS ONCE
Status: DISCONTINUED | OUTPATIENT
Start: 2019-09-05 | End: 2019-09-05

## 2019-09-05 RX ORDER — SODIUM CHLORIDE 0.9 % (FLUSH) 0.9 %
5-40 SYRINGE (ML) INJECTION AS NEEDED
Status: DISCONTINUED | OUTPATIENT
Start: 2019-09-05 | End: 2019-09-14 | Stop reason: HOSPADM

## 2019-09-05 RX ORDER — SODIUM CHLORIDE 0.9 % (FLUSH) 0.9 %
5-40 SYRINGE (ML) INJECTION EVERY 8 HOURS
Status: DISCONTINUED | OUTPATIENT
Start: 2019-09-05 | End: 2019-09-14 | Stop reason: HOSPADM

## 2019-09-05 RX ORDER — DOBUTAMINE HYDROCHLORIDE 200 MG/100ML
0-10 INJECTION INTRAVENOUS
Status: DISCONTINUED | OUTPATIENT
Start: 2019-09-06 | End: 2019-09-08

## 2019-09-05 RX ORDER — FENTANYL CITRATE 50 UG/ML
INJECTION, SOLUTION INTRAMUSCULAR; INTRAVENOUS AS NEEDED
Status: DISCONTINUED | OUTPATIENT
Start: 2019-09-05 | End: 2019-09-05 | Stop reason: HOSPADM

## 2019-09-05 RX ORDER — HEPARIN SODIUM 1000 [USP'U]/ML
INJECTION, SOLUTION INTRAVENOUS; SUBCUTANEOUS AS NEEDED
Status: DISCONTINUED | OUTPATIENT
Start: 2019-09-05 | End: 2019-09-05 | Stop reason: HOSPADM

## 2019-09-05 RX ORDER — LIDOCAINE HYDROCHLORIDE 10 MG/ML
INJECTION, SOLUTION EPIDURAL; INFILTRATION; INTRACAUDAL; PERINEURAL AS NEEDED
Status: DISCONTINUED | OUTPATIENT
Start: 2019-09-05 | End: 2019-09-05 | Stop reason: HOSPADM

## 2019-09-05 RX ORDER — CEFAZOLIN SODIUM/WATER 2 G/20 ML
2 SYRINGE (ML) INTRAVENOUS
Status: COMPLETED | OUTPATIENT
Start: 2019-09-06 | End: 2019-09-06

## 2019-09-05 RX ORDER — MORPHINE SULFATE 2 MG/ML
1-2 INJECTION, SOLUTION INTRAMUSCULAR; INTRAVENOUS
Status: DISCONTINUED | OUTPATIENT
Start: 2019-09-05 | End: 2019-09-05

## 2019-09-05 RX ORDER — MIDAZOLAM HYDROCHLORIDE 1 MG/ML
INJECTION, SOLUTION INTRAMUSCULAR; INTRAVENOUS AS NEEDED
Status: DISCONTINUED | OUTPATIENT
Start: 2019-09-05 | End: 2019-09-05 | Stop reason: HOSPADM

## 2019-09-05 RX ADMIN — NITROGLYCERIN 10 MCG/MIN: 20 INJECTION INTRAVENOUS at 15:50

## 2019-09-05 RX ADMIN — PANTOPRAZOLE SODIUM 40 MG: 40 TABLET, DELAYED RELEASE ORAL at 18:05

## 2019-09-05 RX ADMIN — NITROGLYCERIN 0.4 MG: 0.4 TABLET, ORALLY DISINTEGRATING SUBLINGUAL at 10:23

## 2019-09-05 RX ADMIN — CYCLOBENZAPRINE HYDROCHLORIDE 5 MG: 10 TABLET, FILM COATED ORAL at 01:40

## 2019-09-05 RX ADMIN — PRAVASTATIN SODIUM 80 MG: 40 TABLET ORAL at 21:22

## 2019-09-05 RX ADMIN — NITROGLYCERIN 0.4 MG: 0.4 TABLET, ORALLY DISINTEGRATING SUBLINGUAL at 12:00

## 2019-09-05 RX ADMIN — PREGABALIN 50 MG: 50 CAPSULE ORAL at 21:27

## 2019-09-05 RX ADMIN — MORPHINE SULFATE 2 MG: 2 INJECTION, SOLUTION INTRAMUSCULAR; INTRAVENOUS at 18:47

## 2019-09-05 RX ADMIN — OXYCODONE AND ACETAMINOPHEN 1 TABLET: 5; 325 TABLET ORAL at 18:47

## 2019-09-05 RX ADMIN — CARVEDILOL 6.25 MG: 6.25 TABLET, FILM COATED ORAL at 17:22

## 2019-09-05 RX ADMIN — ASPIRIN 81 MG: 81 TABLET, COATED ORAL at 12:35

## 2019-09-05 RX ADMIN — SODIUM CHLORIDE 250 ML: 900 INJECTION, SOLUTION INTRAVENOUS at 23:00

## 2019-09-05 RX ADMIN — AMIODARONE HYDROCHLORIDE 400 MG: 200 TABLET ORAL at 21:22

## 2019-09-05 RX ADMIN — Medication 10 ML: at 18:10

## 2019-09-05 RX ADMIN — LISINOPRIL 20 MG: 20 TABLET ORAL at 12:36

## 2019-09-05 RX ADMIN — Medication 10 ML: at 22:21

## 2019-09-05 RX ADMIN — DEXTROSE MONOHYDRATE 125 ML: 10 INJECTION, SOLUTION INTRAVENOUS at 12:00

## 2019-09-05 RX ADMIN — MIRTAZAPINE 30 MG: 15 TABLET, FILM COATED ORAL at 21:22

## 2019-09-05 RX ADMIN — BIVALIRUDIN 0.07 MG/KG/HR: 250 INJECTION, POWDER, LYOPHILIZED, FOR SOLUTION INTRAVENOUS at 18:52

## 2019-09-05 RX ADMIN — TRAZODONE HYDROCHLORIDE 150 MG: 50 TABLET ORAL at 21:22

## 2019-09-05 RX ADMIN — ISOSORBIDE MONONITRATE 30 MG: 30 TABLET, EXTENDED RELEASE ORAL at 12:35

## 2019-09-05 RX ADMIN — MORPHINE SULFATE 2 MG: 2 INJECTION, SOLUTION INTRAMUSCULAR; INTRAVENOUS at 17:00

## 2019-09-05 RX ADMIN — HEPARIN SODIUM 12 UNITS/KG/HR: 10000 INJECTION, SOLUTION INTRAVENOUS at 15:02

## 2019-09-05 NOTE — PROGRESS NOTES
Spoke to RN in CCU. MD note stated CABG tomorrow 9/6. OR called and stated it's impella insertion for 9/6 now. Clarified with RN that it is impella insertion. No cardiac meds need to be prepared.

## 2019-09-05 NOTE — PROGRESS NOTES
Brief Procedure Note    Patient: Sherman Mccann Sr. MRN: [id number]  SSN: [id number]    YOB: 1955  Age: 71 y.o. Sex: male      Date of Procedure: 9/5/2019     Pre-procedure Diagnosis: [place]    Post-procedure Diagnosis: LM and 3v CAD, severely reduced LVEF, severe PAD    Procedure: Ultrasound-guided vascular access, LHC, LVgram, ABD AoGram w/ bilateral runoff, IABP insertion, RHC w/ [place] insertion    Performed By: Ag Gann III, DO     Anesthesia: Moderate Sedation    Estimated Blood Loss: Less than 10 mL      Specimens:  None    Findings: as above    Complications: None    Implants: IABP, [place]    Recommendations: Continue medical therapy. CTS consult for CABG.     Signed By: Ag Gann III, DO     September 5, 2019

## 2019-09-05 NOTE — CONSULTS
CSS   History and Physical    Subjective:      Cory Isbell is a 71 y.o. male who was referred for cardiac evaluation by Dr. Anny Allred. He is a pleasant gentleman who has been experiencing chest pain for several weeks. He noticed pain with hemodialysis for months, but began waking up every morning with chest tightness and pain that required 1-2 Nitroglycerins. This week, he took four Nitroglycerins and did not find relief. He underwent cardiac cath today with Dr. Anny Allred. He found significant left sided disease and we were consulted to discuss surgical options. Mr. Bang Hawkins has a PMH of CAD, Tobacco Abuse, ESRD, Type 2 DM, Left AKA, PAD, Renal Artery Stent, Depression, HTN, Hyperlipidemia,Chronic Anemia, Chronic Thrombocytopenia, History of upper GI Bleed in 2015 and Chronic Pain Syndrome with history of Methadone use. He also has a right foot second toe amp scheduled with Dr. Zoraida Obregon in a few weeks. Cardiac Testing    Left Cardiac catheterization: 9-5-2019  1. Severe dLM and 3v CAD  2. Severely reduced LVEF  3. Elevated LVEDP  4. Severe PAD as described below. 5. Successful IABP insertion  6. Elevated right and left heart pressures  7. Normal CO/CI    ECHO:None on record/ Ordered      EKG:Normal sinus rhythm   Right bundle branch block   Left anterior fascicular block   ** Bifascicular block **   Possible Inferior infarct , age undetermined   Anterior infarct , age undetermined   Nonspecific ST-T wave changes   Past Medical History:   Diagnosis Date    Anemia     Arthritis     back, hips    CAD (coronary artery disease)     Sees Dr. Alejandra Escobar, 4 heart attacks    Chronic kidney disease     Dr. Nydia Arreaga, [facility]     Chronic LBP 1990    Slipped on gravel and fell at work; Multiple surgeries;  Sees Dr. Dionte Villarreal for pain mgmt    Chronic pain     Confusion     Depression     Diabetes ([address]     ED (erectile dysfunction)     GERD (gastroesophageal reflux disease) 11/2015    Diagnosed at [facility] last month    Hypercholesteremia     Hypertension     Liver disease     Psychiatric disorder     depression    [facility]) [facility]     Thromboembolus ([facility]     DVT in right leg     Past Surgical History:   Procedure Laterality Date    CARDIAC SURG PROCEDURE UNLIST      PTCA    HX ABOVE KNEE AMPUTATION Left 2013    Left knee stump non-healing ulcer; Dr. Lito Broussard Left     HX APPENDECTOMY      HX BELOW KNEE AMPUTATION      Left leg    HX CHOLECYSTECTOMY      HX GI      HX HIP REPLACEMENT      right hip replaced x 2    HX ORTHOPAEDIC  1990s    4 back surgeries    HX ORTHOPAEDIC      donna ankles pin placed    HX ORTHOPAEDIC      left leg 17 surgeries    HX VASCULAR ACCESS Left     port left arm    UPPER GI ENDOSCOPY,BIOPSY  9/2/2015         UPPER GI ENDOSCOPY,BIOPSY  10/12/2015         VASCULAR SURGERY PROCEDURE UNLIST      Multiple revascularization procedures, toe amputations      Social History     Tobacco Use    Smoking status: Current Every Day Smoker     Packs/day: 1.00     Years: 35.00     Pack years: 35.00    Smokeless tobacco: Never Used    Tobacco comment: 30 pack years; Occasional cigar   Substance Use Topics    Alcohol use: No     Comment: Former heavy drinker quit drinking about 17 years ago      Family History   Problem Relation Age of Onset    Alcohol abuse Father     Diabetes Sister     Diabetes Sister     Lung Disease Mother     Cancer Maternal Grandfather         Head and neck    Cancer Paternal Grandmother         Stomach     Prior to Admission medications    Medication Sig Start Date End Date Taking? Authorizing Provider   nitroglycerin (NITROSTAT) 0.4 mg SL tablet 1 Tab by SubLINGual route every five (5) minutes as needed for Chest Pain. 6/18/15  Yes Tony Llamas MD   pregabalin (LYRICA) 150 mg capsule Take 150 mg by mouth three (3) times daily.     Provider, Historical   ferric citrate (AURYXIA) 210 mg iron tablet Take 420 mg by mouth Before breakfast, lunch, and dinner. Provider, Historical   docusate sodium (COLACE) 100 mg capsule Take 100 mg by mouth daily. Provider, Historical   bumetanide (BUMEX) 1 mg tablet TAKE 1 TABLET BY MOUTH EVERY DAY 7/29/19   Jarad Ndiaye MD   pravastatin (PRAVACHOL) 80 mg tablet TAKE 1 TABLET BY MOUTH EVERY DAY AT NIGHT 5/25/19   Lesley Ndiaye MD   isosorbide mononitrate ER (IMDUR) 30 mg tablet Take 30 mg by mouth every morning. Provider, Historical   FLUoxetine (PROZAC) 20 mg capsule Take 20 mg by mouth daily. Provider, Historical   lisinopril (PRINIVIL, ZESTRIL) 20 mg tablet Take 20 mg by mouth daily. Provider, Historical   carvedilol (COREG) 6.25 mg tablet Take 6.25 mg by mouth two (2) times daily (with meals). Provider, Historical   cyclobenzaprine (FLEXERIL) 5 mg tablet Take 5 mg by mouth three (3) times daily as needed for Muscle Spasm(s). Provider, Historical   traZODone (DESYREL) 150 mg tablet TAKE 1 TABLET BY MOUTH EVERY DAY AT NIGHT 12/1/18   Ki MARKS MD   mirtazapine (REMERON) 30 mg tablet TAKE 1 TABLET BY MOUTH EVERY DAY AT NIGHT 12/1/18   Lesley Ndiaye MD   omeprazole (PRILOSEC) 40 mg capsule TAKE 1 CAPSULE BY MOUTH TWICE A DAY 12/1/18   Jarad Ndiaye MD   finasteride (PROSCAR) 5 mg tablet TAKE 1 TABLET BY MOUTH EVERY DAY 12/1/18   Jaycee Ndiaye MD   clopidogrel (PLAVIX) 75 mg tab TAKE 1 TABLET BY MOUTH EVERY DAY 12/1/18   Ki MARKS MD   aspirin 81 mg chewable tablet Take 1 Tab by mouth daily. Indications: myocardial infarction prevention 6/1/18   Lesley Ndiaye MD       Allergies   Allergen Reactions    Nubain [Nalbuphine] Other (comments)     Made me wild; Dysphoria       Current 1/2 PPD smoker  Previous history of ETOH use, non in years      +CAD on maternal side  Cannot recall other information    Review of Systems:   Consititutional: positive fatigue, Denies fever or chills.   Eyes:  Denies use of glasses or vision problems(cataracts). ENT:  Denies hearing or swallowing difficulty. CV: positive CP, claudication, HTN. Resp: positive dyspnea, denies productive cough. : positive dialysis   GI: Denies ulcers, esophageal strictures, liver problems. M/S: Denies joint or bone problems, or implanted artificial hardware. Skin: Denies varicose veins, edema. Neuro: Denies strokes, or TIAs. Psych:positive depression, Denies anxiety   Endocrine: Denies thyroid problems or diabetes. Heme/Lymphatic: Denies easy bruising or lymphedema. Objective:     VS: 5'7\"  77 kg  141/49  81  14  97%    Physical Exam:    General appearance: alert, cooperative, no distress, poor dentition, older appearing than age  Head: normocephalic, without obvious abnormality; atraumatic  Eyes: conjunctivae/corneas clear; EOM's intact. Nose: nares normal; no drainage. Neck: no carotid bruit and no JVD  Lungs: clear to auscultation bilaterally  Heart: regular rate and rhythm; no murmur  Abdomen: soft, non-tender; bowel sounds normal  Extremities: L AKA, LUE AV Fistula  Skin: Skin color normal; chronic venous change in RLE and right greater toe and second toe edematous with healing wound,   Neurologic: Grossly normal      Labs:   Recent Labs     09/05/19  1500 09/05/19  1240  09/05/19  0540   WBC  --   --   --  4.9   HGB  --   --   --  10.8*   HCT  --   --   --  34.9*   PLT  --   --   --  53*   *  --   --  139   K 3.3*  --   --  4.0   BUN 26*  --   --  75*   CREA 3.80*  --   --  7.77*   GLU 79  --   --  90   GLUCPOC  --  113*   < >  --     < > = values in this interval not displayed. Diagnostics:   PA and lateral:Interval placement of right IJ approach PA catheter with tip terminating in  the right main pulmonary artery. No pneumothorax. 2. Pulmonary vascular congestion without overt pulmonary edema. Right lower lobe  linear subsegmental atelectasis    · Carotid doppler: There is mild stenosis in the right ICA (<50%).   · There is mild stenosis in the left ICA (<50%). · The right vertebral is antegrade. · The left vertebral is antegrade. · The right subclavian with biphasic waveforms. · The left subclavian with biphasic waveforms. · Technically limited exam of the Doppler signals due to patient being on a balloon pump. PFTS-FEV1: unable to get  Vein Mapping:  The greater saphenous and small saphenous vein(s) were imaged in the transverse and longitudinal planes. The vessels showed normal color filling and compressibility. Doppler interrogation of the veins showed phasic and spontaneous flow. Right GSV of inadequate caliber or quality for bypass. Right SSV of inadequate caliber or quality for bypass. Multiple branches noted in the mid-calf and ankle segments of the great saphenous vein   Modified Keagan's Test:  · There are no changes to right PPG signals with radial artery compression indicative of a complete arch. · Less than 40 mm HG pressure drop in the right third digit with radial artery compression indicative of a complete palmar arch  Assessment:     Active Problems:    Unstable angina ([address] (9/4/2019)        Plan:     STS Risk Calculator V2.81 - Discussed by surgeon with patient. Mortality  Morbidity  Long length of stay  Short length of stay  Permanent Stroke  Prolonged ventilation  DSW infection  Renal failure  Reoperation    Treatment Plan:    1. CAD - Significant Left Main, LAD, Cx. IABP support, [place] Placed. Nitro drip, Heparin transitioned to Bivalrudin due to HIT concern. Vein mapping, Modified Keagan's and L AKA. Unable to assess subclavians today because IABP present. Discussion with Dr. Devyn Cagle and Dr. Duncan Huntley and with poor bypass options, will attempt PCI with Impella 5.0 in AM.  2. Thrombocytopenia - HIT Panel, may have exposure with dialysis but chronic according to medical records. Transitioned to Bivalrudin, discussed correct dosing at length with pharmacy and verified AMI dosing with Cards  3.  Anemia of Chronic Disease - per Nephrology  4. ESRD - Sees [facility]  5. Hypertension - Nitroglycerin titration  6. PAD - Follows with Dr. Maggy Cabrera with Vascular, scheduled for toe amp in a few weeks  7. Hypokalemia - replete  8. Hyponatremia - Na 133, trend  9. Left AKA - prosthetic leg does not fit so he cannot use., discuss with Dr. Maggy Cabrera. Thank you for the opportunity to care for Mr. James Morales.       Signed By: Gini Serrano NP     September 5, 2019

## 2019-09-05 NOTE — PROGRESS NOTES
9/5/2019    INTENSIVIST PROGRESS NOTE:     Patient seen and evaluated, chart reviewed   72 yo male smoker, with ESRD s/p cardiac cath revealing severe 3V CAD, LM disease, severe CMP  Transferred back to ccu post procedure with IABP in place  Now pt in CCU in no severe distress  No acute complaints    ROS: no sob, no cp    Visit Vitals  /63 (BP 1 Location: Right arm, BP Patient Position: At rest)   Pulse 93   Temp 97.6 °F (36.4 °C)   Resp 18   Ht 5' 7\" (1.702 m)   Wt 77.1 kg (170 lb)   SpO2 98%   BMI 26.63 kg/m²       General: no distress  Eyes: anicteric  HEENT: dry oral mucosa  Neck: FROM  CV: RRR  Lungs: clear  Abd: soft  : no flank pain  Ext: no edema  Skin: no rash  Musculoskeletal: normal inspection  Neuro: non focal    CXR: RLL atx    Labs reviewed    A/P:  - severe CAD: IABP in place, CABG tomorrow  - heparin drip  - NTG drip  - ESRD: RRT per renal  - glycemic control  - CCU monitoring  - Will assist on disposition planning when stable for transfer  Isaac Farnsworth MD

## 2019-09-05 NOTE — PROGRESS NOTES
HD TRANSFER - OUT REPORT:    Verbal report given to [address] on Calleen Donnie Corey. being transferred to [facility] room 2220 for continuing care. Report consisted of patient's Situation, Background, Assessment and   Recommendations(SBAR). Information from the following report(s) flowsheet and dialysis treatment note was reviewed with the receiving nurse. Method:  $$ Method: Hemodialysis (09/05/19 4241)    Fluid Removed  NET Fluid Removed (mL): 1500 ml (09/05/19 1053)     Patient response to treatment:  well    End Time  Hemodialysis End Time: 0408 (09/05/19 1053)  If not documented, dialysis nurse to update post-dialysis row in HD/Filtration flowsheet     Medications /Volume expansion agents or Fluid boluses administered during treatment? no    Post-dialysis medication administration due?  no  Remind nurse to administer post-HD medication upon return to unit. Fistula hemostasis? yes      Opportunity for questions and clarification was provided.       Patient transported with: belongings by staff in stable condition

## 2019-09-05 NOTE — PROGRESS NOTES
Progress Note      9/5/2019 7:46 AM  NAME: Bridget Gomez Sr. MRN:  [id number]   Admit Diagnosis: Unstable angina ([address] [I20.0]      Problem List:     1. Chest pain; cath 4/30 w/ RCA , 60% oLAD w/ neg FFR (0.88), patent LCx stent w/ mild ISR. 2. Indeterminate troponin elevation  3. Right renal artery stenosis s/p 7.0 x 19mm BMS 5/4/18  4. Occluded LCIA. 5. Elevated troponin  6. ESRD on dialysis. Dr Crump   7. Long term hx of Coronary artery disease s/p multivessel stenting s/p PCI of LCx, PTCA of RCA 7/97, PTCA LAD 3/94.  Lexiscan 2/08 w/ EF 62%, distal ineroapical infarct w/o ischemia  8. Cath 4/2018  No intervention. Lv EF  -  55%   9. Peripheral vascular disease s/p left iliac stenting, left SFA stenting 3/00, right iliac stenting 11/99.  10. Status post L AKA  11. Bilateral carotid stenosis; 16-49% 9/13  12. Hypertension  13. Diabetes  14. Chronic Tobacco abuse 1-2 PPD . 15. Chronic pain  16. Chronic anemia  17. Hyperlipidemia  18. Noncompliance  19. Peptic ulcer disease w/ GIB '15  20. Falls  21. Lives w/ sister in [facility] now  25. Usual Cardiologist:  Dr. Layla Sánchez / Duyen Siu. Assessment/Plan: TnI 0.1    Films reviewed from 4/18. Ostial LAD. Occluded RCA. LCx ok. Left common iliac artery occluded. 1. NPO for cath this afternoon. 2. I have recommended diagnostic cath for definitive evaluation of the patient's coronary anatomy and PCI if appropriate. All risks, benefits, and alternatives were discussed and the patient wishes to proceed. 3. Continue ASA  4. Continue coreg  5. Continue lisinopril  6. Continue ISMN  7. Continue statin  8. Volume management per renal         [x]       High complexity decision making was performed in this patient at high risk for decompensation with multiple organ involvement. Subjective:     Jevon denies chest pain, dyspnea. Discussed with RN events overnight.      Review of Systems:    Symptom Y/N Comments  Symptom Y/N Comments Fever/Chills N   Chest Pain N    Poor Appetite N   Edema N    Cough N   Abdominal Pain N    Sputum N   Joint Pain N    SOB/COREA N   Pruritis/Rash N    Nausea/vomit N   Tolerating PT/OT Y    Diarrhea N   Tolerating Diet Y    Constipation N   Other       Could NOT obtain due to:      Objective:      Physical Exam:    Last 24hrs VS reviewed since prior progress note. Most recent are:    Visit Vitals  /46 (BP 1 Location: Right arm, BP Patient Position: Supine)   Pulse 73   Temp 97.3 °F (36.3 °C)   Resp 18   Ht 5' 7\" (1.702 m)   Wt 77.1 kg (170 lb)   SpO2 95%   BMI 26.63 kg/m²     No intake or output data in the 24 hours ending 09/05/19 0746     General Appearance: Well developed, well nourished, alert & oriented x 3,    no acute distress. Ears/Nose/Mouth/Throat: Hearing grossly normal.  Neck: Supple. Chest: Lungs clear to auscultation bilaterally. Cardiovascular: Regular rate and rhythm, S1S2 normal, no murmur. Abdomen: Soft, non-tender, bowel sounds are active. Extremities: No edema bilaterally. Skin: Warm and dry. []         Post-cath site without hematoma, bruit, tenderness, or thrill. Distal pulses intact. PMH/SH reviewed - no change compared to H&P    Data Review    Telemetry: sinus rhythm     EKG:   [x]  No new EKG for review    Lab Data Personally Reviewed:    Recent Labs     09/05/19  0540 09/04/19  1333   WBC 4.9 6.0   HGB 10.8* 11.7*   HCT 34.9* 38.4   PLT 53* 55*     No results for input(s): INR, PTP, APTT in the last 72 hours.     No lab exists for component: INREXT   Recent Labs     09/05/19  0540 09/04/19  1333    139   K 4.0 4.2    104   CO2 22 25   BUN 75* 69*   CREA 7.77* 7.22*   GLU 90 140*   CA 8.2* 8.8     Recent Labs     09/05/19  0128 09/04/19  1817 09/04/19  1333   TROIQ 0.10* 0.09* 0.09*     Lab Results   Component Value Date/Time    Cholesterol, total 236 (H) 04/29/2018 06:00 AM    HDL Cholesterol 34 04/29/2018 06:00 AM    LDL,Direct 62 10/03/2014 05:50 AM    LDL, calculated 172.4 (H) 04/29/2018 06:00 AM    Triglyceride 148 04/29/2018 06:00 AM    CHOL/HDL Ratio 6.9 (H) 04/29/2018 06:00 AM       Recent Labs     09/04/19  1333   SGOT 16   AP 91   TP 7.4   ALB 3.2*   GLOB 4.2*     No results for input(s): PH, PCO2, PO2 in the last 72 hours.     Medications Personally Reviewed:    Current Facility-Administered Medications   Medication Dose Route Frequency    nitroglycerin (NITROSTAT) tablet 0.4 mg  0.4 mg SubLINGual Q5MIN PRN    aspirin delayed-release tablet 81 mg  81 mg Oral DAILY    pravastatin (PRAVACHOL) tablet 80 mg  80 mg Oral QHS    isosorbide mononitrate ER (IMDUR) tablet 30 mg  30 mg Oral DAILY    bumetanide (BUMEX) tablet 1 mg  1 mg Oral DAILY    carvedilol (COREG) tablet 6.25 mg  6.25 mg Oral BID WITH MEALS    cyclobenzaprine (FLEXERIL) tablet 5 mg  5 mg Oral TID PRN    finasteride (PROSCAR) tablet 5 mg  5 mg Oral DAILY    FLUoxetine (PROzac) capsule 20 mg  20 mg Oral DAILY    lisinopril (PRINIVIL, ZESTRIL) tablet 20 mg  20 mg Oral DAILY    mirtazapine (REMERON) tablet 30 mg  30 mg Oral QHS    nitroglycerin (NITROSTAT) tablet 0.4 mg  0.4 mg SubLINGual Q5MIN PRN    pantoprazole (PROTONIX) tablet 40 mg  40 mg Oral BID    pregabalin (LYRICA) capsule 50 mg  50 mg Oral TID    traZODone (DESYREL) tablet 150 mg  150 mg Oral QHS    insulin lispro (HUMALOG) injection   SubCUTAneous AC&HS    glucose chewable tablet 16 g  4 Tab Oral PRN    glucagon (GLUCAGEN) injection 1 mg  1 mg IntraMUSCular PRN    dextrose 10% infusion 0-250 mL  0-250 mL IntraVENous PRN         Amparo Hugh III, DO

## 2019-09-05 NOTE — PROGRESS NOTES
Problem: Falls - Risk of  Goal: *Absence of Falls  Description  Document Rl Duckworth Fall Risk and appropriate interventions in the flowsheet.   Outcome: Progressing Towards Goal  Note:   Fall Risk Interventions:  Mobility Interventions: Bed/chair exit alarm, OT consult for ADLs, Patient to call before getting OOB         Medication Interventions: Evaluate medications/consider consulting pharmacy, Patient to call before getting OOB, Teach patient to arise slowly    Elimination Interventions: Bed/chair exit alarm    History of Falls Interventions: Bed/chair exit alarm, Evaluate medications/consider consulting pharmacy, Investigate reason for fall         Problem: Patient Education: Go to Patient Education Activity  Goal: Patient/Family Education  Outcome: Progressing Towards Goal

## 2019-09-05 NOTE — PROGRESS NOTES
0730- Patient transported to HD suite  30 COLE Palomares RN called me to alert me of patient's onset of CP  1023- Pt c/o 6/10 non radiating mid sternal CP, patient states it is similar to admission CP. HR 92 NSR, /54. Administered SL Nitro. 1112- Verbal report received from Ada Lin RN in HD, transport has been notified patient ready for transfer. VSS, CP has decreased. 1146- Patient back from HD. BG 78  1200- Administered D10  1241- , patient transported down to cath lab  1453- Tried to call report to CCU, left on hold for 5 minutes  1506- Called report to CCU and received  TRANSFER - OUT REPORT:    Verbal report given to Jessibetty(name) on [employer].  being transferred to CCU(unit) for routine progression of care       Report consisted of patients Situation, Background, Assessment and   Recommendations(SBAR). Information from the following report(s) SBAR, Kardex, ED Summary, Procedure Summary, Intake/Output, MAR, Recent Results and Cardiac Rhythm (NSR BBB) was reviewed with the receiving nurse. Lines:   Venous Access Device chronic dual lumen catheter LOT [id number] 05/10/18 Upper chest (subclavicular area, right (Active)       Peripheral IV 09/04/19 Right Antecubital (Active)   Site Assessment Clean, dry, & intact 9/4/2019  1:42 PM   Phlebitis Assessment 0 9/4/2019  1:42 PM   Infiltration Assessment 0 9/4/2019  1:42 PM   Dressing Status Clean, dry, & intact 9/4/2019  1:42 PM   Dressing Type Transparent;Tape 9/4/2019  1:42 PM        Opportunity for questions and clarification was provided.       Patient transported with:   Registered Nurse

## 2019-09-05 NOTE — PROGRESS NOTES
Update:    Long discussion w/ Dr. Carisa Duron and surgical team.  Very poor CABG candidate given multiple comorbidities. Discussed options for PCI. Long discussion with the patient about options. Mainly whether to medically manage or attempt high risk PCI once an Impella 5.0 would be placed. Pt understands the high risk nature of the procedure and the high likelihood of vessel damage, stroke, and death on the table. He said \"I dont have much of a choice. \"      I did discuss all of this with his son at the patients request.  He understands the risks as well. Was at home and going to talk w/ his wife and the patients wife and come to hospital this evening. Patient is tentatively planned for OR in the AM w/ Impella 5.0 insertion followed by high-risk PCI. PLT count is low. Stopping heparin gtt; sending HIT panel, and will use bival.    NTG gtt for HTN. Pt is pain-free w/ the IABP. I have spent critical care time involved in lab review, consultations with specialist, family decision-making, and documentation. During this entire length of time I was immediately available to the patient. The reason for providing this level of medical care for this critically ill patient was due to a critical illness that impaired one or more vital organ systems such that there was a high probability of imminent or life threatening deterioration in the patients condition. This care involved high complexity decision making to assess, manipulate, and support vital system functions, to treat this degreee vital organ system failure and to prevent further life threatening deterioration of the patients condition.      Total critical care time spent: ~100 mins

## 2019-09-05 NOTE — PROGRESS NOTES
TRANSFER - IN REPORT:    Verbal report received from Sandhya Ronquillo [facility] on [facility].  being received from [facility] for ordered procedure      Report consisted of patients Situation, Background, Assessment and   Recommendations(SBAR). Information from the following report(s) SBAR and Kardex was reviewed with the receiving nurse. Opportunity for questions and clarification was provided. Assessment completed upon patients arrival to unit and care assumed.

## 2019-09-05 NOTE — PROGRESS NOTES
Problem: Falls - Risk of  Goal: *Absence of Falls  Description  Document Samra Shi Fall Risk and appropriate interventions in the flowsheet.   Outcome: Progressing Towards Goal  Note:   Fall Risk Interventions:  Mobility Interventions: Bed/chair exit alarm, Patient to call before getting OOB         Medication Interventions: Bed/chair exit alarm, Patient to call before getting OOB    Elimination Interventions: Bed/chair exit alarm    History of Falls Interventions: Bed/chair exit alarm, Room close to nurse's station         Problem: Patient Education: Go to Patient Education Activity  Goal: Patient/Family Education  Outcome: Progressing Towards Goal     Problem: Patient Education: Go to Patient Education Activity  Goal: Patient/Family Education  Outcome: Progressing Towards Goal     Problem: Unstable angina/NSTEMI: Day of Admission/Day 1  Goal: Off Pathway (Use only if patient is Off Pathway)  Outcome: Progressing Towards Goal  Goal: Activity/Safety  Outcome: Progressing Towards Goal  Goal: Consults, if ordered  Outcome: Progressing Towards Goal  Goal: Diagnostic Test/Procedures  Outcome: Progressing Towards Goal  Goal: Nutrition/Diet  Outcome: Progressing Towards Goal  Goal: Discharge Planning  Outcome: Progressing Towards Goal  Goal: Medications  Outcome: Progressing Towards Goal  Goal: Respiratory  Outcome: Progressing Towards Goal  Goal: Treatments/Interventions/Procedures  Outcome: Progressing Towards Goal  Goal: Psychosocial  Outcome: Progressing Towards Goal  Goal: *Hemodynamically stable  Outcome: Progressing Towards Goal  Goal: *Optimal pain control at patient's stated goal  Outcome: Progressing Towards Goal  Goal: *Lungs clear or at baseline  Outcome: Progressing Towards Goal     Problem: Unstable angina/NSTEMI: Day 2  Goal: Off Pathway (Use only if patient is Off Pathway)  Outcome: Progressing Towards Goal  Goal: Activity/Safety  Outcome: Progressing Towards Goal  Goal: Consults, if ordered  Outcome: Progressing Towards Goal  Goal: Diagnostic Test/Procedures  Outcome: Progressing Towards Goal  Goal: Nutrition/Diet  Outcome: Progressing Towards Goal  Goal: Discharge Planning  Outcome: Progressing Towards Goal  Goal: Medications  Outcome: Progressing Towards Goal  Goal: Respiratory  Outcome: Progressing Towards Goal  Goal: Treatments/Interventions/Procedures  Outcome: Progressing Towards Goal  Goal: Psychosocial  Outcome: Progressing Towards Goal  Goal: *Hemodynamically stable  Outcome: Progressing Towards Goal  Goal: *Optimal pain control at patient's stated goal  Outcome: Progressing Towards Goal  Goal: *Lungs clear or at baseline  Outcome: Progressing Towards Goal     Problem: Unstable Angina/NSTEMI: Discharge Outcomes  Goal: *Hemodynamically stable  Outcome: Progressing Towards Goal  Goal: *Stable cardiac rhythm  Outcome: Progressing Towards Goal  Goal: *Lungs clear or at baseline  Outcome: Progressing Towards Goal  Goal: *Optimal pain control at patient's stated goal  Outcome: Progressing Towards Goal  Goal: *Identifies cardiac risk factors  Outcome: Progressing Towards Goal  Goal: *Verbalizes home exercise program, activity guidelines, cardiac precautions  Outcome: Progressing Towards Goal  Goal: *Verbalizes understanding and describes prescribed diet  Outcome: Progressing Towards Goal  Goal: *Verbalizes name, dosage, time, side effects, and number of days to continue medications  Outcome: Progressing Towards Goal  Goal: *Anxiety reduced or absent  Outcome: Progressing Towards Goal  Goal: *Understands and describes signs and symptoms to report to providers(Stroke Metric)  Outcome: Progressing Towards Goal  Goal: *Describes follow-up/return visits to physicians  Outcome: Progressing Towards Goal  Goal: *Describes available resources and support systems  Outcome: Progressing Towards Goal  Goal: *Influenza immunization  Outcome: Progressing Towards Goal  Goal: *Pneumococcal immunization  Outcome: Progressing Towards Goal  Goal: *Describes smoking cessation resources  Outcome: Progressing Towards Goal

## 2019-09-05 NOTE — PROGRESS NOTES
NAME: Eulalio Harrison Sr.        :  1955        MRN:  [id number]        Assessment :    Plan:  --[id number]  Anemia  CP dyspnea  CAD --Seen on HD at 9:30. Did not get Hd last night. HD in AM to get back on schedule. Holding OWEN for now. Subjective:     Chief Complaint:  \" I'm going to have a cath later today. \"  No CP at present. No dyspnea. No N/V. Review of Systems:    Symptom Y/N Comments  Symptom Y/N Comments   Fever/Chills    Chest Pain     Poor Appetite    Edema     Cough    Abdominal Pain     Sputum    Joint Pain     SOB/COREA    Pruritis/Rash     Nausea/vomit    Tolerating PT/OT     Diarrhea    Tolerating Diet     Constipation    Other       Could not obtain due to:      Objective:     VITALS:   Last 24hrs VS reviewed since prior progress note. Most recent are:  Visit Vitals  /54   Pulse 87   Temp 97.8 °F (36.6 °C) (Oral)   Resp 18   Ht 5' 7\" (1.702 m)   Wt 77.1 kg (170 lb)   SpO2 95%   BMI 26.63 kg/m²     No intake or output data in the 24 hours ending 19 0929   Telemetry Reviewed:     PHYSICAL EXAM:  General: NAD no edema      Lab Data Reviewed: (see below)    Medications Reviewed: (see below)    PMH/SH reviewed - no change compared to H&P  ________________________________________________________________________  Care Plan discussed with:  Patient     Family      RN     Care Manager                    Consultant:          Comments   >50% of visit spent in counseling and coordination of care       ________________________________________________________________________  Reginald Phan MD     Procedures: see electronic medical records for all procedures/Xrays and details which  were not copied into this note but were reviewed prior to creation of Plan.       LABS:  Recent Labs     19  0540 19  1333   WBC 4.9 6.0   HGB 10.8* 11.7*   HCT 34.9* 38.4   PLT 53* 55* Recent Labs     09/05/19  0540 09/04/19  1333    139   K 4.0 4.2    104   CO2 22 25   BUN 75* 69*   CREA 7.77* 7.22*   GLU 90 140*   CA 8.2* 8.8     Recent Labs     09/04/19  1333   SGOT 16   AP 91   TP 7.4   ALB 3.2*   GLOB 4.2*     No results for input(s): INR, PTP, APTT in the last 72 hours. No lab exists for component: INREXT   No results for input(s): FE, TIBC, PSAT, FERR in the last 72 hours. Lab Results   Component Value Date/Time    Folate 7.0 01/06/2016 01:08 PM      No results for input(s): PH, PCO2, PO2 in the last 72 hours. No results for input(s): CPK, CKMB in the last 72 hours.     No lab exists for component: TROPONINI  No components found for: [place]  Lab Results   Component Value Date/Time    Color YELLOW/STRAW 10/11/2015 03:40 PM    Appearance CLEAR 10/11/2015 03:40 PM    Specific gravity 1.008 10/11/2015 03:40 PM    pH (UA) 6.5 10/11/2015 03:40 PM    Protein 100 (A) 10/11/2015 03:40 PM    Glucose 250 (A) 10/11/2015 03:40 PM    Ketone NEGATIVE  10/11/2015 03:40 PM    Bilirubin NEGATIVE  10/11/2015 03:40 PM    Urobilinogen 1.0 10/11/2015 03:40 PM    Nitrites NEGATIVE  10/11/2015 03:40 PM    Leukocyte Esterase NEGATIVE  10/11/2015 03:40 PM    Epithelial cells FEW 10/11/2015 03:40 PM    Bacteria NEGATIVE  10/11/2015 03:40 PM    WBC 0-4 10/11/2015 03:40 PM    RBC 0-5 10/11/2015 03:40 PM       MEDICATIONS:  Current Facility-Administered Medications   Medication Dose Route Frequency    albumin human 25% (BUMINATE) solution 12.5 g  12.5 g IntraVENous DIALYSIS PRN    nitroglycerin (NITROSTAT) tablet 0.4 mg  0.4 mg SubLINGual Q5MIN PRN    aspirin delayed-release tablet 81 mg  81 mg Oral DAILY    pravastatin (PRAVACHOL) tablet 80 mg  80 mg Oral QHS    isosorbide mononitrate ER (IMDUR) tablet 30 mg  30 mg Oral DAILY    bumetanide (BUMEX) tablet 1 mg  1 mg Oral DAILY    carvedilol (COREG) tablet 6.25 mg  6.25 mg Oral BID WITH MEALS    cyclobenzaprine (FLEXERIL) tablet 5 mg  5 mg Oral TID PRN    finasteride (PROSCAR) tablet 5 mg  5 mg Oral DAILY    FLUoxetine (PROzac) capsule 20 mg  20 mg Oral DAILY    lisinopril (PRINIVIL, ZESTRIL) tablet 20 mg  20 mg Oral DAILY    mirtazapine (REMERON) tablet 30 mg  30 mg Oral QHS    nitroglycerin (NITROSTAT) tablet 0.4 mg  0.4 mg SubLINGual Q5MIN PRN    pantoprazole (PROTONIX) tablet 40 mg  40 mg Oral BID    pregabalin (LYRICA) capsule 50 mg  50 mg Oral TID    traZODone (DESYREL) tablet 150 mg  150 mg Oral QHS    insulin lispro (HUMALOG) injection   SubCUTAneous AC&HS    glucose chewable tablet 16 g  4 Tab Oral PRN    glucagon (GLUCAGEN) injection 1 mg  1 mg IntraMUSCular PRN    dextrose 10% infusion 0-250 mL  0-250 mL IntraVENous PRN

## 2019-09-05 NOTE — PROGRESS NOTES
Hospitalist Progress Note    NAME: Scar Jacobs Sr.   :  1955   MRN:  [id number]       Assessment / Plan:  Unstable angina in settings of CAD with h/o multiple stents in the past   HTN   -intermittent CP relieved by nitro  -for card cath today  Cardiology help appreciated   -cont home Coreg and lisinopril   Cont ASA    DM type II with  Nephropathy  -BS low side   -Hold home Tradjenta   -c/w SS     Thrombocytopenia  -base line platelets are normal and now they are 55  -check for HIT     ESRD   -HD TThSa   -Renal help appreciated. Renal consult for HD. Renally dose Rx as appropriate. PAD  Multiple small wounds    -Patient is scheduled to have a right second toe amputation on 10/1/2019  -Continue aspirin and Plavix  -consult wound care    Depression, home trazodone  H/o R renal artery stenosis, s/p stent 2018                  Code Status: full   Surrogate Decision Maker: Wife Armando Sylvester  DVT Prophylaxis: Scd's pending card cath         Baseline: lives at home with wife and his son; independent   Recommended Disposition: TBD     Subjective:     Chief Complaint / Reason for Physician Visit: following unstable angina   Seen during HD   Earlier had CP resolved with NTG     Discussed with RN events overnight. Review of Systems:  Symptom Y/N Comments  Symptom Y/N Comments   Fever/Chills    Chest Pain y intermittent    Poor Appetite    Edema     Cough    Abdominal Pain     Sputum    Joint Pain     SOB/COREA n   Pruritis/Rash     Nausea/vomit    Tolerating PT/OT     Diarrhea    Tolerating Diet     Constipation    Other       Could NOT obtain due to:      Objective:     VITALS:   Last 24hrs VS reviewed since prior progress note.  Most recent are:  Patient Vitals for the past 24 hrs:   Temp Pulse Resp BP SpO2   19 1200  93      19 1143 97.6 °F (36.4 °C) 94 18 136/63 98 %   19 1053 97.6 °F (36.4 °C) 90 17 129/70    19 1045  92 17 136/66    19 1030  89 18 119/54    19 1022  92  135/54    09/05/19 1015  92 17 135/54    09/05/19 1000  91 15 125/76    09/05/19 0945  89 18 126/62    09/05/19 0930  88 18 107/76    09/05/19 0915  87 18 121/54    09/05/19 0900  86 18 121/64    09/05/19 0845  86 18 120/63    09/05/19 0830  86 18 113/68    09/05/19 0815  84 18 114/55    09/05/19 0800  80 18 107/55    09/05/19 0752 97.8 °F (36.6 °C) 81 18 106/56    09/05/19 0329 97.3 °F (36.3 °C) 73 18 100/46 95 %   09/04/19 2342 97.3 °F (36.3 °C) 72 18 108/56 97 %   09/04/19 2036 97.7 °F (36.5 °C) 90 18 131/76 97 %   09/04/19 1915 98.1 °F (36.7 °C) 91 19 124/62 98 %   09/04/19 1830  92 17 135/73 100 %   09/04/19 1815  88 17 131/76 100 %   09/04/19 1800  87 16 134/73 100 %   09/04/19 1746  87  124/66    09/04/19 1745  88 16 124/66 100 %   09/04/19 1730  (!) 101 15 128/63 98 %   09/04/19 1715  86 18 125/50 99 %   09/04/19 1700  85 21 121/65 99 %   09/04/19 1645  86 18 128/64 99 %   09/04/19 1630  85 20 131/68 100 %   09/04/19 1615  84 17 119/71 99 %   09/04/19 1600  83 16 119/63 96 %   09/04/19 1545  79 15 111/57 97 %   09/04/19 1530  82 18 117/60 100 %   09/04/19 1515  81 18 122/68 98 %   09/04/19 1500  83 19 121/82 97 %   09/04/19 1445  81 15 116/60 97 %   09/04/19 1430  83 21 110/63 97 %   09/04/19 1415  87 20 124/82 97 %   09/04/19 1400  90 22 136/75 99 %   09/04/19 1345  90 17 140/74 99 %   09/04/19 1336  90 27 126/82 99 %   09/04/19 1330  93 19 147/74 99 %   09/04/19 1315  93 18 140/75 100 %       Intake/Output Summary (Last 24 hours) at 9/5/2019 1300  Last data filed at 9/5/2019 1235  Gross per 24 hour   Intake 125 ml   Output 1650 ml   Net -1525 ml        PHYSICAL EXAM:  General: WD, WN. Alert, cooperative, no acute distress    EENT:  EOMI. Anicteric sclerae. MMM  Resp:  CTA bilaterally, no wheezing or rales.   No accessory muscle use  CV:  Regular  rhythm,  No edema. + BL LE chronic venous changes   GI:  Soft, Non distended, Non tender.  +Bowel sounds  Neurologic:  Alert and oriented X 3, normal speech,   Psych:   Good insight. Not anxious nor agitated  Skin:  No rashes. No jaundice    Reviewed most current lab test results and cultures  YES  Reviewed most current radiology test results   YES  Review and summation of old records today    NO  Reviewed patient's current orders and MAR    YES  PMH/SH reviewed - no change compared to H&P  ________________________________________________________________________  Care Plan discussed with:    Comments   Patient y    Family      RN y    Care Manager     Consultant  y Cardiology Dr Yoly Suarez team rounds were held today with , nursing, pharmacist and clinical coordinator. Patient's plan of care was discussed; medications were reviewed and discharge planning was addressed. ________________________________________________________________________  Total NON critical care TIME:  35   Minutes    Total CRITICAL CARE TIME Spent:   Minutes non procedure based      Comments   >50% of visit spent in counseling and coordination of care     ________________________________________________________________________  Cris De La Torre MD     Procedures: see electronic medical records for all procedures/Xrays and details which were not copied into this note but were reviewed prior to creation of Plan. LABS:  I reviewed today's most current labs and imaging studies.   Pertinent labs include:  Recent Labs     09/05/19 0540 09/04/19  1333   WBC 4.9 6.0   HGB 10.8* 11.7*   HCT 34.9* 38.4   PLT 53* 55*     Recent Labs     09/05/19 0540 09/04/19  1333    139   K 4.0 4.2    104   CO2 22 25   GLU 90 140*   BUN 75* 69*   CREA 7.77* 7.22*   CA 8.2* 8.8   ALB  --  3.2*   TBILI  --  0.6   SGOT  --  16   ALT  --  13       Signed: Cris De La Torre MD

## 2019-09-05 NOTE — PROCEDURES
[facility] Team [facility]  [phone]    Vitals   Pre   Post   Assessment   Pre   Post     Temp  Temp: 97.8 °F (36.6 °C) (09/05/19 0752) 97.6 LOC  A&OX4 A&OX4   HR   Pulse (Heart Rate): 81 (09/05/19 0752) 90 Lungs   Clear, diminished Clear, diminished   B/P   BP: 106/56 (09/05/19 0752) 129/70 Cardiac   Bedside telemetry, sinus rhythm with a BBB per primary RN. 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 Trace generalized. Orders:    Duration:   Start:   07:52 End:   10:53 Total:   3 hrs   Dialyzer:   Dialyzer/Set Up Inspection: Traleigh ann Portillo (09/05/19 0752)   K Bath:   Dialysate K (mEq/L): 2 (09/05/19 0752)   Ca Bath:   Dialysate CA (mEq/L): 2.5 (09/05/19 0752)   Na/Bicarb:   Dialysate NA (mEq/L): 140 (09/05/19 0752)   Target Fluid Removal:   Goal/Amount of Fluid to Remove (mL): 2000 mL (09/05/19 0752)   Access     Type & Location:   HARVEY AVF: skin CDI. No s/s of infection. No issues with cannulation or hemostasis. Running well at .     Labs     Obtained/Reviewed   Critical Results Called   Date when labs were drawn-  Hgb-    HGB   Date Value Ref Range Status   09/05/2019 10.8 (L) 12.1 - 17.0 g/dL Final     K-    Potassium   Date Value Ref Range Status   09/05/2019 4.0 3.5 - 5.1 mmol/L Final     Ca-   Calcium   Date Value Ref Range Status   09/05/2019 8.2 (L) 8.5 - 10.1 MG/DL Final     Bun-   BUN   Date Value Ref Range Status   09/05/2019 75 (H) 6 - 20 MG/DL Final     Creat-   Creatinine   Date Value Ref Range Status   09/05/2019 7.77 (H) 0.70 - 1.30 MG/DL Final        Medications/ Blood Products Given     Name   Dose   Route and Time     None from HD RN                Blood Volume Processed (BVP):   66.0 L Net Fluid   Removed:  1500 ml   Comments   Time Out Done: 07:45  Primary Nurse Rpt Pre: Kamala Garcia RN  Primary Nurse Rpt Post: [facility] RN  Pt Education: procedural, S/S of hypotension  Care Plan: ongoing  Tx Summary:  Pt arrived to HD [address]. Consent signed & on file. SBAR received from Primary RN Kamala Garcia. 07:52- Pt cannulated with 41K needles per policy & without issue. VSS. Dialysis Tx initiated. Dialysis access visualized and lines intact. 08:30- Pt resting quietly. VSS. Denies pain or SOB. Dialysis access visualized and lines intact. 09:00- Pt resting quietly. VSS. Denies pain or SOB. Dialysis access visualized and lines intact. 09:25- MD Severino at bedside. 09:30- Pt resting quietly. VSS. Denies pain or SOB. Dialysis access visualized and lines intact. 10:00- Pt resting quietly. VSS. Denies pain or SOB. Dialysis access visualized and lines intact. 10:05- Patient complaining of chest pain, paused UF. Notified primary RN of patient having chest pain. 10:23- Primary RN gave patient SL Nitroglycerin per order 0.4 mg.  10:30- Patient states chest pain is improved, HR is now 89 and BP is 119/54. UF resumed, lowered UF goal to 1500 ml. Patient resting quietly, denies pain or SOB. Dialysis access visualized and lines intact. 10:53- Tx ended. VSS. All possible blood returned to patient. Dialysis access visualized and lines intact throughout treatment. Hemostasis achieved without issue. Bed locked and in the lowest position, call bell and belongings in reach. SBAR given to Primary,  [facility]. All Dialysis related medications have been reviewed. Patient is stable at time of his departure.     Admiting Diagnosis: Unstable Angina  Pt's previous clinic- [facility]  Consent signed - Informed Consent Verified: Yes (09/05/19 8542)  [facility] Consent - on file  Hepatitis Status- HbAg negative 01/02/19, HbAB 17, immune  Machine #- Machine Number: [id number] (09/05/19 5632)  Telemetry status- Bedside telemetry, Sinus rhythm with a BBB  Pre-dialysis wt. -

## 2019-09-05 NOTE — PROGRESS NOTES
Bedside shift change report GIVEN TO CAITLYN Montalvo. Report included the following information SBAR, Kardex, Intake/Output and Cardiac Rhythm NSR.         SIGNIFICANT CHANGES DURING SHIFT:  None        CONCERNS TO ADDRESS WITH MD:  None            [facility] NURSING NOTE   Admission Date 9/4/2019   Admission Diagnosis Unstable angina ([address] [I20.0]   Consults IP CONSULT TO CARDIOLOGY      Cardiac Monitoring [x] Yes [] No      Purposeful Hourly Rounding [x] Yes    Momo Score Total Score: 3   Momo score 3 or > [] Bed Alarm [] Avasys [] 1:1 sitter [] Patient refused (Signed refusal form in chart)   Mitchel Score Mitchel Score: 19   Mitchel score 14 or < [] PMT consult [] Wound Care consult    []  Specialty bed  [] Nutrition consult      Influenza Vaccine Received Flu Vaccine for Current Season (usually Sept-March): Not Flu Season           Oxygen needs? [x] Room air Oxygen @  []1L    []2L    []3L   []4L    []5L   []6L via  NC   Chronic home O2 use? [] Yes [] No  Perform O2 challenge test and document in progress note using [employer]Home[employer])      Last bowel movement        Urinary Catheter             LDAs             Venous Access Device chronic dual lumen catheter LOT [id number] 05/10/18 Upper chest (subclavicular area, right (Active)      Peripheral IV 09/04/19 Right Antecubital (Active)   Site Assessment Clean, dry, & intact 9/4/2019  1:42 PM   Phlebitis Assessment 0 9/4/2019  1:42 PM   Infiltration Assessment 0 9/4/2019  1:42 PM   Dressing Status Clean, dry, & intact 9/4/2019  1:42 PM   Dressing Type Transparent;Tape 9/4/2019  1:42 PM        Hemodialysis Access 09/05/19 (Active)                     Readmission Risk Assessment Tool Score High Risk            32       Total Score        3 Has Seen PCP in Last 6 Months (Yes=3, No=0)    2 . Living with Significant Other. Assisted Living. LTAC. SNF. or   Rehab    9 Pt.  Coverage (Medicare=5 , Medicaid, or Self-Pay=4)    18 Charlson Comorbidity Score (Age + Comorbid Conditions)        Criteria that do not apply:    Patient Length of Stay (>5 days = 3)    IP Visits Last 12 Months (1-3=4, 4=9, >4=11)       Expected Length of Stay - - -   Actual Length of Stay 1

## 2019-09-06 ENCOUNTER — APPOINTMENT (OUTPATIENT)
Dept: GENERAL RADIOLOGY | Age: 64
DRG: 215 | End: 2019-09-06
Attending: THORACIC SURGERY (CARDIOTHORACIC VASCULAR SURGERY)
Payer: MEDICARE

## 2019-09-06 ENCOUNTER — APPOINTMENT (OUTPATIENT)
Dept: NON INVASIVE DIAGNOSTICS | Age: 64
DRG: 215 | End: 2019-09-06
Attending: NURSE PRACTITIONER
Payer: MEDICARE

## 2019-09-06 ENCOUNTER — APPOINTMENT (OUTPATIENT)
Dept: GENERAL RADIOLOGY | Age: 64
DRG: 215 | End: 2019-09-06
Attending: INTERNAL MEDICINE
Payer: MEDICARE

## 2019-09-06 ENCOUNTER — APPOINTMENT (OUTPATIENT)
Dept: NON INVASIVE DIAGNOSTICS | Age: 64
DRG: 215 | End: 2019-09-06
Attending: THORACIC SURGERY (CARDIOTHORACIC VASCULAR SURGERY)
Payer: MEDICARE

## 2019-09-06 ENCOUNTER — ANESTHESIA (OUTPATIENT)
Dept: CARDIOTHORACIC SURGERY | Age: 64
DRG: 215 | End: 2019-09-06
Payer: MEDICARE

## 2019-09-06 PROBLEM — I50.9 HEART FAILURE (HCC): Status: ACTIVE | Noted: 2019-09-06

## 2019-09-06 LAB
ALBUMIN SERPL-MCNC: 2.3 G/DL (ref 3.5–5)
ALBUMIN SERPL-MCNC: 2.5 G/DL (ref 3.5–5)
ALBUMIN/GLOB SERPL: 0.7 {RATIO} (ref 1.1–2.2)
ALBUMIN/GLOB SERPL: 0.9 {RATIO} (ref 1.1–2.2)
ALP SERPL-CCNC: 55 U/L (ref 45–117)
ALP SERPL-CCNC: 64 U/L (ref 45–117)
ALT SERPL-CCNC: 8 U/L (ref 12–78)
ALT SERPL-CCNC: 9 U/L (ref 12–78)
ANION GAP SERPL CALC-SCNC: 13 MMOL/L (ref 5–15)
ANION GAP SERPL CALC-SCNC: 9 MMOL/L (ref 5–15)
APTT PPP: 110.6 SEC (ref 22.1–32)
APTT PPP: 44.3 SEC (ref 22.1–32)
APTT PPP: 69.5 SEC (ref 22.1–32)
ARTERIAL PATENCY WRIST A: ABNORMAL
AST SERPL-CCNC: 17 U/L (ref 15–37)
AST SERPL-CCNC: 29 U/L (ref 15–37)
BACTERIA SPEC CULT: ABNORMAL
BACTERIA SPEC CULT: ABNORMAL
BASE DEFICIT BLD-SCNC: 2 MMOL/L
BASOPHILS # BLD: 0 K/UL (ref 0–0.1)
BASOPHILS NFR BLD: 1 % (ref 0–1)
BDY SITE: ABNORMAL
BILIRUB SERPL-MCNC: 0.5 MG/DL (ref 0.2–1)
BILIRUB SERPL-MCNC: 0.8 MG/DL (ref 0.2–1)
BUN SERPL-MCNC: 35 MG/DL (ref 6–20)
BUN SERPL-MCNC: 37 MG/DL (ref 6–20)
BUN/CREAT SERPL: 7 (ref 12–20)
BUN/CREAT SERPL: 7 (ref 12–20)
CA-I BLD-SCNC: 0.91 MMOL/L (ref 1.12–1.32)
CALCIUM SERPL-MCNC: 6.6 MG/DL (ref 8.5–10.1)
CALCIUM SERPL-MCNC: 7.2 MG/DL (ref 8.5–10.1)
CHLORIDE SERPL-SCNC: 100 MMOL/L (ref 97–108)
CHLORIDE SERPL-SCNC: 101 MMOL/L (ref 97–108)
CO2 SERPL-SCNC: 21 MMOL/L (ref 21–32)
CO2 SERPL-SCNC: 27 MMOL/L (ref 21–32)
COHGB MFR BLD: 1.4 % (ref 1–2)
CREAT SERPL-MCNC: 4.76 MG/DL (ref 0.7–1.3)
CREAT SERPL-MCNC: 5.07 MG/DL (ref 0.7–1.3)
DIFFERENTIAL METHOD BLD: ABNORMAL
EOSINOPHIL # BLD: 0.1 K/UL (ref 0–0.4)
EOSINOPHIL NFR BLD: 4 % (ref 0–7)
ERYTHROCYTE [DISTWIDTH] IN BLOOD BY AUTOMATED COUNT: 17.1 % (ref 11.5–14.5)
ERYTHROCYTE [DISTWIDTH] IN BLOOD BY AUTOMATED COUNT: 17.2 % (ref 11.5–14.5)
GAS FLOW.O2 O2 DELIVERY SYS: ABNORMAL L/MIN
GLOBULIN SER CALC-MCNC: 2.7 G/DL (ref 2–4)
GLOBULIN SER CALC-MCNC: 3.6 G/DL (ref 2–4)
GLUCOSE BLD STRIP.AUTO-MCNC: 166 MG/DL (ref 65–100)
GLUCOSE SERPL-MCNC: 101 MG/DL (ref 65–100)
GLUCOSE SERPL-MCNC: 154 MG/DL (ref 65–100)
HCO3 BLD-SCNC: 22.3 MMOL/L (ref 22–26)
HCT VFR BLD AUTO: 30.4 % (ref 36.6–50.3)
HCT VFR BLD AUTO: 31.3 % (ref 36.6–50.3)
HGB BLD OXIMETRY-MCNC: 10.8 G/DL (ref 14–17)
HGB BLD-MCNC: 10.2 G/DL (ref 12.1–17)
HGB BLD-MCNC: 9.6 G/DL (ref 12.1–17)
IMM GRANULOCYTES # BLD AUTO: 0 K/UL (ref 0–0.04)
IMM GRANULOCYTES NFR BLD AUTO: 0 % (ref 0–0.5)
INR PPP: 2.9 (ref 0.9–1.1)
LACTATE SERPL-SCNC: 1.1 MMOL/L (ref 0.4–2)
LYMPHOCYTES # BLD: 0.6 K/UL (ref 0.8–3.5)
LYMPHOCYTES NFR BLD: 17 % (ref 12–49)
MAGNESIUM SERPL-MCNC: 1.9 MG/DL (ref 1.6–2.4)
MCH RBC QN AUTO: 30.3 PG (ref 26–34)
MCH RBC QN AUTO: 31.3 PG (ref 26–34)
MCHC RBC AUTO-ENTMCNC: 31.6 G/DL (ref 30–36.5)
MCHC RBC AUTO-ENTMCNC: 32.6 G/DL (ref 30–36.5)
MCV RBC AUTO: 92.9 FL (ref 80–99)
MCV RBC AUTO: 99 FL (ref 80–99)
METHGB MFR BLD: 0.1 % (ref 0–1.4)
MONOCYTES # BLD: 0.5 K/UL (ref 0–1)
MONOCYTES NFR BLD: 14 % (ref 5–13)
NEUTS SEG # BLD: 2.3 K/UL (ref 1.8–8)
NEUTS SEG NFR BLD: 64 % (ref 32–75)
NRBC # BLD: 0 K/UL (ref 0–0.01)
NRBC # BLD: 0 K/UL (ref 0–0.01)
NRBC BLD-RTO: 0 PER 100 WBC
NRBC BLD-RTO: 0 PER 100 WBC
OXYHGB MFR BLD: 74.2 % (ref 94–97)
PCO2 BLD: 34.2 MMHG (ref 35–45)
PH BLD: 7.42 [PH] (ref 7.35–7.45)
PLATELET # BLD AUTO: 49 K/UL (ref 150–400)
PLATELET # BLD AUTO: 90 K/UL (ref 150–400)
PMV BLD AUTO: 11.1 FL (ref 8.9–12.9)
PMV BLD AUTO: 12.3 FL (ref 8.9–12.9)
PO2 BLD: 288 MMHG (ref 80–100)
POTASSIUM SERPL-SCNC: 3.3 MMOL/L (ref 3.5–5.1)
POTASSIUM SERPL-SCNC: 3.8 MMOL/L (ref 3.5–5.1)
PROT SERPL-MCNC: 5 G/DL (ref 6.4–8.2)
PROT SERPL-MCNC: 6.1 G/DL (ref 6.4–8.2)
PROTHROMBIN TIME: 28.4 SEC (ref 9–11.1)
RBC # BLD AUTO: 3.07 M/UL (ref 4.1–5.7)
RBC # BLD AUTO: 3.37 M/UL (ref 4.1–5.7)
RBC MORPH BLD: ABNORMAL
SAO2 % BLD: 100 % (ref 92–97)
SAO2 % BLD: 75 % (ref 95–99)
SERVICE CMNT-IMP: ABNORMAL
SERVICE CMNT-IMP: ABNORMAL
SODIUM SERPL-SCNC: 135 MMOL/L (ref 136–145)
SODIUM SERPL-SCNC: 136 MMOL/L (ref 136–145)
SPECIMEN TYPE: ABNORMAL
THERAPEUTIC RANGE,PTTT: ABNORMAL SECS (ref 58–77)
WBC # BLD AUTO: 3.5 K/UL (ref 4.1–11.1)
WBC # BLD AUTO: 5.8 K/UL (ref 4.1–11.1)

## 2019-09-06 PROCEDURE — 92928 PRQ TCAT PLMT NTRAC ST 1 LES: CPT | Performed by: INTERNAL MEDICINE

## 2019-09-06 PROCEDURE — 85730 THROMBOPLASTIN TIME PARTIAL: CPT

## 2019-09-06 PROCEDURE — 77030013798 HC KT TRNSDUC PRSSR EDWD -B

## 2019-09-06 PROCEDURE — 82962 GLUCOSE BLOOD TEST: CPT

## 2019-09-06 PROCEDURE — 74011250636 HC RX REV CODE- 250/636: Performed by: NURSE ANESTHETIST, CERTIFIED REGISTERED

## 2019-09-06 PROCEDURE — 77030029065 HC DRSG HEMO QCLOT ZMED -B: Performed by: INTERNAL MEDICINE

## 2019-09-06 PROCEDURE — 74011250637 HC RX REV CODE- 250/637: Performed by: INTERNAL MEDICINE

## 2019-09-06 PROCEDURE — B24BZZ4 ULTRASONOGRAPHY OF HEART WITH AORTA, TRANSESOPHAGEAL: ICD-10-PCS | Performed by: ANESTHESIOLOGY

## 2019-09-06 PROCEDURE — 77010033678 HC OXYGEN DAILY

## 2019-09-06 PROCEDURE — C1874 STENT, COATED/COV W/DEL SYS: HCPCS | Performed by: INTERNAL MEDICINE

## 2019-09-06 PROCEDURE — C9113 INJ PANTOPRAZOLE SODIUM, VIA: HCPCS | Performed by: THORACIC SURGERY (CARDIOTHORACIC VASCULAR SURGERY)

## 2019-09-06 PROCEDURE — 77030026438 HC STYL ET INTUB CARD -A: Performed by: NURSE ANESTHETIST, CERTIFIED REGISTERED

## 2019-09-06 PROCEDURE — C1887 CATHETER, GUIDING: HCPCS | Performed by: THORACIC SURGERY (CARDIOTHORACIC VASCULAR SURGERY)

## 2019-09-06 PROCEDURE — C1769 GUIDE WIRE: HCPCS | Performed by: INTERNAL MEDICINE

## 2019-09-06 PROCEDURE — 30233N1 TRANSFUSION OF NONAUTOLOGOUS RED BLOOD CELLS INTO PERIPHERAL VEIN, PERCUTANEOUS APPROACH: ICD-10-PCS | Performed by: GENERAL ACUTE CARE HOSPITAL

## 2019-09-06 PROCEDURE — 77030002986 HC SUT PROL J&J -A: Performed by: THORACIC SURGERY (CARDIOTHORACIC VASCULAR SURGERY)

## 2019-09-06 PROCEDURE — 99153 MOD SED SAME PHYS/QHP EA: CPT | Performed by: INTERNAL MEDICINE

## 2019-09-06 PROCEDURE — 77030026906 HC PMP CARD IMPELLA 5 ABIM -L: Performed by: THORACIC SURGERY (CARDIOTHORACIC VASCULAR SURGERY)

## 2019-09-06 PROCEDURE — 30233R1 TRANSFUSION OF NONAUTOLOGOUS PLATELETS INTO PERIPHERAL VEIN, PERCUTANEOUS APPROACH: ICD-10-PCS | Performed by: GENERAL ACUTE CARE HOSPITAL

## 2019-09-06 PROCEDURE — 74011250636 HC RX REV CODE- 250/636: Performed by: INTERNAL MEDICINE

## 2019-09-06 PROCEDURE — 74011250636 HC RX REV CODE- 250/636: Performed by: SURGERY

## 2019-09-06 PROCEDURE — 99152 MOD SED SAME PHYS/QHP 5/>YRS: CPT | Performed by: INTERNAL MEDICINE

## 2019-09-06 PROCEDURE — 74011000250 HC RX REV CODE- 250: Performed by: THORACIC SURGERY (CARDIOTHORACIC VASCULAR SURGERY)

## 2019-09-06 PROCEDURE — 77030014008 HC SPNG HEMSTAT J&J -C: Performed by: THORACIC SURGERY (CARDIOTHORACIC VASCULAR SURGERY)

## 2019-09-06 PROCEDURE — 77030008771 HC TU NG SALEM SUMP -A: Performed by: INTERNAL MEDICINE

## 2019-09-06 PROCEDURE — 77030010516 HC APPL HEMA CLP TELE -B: Performed by: THORACIC SURGERY (CARDIOTHORACIC VASCULAR SURGERY)

## 2019-09-06 PROCEDURE — C1768 GRAFT, VASCULAR: HCPCS | Performed by: THORACIC SURGERY (CARDIOTHORACIC VASCULAR SURGERY)

## 2019-09-06 PROCEDURE — P9016 RBC LEUKOCYTES REDUCED: HCPCS

## 2019-09-06 PROCEDURE — C1753 CATH, INTRAVAS ULTRASOUND: HCPCS | Performed by: INTERNAL MEDICINE

## 2019-09-06 PROCEDURE — 0272376 DILATION OF CORONARY ARTERY, THREE ARTERIES, BIFURCATION, WITH FOUR OR MORE DRUG-ELUTING INTRALUMINAL DEVICES, PERCUTANEOUS APPROACH: ICD-10-PCS | Performed by: INTERNAL MEDICINE

## 2019-09-06 PROCEDURE — 77030021532 HC CATH ANGI DX IMPRS MRTM -B: Performed by: THORACIC SURGERY (CARDIOTHORACIC VASCULAR SURGERY)

## 2019-09-06 PROCEDURE — 33968 REMOVE AORTIC ASSIST DEVICE: CPT | Performed by: INTERNAL MEDICINE

## 2019-09-06 PROCEDURE — 77030018729 HC ELECTRD DEFIB PAD CARD -B: Performed by: THORACIC SURGERY (CARDIOTHORACIC VASCULAR SURGERY)

## 2019-09-06 PROCEDURE — 77030014491 HC PLEDG PTFE BARD -A: Performed by: THORACIC SURGERY (CARDIOTHORACIC VASCULAR SURGERY)

## 2019-09-06 PROCEDURE — 5A1935Z RESPIRATORY VENTILATION, LESS THAN 24 CONSECUTIVE HOURS: ICD-10-PCS | Performed by: GENERAL ACUTE CARE HOSPITAL

## 2019-09-06 PROCEDURE — 76060000038 HC ANESTHESIA 3.5 TO 4 HR: Performed by: THORACIC SURGERY (CARDIOTHORACIC VASCULAR SURGERY)

## 2019-09-06 PROCEDURE — 77030018846 HC SOL IRR STRL H20 ICUM -A: Performed by: THORACIC SURGERY (CARDIOTHORACIC VASCULAR SURGERY)

## 2019-09-06 PROCEDURE — 92978 ENDOLUMINL IVUS OCT C 1ST: CPT | Performed by: INTERNAL MEDICINE

## 2019-09-06 PROCEDURE — 74011250636 HC RX REV CODE- 250/636

## 2019-09-06 PROCEDURE — 77030004549 HC CATH ANGI DX PRF MRTM -A

## 2019-09-06 PROCEDURE — 76010000111 HC CV SURG 3.5 TO 4 HR: Performed by: THORACIC SURGERY (CARDIOTHORACIC VASCULAR SURGERY)

## 2019-09-06 PROCEDURE — 77030019697 HC SYR ANGI INFL MRTM -B: Performed by: INTERNAL MEDICINE

## 2019-09-06 PROCEDURE — C1725 CATH, TRANSLUMIN NON-LASER: HCPCS | Performed by: INTERNAL MEDICINE

## 2019-09-06 PROCEDURE — 76937 US GUIDE VASCULAR ACCESS: CPT | Performed by: INTERNAL MEDICINE

## 2019-09-06 PROCEDURE — 77030008467 HC STPLR SKN COVD -B: Performed by: THORACIC SURGERY (CARDIOTHORACIC VASCULAR SURGERY)

## 2019-09-06 PROCEDURE — 74011250636 HC RX REV CODE- 250/636: Performed by: THORACIC SURGERY (CARDIOTHORACIC VASCULAR SURGERY)

## 2019-09-06 PROCEDURE — 83605 ASSAY OF LACTIC ACID: CPT

## 2019-09-06 PROCEDURE — 77030022474 HC RELD STPLR ENDO GIA COVD -C: Performed by: THORACIC SURGERY (CARDIOTHORACIC VASCULAR SURGERY)

## 2019-09-06 PROCEDURE — 80053 COMPREHEN METABOLIC PANEL: CPT

## 2019-09-06 PROCEDURE — 85025 COMPLETE CBC W/AUTO DIFF WBC: CPT

## 2019-09-06 PROCEDURE — 94002 VENT MGMT INPAT INIT DAY: CPT

## 2019-09-06 PROCEDURE — 71045 X-RAY EXAM CHEST 1 VIEW: CPT

## 2019-09-06 PROCEDURE — 77030022473 HC HNDL ENDO GIA UNIV USDA -C: Performed by: THORACIC SURGERY (CARDIOTHORACIC VASCULAR SURGERY)

## 2019-09-06 PROCEDURE — C1887 CATHETER, GUIDING: HCPCS | Performed by: INTERNAL MEDICINE

## 2019-09-06 PROCEDURE — C1769 GUIDE WIRE: HCPCS

## 2019-09-06 PROCEDURE — 85027 COMPLETE CBC AUTOMATED: CPT

## 2019-09-06 PROCEDURE — 74011000258 HC RX REV CODE- 258: Performed by: THORACIC SURGERY (CARDIOTHORACIC VASCULAR SURGERY)

## 2019-09-06 PROCEDURE — 74018 RADEX ABDOMEN 1 VIEW: CPT

## 2019-09-06 PROCEDURE — 36415 COLL VENOUS BLD VENIPUNCTURE: CPT

## 2019-09-06 PROCEDURE — B2111ZZ FLUOROSCOPY OF MULTIPLE CORONARY ARTERIES USING LOW OSMOLAR CONTRAST: ICD-10-PCS | Performed by: INTERNAL MEDICINE

## 2019-09-06 PROCEDURE — 77030004549 HC CATH ANGI DX PRF MRTM -A: Performed by: THORACIC SURGERY (CARDIOTHORACIC VASCULAR SURGERY)

## 2019-09-06 PROCEDURE — 36430 TRANSFUSION BLD/BLD COMPNT: CPT

## 2019-09-06 PROCEDURE — 77030008684 HC TU ET CUF COVD -B: Performed by: NURSE ANESTHETIST, CERTIFIED REGISTERED

## 2019-09-06 PROCEDURE — 74011636637 HC RX REV CODE- 636/637: Performed by: INTERNAL MEDICINE

## 2019-09-06 PROCEDURE — 74011250637 HC RX REV CODE- 250/637: Performed by: NURSE PRACTITIONER

## 2019-09-06 PROCEDURE — 74011000258 HC RX REV CODE- 258: Performed by: NURSE ANESTHETIST, CERTIFIED REGISTERED

## 2019-09-06 PROCEDURE — 77030013798 HC KT TRNSDUC PRSSR EDWD -B: Performed by: NURSE ANESTHETIST, CERTIFIED REGISTERED

## 2019-09-06 PROCEDURE — 77030010506 HC ADH BIOGLU CRYO -G: Performed by: THORACIC SURGERY (CARDIOTHORACIC VASCULAR SURGERY)

## 2019-09-06 PROCEDURE — 77030018836 HC SOL IRR NACL ICUM -A: Performed by: THORACIC SURGERY (CARDIOTHORACIC VASCULAR SURGERY)

## 2019-09-06 PROCEDURE — 77030002524 HC INSTR CLMP FGRTY EDWD -B: Performed by: THORACIC SURGERY (CARDIOTHORACIC VASCULAR SURGERY)

## 2019-09-06 PROCEDURE — 77030034848: Performed by: THORACIC SURGERY (CARDIOTHORACIC VASCULAR SURGERY)

## 2019-09-06 PROCEDURE — 83735 ASSAY OF MAGNESIUM: CPT

## 2019-09-06 PROCEDURE — 77030018316 HC SEAL FBRN TISSL 4ML BAXT -C: Performed by: THORACIC SURGERY (CARDIOTHORACIC VASCULAR SURGERY)

## 2019-09-06 PROCEDURE — 85610 PROTHROMBIN TIME: CPT

## 2019-09-06 PROCEDURE — 76010000113 HC CV SURG 1 TO 1.5 HR: Performed by: THORACIC SURGERY (CARDIOTHORACIC VASCULAR SURGERY)

## 2019-09-06 PROCEDURE — 76060000033 HC ANESTHESIA 1 TO 1.5 HR: Performed by: THORACIC SURGERY (CARDIOTHORACIC VASCULAR SURGERY)

## 2019-09-06 PROCEDURE — 77030011640 HC PAD GRND REM COVD -A: Performed by: THORACIC SURGERY (CARDIOTHORACIC VASCULAR SURGERY)

## 2019-09-06 PROCEDURE — 77030010938 HC CLP LIG TELE -A: Performed by: THORACIC SURGERY (CARDIOTHORACIC VASCULAR SURGERY)

## 2019-09-06 PROCEDURE — 77030028837 HC SYR ANGI PWR INJ COEU -A: Performed by: INTERNAL MEDICINE

## 2019-09-06 PROCEDURE — 77030018673: Performed by: THORACIC SURGERY (CARDIOTHORACIC VASCULAR SURGERY)

## 2019-09-06 PROCEDURE — 02PA3RZ REMOVAL OF SHORT-TERM EXTERNAL HEART ASSIST SYSTEM FROM HEART, PERCUTANEOUS APPROACH: ICD-10-PCS | Performed by: THORACIC SURGERY (CARDIOTHORACIC VASCULAR SURGERY)

## 2019-09-06 PROCEDURE — 82375 ASSAY CARBOXYHB QUANT: CPT

## 2019-09-06 PROCEDURE — 82803 BLOOD GASES ANY COMBINATION: CPT

## 2019-09-06 PROCEDURE — 65620000000 HC RM CCU GENERAL

## 2019-09-06 PROCEDURE — 77030003029 HC SUT VCRL J&J -B: Performed by: THORACIC SURGERY (CARDIOTHORACIC VASCULAR SURGERY)

## 2019-09-06 PROCEDURE — 77030036669: Performed by: THORACIC SURGERY (CARDIOTHORACIC VASCULAR SURGERY)

## 2019-09-06 PROCEDURE — 77030010505 HC ADH BIOGLU CRYO -F: Performed by: THORACIC SURGERY (CARDIOTHORACIC VASCULAR SURGERY)

## 2019-09-06 PROCEDURE — 02HV33Z INSERTION OF INFUSION DEVICE INTO SUPERIOR VENA CAVA, PERCUTANEOUS APPROACH: ICD-10-PCS | Performed by: ANESTHESIOLOGY

## 2019-09-06 PROCEDURE — 77030005401 HC CATH RAD ARRO -A: Performed by: NURSE ANESTHETIST, CERTIFIED REGISTERED

## 2019-09-06 PROCEDURE — 77030020256 HC SOL INJ NACL 0.9%  500ML: Performed by: THORACIC SURGERY (CARDIOTHORACIC VASCULAR SURGERY)

## 2019-09-06 PROCEDURE — 76000 FLUOROSCOPY <1 HR PHYS/QHP: CPT

## 2019-09-06 PROCEDURE — 74011636320 HC RX REV CODE- 636/320: Performed by: INTERNAL MEDICINE

## 2019-09-06 PROCEDURE — P9035 PLATELET PHERES LEUKOREDUCED: HCPCS

## 2019-09-06 PROCEDURE — 77030020061 HC IV BLD WRMR ADMIN SET 3M -B: Performed by: NURSE ANESTHETIST, CERTIFIED REGISTERED

## 2019-09-06 PROCEDURE — 77030008771 HC TU NG SALEM SUMP -A

## 2019-09-06 PROCEDURE — 74011000250 HC RX REV CODE- 250: Performed by: NURSE ANESTHETIST, CERTIFIED REGISTERED

## 2019-09-06 PROCEDURE — 93454 CORONARY ARTERY ANGIO S&I: CPT | Performed by: INTERNAL MEDICINE

## 2019-09-06 PROCEDURE — 77030002996 HC SUT SLK J&J -A: Performed by: THORACIC SURGERY (CARDIOTHORACIC VASCULAR SURGERY)

## 2019-09-06 PROCEDURE — 74011250636 HC RX REV CODE- 250/636: Performed by: NURSE PRACTITIONER

## 2019-09-06 PROCEDURE — 77030011220 HC DEV ELECSURG COVD -B: Performed by: THORACIC SURGERY (CARDIOTHORACIC VASCULAR SURGERY)

## 2019-09-06 PROCEDURE — 92929 HC PLC DE STNT +/-PTA MAJOR COR VESL/BRNCH  ADD LC: CPT | Performed by: INTERNAL MEDICINE

## 2019-09-06 PROCEDURE — 77030013532 HC SEAL FBRN TISSL RDYTUSE 4ML BAXT -C: Performed by: THORACIC SURGERY (CARDIOTHORACIC VASCULAR SURGERY)

## 2019-09-06 PROCEDURE — 77030029641 HC PMP CARD PERC IMPELLA CP ABIM -L: Performed by: THORACIC SURGERY (CARDIOTHORACIC VASCULAR SURGERY)

## 2019-09-06 PROCEDURE — 77030011255 HC DSG AQUACEL AG BMS -A: Performed by: THORACIC SURGERY (CARDIOTHORACIC VASCULAR SURGERY)

## 2019-09-06 DEVICE — STENT RONYX35018UX RESOLUTE ONYX 3.50X18
Type: IMPLANTABLE DEVICE | Status: FUNCTIONAL
Brand: RESOLUTE ONYX™

## 2019-09-06 DEVICE — STENT RONYX30018UX RESOLUTE ONYX 3.00X18
Type: IMPLANTABLE DEVICE | Status: FUNCTIONAL
Brand: RESOLUTE ONYX™

## 2019-09-06 DEVICE — GRAFT HEMSHLD WVN STR 8MMX30CM -- HEMASHIELD PLATINUM: Type: IMPLANTABLE DEVICE | Site: AXILLA | Status: FUNCTIONAL

## 2019-09-06 DEVICE — STENT RONYX25018UX RESOLUTE ONYX 2.50X18
Type: IMPLANTABLE DEVICE | Status: FUNCTIONAL
Brand: RESOLUTE ONYX™

## 2019-09-06 DEVICE — STENT RONYX25022UX RESOLUTE ONYX 2.50X22
Type: IMPLANTABLE DEVICE | Status: FUNCTIONAL
Brand: RESOLUTE ONYX™

## 2019-09-06 RX ORDER — SUCCINYLCHOLINE CHLORIDE 20 MG/ML
INJECTION INTRAMUSCULAR; INTRAVENOUS AS NEEDED
Status: DISCONTINUED | OUTPATIENT
Start: 2019-09-06 | End: 2019-09-06 | Stop reason: HOSPADM

## 2019-09-06 RX ORDER — SODIUM CHLORIDE 9 MG/ML
INJECTION, SOLUTION INTRAVENOUS
Status: DISCONTINUED | OUTPATIENT
Start: 2019-09-06 | End: 2019-09-06 | Stop reason: HOSPADM

## 2019-09-06 RX ORDER — SODIUM CHLORIDE 9 MG/ML
250 INJECTION, SOLUTION INTRAVENOUS AS NEEDED
Status: DISCONTINUED | OUTPATIENT
Start: 2019-09-06 | End: 2019-09-08

## 2019-09-06 RX ORDER — FENTANYL CITRATE 50 UG/ML
INJECTION, SOLUTION INTRAMUSCULAR; INTRAVENOUS AS NEEDED
Status: DISCONTINUED | OUTPATIENT
Start: 2019-09-06 | End: 2019-09-06 | Stop reason: HOSPADM

## 2019-09-06 RX ORDER — ETOMIDATE 2 MG/ML
INJECTION INTRAVENOUS AS NEEDED
Status: DISCONTINUED | OUTPATIENT
Start: 2019-09-06 | End: 2019-09-06 | Stop reason: HOSPADM

## 2019-09-06 RX ORDER — LIDOCAINE HYDROCHLORIDE 10 MG/ML
INJECTION, SOLUTION EPIDURAL; INFILTRATION; INTRACAUDAL; PERINEURAL
Status: COMPLETED
Start: 2019-09-06 | End: 2019-09-06

## 2019-09-06 RX ORDER — PROPOFOL 10 MG/ML
INJECTION, EMULSION INTRAVENOUS AS NEEDED
Status: DISCONTINUED | OUTPATIENT
Start: 2019-09-06 | End: 2019-09-06 | Stop reason: HOSPADM

## 2019-09-06 RX ORDER — CEFAZOLIN SODIUM 1 G/3ML
1 INJECTION, POWDER, FOR SOLUTION INTRAMUSCULAR; INTRAVENOUS ONCE
Status: ACTIVE | OUTPATIENT
Start: 2019-09-06 | End: 2019-09-06

## 2019-09-06 RX ORDER — HEPARIN SODIUM 200 [USP'U]/100ML
INJECTION, SOLUTION INTRAVENOUS
Status: COMPLETED | OUTPATIENT
Start: 2019-09-06 | End: 2019-09-06

## 2019-09-06 RX ORDER — PROPOFOL 10 MG/ML
0-50 VIAL (ML) INTRAVENOUS
Status: DISCONTINUED | OUTPATIENT
Start: 2019-09-06 | End: 2019-09-08

## 2019-09-06 RX ORDER — CALCIUM CHLORIDE INJECTION 100 MG/ML
INJECTION, SOLUTION INTRAVENOUS
Status: DISPENSED
Start: 2019-09-06 | End: 2019-09-07

## 2019-09-06 RX ORDER — SODIUM CHLORIDE 0.9 % (FLUSH) 0.9 %
SYRINGE (ML) INJECTION
Status: DISPENSED
Start: 2019-09-06 | End: 2019-09-06

## 2019-09-06 RX ORDER — SODIUM BICARBONATE 84 MG/ML
INJECTION, SOLUTION INTRAVENOUS
Status: DISPENSED
Start: 2019-09-06 | End: 2019-09-06

## 2019-09-06 RX ORDER — VASOPRESSIN 20 U/ML
INJECTION PARENTERAL AS NEEDED
Status: DISCONTINUED | OUTPATIENT
Start: 2019-09-06 | End: 2019-09-06

## 2019-09-06 RX ORDER — VASOPRESSIN 20 U/ML
INJECTION PARENTERAL AS NEEDED
Status: DISCONTINUED | OUTPATIENT
Start: 2019-09-06 | End: 2019-09-06 | Stop reason: HOSPADM

## 2019-09-06 RX ORDER — ALBUMIN HUMAN 50 G/1000ML
SOLUTION INTRAVENOUS
Status: DISPENSED
Start: 2019-09-06 | End: 2019-09-06

## 2019-09-06 RX ORDER — HEPARIN SODIUM 1000 [USP'U]/ML
2000 INJECTION, SOLUTION INTRAVENOUS; SUBCUTANEOUS ONCE
Status: COMPLETED | OUTPATIENT
Start: 2019-09-06 | End: 2019-09-06

## 2019-09-06 RX ORDER — CEFAZOLIN SODIUM 1 G/3ML
INJECTION, POWDER, FOR SOLUTION INTRAMUSCULAR; INTRAVENOUS AS NEEDED
Status: DISCONTINUED | OUTPATIENT
Start: 2019-09-06 | End: 2019-09-06

## 2019-09-06 RX ORDER — BUPIVACAINE HYDROCHLORIDE AND EPINEPHRINE 5; 5 MG/ML; UG/ML
30 INJECTION, SOLUTION EPIDURAL; INTRACAUDAL; PERINEURAL ONCE
Status: DISPENSED | OUTPATIENT
Start: 2019-09-06 | End: 2019-09-06

## 2019-09-06 RX ORDER — ONDANSETRON 2 MG/ML
1 INJECTION INTRAMUSCULAR; INTRAVENOUS ONCE
Status: COMPLETED | OUTPATIENT
Start: 2019-09-06 | End: 2019-09-06

## 2019-09-06 RX ORDER — PROPOFOL 10 MG/ML
INJECTION, EMULSION INTRAVENOUS
Status: DISPENSED
Start: 2019-09-06 | End: 2019-09-06

## 2019-09-06 RX ORDER — MIDAZOLAM HYDROCHLORIDE 1 MG/ML
5 INJECTION, SOLUTION INTRAMUSCULAR; INTRAVENOUS ONCE
Status: COMPLETED | OUTPATIENT
Start: 2019-09-06 | End: 2019-09-06

## 2019-09-06 RX ORDER — ROCURONIUM BROMIDE 10 MG/ML
INJECTION, SOLUTION INTRAVENOUS AS NEEDED
Status: DISCONTINUED | OUTPATIENT
Start: 2019-09-06 | End: 2019-09-06 | Stop reason: HOSPADM

## 2019-09-06 RX ORDER — FENTANYL CITRATE 50 UG/ML
100 INJECTION, SOLUTION INTRAMUSCULAR; INTRAVENOUS ONCE
Status: ACTIVE | OUTPATIENT
Start: 2019-09-06 | End: 2019-09-06

## 2019-09-06 RX ORDER — ASPIRIN 300 MG/1
300 SUPPOSITORY RECTAL ONCE
Status: COMPLETED | OUTPATIENT
Start: 2019-09-06 | End: 2019-09-06

## 2019-09-06 RX ORDER — MIDAZOLAM HYDROCHLORIDE 1 MG/ML
INJECTION, SOLUTION INTRAMUSCULAR; INTRAVENOUS
Status: DISPENSED
Start: 2019-09-06 | End: 2019-09-06

## 2019-09-06 RX ORDER — CALCIUM CHLORIDE INJECTION 100 MG/ML
INJECTION, SOLUTION INTRAVENOUS
Status: DISPENSED
Start: 2019-09-06 | End: 2019-09-06

## 2019-09-06 RX ADMIN — SODIUM CHLORIDE: 9 INJECTION, SOLUTION INTRAVENOUS at 09:07

## 2019-09-06 RX ADMIN — ASPIRIN 300 MG: 300 SUPPOSITORY RECTAL at 12:37

## 2019-09-06 RX ADMIN — VASOPRESSIN 0.04 UNITS/MIN: 20 INJECTION INTRAVENOUS at 09:20

## 2019-09-06 RX ADMIN — FENTANYL CITRATE 100 MCG: 50 INJECTION, SOLUTION INTRAMUSCULAR; INTRAVENOUS at 09:43

## 2019-09-06 RX ADMIN — FENTANYL CITRATE 50 MCG: 50 INJECTION, SOLUTION INTRAMUSCULAR; INTRAVENOUS at 09:08

## 2019-09-06 RX ADMIN — DOBUTAMINE IN DEXTROSE 10 MCG/KG/MIN: 200 INJECTION, SOLUTION INTRAVENOUS at 20:07

## 2019-09-06 RX ADMIN — LIDOCAINE HYDROCHLORIDE 5 ML: 10 INJECTION, SOLUTION EPIDURAL; INFILTRATION; INTRACAUDAL; PERINEURAL at 07:32

## 2019-09-06 RX ADMIN — SUCCINYLCHOLINE CHLORIDE 160 MG: 20 INJECTION, SOLUTION INTRAMUSCULAR; INTRAVENOUS at 09:08

## 2019-09-06 RX ADMIN — DEXMEDETOMIDINE 1 MCG/KG/HR: 100 INJECTION, SOLUTION, CONCENTRATE INTRAVENOUS at 17:51

## 2019-09-06 RX ADMIN — INSULIN LISPRO 2 UNITS: 100 INJECTION, SOLUTION INTRAVENOUS; SUBCUTANEOUS at 19:08

## 2019-09-06 RX ADMIN — CALCIUM CHLORIDE 1 G: 100 INJECTION INTRAVENOUS; INTRAVENTRICULAR at 17:30

## 2019-09-06 RX ADMIN — AMIODARONE HYDROCHLORIDE 400 MG: 200 TABLET ORAL at 21:23

## 2019-09-06 RX ADMIN — Medication 10 ML: at 21:11

## 2019-09-06 RX ADMIN — PRAVASTATIN SODIUM 80 MG: 40 TABLET ORAL at 21:16

## 2019-09-06 RX ADMIN — ETOMIDATE 20 MG: 2 INJECTION, SOLUTION INTRAVENOUS at 09:08

## 2019-09-06 RX ADMIN — DOBUTAMINE IN DEXTROSE 2 MCG/KG/MIN: 200 INJECTION, SOLUTION INTRAVENOUS at 00:01

## 2019-09-06 RX ADMIN — Medication 10 ML: at 06:00

## 2019-09-06 RX ADMIN — PROPOFOL 5 MCG/KG/MIN: 10 INJECTION, EMULSION INTRAVENOUS at 17:50

## 2019-09-06 RX ADMIN — ROCURONIUM BROMIDE 45 MG: 10 INJECTION INTRAVENOUS at 09:18

## 2019-09-06 RX ADMIN — DEXMEDETOMIDINE 0.4 MCG/KG/HR: 100 INJECTION, SOLUTION, CONCENTRATE INTRAVENOUS at 11:45

## 2019-09-06 RX ADMIN — DEXMEDETOMIDINE 0.7 MCG/KG/HR: 100 INJECTION, SOLUTION, CONCENTRATE INTRAVENOUS at 17:32

## 2019-09-06 RX ADMIN — PREGABALIN 50 MG: 50 CAPSULE ORAL at 21:17

## 2019-09-06 RX ADMIN — OXYCODONE AND ACETAMINOPHEN 1 TABLET: 5; 325 TABLET ORAL at 01:29

## 2019-09-06 RX ADMIN — FENTANYL CITRATE 50 MCG: 50 INJECTION, SOLUTION INTRAMUSCULAR; INTRAVENOUS at 12:29

## 2019-09-06 RX ADMIN — PROPOFOL 50 MG: 10 INJECTION, EMULSION INTRAVENOUS at 09:39

## 2019-09-06 RX ADMIN — MORPHINE SULFATE 2 MG: 2 INJECTION, SOLUTION INTRAMUSCULAR; INTRAVENOUS at 01:29

## 2019-09-06 RX ADMIN — SODIUM CHLORIDE 40 MG: 9 INJECTION, SOLUTION INTRAMUSCULAR; INTRAVENOUS; SUBCUTANEOUS at 21:15

## 2019-09-06 RX ADMIN — ROCURONIUM BROMIDE 5 MG: 10 INJECTION INTRAVENOUS at 09:08

## 2019-09-06 RX ADMIN — PROPOFOL 50 MG: 10 INJECTION, EMULSION INTRAVENOUS at 09:08

## 2019-09-06 RX ADMIN — VASOPRESSIN 1 UNITS: 20 INJECTION INTRAVENOUS at 09:22

## 2019-09-06 RX ADMIN — MIDAZOLAM HYDROCHLORIDE 2 MG: 1 INJECTION, SOLUTION INTRAMUSCULAR; INTRAVENOUS at 07:28

## 2019-09-06 RX ADMIN — SODIUM CHLORIDE 5 MG/HR: 900 INJECTION, SOLUTION INTRAVENOUS at 16:57

## 2019-09-06 RX ADMIN — EPINEPHRINE 1 MCG/MIN: 1 INJECTION INTRAMUSCULAR; INTRAVENOUS; SUBCUTANEOUS at 09:20

## 2019-09-06 RX ADMIN — CALCIUM CHLORIDE 1 G: 100 INJECTION INTRAVENOUS; INTRAVENTRICULAR at 18:30

## 2019-09-06 RX ADMIN — Medication 2 G: at 09:14

## 2019-09-06 NOTE — ANESTHESIA POSTPROCEDURE EVALUATION
Procedure(s):  IMPELLA CP INSERTION, INTRAOPERATIVE WADE BY DR. Moi Welch. general    Anesthesia Post Evaluation        Patient location: Cath Lab. Note status: Sedated. Post-procedure mental status: Sedated. Pain management: adequate  Airway patency: patent  Anesthetic complications: no  Cardiovascular status: acceptable  Respiratory status: acceptable  Hydration status: acceptable  Comments: Transfer of care to cath lab and cardiologist VSS, ventilated and sedated   Post anesthesia nausea and vomiting:  none      No vitals data found for the desired time range.

## 2019-09-06 NOTE — PROGRESS NOTES
1900- Bedside and Verbal shift change report given to received by [facility] CAITLYN URRUTIA (offgoing nurse). Report included the following information SBAR, Kardex, Procedure Summary, Intake/Output, MAR, Recent Results, Cardiac Rhythm sinus with bbb and Alarm Parameters . Assessment completed. Pt alert and oriented x4; pupils equal and reactive; pulses present; IABP 1:1,  site soft, no hematoma, small amount of oozing; Quick clot applied to swanz and cordis site due to bleeding; Angiomax infusing- to be turned off @ midnight per day shift nurse; Nitroglycerin infusing to maintain map of 80's; CI >2, CVP 9. Family at pt bedside. 2030- weaning down Nitro. 2100- ptt sent. 2115-  Nitroglycerin stopped. CI 2.4, CVP 8-10.    2200- 2230 CI 1.5-1.6; Dr. Eleazar Agudelo paged and notify of low CI; orders for 250 cc bolus and to repeat bolus if no response to initial bolus. Dobutamine if index and cvp remains low. 2300- Bolus infusing. CI fluctuating between 1.6 and 2.    2330-  CI 1.5 and 1.7. Second bolus infusing. 0000- Reassessment completed; Dobutamine started for CI <2; Angiomax stopped. [id number] CI = or >2.     0400- Reassessment completed, no new changes. 0500- Called made to  for critical platelet    0816- 1 of 2 platelets infusing.

## 2019-09-06 NOTE — ANESTHESIA PROCEDURE NOTES
Central Line Placement    Performed by: Beth Figueroa DO  Authorized by: Beth Figueroa DO     Indications: vascular access, central pressure monitoring and need for vasopressors  Preanesthetic Checklist: patient identified, risks and benefits discussed, anesthesia consent, site marked, patient being monitored and timeout performed      Pre-procedure: All elements of maximal sterile barrier technique followed?  Yes    2% Chlorhexidine for cutaneous antisepsis, Hand hygiene performed prior to catheter insertion and Ultrasound guidance    Sterile Ultrasound Technique followed?: Yes            Procedure:   Prep:  Chlorhexidine  Location:  Internal jugular  Orientation:  Right  Patient position:  Trendelenburg  Catheter type:  Quad lumen  Catheter size:  8.5 Fr  Catheter length:  16 cm  Number of attempts:  1  Successful placement: Yes      Assessment:   Post-procedure:  Catheter secured and sterile dressing applied  Assessment:  Blood return through all ports  Insertion:  Uncomplicated  Patient tolerance:  Patient tolerated the procedure well with no immediate complications

## 2019-09-06 NOTE — PROGRESS NOTES
Cardiac Surgery Care Coordinator- Met with the family of Liliana Agudelo, introduced role of the Cardiac Surgery Coordinator. Reviewed plan of care and expectations. Provided family with an update from OR. Encouraged family to verbalize and emotional support given. Will continue to update.  Kamaljit Moss RN

## 2019-09-06 NOTE — PROGRESS NOTES
[address] [facility] in [place], [place]  Inpatient Progress Note      Patient name: Chu Varela Sr.  Patient : 1955  Patient MRN: [id number]  Attending MD: Lex Chawla MD  Date of service: 19    CHIEF COMPLAINT:  Cardiogenic shock    PLAN:  Continue current infusion of dobutamine 5 mcg/kg/min to keep cardiac index > 2.3  Starting cardene gtt for hypertension  OG to be placed this evening to start home oral meds: lisinopril, imdur and diuretic  No beta blockers due to dobutamine use  Echocardiogram tomorrow to assess AV  Dialysis per nephrology; euvolemic now  Pancytopenia likely due to impella  Antiplatelet therapy per primary team  Keep K>4 and Mg<2  Check TSH, fT4  Sedation and vent management per CTS    AHF will follow along over the weekend    IMPRESSION:  Chest pain  Acute systolic heart failure  C/b cardiogenic shock  Coronary artery disease  S/p high risk PCI /s/p 4x STANLEY to of LAD, OM1, LM/LAD/LCx bifurcation with STANLEY  LHC () w/ RCA , 60% oLAD w/ neg FFR (0.88), patent LCx stent w/ mild ISR. Ischemic cardiomyopathy, LVEF 20%  Severe AS and moderate AR by intraop WADE  Prolonged QTc  Right renal artery stenosis s/p 7.0 x 19mm BMS 18  Occluded LCIA. Hyperparathyroidism, PTH in 700s  Hypocalcemia, around 6  Vitamin D deficiency  ESRD on dialysis. Dr Crump   Long term hx of Coronary artery disease s/p multivessel stenting s/p PCI of LCx, PTCA of RCA , PTCA LAD 3/94.  Lexiscan  w/ EF 62%, distal ineroapical infarct w/o ischemia  Cath 2018  No intervention.   Lv EF  -  55%   Peripheral vascular disease s/p left iliac stenting, left SFA stenting 3/00, right iliac stenting .   Status post L AKA  Bilateral carotid stenosis; 16-49%   Hypertension  Diabetes  Chronic Tobacco abuse 1-2 PPD .   Chronic pain  Chronic anemia  Hyperlipidemia  Noncompliance  Peptic ulcer disease w/ GIB '15  Falls  Lives w/ sister in [facility] now    CARDIAC IMAGING:  Echo (4/29/19) LVEF 55%,   EKG (9/4/19) NSR 89bpm,  ms, bifascicular bloc, anterior and inferior infarct, QTC 5.37  LHC (9/5/19) Films reviewed from 4/18. Ostial LAD. Occluded RCA. LCx ok. Left common iliac artery occluded.   Cath 9/5 w/ RCA , sev reduced LVEF, severe LM, LAD, LCx disease (Renteria Class 1, 1, 1). Discussed w/ CTS; initially planned for CABG but rescinded due to numerous comorbidities. IABP placed @ 1:1. Avenue placed    INTERVAL HISTORY:  Returned from cath lab  Impella and IABP removed  PA 20/10s, CI 1.6  WBC 3.5  HGB 9.6 from 10.8  PLT 49 from 53  APTT 44  Creatinine 4.76  TBili 0.5  Albumin 2.5  HGBA1C 6.6      PHYSICAL EXAM:  Visit Vitals  /63   Pulse 65   Temp 96.9 °F (36.1 °C)   Resp 18   Ht 5' 7\" (1.702 m)   Wt 158 lb 1.1 oz (71.7 kg)   SpO2 99%   BMI 24.76 kg/m²     General: Patient is well developed, well-nourished intubated and sedated  HEENT: intubated   Neck: Clear  JVD: Cannot be appreciated   Lungs: Breath sounds are equal and clear bilaterally. No wheezes, rhonchi, or rales. Heart: Regular rate and rhythm with normal S1 and S2. No murmurs, gallops or rubs. Abdomen: Soft, no mass or tenderness. No organomegaly or hernia. Bowel sounds present. Genitourinary and rectal: deferred  Extremities: R toea cyanosis, no edema  Neurologic: Sedated    REVIEW OF SYSTEMS:  Unable to obtain    HISTORY OF PRESENT ILLNESS:  Briefly, this is a 70 y/o WM, tobacco smoker, with h/o CAD, ICM LVEF 20%, ESRD on HD, right AKA who presented with new angina. Found to have severe LM disease and  of prox RCA. Consider too high risk for CABG. Underwent high risk PCI /s/p 4x STANLEY placed to LAD, OM1, LM/LAD/LCx bifurcation with STANLEY with impella 5.0 support. Impella was removed with excellent hemodynamics on dobutamine 5 mcg/kg/min. Patient remains intubated, hypertensive requiring cardene gtt; normal filling pressures and index 1.6.     PAST MEDICAL HISTORY:  Past Medical History:   Diagnosis Date    Anemia     Arthritis     back, hips    CAD (coronary artery disease)     Sees Dr. Cruz Stark, 4 heart attacks    Chronic kidney disease     Dr. David Rosario, [facility] M-W-F     Chronic LBP 1990    Slipped on gravel and fell at work; Multiple surgeries;  Sees Dr. Filiberto Lowe for pain mgmt    Chronic pain     Confusion     Depression     Diabetes ([facility] [address]     ED (erectile dysfunction)     GERD (gastroesophageal reflux disease) 11/2015    Diagnosed at [address] last month    Hypercholesteremia     Hypertension     Liver disease     Psychiatric disorder     depression    PVD (peripheral vascular disease) ([facility] [address]     Thromboembolus ([facility] [address]     DVT in right leg       PAST SURGICAL HISTORY:  Past Surgical History:   Procedure Laterality Date    CARDIAC SURG PROCEDURE UNLIST      PTCA    HX ABOVE KNEE AMPUTATION Left 2013    Left knee stump non-healing ulcer; Dr. Yaya Head Left     HX APPENDECTOMY      HX BELOW KNEE AMPUTATION      Left leg    HX CHOLECYSTECTOMY      HX GI      HX HIP REPLACEMENT      right hip replaced x 2    HX ORTHOPAEDIC  1990s    4 back surgeries    HX ORTHOPAEDIC      donna ankles pin placed    HX ORTHOPAEDIC      left leg 17 surgeries    HX VASCULAR ACCESS Left     port left arm    UPPER GI ENDOSCOPY,BIOPSY  9/2/2015         UPPER GI ENDOSCOPY,BIOPSY  10/12/2015         VASCULAR SURGERY PROCEDURE UNLIST      Multiple revascularization procedures, toe amputations       FAMILY HISTORY:  Family History   Problem Relation Age of Onset    Alcohol abuse Father     Diabetes Sister     Diabetes Sister     Lung Disease Mother     Cancer Maternal Grandfather         Head and neck    Cancer Paternal Grandmother         Stomach       SOCIAL HISTORY:  Social History     Socioeconomic History    Marital status:      Spouse name: Not on file    Number of children: Not on file    Years of education: Not on file    Highest education level: Not on file   Occupational History    Occupation: Retired    Tobacco Use    Smoking status: Current Every Day Smoker     Packs/day: 1.00     Years: 35.00     Pack years: 35.00    Smokeless tobacco: Never Used    Tobacco comment: 30 pack years; Occasional cigar   Substance and Sexual Activity    Alcohol use: No     Comment: Former heavy drinker quit drinking about 17 years ago    Drug use: No     Types: Prescription    Sexual activity: Not Currently       LABORATORY RESULTS:     Labs Latest Ref Rng & Units 9/6/2019 9/5/2019 9/5/2019 9/4/2019   WBC 4.1 - 11.1 K/uL 3.5(L) - 4.9 6.0   RBC 4.10 - 5.70 M/uL 3.07(L) - 3.47(L) 3.79(L)   Hemoglobin 12.1 - 17.0 g/dL 9.6(L) - 10. 8(L) 11. 7(L)   Hematocrit 36.6 - 50.3 % 30. 4(L) - 34. 9(L) 38.4   MCV 80.0 - 99.0 FL 99.0 - 100. 6(H) 101. 3(H)   Platelets 071 - 071 K/uL 49(LL) - 53(L) 55(L)   Lymphocytes 12 - 49 % 17 - - 21   Monocytes 5 - 13 % 14(H) - - 13   Eosinophils 0 - 7 % 4 - - 4   Basophils 0 - 1 % 1 - - 1   Albumin 3.5 - 5.0 g/dL 2. 5(L) 2. 7(L) - 3. 2(L)   Calcium 8.5 - 10.1 MG/DL 7. 2(L) 7. 8(L) 8.2(L) 8.8   SGOT 15 - 37 U/L 17 15 - 16   Glucose 65 - 100 mg/dL 101(H) 79 90 140(H)   BUN 6 - 20 MG/DL 35(H) 26(H) 75(H) 69(H)   Creatinine 0.70 - 1.30 MG/DL 4.76(H) 3.80(H) 7.77(H) 7.22(H)   Sodium 136 - 145 mmol/L 136 133(L) 139 139   Potassium 3.5 - 5.1 mmol/L 3. 3(L) 3. 3(L) 4.0 4.2   Some recent data might be hidden     No results found for: TSH, TSH2, TSH3, TSHP, TSHELE, TSHEXT    ALLERGY:  Allergies   Allergen Reactions    Nubain [Nalbuphine] Other (comments)     Made me wild; Dysphoria        CURRENT MEDICATIONS:    Current Facility-Administered Medications:     ceFAZolin (ANCEF) injection 1 g, 1 g, IntraVENous, ONCE, Yasir Alvares MD    bupivacaine-EPINEPHrine (PF) (SENSORCAINE PF) 0.5 %-1:200,000 injection 150 mg, 30 mL, SubCUTAneous, ONCE, Radha BURNS MD    0.9% sodium chloride infusion 250 mL, 250 mL, IntraVENous, PRN, Sharyle Seitz, NP    midazolam (VERSED) 1 mg/mL injection, , , ,     fentaNYL citrate (PF) injection 100 mcg, 100 mcg, IntraVENous, ONCE, Michael Holguin, DO    0.9% sodium chloride infusion 250 mL, 250 mL, IntraVENous, PRN, Loi Romero MD    [START ON 9/7/2019] dexmedeTOMidine (PRECEDEX) 400 mcg in 0.9% sodium chloride 100 mL infusion, 0.2-0.7 mcg/kg/hr, IntraVENous, TITRATE, Loi Romero MD, Last Rate: 10.8 mL/hr at 09/06/19 1216, 0.6 mcg/kg/hr at 09/06/19 1216    ticagrelor (BRILINTA) tablet 90 mg, 90 mg, Oral, Q12H, Ricardo Herrera III, DO    0.9% sodium chloride infusion 250 mL, 250 mL, IntraVENous, PRN, Loi Romero MD    0.9% sodium chloride infusion 250 mL, 250 mL, IntraVENous, PRN, Loi Romero MD    propofol (DIPRIVAN) infusion, 0-50 mcg/kg/min, IntraVENous, TITRATE, Loi Romero MD    0.9% sodium chloride infusion 250 mL, 250 mL, IntraVENous, PRN, Loi Romero MD    vasopressin (VASOSTRICT) 20 Units in 0.9% sodium chloride 100 mL infusion, 0-0.03 Units/min, IntraVENous, TITRATE, Loi Romero MD    niCARdipine (CARDENE) 50 mg in 0.9% sodium chloride 100 mL infusion, 0-15 mg/hr, IntraVENous, TITRATE, Loi Romero MD    0.9% sodium chloride infusion 250 mL, 250 mL, IntraVENous, PRN, Loi Romero MD    albumin human 25% (BUMINATE) solution 12.5 g, 12.5 g, IntraVENous, DIALYSIS PRN, Gregoria Lefort III, MD    sodium chloride (NS) flush 5-40 mL, 5-40 mL, IntraVENous, Q8H, Darcie Navarrete NP, Stopped at 09/06/19 1400    sodium chloride (NS) flush 5-40 mL, 5-40 mL, IntraVENous, PRN, Theron BASILIO NP    sodium phosphate (FLEET'S) enema 1 Enema, 1 Enema, Rectal, PRN, Sharyle Seitz, NP    amiodarone (CORDARONE) tablet 400 mg, 400 mg, Oral, Q12H, Theron BASILIO NP, Stopped at 09/06/19 0900    nitroglycerin (Tridil) 200 mcg/ml infusion, 0-200 mcg/min, IntraVENous, TITRATE, Sharyle Seitz, NP, Stopped at 09/05/19 2110   bivalirudin (ANGIOMAX) 250 mg in 0.9% sodium chloride (MBP/ADV) 50 mL, 0.075-2.5 mg/kg/hr, IntraVENous, TITRATE, Rossana BASILIO NP, Last Rate: 5 mL/hr at 09/06/19 1126, 0.325 mg/kg/hr at 09/06/19 1126    morphine injection 2-4 mg, 2-4 mg, IntraVENous, Q2H PRN, Rossana BASILIO, NP, 2 mg at 09/06/19 0129    oxyCODONE-acetaminophen (PERCOCET) 5-325 mg per tablet 1-2 Tab, 1-2 Tab, Oral, Q4H PRN, Rossana BASILIO, NP, 1 Tab at 09/06/19 0129    DOBUTamine (DOBUTREX) 500 mg/250 mL (2,000 mcg/mL) infusion, 0-10 mcg/kg/min, IntraVENous, TITRATE, Soco Delong MD, Last Rate: 11.6 mL/hr at 09/06/19 1253, 5 mcg/kg/min at 09/06/19 1253    aspirin delayed-release tablet 81 mg, 81 mg, Oral, DAILY, Beto Carter MD, Stopped at 09/06/19 0900    pravastatin (PRAVACHOL) tablet 80 mg, 80 mg, Oral, QHS, Varun Carter MD, 80 mg at 09/05/19 2122    isosorbide mononitrate ER (IMDUR) tablet 30 mg, 30 mg, Oral, DAILY, Beto Griffith MD, Stopped at 09/06/19 0900    bumetanide (BUMEX) tablet 1 mg, 1 mg, Oral, DAILY, Beto Griffith MD, Stopped at 09/05/19 0900    cyclobenzaprine (FLEXERIL) tablet 5 mg, 5 mg, Oral, TID PRN, Nicol Ashby MD, 5 mg at 09/05/19 0140    finasteride (PROSCAR) tablet 5 mg, 5 mg, Oral, DAILY, Beto Griffith MD, Stopped at 09/05/19 0900    FLUoxetine (PROzac) capsule 20 mg, 20 mg, Oral, DAILY, Nedra WELCH MD, Stopped at 09/05/19 0900    lisinopril (PRINIVIL, ZESTRIL) tablet 20 mg, 20 mg, Oral, DAILY, Beto Crater MD, Stopped at 09/06/19 0900    mirtazapine (REMERON) tablet 30 mg, 30 mg, Oral, QHS, Varun Carter MD, 30 mg at 09/05/19 2122    nitroglycerin (NITROSTAT) tablet 0.4 mg, 0.4 mg, SubLINGual, Q5MIN PRN, Nicol Ashby MD, 0.4 mg at 09/05/19 1200    pantoprazole (PROTONIX) tablet 40 mg, 40 mg, Oral, BID, Nicol Ashby MD, Stopped at 09/06/19 0900    pregabalin (LYRICA) capsule 50 mg, 50 mg, Oral, TID, Nicol Ashby MD, Stopped at 09/06/19 0900    traZODone ([facility]) tablet 150 mg, 150 mg, Oral, QHS, Varun Carter MD, 150 mg at 09/05/19 2122    insulin lispro (HUMALOG) injection, , SubCUTAneous, AC&HS, Pam Figueroa MD, Stopped at 09/04/19 2200    glucose chewable tablet 16 g, 4 Tab, Oral, PRN, Jeanna Emery MD    glucagon (GLUCAGEN) injection 1 mg, 1 mg, IntraMUSCular, PRN, Jeanna Emery MD    dextrose 10% infusion 0-250 mL, 0-250 mL, IntraVENous, PRN, Pam Figueroa MD, Last Rate: 750 mL/hr at 09/05/19 1200, 125 mL at 09/05/19 1200    Thank you for allowing me to participate in this patient's care.     Sam Del Toro MD PhD  [facility], [address]  Phone: [phone]  Fax: [phone]    Critically ill  Critical care 35 min

## 2019-09-06 NOTE — ANESTHESIA PREPROCEDURE EVALUATION
Anesthetic History   No history of anesthetic complications            Review of Systems / Medical History  Patient summary reviewed, nursing notes reviewed and pertinent labs reviewed    Pulmonary          Smoker (EX)      Comments: 35 pack years   Neuro/Psych         Psychiatric history (Depression)    Comments: Depression Cardiovascular    Hypertension: well controlled      CHF  Dysrhythmias   Past MI, CAD, PAD, cardiac stents and hyperlipidemia    Exercise tolerance: <4 METS  Comments: EF<15%  IABP     GI/Hepatic/Renal     GERD    Renal disease (CKD): CRI, ESRD and dialysis  PUD    Comments: Hematemesis  Hx of PUD 9-2-15 on EGD Endo/Other    Diabetes: type 2, using insulin    Arthritis (Back and hips) and anemia     Other Findings   Comments: Chronic Low Back Pain with implanted stimulator  Hx of Thromboembolism           Physical Exam    Airway  Mallampati: II  TM Distance: > 6 cm  Neck ROM: normal range of motion   Mouth opening: Normal     Cardiovascular    Rhythm: regular  Rate: normal    Murmur, Aortic area     Dental    Dentition: Edentulous     Pulmonary  Breath sounds clear to auscultation               Abdominal  GI exam deferred       Other Findings            Anesthetic Plan    ASA: 5  Anesthesia type: general - supraclavicular block    Monitoring Plan: Arterial line, WADE, BIS, CVP and [place]    Post procedure ventilation   Induction: Intravenous  Anesthetic plan and risks discussed with: Patient

## 2019-09-06 NOTE — PROGRESS NOTES
Received orders for Ancef IV per cardiac surgery protocol. Pt is on HD. Adjusted orders to be Ancef 1g IV q24h x 2 doses. Abx will be stopped before 48h time.

## 2019-09-06 NOTE — PROCEDURES
[address]  W[place]    Name:  Joleen Lesches  MR#:  [id number]  :  1955  ACCOUNT #:  [de-identified]  DATE OF SERVICE:  2019      SURGEON:  Eva Ramires MD    The transesophageal echocardiographic exam was requested by the surgeon in order to evaluate real-time cardiac and valvular form and function in a patient with known cardiomyopathy and coronary artery disease, scheduled for an Impella 5.0 insertion under general anesthesia. Modalities incorporated included 2-D, 3-D, color-flow mode, pulsed-wave Doppler, and continuous wave Doppler. The epiaortic probe was easily and atraumatically inserted into the patient's esophagus while the patient was sedated under general anesthesia inside the operating room. AORTA:    Ascending aorta: The patient's ascending aorta measured 2.8 cm in diameter. There was no evidence of dissection. There was mild and nonmobile plaque. Aortic arch: The aortic arch measured 2.47 cm in diameter. There was no evidence of dissection. There was mild and nonmobile plaque. Descending aorta: The descending aorta measured 2.4 cm in diameter. There was no evidence of dissection. Plaque thickness measured 0.6 cm in thickness and was nonmobile. An intra-aortic balloon pump was visualized below the level of the subclavian artery. VALVES:    Aortic valve: The aortic valve exhibited moderate-to-severe stenosis with a maximum velocity of 322 cm/sec, a peak gradient of 42 mmHg and a mean gradient of 21 mmHg. The aortic valve area calculated with the continuity equation measured 0.63 cm2, the aortic valve area measured with planimetry measured 0.76 cm2. There was moderate aortic insufficiency, which was centrally located and directed with an aortic insufficiency pressure half-time of 365 cm/sec.   The leaflet morphology exhibited a partially fused non and left coronary cusp with the majority of valve opening via the right coronary cusp.    Mitral valve: The mitral valve annulus measured 3.9 cm. There was no mitral valve stenosis with a mean gradient of 2 mmHg. There was mild-to-moderate centrally located mitral valve insufficiency. The mitral valve exhibited mild mitral annular calcification without restriction. Tricuspid valve: The tricuspid valve annulus was dilated at 4.2 cm. There was no tricuspid valve stenosis. There was severe tricuspid insufficiency. The tricuspid leaflet morphology other than poor central coaptation was normal.    Pulmonic valve: The pulmonic valve annulus measured 2.6 cm. There was trace pulmonic insufficiency. There was no pulmonic stenosis. The pulmonic leaflet morphology and motion were both grossly normal.    ATRIA:    Right atrium:  The right atrium was dilated and measured 6.1 cm. There was no spontaneous echo contrast.  There was no right atrial thrombus or tumor. A pulmonary artery catheter was visualized. Left atrium:  The left atrium was also enlarged at 5.2 cm. There was no spontaneous echo contrast.  There was no left atrial thrombus or tumor. Left atrial appendage: There was no thrombus visualized within the left atrial appendage. Pulsed-wave Doppler within the appendage measured 50 cm/sec. Interatrial septum:  The interatrial septum morphology was normal.  There was no patent foramen ovale with color-flow mode. VENTRICLES:    Right ventricle: The right ventricular cavity size was dilated. The right ventricular major axis was 9.4 cm. There was mild right ventricular hypertrophy, which was concentric. There was no right ventricular thrombus and the right ventricular ejection fraction exhibited mild-to-moderate dysfunction. Left ventricle: The left ventricular cavity size was enlarged at 9.3 cm. There was concentric left ventricular hypertrophy with an inferior wall thickness of 1.4 cm.   There was no left ventricular thrombus and the left ventricular ejection fraction was 25%. REGIONAL FUNCTION:  There was moderate-to-severe global hypokinesis. PERICARDIUM:  Pericardium was normal.    PLEURAE:  The pleurae were normal.    POST-INTERVENTION FOLLOWUP STUDY:  A CP Impella was visualized 2.5 cm beyond the aortic valve annulus. The right ventricular function was unchanged. The left ventricular function was unchanged, and the LV cavity was decompressed. There was at least residual moderate aortic insufficiency. There was residual severe tricuspid regurgitation. There was at least moderate mitral regurgitation. There were no effusions. The intra-aortic balloon pump position was unchanged. The ascending was intact. These results were discussed and viewed with the cardiac surgeon. The transesophageal echocardiographic probe was easily and atraumatically removed from the patient's esophagus. The patient tolerated the procedure without event.       DO ZOYA [id number]  D:  09/06/2019 11:26  T:  09/06/2019 11:36  JOB #:  [id number]

## 2019-09-06 NOTE — PROGRESS NOTES
5355 Report received from Waqar Rg, CAITLYN Patient having right quad lumen cath placed by Dr Jasmeet Matthews,   3678 X-ray to confirm line. Dr Anny Allred confirmed placement. 3417 Patient off unit to OR.  0900 Consult called to advance heart failure. Message left. 1142 Echo at bedside patient in OR, then CCL will do TTE tomorrow. 1640 Orders given by Dr Daniela Aguilar, plan of care discussed, patient arrived via cath lab team.  Placed on monitor. 1713 VS reviewed with Dr Melissa Quezada to not give plts for plt of 90.    1800 . Celio Anderson Primary Nurse Angelica Todd, CAITLYN and Nisha Caro, RN performed a dual skin assessment on this patient Impairment noted- see wound doc flow sheet.   No pressure injury noted, patient with right axilla dsg with slight oozing, right shin abrasion, and multiple small venous stasis ulcers on right toes (amputation scheduled for Oct.)

## 2019-09-06 NOTE — PROGRESS NOTES
Pt seen and examined. Agree with consultation note. Pt has a hx of ESRD on HD, right AKA and EF 20%. Now with angina and found to have severe LM disease and  of prox RCA. CABG too high risk, in terms of his renal and heart failure but also his recovery with an AKA. Plan is for Impella 5.0 via right axillary artery for support during high risk LM PCI by Dr. Nehemias Juarez. Risks and benefits discussed with patient and his family who consented to surgery.

## 2019-09-06 NOTE — PROGRESS NOTES
12 - received bedside report from Lee Ann Holly [facility].    0800 - Assessment completed. Patient being prepped for impella surgery and cardiac cath lab. Consents still needed. Swanz catheter present, right radial arterial line present, linder patently draining < 10 ml/hr. 0830 - Consents obtained from patient's wife. 9831 - Patient being taken to OR for impella and will head to cath lab after. 1610 - Received verbal orders from Dr. Romana Spell with preceptor present Louie Located within [facility])    3402 - patient arrived back on unit from cath lab.    1700 - assessment completed. Patient on ventilator. Swanz Catheter present and intact. Right radial arterial line present. 1752 - Patient biting down on ETT titrated Precedex to 1.0, above max order range. 1920 Ector Lyons, gave bedside report to Lee Ann Holly, RN.

## 2019-09-06 NOTE — PROGRESS NOTES
2 D Echo attempted but per. MICHAEL. [facility].  Will do tomorrow

## 2019-09-06 NOTE — INTERDISCIPLINARY ROUNDS
Critical care interdisciplinary rounds held on 09/06/2019. Following members present, Pharmacy, Diabetes Treatment, Case Management, Respiratory Therapy, Physical Therapy and Nutrition. Led by CAROL Mejia RN and Dr. Saint Radish and Dr. Jaylan León. Plan of care discussed. See clinical pathway for plan of care and interventions and desired outcomes. Discussed medications. Patient is currently in OR receiving Impella.

## 2019-09-06 NOTE — PROGRESS NOTES
9/6/2019    INTENSIVIST PROGRESS NOTE:     Patient seen and evaluated, chart reviewed   72 yo male smoker, with ESRD s/p cardiac cath revealing severe 3V CAD, LM disease, severe CMP  Transferred back to ccu post procedure with IABP in place  Now pt in CCU in no severe distress  No acute complaints    ROS: no sob, no cp  9/6- pt is awake and alert. Iabp in place. Sgc in place. Pap 53/22 and mean 34 . Dobutamine 4 ucg . Dr. Lynne Costello is here and discussing plans  With the pt. To or today for  IMpella and cath with stent . Visit Vitals  /48   Pulse 62   Temp 96.9 °F (36.1 °C)   Resp 11   Ht 5' 7\" (1.702 m)   Wt 71.7 kg (158 lb 1.1 oz)   SpO2 94%   BMI 24.76 kg/m²       General: no distress. Looks chronically ill. Eyes: anicteric  HEENT: dry oral mucosa. edentulous  Neck: FROM  CV: RRR  Lungs: clear ant/lat  Abd: soft  : no flank pain. Gonzalez in with not much out   Ext: no edema- left  [place] . Skin: no rash  Musculoskeletal: normal inspection  Neuro: non focal and appropriately interactive. CXR: RLL atx- linear - sgc ok     Labs reviewed  plts 49 k / 3.3  A/P:  - severe CAD: IABP in place, CABG  Today   - heparin drip  - NTG drip- now off   - ESRD: RRT per renal  - glycemic control  - CCU monitoring  - Will assist on disposition planning when stable for transfer  - plts mgt per primary team   Corbin Singh MD     Pt returned from cath lab. Discussed with Dr. Allen July. Pt is to remain intubated overnight .  abg's are ok . cxr shows good position of ett. Echo lvef 25 % .

## 2019-09-06 NOTE — OP NOTES
[address]  OPERATIVE REPORT    Name:  Lisa Jose  MR#:  [id number]  :  1955  ACCOUNT #:  [de-identified]  DATE OF SERVICE:    PREOPERATIVE DIAGNOSES:  1. Acute systolic heart failure class III (ejection fraction of 20%). 2.  Left main coronary artery disease. 3.  Multivessel coronary artery disease. 4. In-stent restenosis. 5.  Severe peripheral vascular disease with occluded left iliac artery. 6.  Status post left above-the-knee amputation. 7.  End-stage renal disease on hemodialysis with left arm arteriovenous fistula. 8.  Mild bilateral carotid artery stenosis. 9.  Unstable angina. 10.  Moderate aortic stenosis. 11.  Moderate aortic regurgitation. 12.  Mild-to-moderate mitral regurgitation. 13.  Severe tricuspid regurgitation. 14.  Mild-to-moderate right ventricular dysfunction. 15.  Thrombocytopenia (platelet count 43,409). 16.  Tobacco abuse with daily pack per day smoking. 17.  Hypertension. 18.  Hyperlipidemia. 19.  Depression. 20.  History of upper gastrointestinal bleed in . 21.  Chronic pain syndrome with lower back pain and home methadone use. POSTOPERATIVE DIAGNOSES:  1. Acute systolic heart failure class III (ejection fraction of 20%). 2.  Left main coronary artery disease. 3.  Multivessel coronary artery disease. 4. In-stent restenosis. 5.  Severe peripheral vascular disease with occluded left iliac artery. 6.  Status post left above-the-knee amputation. 7.  End-stage renal disease on hemodialysis with left arm arteriovenous fistula. 8.  Mild bilateral carotid artery stenosis. 9.  Unstable angina. 10.  Moderate aortic stenosis. 11.  Moderate aortic regurgitation. 12.  Mild-to-moderate mitral regurgitation. 13.  Severe tricuspid regurgitation. 14.  Mild-to-moderate right ventricular dysfunction. 15.  Thrombocytopenia (platelet count 42,475). 16.  Tobacco abuse with daily pack per day smoking. 17.  Hypertension.   18. Hyperlipidemia. 19.  Depression. 20.  History of upper gastrointestinal bleed in 2015. 21.  Chronic pain syndrome with lower back pain and home methadone use. PROCEDURE PERFORMED:  1. Right axillary cutdown and construction of #8 Dacron graft to axillary artery anastomosis. 2.  Insertion of Impella CP. 3.  Transesophageal echocardiography. SURGEON:  Faith Barlow MD    ASSISTANT:  Nancy Rivera PA-C    ANESTHESIA:  General endotracheal.    ANESTHESIOLOGIST:  Farzaneh Holguin DO    COMPLICATIONS:  None. SPECIMENS REMOVED:  None. IMPLANTS:  Impella CP. ESTIMATED BLOOD LOSS:  20 mL. DETAILS:  The patient is a 65-year-old gentleman with a critical left main and a proximal RCA that has a chronic total occlusion. He presented with unstable angina. In addition, he has an ejection fraction of 20% which was previously 40% in 2018. He was referred for coronary artery bypass grafting. However, he also has in addition to end-stage renal disease on dialysis, a left AKA and severe peripheral vascular disease. We therefore devised a strategy to optimize PCI of his left main using an Impella for hemodynamic support. PROCEDURE:  He was brought to the operating room. He was prepped and draped in a sterile fashion. A time-out was performed. An incision was made a fingerbreadth under the right clavicle horizontally. We dissected down to the axillary artery. It was heavily diseased. We opened the artery and there were multiple layers of his arterial wall that began to delaminate. We sized it for a size 8 graft and decided an Impella CP would be the best to go through this graft in this size artery. In addition, he was found to have moderate-to-severe aortic stenosis with an aortic valve area of 0.6. This would further complicate the use of an Impella 5.0 to pass through this small valve area. For these reasons, we used an Impella CP.   We obtained proximal and distal control of the axillary artery. We performed our anastomosis without any difficulty using an size 8 Dacron graft. We had a hemostatic result. We then secured the introducer and used an JULISA catheter to advance the wire into the ascending aorta. Once in the ascending aorta, we spent some time crossing the aortic valve with the wire. We were able to do so, however, the wire went into the mitral apparatus. We used an AL1 to help guide the wire out of the chordae tendineae and into the LV cavity. When we were satisfied with this, we advanced the Impella CP over the Impella wire and safely into the LV. We turned down the Impella and we had 3 liters of flow. We were happy with the position, although it was close to the mitral valve. We put the balloon pump to 1:3. We had stable hemodynamics. We did notice that the AI has worsened now to moderate to severe; however, the LV does not appear distended and he is maintaining good flow through the Impella. We surmised that this will be adequate to support coronary intervention and maybe able to be removed at the conclusion of the interventional procedure. We then inserted the sheath into the axillary artery and secured this with three silk sutures and accordioned the Dacron graft into the axillary wound and secured the flange of the sheath on both sides at the skin. We then closed the axillary wound in three layers after confirming that we had a hemostatic wound. The patient was then transferred to the cath lab for left main PCI.         MD CHASITY [id number]  D:  09/06/2019 11:58  T:  09/06/2019 13:18  JOB #:  [id number]

## 2019-09-06 NOTE — BRIEF OP NOTE
BRIEF OP NOTE  Pre-Op Diagnosis: CAD    Post-Op Diagnosis: CAD      Procedure:  IMPELLA CP INSERTION VIA RT AXILLARY ARTERY, INTRAOPERATIVE WADE BY DR. Fadumo Benavides    Surgeon: Shaye Davila MD    Assistant(s): Kurtis Carver PA-C    Anesthesia: General     Infusions: Dobutamine, vasopressin, precedex     Estimated Blood Loss:  100cc    Specimens: * No specimens in log *    Complications:  none    Findings: See full operative note. Implants:   Implant Name Type Inv.  Item Serial No.  Lot No. LRB No. Used Action   GRAFT HEMSHLD WVN STR [id number] -- [place] - [id number]  GRAFT HEMSHLD WVN STR [id number] -- HEMASHIELD PLATINUM [id number] [employer] CARDIOVASCLR [id number] Right 1 Implanted   [employer]   [id number] [employer] [id number] Right 1 Implanted

## 2019-09-06 NOTE — PROGRESS NOTES
**Consult Information**  Member Facility: [facility] MRN: [id number]  Consult ID: [id number]  Facility Time Zone: ET  Date and Time of Consult: 09/06/2019 05:45:25 AM  Requesting Clinician: Daniel Pizano  Patient Name: Arabella Bradley  Date of Birth: [birth year]  Gender: Male    **Clinical Note**  Clinical Note: hold off on plt transfusion for now    defer to primary team for mgmt d/t risk of fluid overload    pt not having signs / symptoms of bleeding at this time    **Attestation**  Interaction Mode: Phone Only  Phone Duration (mins): 4  Time of Call : 09/06/2019 05:53 AM  Interaction Attestation: Interprofessional internet consultation was delivered through telephonic and/or electronic communication upon the request of the patients treating physician, while the requesting and the rendering provider were not in the same physical location. Written report was provided to the requesting provider.   Evaluation Duration (mins): 8

## 2019-09-06 NOTE — PROGRESS NOTES
NAME: Addie Marti Sr.        :  1955        MRN:  [id number]        Assessment :    Plan:  --[facility]  Anemia  CP dyspnea  CAD --Hd planned for today, but patient is going for Impella and cath. Labs and volume look OK. Can put HD off until AM depending on how he does with high risk procedure. Holding OWEN for now. Subjective:     Chief Complaint:  Going down for Impella. Review of Systems:    Symptom Y/N Comments  Symptom Y/N Comments   Fever/Chills    Chest Pain     Poor Appetite    Edema     Cough    Abdominal Pain     Sputum    Joint Pain     SOB/COREA    Pruritis/Rash     Nausea/vomit    Tolerating PT/OT     Diarrhea    Tolerating Diet     Constipation    Other       Could not obtain due to:      Objective:     VITALS:   Last 24hrs VS reviewed since prior progress note. Most recent are:  Visit Vitals  /41   Pulse 69   Temp 96.9 °F (36.1 °C)   Resp 12   Ht 5' 7\" (1.702 m)   Wt 71.7 kg (158 lb 1.1 oz)   SpO2 99%   BMI 24.76 kg/m²       Intake/Output Summary (Last 24 hours) at 2019 0848  Last data filed at 2019 0400  Gross per 24 hour   Intake 125 ml   Output 1682 ml   Net -1557 ml      Telemetry Reviewed:     PHYSICAL EXAM:  General: NAD       Lab Data Reviewed: (see below)    Medications Reviewed: (see below)    PMH/SH reviewed - no change compared to H&P  ________________________________________________________________________  Care Plan discussed with:  Patient     Family      RN     Care Manager                    Consultant:          Comments   >50% of visit spent in counseling and coordination of care       ________________________________________________________________________  Cornel Webber MD     Procedures: see electronic medical records for all procedures/Xrays and details which  were not copied into this note but were reviewed prior to creation of Plan. LABS:  Recent Labs     09/06/19  0401 09/05/19  0540   WBC 3.5* 4.9   HGB 9.6* 10.8*   HCT 30.4* 34.9*   PLT 49* 53*     Recent Labs     09/06/19  0401 09/05/19  1500 09/05/19  0540    133* 139   K 3.3* 3.3* 4.0    98 106   CO2 27 27 22   BUN 35* 26* 75*   CREA 4.76* 3.80* 7.77*   * 79 90   CA 7.2* 7.8* 8.2*     Recent Labs     09/06/19  0401 09/05/19  1500 09/04/19  1333   SGOT 17 15 16   AP 64 68 91   TP 6.1* 6.3* 7.4   ALB 2.5* 2.7* 3.2*   GLOB 3.6 3.6 4.2*     Recent Labs     09/06/19  0402 09/05/19  2324 09/05/19 2115 09/05/19  1842   INR  --   --   --  1.2*   PTP  --   --   --  12.3*   APTT 44.3* 110.6* 35.6* 82.0*      No results for input(s): FE, TIBC, PSAT, FERR in the last 72 hours. Lab Results   Component Value Date/Time    Folate 7.0 01/06/2016 01:08 PM      No results for input(s): PH, PCO2, PO2 in the last 72 hours. No results for input(s): CPK, CKMB in the last 72 hours.     No lab exists for component: TROPONINI  No components found for: [place]  Lab Results   Component Value Date/Time    Color YELLOW/STRAW 09/05/2019 06:00 PM    Appearance CLEAR 09/05/2019 06:00 PM    Specific gravity 1.027 09/05/2019 06:00 PM    pH (UA) 7.0 09/05/2019 06:00 PM    Protein 100 (A) 09/05/2019 06:00 PM    Glucose NEGATIVE  09/05/2019 06:00 PM    Ketone NEGATIVE  09/05/2019 06:00 PM    Bilirubin NEGATIVE  09/05/2019 06:00 PM    Urobilinogen 1.0 09/05/2019 06:00 PM    Nitrites NEGATIVE  09/05/2019 06:00 PM    Leukocyte Esterase NEGATIVE  09/05/2019 06:00 PM    Epithelial cells FEW 09/05/2019 06:00 PM    Bacteria NEGATIVE  09/05/2019 06:00 PM    WBC 0-4 09/05/2019 06:00 PM    RBC 5-10 09/05/2019 06:00 PM       MEDICATIONS:  Current Facility-Administered Medications   Medication Dose Route Frequency    heparin (porcine) 1,000 unit/mL injection 2,000 Units  2,000 Units IntraVENous ONCE    ceFAZolin (ANCEF) injection 1 g  1 g IntraVENous ONCE    bupivacaine-EPINEPHrine (PF) (SENSORCAINE PF) 0.5 %-1:200,000 injection 150 mg  30 mL SubCUTAneous ONCE    0.9% sodium chloride infusion 250 mL  250 mL IntraVENous PRN    midazolam (VERSED) 1 mg/mL injection        fentaNYL citrate (PF) injection 100 mcg  100 mcg IntraVENous ONCE    0.9% sodium chloride infusion 250 mL  250 mL IntraVENous PRN    albumin human 25% (BUMINATE) solution 12.5 g  12.5 g IntraVENous DIALYSIS PRN    sodium chloride (NS) flush 5-40 mL  5-40 mL IntraVENous Q8H    sodium chloride (NS) flush 5-40 mL  5-40 mL IntraVENous PRN    sodium phosphate (FLEET'S) enema 1 Enema  1 Enema Rectal PRN    amiodarone (CORDARONE) tablet 400 mg  400 mg Oral Q12H    ceFAZolin (ANCEF) 2 g/20 mL in sterile water IV syringe  2 g IntraVENous ON CALL TO OR    nitroglycerin (Tridil) 200 mcg/ml infusion  0-200 mcg/min IntraVENous TITRATE    bivalirudin (ANGIOMAX) 250 mg in 0.9% sodium chloride (MBP/ADV) 50 mL  0.075-2.5 mg/kg/hr IntraVENous TITRATE    morphine injection 2-4 mg  2-4 mg IntraVENous Q2H PRN    oxyCODONE-acetaminophen (PERCOCET) 5-325 mg per tablet 1-2 Tab  1-2 Tab Oral Q4H PRN    DOBUTamine (DOBUTREX) 500 mg/250 mL (2,000 mcg/mL) infusion  0-10 mcg/kg/min IntraVENous TITRATE    aspirin delayed-release tablet 81 mg  81 mg Oral DAILY    pravastatin (PRAVACHOL) tablet 80 mg  80 mg Oral QHS    isosorbide mononitrate ER (IMDUR) tablet 30 mg  30 mg Oral DAILY    bumetanide (BUMEX) tablet 1 mg  1 mg Oral DAILY    cyclobenzaprine (FLEXERIL) tablet 5 mg  5 mg Oral TID PRN    finasteride (PROSCAR) tablet 5 mg  5 mg Oral DAILY    FLUoxetine (PROzac) capsule 20 mg  20 mg Oral DAILY    lisinopril (PRINIVIL, ZESTRIL) tablet 20 mg  20 mg Oral DAILY    mirtazapine (REMERON) tablet 30 mg  30 mg Oral QHS    nitroglycerin (NITROSTAT) tablet 0.4 mg  0.4 mg SubLINGual Q5MIN PRN    pantoprazole (PROTONIX) tablet 40 mg  40 mg Oral BID    pregabalin (LYRICA) capsule 50 mg  50 mg Oral TID    traZODone (DESYREL) tablet 150 mg  150 mg Oral QHS    insulin lispro (HUMALOG) injection   SubCUTAneous AC&HS    glucose chewable tablet 16 g  4 Tab Oral PRN    glucagon (GLUCAGEN) injection 1 mg  1 mg IntraMUSCular PRN    dextrose 10% infusion 0-250 mL  0-250 mL IntraVENous PRN

## 2019-09-06 NOTE — PROGRESS NOTES
Progress Note      9/6/2019 7:46 AM  NAME: Scar Jacobs Sr. MRN:  [id number]   Admit Diagnosis: Unstable angina ([facility] [I20.0]  Heart failure ([facility] [I50.9]      Problem List:     1. Chest pain; cath 4/30 w/ RCA , 60% oLAD w/ neg FFR (0.88), patent LCx stent w/ mild ISR. 2. Cardiogenic Shock  3. Indeterminate troponin elevation  4. Right renal artery stenosis s/p 7.0 x 19mm BMS 5/4/18  5. Occluded LCIA. 6. Elevated troponin  7. ESRD on dialysis. Dr Crump   8. Long term hx of Coronary artery disease s/p multivessel stenting s/p PCI of LCx, PTCA of RCA 7/97, PTCA LAD 3/94.  Lexiscan 2/08 w/ EF 62%, distal ineroapical infarct w/o ischemia  9. Cath 4/2018  No intervention. Lv EF  -  55%   10. Peripheral vascular disease s/p left iliac stenting, left SFA stenting 3/00, right iliac stenting 11/99.  11. Status post L AKA  12. Bilateral carotid stenosis; 16-49% 9/13  13. Hypertension  14. Diabetes  15. Chronic Tobacco abuse 1-2 PPD . 16. Chronic pain  17. Chronic anemia  18. Hyperlipidemia  19. Noncompliance  20. Peptic ulcer disease w/ GIB '15  21. Falls  22. Lives w/ sister in [address] [place] [address] now  21. Usual Cardiologist:  Dr. Jennifer Rene / Licha Calvillo. Assessment/Plan: TnI 0.1    Films reviewed from 4/18. Ostial LAD. Occluded RCA. LCx ok. Left common iliac artery occluded. Cath 9/5 w/ RCA , sev reduced LVEF, severe LM, LAD, LCx disease (Renteria Class 1, 1, 1). Discussed w/ CTS; initially planned for CABG but rescinded due to numerous comorbidities. IABP placed @ 1:1. Pike placed. Long discussion w/ patient and family. On for Impella 5.0 axillary this AM and then high-risk PCI of LM/LAD/LCx to follow. Risk of death is high; discussed w/ patient and family; pt is willing to accept these risks. Dobutamine @ 4mcg/kg/min. Off NTG gtt. CI 2.2  PAd 21    Minimal UOP w/ linder. CVL placed this AM by anesthesia w/ 1mg versed prior to consenting. PLT going in now. PLT count 49.     Keep NPO.    Waiting on family for face to face meeting. 1. Continue ASA  2. Hold coreg w/ inotrope. 3. Continue lisinopril  4. Continue ISMN  5. Continue statin  6. Volume management per renal         [x]       High complexity decision making was performed in this patient at high risk for decompensation with multiple organ involvement. Subjective:     Jevon denies chest pain, dyspnea. Discussed with RN events overnight. Review of Systems:    Symptom Y/N Comments  Symptom Y/N Comments   Fever/Chills N   Chest Pain N    Poor Appetite N   Edema N    Cough N   Abdominal Pain N    Sputum N   Joint Pain N    SOB/COREA N   Pruritis/Rash N    Nausea/vomit N   Tolerating PT/OT Y    Diarrhea N   Tolerating Diet Y    Constipation N   Other       Could NOT obtain due to:      Objective:      Physical Exam:    Last 24hrs VS reviewed since prior progress note. Most recent are:    Visit Vitals  /41   Pulse 69   Temp 96.9 °F (36.1 °C)   Resp 12   Ht 5' 7\" (1.702 m)   Wt 71.7 kg (158 lb 1.1 oz)   SpO2 99%   BMI 24.76 kg/m²       Intake/Output Summary (Last 24 hours) at 9/6/2019 7556  Last data filed at 9/6/2019 0400  Gross per 24 hour   Intake 125 ml   Output 1682 ml   Net -1557 ml        General Appearance: Well developed, well nourished, alert & oriented x 3,    no acute distress. Ears/Nose/Mouth/Throat: Hearing grossly normal.  Neck: Supple. Chest: Lungs clear to auscultation bilaterally. Cardiovascular: Regular rate and rhythm, S1S2 normal, no murmur. Abdomen: Soft, non-tender, bowel sounds are active. Extremities: No edema bilaterally. Skin: Warm and dry. [x]         Post-cath site without hematoma, bruit, tenderness, or thrill. Distal pulses intact.     PMH/SH reviewed - no change compared to H&P    Data Review    Telemetry: sinus rhythm     EKG:   [x]  No new EKG for review    Lab Data Personally Reviewed:    Recent Labs     09/06/19  0401 09/05/19  0540   WBC 3.5* 4.9   HGB 9.6* 10.8*   HCT 30.4* 34.9*   PLT 49* 53*     Recent Labs     09/06/19  0402 09/05/19  2324 09/05/19  2115 09/05/19  1842   INR  --   --   --  1.2*   PTP  --   --   --  12.3*   APTT 44.3* 110.6* 35.6* 82.0*      Recent Labs     09/06/19  0401 09/05/19  1500 09/05/19  0540    133* 139   K 3.3* 3.3* 4.0    98 106   CO2 27 27 22   BUN 35* 26* 75*   CREA 4.76* 3.80* 7.77*   * 79 90   CA 7.2* 7.8* 8.2*     Recent Labs     09/05/19  0128 09/04/19  1817 09/04/19  1333   TROIQ 0.10* 0.09* 0.09*     Lab Results   Component Value Date/Time    Cholesterol, total 236 (H) 04/29/2018 06:00 AM    HDL Cholesterol 34 04/29/2018 06:00 AM    LDL,Direct 62 10/03/2014 05:50 AM    LDL, calculated 172.4 (H) 04/29/2018 06:00 AM    Triglyceride 148 04/29/2018 06:00 AM    CHOL/HDL Ratio 6.9 (H) 04/29/2018 06:00 AM       Recent Labs     09/06/19  0401 09/05/19  1500 09/04/19  1333   SGOT 17 15 16   AP 64 68 91   TP 6.1* 6.3* 7.4   ALB 2.5* 2.7* 3.2*   GLOB 3.6 3.6 4.2*     No results for input(s): PH, PCO2, PO2 in the last 72 hours.     Medications Personally Reviewed:    Current Facility-Administered Medications   Medication Dose Route Frequency    heparin (porcine) 1,000 unit/mL injection 2,000 Units  2,000 Units IntraVENous ONCE    ceFAZolin (ANCEF) injection 1 g  1 g IntraVENous ONCE    bupivacaine-EPINEPHrine (PF) (SENSORCAINE PF) 0.5 %-1:200,000 injection 150 mg  30 mL SubCUTAneous ONCE    0.9% sodium chloride infusion 250 mL  250 mL IntraVENous PRN    midazolam (VERSED) 1 mg/mL injection        fentaNYL citrate (PF) injection 100 mcg  100 mcg IntraVENous ONCE    0.9% sodium chloride infusion 250 mL  250 mL IntraVENous PRN    albumin human 25% (BUMINATE) solution 12.5 g  12.5 g IntraVENous DIALYSIS PRN    sodium chloride (NS) flush 5-40 mL  5-40 mL IntraVENous Q8H    sodium chloride (NS) flush 5-40 mL  5-40 mL IntraVENous PRN    sodium phosphate (FLEET'S) enema 1 Enema  1 Enema Rectal PRN    amiodarone (CORDARONE) tablet 400 mg  400 mg Oral Q12H    ceFAZolin (ANCEF) 2 g/20 mL in sterile water IV syringe  2 g IntraVENous ON CALL TO OR    nitroglycerin (Tridil) 200 mcg/ml infusion  0-200 mcg/min IntraVENous TITRATE    bivalirudin (ANGIOMAX) 250 mg in 0.9% sodium chloride (MBP/ADV) 50 mL  0.075-2.5 mg/kg/hr IntraVENous TITRATE    morphine injection 2-4 mg  2-4 mg IntraVENous Q2H PRN    oxyCODONE-acetaminophen (PERCOCET) 5-325 mg per tablet 1-2 Tab  1-2 Tab Oral Q4H PRN    DOBUTamine (DOBUTREX) 500 mg/250 mL (2,000 mcg/mL) infusion  0-10 mcg/kg/min IntraVENous TITRATE    aspirin delayed-release tablet 81 mg  81 mg Oral DAILY    pravastatin (PRAVACHOL) tablet 80 mg  80 mg Oral QHS    isosorbide mononitrate ER (IMDUR) tablet 30 mg  30 mg Oral DAILY    bumetanide (BUMEX) tablet 1 mg  1 mg Oral DAILY    carvedilol (COREG) tablet 6.25 mg  6.25 mg Oral BID WITH MEALS    cyclobenzaprine (FLEXERIL) tablet 5 mg  5 mg Oral TID PRN    finasteride (PROSCAR) tablet 5 mg  5 mg Oral DAILY    FLUoxetine (PROzac) capsule 20 mg  20 mg Oral DAILY    lisinopril (PRINIVIL, ZESTRIL) tablet 20 mg  20 mg Oral DAILY    mirtazapine (REMERON) tablet 30 mg  30 mg Oral QHS    nitroglycerin (NITROSTAT) tablet 0.4 mg  0.4 mg SubLINGual Q5MIN PRN    pantoprazole (PROTONIX) tablet 40 mg  40 mg Oral BID    pregabalin (LYRICA) capsule 50 mg  50 mg Oral TID    traZODone (DESYREL) tablet 150 mg  150 mg Oral QHS    insulin lispro (HUMALOG) injection   SubCUTAneous AC&HS    glucose chewable tablet 16 g  4 Tab Oral PRN    glucagon (GLUCAGEN) injection 1 mg  1 mg IntraMUSCular PRN    dextrose 10% infusion 0-250 mL  0-250 mL IntraVENous PRN         Salty Mathis III, DO

## 2019-09-06 NOTE — ANESTHESIA PROCEDURE NOTES
Arterial Line Placement    Performed by: Catalina Mcknight DO  Authorized by: Catalina Mcknight DO     Pre-Procedure  Indications:  Arterial pressure monitoring and blood sampling  Preanesthetic Checklist: patient identified, risks and benefits discussed, anesthesia consent, site marked, patient being monitored, timeout performed and patient being monitored      Procedure:   Prep:  ChloraPrep  Seldinger Technique?: Yes    Orientation:  Right  Location:  Radial artery  Catheter size:  20 G  Number of attempts:  1    Assessment:   Post-procedure:  Line secured and sterile dressing applied  Patient Tolerance:  Patient tolerated the procedure well with no immediate complications  Comment:   Collateral perfusion verified, left AV fistula

## 2019-09-06 NOTE — PROGRESS NOTES
Brief Procedure Note    Patient: Senait Black Sr. MRN: [id number]  SSN: [id number]    YOB: 1955  Age: 71 y.o. Sex: male      Date of Procedure: 9/6/2019     Pre-procedure Diagnosis: [place], Cardiogenic shock, staged high-risk PCI    Post-procedure Diagnosis: LM and 3v CAD    Procedure: IABP removal, PTCA and PCI of the LAD w/ a STANLEY and OM1 w/ a STANLEY. IVUS. PTCA and PCI of the LM/LAD/LCx bifurcation w/ 2 STANLEY utilizing a [place] technique. Performed By: Ernesto Norton III, DO     Anesthesia: Precedex    Estimated Blood Loss: Less than 10 mL      Specimens:  None    Findings: as above    Complications: None    Implants: 4 Luis Fernando STANLEY    Recommendations: Continue medical therapy.     Signed By: Ernesto Norton III, DO     September 6, 2019

## 2019-09-06 NOTE — PROGRESS NOTES
0  Pt arrived to CCU with IABP 1:1. Assessment complete. [id number] [address] with Dr. Ronna Link NP and Dr. Oscar Guy regarding pt condition and plan of care. Discussed low plt count 53 and pt currently on heparin gtt. Order received to stop heparin gtt and start bivalirudin. [address] placed per order. Small amount of clear urine out  1630  Pt c/o pain in back that is chronic, but exacerbated by lying flat. D/w Darcie, Orders received  1800  Current morphine dose not controlling pain. Pt has history of severe pain. New orders received.

## 2019-09-07 ENCOUNTER — APPOINTMENT (OUTPATIENT)
Dept: GENERAL RADIOLOGY | Age: 64
DRG: 215 | End: 2019-09-07
Attending: INTERNAL MEDICINE
Payer: MEDICARE

## 2019-09-07 ENCOUNTER — APPOINTMENT (OUTPATIENT)
Dept: NON INVASIVE DIAGNOSTICS | Age: 64
DRG: 215 | End: 2019-09-07
Attending: INTERNAL MEDICINE
Payer: MEDICARE

## 2019-09-07 LAB
ALBUMIN SERPL-MCNC: 2.4 G/DL (ref 3.5–5)
ALBUMIN/GLOB SERPL: 0.8 {RATIO} (ref 1.1–2.2)
ALP SERPL-CCNC: 53 U/L (ref 45–117)
ALT SERPL-CCNC: 8 U/L (ref 12–78)
ANION GAP SERPL CALC-SCNC: 11 MMOL/L (ref 5–15)
ARTERIAL PATENCY WRIST A: NO
AST SERPL-CCNC: 27 U/L (ref 15–37)
BASE DEFICIT BLD-SCNC: 5 MMOL/L
BDY SITE: ABNORMAL
BILIRUB SERPL-MCNC: 0.6 MG/DL (ref 0.2–1)
BLD PROD TYP BPU: NORMAL
BNP SERPL-MCNC: ABNORMAL PG/ML
BPU ID: NORMAL
BUN SERPL-MCNC: 42 MG/DL (ref 6–20)
BUN/CREAT SERPL: 8 (ref 12–20)
CA-I BLD-SCNC: 1.11 MMOL/L (ref 1.12–1.32)
CALCIUM SERPL-MCNC: 8 MG/DL (ref 8.5–10.1)
CHLORIDE SERPL-SCNC: 102 MMOL/L (ref 97–108)
CO2 SERPL-SCNC: 21 MMOL/L (ref 21–32)
COHGB MFR BLD: 1.3 % (ref 1–2)
CREAT SERPL-MCNC: 5.55 MG/DL (ref 0.7–1.3)
ECHO AV AREA PEAK VELOCITY: 0.9 CM2
ECHO AV AREA VTI: 1 CM2
ECHO AV AREA/BSA PEAK VELOCITY: 0.5 CM2/M2
ECHO AV AREA/BSA VTI: 0.5 CM2/M2
ECHO AV CUSP MM: 0.92 CM
ECHO AV MEAN GRADIENT: 18.4 MMHG
ECHO AV PEAK GRADIENT: 33.5 MMHG
ECHO AV PEAK VELOCITY: 289.28 CM/S
ECHO AV REGURGITANT PHT: 345.3 CM
ECHO AV VTI: 46.45 CM
ECHO LA MAJOR AXIS: 4.72 CM
ECHO LV E' LATERAL VELOCITY: 8.3 CM/S
ECHO LV E' SEPTAL VELOCITY: 3 CM/S
ECHO LV INTERNAL DIMENSION DIASTOLIC MMODE: 5.89 CM
ECHO LV INTERNAL DIMENSION DIASTOLIC: 5.25 CM (ref 4.2–5.9)
ECHO LV INTERNAL DIMENSION SYSTOLIC MMODE: 4.72 CM
ECHO LV INTERNAL DIMENSION SYSTOLIC: 5.06 CM
ECHO LV IVSD: 1.2 CM (ref 0.6–1)
ECHO LV MASS 2D: 315.5 G (ref 88–224)
ECHO LV MASS INDEX 2D: 172.5 G/M2 (ref 49–115)
ECHO LV POSTERIOR WALL DIASTOLIC: 1.27 CM (ref 0.6–1)
ECHO LVOT CARDIAC OUTPUT: 4 L/MIN
ECHO LVOT DIAM: 2 CM
ECHO LVOT PEAK GRADIENT: 2.5 MMHG
ECHO LVOT PEAK VELOCITY: 78.8 CM/S
ECHO LVOT SV: 46 ML
ECHO LVOT VTI: 14.64 CM
ECHO MV A VELOCITY: 75.04 CM/S
ECHO MV AREA PHT: 3.9 CM2
ECHO MV AREA VTI: 3 CM2
ECHO MV E DECELERATION TIME (DT): 164.5 MS
ECHO MV E POINT SEPTAL SEPARATION (EPSS): 16.2 CM
ECHO MV E VELOCITY: 71.01 CM/S
ECHO MV E/A RATIO: 0.95
ECHO MV E/E' LATERAL: 8.56
ECHO MV E/E' RATIO (AVERAGED): 16.11
ECHO MV E/E' SEPTAL: 23.67
ECHO MV MAX VELOCITY: 78.17 CM/S
ECHO MV MEAN GRADIENT: 1.7 MMHG
ECHO MV PEAK GRADIENT: 2.4 MMHG
ECHO MV PRESSURE HALF TIME (PHT): 56.9 MS
ECHO MV REGURGITANT PEAK GRADIENT: 94.7 MMHG
ECHO MV REGURGITANT PEAK VELOCITY: 486.64 CM/S
ECHO MV REGURGITANT VTIA: 117.64 CM
ECHO MV VTI: 15.19 CM
ECHO PV MAX VELOCITY: 102.37 CM/S
ECHO PV MEAN GRADIENT: 2.3 MMHG
ECHO PV PEAK GRADIENT: 4.2 MMHG
ECHO PV VTI: 13.64 CM
ECHO RV INTERNAL DIMENSION: 2.07 CM
ECHO RV TAPSE: 1.44 CM (ref 1.5–2)
ERYTHROCYTE [DISTWIDTH] IN BLOOD BY AUTOMATED COUNT: 18.6 % (ref 11.5–14.5)
FERRITIN SERPL-MCNC: 5164 NG/ML (ref 26–388)
GAS FLOW.O2 O2 DELIVERY SYS: ABNORMAL L/MIN
GAS FLOW.O2 SETTING OXYMISER: 14 BPM
GLOBULIN SER CALC-MCNC: 3 G/DL (ref 2–4)
GLUCOSE BLD STRIP.AUTO-MCNC: 198 MG/DL (ref 65–100)
GLUCOSE BLD STRIP.AUTO-MCNC: 220 MG/DL (ref 65–100)
GLUCOSE BLD STRIP.AUTO-MCNC: 230 MG/DL (ref 65–100)
GLUCOSE BLD STRIP.AUTO-MCNC: 90 MG/DL (ref 65–100)
GLUCOSE BLD STRIP.AUTO-MCNC: 92 MG/DL (ref 65–100)
GLUCOSE BLD STRIP.AUTO-MCNC: 99 MG/DL (ref 65–100)
GLUCOSE SERPL-MCNC: 137 MG/DL (ref 65–100)
HCO3 BLD-SCNC: 20 MMOL/L (ref 22–26)
HCT VFR BLD AUTO: 28.4 % (ref 36.6–50.3)
HGB BLD OXIMETRY-MCNC: 10.1 G/DL (ref 14–17)
HGB BLD-MCNC: 9.5 G/DL (ref 12.1–17)
IRON SATN MFR SERPL: 57 % (ref 20–50)
IRON SERPL-MCNC: 86 UG/DL (ref 35–150)
LACTATE SERPL-SCNC: 0.7 MMOL/L (ref 0.4–2)
MAGNESIUM SERPL-MCNC: 1.9 MG/DL (ref 1.6–2.4)
MCH RBC QN AUTO: 30.7 PG (ref 26–34)
MCHC RBC AUTO-ENTMCNC: 33.5 G/DL (ref 30–36.5)
MCV RBC AUTO: 91.9 FL (ref 80–99)
METHGB MFR BLD: 0.1 % (ref 0–1.4)
NRBC # BLD: 0 K/UL (ref 0–0.01)
NRBC BLD-RTO: 0 PER 100 WBC
O2/TOTAL GAS SETTING VFR VENT: 50 %
OXYHGB MFR BLD: 74.7 % (ref 94–97)
PCO2 BLD: 34.2 MMHG (ref 35–45)
PEEP RESPIRATORY: 6 CMH2O
PF4 HEPARIN CMPLX AB SER-ACNC: 0.32 OD (ref 0–0.4)
PH BLD: 7.37 [PH] (ref 7.35–7.45)
PISA AR MAX VEL: 329.38 CM/S
PLATELET # BLD AUTO: 72 K/UL (ref 150–400)
PMV BLD AUTO: 11.6 FL (ref 8.9–12.9)
PO2 BLD: 76 MMHG (ref 80–100)
POTASSIUM SERPL-SCNC: 4.1 MMOL/L (ref 3.5–5.1)
PROT SERPL-MCNC: 5.4 G/DL (ref 6.4–8.2)
RBC # BLD AUTO: 3.09 M/UL (ref 4.1–5.7)
SAO2 % BLD: 76 % (ref 95–99)
SAO2 % BLD: 95 % (ref 92–97)
SERVICE CMNT-IMP: ABNORMAL
SERVICE CMNT-IMP: NORMAL
SODIUM SERPL-SCNC: 134 MMOL/L (ref 136–145)
SPECIMEN TYPE: ABNORMAL
STATUS OF UNIT,%ST: NORMAL
T4 FREE SERPL-MCNC: 1.5 NG/DL (ref 0.8–1.5)
TIBC SERPL-MCNC: 151 UG/DL (ref 250–450)
TOTAL RESP. RATE, ITRR: 15
TSH SERPL DL<=0.05 MIU/L-ACNC: 1.86 UIU/ML (ref 0.36–3.74)
UNIT DIVISION, %UDIV: 0
VENTILATION MODE VENT: ABNORMAL
VT SETTING VENT: 500 ML
WBC # BLD AUTO: 6.4 K/UL (ref 4.1–11.1)

## 2019-09-07 PROCEDURE — 87077 CULTURE AEROBIC IDENTIFY: CPT

## 2019-09-07 PROCEDURE — 77030018846 HC SOL IRR STRL H20 ICUM -A

## 2019-09-07 PROCEDURE — 74011250637 HC RX REV CODE- 250/637: Performed by: NURSE PRACTITIONER

## 2019-09-07 PROCEDURE — 74011250637 HC RX REV CODE- 250/637: Performed by: INTERNAL MEDICINE

## 2019-09-07 PROCEDURE — 83605 ASSAY OF LACTIC ACID: CPT

## 2019-09-07 PROCEDURE — 82803 BLOOD GASES ANY COMBINATION: CPT

## 2019-09-07 PROCEDURE — 84443 ASSAY THYROID STIM HORMONE: CPT

## 2019-09-07 PROCEDURE — 83880 ASSAY OF NATRIURETIC PEPTIDE: CPT

## 2019-09-07 PROCEDURE — 36415 COLL VENOUS BLD VENIPUNCTURE: CPT

## 2019-09-07 PROCEDURE — 94003 VENT MGMT INPAT SUBQ DAY: CPT

## 2019-09-07 PROCEDURE — 74011250636 HC RX REV CODE- 250/636: Performed by: INTERNAL MEDICINE

## 2019-09-07 PROCEDURE — 85027 COMPLETE CBC AUTOMATED: CPT

## 2019-09-07 PROCEDURE — 74011250636 HC RX REV CODE- 250/636: Performed by: THORACIC SURGERY (CARDIOTHORACIC VASCULAR SURGERY)

## 2019-09-07 PROCEDURE — 83735 ASSAY OF MAGNESIUM: CPT

## 2019-09-07 PROCEDURE — 93306 TTE W/DOPPLER COMPLETE: CPT

## 2019-09-07 PROCEDURE — 74011636637 HC RX REV CODE- 636/637: Performed by: INTERNAL MEDICINE

## 2019-09-07 PROCEDURE — 80053 COMPREHEN METABOLIC PANEL: CPT

## 2019-09-07 PROCEDURE — 87070 CULTURE OTHR SPECIMN AEROBIC: CPT

## 2019-09-07 PROCEDURE — 83540 ASSAY OF IRON: CPT

## 2019-09-07 PROCEDURE — 74011000250 HC RX REV CODE- 250: Performed by: THORACIC SURGERY (CARDIOTHORACIC VASCULAR SURGERY)

## 2019-09-07 PROCEDURE — C9113 INJ PANTOPRAZOLE SODIUM, VIA: HCPCS | Performed by: THORACIC SURGERY (CARDIOTHORACIC VASCULAR SURGERY)

## 2019-09-07 PROCEDURE — 77030012794 HC KT NSL CANN/HGH TRAN -A

## 2019-09-07 PROCEDURE — 84439 ASSAY OF FREE THYROXINE: CPT

## 2019-09-07 PROCEDURE — 65620000000 HC RM CCU GENERAL

## 2019-09-07 PROCEDURE — 74011000258 HC RX REV CODE- 258: Performed by: THORACIC SURGERY (CARDIOTHORACIC VASCULAR SURGERY)

## 2019-09-07 PROCEDURE — 87186 SC STD MICRODIL/AGAR DIL: CPT

## 2019-09-07 PROCEDURE — 71045 X-RAY EXAM CHEST 1 VIEW: CPT

## 2019-09-07 PROCEDURE — 82375 ASSAY CARBOXYHB QUANT: CPT

## 2019-09-07 PROCEDURE — P9035 PLATELET PHERES LEUKOREDUCED: HCPCS

## 2019-09-07 PROCEDURE — 82728 ASSAY OF FERRITIN: CPT

## 2019-09-07 PROCEDURE — 36430 TRANSFUSION BLD/BLD COMPNT: CPT

## 2019-09-07 PROCEDURE — 74011250636 HC RX REV CODE- 250/636: Performed by: NURSE PRACTITIONER

## 2019-09-07 PROCEDURE — 90935 HEMODIALYSIS ONE EVALUATION: CPT

## 2019-09-07 PROCEDURE — P9047 ALBUMIN (HUMAN), 25%, 50ML: HCPCS | Performed by: INTERNAL MEDICINE

## 2019-09-07 PROCEDURE — 82962 GLUCOSE BLOOD TEST: CPT

## 2019-09-07 RX ORDER — LISINOPRIL 5 MG/1
5 TABLET ORAL DAILY
Status: DISCONTINUED | OUTPATIENT
Start: 2019-09-08 | End: 2019-09-08

## 2019-09-07 RX ORDER — SODIUM CHLORIDE 9 MG/ML
250 INJECTION, SOLUTION INTRAVENOUS AS NEEDED
Status: DISCONTINUED | OUTPATIENT
Start: 2019-09-07 | End: 2019-09-08

## 2019-09-07 RX ORDER — GUAIFENESIN 100 MG/5ML
81 LIQUID (ML) ORAL DAILY
Status: DISCONTINUED | OUTPATIENT
Start: 2019-09-08 | End: 2019-09-14 | Stop reason: HOSPADM

## 2019-09-07 RX ADMIN — MORPHINE SULFATE 2 MG: 2 INJECTION, SOLUTION INTRAMUSCULAR; INTRAVENOUS at 20:43

## 2019-09-07 RX ADMIN — Medication 10 ML: at 05:54

## 2019-09-07 RX ADMIN — SODIUM CHLORIDE 40 MG: 9 INJECTION, SOLUTION INTRAMUSCULAR; INTRAVENOUS; SUBCUTANEOUS at 08:46

## 2019-09-07 RX ADMIN — PREGABALIN 50 MG: 50 CAPSULE ORAL at 16:08

## 2019-09-07 RX ADMIN — MORPHINE SULFATE 2 MG: 2 INJECTION, SOLUTION INTRAMUSCULAR; INTRAVENOUS at 12:02

## 2019-09-07 RX ADMIN — ISOSORBIDE MONONITRATE 30 MG: 30 TABLET, EXTENDED RELEASE ORAL at 08:47

## 2019-09-07 RX ADMIN — BUMETANIDE 1 MG: 1 TABLET ORAL at 08:46

## 2019-09-07 RX ADMIN — Medication 10 ML: at 22:32

## 2019-09-07 RX ADMIN — Medication 10 ML: at 13:07

## 2019-09-07 RX ADMIN — PROPOFOL 20 MCG/KG/MIN: 10 INJECTION, EMULSION INTRAVENOUS at 08:44

## 2019-09-07 RX ADMIN — LISINOPRIL 20 MG: 20 TABLET ORAL at 08:47

## 2019-09-07 RX ADMIN — OXYCODONE AND ACETAMINOPHEN 1 TABLET: 5; 325 TABLET ORAL at 22:33

## 2019-09-07 RX ADMIN — DOBUTAMINE IN DEXTROSE 2.5 MCG/KG/MIN: 200 INJECTION, SOLUTION INTRAVENOUS at 16:09

## 2019-09-07 RX ADMIN — SODIUM CHLORIDE 40 MG: 9 INJECTION, SOLUTION INTRAMUSCULAR; INTRAVENOUS; SUBCUTANEOUS at 20:43

## 2019-09-07 RX ADMIN — PREGABALIN 50 MG: 50 CAPSULE ORAL at 08:46

## 2019-09-07 RX ADMIN — DEXMEDETOMIDINE 1 MCG/KG/HR: 100 INJECTION, SOLUTION, CONCENTRATE INTRAVENOUS at 00:12

## 2019-09-07 RX ADMIN — FINASTERIDE 5 MG: 5 TABLET, FILM COATED ORAL at 08:46

## 2019-09-07 RX ADMIN — AMIODARONE HYDROCHLORIDE 400 MG: 200 TABLET ORAL at 20:42

## 2019-09-07 RX ADMIN — AMIODARONE HYDROCHLORIDE 400 MG: 200 TABLET ORAL at 08:46

## 2019-09-07 RX ADMIN — CEFAZOLIN SODIUM 1 G: 1 INJECTION, POWDER, FOR SOLUTION INTRAMUSCULAR; INTRAVENOUS at 08:45

## 2019-09-07 RX ADMIN — ALBUMIN (HUMAN) 12.5 G: 0.25 INJECTION, SOLUTION INTRAVENOUS at 16:07

## 2019-09-07 RX ADMIN — MIRTAZAPINE 30 MG: 15 TABLET, FILM COATED ORAL at 22:30

## 2019-09-07 RX ADMIN — INSULIN LISPRO 3 UNITS: 100 INJECTION, SOLUTION INTRAVENOUS; SUBCUTANEOUS at 13:02

## 2019-09-07 RX ADMIN — OXYCODONE AND ACETAMINOPHEN 1 TABLET: 5; 325 TABLET ORAL at 16:08

## 2019-09-07 RX ADMIN — TICAGRELOR 90 MG: 90 TABLET ORAL at 07:21

## 2019-09-07 RX ADMIN — TICAGRELOR 90 MG: 90 TABLET ORAL at 17:41

## 2019-09-07 RX ADMIN — ASPIRIN 81 MG: 81 TABLET, COATED ORAL at 08:46

## 2019-09-07 RX ADMIN — FLUOXETINE 20 MG: 20 CAPSULE ORAL at 08:47

## 2019-09-07 RX ADMIN — TRAZODONE HYDROCHLORIDE 150 MG: 50 TABLET ORAL at 22:32

## 2019-09-07 RX ADMIN — PRAVASTATIN SODIUM 80 MG: 40 TABLET ORAL at 22:31

## 2019-09-07 RX ADMIN — PREGABALIN 50 MG: 50 CAPSULE ORAL at 22:31

## 2019-09-07 RX ADMIN — INSULIN LISPRO 2 UNITS: 100 INJECTION, SOLUTION INTRAVENOUS; SUBCUTANEOUS at 17:41

## 2019-09-07 NOTE — PROGRESS NOTES
[facility] in [place], [place]  Inpatient Progress Note      Patient name: Liliana Dhillon Sr.  Patient : 1955  Patient MRN: [id number]  Attending MD: Courtney Chi MD  Date of service: 19    CHIEF COMPLAINT:  Cardiogenic shock    24Hr Events  Impella placement and removal  PCI x 3  Extubated this morning  On HD    PLAN:  Continue current infusion of dobutamine 2.5 mcg/kg/min   Would stop dobutamine in the morning if BP stable and liver function is stable  PA cath removed this morning by cardiology  TTE today showed mild MR, EF 40-45%  Cont Cardene for HTN- off currently   Cont Lisinopril  Cont Imdur  Cont PO bumex- will adjust as needed   No beta blockers due to dobutamine use- will resume when off inotrope   Dialysis per nephrology; HD today  Pancytopenia likely due to impella- should improve  Antiplatelet therapy per primary team  Keep K>4 and Mg<2  TFTs WNL  Pulmonary hygiene       IMPRESSION:  Chest pain  Acute systolic heart failure  C/b cardiogenic shock  Coronary artery disease  S/p high risk PCI /s/p 4x STANLEY to of LAD, OM1, LM/LAD/LCx bifurcation with STANLEY  LHC () w/ RCA , 60% oLAD w/ neg FFR (0.88), patent LCx stent w/ mild ISR. Ischemic cardiomyopathy, LVEF 20%  Severe AS and moderate AR by intraop WADE  Prolonged QTc  Right renal artery stenosis s/p 7.0 x 19mm BMS 18  Occluded LCIA. Hyperparathyroidism, PTH in 700s  Hypocalcemia, around 6  Vitamin D deficiency  ESRD on dialysis. Dr Crump   Long term hx of Coronary artery disease s/p multivessel stenting s/p PCI of LCx, PTCA of RCA , PTCA LAD 3/94.  Lexiscan  w/ EF 62%, distal ineroapical infarct w/o ischemia  Cath 2018  No intervention.   Lv EF  -  55%   Peripheral vascular disease s/p left iliac stenting, left SFA stenting 3/00, right iliac stenting .   Status post L AKA  Bilateral carotid stenosis; 16-49%   Hypertension  Diabetes  Chronic Tobacco abuse 1-2 PPD .   Chronic pain  Chronic anemia  Hyperlipidemia  Noncompliance  Peptic ulcer disease w/ GIB '15  Falls  Lives w/ sister in [address] [facility] now    CARDIAC IMAGING:  Echo (4/29/19) LVEF 55%,   EKG (9/4/19) NSR 89bpm,  ms, bifascicular bloc, anterior and inferior infarct, QTC 5.37  LH (9/5/19) Films reviewed from 4/18. Ostial LAD. Occluded RCA. LCx ok. Left common iliac artery occluded.   Cath 9/5 w/ RCA , sev reduced LVEF, severe LM, LAD, LCx disease (Renteria Class 1, 1, 1). Discussed w/ CTS; initially planned for CABG but rescinded due to numerous comorbidities. IABP placed @ 1:1. [place] placed    INTERVAL HISTORY:  Returned from cath lab  Impella and IABP removed  PA 20/10s, CI 1.6  WBC 3.5  HGB 9.6 from 10.8  PLT 49 from 53  APTT 44  Creatinine 4.76  TBili 0.5  Albumin 2.5  HGBA1C 6.6      PHYSICAL EXAM:  Visit Vitals  /42   Pulse 81   Temp 98.5 °F (36.9 °C)   Resp 21   Ht 5' 7\" (1.702 m)   Wt 158 lb (71.7 kg)   SpO2 100%   BMI 24.75 kg/m²     Physical Exam   Constitutional: He is well-developed, well-nourished, and in no distress. No distress. HENT:   Head: Normocephalic. Neck: Normal range of motion. Neck supple. No JVD present. Cardiovascular: Normal rate, regular rhythm, normal heart sounds and intact distal pulses. Pulmonary/Chest: Effort normal and breath sounds normal. No respiratory distress. Abdominal: Soft. He exhibits no distension. Musculoskeletal: Normal range of motion. He exhibits no edema. L AKA   Neurological: He is alert. Skin: Skin is warm and dry. Venous ulcers on BUE and L LE   Nursing note and vitals reviewed. REVIEW OF SYSTEMS:  Review of Systems   Constitutional: Positive for malaise/fatigue. Negative for fever. HENT: Negative. Respiratory: Negative. Cardiovascular: Positive for leg swelling. Negative for chest pain. Gastrointestinal: Negative. Musculoskeletal: Negative. Skin: Negative. Neurological: Negative. Psychiatric/Behavioral: Negative for depression. The patient has insomnia. HISTORY OF PRESENT ILLNESS:  Briefly, this is a 72 y/o WM, tobacco smoker, with h/o CAD, ICM LVEF 20%, ESRD on HD, right AKA who presented with new angina. Found to have severe LM disease and  of prox RCA. Consider too high risk for CABG. Underwent high risk PCI /s/p 4x STANLEY placed to LAD, OM1, LM/LAD/LCx bifurcation with STANLEY with impella 5.0 support. Impella was removed with excellent hemodynamics on dobutamine 5 mcg/kg/min. Patient remains intubated, hypertensive requiring cardene gtt; normal filling pressures and index 1.6. PAST MEDICAL HISTORY:  Past Medical History:   Diagnosis Date    Anemia     Arthritis     back, hips    CAD (coronary artery disease)     Sees Dr. Isadora Porter, 4 heart attacks    Chronic kidney disease     Dr. Rodri Simpson, [facility]     Chronic LBP 1990    Slipped on gravel and fell at work; Multiple surgeries;  Sees Dr. Joeseph Nageotte for pain mgmt    Chronic pain     Confusion     Depression     Diabetes [facility]     ED (erectile dysfunction)     GERD (gastroesophageal reflux disease) 11/2015    Diagnosed [address] last month    Hypercholesteremia     Hypertension     Liver disease     Psychiatric disorder     depression    PVD (peripheral vascular disease) ([facility]     Thromboembolus ([facility] [facility]     DVT in right leg       PAST SURGICAL HISTORY:  Past Surgical History:   Procedure Laterality Date    CARDIAC SURG PROCEDURE UNLIST      PTCA    HX ABOVE KNEE AMPUTATION Left 2013    Left knee stump non-healing ulcer; Dr. Randall Bending Left     HX APPENDECTOMY      HX Schroeder Mariah      Left leg    HX CHOLECYSTECTOMY      HX GI      HX HIP REPLACEMENT      right hip replaced x 2    HX ORTHOPAEDIC  1990s    4 back surgeries    HX ORTHOPAEDIC      donna ankles pin placed    HX ORTHOPAEDIC      left leg 17 surgeries    HX VASCULAR ACCESS Left port left arm    UPPER GI ENDOSCOPY,BIOPSY  9/2/2015         UPPER GI ENDOSCOPY,BIOPSY  10/12/2015         VASCULAR SURGERY PROCEDURE UNLIST      Multiple revascularization procedures, toe amputations       FAMILY HISTORY:  Family History   Problem Relation Age of Onset    Alcohol abuse Father     Diabetes Sister     Diabetes Sister     Lung Disease Mother     Cancer Maternal Grandfather         Head and neck    Cancer Paternal Grandmother         Stomach       SOCIAL HISTORY:  Social History     Socioeconomic History    Marital status:      Spouse name: Not on file    Number of children: Not on file    Years of education: Not on file    Highest education level: Not on file   Occupational History    Occupation: Retired    Tobacco Use    Smoking status: Current Every Day Smoker     Packs/day: 1.00     Years: 35.00     Pack years: 35.00    Smokeless tobacco: Never Used    Tobacco comment: 30 pack years; Occasional cigar   Substance and Sexual Activity    Alcohol use: No     Comment: Former heavy drinker quit drinking about 17 years ago    Drug use: No     Types: Prescription    Sexual activity: Not Currently       LABORATORY RESULTS:     Labs Latest Ref Rng & Units 9/7/2019 9/6/2019 9/6/2019 9/5/2019 9/5/2019 9/4/2019   WBC 4.1 - 11.1 K/uL 6.4 5.8 3.5(L) - 4.9 6.0   RBC 4.10 - 5.70 M/uL 3.09(L) 3.37(L) 3.07(L) - 3.47(L) 3.79(L)   Hemoglobin 12.1 - 17.0 g/dL 9.5(L) 10. 2(L) 9.6(L) - 10. 8(L) 11. 7(L)   Hematocrit 36.6 - 50.3 % 28. 4(L) 31. 3(L) 30. 4(L) - 34. 9(L) 38.4   MCV 80.0 - 99.0 FL 91.9 92.9 99.0 - 100. 6(H) 101. 3(H)   Platelets 032 - 364 K/uL 72(L) 90(L) 49(LL) - 53(L) 55(L)   Lymphocytes 12 - 49 % - - 17 - - 21   Monocytes 5 - 13 % - - 14(H) - - 13   Eosinophils 0 - 7 % - - 4 - - 4   Basophils 0 - 1 % - - 1 - - 1   Albumin 3.5 - 5.0 g/dL 2. 4(L) 2. 3(L) 2. 5(L) 2. 7(L) - 3. 2(L)   Calcium 8.5 - 10.1 MG/DL 8. 0(L) 6. 6(L) 7. 2(L) 7. 8(L) 8.2(L) 8.8   SGOT 15 - 37 U/L 27 29 17 15 - 16   Glucose 65 - 100 mg/dL 137(H) 154(H) 101(H) 79 90 140(H)   BUN 6 - 20 MG/DL 42(H) 37(H) 35(H) 26(H) 75(H) 69(H)   Creatinine 0.70 - 1.30 MG/DL 5.55(H) 5.07(H) 4.76(H) 3.80(H) 7.77(H) 7.22(H)   Sodium 136 - 145 mmol/L 134(L) 135(L) 136 133(L) 139 139   Potassium 3.5 - 5.1 mmol/L 4.1 3.8 3. 3(L) 3. 3(L) 4.0 4.2   TSH 0.36 - 3.74 uIU/mL 1.86 - - - - -   Some recent data might be hidden     Lab Results   Component Value Date/Time    TSH 1.86 09/07/2019 02:20 AM       ALLERGY:  Allergies   Allergen Reactions    Nubain [Nalbuphine] Other (comments)     Made me wild; Dysphoria        CURRENT MEDICATIONS:  Current Facility-Administered Medications   Medication Dose Route Frequency    0.9% sodium chloride infusion 250 mL  250 mL IntraVENous PRN    [START ON 9/9/2019] epoetin calos-epbx (RETACRIT) injection 6,000 Units  6,000 Units SubCUTAneous DIALYSIS MON, WED & FRI    [START ON 9/8/2019] aspirin chewable tablet 81 mg  81 mg Oral DAILY    0.9% sodium chloride infusion 250 mL  250 mL IntraVENous PRN    0.9% sodium chloride infusion 250 mL  250 mL IntraVENous PRN    dexmedeTOMidine (PRECEDEX) 400 mcg in 0.9% sodium chloride 100 mL infusion  0.2-1.4 mcg/kg/hr IntraVENous TITRATE    ticagrelor (BRILINTA) tablet 90 mg  90 mg Oral Q12H    0.9% sodium chloride infusion 250 mL  250 mL IntraVENous PRN    0.9% sodium chloride infusion 250 mL  250 mL IntraVENous PRN    propofol (DIPRIVAN) infusion  0-50 mcg/kg/min IntraVENous TITRATE    0.9% sodium chloride infusion 250 mL  250 mL IntraVENous PRN    vasopressin (VASOSTRICT) 20 Units in 0.9% sodium chloride 100 mL infusion  0.04-0.06 Units/min IntraVENous TITRATE    niCARdipine (CARDENE) 50 mg in 0.9% sodium chloride 100 mL infusion  0-15 mg/hr IntraVENous TITRATE    0.9% sodium chloride infusion 250 mL  250 mL IntraVENous PRN    pantoprazole (PROTONIX) 40 mg in sodium chloride 0.9% 10 mL injection  40 mg IntraVENous Q12H    ceFAZolin (ANCEF) 1 g in 0.9% sodium chloride (MBP/ADV) 50 mL  1 g IntraVENous Q24H    albumin human 25% (BUMINATE) solution 12.5 g  12.5 g IntraVENous DIALYSIS PRN    sodium chloride (NS) flush 5-40 mL  5-40 mL IntraVENous Q8H    sodium chloride (NS) flush 5-40 mL  5-40 mL IntraVENous PRN    sodium phosphate (FLEET'S) enema 1 Enema  1 Enema Rectal PRN    amiodarone (CORDARONE) tablet 400 mg  400 mg Oral Q12H    nitroglycerin (Tridil) 200 mcg/ml infusion  0-200 mcg/min IntraVENous TITRATE    morphine injection 2-4 mg  2-4 mg IntraVENous Q2H PRN    oxyCODONE-acetaminophen (PERCOCET) 5-325 mg per tablet 1-2 Tab  1-2 Tab Oral Q4H PRN    DOBUTamine (DOBUTREX) 500 mg/250 mL (2,000 mcg/mL) infusion  0-10 mcg/kg/min IntraVENous TITRATE    pravastatin (PRAVACHOL) tablet 80 mg  80 mg Oral QHS    isosorbide mononitrate ER (IMDUR) tablet 30 mg  30 mg Oral DAILY    bumetanide (BUMEX) tablet 1 mg  1 mg Oral DAILY    cyclobenzaprine (FLEXERIL) tablet 5 mg  5 mg Oral TID PRN    finasteride (PROSCAR) tablet 5 mg  5 mg Oral DAILY    FLUoxetine (PROzac) capsule 20 mg  20 mg Oral DAILY    lisinopril (PRINIVIL, ZESTRIL) tablet 20 mg  20 mg Oral DAILY    mirtazapine (REMERON) tablet 30 mg  30 mg Oral QHS    nitroglycerin (NITROSTAT) tablet 0.4 mg  0.4 mg SubLINGual Q5MIN PRN    pregabalin (LYRICA) capsule 50 mg  50 mg Oral TID    traZODone (DESYREL) tablet 150 mg  150 mg Oral QHS    insulin lispro (HUMALOG) injection   SubCUTAneous AC&HS    glucose chewable tablet 16 g  4 Tab Oral PRN    glucagon (GLUCAGEN) injection 1 mg  1 mg IntraMUSCular PRN    dextrose 10% infusion 0-250 mL  0-250 mL IntraVENous PRN         Thank you for allowing me to participate in this patient's care.     Daniel Price NP  [address] [place] [address], [address]  Phone: [phone]

## 2019-09-07 NOTE — PROGRESS NOTES
9/7/2019    10:05am    Pt extubated and currently on 11L HFNC SAT 97%. No distress noted. Will continue to monitor.       [place]

## 2019-09-07 NOTE — PROGRESS NOTES
NAME: Jerilyn Gonzalez Sr.        :  1955        MRN:  [id number]        Assessment :    Plan:  --KZSF-[id number]-VQE-[place]  Anemia  CP dyspnea  CAD --Plan for Hd today. Pull fluid as tolerated. Start OWEN. Subjective:     Chief Complaint:  Intubated. Review of Systems:    Symptom Y/N Comments  Symptom Y/N Comments   Fever/Chills    Chest Pain     Poor Appetite    Edema     Cough    Abdominal Pain     Sputum    Joint Pain     SOB/COREA    Pruritis/Rash     Nausea/vomit    Tolerating PT/OT     Diarrhea    Tolerating Diet     Constipation    Other       Could not obtain due to:      Objective:     VITALS:   Last 24hrs VS reviewed since prior progress note. Most recent are:  Visit Vitals  /45   Pulse 89   Temp (!) 101.7 °F (38.7 °C)   Resp 21   Ht 5' 7\" (1.702 m)   Wt 71.7 kg (158 lb 1.1 oz)   SpO2 97%   BMI 24.76 kg/m²       Intake/Output Summary (Last 24 hours) at 2019 0839  Last data filed at 2019 0600  Gross per 24 hour   Intake 1368.55 ml   Output 90 ml   Net 1278.55 ml      Telemetry Reviewed:     PHYSICAL EXAM:  General: intubated. 1+ edema      Lab Data Reviewed: (see below)    Medications Reviewed: (see below)    PMH/SH reviewed - no change compared to H&P  ________________________________________________________________________  Care Plan discussed with:  Patient     Family      RN     Care Manager                    Consultant:          Comments   >50% of visit spent in counseling and coordination of care       ________________________________________________________________________  Julia Mckenzie MD     Procedures: see electronic medical records for all procedures/Xrays and details which  were not copied into this note but were reviewed prior to creation of Plan.       LABS:  Recent Labs     190 19  1707   WBC 6.4 5.8   HGB 9.5* 10.2*   HCT 28.4* 31.3*   PLT 72* 90* Recent Labs     09/07/19 0220 09/06/19 1707 09/06/19  0401   * 135* 136   K 4.1 3.8 3.3*    101 100   CO2 21 21 27   BUN 42* 37* 35*   CREA 5.55* 5.07* 4.76*   * 154* 101*   CA 8.0* 6.6* 7.2*   MG 1.9 1.9  --      Recent Labs     09/07/19 0220 09/06/19 1707 09/06/19  0401   SGOT 27 29 17   AP 53 55 64   TP 5.4* 5.0* 6.1*   ALB 2.4* 2.3* 2.5*   GLOB 3.0 2.7 3.6     Recent Labs     09/06/19 1707 09/06/19 0402 09/05/19 2324 09/05/19  1842   INR 2.9*  --   --   --  1.2*   PTP 28.4*  --   --   --  12.3*   APTT 69.5* 44.3* 110.6*   < > 82.0*    < > = values in this interval not displayed. No results for input(s): FE, TIBC, PSAT, FERR in the last 72 hours. Lab Results   Component Value Date/Time    Folate 7.0 01/06/2016 01:08 PM      No results for input(s): PH, PCO2, PO2 in the last 72 hours. No results for input(s): CPK, CKMB in the last 72 hours.     No lab exists for component: TROPONINI  No components found for: [place]  Lab Results   Component Value Date/Time    Color YELLOW/STRAW 09/05/2019 06:00 PM    Appearance CLEAR 09/05/2019 06:00 PM    Specific gravity 1.027 09/05/2019 06:00 PM    pH (UA) 7.0 09/05/2019 06:00 PM    Protein 100 (A) 09/05/2019 06:00 PM    Glucose NEGATIVE  09/05/2019 06:00 PM    Ketone NEGATIVE  09/05/2019 06:00 PM    Bilirubin NEGATIVE  09/05/2019 06:00 PM    Urobilinogen 1.0 09/05/2019 06:00 PM    Nitrites NEGATIVE  09/05/2019 06:00 PM    Leukocyte Esterase NEGATIVE  09/05/2019 06:00 PM    Epithelial cells FEW 09/05/2019 06:00 PM    Bacteria NEGATIVE  09/05/2019 06:00 PM    WBC 0-4 09/05/2019 06:00 PM    RBC 5-10 09/05/2019 06:00 PM       MEDICATIONS:  Current Facility-Administered Medications   Medication Dose Route Frequency    0.9% sodium chloride infusion 250 mL  250 mL IntraVENous PRN    0.9% sodium chloride infusion 250 mL  250 mL IntraVENous PRN    0.9% sodium chloride infusion 250 mL  250 mL IntraVENous PRN    dexmedeTOMidine (PRECEDEX) 400 mcg in 0.9% sodium chloride 100 mL infusion  0.2-1.4 mcg/kg/hr IntraVENous TITRATE    ticagrelor (BRILINTA) tablet 90 mg  90 mg Oral Q12H    0.9% sodium chloride infusion 250 mL  250 mL IntraVENous PRN    0.9% sodium chloride infusion 250 mL  250 mL IntraVENous PRN    propofol (DIPRIVAN) infusion  0-50 mcg/kg/min IntraVENous TITRATE    0.9% sodium chloride infusion 250 mL  250 mL IntraVENous PRN    vasopressin (VASOSTRICT) 20 Units in 0.9% sodium chloride 100 mL infusion  0.04-0.06 Units/min IntraVENous TITRATE    niCARdipine (CARDENE) 50 mg in 0.9% sodium chloride 100 mL infusion  0-15 mg/hr IntraVENous TITRATE    0.9% sodium chloride infusion 250 mL  250 mL IntraVENous PRN    pantoprazole (PROTONIX) 40 mg in sodium chloride 0.9% 10 mL injection  40 mg IntraVENous Q12H    ceFAZolin (ANCEF) 1 g in 0.9% sodium chloride (MBP/ADV) 50 mL  1 g IntraVENous Q24H    albumin human 25% (BUMINATE) solution 12.5 g  12.5 g IntraVENous DIALYSIS PRN    sodium chloride (NS) flush 5-40 mL  5-40 mL IntraVENous Q8H    sodium chloride (NS) flush 5-40 mL  5-40 mL IntraVENous PRN    sodium phosphate (FLEET'S) enema 1 Enema  1 Enema Rectal PRN    amiodarone (CORDARONE) tablet 400 mg  400 mg Oral Q12H    nitroglycerin (Tridil) 200 mcg/ml infusion  0-200 mcg/min IntraVENous TITRATE    bivalirudin (ANGIOMAX) 250 mg in 0.9% sodium chloride (MBP/ADV) 50 mL  0.075-2.5 mg/kg/hr IntraVENous TITRATE    morphine injection 2-4 mg  2-4 mg IntraVENous Q2H PRN    oxyCODONE-acetaminophen (PERCOCET) 5-325 mg per tablet 1-2 Tab  1-2 Tab Oral Q4H PRN    DOBUTamine (DOBUTREX) 500 mg/250 mL (2,000 mcg/mL) infusion  0-10 mcg/kg/min IntraVENous TITRATE    aspirin delayed-release tablet 81 mg  81 mg Oral DAILY    pravastatin (PRAVACHOL) tablet 80 mg  80 mg Oral QHS    isosorbide mononitrate ER (IMDUR) tablet 30 mg  30 mg Oral DAILY    bumetanide (BUMEX) tablet 1 mg  1 mg Oral DAILY    cyclobenzaprine (FLEXERIL) tablet 5 mg  5 mg Oral TID PRN    finasteride (PROSCAR) tablet 5 mg  5 mg Oral DAILY    FLUoxetine (PROzac) capsule 20 mg  20 mg Oral DAILY    lisinopril (PRINIVIL, ZESTRIL) tablet 20 mg  20 mg Oral DAILY    mirtazapine (REMERON) tablet 30 mg  30 mg Oral QHS    nitroglycerin (NITROSTAT) tablet 0.4 mg  0.4 mg SubLINGual Q5MIN PRN    pregabalin (LYRICA) capsule 50 mg  50 mg Oral TID    traZODone (DESYREL) tablet 150 mg  150 mg Oral QHS    insulin lispro (HUMALOG) injection   SubCUTAneous AC&HS    glucose chewable tablet 16 g  4 Tab Oral PRN    glucagon (GLUCAGEN) injection 1 mg  1 mg IntraMUSCular PRN    dextrose 10% infusion 0-250 mL  0-250 mL IntraVENous PRN

## 2019-09-07 NOTE — PROGRESS NOTES
9/7/2019    INTENSIVIST PROGRESS NOTE:     Patient seen and evaluated, chart reviewed   70 yo male smoker, with ESRD s/p cardiac cath revealing severe 3V CAD, LM disease, severe CMP  Transferred back to ccu post procedure with IABP in place  Now pt in CCU in no severe distress  No acute complaints    ROS: no sob, no cp  9/6- pt is awake and alert. Iabp in place. Sgc in place. Pap 53/22 and mean 34 . Dobutamine 4 ucg . Dr. Moiz Daniel is here and discussing plans  With the pt. To or today for  IMpella and cath with stent . 9/7 impella removed the other day. Passing SBt but large amount of bloody, thick secretions from ETT. Still on IV cardene and IV Dobutamine. Fever > 101.  No t yet cultured    MEDS:   Current Facility-Administered Medications   Medication    0.9% sodium chloride infusion 250 mL    0.9% sodium chloride infusion 250 mL    0.9% sodium chloride infusion 250 mL    dexmedeTOMidine (PRECEDEX) 400 mcg in 0.9% sodium chloride 100 mL infusion    ticagrelor (BRILINTA) tablet 90 mg    0.9% sodium chloride infusion 250 mL    0.9% sodium chloride infusion 250 mL    propofol (DIPRIVAN) infusion    0.9% sodium chloride infusion 250 mL    vasopressin (VASOSTRICT) 20 Units in 0.9% sodium chloride 100 mL infusion    niCARdipine (CARDENE) 50 mg in 0.9% sodium chloride 100 mL infusion    0.9% sodium chloride infusion 250 mL    pantoprazole (PROTONIX) 40 mg in sodium chloride 0.9% 10 mL injection    ceFAZolin (ANCEF) 1 g in 0.9% sodium chloride (MBP/ADV) 50 mL    albumin human 25% (BUMINATE) solution 12.5 g    sodium chloride (NS) flush 5-40 mL    sodium chloride (NS) flush 5-40 mL    sodium phosphate (FLEET'S) enema 1 Enema    amiodarone (CORDARONE) tablet 400 mg    nitroglycerin (Tridil) 200 mcg/ml infusion    bivalirudin (ANGIOMAX) 250 mg in 0.9% sodium chloride (MBP/ADV) 50 mL    morphine injection 2-4 mg    oxyCODONE-acetaminophen (PERCOCET) 5-325 mg per tablet 1-2 Tab    DOBUTamine (DOBUTREX) 500 mg/250 mL (2,000 mcg/mL) infusion    aspirin delayed-release tablet 81 mg    pravastatin (PRAVACHOL) tablet 80 mg    isosorbide mononitrate ER (IMDUR) tablet 30 mg    bumetanide (BUMEX) tablet 1 mg    cyclobenzaprine (FLEXERIL) tablet 5 mg    finasteride (PROSCAR) tablet 5 mg    FLUoxetine (PROzac) capsule 20 mg    lisinopril (PRINIVIL, ZESTRIL) tablet 20 mg    mirtazapine (REMERON) tablet 30 mg    nitroglycerin (NITROSTAT) tablet 0.4 mg    pregabalin (LYRICA) capsule 50 mg    traZODone (DESYREL) tablet 150 mg    insulin lispro (HUMALOG) injection    glucose chewable tablet 16 g    glucagon (GLUCAGEN) injection 1 mg    dextrose 10% infusion 0-250 mL          Visit Vitals  /45   Pulse 89   Temp (!) 101.7 °F (38.7 °C)   Resp 21   Ht 5' 7\" (1.702 m)   Wt 71.7 kg (158 lb 1.1 oz)   SpO2 97%   BMI 24.76 kg/m²       General: no distress. Looks chronically ill. On vent, restrained  Eyes: anicteric  HEENT: dry oral mucosa. edentulous  Neck: FROM  CV: RRR  Lungs: clear ant/lat  Abd: soft  : no flank pain. Gonzalez in with not much out   Ext: no edema- left  [place] . Skin: no rash  Musculoskeletal: normal inspection  Neuro: non focal and appropriately interactive. CXR: RLL atx- linear - sgc ok             Labs:    Recent Labs     09/07/19  0220 09/06/19  1707 09/06/19  0402 09/06/19  0401 09/05/19  2324  09/05/19  1842   WBC 6.4 5.8  --  3.5*  --   --   --    HGB 9.5* 10.2*  --  9.6*  --   --   --    PLT 72* 90*  --  49*  --   --   --    INR  --  2.9*  --   --   --   --  1.2*   APTT  --  69.5* 44.3*  --  110.6*   < > 82.0*    < > = values in this interval not displayed.      Recent Labs     09/07/19  0220 09/06/19  1858 09/06/19  1707 09/06/19  0401   *  --  135* 136   K 4.1  --  3.8 3.3*     --  101 100   CO2 21  --  21 27   *  --  154* 101*   BUN 42*  --  37* 35*   CREA 5.55*  --  5.07* 4.76*   CA 8.0*  --  6.6* 7.2*   MG 1.9  --  1.9  --    LAC 0.7 1.1  --   --    ALB 2.4*  --  2.3* 2.5*   SGOT 27  --  29 17   ALT 8*  --  8* 9*     No results for input(s): PH, PCO2, PO2, HCO3, FIO2 in the last 72 hours.   Recent Labs     09/05/19  0128 09/04/19  1817 09/04/19  1333   TROIQ 0.10* 0.09* 0.09*     No results found for: BNPP, BNP       A/P:  - severe CAD: s/p IABP, impella; s/p CABG   - Cardiogenic shock and ischmeic cardiomyopathy  - thrombocytopenia  - fever- source?- impella out; no change on CXR but now with bloody thick secretions  - NTG drip- now off   - ESRD: RRT per renal  - anemia    - place back on vent support and wathc secretions  -in CXR ok in AM will extuibate after another SBT  - wean inotropes  - glycemic control  - CCU monitoring  - Will assist on disposition planning when stable for transfer      Lennie Feldman MD

## 2019-09-07 NOTE — PROGRESS NOTES
Alert. Recently extubated. Conversant.  8. Nicardipine. /37. Sinus 81/min. CVP 1. PA 39/11. CI 3. SVO2 80. Right brachial pulse palpable. Right infraclavicular wound not swollen. Right groin arterial sheath remains in place. ASA, ticagrelor. Hct 28. Plat 72. Cardiac filling pressures low. Cardiac output good. Hemodynamic management per Dr. Nehemias Juarez.

## 2019-09-07 NOTE — PROGRESS NOTES
9/7/2019    INTENSIVIST PROGRESS NOTE:     Patient seen and evaluated, chart reviewed   70 yo male smoker, with ESRD s/p cardiac cath revealing severe 3V CAD, LM disease, severe CMP  Transferred back to ccu post procedure with IABP in place  Now pt in CCU in no severe distress  No acute complaints    ROS: no sob, no cp  9/6- pt is awake and alert. Iabp in place. Sgc in place. Pap 53/22 and mean 34 . Dobutamine 4 ucg . Dr. Sherif Fernandez is here and discussing plans  With the pt. To or today for  IMpella and cath with stent . 9/7 impella removed the other day. Passing SBt but large amount of bloody, thick secretions from ETT. Still on IV cardene and IV Dobutamine. Fever > 101. No t yet cultured. Discussed with DR. Ray Mendosa. Bedside echo improved.  Sputum cultured    MEDS:   Current Facility-Administered Medications   Medication    0.9% sodium chloride infusion 250 mL    [START ON 9/9/2019] epoetin calos-epbx (RETACRIT) injection 6,000 Units    0.9% sodium chloride infusion 250 mL    0.9% sodium chloride infusion 250 mL    dexmedeTOMidine (PRECEDEX) 400 mcg in 0.9% sodium chloride 100 mL infusion    ticagrelor (BRILINTA) tablet 90 mg    0.9% sodium chloride infusion 250 mL    0.9% sodium chloride infusion 250 mL    propofol (DIPRIVAN) infusion    0.9% sodium chloride infusion 250 mL    vasopressin (VASOSTRICT) 20 Units in 0.9% sodium chloride 100 mL infusion    niCARdipine (CARDENE) 50 mg in 0.9% sodium chloride 100 mL infusion    0.9% sodium chloride infusion 250 mL    pantoprazole (PROTONIX) 40 mg in sodium chloride 0.9% 10 mL injection    ceFAZolin (ANCEF) 1 g in 0.9% sodium chloride (MBP/ADV) 50 mL    albumin human 25% (BUMINATE) solution 12.5 g    sodium chloride (NS) flush 5-40 mL    sodium chloride (NS) flush 5-40 mL    sodium phosphate (FLEET'S) enema 1 Enema    amiodarone (CORDARONE) tablet 400 mg    nitroglycerin (Tridil) 200 mcg/ml infusion    bivalirudin (ANGIOMAX) 250 mg in 0.9% sodium chloride (MBP/ADV) 50 mL    morphine injection 2-4 mg    oxyCODONE-acetaminophen (PERCOCET) 5-325 mg per tablet 1-2 Tab    DOBUTamine (DOBUTREX) 500 mg/250 mL (2,000 mcg/mL) infusion    aspirin delayed-release tablet 81 mg    pravastatin (PRAVACHOL) tablet 80 mg    isosorbide mononitrate ER (IMDUR) tablet 30 mg    bumetanide (BUMEX) tablet 1 mg    cyclobenzaprine (FLEXERIL) tablet 5 mg    finasteride (PROSCAR) tablet 5 mg    FLUoxetine (PROzac) capsule 20 mg    lisinopril (PRINIVIL, ZESTRIL) tablet 20 mg    mirtazapine (REMERON) tablet 30 mg    nitroglycerin (NITROSTAT) tablet 0.4 mg    pregabalin (LYRICA) capsule 50 mg    traZODone (DESYREL) tablet 150 mg    insulin lispro (HUMALOG) injection    glucose chewable tablet 16 g    glucagon (GLUCAGEN) injection 1 mg    dextrose 10% infusion 0-250 mL          Visit Vitals  /45   Pulse 83   Temp (!) 101.7 °F (38.7 °C)   Resp 15   Ht 5' 7\" (1.702 m)   Wt 71.7 kg (158 lb 1.1 oz)   SpO2 97%   BMI 24.76 kg/m²       General: no distress. Looks chronically ill. On vent, restrained  Eyes: anicteric  HEENT: dry oral mucosa. edentulous  Neck: FROM  CV: RRR  Lungs: clear ant/lat  Abd: soft  : no flank pain. Gonzalez in with not much out   Ext: no edema- left  [place] . Skin: no rash  Musculoskeletal: normal inspection  Neuro: non focal and appropriately interactive. CXR: RLL atx- linear - sgc ok             Labs:    Recent Labs     09/07/19  0220 09/06/19  1707 09/06/19  0402 09/06/19  0401 09/05/19  2324  09/05/19  1842   WBC 6.4 5.8  --  3.5*  --   --   --    HGB 9.5* 10.2*  --  9.6*  --   --   --    PLT 72* 90*  --  49*  --   --   --    INR  --  2.9*  --   --   --   --  1.2*   APTT  --  69.5* 44.3*  --  110.6*   < > 82.0*    < > = values in this interval not displayed.      Recent Labs     09/07/19  0220 09/06/19  1858 09/06/19  1707 09/06/19  0401   *  --  135* 136   K 4.1  --  3.8 3.3*     --  101 100   CO2 21  --  21 27   * --  154* 101*   BUN 42*  --  37* 35*   CREA 5.55*  --  5.07* 4.76*   CA 8.0*  --  6.6* 7.2*   MG 1.9  --  1.9  --    LAC 0.7 1.1  --   --    ALB 2.4*  --  2.3* 2.5*   SGOT 27  --  29 17   ALT 8*  --  8* 9*     No results for input(s): PH, PCO2, PO2, HCO3, FIO2 in the last 72 hours.   Recent Labs     09/05/19  0128 09/04/19  1817 09/04/19  1333   TROIQ 0.10* 0.09* 0.09*     No results found for: BNPP, BNP       A/P:  - severe CAD: s/p IABP, impella; s/p CABG   - Cardiogenic shock and ischmeic cardiomyopathy  - thrombocytopenia  - fever- source?- impella out; no change on CXR but now with bloody thick secretions  - NTG drip- now off   - ESRD: RRT per renal  - anemia    - place back on vent support and wathc secretions  -in CXR ok in AM will extuibate after another SBT  - wean inotropes  - glycemic control  - CCU monitoring  - Will assist on disposition planning when stable for transfer      Dario Thompson MD

## 2019-09-07 NOTE — PROCEDURES
[address]  [facility]    Name:  Phuong Mcdaniel  MR#:  [id number]  :  1955  ACCOUNT #:  [de-identified]  DATE OF SERVICE:  2019      SURGEON:  Audrey Blanco MD.    The transesophageal echocardiographic exam was requested by the surgeon in order to evaluate real-time cardiac and valvular form and function in a patient status post Impella insertion and PCI in the cardiac cath lab now pending Impella removal.  The transesophageal echocardiographic probe was easily inserted into the patient's esophagus while the patient was sedated under total intravenous anesthesia. The transesophageal echocardiographic probe met no resistance. Modalities incorporated included 2-D, color-flow mode and continuous wave Doppler. AORTA:    Ascending aorta: The patient's ascending aorta was intact. There was no evidence of dissection. Aortic arch: The aortic arch was intact. There was no evidence of dissection. Descending aorta: The descending aorta was intact. There was no evidence of dissection. VALVES:    Aortic valve: The aortic valve was visualized with the Impella 2.2 cm beyond the annulus. There was moderate aortic insufficiency. It could be visualized with the Impella flow. Mitral valve:  Mitral valve exhibited mild regurgitation. Tricuspid valve: The tricuspid valve exhibited severe regurgitation. VENTRICLES:    Right ventricle: The right ventricular function remains mildly depressed. Left ventricle: The left ventricular ejection fraction appeared to be 25%. There appeared to be a left ventricular thrombus on the end of the Impella device. PERICARDIUM:  The pericardium was normal.  There were no effusions. POST-INTERVENTION FOLLOWUP STUDY:  The aforementioned exam was discussed and viewed with the cardiac surgeon and the cardiologist.  The Impella was removed without event and the thrombus could be visualized on the removed Impella tip.   There was residual moderate aortic insufficiency. There was moderate-to-severe aortic stenosis as was unchanged. There were no effusions, the LV and RV functions remain the same. These results were discussed and viewed with the cardiac surgeon. The transesophageal echocardiographic probe was easily and atraumatically removed from the patient's esophagus. The patient tolerated the procedure without event.       Yesenia Vang DO      [id number]  D:  09/06/2019 15:01  T:  09/06/2019 16:03  JOB #:  [id number]

## 2019-09-07 NOTE — PROGRESS NOTES
09/07/19 0629   ABCDEF Bundle   SBT Safety Screen Passed Yes   SBT Trial Passed Yes   Weaning Parameters   Spontaneous Breathing Trial Complete Yes   Resp Rate Observed 18   Ve 7.9      RSBI 54

## 2019-09-07 NOTE — OP NOTES
[address]  OPERATIVE REPORT    Name:  Nicole Arvizu  MR#:  [id number]  :  1955  ACCOUNT #:  [de-identified]  DATE OF SERVICE:  2019      PREOPERATIVE DIAGNOSIS:  Acute on chronic systolic heart failure class III, status post Impella CP insertion. POSTOPERATIVE DIAGNOSIS:  Acute on chronic systolic heart failure class III, status post Impella CP insertion. PROCEDURE PERFORMED:  1. Removal of Impella CP. 2.  Brian catheterization of right upper extremity. SURGEON:  Blanco Vizcarra MD.    ASSISTANT:  Selina Burris PA-C. ANESTHESIOLOGIST:  Stephen Holguin DO.    ANESTHESIA:  General.    COMPLICATIONS:  None. SPECIMENS REMOVED:  None. IMPLANTS:  None. ESTIMATED BLOOD LOSS:  100 mL. PROCEDURE:  The patient had an Impella CP placed today to support him hemodynamically during high risk PCI. The interventional cardiology team placed 4 stents into his LAD circumflex and through his left main. They had an excellent angiographic result. Hemodynamically, he improved. We were able to remove the Impella and the balloon pump. We verified this with [facility]ADE. The right upper axillary area was prepped and draped in a sterile fashion. The lateral 2 staples and the lateral sutures were removed. The graft was withdrawn from the wound. The Impella CP was withdrawn from the graft and my fingers were used to pinch close the graft. A Brian balloon was then advanced through the graft and down the right upper extremity. No clot was withdrawn with the balloon. We then used a vascular stapler to fire across the 8-0 Dacron graft. We noticed blood emanating up from the corner of the wound. We opened the wound fully to explore this area. We placed several repair stitches around the anastomosis. We noticed there was a diffuse coagulopathy. The ACT was over 250. His platelets were below 50 and he had just received a load of Brilinta and rectal aspirin.   We administered platelets and we placed hemostatic agents and held pressure around the anastomosis. After some time, it appeared hemostatic. We then closed the wound in 3 layers. The patient was then transferred to the CICU.       MD CHASITY [id number]  D:  09/06/2019 16:11  T:  09/06/2019 22:06  JOB #:  [id number]

## 2019-09-07 NOTE — PROGRESS NOTES
Progress Note      9/7/2019 7:46 AM  NAME: George Jeffrey Sr. MRN:  [id number]   Admit Diagnosis: Unstable angina ([facility] [address] [I20.0]  Heart failure ([facility] [address] [I50.9]      Problem List:     1. Chest pain; cath 4/30 w/ RCA , 60% oLAD w/ neg FFR (0.88), patent LCx stent w/ mild ISR. 2. Cardiogenic Shock  3. Indeterminate troponin elevation  4. Right renal artery stenosis s/p 7.0 x 19mm BMS 5/4/18  5. Occluded LCIA. 6. Elevated troponin  7. ESRD on dialysis. Dr Crump   8. Long term hx of Coronary artery disease s/p multivessel stenting s/p PCI of LCx, PTCA of RCA 7/97, PTCA LAD 3/94.  Lexiscan 2/08 w/ EF 62%, distal ineroapical infarct w/o ischemia  9. Cath 4/2018  No intervention. Lv EF  -  55%   10. Peripheral vascular disease s/p left iliac stenting, left SFA stenting 3/00, right iliac stenting 11/99.  11. Status post L AKA  12. Bilateral carotid stenosis; 16-49% 9/13  13. Hypertension  14. Diabetes  15. Chronic Tobacco abuse 1-2 PPD . 16. Chronic pain  17. Chronic anemia  18. Hyperlipidemia  19. Noncompliance  20. Peptic ulcer disease w/ GIB '15  21. Falls  22. Lives w/ sister in [address] [place] [address] now  21. Usual Cardiologist:  Dr. Arleen Whalne / [facility]. Assessment/Plan: TnI 0.1    Films reviewed from 4/18. Ostial LAD. Occluded RCA. LCx ok. Left common iliac artery occluded. Cath 9/5 w/ RCA , sev reduced LVEF, severe LM, LAD, LCx disease (Renteria Class 1, 1, 1). Discussed w/ CTS; initially planned for CABG but rescinded due to numerous comorbidities. IABP placed @ 1:1. [place] placed. Long discussion w/ patient and family. On for Impella 5.0 axillary this AM and then high-risk PCI of LM/LAD/LCx to follow. Risk of death is high; discussed w/ patient and family; pt is willing to accept these risks. Cath 9/6 w/ Impella CP supported PCI of OM1, mLAD, and LM/LAD/LCx bifurcation w/ Culotte technique w/ total of 4 STANLEY    Impella removed in cath lab. Some bleeding afterwards. Got PRBCs and PLT.     Clot noted on distal Impella upon removal (seen on WADE)    Temp 101.7  Passed SBT; some bloody secretions  2.5mcg dobutamine  Cardene off  Vasopressin off  PAd 13  CI 2.7  PLT 72  HgB 9.6    Prelim echo w/ EF 40%, mod AoS, mod AI    1. Continue ASA, ticagrelor  2. Extubate then remove PA catheter  3. PanCx  4. Remove sheath from RCFA today  5. CAMILLE panel pending  6. Hold coreg w/ inotrope; resume when off as able  7. Continue lisinopril 20mg  8. Continue ISMN 30mg  9. Continue statin  10. Volume management per renal; HD today  11. No family here yet  15. Dr. Mejia Castro in background for osteo of right foot; would hold on additional procedures at this time and let him recover    I have spent critical care time involved in lab review, consultations with specialist, family decision-making, and documentation. During this entire length of time I was immediately available to the patient. The reason for providing this level of medical care for this critically ill patient was due to a critical illness that impaired one or more vital organ systems such that there was a high probability of imminent or life threatening deterioration in the patients condition. This care involved high complexity decision making to assess, manipulate, and support vital system functions, to treat this degree of vital organ system failure and to prevent further life threatening deterioration of the patients condition. Total critical care time spent: ~75 mins             [x]       High complexity decision making was performed in this patient at high risk for decompensation with multiple organ involvement. Subjective:     Jevon denies intubated/sedated  Discussed with RN events overnight.      Review of Systems:    Symptom Y/N Comments  Symptom Y/N Comments   Fever/Chills N   Chest Pain N    Poor Appetite N   Edema N    Cough N   Abdominal Pain N    Sputum N   Joint Pain N    SOB/COREA N   Pruritis/Rash N    Nausea/vomit N   Tolerating PT/OT Y Diarrhea N   Tolerating Diet Y    Constipation N   Other       Could NOT obtain due to: sedated     Objective:      Physical Exam:    Last 24hrs VS reviewed since prior progress note. Most recent are:    Visit Vitals  /45   Pulse 83   Temp (!) 101.7 °F (38.7 °C)   Resp 15   Ht 5' 7\" (1.702 m)   Wt 71.7 kg (158 lb 1.1 oz)   SpO2 97%   BMI 24.76 kg/m²       Intake/Output Summary (Last 24 hours) at 9/7/2019 0902  Last data filed at 9/7/2019 0600  Gross per 24 hour   Intake 1368.55 ml   Output 90 ml   Net 1278.55 ml        General Appearance: Well developed, well nourished, intubated/sedated,    no acute distress. Ears/Nose/Mouth/Throat: Hearing grossly normal.  Neck: Supple. Chest: Lungs clear to auscultation bilaterally. Cardiovascular: Regular rate and rhythm, S1S2 normal, no murmur. Abdomen: Soft, non-tender, bowel sounds are active. Extremities: No edema bilaterally. LAKA. Skin: Warm and dry. [x]         Post-cath site without hematoma, bruit, tenderness, or thrill. Distal pulses intact. PMH/SH reviewed - no change compared to H&P    Data Review    Telemetry: sinus rhythm     EKG:   [x]  No new EKG for review    Lab Data Personally Reviewed:    Recent Labs     09/07/19 0220 09/06/19 1707   WBC 6.4 5.8   HGB 9.5* 10.2*   HCT 28.4* 31.3*   PLT 72* 90*     Recent Labs     09/06/19 1707 09/06/19 0402 09/05/19  2324  09/05/19  1842   INR 2.9*  --   --   --  1.2*   PTP 28.4*  --   --   --  12.3*   APTT 69.5* 44.3* 110.6*   < > 82.0*    < > = values in this interval not displayed.       Recent Labs     09/07/19 0220 09/06/19 1707 09/06/19  0401   * 135* 136   K 4.1 3.8 3.3*    101 100   CO2 21 21 27   BUN 42* 37* 35*   CREA 5.55* 5.07* 4.76*   * 154* 101*   CA 8.0* 6.6* 7.2*   MG 1.9 1.9  --      Recent Labs     09/05/19  0128 09/04/19  1817 09/04/19  1333   TROIQ 0.10* 0.09* 0.09*     Lab Results   Component Value Date/Time    Cholesterol, total 236 (H) 04/29/2018 06:00 AM HDL Cholesterol 34 04/29/2018 06:00 AM    LDL,Direct 62 10/03/2014 05:50 AM    LDL, calculated 172.4 (H) 04/29/2018 06:00 AM    Triglyceride 148 04/29/2018 06:00 AM    CHOL/HDL Ratio 6.9 (H) 04/29/2018 06:00 AM       Recent Labs     09/07/19  0220 09/06/19  1707 09/06/19  0401   SGOT 27 29 17   AP 53 55 64   TP 5.4* 5.0* 6.1*   ALB 2.4* 2.3* 2.5*   GLOB 3.0 2.7 3.6     No results for input(s): PH, PCO2, PO2 in the last 72 hours.     Medications Personally Reviewed:    Current Facility-Administered Medications   Medication Dose Route Frequency    0.9% sodium chloride infusion 250 mL  250 mL IntraVENous PRN    [START ON 9/9/2019] epoetin calos-epbx (RETACRIT) injection 6,000 Units  6,000 Units SubCUTAneous DIALYSIS MON, WED & FRI    0.9% sodium chloride infusion 250 mL  250 mL IntraVENous PRN    0.9% sodium chloride infusion 250 mL  250 mL IntraVENous PRN    dexmedeTOMidine (PRECEDEX) 400 mcg in 0.9% sodium chloride 100 mL infusion  0.2-1.4 mcg/kg/hr IntraVENous TITRATE    ticagrelor (BRILINTA) tablet 90 mg  90 mg Oral Q12H    0.9% sodium chloride infusion 250 mL  250 mL IntraVENous PRN    0.9% sodium chloride infusion 250 mL  250 mL IntraVENous PRN    propofol (DIPRIVAN) infusion  0-50 mcg/kg/min IntraVENous TITRATE    0.9% sodium chloride infusion 250 mL  250 mL IntraVENous PRN    vasopressin (VASOSTRICT) 20 Units in 0.9% sodium chloride 100 mL infusion  0.04-0.06 Units/min IntraVENous TITRATE    niCARdipine (CARDENE) 50 mg in 0.9% sodium chloride 100 mL infusion  0-15 mg/hr IntraVENous TITRATE    0.9% sodium chloride infusion 250 mL  250 mL IntraVENous PRN    pantoprazole (PROTONIX) 40 mg in sodium chloride 0.9% 10 mL injection  40 mg IntraVENous Q12H    ceFAZolin (ANCEF) 1 g in 0.9% sodium chloride (MBP/ADV) 50 mL  1 g IntraVENous Q24H    albumin human 25% (BUMINATE) solution 12.5 g  12.5 g IntraVENous DIALYSIS PRN    sodium chloride (NS) flush 5-40 mL  5-40 mL IntraVENous Q8H    sodium chloride (NS) flush 5-40 mL  5-40 mL IntraVENous PRN    sodium phosphate (FLEET'S) enema 1 Enema  1 Enema Rectal PRN    amiodarone (CORDARONE) tablet 400 mg  400 mg Oral Q12H    nitroglycerin (Tridil) 200 mcg/ml infusion  0-200 mcg/min IntraVENous TITRATE    bivalirudin (ANGIOMAX) 250 mg in 0.9% sodium chloride (MBP/ADV) 50 mL  0.075-2.5 mg/kg/hr IntraVENous TITRATE    morphine injection 2-4 mg  2-4 mg IntraVENous Q2H PRN    oxyCODONE-acetaminophen (PERCOCET) 5-325 mg per tablet 1-2 Tab  1-2 Tab Oral Q4H PRN    DOBUTamine (DOBUTREX) 500 mg/250 mL (2,000 mcg/mL) infusion  0-10 mcg/kg/min IntraVENous TITRATE    aspirin delayed-release tablet 81 mg  81 mg Oral DAILY    pravastatin (PRAVACHOL) tablet 80 mg  80 mg Oral QHS    isosorbide mononitrate ER (IMDUR) tablet 30 mg  30 mg Oral DAILY    bumetanide (BUMEX) tablet 1 mg  1 mg Oral DAILY    cyclobenzaprine (FLEXERIL) tablet 5 mg  5 mg Oral TID PRN    finasteride (PROSCAR) tablet 5 mg  5 mg Oral DAILY    FLUoxetine (PROzac) capsule 20 mg  20 mg Oral DAILY    lisinopril (PRINIVIL, ZESTRIL) tablet 20 mg  20 mg Oral DAILY    mirtazapine (REMERON) tablet 30 mg  30 mg Oral QHS    nitroglycerin (NITROSTAT) tablet 0.4 mg  0.4 mg SubLINGual Q5MIN PRN    pregabalin (LYRICA) capsule 50 mg  50 mg Oral TID    traZODone (DESYREL) tablet 150 mg  150 mg Oral QHS    insulin lispro (HUMALOG) injection   SubCUTAneous AC&HS    glucose chewable tablet 16 g  4 Tab Oral PRN    glucagon (GLUCAGEN) injection 1 mg  1 mg IntraMUSCular PRN    dextrose 10% infusion 0-250 mL  0-250 mL IntraVENous PRN         Nkechi Chi III, DO

## 2019-09-07 NOTE — PROGRESS NOTES
Shift Summary:   Pt extubated. Currently on mid-flow O2 sat's >94%. Right femoral Sheath removed by Cath lab RN. One unit of platelets given tolerated transfusion well. SWAN and right radial A-line removed per order post extubation. Respiratory cultures sent. Remains on Dobutamine at 2.5. HD completed. Family updated. 1920: Bedside and Verbal shift change report given to [address] [place] [address]  (oncoming nurse) by Boubacar Alexander RN  (offgoing nurse). Report included the following information SBAR, Kardex, ED Summary, OR Summary, Procedure Summary, Intake/Output, MAR, Accordion, Recent Results, Med Rec Status, Alarm Parameters  and Procedure Verification.

## 2019-09-07 NOTE — PROCEDURES
[facility]  [phone]    Vitals   Pre   Post   Assessment   Pre   Post     Temp  Temp: 98.4 °F (36.9 °C) (09/07/19 1500)  98 LOC  Alert and oriented x4 Alert and oriented x2   HR   Pulse (Heart Rate): 78 (09/07/19 1515) 83 Lungs   Diminished on  NC  diminished on NC   B/P   BP: 109/50 (09/07/19 1515) 100/48 Cardiac   B/p wnl  b/p wnl   Resp   Resp Rate: 20 (09/07/19 1515) 18 Skin   intact  intact   Pain level  Pain Intensity 1: 3 (09/07/19 1400) 0 Edema  Trace in lower ext's     Trace in lower ext's   Orders:    Duration:   Start:    1505 End:   1806 Total:   3hrs   Dialyzer:   Dialyzer/Set Up Inspection: Revaclear (09/07/19 1500)   K Bath:   Dialysate K (mEq/L): 2 (09/07/19 1500)   Ca Bath:   Dialysate CA (mEq/L): 2.5 (09/07/19 1500)   Na/Bicarb:   Dialysate NA (mEq/L): 140 (09/07/19 1500)   Target Fluid Removal:   Goal/Amount of Fluid to Remove (mL): 2000 mL (09/07/19 1500)   Access     Type & Location:   Lt arm fistula, light thrill and light bruit. Area cleaned and accessed with 15g needles x2 without any issues. Good blood flow.  No signs of infection noted   Labs     Obtained/Reviewed   Critical Results Called   Date when labs were drawn-  Hgb-    HGB   Date Value Ref Range Status   09/07/2019 9.5 (L) 12.1 - 17.0 g/dL Final     K-    Potassium   Date Value Ref Range Status   09/07/2019 4.1 3.5 - 5.1 mmol/L Final     Ca-   Calcium   Date Value Ref Range Status   09/07/2019 8.0 (L) 8.5 - 10.1 MG/DL Final     Bun-   BUN   Date Value Ref Range Status   09/07/2019 42 (H) 6 - 20 MG/DL Final     Creat-   Creatinine   Date Value Ref Range Status   09/07/2019 5.55 (H) 0.70 - 1.30 MG/DL Final        Medications/ Blood Products Given     Name   Dose   Route and Time     Albumin 12.5g Given at 1607              Blood Volume Processed (BVP):    64 Net Fluid   Removed:  400ml   Comments   Time Out Done: 1435  Primary Nurse Rpt Pre: Ramírez Baker RN  Primary Nurse Rpt Post: Ramírez Baker RN  Pt Education: ESRD  Care Plan:ESRD  Tx Summary:  5659 Dialysis started  1600 Uf turned off for b/p 81/41 p 78. Primary nurse titrating dobutamine drip and will give albumin when it comes available. 50 ml of Ns given at this time while waiting for albumin  1607 Albumin given for low b/p at this time  1645 b/p wnl Uf turned back on but goal lowered  1700 b/p down to 94'O sytolic, Uf turned off again  1715 b/p wnl, Uf turned back on but goal lowered again  1806 Dialysis completed and blood rinsed back. Needles removed and pressure held till bleeding stopped. Dressing applied to each site, pt stable. Unable to pull target gaol due to low b/p even with titrating dobutamine and giving albumin  Admiting Diagnosis:Heart Failure  Pt's previous clinic- [facility]  Consent signed - Informed Consent Verified: Yes (09/07/19 1500)  [facility] Consent - on file  Hepatitis Status- Immune 1/2/19 from clinic  Machine #- Machine Number: [id number] (09/07/19 1500)  Telemetry status-  Pre-dialysis wt. -

## 2019-09-07 NOTE — PROGRESS NOTES
Hospitalist Progress Note    NAME: Jimmy Schumacher Sr.   :  1955   MRN:  [id number]       Assessment / Plan:  Unstable angina in settings of CAD with h/o multiple stents in the past   HTN   -Card cath  with severe disease,  Poor CABG candidate   Cath  w/ Impella CP supported PCI of OM1, mLAD, and LM/LAD/LCx bifurcation w/               Culotte technique w/ total of 4 STANLEY               Impella removed in cath lab. Some bleeding afterwards. Got PRBCs and PLT. Clot noted on distal Impella upon removal (seen on WADE)  -Cardiology/pulmonology help appreciated   -cont home Coreg and lisinopril   Cont ASA    DM type II with  Nephropathy  -BS low side   -Hold home Tradjenta   -c/w SS as needed     Thrombocytopenia  -Plt at baseline normal. Admission 53  Heparin gtt was stopped   On angiomax now   -follow HIT     ESRD   -HD TThSa   -Renal help appreciated. Renal consult for HD. Renally dose Rx as appropriate. PAD  Multiple small wounds    -Patient is scheduled to have a right second toe amputation on 10/1/2019  -Continue aspirin and Plavix  -consult wound care    L AKA   Depression, home trazodone  H/o R renal artery stenosis, s/p stent 2018                  Code Status: full   Surrogate Decision Maker: Wife Elidia Salas  DVT Prophylaxis: Scd's pending card cath         Baseline: lives at home with wife and his son; independent   Recommended Disposition: TBD     Subjective:     Chief Complaint / Reason for Physician Visit: following unstable angina   In ICU. He just was extubated   No CP   Fever 101     Discussed with RN events overnight.      Review of Systems:  Symptom Y/N Comments  Symptom Y/N Comments   Fever/Chills    Chest Pain n    Poor Appetite    Edema     Cough    Abdominal Pain     Sputum    Joint Pain     SOB/COREA n   Pruritis/Rash     Nausea/vomit    Tolerating PT/OT     Diarrhea    Tolerating Diet     Constipation    Other       Could NOT obtain due to:      Objective: VITALS:   Last 24hrs VS reviewed since prior progress note.  Most recent are:  Patient Vitals for the past 24 hrs:   Temp Pulse Resp BP SpO2   09/07/19 1236  80 21  95 %   09/07/19 1230  82 20 (!) 96/39 96 %   09/07/19 1215  86 20  97 %   09/07/19 1140 98.5 °F (36.9 °C) 81 21  100 %   09/07/19 1135  83 20  98 %   09/07/19 1130 98.4 °F (36.9 °C) 82 16 101/42 100 %   09/07/19 1115  86 12  98 %   09/07/19 1110 98.5 °F (36.9 °C) 82 24  97 %   09/07/19 1105  82 13  98 %   09/07/19 1100 98.5 °F (36.9 °C) 84 (!) 0 (!) 95/35 98 %   09/07/19 1019    123/48    09/07/19 1018     97 %   09/07/19 1000  (!) 102 23 (!) 88/59    09/07/19 0900 99.8 °F (37.7 °C) 82 17 97/42 97 %   09/07/19 0845  83 15  97 %   09/07/19 0815  91 19  97 %   09/07/19 0813  89 21  97 %   09/07/19 0800  93 21  98 %   09/07/19 0730  84 18 123/48 97 %   09/07/19 0700 (!) 101.7 °F (38.7 °C) 83 23 119/45 97 %   09/07/19 0600  80 14 114/47 96 %   09/07/19 0430  80 16 117/43 97 %   09/07/19 0400  80 17 112/44 96 %   09/07/19 0350  80 14  95 %   09/07/19 0300 (!) 100.7 °F (38.2 °C) 80 16 108/43 95 %   09/07/19 0200  80 16 110/44 95 %   09/07/19 0100 100 °F (37.8 °C) 79 15 106/43 96 %   09/07/19 0000 99.8 °F (37.7 °C) 79 16  95 %   09/06/19 2330  76 17 111/41 95 %   09/06/19 2319  76 15  94 %   09/06/19 2300  75 14 107/44 97 %   09/06/19 2230  74 14 105/44 93 %   09/06/19 2200 98.9 °F (37.2 °C) 74 12 103/45 93 %   09/06/19 2130  72 16 100/42 93 %   09/06/19 2100  73 17 98/42 93 %   09/06/19 2000 98.1 °F (36.7 °C) 64 14 (!) 88/39 95 %   09/06/19 1930  62 14 (!) 83/39 95 %   09/06/19 1907  63 15  93 %   09/06/19 1900 97.2 °F (36.2 °C) 62 14 (!) 97/38 96 %   09/06/19 1851  63 14  95 %   09/06/19 1818  69 14  93 %   09/06/19 1800 96.6 °F (35.9 °C) 62 16 103/41 100 %   09/06/19 1745 96.4 °F (35.8 °C) 62 16  99 %   09/06/19 1730 96.4 °F (35.8 °C) 67 16 93/42 100 %   09/06/19 1715 96.5 °F (35.8 °C) 66 16  100 % 09/06/19 1710 96.5 °F (35.8 °C) 65 16  100 %   09/06/19 1700  65 16 123/48 100 %   09/06/19 1655  65 14  100 %   09/06/19 1640 97.7 °F (36.5 °C) 67 17  100 %   09/06/19 1609 97.9 °F (36.6 °C) 62 13 119/56 99 %       Intake/Output Summary (Last 24 hours) at 9/7/2019 1256  Last data filed at 9/7/2019 1236  Gross per 24 hour   Intake 1591.05 ml   Output 110 ml   Net 1481.05 ml        PHYSICAL EXAM:  General: WD, WN. Confused some , just got extubated. Cooperative, no acute distress    EENT:  EOMI. Anicteric sclerae. MMM  Resp:  CTA bilaterally, no wheezing or rales. No accessory muscle use  CV:  Regular  rhythm,  No edema. + BL LE chronic venous changes   GI:  Soft, Non distended, Non tender.  +Bowel sounds  Neurologic:  Alert and oriented X 3, normal speech,   Psych:   Good insight. Not anxious nor agitated  Skin:  No rashes. No jaundice    Reviewed most current lab test results and cultures  YES  Reviewed most current radiology test results   YES  Review and summation of old records today    NO  Reviewed patient's current orders and MAR    YES  PMH/SH reviewed - no change compared to H&P  ________________________________________________________________________  Care Plan discussed with:    Comments   Patient y    Family      RN y    Care Manager     Consultant  y Cardiology Dr Oswaldo Lobo team rounds were held today with , nursing, pharmacist and clinical coordinator. Patient's plan of care was discussed; medications were reviewed and discharge planning was addressed.      ________________________________________________________________________  Total NON critical care TIME:  35  Minutes    Total CRITICAL CARE TIME Spent:   Minutes non procedure based      Comments   >50% of visit spent in counseling and coordination of care     ________________________________________________________________________  Pelon Oliva MD     Procedures: see electronic medical records for all procedures/Xrays and details which were not copied into this note but were reviewed prior to creation of Plan. LABS:  I reviewed today's most current labs and imaging studies.   Pertinent labs include:  Recent Labs     09/07/19 0220 09/06/19 1707 09/06/19 0401   WBC 6.4 5.8 3.5*   HGB 9.5* 10.2* 9.6*   HCT 28.4* 31.3* 30.4*   PLT 72* 90* 49*     Recent Labs     09/07/19 0220 09/06/19 1707 09/06/19 0401 09/05/19  1842   * 135* 136  --    K 4.1 3.8 3.3*  --     101 100  --    CO2 21 21 27  --    * 154* 101*  --    BUN 42* 37* 35*  --    CREA 5.55* 5.07* 4.76*  --    CA 8.0* 6.6* 7.2*  --    MG 1.9 1.9  --   --    ALB 2.4* 2.3* 2.5*  --    TBILI 0.6 0.8 0.5  --    SGOT 27 29 17  --    ALT 8* 8* 9*  --    INR  --  2.9*  --  1.2*       Signed: Nancy Lockhart MD

## 2019-09-07 NOTE — PROGRESS NOTES
[id number] Bedside and Verbal shift change report given to received by Mallorie Chacon, RN (offgoing nurse). Report included the following information SBAR, Kardex, Procedure Summary, Intake/Output, MAR, Recent Results, Cardiac Rhythm sinus with bbb and Alarm Parameters . Assessment completed. Pt intubated and lightly sedated; pupils equal and reactive; pulses present; sheath site soft, no hematoma, Dobutamine and Nicardipine infusing, CI 1.5-1.9, b/p 80's over 30's non-invasive; 90's over 30's invasive; CVP 6-8;  pulmonary pressure 30's over teens; Dobutamine increased; Nicardipine stopped. [id number] CI 2.1-2.4, b/p 98/42  non-invasive; 120/40 invasive; Dobutamine decreased. Patient fighting against vent, pulling on restraints, attempting to sit up, sedation increased. 0000- Reassessment completed; CI 2.6; SVO2 66%; pulmonary pressure 46/17; /41, Dobutamine @8 mcg/kg/min.    2406-2463 order to infuse 1 pck of platelets entered; per blood bank, will call as soon as platelet  Available from [facility].    0600- CI 2.5; SVO2 68; cvp 7-9; Dobutamine @5 mcg/kg/min; platelet will be available approx @ 0715.

## 2019-09-08 ENCOUNTER — APPOINTMENT (OUTPATIENT)
Dept: GENERAL RADIOLOGY | Age: 64
DRG: 215 | End: 2019-09-08
Attending: INTERNAL MEDICINE
Payer: MEDICARE

## 2019-09-08 LAB
ABO + RH BLD: NORMAL
ALBUMIN SERPL-MCNC: 2.5 G/DL (ref 3.5–5)
ALBUMIN/GLOB SERPL: 0.8 {RATIO} (ref 1.1–2.2)
ALP SERPL-CCNC: 52 U/L (ref 45–117)
ALT SERPL-CCNC: <6 U/L (ref 12–78)
ANION GAP SERPL CALC-SCNC: 8 MMOL/L (ref 5–15)
AST SERPL-CCNC: 50 U/L (ref 15–37)
BASOPHILS # BLD: 0 K/UL (ref 0–0.1)
BASOPHILS NFR BLD: 0 % (ref 0–1)
BILIRUB SERPL-MCNC: 0.6 MG/DL (ref 0.2–1)
BLD PROD TYP BPU: NORMAL
BLOOD GROUP ANTIBODIES SERPL: NORMAL
BNP SERPL-MCNC: ABNORMAL PG/ML
BPU ID: NORMAL
BUN SERPL-MCNC: 21 MG/DL (ref 6–20)
BUN/CREAT SERPL: 6 (ref 12–20)
CALCIUM SERPL-MCNC: 7.8 MG/DL (ref 8.5–10.1)
CHLORIDE SERPL-SCNC: 102 MMOL/L (ref 97–108)
CO2 SERPL-SCNC: 28 MMOL/L (ref 21–32)
CREAT SERPL-MCNC: 3.65 MG/DL (ref 0.7–1.3)
CROSSMATCH RESULT,%XM: NORMAL
DIFFERENTIAL METHOD BLD: ABNORMAL
EOSINOPHIL # BLD: 0.2 K/UL (ref 0–0.4)
EOSINOPHIL NFR BLD: 2 % (ref 0–7)
ERYTHROCYTE [DISTWIDTH] IN BLOOD BY AUTOMATED COUNT: 19.1 % (ref 11.5–14.5)
GLOBULIN SER CALC-MCNC: 3.1 G/DL (ref 2–4)
GLUCOSE BLD STRIP.AUTO-MCNC: 118 MG/DL (ref 65–100)
GLUCOSE BLD STRIP.AUTO-MCNC: 97 MG/DL (ref 65–100)
GLUCOSE BLD STRIP.AUTO-MCNC: 97 MG/DL (ref 65–100)
GLUCOSE SERPL-MCNC: 90 MG/DL (ref 65–100)
HCT VFR BLD AUTO: 21.9 % (ref 36.6–50.3)
HGB BLD-MCNC: 7.3 G/DL (ref 12.1–17)
IMM GRANULOCYTES # BLD AUTO: 0 K/UL (ref 0–0.04)
IMM GRANULOCYTES NFR BLD AUTO: 1 % (ref 0–0.5)
LYMPHOCYTES # BLD: 0.2 K/UL (ref 0.8–3.5)
LYMPHOCYTES NFR BLD: 3 % (ref 12–49)
MCH RBC QN AUTO: 31.3 PG (ref 26–34)
MCHC RBC AUTO-ENTMCNC: 33.3 G/DL (ref 30–36.5)
MCV RBC AUTO: 94 FL (ref 80–99)
MONOCYTES # BLD: 0.5 K/UL (ref 0–1)
MONOCYTES NFR BLD: 7 % (ref 5–13)
NEUTS SEG # BLD: 5.3 K/UL (ref 1.8–8)
NEUTS SEG NFR BLD: 86 % (ref 32–75)
NRBC # BLD: 0 K/UL (ref 0–0.01)
NRBC BLD-RTO: 0 PER 100 WBC
PLATELET # BLD AUTO: 72 K/UL (ref 150–400)
PMV BLD AUTO: 11.1 FL (ref 8.9–12.9)
POTASSIUM SERPL-SCNC: 3.2 MMOL/L (ref 3.5–5.1)
PROT SERPL-MCNC: 5.6 G/DL (ref 6.4–8.2)
RBC # BLD AUTO: 2.33 M/UL (ref 4.1–5.7)
SERVICE CMNT-IMP: ABNORMAL
SERVICE CMNT-IMP: NORMAL
SERVICE CMNT-IMP: NORMAL
SODIUM SERPL-SCNC: 138 MMOL/L (ref 136–145)
SPECIMEN EXP DATE BLD: NORMAL
STATUS OF UNIT,%ST: NORMAL
UNIT DIVISION, %UDIV: 0
WBC # BLD AUTO: 6.2 K/UL (ref 4.1–11.1)

## 2019-09-08 PROCEDURE — 74011250637 HC RX REV CODE- 250/637: Performed by: NURSE PRACTITIONER

## 2019-09-08 PROCEDURE — 74011250636 HC RX REV CODE- 250/636: Performed by: THORACIC SURGERY (CARDIOTHORACIC VASCULAR SURGERY)

## 2019-09-08 PROCEDURE — 74011000258 HC RX REV CODE- 258: Performed by: THORACIC SURGERY (CARDIOTHORACIC VASCULAR SURGERY)

## 2019-09-08 PROCEDURE — 86900 BLOOD TYPING SEROLOGIC ABO: CPT

## 2019-09-08 PROCEDURE — 36430 TRANSFUSION BLD/BLD COMPNT: CPT

## 2019-09-08 PROCEDURE — C9113 INJ PANTOPRAZOLE SODIUM, VIA: HCPCS | Performed by: THORACIC SURGERY (CARDIOTHORACIC VASCULAR SURGERY)

## 2019-09-08 PROCEDURE — 86923 COMPATIBILITY TEST ELECTRIC: CPT

## 2019-09-08 PROCEDURE — 36415 COLL VENOUS BLD VENIPUNCTURE: CPT

## 2019-09-08 PROCEDURE — 82542 COL CHROMOTOGRAPHY QUAL/QUAN: CPT

## 2019-09-08 PROCEDURE — 74011250637 HC RX REV CODE- 250/637: Performed by: INTERNAL MEDICINE

## 2019-09-08 PROCEDURE — 82962 GLUCOSE BLOOD TEST: CPT

## 2019-09-08 PROCEDURE — 74011000250 HC RX REV CODE- 250: Performed by: THORACIC SURGERY (CARDIOTHORACIC VASCULAR SURGERY)

## 2019-09-08 PROCEDURE — 80053 COMPREHEN METABOLIC PANEL: CPT

## 2019-09-08 PROCEDURE — 83880 ASSAY OF NATRIURETIC PEPTIDE: CPT

## 2019-09-08 PROCEDURE — 65620000000 HC RM CCU GENERAL

## 2019-09-08 PROCEDURE — 74011250636 HC RX REV CODE- 250/636: Performed by: NURSE PRACTITIONER

## 2019-09-08 PROCEDURE — 71045 X-RAY EXAM CHEST 1 VIEW: CPT

## 2019-09-08 PROCEDURE — 85025 COMPLETE CBC W/AUTO DIFF WBC: CPT

## 2019-09-08 PROCEDURE — 77010033678 HC OXYGEN DAILY

## 2019-09-08 PROCEDURE — P9016 RBC LEUKOCYTES REDUCED: HCPCS

## 2019-09-08 PROCEDURE — 77030026918 HC ADMN ST IV BLD BD -A

## 2019-09-08 RX ORDER — MIDODRINE HYDROCHLORIDE 5 MG/1
5 TABLET ORAL
Status: DISCONTINUED | OUTPATIENT
Start: 2019-09-08 | End: 2019-09-09

## 2019-09-08 RX ORDER — POTASSIUM CHLORIDE 750 MG/1
40 TABLET, FILM COATED, EXTENDED RELEASE ORAL
Status: COMPLETED | OUTPATIENT
Start: 2019-09-08 | End: 2019-09-08

## 2019-09-08 RX ORDER — SODIUM CHLORIDE 9 MG/ML
250 INJECTION, SOLUTION INTRAVENOUS AS NEEDED
Status: DISCONTINUED | OUTPATIENT
Start: 2019-09-08 | End: 2019-09-14 | Stop reason: HOSPADM

## 2019-09-08 RX ORDER — MIDODRINE HYDROCHLORIDE 5 MG/1
5 TABLET ORAL 2 TIMES DAILY WITH MEALS
Status: DISCONTINUED | OUTPATIENT
Start: 2019-09-08 | End: 2019-09-08

## 2019-09-08 RX ORDER — SODIUM CHLORIDE 0.9 % (FLUSH) 0.9 %
SYRINGE (ML) INJECTION
Status: DISPENSED
Start: 2019-09-08 | End: 2019-09-08

## 2019-09-08 RX ADMIN — FINASTERIDE 5 MG: 5 TABLET, FILM COATED ORAL at 09:13

## 2019-09-08 RX ADMIN — POTASSIUM CHLORIDE 40 MEQ: 750 TABLET, EXTENDED RELEASE ORAL at 10:42

## 2019-09-08 RX ADMIN — MIRTAZAPINE 30 MG: 15 TABLET, FILM COATED ORAL at 22:03

## 2019-09-08 RX ADMIN — ASPIRIN 81 MG 81 MG: 81 TABLET ORAL at 09:13

## 2019-09-08 RX ADMIN — OXYCODONE AND ACETAMINOPHEN 1 TABLET: 5; 325 TABLET ORAL at 00:21

## 2019-09-08 RX ADMIN — OXYCODONE AND ACETAMINOPHEN 1 TABLET: 5; 325 TABLET ORAL at 18:20

## 2019-09-08 RX ADMIN — SODIUM CHLORIDE 40 MG: 9 INJECTION, SOLUTION INTRAMUSCULAR; INTRAVENOUS; SUBCUTANEOUS at 21:58

## 2019-09-08 RX ADMIN — OXYCODONE AND ACETAMINOPHEN 2 TABLET: 5; 325 TABLET ORAL at 09:12

## 2019-09-08 RX ADMIN — PRAVASTATIN SODIUM 80 MG: 40 TABLET ORAL at 22:03

## 2019-09-08 RX ADMIN — TRAZODONE HYDROCHLORIDE 150 MG: 50 TABLET ORAL at 22:03

## 2019-09-08 RX ADMIN — CYCLOBENZAPRINE HYDROCHLORIDE 5 MG: 10 TABLET, FILM COATED ORAL at 09:12

## 2019-09-08 RX ADMIN — Medication 1 AMPULE: at 22:50

## 2019-09-08 RX ADMIN — SODIUM CHLORIDE 40 MG: 9 INJECTION, SOLUTION INTRAMUSCULAR; INTRAVENOUS; SUBCUTANEOUS at 09:17

## 2019-09-08 RX ADMIN — MORPHINE SULFATE 2 MG: 2 INJECTION, SOLUTION INTRAMUSCULAR; INTRAVENOUS at 03:37

## 2019-09-08 RX ADMIN — BUMETANIDE 1 MG: 1 TABLET ORAL at 09:12

## 2019-09-08 RX ADMIN — Medication 10 ML: at 14:53

## 2019-09-08 RX ADMIN — Medication 40 ML: at 22:12

## 2019-09-08 RX ADMIN — PREGABALIN 50 MG: 50 CAPSULE ORAL at 22:03

## 2019-09-08 RX ADMIN — Medication 10 ML: at 05:10

## 2019-09-08 RX ADMIN — CEFAZOLIN SODIUM 1 G: 1 INJECTION, POWDER, FOR SOLUTION INTRAMUSCULAR; INTRAVENOUS at 09:16

## 2019-09-08 RX ADMIN — TICAGRELOR 90 MG: 90 TABLET ORAL at 17:42

## 2019-09-08 RX ADMIN — MIDODRINE HYDROCHLORIDE 5 MG: 5 TABLET ORAL at 18:20

## 2019-09-08 RX ADMIN — FLUOXETINE 20 MG: 20 CAPSULE ORAL at 09:12

## 2019-09-08 RX ADMIN — Medication 1 AMPULE: at 05:17

## 2019-09-08 RX ADMIN — OXYCODONE AND ACETAMINOPHEN 1 TABLET: 5; 325 TABLET ORAL at 14:53

## 2019-09-08 RX ADMIN — PREGABALIN 50 MG: 50 CAPSULE ORAL at 17:42

## 2019-09-08 RX ADMIN — PREGABALIN 50 MG: 50 CAPSULE ORAL at 09:13

## 2019-09-08 RX ADMIN — TICAGRELOR 90 MG: 90 TABLET ORAL at 05:10

## 2019-09-08 NOTE — PROGRESS NOTES
0730 Bedside shift change report given to Nurys Jones (oncoming nurse) by Elvia Castellanos (offgoing nurse). Report included the following information SBAR, Kardex, ED Summary, OR Summary, Procedure Summary, Intake/Output, MAR, Accordion, Recent Results, Med Rec Status, Cardiac Rhythm Sinus Rhythm BBB and Alarm Parameters . 1685 Per Dr Adolfo Jenkins ok for patient's BP to be in the 80s if he is sleeping. 1300 Patient's BP staying 83-90 while asleep and up in chair ok per Dr Adolfo Jenkins. [facility] Per Dr Adolfo Jenkins give Midodrine 5 mg TID with meals, first dose now, dc bumex, place orders for chest xray in am.  1900 Bedside shift change report given to Micheline Aldridge (oncoming nurse) by Nurys Jones (offgoing nurse).  Report included the following information SBAR, Kardex, ED Summary, OR Summary, Procedure Summary, Intake/Output, MAR, Accordion, Recent Results, Med Rec Status, Cardiac Rhythm SR w/ BBB, Alarm Parameters  and Pre Procedure Checklist.

## 2019-09-08 NOTE — PROGRESS NOTES
Progress Note      9/8/2019 7:46 AM  NAME: Jerilyn Gonzalez Sr. MRN:  [id number]   Admit Diagnosis: Unstable angina ([facility] [address] [I20.0]  Heart failure ([facility] [address] [I50.9]      Problem List:     1. [place]; cath 4/30 w/ RCA , 60% oLAD w/ neg FFR (0.88), patent LCx stent w/ mild ISR. 2. Cardiogenic Shock; resolved  3. Indeterminate troponin elevation  4. Right renal artery stenosis s/p 7.0 x 19mm BMS 5/4/18  5. Occluded LCIA. 6. Elevated troponin  7. ESRD on dialysis. Dr Crump   8. Long term hx of Coronary artery disease s/p multivessel stenting s/p PCI of LCx, PTCA of RCA 7/97, PTCA LAD 3/94.  Lexiscan 2/08 w/ EF 62%, distal ineroapical infarct w/o ischemia  9. Cath 4/2018  No intervention. Lv EF  -  55%   10. Peripheral vascular disease s/p left iliac stenting, left SFA stenting 3/00, right iliac stenting 11/99.  11. Status post L AKA  12. Bilateral carotid stenosis; 16-49% 9/13  13. Hypertension  14. Diabetes  15. Chronic Tobacco abuse 1-2 PPD . 16. Chronic pain  17. Chronic anemia  18. Hyperlipidemia  19. Noncompliance  20. Peptic ulcer disease w/ GIB '15  21. Falls  22. Lives w/ sister in [address] [place] [address] now  21. Usual Cardiologist:  Dr. Purvi Shah / Lesly Ojeda. Assessment/Plan: TnI 0.1    Films reviewed from 4/18. Ostial LAD. Occluded RCA. LCx ok. Left common iliac artery occluded. Cath 9/5 w/ RCA , sev reduced LVEF, severe LM, LAD, LCx disease (Renteria Class 1, 1, 1). Discussed w/ CTS; initially planned for CABG but rescinded due to numerous comorbidities. IABP placed @ 1:1. [place] placed. Long discussion w/ patient and family. On for Impella 5.0 axillary this AM and then high-risk PCI of LM/LAD/LCx to follow. Risk of death is high; discussed w/ patient and family; pt is willing to accept these risks. Cath 9/6 w/ Impella CP supported PCI of OM1, mLAD, and LM/LAD/LCx bifurcation w/ Culotte technique w/ total of 4 STANLEY    Impella removed in cath lab. Some bleeding afterwards. Got PRBCs and PLT.     Clot noted on distal Impella upon removal (seen on WADE). No signs of neuro deficits. Echo w/ EF 40-45%, mod AoS, mod AI    Extubated 9/7  A-line out 9/7  PA cath out 9/7  RCFA sheath out 9/7    Remains w/ TLC RIJ    No more fevers    HgB 7.3  PLT 2  K 3.3    1. Continue ASA, ticagrelor  2. CAMILLE Ab NEG; will send BELLA  3. Stop dobutamine  4. Transfuse 2 units PRBC  5. Replete K  6. HD tomorrow  7. Hold lisinopril 20mg  8. Hold ISMN 30mg  9. Hold coreg 3.25mg BID  10. Continue statin  11. Volume management per renal.  Does he need the bumex? 12. No family here yet  15. Will see how he does later and consider transfer out of unit  14. He will need PT/OT for discharge planning  15. Dr. Marga Holley in background for osteo of right foot; would hold on additional procedures at this time and let him recover             [x]       High complexity decision making was performed in this patient at high risk for decompensation with multiple organ involvement. Subjective:     Sean Llamas Sr. denies CP and SOB but says he has pain all over. Discussed with RN events overnight. Review of Systems:    Symptom Y/N Comments  Symptom Y/N Comments   Fever/Chills N   Chest Pain N    Poor Appetite N   Edema N    Cough N   Abdominal Pain N    Sputum N   Joint Pain N    SOB/COREA N   Pruritis/Rash N    Nausea/vomit N   Tolerating PT/OT Y    Diarrhea N   Tolerating Diet Y    Constipation N   Other       Could NOT obtain due to: sedated     Objective:      Physical Exam:    Last 24hrs VS reviewed since prior progress note.  Most recent are:    Visit Vitals  BP 95/46   Pulse 76   Temp 98.2 °F (36.8 °C)   Resp 17   Ht 5' 7\" (1.702 m)   Wt 75.8 kg (167 lb 1.7 oz)   SpO2 100%   BMI 26.17 kg/m²       Intake/Output Summary (Last 24 hours) at 9/8/2019 0845  Last data filed at 9/8/2019 0600  Gross per 24 hour   Intake 755.59 ml   Output 522 ml   Net 233.59 ml        General Appearance: Well developed, well nourished, AnO x 3,    no acute distress. Ears/Nose/Mouth/Throat: Hearing grossly normal.  Neck: Supple. Chest: Lungs clear to auscultation bilaterally. Cardiovascular: Regular rate and rhythm, S1S2 normal, no murmur. Abdomen: Soft, non-tender, bowel sounds are active. Extremities: No edema bilaterally. LAKA. Skin: Warm and dry. [x]         Post-cath site without hematoma, bruit, tenderness, or thrill. Distal pulses intact. PMH/ reviewed - no change compared to H&P    Data Review    Telemetry: sinus rhythm     EKG:   [x]  No new EKG for review    Lab Data Personally Reviewed:    Recent Labs     09/08/19  0508 09/07/19 0220   WBC 6.2 6.4   HGB 7.3* 9.5*   HCT 21.9* 28.4*   PLT 72* 72*     Recent Labs     09/06/19  1707 09/06/19  0402 09/05/19  2324  09/05/19  1842   INR 2.9*  --   --   --  1.2*   PTP 28.4*  --   --   --  12.3*   APTT 69.5* 44.3* 110.6*   < > 82.0*    < > = values in this interval not displayed. Recent Labs     09/08/19 0508 09/07/19 0220 09/06/19 1707    134* 135*   K 3.2* 4.1 3.8    102 101   CO2 28 21 21   BUN 21* 42* 37*   CREA 3.65* 5.55* 5.07*   GLU 90 137* 154*   CA 7.8* 8.0* 6.6*   MG  --  1.9 1.9     No results for input(s): CPK, CKNDX, TROIQ in the last 72 hours. No lab exists for component: CPKMB  Lab Results   Component Value Date/Time    Cholesterol, total 236 (H) 04/29/2018 06:00 AM    HDL Cholesterol 34 04/29/2018 06:00 AM    LDL,Direct 62 10/03/2014 05:50 AM    LDL, calculated 172.4 (H) 04/29/2018 06:00 AM    Triglyceride 148 04/29/2018 06:00 AM    CHOL/HDL Ratio 6.9 (H) 04/29/2018 06:00 AM       Recent Labs     09/08/19  0508 09/07/19  0220 09/06/19  1707   SGOT 50* 27 29   AP 52 53 55   TP 5.6* 5.4* 5.0*   ALB 2.5* 2.4* 2.3*   GLOB 3.1 3.0 2.7     No results for input(s): PH, PCO2, PO2 in the last 72 hours.     Medications Personally Reviewed:    Current Facility-Administered Medications   Medication Dose Route Frequency    0.9% sodium chloride infusion 250 mL  250 mL IntraVENous PRN    potassium chloride SR (KLOR-CON 10) tablet 40 mEq  40 mEq Oral NOW    0.9% sodium chloride infusion 250 mL  250 mL IntraVENous PRN    [START ON 9/9/2019] epoetin calos-epbx (RETACRIT) injection 6,000 Units  6,000 Units SubCUTAneous DIALYSIS MON, WED & FRI    aspirin chewable tablet 81 mg  81 mg Oral DAILY    alcohol 62% (NOZIN) nasal  1 Ampule  1 Ampule Topical Q12H    0.9% sodium chloride infusion 250 mL  250 mL IntraVENous PRN    0.9% sodium chloride infusion 250 mL  250 mL IntraVENous PRN    dexmedeTOMidine (PRECEDEX) 400 mcg in 0.9% sodium chloride 100 mL infusion  0.2-1.4 mcg/kg/hr IntraVENous TITRATE    ticagrelor (BRILINTA) tablet 90 mg  90 mg Oral Q12H    0.9% sodium chloride infusion 250 mL  250 mL IntraVENous PRN    0.9% sodium chloride infusion 250 mL  250 mL IntraVENous PRN    propofol (DIPRIVAN) infusion  0-50 mcg/kg/min IntraVENous TITRATE    0.9% sodium chloride infusion 250 mL  250 mL IntraVENous PRN    vasopressin (VASOSTRICT) 20 Units in 0.9% sodium chloride 100 mL infusion  0.04-0.06 Units/min IntraVENous TITRATE    niCARdipine (CARDENE) 50 mg in 0.9% sodium chloride 100 mL infusion  0-15 mg/hr IntraVENous TITRATE    0.9% sodium chloride infusion 250 mL  250 mL IntraVENous PRN    pantoprazole (PROTONIX) 40 mg in sodium chloride 0.9% 10 mL injection  40 mg IntraVENous Q12H    ceFAZolin (ANCEF) 1 g in 0.9% sodium chloride (MBP/ADV) 50 mL  1 g IntraVENous Q24H    albumin human 25% (BUMINATE) solution 12.5 g  12.5 g IntraVENous DIALYSIS PRN    sodium chloride (NS) flush 5-40 mL  5-40 mL IntraVENous Q8H    sodium chloride (NS) flush 5-40 mL  5-40 mL IntraVENous PRN    sodium phosphate (FLEET'S) enema 1 Enema  1 Enema Rectal PRN    nitroglycerin (Tridil) 200 mcg/ml infusion  0-200 mcg/min IntraVENous TITRATE    morphine injection 2-4 mg  2-4 mg IntraVENous Q2H PRN    oxyCODONE-acetaminophen (PERCOCET) 5-325 mg per tablet 1-2 Tab  1-2 Tab Oral Q4H PRN  DOBUTamine (DOBUTREX) 500 mg/250 mL (2,000 mcg/mL) infusion  0-10 mcg/kg/min IntraVENous TITRATE    pravastatin (PRAVACHOL) tablet 80 mg  80 mg Oral QHS    bumetanide (BUMEX) tablet 1 mg  1 mg Oral DAILY    cyclobenzaprine (FLEXERIL) tablet 5 mg  5 mg Oral TID PRN    finasteride (PROSCAR) tablet 5 mg  5 mg Oral DAILY    FLUoxetine (PROzac) capsule 20 mg  20 mg Oral DAILY    mirtazapine (REMERON) tablet 30 mg  30 mg Oral QHS    nitroglycerin (NITROSTAT) tablet 0.4 mg  0.4 mg SubLINGual Q5MIN PRN    pregabalin (LYRICA) capsule 50 mg  50 mg Oral TID    traZODone (DESYREL) tablet 150 mg  150 mg Oral QHS    insulin lispro (HUMALOG) injection   SubCUTAneous AC&HS    glucose chewable tablet 16 g  4 Tab Oral PRN    glucagon (GLUCAGEN) injection 1 mg  1 mg IntraMUSCular PRN    dextrose 10% infusion 0-250 mL  0-250 mL IntraVENous PRN         Raman Gibbons III, DO Estimated Blood Loss (Cc): minimal

## 2019-09-08 NOTE — PROGRESS NOTES
Hospitalist Progress Note    NAME: Evan Ceja Sr.   :  1955   MRN:  [id number]       Assessment / Plan:  Unstable angina in settings of CAD with h/o multiple stents in the past   HTN   -extubated yesterday and respiratory status stable   -Card cath  with severe disease  Poor CABG candidate   Cath  w/ Impella CP supported PCI of OM1, mLAD, and LM/LAD/LCx bifurcation w/               Culotte technique w/ total of 4 STANLEY               Impella removed in cath lab. Some bleeding afterwards. Got PRBCs and PLT. Clot noted on distal Impella upon removal (seen on WADE)  -Cardiology/pulmonology help appreciated   Cont ASA    DM type II with  Nephropathy  -BS low side this am, was 200 yesterday   -Hold home Tradjenta   -c/w SS as needed     Thrombocytopenia  -Plt at baseline normal. 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 Renal consult for HD. Renally dose Rx as appropriate. PAD  Multiple small wounds    -Patient is scheduled to have a right second toe amputation on 10/1/2019 Dr Chanda Kawasaki   -Continue aspirin and Plavix  -consult wound care    L AKA   Depression, home trazodone  H/o R renal artery stenosis, s/p stent 2018                  Code Status: full   Surrogate Decision Maker: Wife Lesly Sanchez  DVT Prophylaxis: Scd's pending card cath         Baseline: lives at home with wife and his son; independent   Recommended Disposition: TBD     Subjective:     Chief Complaint / Reason for Physician Visit: following unstable angina   C/o sore throat s/p intubation   Sleepy this am     Discussed with RN events overnight.      Review of Systems:  Symptom Y/N Comments  Symptom Y/N Comments   Fever/Chills    Chest Pain n    Poor Appetite    Edema     Cough    Abdominal Pain     Sputum    Joint Pain     SOB/COREA n   Pruritis/Rash     Nausea/vomit    Tolerating PT/OT     Diarrhea    Tolerating Diet     Constipation    Other       Could NOT obtain due to:      Objective:     VITALS:   Last 24hrs VS reviewed since prior progress note.  Most recent are:  Patient Vitals for the past 24 hrs:   Temp Pulse Resp BP SpO2   09/08/19 0910  79 15 95/42 98 %   09/08/19 0800  76 17 95/46 100 %   09/08/19 0700  76 14 (!) 89/42 100 %   09/08/19 0619    97/41    09/08/19 0605    (!) 93/39    09/08/19 0600  78 20 (!) 82/41 100 %   09/08/19 0500 98.2 °F (36.8 °C) 81 22 94/44 99 %   09/08/19 0400  76 13  100 %   09/08/19 0300  82 17 98/48 98 %   09/08/19 0200  83 17 104/40 92 %   09/08/19 0100  84 16 107/53 100 %   09/08/19 0000 98 °F (36.7 °C) 83 18 102/44 100 %   09/07/19 2300  79 21 (!) 89/36 100 %   09/07/19 2230    101/44    09/07/19 2200  81 21 96/46 100 %   09/07/19 2100  86 20 95/43 100 %   09/07/19 2000 98.2 °F (36.8 °C) 87 22 104/48 98 %   09/07/19 1900  85 19 106/48 99 %   09/07/19 1847     98 %   09/07/19 1815 98 °F (36.7 °C) 82 19 111/45 98 %   09/07/19 1800  82 21 100/48 98 %   09/07/19 1745  78 20 (!) 87/46 97 %   09/07/19 1730  75 18 (!) 92/39 96 %   09/07/19 1715  75 10 90/49 96 %   09/07/19 1700  74 17 (!) 88/39 96 %   09/07/19 1645  76 18 101/44 96 %   09/07/19 1630  84 18 96/55 99 %   09/07/19 1615  82 17 94/47 99 %   09/07/19 1600  76 19 (!) 81/41 98 %   09/07/19 1545  78 16 96/46 99 %   09/07/19 1530  77 20 (!) 95/39 98 %   09/07/19 1515  78 20 109/50 99 %   09/07/19 1500 98.4 °F (36.9 °C) 80 20 108/51 99 %   09/07/19 1400  78 22 97/41 98 %   09/07/19 1300  79 19 (!) 92/39 95 %   09/07/19 1236  80 21  95 %   09/07/19 1230  82 20 (!) 96/39 96 %   09/07/19 1215  86 20  97 %   09/07/19 1140 98.5 °F (36.9 °C) 81 21  100 %   09/07/19 1135  83 20  98 %   09/07/19 1130 98.4 °F (36.9 °C) 82 16 101/42 100 %   09/07/19 1115  86 12  98 %   09/07/19 1110 98.5 °F (36.9 °C) 82 24  97 %   09/07/19 1105  82 13  98 %   09/07/19 1100 98.5 °F (36.9 °C) 84 (!) 0 (!) 95/35 98 %   09/07/19 1019    123/48  09/07/19 1018     97 %   09/07/19 1000  (!) 102 23 (!) 88/59        Intake/Output Summary (Last 24 hours) at 9/8/2019 0942  Last data filed at 9/8/2019 0600  Gross per 24 hour   Intake 755.59 ml   Output 522 ml   Net 233.59 ml        PHYSICAL EXAM:  General: WD, WN. Awake/alert. Cooperative, no acute distress    EENT:  EOMI. Anicteric sclerae. MMM  Resp:  CTA bilaterally, no wheezing or rales. No accessory muscle use  CV:  Regular  rhythm,  No edema. + BL LE chronic venous changes   GI:  Soft, Non distended, Non tender.  +Bowel sounds  Neurologic:  Alert and oriented X 3, normal speech,   Psych:   Good insight. Not anxious nor agitated  Skin:  No rashes. No jaundice    Reviewed most current lab test results and cultures  YES  Reviewed most current radiology test results   YES  Review and summation of old records today    NO  Reviewed patient's current orders and MAR    YES  PMH/SH reviewed - no change compared to H&P  ________________________________________________________________________  Care Plan discussed with:    Comments   Patient y    Family      RN y    Care Manager     Consultant                        Multidiciplinary team rounds were held today with , nursing, pharmacist and clinical coordinator. Patient's plan of care was discussed; medications were reviewed and discharge planning was addressed. ________________________________________________________________________  Total NON critical care TIME:  25  Minutes    Total CRITICAL CARE TIME Spent:   Minutes non procedure based      Comments   >50% of visit spent in counseling and coordination of care     ________________________________________________________________________  José Antonio Cordero MD     Procedures: see electronic medical records for all procedures/Xrays and details which were not copied into this note but were reviewed prior to creation of Plan.       LABS:  I reviewed today's most current labs and imaging studies. Pertinent labs include:  Recent Labs     09/08/19  0508 09/07/19 0220 09/06/19 1707   WBC 6.2 6.4 5.8   HGB 7.3* 9.5* 10.2*   HCT 21.9* 28.4* 31.3*   PLT 72* 72* 90*     Recent Labs     09/08/19  0508 09/07/19 0220 09/06/19 1707 09/05/19  1842    134* 135*   < >  --    K 3.2* 4.1 3.8   < >  --     102 101   < >  --    CO2 28 21 21   < >  --    GLU 90 137* 154*   < >  --    BUN 21* 42* 37*   < >  --    CREA 3.65* 5.55* 5.07*   < >  --    CA 7.8* 8.0* 6.6*   < >  --    MG  --  1.9 1.9  --   --    ALB 2.5* 2.4* 2.3*   < >  --    TBILI 0.6 0.6 0.8   < >  --    SGOT 50* 27 29   < >  --    ALT <6* 8* 8*   < >  --    INR  --   --  2.9*  --  1.2*    < > = values in this interval not displayed.        Signed: Armaan Orozco MD

## 2019-09-08 NOTE — PROGRESS NOTES
: Patient requesting pain medicine for chest pain, yet with further assessment, pain is located at previous Impella site. 2 mg morphine given. Family at bedside. : O2 weaned to 4 L NC. One Percocet given for abdominal and back pain. 0025: Patient requesting more pain medicine for chronic back pain. Second Percocet given from earlier dose (ordered dose 1-2 tabs). 9039: 2 mg morphine given for phantom pain in LLE.  0623: H/H lower this morning. Type and Crossmatch . Another one sent to blood bank.

## 2019-09-08 NOTE — PROGRESS NOTES
NAME: Jabier Chi Sr.        :  1955        MRN:  [id number]        Assessment :    Plan:  --[id number]-[id number]-[place]  Anemia  CP dyspnea  CAD --No acute need for HD today. Plan HD in AM.      Started OWEN. Subjective:     Chief Complaint: \"I'm sore all over. \"  No N/V. No dyspnea. Ate a little. Review of Systems:    Symptom Y/N Comments  Symptom Y/N Comments   Fever/Chills    Chest Pain     Poor Appetite    Edema     Cough    Abdominal Pain     Sputum    Joint Pain     SOB/COREA    Pruritis/Rash     Nausea/vomit    Tolerating PT/OT     Diarrhea    Tolerating Diet     Constipation    Other       Could not obtain due to:      Objective:     VITALS:   Last 24hrs VS reviewed since prior progress note. Most recent are:  Visit Vitals  BP 95/46   Pulse 76   Temp 98.2 °F (36.8 °C)   Resp 17   Ht 5' 7\" (1.702 m)   Wt 75.8 kg (167 lb 1.7 oz)   SpO2 100%   BMI 26.17 kg/m²       Intake/Output Summary (Last 24 hours) at 2019 0854  Last data filed at 2019 0600  Gross per 24 hour   Intake 755.59 ml   Output 522 ml   Net 233.59 ml      Telemetry Reviewed:     PHYSICAL EXAM:  General: intubated. 1+ edema      Lab Data Reviewed: (see below)    Medications Reviewed: (see below)    PMH/SH reviewed - no change compared to H&P  ________________________________________________________________________  Care Plan discussed with:  Patient     Family      RN     Care Manager                    Consultant:          Comments   >50% of visit spent in counseling and coordination of care       ________________________________________________________________________  Eric Davis MD     Procedures: see electronic medical records for all procedures/Xrays and details which  were not copied into this note but were reviewed prior to creation of Plan.       LABS:  Recent Labs     19  0508 19  0220   WBC 6.2 6.4   HGB 7.3* 9.5*   HCT 21.9* 28.4*   PLT 72* 72*     Recent Labs     09/08/19  0508 09/07/19 0220 09/06/19 1707    134* 135*   K 3.2* 4.1 3.8    102 101   CO2 28 21 21   BUN 21* 42* 37*   CREA 3.65* 5.55* 5.07*   GLU 90 137* 154*   CA 7.8* 8.0* 6.6*   MG  --  1.9 1.9     Recent Labs     09/08/19  0508 09/07/19 0220 09/06/19 1707   SGOT 50* 27 29   AP 52 53 55   TP 5.6* 5.4* 5.0*   ALB 2.5* 2.4* 2.3*   GLOB 3.1 3.0 2.7     Recent Labs     09/06/19 1707 09/06/19  0402 09/05/19  2324  09/05/19  1842   INR 2.9*  --   --   --  1.2*   PTP 28.4*  --   --   --  12.3*   APTT 69.5* 44.3* 110.6*   < > 82.0*    < > = values in this interval not displayed. Recent Labs     09/07/19 0220   TIBC 151*   PSAT 57*   FERR 5,164*      Lab Results   Component Value Date/Time    Folate 7.0 01/06/2016 01:08 PM      No results for input(s): PH, PCO2, PO2 in the last 72 hours. No results for input(s): CPK, CKMB in the last 72 hours.     No lab exists for component: TROPONINI  No components found for: [place]  Lab Results   Component Value Date/Time    Color YELLOW/STRAW 09/05/2019 06:00 PM    Appearance CLEAR 09/05/2019 06:00 PM    Specific gravity 1.027 09/05/2019 06:00 PM    pH (UA) 7.0 09/05/2019 06:00 PM    Protein 100 (A) 09/05/2019 06:00 PM    Glucose NEGATIVE  09/05/2019 06:00 PM    Ketone NEGATIVE  09/05/2019 06:00 PM    Bilirubin NEGATIVE  09/05/2019 06:00 PM    Urobilinogen 1.0 09/05/2019 06:00 PM    Nitrites NEGATIVE  09/05/2019 06:00 PM    Leukocyte Esterase NEGATIVE  09/05/2019 06:00 PM    Epithelial cells FEW 09/05/2019 06:00 PM    Bacteria NEGATIVE  09/05/2019 06:00 PM    WBC 0-4 09/05/2019 06:00 PM    RBC 5-10 09/05/2019 06:00 PM       MEDICATIONS:  Current Facility-Administered Medications   Medication Dose Route Frequency    0.9% sodium chloride infusion 250 mL  250 mL IntraVENous PRN    potassium chloride SR (KLOR-CON 10) tablet 40 mEq  40 mEq Oral NOW    [START ON 9/9/2019] epoetin calos-epbx (RETACRIT) injection 6,000 Units  6,000 Units SubCUTAneous DIALYSIS MON, WED & FRI    aspirin chewable tablet 81 mg  81 mg Oral DAILY    alcohol 62% (NOZIN) nasal  1 Ampule  1 Ampule Topical Q12H    ticagrelor (BRILINTA) tablet 90 mg  90 mg Oral Q12H    pantoprazole (PROTONIX) 40 mg in sodium chloride 0.9% 10 mL injection  40 mg IntraVENous Q12H    ceFAZolin (ANCEF) 1 g in 0.9% sodium chloride (MBP/ADV) 50 mL  1 g IntraVENous Q24H    albumin human 25% (BUMINATE) solution 12.5 g  12.5 g IntraVENous DIALYSIS PRN    sodium chloride (NS) flush 5-40 mL  5-40 mL IntraVENous Q8H    sodium chloride (NS) flush 5-40 mL  5-40 mL IntraVENous PRN    sodium phosphate (FLEET'S) enema 1 Enema  1 Enema Rectal PRN    oxyCODONE-acetaminophen (PERCOCET) 5-325 mg per tablet 1-2 Tab  1-2 Tab Oral Q4H PRN    pravastatin (PRAVACHOL) tablet 80 mg  80 mg Oral QHS    bumetanide (BUMEX) tablet 1 mg  1 mg Oral DAILY    cyclobenzaprine (FLEXERIL) tablet 5 mg  5 mg Oral TID PRN    finasteride (PROSCAR) tablet 5 mg  5 mg Oral DAILY    FLUoxetine (PROzac) capsule 20 mg  20 mg Oral DAILY    mirtazapine (REMERON) tablet 30 mg  30 mg Oral QHS    nitroglycerin (NITROSTAT) tablet 0.4 mg  0.4 mg SubLINGual Q5MIN PRN    pregabalin (LYRICA) capsule 50 mg  50 mg Oral TID    traZODone (DESYREL) tablet 150 mg  150 mg Oral QHS    insulin lispro (HUMALOG) injection   SubCUTAneous AC&HS    glucose chewable tablet 16 g  4 Tab Oral PRN    glucagon (GLUCAGEN) injection 1 mg  1 mg IntraMUSCular PRN    dextrose 10% infusion 0-250 mL  0-250 mL IntraVENous PRN

## 2019-09-08 NOTE — PROGRESS NOTES
9/8/2019    INTENSIVIST PROGRESS NOTE:     Patient seen and evaluated, chart reviewed   70 yo male smoker, with ESRD s/p cardiac cath revealing severe 3V CAD, LM disease, severe CMP  Transferred back to ccu post procedure with IABP in place  Now pt in CCU in no severe distress  No acute complaints    ROS: no sob, no cp  9/6- pt is awake and alert. Iabp in place. Sgc in place. Pap 53/22 and mean 34 . Dobutamine 4 ucg . Dr. Cara Echols is here and discussing plans  With the pt. To or today for  IMpella and cath with stent . 9/7 impella removed the other day. Passing SBt but large amount of bloody, thick secretions from ETT. Still on IV cardene and IV Dobutamine. Fever > 101. No t yet cultured. Discussed with DR. Margarita Aquino. Bedside echo improved. Sputum cultured    9/8 extubated the other day. Now on low dose IV dobutamine. C/o some back pain. Mild CP. No SOBon NC O2.  No more fever and denied congestion      A/P:  - severe CAD: s/p IABP, impella; s/p CABG   - Cardiogenic shock and ischmeic cardiomyopathy  - fever- source?- impella out; no change on CXR but now with bloody thick secretions  - left AKA  - ESRD: RRT per renal; dialyzed yesterday  - anemia  - thrombocytopenia- HITnegative  - hypokalemia  - MRSA -nares    - CCU another day? - could go to PCU otherwise- if he tolerated blood and being off dobutamine  - BELLA- d/w Dr. Cara Echols  - Follow cultures  - transfusion ordered  - inotropes per cardiology  -RRT per nephrology  - mobilize  - d/c kailash  - glycemic control  - CCU monitoring  - Will assist on disposition planning when stable for transfer    MEDS:   Current Facility-Administered Medications   Medication    0.9% sodium chloride infusion 250 mL    [START ON 9/9/2019] epoetin calos-epbx (RETACRIT) injection 6,000 Units    aspirin chewable tablet 81 mg    lisinopril (PRINIVIL, ZESTRIL) tablet 5 mg    alcohol 62% (NOZIN) nasal  1 Ampule    0.9% sodium chloride infusion 250 mL    0.9% sodium chloride infusion 250 mL    dexmedeTOMidine (PRECEDEX) 400 mcg in 0.9% sodium chloride 100 mL infusion    ticagrelor (BRILINTA) tablet 90 mg    0.9% sodium chloride infusion 250 mL    0.9% sodium chloride infusion 250 mL    propofol (DIPRIVAN) infusion    0.9% sodium chloride infusion 250 mL    vasopressin (VASOSTRICT) 20 Units in 0.9% sodium chloride 100 mL infusion    niCARdipine (CARDENE) 50 mg in 0.9% sodium chloride 100 mL infusion    0.9% sodium chloride infusion 250 mL    pantoprazole (PROTONIX) 40 mg in sodium chloride 0.9% 10 mL injection    ceFAZolin (ANCEF) 1 g in 0.9% sodium chloride (MBP/ADV) 50 mL    albumin human 25% (BUMINATE) solution 12.5 g    sodium chloride (NS) flush 5-40 mL    sodium chloride (NS) flush 5-40 mL    sodium phosphate (FLEET'S) enema 1 Enema    nitroglycerin (Tridil) 200 mcg/ml infusion    morphine injection 2-4 mg    oxyCODONE-acetaminophen (PERCOCET) 5-325 mg per tablet 1-2 Tab    DOBUTamine (DOBUTREX) 500 mg/250 mL (2,000 mcg/mL) infusion    pravastatin (PRAVACHOL) tablet 80 mg    bumetanide (BUMEX) tablet 1 mg    cyclobenzaprine (FLEXERIL) tablet 5 mg    finasteride (PROSCAR) tablet 5 mg    FLUoxetine (PROzac) capsule 20 mg    mirtazapine (REMERON) tablet 30 mg    nitroglycerin (NITROSTAT) tablet 0.4 mg    pregabalin (LYRICA) capsule 50 mg    traZODone (DESYREL) tablet 150 mg    insulin lispro (HUMALOG) injection    glucose chewable tablet 16 g    glucagon (GLUCAGEN) injection 1 mg    dextrose 10% infusion 0-250 mL          Visit Vitals  BP 95/46   Pulse 76   Temp 98.2 °F (36.8 °C)   Resp 17   Ht 5' 7\" (1.702 m)   Wt 75.8 kg (167 lb 1.7 oz)   SpO2 100%   BMI 26.17 kg/m²       General: no distress. Looks chronically ill. On vent, restrained  Eyes: anicteric  HEENT: dry oral mucosa. edentulous  Neck: FROM  CV: RRR  Lungs: clear ant/lat  Abd: soft  : no flank pain. Gonzalez in with not much out   Ext: no edema- left  [place] .    Skin: no rash  Musculoskeletal: normal inspection  Neuro: non focal and appropriately interactive. CXR: RLL atx- linear - sgc ok             Labs:    Recent Labs     09/08/19  0508 09/07/19 0220 09/06/19  1707 09/06/19  0402  09/05/19  2324  09/05/19  1842   WBC 6.2 6.4 5.8  --    < >  --   --   --    HGB 7.3* 9.5* 10.2*  --    < >  --   --   --    PLT 72* 72* 90*  --    < >  --   --   --    INR  --   --  2.9*  --   --   --   --  1.2*   APTT  --   --  69.5* 44.3*  --  110.6*   < > 82.0*    < > = values in this interval not displayed. Recent Labs     09/08/19 0508 09/07/19 0220 09/06/19  1858 09/06/19  1707    134*  --  135*   K 3.2* 4.1  --  3.8    102  --  101   CO2 28 21  --  21   GLU 90 137*  --  154*   BUN 21* 42*  --  37*   CREA 3.65* 5.55*  --  5.07*   CA 7.8* 8.0*  --  6.6*   MG  --  1.9  --  1.9   LAC  --  0.7 1.1  --    ALB 2.5* 2.4*  --  2.3*   SGOT 50* 27  --  29   ALT <6* 8*  --  8*     No results for input(s): PH, PCO2, PO2, HCO3, FIO2 in the last 72 hours. No results for input(s): CPK, CKNDX, TROIQ in the last 72 hours.     No lab exists for component: CPKMB  No results found for: BNPP, BNP     Tevin Silverio MD

## 2019-09-08 NOTE — PROGRESS NOTES
Sitting up in chair, napping. No respiratory distress. /50. HR 77. Off dobutamine. Right infraclavicular wound without undue swelling. Transfusing today (Hct 22).

## 2019-09-08 NOTE — PROGRESS NOTES
[facility] in [place], [place]  Inpatient Progress Note      Patient name: Jabier Chi Sr.  Patient : 1955  Patient MRN: [id number]  Attending MD: Pradip Murray MD  Date of service: 19    CHIEF COMPLAINT:  Cardiogenic shock    24Hr Events  Tolerating HD  Dobutamine weaned off this morning   Hypotensive this am   Hgb 7.3- getting transfused     PLAN:  Dobutamine weaned off per cardiology   Agree with holding ACE/Imdur this morning due to hypotension- resume when appropriate   TTE today showed mild MR, EF 40-45%- this was on 2.5mg of Dobutamine  Hgb 7.3 this morning- receiving 2 units PRBC  Consider repeating TTE off inotropic support  If BP remains low after transfusion, may need vasopressor support. Bumex held today   No beta blockers due to dobutamine use- will resume when BP improved   Dialysis per nephrology; no HD today   Pancytopenia likely due to impella- should improve  Antiplatelet therapy per primary team  Keep K>4 and Mg<2  TFTs WNL  Pulmonary hygiene       IMPRESSION:  Chest pain  Acute systolic heart failure  C/b cardiogenic shock  Coronary artery disease  S/p high risk PCI /s/p 4x STANLEY to of LAD, OM1, LM/LAD/LCx bifurcation with STANLEY  LHC () w/ RCA , 60% oLAD w/ neg FFR (0.88), patent LCx stent w/ mild ISR. Ischemic cardiomyopathy, LVEF 20%  Severe AS and moderate AR by intraop WADE  Prolonged QTc  Right renal artery stenosis s/p 7.0 x 19mm BMS 18  Occluded LCIA. Hyperparathyroidism, PTH in 700s  Hypocalcemia, around 6  Vitamin D deficiency  ESRD on dialysis. Dr Crump   Long term hx of Coronary artery disease s/p multivessel stenting s/p PCI of LCx, PTCA of RCA , PTCA LAD 3/94.  Lexiscan  w/ EF 62%, distal ineroapical infarct w/o ischemia  Cath 2018  No intervention.   Lv EF  -  55%   Peripheral vascular disease s/p left iliac stenting, left SFA stenting 3/00, right iliac stenting .   Status post L AKA  Bilateral carotid stenosis; 16-49% 9/13  Hypertension  Diabetes  Chronic Tobacco abuse 1-2 PPD .   Chronic pain  Chronic anemia  Hyperlipidemia  Noncompliance  Peptic ulcer disease w/ GIB '15  Falls  Lives w/ sister in [address] [place] [address] now    CARDIAC IMAGING:  Echo (4/29/19) LVEF 55%,   EKG (9/4/19) NSR 89bpm,  ms, bifascicular bloc, anterior and inferior infarct, QTC 5.37  LHC (9/5/19) Films reviewed from 4/18. Ostial LAD. Occluded RCA. LCx ok. Left common iliac artery occluded.   Cath 9/5 w/ RCA , sev reduced LVEF, severe LM, LAD, LCx disease (Renteria Class 1, 1, 1). Discussed w/ CTS; initially planned for CABG but rescinded due to numerous comorbidities. IABP placed @ 1:1. Cobden placed      PHYSICAL EXAM:  Visit Vitals  /49   Pulse 77   Temp 98.5 °F (36.9 °C)   Resp 17   Ht 5' 7\" (1.702 m)   Wt 167 lb 1.7 oz (75.8 kg)   SpO2 100%   BMI 26.17 kg/m²     Physical Exam   Constitutional: He is well-developed, well-nourished, and in no distress. No distress. Sleeping in chair but easily awakened   HENT:   Head: Normocephalic. Neck: Normal range of motion. Neck supple. No JVD present. Cardiovascular: Normal rate, regular rhythm, normal heart sounds and intact distal pulses. Pulmonary/Chest: Effort normal and breath sounds normal. No respiratory distress. Abdominal: Soft. He exhibits no distension. Musculoskeletal: Normal range of motion. He exhibits no edema. L AKA   Neurological: He is alert. Skin: Skin is warm and dry. Venous ulcers on BUE and L LE   Nursing note and vitals reviewed. REVIEW OF SYSTEMS:  Review of Systems   Constitutional: Positive for malaise/fatigue. Negative for fever. HENT: Negative. Respiratory: Negative. Cardiovascular: Positive for leg swelling. Negative for chest pain. Gastrointestinal: Negative. Musculoskeletal: Negative. Skin: Negative. Neurological: Negative. Psychiatric/Behavioral: Negative for depression.  The patient has insomnia. HISTORY OF PRESENT ILLNESS:  Briefly, this is a 72 y/o WM, tobacco smoker, with h/o CAD, ICM LVEF 20%, ESRD on HD, right AKA who presented with new angina. Found to have severe LM disease and  of prox RCA. Consider too high risk for CABG. Underwent high risk PCI /s/p 4x STANLEY placed to LAD, OM1, LM/LAD/LCx bifurcation with STANLEY with impella 5.0 support. Impella was removed with excellent hemodynamics on dobutamine 5 mcg/kg/min. Patient remains intubated, hypertensive requiring cardene gtt; normal filling pressures and index 1.6. PAST MEDICAL HISTORY:  Past Medical History:   Diagnosis Date    Anemia     Arthritis     back, hips    CAD (coronary artery disease)     Sees Dr. Lennie Cortez, 4 heart attacks    [facility]     [facility]     Chronic LBP 1990    Slipped on gravel and fell at work; Multiple surgeries;  Sees Dr. Heide Lyn for pain mgmt    Chronic pain     Confusion     Depression     Diabetes ([facility]     ED (erectile dysfunction)     GERD (gastroesophageal reflux disease) 11/2015    Diagnosed [facility]    Hypercholesteremia     Hypertension     Liver disease     Psychiatric disorder     depression    PVD (peripheral vascular disease) ([facility]     Thromboembolus ([facility]     DVT in right leg       PAST SURGICAL HISTORY:  Past Surgical History:   Procedure Laterality Date    CARDIAC SURG PROCEDURE UNLIST      PTCA    HX ABOVE KNEE AMPUTATION Left 2013    Left knee stump non-healing ulcer; Dr. Larry Person Left     HX APPENDECTOMY      HX BELOW KNEE AMPUTATION      Left leg    HX CHOLECYSTECTOMY      HX GI      HX HIP REPLACEMENT      right hip replaced x 2    HX ORTHOPAEDIC  1990s    4 back surgeries    HX ORTHOPAEDIC      donna ankles pin placed    HX ORTHOPAEDIC      left leg 17 surgeries    HX VASCULAR ACCESS Left     port left arm    UPPER GI ENDOSCOPY,BIOPSY  9/2/2015         UPPER GI ENDOSCOPY,BIOPSY  10/12/2015         VASCULAR SURGERY PROCEDURE UNLIST      Multiple revascularization procedures, toe amputations       FAMILY HISTORY:  Family History   Problem Relation Age of Onset    Alcohol abuse Father     Diabetes Sister     Diabetes Sister     Lung Disease Mother     Cancer Maternal Grandfather         Head and neck    Cancer Paternal Grandmother         Stomach       SOCIAL HISTORY:  Social History     Socioeconomic History    Marital status:      Spouse name: Not on file    Number of children: Not on file    Years of education: Not on file    Highest education level: Not on file   Occupational History    Occupation: Retired    Tobacco Use    Smoking status: Current Every Day Smoker     Packs/day: 1.00     Years: 35.00     Pack years: 35.00    Smokeless tobacco: Never Used    Tobacco comment: 30 pack years; Occasional cigar   Substance and Sexual Activity    Alcohol use: No     Comment: Former heavy drinker quit drinking about 17 years ago    Drug use: No     Types: Prescription    Sexual activity: Not Currently       LABORATORY RESULTS:     Labs Latest Ref Rng & Units 9/8/2019 9/7/2019 9/6/2019 9/6/2019 9/5/2019 9/5/2019 9/4/2019   WBC 4.1 - 11.1 K/uL 6.2 6.4 5.8 3.5(L) - 4.9 6.0   RBC 4.10 - 5.70 M/uL 2.33(L) 3.09(L) 3.37(L) 3.07(L) - 3.47(L) 3.79(L)   Hemoglobin 12.1 - 17.0 g/dL 7. 3(L) 9.5(L) 10. 2(L) 9.6(L) - 10. 8(L) 11. 7(L)   Hematocrit 36.6 - 50.3 % 21. 9(L) 28. 4(L) 31. 3(L) 30. 4(L) - 34. 9(L) 38.4   MCV 80.0 - 99.0 FL 94.0 91.9 92.9 99.0 - 100. 6(H) 101. 3(H)   Platelets 312 - 681 K/uL 72(L) 72(L) 90(L) 49(LL) - 53(L) 55(L)   Lymphocytes 12 - 49 % 3(L) - - 17 - - 21   Monocytes 5 - 13 % 7 - - 14(H) - - 13   Eosinophils 0 - 7 % 2 - - 4 - - 4   Basophils 0 - 1 % 0 - - 1 - - 1   Albumin 3.5 - 5.0 g/dL 2. 5(L) 2. 4(L) 2. 3(L) 2. 5(L) 2. 7(L) - 3. 2(L)   Calcium 8.5 - 10.1 MG/DL 7. 8(L) 8.0(L) 6. 6(L) 7. 2(L) 7. 8(L) 8.2(L) 8.8   SGOT 15 - 37 U/L 50(H) 27 29 17 15 - 16 Glucose 65 - 100 mg/dL 90 137(H) 154(H) 101(H) 79 90 140(H)   BUN 6 - 20 MG/DL 21(H) 42(H) 37(H) 35(H) 26(H) 75(H) 69(H)   Creatinine 0.70 - 1.30 MG/DL 3.65(H) 5.55(H) 5.07(H) 4.76(H) 3.80(H) 7.77(H) 7.22(H)   Sodium 136 - 145 mmol/L 138 134(L) 135(L) 136 133(L) 139 139   Potassium 3.5 - 5.1 mmol/L 3.2(L) 4.1 3.8 3. 3(L) 3. 3(L) 4.0 4.2   TSH 0.36 - 3.74 uIU/mL - 1.86 - - - - -   Some recent data might be hidden     Lab Results   Component Value Date/Time    TSH 1.86 09/07/2019 02:20 AM       ALLERGY:  Allergies   Allergen Reactions    Nubain [Nalbuphine] Other (comments)     Made me wild; Dysphoria        CURRENT MEDICATIONS:  Current Facility-Administered Medications   Medication Dose Route Frequency    0.9% sodium chloride infusion 250 mL  250 mL IntraVENous PRN    [START ON 9/9/2019] epoetin calos-epbx (RETACRIT) injection 6,000 Units  6,000 Units SubCUTAneous DIALYSIS MON, WED & FRI    aspirin chewable tablet 81 mg  81 mg Oral DAILY    alcohol 62% (NOZIN) nasal  1 Ampule  1 Ampule Topical Q12H    ticagrelor (BRILINTA) tablet 90 mg  90 mg Oral Q12H    pantoprazole (PROTONIX) 40 mg in sodium chloride 0.9% 10 mL injection  40 mg IntraVENous Q12H    albumin human 25% (BUMINATE) solution 12.5 g  12.5 g IntraVENous DIALYSIS PRN    sodium chloride (NS) flush 5-40 mL  5-40 mL IntraVENous Q8H    sodium chloride (NS) flush 5-40 mL  5-40 mL IntraVENous PRN    sodium phosphate (FLEET'S) enema 1 Enema  1 Enema Rectal PRN    oxyCODONE-acetaminophen (PERCOCET) 5-325 mg per tablet 1-2 Tab  1-2 Tab Oral Q4H PRN    pravastatin (PRAVACHOL) tablet 80 mg  80 mg Oral QHS    bumetanide (BUMEX) tablet 1 mg  1 mg Oral DAILY    cyclobenzaprine (FLEXERIL) tablet 5 mg  5 mg Oral TID PRN    finasteride (PROSCAR) tablet 5 mg  5 mg Oral DAILY    FLUoxetine (PROzac) capsule 20 mg  20 mg Oral DAILY    mirtazapine (REMERON) tablet 30 mg  30 mg Oral QHS    nitroglycerin (NITROSTAT) tablet 0.4 mg  0.4 mg SubLINGual Q5MIN PRN    pregabalin (LYRICA) capsule 50 mg  50 mg Oral TID    traZODone (DESYREL) tablet 150 mg  150 mg Oral QHS    insulin lispro (HUMALOG) injection   SubCUTAneous AC&HS    glucose chewable tablet 16 g  4 Tab Oral PRN    glucagon (GLUCAGEN) injection 1 mg  1 mg IntraMUSCular PRN    dextrose 10% infusion 0-250 mL  0-250 mL IntraVENous PRN         Thank you for allowing me to participate in this patient's care.     Jatinder Wilson NP  [address] [place] [address], [address]  Phone: [phone]

## 2019-09-09 ENCOUNTER — APPOINTMENT (OUTPATIENT)
Dept: GENERAL RADIOLOGY | Age: 64
DRG: 215 | End: 2019-09-09
Attending: INTERNAL MEDICINE
Payer: MEDICARE

## 2019-09-09 LAB
ABO + RH BLD: NORMAL
ALBUMIN SERPL-MCNC: 2.6 G/DL (ref 3.5–5)
ALBUMIN/GLOB SERPL: 0.7 {RATIO} (ref 1.1–2.2)
ALP SERPL-CCNC: 80 U/L (ref 45–117)
ALT SERPL-CCNC: <6 U/L (ref 12–78)
ANION GAP SERPL CALC-SCNC: 7 MMOL/L (ref 5–15)
AST SERPL-CCNC: 63 U/L (ref 15–37)
BASOPHILS # BLD: 0.1 K/UL (ref 0–0.1)
BASOPHILS NFR BLD: 1 % (ref 0–1)
BILIRUB SERPL-MCNC: 0.6 MG/DL (ref 0.2–1)
BLD PROD TYP BPU: NORMAL
BLD PROD TYP BPU: NORMAL
BLOOD GROUP ANTIBODIES SERPL: NORMAL
BPU ID: NORMAL
BPU ID: NORMAL
BUN SERPL-MCNC: 36 MG/DL (ref 6–20)
BUN/CREAT SERPL: 7 (ref 12–20)
CALCIUM SERPL-MCNC: 7.9 MG/DL (ref 8.5–10.1)
CHLORIDE SERPL-SCNC: 101 MMOL/L (ref 97–108)
CO2 SERPL-SCNC: 26 MMOL/L (ref 21–32)
CREAT SERPL-MCNC: 5.11 MG/DL (ref 0.7–1.3)
CROSSMATCH RESULT,%XM: NORMAL
CROSSMATCH RESULT,%XM: NORMAL
DIFFERENTIAL METHOD BLD: ABNORMAL
EOSINOPHIL # BLD: 0.3 K/UL (ref 0–0.4)
EOSINOPHIL NFR BLD: 5 % (ref 0–7)
ERYTHROCYTE [DISTWIDTH] IN BLOOD BY AUTOMATED COUNT: 19.4 % (ref 11.5–14.5)
GLOBULIN SER CALC-MCNC: 3.5 G/DL (ref 2–4)
GLUCOSE BLD STRIP.AUTO-MCNC: 132 MG/DL (ref 65–100)
GLUCOSE BLD STRIP.AUTO-MCNC: 135 MG/DL (ref 65–100)
GLUCOSE BLD STRIP.AUTO-MCNC: 137 MG/DL (ref 65–100)
GLUCOSE BLD STRIP.AUTO-MCNC: 170 MG/DL (ref 65–100)
GLUCOSE BLD STRIP.AUTO-MCNC: 78 MG/DL (ref 65–100)
GLUCOSE BLD STRIP.AUTO-MCNC: 81 MG/DL (ref 65–100)
GLUCOSE SERPL-MCNC: 139 MG/DL (ref 65–100)
HCT VFR BLD AUTO: 28.8 % (ref 36.6–50.3)
HGB BLD-MCNC: 9.7 G/DL (ref 12.1–17)
IMM GRANULOCYTES # BLD AUTO: 0.1 K/UL (ref 0–0.04)
IMM GRANULOCYTES NFR BLD AUTO: 1 % (ref 0–0.5)
LYMPHOCYTES # BLD: 0.4 K/UL (ref 0.8–3.5)
LYMPHOCYTES NFR BLD: 7 % (ref 12–49)
MCH RBC QN AUTO: 30.9 PG (ref 26–34)
MCHC RBC AUTO-ENTMCNC: 33.7 G/DL (ref 30–36.5)
MCV RBC AUTO: 91.7 FL (ref 80–99)
MONOCYTES # BLD: 0.6 K/UL (ref 0–1)
MONOCYTES NFR BLD: 10 % (ref 5–13)
NEUTS SEG # BLD: 4.1 K/UL (ref 1.8–8)
NEUTS SEG NFR BLD: 76 % (ref 32–75)
NRBC # BLD: 0.02 K/UL (ref 0–0.01)
NRBC BLD-RTO: 0.4 PER 100 WBC
PLATELET # BLD AUTO: 71 K/UL (ref 150–400)
PMV BLD AUTO: 10.9 FL (ref 8.9–12.9)
POTASSIUM SERPL-SCNC: 4.3 MMOL/L (ref 3.5–5.1)
PROT SERPL-MCNC: 6.1 G/DL (ref 6.4–8.2)
RBC # BLD AUTO: 3.14 M/UL (ref 4.1–5.7)
RBC MORPH BLD: ABNORMAL
SERVICE CMNT-IMP: ABNORMAL
SERVICE CMNT-IMP: NORMAL
SERVICE CMNT-IMP: NORMAL
SODIUM SERPL-SCNC: 134 MMOL/L (ref 136–145)
SPECIMEN EXP DATE BLD: NORMAL
STATUS OF UNIT,%ST: NORMAL
STATUS OF UNIT,%ST: NORMAL
UNIT DIVISION, %UDIV: 0
UNIT DIVISION, %UDIV: 0
WBC # BLD AUTO: 5.6 K/UL (ref 4.1–11.1)

## 2019-09-09 PROCEDURE — 71045 X-RAY EXAM CHEST 1 VIEW: CPT

## 2019-09-09 PROCEDURE — 74011250637 HC RX REV CODE- 250/637: Performed by: INTERNAL MEDICINE

## 2019-09-09 PROCEDURE — P9047 ALBUMIN (HUMAN), 25%, 50ML: HCPCS | Performed by: INTERNAL MEDICINE

## 2019-09-09 PROCEDURE — 77010033678 HC OXYGEN DAILY

## 2019-09-09 PROCEDURE — C9113 INJ PANTOPRAZOLE SODIUM, VIA: HCPCS | Performed by: THORACIC SURGERY (CARDIOTHORACIC VASCULAR SURGERY)

## 2019-09-09 PROCEDURE — 74011250636 HC RX REV CODE- 250/636: Performed by: THORACIC SURGERY (CARDIOTHORACIC VASCULAR SURGERY)

## 2019-09-09 PROCEDURE — 85025 COMPLETE CBC W/AUTO DIFF WBC: CPT

## 2019-09-09 PROCEDURE — 74011250637 HC RX REV CODE- 250/637: Performed by: NURSE PRACTITIONER

## 2019-09-09 PROCEDURE — 74011250636 HC RX REV CODE- 250/636: Performed by: INTERNAL MEDICINE

## 2019-09-09 PROCEDURE — 94760 N-INVAS EAR/PLS OXIMETRY 1: CPT

## 2019-09-09 PROCEDURE — 90935 HEMODIALYSIS ONE EVALUATION: CPT

## 2019-09-09 PROCEDURE — 80053 COMPREHEN METABOLIC PANEL: CPT

## 2019-09-09 PROCEDURE — 65660000000 HC RM CCU STEPDOWN

## 2019-09-09 PROCEDURE — 36415 COLL VENOUS BLD VENIPUNCTURE: CPT

## 2019-09-09 PROCEDURE — 82962 GLUCOSE BLOOD TEST: CPT

## 2019-09-09 RX ORDER — MIDODRINE HYDROCHLORIDE 5 MG/1
10 TABLET ORAL
Status: DISCONTINUED | OUTPATIENT
Start: 2019-09-09 | End: 2019-09-14 | Stop reason: HOSPADM

## 2019-09-09 RX ORDER — MAGNESIUM SULFATE 100 %
4 CRYSTALS MISCELLANEOUS AS NEEDED
Status: DISCONTINUED | OUTPATIENT
Start: 2019-09-09 | End: 2019-09-14 | Stop reason: HOSPADM

## 2019-09-09 RX ORDER — PANTOPRAZOLE SODIUM 40 MG/1
40 TABLET, DELAYED RELEASE ORAL
Status: DISCONTINUED | OUTPATIENT
Start: 2019-09-09 | End: 2019-09-14 | Stop reason: HOSPADM

## 2019-09-09 RX ORDER — INSULIN LISPRO 100 [IU]/ML
INJECTION, SOLUTION INTRAVENOUS; SUBCUTANEOUS
Status: DISCONTINUED | OUTPATIENT
Start: 2019-09-09 | End: 2019-09-14 | Stop reason: HOSPADM

## 2019-09-09 RX ORDER — DEXTROSE MONOHYDRATE 100 MG/ML
0-250 INJECTION, SOLUTION INTRAVENOUS AS NEEDED
Status: DISCONTINUED | OUTPATIENT
Start: 2019-09-09 | End: 2019-09-14 | Stop reason: HOSPADM

## 2019-09-09 RX ADMIN — TRAZODONE HYDROCHLORIDE 150 MG: 50 TABLET ORAL at 22:35

## 2019-09-09 RX ADMIN — PREGABALIN 50 MG: 50 CAPSULE ORAL at 17:22

## 2019-09-09 RX ADMIN — ALBUMIN (HUMAN) 12.5 G: 0.25 INJECTION, SOLUTION INTRAVENOUS at 10:00

## 2019-09-09 RX ADMIN — Medication 10 ML: at 14:40

## 2019-09-09 RX ADMIN — TICAGRELOR 90 MG: 90 TABLET ORAL at 05:58

## 2019-09-09 RX ADMIN — MIDODRINE HYDROCHLORIDE 10 MG: 5 TABLET ORAL at 08:03

## 2019-09-09 RX ADMIN — PREGABALIN 50 MG: 50 CAPSULE ORAL at 19:40

## 2019-09-09 RX ADMIN — TICAGRELOR 90 MG: 90 TABLET ORAL at 17:23

## 2019-09-09 RX ADMIN — ASPIRIN 81 MG 81 MG: 81 TABLET ORAL at 08:03

## 2019-09-09 RX ADMIN — OXYCODONE AND ACETAMINOPHEN 1 TABLET: 5; 325 TABLET ORAL at 08:03

## 2019-09-09 RX ADMIN — FLUOXETINE 20 MG: 20 CAPSULE ORAL at 08:03

## 2019-09-09 RX ADMIN — EPOETIN ALFA-EPBX 6000 UNITS: 10000 INJECTION, SOLUTION INTRAVENOUS; SUBCUTANEOUS at 11:08

## 2019-09-09 RX ADMIN — FINASTERIDE 5 MG: 5 TABLET, FILM COATED ORAL at 08:03

## 2019-09-09 RX ADMIN — PRAVASTATIN SODIUM 80 MG: 40 TABLET ORAL at 19:41

## 2019-09-09 RX ADMIN — OXYCODONE AND ACETAMINOPHEN 2 TABLET: 5; 325 TABLET ORAL at 14:40

## 2019-09-09 RX ADMIN — MIDODRINE HYDROCHLORIDE 10 MG: 5 TABLET ORAL at 12:02

## 2019-09-09 RX ADMIN — Medication 10 ML: at 05:58

## 2019-09-09 RX ADMIN — MIDODRINE HYDROCHLORIDE 10 MG: 5 TABLET ORAL at 17:22

## 2019-09-09 RX ADMIN — MIRTAZAPINE 30 MG: 15 TABLET, FILM COATED ORAL at 19:41

## 2019-09-09 RX ADMIN — PANTOPRAZOLE SODIUM 40 MG: 40 TABLET, DELAYED RELEASE ORAL at 17:22

## 2019-09-09 RX ADMIN — EPOETIN ALFA-EPBX 6000 UNITS: 10000 INJECTION, SOLUTION INTRAVENOUS; SUBCUTANEOUS at 21:00

## 2019-09-09 RX ADMIN — SODIUM CHLORIDE 40 MG: 9 INJECTION, SOLUTION INTRAMUSCULAR; INTRAVENOUS; SUBCUTANEOUS at 08:03

## 2019-09-09 RX ADMIN — OXYCODONE AND ACETAMINOPHEN 2 TABLET: 5; 325 TABLET ORAL at 19:41

## 2019-09-09 RX ADMIN — PREGABALIN 50 MG: 50 CAPSULE ORAL at 08:03

## 2019-09-09 RX ADMIN — OXYCODONE AND ACETAMINOPHEN 2 TABLET: 5; 325 TABLET ORAL at 02:18

## 2019-09-09 RX ADMIN — CYCLOBENZAPRINE HYDROCHLORIDE 5 MG: 10 TABLET, FILM COATED ORAL at 19:40

## 2019-09-09 RX ADMIN — Medication 10 ML: at 19:42

## 2019-09-09 RX ADMIN — ALBUMIN (HUMAN) 12.5 G: 0.25 INJECTION, SOLUTION INTRAVENOUS at 09:34

## 2019-09-09 RX ADMIN — Medication 1 AMPULE: at 12:02

## 2019-09-09 NOTE — PROGRESS NOTES
Dual skin assessment complete with Herlinda. RLE-scab on anterior shin area, knee, inner ankle, Lateral Great toe, index toe. R shoulder/chest incision with 8 staples c/d/i JAYSON. R hand scabs on fingers 2-4. Scab on R lower arm. L hand scabs on 2, 4 finger and thumb. Scab on middle finger. Friction area on L elbow. Bruising to chest.  R groin s/p cath with dressing c/d/i. Abrasion/bruise on scrotum. Abrasion to upper abdomen. Multiple abrasions and bruising to back.

## 2019-09-09 NOTE — PROGRESS NOTES
Cardiac Surgery ICU Progress Note    Admit Date: 9/4/2019    POD: 3 Days Post-Op      Problems:  Active Problems:    Unstable angina ([facility] (9/4/2019)      Heart failure ([facility] (9/6/2019)        Procedure:  Procedure(s):  CORONARY ANGIOGRAPHY  PERCUTANEOUS CORONARY INTERVENTION  Intra-Aortic Balloon Removal  Intravascular Ultrasound  Insert Stent Preston Coronary      Summary:     Stable hemodynamics in sinus rhythm without ectopy on no support. No chest pain or SOB  HD today  Dr. Tiffany Cuevas to transfer to the floor     Plan/Recommendations/Medical Decision Making:       CARDS: B-Blocker held for bradycardia, ACE held for hypotension. Brilenta  PULMONARY: Increase I/S effort and frequency  RENAL:HD today  HEME: WBC 6 HH 10/29  PT/OT:Increase activity,    Disposition: Stepdown per Dr. Tiffany Cuevas  Patient seen and reviewed with  Dr. Betsy Mirza MD       Objective:     CXR Results  (Last 48 hours)               09/09/19 0436  XR CHEST PORT Final result    Impression:  IMPRESSION: No significant change. Narrative:  INDICATION:  evaluate fluid status. EXAM: CXR Portable. FINDINGS: Portable chest shows no significant change including CVL since   yesterday. There is no apparent pneumothorax. Lungs show stable appearance   including mild bibasilar haziness favoring atelectasis. Heart size is top   normal. There is no overt pulmonary edema. 09/08/19 0451  XR CHEST PORT Final result    Impression:  IMPRESSION: Bibasilar patchy airspace disease. Probable small left pleural   effusion. Narrative:  INDICATION: Respiratory failure. Portable AP semiupright view of the chest.       Direct comparison made to prior chest x-ray dated September 7, 2019. Cardiomediastinal silhouette is stable. There is a right internal jugular   central venous catheter extends to the distal SVC. There is no pneumothorax. There is bibasilar patchy airspace disease.  There is a probable small left   pleural effusion. There is no pneumothorax. Labs:   Recent Labs     19  0802 19  0449  19  1707   WBC  --  5.6   < > 5.8   HGB  --  9.7*   < > 10.2*   HCT  --  28.8*   < > 31.3*   PLT  --  71*   < > 90*   NA  --  134*   < > 135*   K  --  4.3   < > 3.8   BUN  --  36*   < > 37*   CREA  --  5.11*   < > 5.07*   GLU  --  139*   < > 154*   GLUCPOC 135*  --    < >  --    INR  --   --   --  2.9*    < > = values in this interval not displayed. Vitals:    Visit Vitals  BP 92/45   Pulse 69   Temp 97.8 °F (36.6 °C)   Resp 11   Ht 5' 7\" (1.702 m)   Wt 167 lb 1.7 oz (75.8 kg)   SpO2 93%   BMI 26.17 kg/m²       Temp (24hrs), Av.3 °F (36.8 °C), Min:97.8 °F (36.6 °C), Max:98.5 °F (36.9 °C)      Last 3 Recorded Weights in this Encounter    19 0600 19 1019 19 0500   Weight: 158 lb 1.1 oz (71.7 kg) 158 lb (71.7 kg) 167 lb 1.7 oz (75.8 kg)       Oxygen Therapy:  Oxygen Therapy  O2 Sat (%): 93 % (19)  Pulse via Oximetry: 72 beats per minute (19)  O2 Device: Nasal cannula (19)  O2 Flow Rate (L/min): 2 l/min (19)  FIO2 (%): 50 % (19)    Admission Weight: Last Weight   Weight: 170 lb (77.1 kg) Weight: 167 lb 1.7 oz (75.8 kg)     Intake / Output / Drain:  Current Shift: No intake/output data recorded. Last 24 hrs.:     Intake/Output Summary (Last 24 hours) at 2019 0836  Last data filed at 2019 2200  Gross per 24 hour   Intake 1056 ml   Output    Net 1056 ml         EXAM:      General:No chest pain      Chest:  impella site ok      Incision: dry and intact    Lungs:    Rhonchi bilaterally. Heart:  Regular rate and rhythm, S1, S2 normal, no murmur, no click,      Abdomen:   Soft, non-tender. Bowel sounds present. Extremities:  mild edema, sores are dry     Neurologic:  Gross motor and sensory apparatus intact.        Signed By: DEE Prescott

## 2019-09-09 NOTE — PROGRESS NOTES
Hospitalist Progress Note    NAME: Karolina Byrne Sr.   :  1955   MRN:  [id number]       Assessment / Plan:  Unstable angina in settings of CAD with h/o multiple stents in the past   HTN   -doing well clinically    -Card cath  with severe disease,  Poor CABG candidate   Cath  w/ Impella CP supported PCI of OM1, mLAD, and LM/LAD/LCx bifurcation w/               Culotte technique w/ total of 4 STANLEY               Impella removed in cath lab. Some bleeding afterwards. Got PRBCs and PLT. Clot noted on distal Impella upon removal (seen on WADE)  -Cardiology/pulmonology help appreciated   -cont home Coreg and lisinopril   Cont ASA    DM type II with  Nephropathy  -BS low side   -Hold home Tradjenta   -c/w SS as needed     Thrombocytopenia  -Plt at baseline normal. Admission 53, low side but stable now   Heparin gtt was stopped   On angiomax now   -HIT negative, follow BELLA     ESRD   -HD TThSa   -Renal help appreciated. Renal consult for HD. Renally dose Rx as appropriate. PAD  Multiple small wounds    -Patient is scheduled to have a right second toe amputation on 10/1/2019  -Continue aspirin and Plavix  -consult wound care    L AKA   Depression, home trazodone  H/o R renal artery stenosis, s/p stent 2018                  Code Status: full   Surrogate Decision Maker: Wife Ana Russell  DVT Prophylaxis: Scd's pending card cath         Baseline: lives at home with wife and his son; independent   Recommended Disposition: when ok with cardiology      Subjective:     Chief Complaint / Reason for Physician Visit: following unstable angina   No recurrent fever  No CP   Appears clinically stronger today   Seen during HD this am while still in icu     Discussed with RN events overnight.      Review of Systems:  Symptom Y/N Comments  Symptom Y/N Comments   Fever/Chills    Chest Pain n    Poor Appetite    Edema     Cough    Abdominal Pain     Sputum    Joint Pain     SOB/COREA n   Pruritis/Rash Nausea/vomit    Tolerating PT/OT     Diarrhea    Tolerating Diet     Constipation    Other       Could NOT obtain due to:      Objective:     VITALS:   Last 24hrs VS reviewed since prior progress note. Most recent are:  Patient Vitals for the past 24 hrs:   Temp Pulse Resp BP SpO2   09/09/19 1452 97.9 °F (36.6 °C) 79 12 120/65 92 %   09/09/19 1228 97.8 °F (36.6 °C) 82 11 115/69 92 %   09/09/19 1126 97.4 °F (36.3 °C) 74 12 127/52 95 %   09/09/19 1115  72 13 123/50 95 %   09/09/19 1100  72 18 113/53 97 %   09/09/19 1045  71 14 112/48 98 %   09/09/19 1030  64 12 (!) 95/39 95 %   09/09/19 1015  66 10 (!) 92/35 94 %   09/09/19 0959  63 11 (!) 87/38 97 %   09/09/19 0953  64 11 (!) 96/38 96 %   09/09/19 0945  66 13 (!) 80/37 97 %   09/09/19 0930  66 15 (!) 90/39 97 %   09/09/19 0915  66 11 90/42 96 %   09/09/19 0900 97.6 °F (36.4 °C) 67 17 93/56 95 %   09/09/19 0845  67 12 91/42 96 %   09/09/19 0826 97.6 °F (36.4 °C) 69 17 100/44 95 %   09/09/19 0800 97.6 °F (36.4 °C) 68 11 (!) 87/48 95 %   09/09/19 0700  69 11 92/45    09/09/19 0600  72 11 97/51 93 %   09/09/19 0500  71 13 (!) 88/42 97 %   09/09/19 0400 97.8 °F (36.6 °C) 74 16 94/52    09/09/19 0300  76 11 91/52 96 %   09/09/19 0230  81 17 108/56 92 %   09/09/19 0200  77 22     09/09/19 0100  76 16 102/46    09/09/19 0000 98.4 °F (36.9 °C) 79 14 97/52    09/08/19 2300  77 18 96/51    09/08/19 2200  78 15 94/47    09/08/19 2100  79 15 92/52 98 %   09/08/19 2010  76 (!) 5 94/43 99 %   09/08/19 2000 98.1 °F (36.7 °C) 76 17 (!) 82/48 100 %   09/08/19 1815  79 15 (!) 88/42 (!) 88 %   09/08/19 1800  78 12 (!) 77/43    09/08/19 1700  75 14 92/46 94 %   09/08/19 1600 98.3 °F (36.8 °C) 76 16 92/53 94 %       Intake/Output Summary (Last 24 hours) at 9/9/2019 1534  Last data filed at 9/9/2019 1126  Gross per 24 hour   Intake 310 ml   Output 200 ml   Net 110 ml        PHYSICAL EXAM:  General: WD, WN. Confused some , just got extubated.  Cooperative, no acute distress    EENT:  EOMI. Anicteric sclerae. MMM  Resp:  CTA bilaterally, no wheezing or rales. No accessory muscle use  CV:  Regular  rhythm,  No edema. + BL LE chronic venous changes   GI:  Soft, Non distended, Non tender.  +Bowel sounds  Neurologic:  Alert and oriented X 3, normal speech,   Psych:   Good insight. Not anxious nor agitated  Skin:  No rashes. No jaundice. + R shoulder stiches     Reviewed most current lab test results and cultures  YES  Reviewed most current radiology test results   YES  Review and summation of old records today    NO  Reviewed patient's current orders and MAR    YES  PMH/SH reviewed - no change compared to H&P  ________________________________________________________________________  Care Plan discussed with:    Comments   Patient y    Family      RN y    Care Manager     Consultant                        Multidiciplinary team rounds were held today with , nursing, pharmacist and clinical coordinator. Patient's plan of care was discussed; medications were reviewed and discharge planning was addressed. ________________________________________________________________________  Total NON critical care TIME: 25  Minutes    Total CRITICAL CARE TIME Spent:   Minutes non procedure based      Comments   >50% of visit spent in counseling and coordination of care     ________________________________________________________________________  London Perez MD     Procedures: see electronic medical records for all procedures/Xrays and details which were not copied into this note but were reviewed prior to creation of Plan. LABS:  I reviewed today's most current labs and imaging studies.   Pertinent labs include:  Recent Labs     09/09/19 0449 09/08/19  0508 09/07/19 0220   WBC 5.6 6.2 6.4   HGB 9.7* 7.3* 9.5*   HCT 28.8* 21.9* 28.4*   PLT 71* 72* 72*     Recent Labs     09/09/19 0449 09/08/19  0508 09/07/19 0220 09/06/19  1707   * 138 134* 135*   K 4. 3 3.2* 4.1 3.8    102 102 101   CO2 26 28 21 21   * 90 137* 154*   BUN 36* 21* 42* 37*   CREA 5.11* 3.65* 5.55* 5.07*   CA 7.9* 7.8* 8.0* 6.6*   MG  --   --  1.9 1.9   ALB 2.6* 2.5* 2.4* 2.3*   TBILI 0.6 0.6 0.6 0.8   SGOT 63* 50* 27 29   ALT <6* <6* 8* 8*   INR  --   --   --  2.9*       Signed: Milagro Canada MD

## 2019-09-09 NOTE — DIALYSIS
[facility] Dialysis Team [facility]  [phone]    Vitals   Pre   Post   Assessment   Pre   Post     Temp  Temp: 97.6 °F (36.4 °C) (09/09/19 0826)  97.4 LOC  A&Ox4 No change   HR   Pulse (Heart Rate): 69 (09/09/19 0826) 75 Lungs   Clear-Dim bases auscultation  No change   B/P   BP: 100/44 (09/09/19 0826) 127/52 Cardiac   RRR  No change   Resp   Resp Rate: 17 (09/09/19 0826) 12 Skin   Warm, dry  No change   Pain level  4/10-Primary RN aware. 4/10 Edema  None     No change   Orders:   Duration (hr) 3    Dialysate Bath:  K 4    Dialysate Bath:  CA 2.5    Dialysate Bath: Bicarb 35    WT LOSS 2         Duration:   Start:    0826 End:    1126 Total:   3 Hours   Dialyzer:   Dialyzer/Set Up Inspection: Hiren Chávez (09/09/19 0826)   K Bath:   Dialysate K (mEq/L): 4 (09/09/19 0826)   Ca Bath:   Dialysate CA (mEq/L): 2.5 (09/09/19 0826)   Na/Bicarb:   Dialysate NA (mEq/L): 140 (09/09/19 0826)   Target Fluid Removal:   Goal/Amount of Fluid to Remove (mL): 2000 mL (09/09/19 0826)   Access     Type & Location:   HARVEY AVF + B&T No s/s of infection. Site disinfected with alcohol and cannulated with two 15g needles. Aspirated and flushed well with 0.9%NS. Needles covered with 2x2 gauze and secured with tape. Tx initiated without incident.     Labs     Obtained/Reviewed   Critical Results Called   Date when labs were drawn-  Hgb-    HGB   Date Value Ref Range Status   09/09/2019 9.7 (L) 12.1 - 17.0 g/dL Final     K-    Potassium   Date Value Ref Range Status   09/09/2019 4.3 3.5 - 5.1 mmol/L Final     Comment:     INVESTIGATED PER DELTA CHECK PROTOCOL     Ca-   Calcium   Date Value Ref Range Status   09/09/2019 7.9 (L) 8.5 - 10.1 MG/DL Final     Bun-   BUN   Date Value Ref Range Status   09/09/2019 36 (H) 6 - 20 MG/DL Final     Creat-   Creatinine   Date Value Ref Range Status   09/09/2019 5.11 (H) 0.70 - 1.30 MG/DL Final     Comment:     INVESTIGATED PER DELTA CHECK PROTOCOL        Medications/ Blood Products Given     Name Dose   Route and Time     Albumin 12.5g/50mL IVP-0937&1010   Retacrit 6000unit/0.6mL Sub c-1112        Blood Volume Processed (BVP):    64L Net Fluid   Removed:  200mL   Comments   Time Out Done: 6641  Primary Nurse Rpt Pre: Kiya RN-Report included SBAR, given opportunity to ask questions. Primary Nurse Rpt Post:Kiya RN-Report included dialysis report, SBAR, medications  Pt Education: Procedural, Access care  Care Plan:Continue dialysis as ordered. Tx Summary:  0930-BP 90/39 with a MAP of 52-UF off albumin administered. 0951-250 mL NS bolus administered. MD contacted, and at bedside. UF to remain off.   0954-BP 96/38  1010-BP 87/38 MD ordered 2nd albumin administered and no UF.  1015-BP 92/35-continuing to monitor closely; MD aware of continued low diastolic BP  0349-/26-HIWOT goal lowered and turned back on to end with no +UF. Tx completed. At end, all possible blood returned to patient. Lines flushed well with 0.9%NS. Needles removed and hemostasis  Achieved. Sites secured with gauze and tape. Patient bed in the lowest position with the call bell within reach. Report given to primary RN.   Admiting Diagnosis:Unstable angina  Pt's previous clinic-[facility]  Consent signed - Informed Consent Verified: Yes (09/09/19 0826)  Augusto Consent - yes  Hepatitis Status- Neg/Imm (1/2/19)  Machine #- Machine Number: [id number] (09/09/19 8981)  Telemetry status-Monitored bedside and remotely

## 2019-09-09 NOTE — CARDIO/PULMONARY
Cardiac Rehab Note: chart review     Smoking history assessed. Patient is a current everyday smoker. Smoking Cessation Program link has been added to the AVS.     EF  41-45% on 9/7/2019 per echo    MI education folder, with heart heathy diet, warning signs, heart facts, catheterization brochure, and out patient cardiac rehab program to bedside of Terri Rodriguez Sr. on 9/9/2019    Patient was not available/able to meet at this time with no family present at bedside. Pt receiving bedside dialysis and resting with eyes closed    CP Rehab will attempt to follow up.

## 2019-09-09 NOTE — PROGRESS NOTES
Spiritual Care Assessment/Progress Note  [facility]      NAME: Eulalio Harrison Sr. MRN: [id number]  AGE: 71 y.o. SEX: male  Restorationism Affiliation: Buddhism   Language: English     9/9/2019     Total Time (in minutes): 15     Spiritual Assessment begun in [facility] CARDIOPULMONARY CARE through conversation with:         [x]Patient        [] Family    [] Friend(s)        Reason for Consult:  Follow-up, routine     Spiritual beliefs: (Please include comment if needed)     [x] Identifies with a caroline tradition: Buddhism         [] Supported by a caroline community:            [] Claims no spiritual orientation:           [] Seeking spiritual identity:                [] Adheres to an individual form of spirituality:           [] Not able to assess:                           Identified resources for coping:      [] Prayer                               [] Music                  [] Guided Imagery     [x] Family/friends                 [] Pet visits     [] Devotional reading                         [] Unknown     [] Other:                                              Interventions offered during this visit: (See comments for more details)    Patient Interventions: Affirmation of caroline, Affirmation of emotions/emotional suffering, Catharsis/review of pertinent events in supportive environment, Initial visit, Initial/Spiritual assessment, patient floor, Prayer (assurance of)           Plan of Care:     [x] Support spiritual and/or cultural needs    [] Support AMD and/or advance care planning process      [] Support grieving process   [] Coordinate Rites and/or Rituals    [] Coordination with community clergy   [] No spiritual needs identified at this time   [] Detailed Plan of Care below (See Comments)  [] Make referral to Music Therapy  [] Make referral to Pet Therapy     [] Make referral to Addiction services  [] Make referral to [facility]  [] Make referral to Spiritual Care Partner  [] No future visits requested        [x] Follow up visits as needed     Comments:  made initial visit to patients room on [address] Patient had just transferred out of CCU. Patient was resting in bed when  entered the room. The window shades were up and it was bright in the room. Patient spoke some of his stay in the hospital and that he was feeling better. Patient said he has a good support group with his family as he lives with his son, daughter in law and grandchildren. He likes having his grandchildren with him. Patient has other family in the area. Patient identifies as Yris Schools provided pastoral care and support during this visit. Spiritual Care will follow up as needed.     Tanvir Aponte MDiv  Pager: [phone]

## 2019-09-09 NOTE — PROGRESS NOTES
0730 Beds shift change report given to Quita Smith (oncoming nurse) by Dina Soriano (offgoing nurse). Report included the following information SBAR, Kardex, ED Summary, OR Summary, Procedure Summary, Intake/Output, MAR, Accordion, Recent Results, Med Rec Status, Cardiac Rhythm SR/BBB/1st degree avb, Alarm Parameters , Pre Procedure Checklist, Procedure Verification and Quality Measures. [facility] Ember Shannan at bedside for HD.  1230 . [facility] TRANSFER - OUT REPORT:    Verbal report given to Margaret(name) on Your.MD Sr.  being transferred to [facility](unit) for routine progression of care       Report consisted of patients Situation, Background, Assessment and   Recommendations(SBAR). Information from the following report(s) SBAR, Kardex, ED Summary, OR Summary, Procedure Summary, Intake/Output, MAR, Accordion, Recent Results, Med Rec Status, Cardiac Rhythm SR BBB 1st degree AVB, Alarm Parameters , Pre Procedure Checklist and Procedure Verification was reviewed with the receiving nurse.     Lines:   Quad Lumen 09/06/19 Right Internal jugular (Active)   Central Line Being Utilized Yes 9/9/2019  2:52 PM   Criteria for Appropriate Use Limited/no vessel suitable for conventional peripheral access 9/9/2019  2:52 PM   Site Assessment Clean, dry, & intact 9/9/2019  2:52 PM   Infiltration Assessment 0 9/9/2019  2:52 PM   Affected Extremity/Extremities Color distal to insertion site pink (or appropriate for race) 9/9/2019  2:52 PM   Date of Last Dressing Change 09/08/19 9/9/2019  2:52 PM   Dressing Status Clean, dry, & intact 9/9/2019  2:52 PM   Dressing Type Transparent 9/9/2019  2:52 PM   Action Taken Open ports on tubing capped 9/9/2019  2:52 PM   Proximal Hub Color/Line Status White;Capped;Flushed 9/9/2019  2:52 PM   Positive Blood Return (Medial Site) Yes 9/9/2019  2:52 PM   Medial 1 Hub Color/Line Status Gray;Capped;Flushed 9/9/2019  2:52 PM   Positive Blood Return (Lateral Site) Yes 9/9/2019  2:52 PM   Medial 2 Hub Color/Line Status Blue;Capped;Flushed 9/9/2019  2:52 PM   Positive Blood Return (Site #3) No 9/9/2019  2:52 PM   Distal Hub Color/Line Status Brown;Capped;Flushed 9/9/2019  2:52 PM   Positive Blood Return (Site #4) Yes 9/9/2019  2:52 PM   Alcohol Cap Used Yes 9/9/2019  2:52 PM       Peripheral IV 09/04/19 Right Antecubital (Active)   Site Assessment Clean, dry, & intact 9/9/2019  8:00 AM   Phlebitis Assessment 0 9/9/2019  8:00 AM   Infiltration Assessment 0 9/9/2019  8:00 AM   Dressing Status Clean, dry, & intact 9/9/2019  8:00 AM   Dressing Type Tape;Transparent 9/9/2019  8:00 AM   Hub Color/Line Status Pink 9/9/2019  8:00 AM   Action Taken Dressing changed 9/8/2019  8:00 AM   Alcohol Cap Used Yes 9/8/2019  9:00 PM        Opportunity for questions and clarification was provided.       Patient transported with:   O2 @ 2 liters

## 2019-09-09 NOTE — PROGRESS NOTES
Progress Note      9/9/2019 7:46 AM  NAME: Scar Jacobs Sr. MRN:  [id number]   Admit Diagnosis: Unstable angina ([facility] [I20.0]  Heart failure ([facility] [I50.9]      Problem List:     1. [place]; cath 4/30 w/ RCA , 60% oLAD w/ neg FFR (0.88), patent LCx stent w/ mild ISR. 2. Cardiogenic Shock; resolved  3. Indeterminate troponin elevation  4. Right renal artery stenosis s/p 7.0 x 19mm BMS 5/4/18  5. Occluded LCIA. 6. Elevated troponin  7. ESRD on dialysis. Dr Crump   8. Long term hx of Coronary artery disease s/p multivessel stenting s/p PCI of LCx, PTCA of RCA 7/97, PTCA LAD 3/94.  Lexiscan 2/08 w/ EF 62%, distal ineroapical infarct w/o ischemia  9. Cath 4/2018  No intervention. Lv EF  -  55%   10. Peripheral vascular disease s/p left iliac stenting, left SFA stenting 3/00, right iliac stenting 11/99.  11. Status post L AKA  12. Bilateral carotid stenosis; 16-49% 9/13  13. Hypertension  14. Diabetes  15. Chronic Tobacco abuse 1-2 PPD . 16. Chronic pain  17. Chronic anemia  18. Hyperlipidemia  19. Noncompliance  20. Peptic ulcer disease w/ GIB '15  21. Falls  22. Lives w/ sister in [address] [place] [address] now  21. Usual Cardiologist:  Dr. Jennifer Rene / Licha Calvillo. Assessment/Plan: TnI 0.1    Films reviewed from 4/18. Ostial LAD. Occluded RCA. LCx ok. Left common iliac artery occluded. Cath 9/5 w/ RCA , sev reduced LVEF, severe LM, LAD, LCx disease (Renteria Class 1, 1, 1). Discussed w/ CTS; initially planned for CABG but rescinded due to numerous comorbidities. IABP placed @ 1:1. [place] placed. Long discussion w/ patient and family. On for Impella 5.0 axillary this AM and then high-risk PCI of LM/LAD/LCx to follow. Risk of death is high; discussed w/ patient and family; pt is willing to accept these risks. Cath 9/6 w/ Impella CP supported PCI of OM1, mLAD, and LM/LAD/LCx bifurcation w/ Culotte technique w/ total of 4 STANLEY    Impella removed in cath lab. Some bleeding afterwards. Got PRBCs and PLT.     Clot noted on distal Impella upon removal (seen on WADE). No signs of neuro deficits. Echo w/ EF 40-45%, mod AoS, mod AI    Extubated 9/7  A-line out 9/7  PA cath out 9/7  RCFA sheath out 9/7    Remains w/ TLC RIJ    No more fevers    HgB 9.7  PLT 71  K 4.3    1. Continue ASA, ticagrelor  2. CAMILLE Ab NEG; will send BELLA  3. Stopped dobutamine  4. HD today  5. Hold lisinopril 20mg  6. Hold ISMN 30mg  7. Hold coreg 3.25mg BID  8. Continue statin  9. Volume management per renal.  Does he need the bumex? 10. No family here yet  6. He will need PT/OT for discharge planning  12. Dr. Niurka Berry in background for osteo of right foot; would hold on additional procedures at this time and let him recover  13. If does ok w/ HD can transfer to 2nd floor gen tele             [x]       High complexity decision making was performed in this patient at high risk for decompensation with multiple organ involvement. Subjective:     Janell Sierra Sr. denies CP and SOB but says he has pain all over. Discussed with RN events overnight. Review of Systems:    Symptom Y/N Comments  Symptom Y/N Comments   Fever/Chills N   Chest Pain N    Poor Appetite N   Edema N    Cough N   Abdominal Pain N    Sputum N   Joint Pain N    SOB/COREA N   Pruritis/Rash N    Nausea/vomit N   Tolerating PT/OT Y    Diarrhea N   Tolerating Diet Y    Constipation N   Other       Could NOT obtain due to: sedated     Objective:      Physical Exam:    Last 24hrs VS reviewed since prior progress note. Most recent are:    Visit Vitals  BP 92/45   Pulse 69   Temp 97.8 °F (36.6 °C)   Resp 11   Ht 5' 7\" (1.702 m)   Wt 75.8 kg (167 lb 1.7 oz)   SpO2 93%   BMI 26.17 kg/m²       Intake/Output Summary (Last 24 hours) at 9/9/2019 0749  Last data filed at 9/8/2019 2200  Gross per 24 hour   Intake 1467.6 ml   Output 5 ml   Net 1462.6 ml        General Appearance: Well developed, well nourished, AnO x 3,    no acute distress.   Ears/Nose/Mouth/Throat: Hearing grossly normal.  Neck: Supple. Chest: Lungs clear to auscultation bilaterally. Cardiovascular: Regular rate and rhythm, S1S2 normal, no murmur. Abdomen: Soft, non-tender, bowel sounds are active. Extremities: No edema bilaterally. LAKA. Skin: Warm and dry. [x]         Post-cath site without hematoma, bruit, tenderness, or thrill. Distal pulses intact. PMH/ reviewed - no change compared to H&P    Data Review    Telemetry: sinus rhythm     EKG:   [x]  No new EKG for review    Lab Data Personally Reviewed:    Recent Labs     09/09/19 0449 09/08/19 0508   WBC 5.6 6.2   HGB 9.7* 7.3*   HCT 28.8* 21.9*   PLT 71* 72*     Recent Labs     09/06/19 1707   INR 2.9*   PTP 28.4*   APTT 69.5*      Recent Labs     09/09/19 0449 09/08/19 0508 09/07/19 0220 09/06/19  1707   * 138 134* 135*   K 4.3 3.2* 4.1 3.8    102 102 101   CO2 26 28 21 21   BUN 36* 21* 42* 37*   CREA 5.11* 3.65* 5.55* 5.07*   * 90 137* 154*   CA 7.9* 7.8* 8.0* 6.6*   MG  --   --  1.9 1.9     No results for input(s): CPK, CKNDX, TROIQ in the last 72 hours. No lab exists for component: CPKMB  Lab Results   Component Value Date/Time    Cholesterol, total 236 (H) 04/29/2018 06:00 AM    HDL Cholesterol 34 04/29/2018 06:00 AM    LDL,Direct 62 10/03/2014 05:50 AM    LDL, calculated 172.4 (H) 04/29/2018 06:00 AM    Triglyceride 148 04/29/2018 06:00 AM    CHOL/HDL Ratio 6.9 (H) 04/29/2018 06:00 AM       Recent Labs     09/09/19 0449 09/08/19 0508 09/07/19  0220   SGOT 63* 50* 27   AP 80 52 53   TP 6.1* 5.6* 5.4*   ALB 2.6* 2.5* 2.4*   GLOB 3.5 3.1 3.0     No results for input(s): PH, PCO2, PO2 in the last 72 hours.     Medications Personally Reviewed:    Current Facility-Administered Medications   Medication Dose Route Frequency    midodrine (PROAMITINE) tablet 10 mg  10 mg Oral TID WITH MEALS    0.9% sodium chloride infusion 250 mL  250 mL IntraVENous PRN    epoetin calos-epbx (RETACRIT) injection 6,000 Units  6,000 Units SubCUTAneous DIALYSIS MON, WED & FRI    aspirin chewable tablet 81 mg  81 mg Oral DAILY    alcohol 62% (NOZIN) nasal  1 Ampule  1 Ampule Topical Q12H    ticagrelor (BRILINTA) tablet 90 mg  90 mg Oral Q12H    pantoprazole (PROTONIX) 40 mg in sodium chloride 0.9% 10 mL injection  40 mg IntraVENous Q12H    albumin human 25% (BUMINATE) solution 12.5 g  12.5 g IntraVENous DIALYSIS PRN    sodium chloride (NS) flush 5-40 mL  5-40 mL IntraVENous Q8H    sodium chloride (NS) flush 5-40 mL  5-40 mL IntraVENous PRN    sodium phosphate (FLEET'S) enema 1 Enema  1 Enema Rectal PRN    oxyCODONE-acetaminophen (PERCOCET) 5-325 mg per tablet 1-2 Tab  1-2 Tab Oral Q4H PRN    pravastatin (PRAVACHOL) tablet 80 mg  80 mg Oral QHS    cyclobenzaprine (FLEXERIL) tablet 5 mg  5 mg Oral TID PRN    finasteride (PROSCAR) tablet 5 mg  5 mg Oral DAILY    FLUoxetine (PROzac) capsule 20 mg  20 mg Oral DAILY    mirtazapine (REMERON) tablet 30 mg  30 mg Oral QHS    nitroglycerin (NITROSTAT) tablet 0.4 mg  0.4 mg SubLINGual Q5MIN PRN    pregabalin (LYRICA) capsule 50 mg  50 mg Oral TID    traZODone (DESYREL) tablet 150 mg  150 mg Oral QHS    insulin lispro (HUMALOG) injection   SubCUTAneous AC&HS    glucose chewable tablet 16 g  4 Tab Oral PRN    glucagon (GLUCAGEN) injection 1 mg  1 mg IntraMUSCular PRN    dextrose 10% infusion 0-250 mL  0-250 mL IntraVENous PRN         Vista Jesse III, DO

## 2019-09-09 NOTE — INTERDISCIPLINARY ROUNDS
Critical care interdisciplinary rounds held on 09/09/2019. Following members present, Pharmacy, Diabetes Treatment, Case Management,  Physical Therapy and Nutrition. Led by NEREIDA Hebert RN and Dr. Ruel Wilder and Dr. Saud Wu. Plan of care discussed. See clinical pathway for plan of care and interventions and desired outcomes. Currently on Hemodialysis.

## 2019-09-09 NOTE — PROGRESS NOTES
TRANSFER - IN REPORT:    Verbal report received from Kiya(name) on [employer]  being received from CCU(unit) for routine progression of care      Report consisted of patients Situation, Background, Assessment and   Recommendations(SBAR). Information from the following report(s) SBAR, Procedure Summary, Intake/Output, MAR, Recent Results and Cardiac Rhythm SR BBB 1st degree was reviewed with the receiving nurse. Opportunity for questions and clarification was provided. Assessment completed upon patients arrival to unit and care assumed.

## 2019-09-09 NOTE — PROGRESS NOTES
9/9/2019    INTENSIVIST PROGRESS NOTE:     Patient seen and evaluated, chart reviewed   70 yo male smoker, with ESRD s/p cardiac cath revealing severe 3V CAD, LM disease, severe CMP  Transferred back to ccu post procedure with IABP in place  Now pt in CCU in no severe distress  No acute complaints    ROS: no sob, no cp  9/6- pt is awake and alert. Iabp in place. Sgc in place. Pap 53/22 and mean 34 . Dobutamine 4 ucg . Dr. Lynne Costello is here and discussing plans  With the pt. To or today for  IMpella and cath with stent . 9/7 impella removed the other day. Passing SBt but large amount of bloody, thick secretions from ETT. Still on IV cardene and IV Dobutamine. Fever > 101. No t yet cultured. Discussed with DR. Allen July. Bedside echo improved. Sputum cultured    9/8 extubated the other day. Now on low dose IV dobutamine. C/o some back pain. Mild CP. No SOB on NC O2. No more fever and denied congestion. Transfused    9/9 Hgb up and tolerated transfusion the other day. achey on right shoulder. For HD this AM. No SOb.  No chest pain      A/P:  - severe CAD: s/p IABP, impella; s/p CABG   - Cardiogenic shock and ischmeic cardiomyopathy  - fever- source?- impella out; no change on CXR but now with bloody thick secretions  - left AKA  - ESRD: RRT per renal; dialyzed yesterday  - anemia  - thrombocytopenia- HITnegative; BELLA pending  - hypokalemia  - MRSA -nares    - second floor telemetry   - BELLA- d/w Dr. Lynne Costello  - Follow cultures  - transfusion ordered  - inotropes per cardiology  -RRT per nephrology  - mobilize  - d/c kailash  - glycemic control  - CCU monitoring  - Will assist on disposition planning when stable for transfer  - will sign of after moved to telemetry    MEDS:   Current Facility-Administered Medications   Medication    midodrine (PROAMITINE) tablet 10 mg    0.9% sodium chloride infusion 250 mL    epoetin calos-epbx (RETACRIT) injection 6,000 Units    aspirin chewable tablet 81 mg    alcohol 62% (NOZIN) nasal  1 Ampule    ticagrelor (BRILINTA) tablet 90 mg    pantoprazole (PROTONIX) 40 mg in sodium chloride 0.9% 10 mL injection    albumin human 25% (BUMINATE) solution 12.5 g    sodium chloride (NS) flush 5-40 mL    sodium chloride (NS) flush 5-40 mL    sodium phosphate (FLEET'S) enema 1 Enema    oxyCODONE-acetaminophen (PERCOCET) 5-325 mg per tablet 1-2 Tab    pravastatin (PRAVACHOL) tablet 80 mg    cyclobenzaprine (FLEXERIL) tablet 5 mg    finasteride (PROSCAR) tablet 5 mg    FLUoxetine (PROzac) capsule 20 mg    mirtazapine (REMERON) tablet 30 mg    nitroglycerin (NITROSTAT) tablet 0.4 mg    pregabalin (LYRICA) capsule 50 mg    traZODone (DESYREL) tablet 150 mg    insulin lispro (HUMALOG) injection    glucose chewable tablet 16 g    glucagon (GLUCAGEN) injection 1 mg    dextrose 10% infusion 0-250 mL          Visit Vitals  BP 92/45   Pulse 69   Temp 97.8 °F (36.6 °C)   Resp 11   Ht 5' 7\" (1.702 m)   Wt 75.8 kg (167 lb 1.7 oz)   SpO2 93%   BMI 26.17 kg/m²       General: no distress. Looks chronically ill. On vent, restrained  Eyes: anicteric  HEENT: dry oral mucosa. edentulous  Neck: FROM  CV: RRR  Lungs: clear ant/lat  Abd: soft  : no flank pain. Gonzalez in with not much out   Ext: no edema- left  [place] . Skin: no rash  Musculoskeletal: normal inspection  Neuro: non focal and appropriately interactive.      CXR: RLL atx- linear - sgc ok             Labs:    Recent Labs     09/09/19 0449 09/08/19  0508 09/07/19 0220 09/06/19  1707   WBC 5.6 6.2 6.4 5.8   HGB 9.7* 7.3* 9.5* 10.2*   PLT 71* 72* 72* 90*   INR  --   --   --  2.9*   APTT  --   --   --  69.5*     Recent Labs     09/09/19  0449 09/08/19  0508 09/07/19 0220 09/06/19  1858 09/06/19  1707   * 138 134*  --  135*   K 4.3 3.2* 4.1  --  3.8    102 102  --  101   CO2 26 28 21  --  21   * 90 137*  --  154*   BUN 36* 21* 42*  --  37*   CREA 5.11* 3.65* 5.55*  --  5.07*   CA 7.9* 7.8* 8.0*  --  6.6*   MG  -- --  1.9  --  1.9   LAC  --   --  0.7 1.1  --    ALB 2.6* 2.5* 2.4*  --  2.3*   SGOT 63* 50* 27  --  29   ALT <6* <6* 8*  --  8*     No results for input(s): PH, PCO2, PO2, HCO3, FIO2 in the last 72 hours. No results for input(s): CPK, CKNDX, TROIQ in the last 72 hours.     No lab exists for component: CPKMB  No results found for: BNPP, BNP     Dario Thompson MD

## 2019-09-09 NOTE — DIABETES MGMT
[facility]    [facility] Progress Note    Recommendations/ Comments: Pt discussed with rounding team and  - changing correctional insulin scale to high sensitivity with current renal status/CrCl. Chart reviewed on Karolina Byrne Sr. during Multidisciplinary Rounds. A1c:   Lab Results   Component Value Date/Time    Hemoglobin A1c 6.6 (H) 09/05/2019 03:00 PM           Recent Glucose Results:   Lab Results   Component Value Date/Time     (H) 09/09/2019 04:49 AM    GLUCPOC 135 (H) 09/09/2019 08:02 AM    GLUCPOC 170 (H) 09/09/2019 02:14 AM    GLUCPOC 118 (H) 09/08/2019 05:44 PM        Lab Results   Component Value Date/Time    Creatinine 5.11 (H) 09/09/2019 04:49 AM     Estimated Creatinine Clearance: 13.5 mL/min (A) (based on SCr of 5.11 mg/dL (H)). Active Orders   Diet    DIET CARDIAC Regular; 2 GM NA (House Low NA); Consistent Carb 2000kcal        PO intake:   Patient Vitals for the past 72 hrs:   % Diet Eaten   09/08/19 1700 40 %   09/08/19 1200 40 %       Will continue to follow as needed. Thank you.   [id number]  [phone]  Time spent: 15 minutes

## 2019-09-09 NOTE — PROGRESS NOTES
1900- bedside report received from [facility] and assumed care of pt.  2000- assessment completed. 2010- pt returned to bed and noted to have incontinent stool present when we went to turn him, complete bath done and repositioned pt. He is watching TV now in bed.    0205-Complained of pain 10 on 1-10 scale. Medicated with 2 percocet per orders. 0230- pain now relieved he states that his pain is a 4 on 1-10 scale. 0410- pt cleaned for an incontinent stool and then repositioned. 0430- am labs drawn and sent.     0700- bedside report given to [facility]

## 2019-09-09 NOTE — PROGRESS NOTES
NAME: Jimmy Schumacher Sr.        :  1955        MRN:  [id number]        Assessment :    Plan:  --ESRD-  Hypotension  Anemia  CP dyspnea  CAD --[id number]  HD today - UF limited by low BP    On OWEN. Prn prbc's (s/p transfusion )    On Midodrine as of 19 (dose increased today)       Subjective:     Chief Complaint: resting. The patient was seen on dialysis at 9:56 AM .  BP is low (UF off, s/p ns and albumin; now stable, improved BP - Midodrine increased today as well). Access is working well. Review of Systems:    Symptom Y/N Comments  Symptom Y/N Comments   Fever/Chills    Chest Pain     Poor Appetite    Edema     Cough    Abdominal Pain     Sputum    Joint Pain     SOB/COREA    Pruritis/Rash     Nausea/vomit    Tolerating PT/OT     Diarrhea    Tolerating Diet     Constipation    Other       Could not obtain due to:      Objective:     VITALS:   Last 24hrs VS reviewed since prior progress note. Most recent are:  Visit Vitals  BP 92/45   Pulse 69   Temp 97.8 °F (36.6 °C)   Resp 11   Ht 5' 7\" (1.702 m)   Wt 75.8 kg (167 lb 1.7 oz)   SpO2 93%   BMI 26.17 kg/m²       Intake/Output Summary (Last 24 hours) at 2019 0735  Last data filed at 2019 2200  Gross per 24 hour   Intake 1467.6 ml   Output 5 ml   Net 1462.6 ml      Telemetry Reviewed:     PHYSICAL EXAM:  General: intubated.   1+ edema      Lab Data Reviewed: (see below)    Medications Reviewed: (see below)    PMH/SH reviewed - no change compared to H&P  ________________________________________________________________________  Care Plan discussed with:  Patient     Family      RN     Care Manager                    Consultant:          Comments   >50% of visit spent in counseling and coordination of care       ________________________________________________________________________  Oneal Pang MD     Procedures: see electronic medical records for all procedures/Xrays and details which  were not copied into this note but were reviewed prior to creation of Plan. LABS:  Recent Labs     09/09/19 0449 09/08/19  0508   WBC 5.6 6.2   HGB 9.7* 7.3*   HCT 28.8* 21.9*   PLT 71* 72*     Recent Labs     09/09/19  0449 09/08/19  0508 09/07/19  0220 09/06/19  1707   * 138 134* 135*   K 4.3 3.2* 4.1 3.8    102 102 101   CO2 26 28 21 21   BUN 36* 21* 42* 37*   CREA 5.11* 3.65* 5.55* 5.07*   * 90 137* 154*   CA 7.9* 7.8* 8.0* 6.6*   MG  --   --  1.9 1.9     Recent Labs     09/09/19 0449 09/08/19  0508 09/07/19  0220   SGOT 63* 50* 27   AP 80 52 53   TP 6.1* 5.6* 5.4*   ALB 2.6* 2.5* 2.4*   GLOB 3.5 3.1 3.0     Recent Labs     09/06/19  1707   INR 2.9*   PTP 28.4*   APTT 69.5*      Recent Labs     09/07/19  0220   TIBC 151*   PSAT 57*   FERR 5,164*      Lab Results   Component Value Date/Time    Folate 7.0 01/06/2016 01:08 PM      No results for input(s): PH, PCO2, PO2 in the last 72 hours. No results for input(s): CPK, CKMB in the last 72 hours.     No lab exists for component: TROPONINI  No components found for: [place]  Lab Results   Component Value Date/Time    Color YELLOW/STRAW 09/05/2019 06:00 PM    Appearance CLEAR 09/05/2019 06:00 PM    Specific gravity 1.027 09/05/2019 06:00 PM    pH (UA) 7.0 09/05/2019 06:00 PM    Protein 100 (A) 09/05/2019 06:00 PM    Glucose NEGATIVE  09/05/2019 06:00 PM    Ketone NEGATIVE  09/05/2019 06:00 PM    Bilirubin NEGATIVE  09/05/2019 06:00 PM    Urobilinogen 1.0 09/05/2019 06:00 PM    Nitrites NEGATIVE  09/05/2019 06:00 PM    Leukocyte Esterase NEGATIVE  09/05/2019 06:00 PM    Epithelial cells FEW 09/05/2019 06:00 PM    Bacteria NEGATIVE  09/05/2019 06:00 PM    WBC 0-4 09/05/2019 06:00 PM    RBC 5-10 09/05/2019 06:00 PM       MEDICATIONS:  Current Facility-Administered Medications   Medication Dose Route Frequency    0.9% sodium chloride infusion 250 mL  250 mL IntraVENous PRN    midodrine (PROAMITINE) tablet 5 mg 5 mg Oral TID WITH MEALS    epoetin calos-epbx (RETACRIT) injection 6,000 Units  6,000 Units SubCUTAneous DIALYSIS MON, WED & FRI    aspirin chewable tablet 81 mg  81 mg Oral DAILY    alcohol 62% (NOZIN) nasal  1 Ampule  1 Ampule Topical Q12H    ticagrelor (BRILINTA) tablet 90 mg  90 mg Oral Q12H    pantoprazole (PROTONIX) 40 mg in sodium chloride 0.9% 10 mL injection  40 mg IntraVENous Q12H    albumin human 25% (BUMINATE) solution 12.5 g  12.5 g IntraVENous DIALYSIS PRN    sodium chloride (NS) flush 5-40 mL  5-40 mL IntraVENous Q8H    sodium chloride (NS) flush 5-40 mL  5-40 mL IntraVENous PRN    sodium phosphate (FLEET'S) enema 1 Enema  1 Enema Rectal PRN    oxyCODONE-acetaminophen (PERCOCET) 5-325 mg per tablet 1-2 Tab  1-2 Tab Oral Q4H PRN    pravastatin (PRAVACHOL) tablet 80 mg  80 mg Oral QHS    cyclobenzaprine (FLEXERIL) tablet 5 mg  5 mg Oral TID PRN    finasteride (PROSCAR) tablet 5 mg  5 mg Oral DAILY    FLUoxetine (PROzac) capsule 20 mg  20 mg Oral DAILY    mirtazapine (REMERON) tablet 30 mg  30 mg Oral QHS    nitroglycerin (NITROSTAT) tablet 0.4 mg  0.4 mg SubLINGual Q5MIN PRN    pregabalin (LYRICA) capsule 50 mg  50 mg Oral TID    traZODone (DESYREL) tablet 150 mg  150 mg Oral QHS    insulin lispro (HUMALOG) injection   SubCUTAneous AC&HS    glucose chewable tablet 16 g  4 Tab Oral PRN    glucagon (GLUCAGEN) injection 1 mg  1 mg IntraMUSCular PRN    dextrose 10% infusion 0-250 mL  0-250 mL IntraVENous PRN

## 2019-09-10 ENCOUNTER — APPOINTMENT (OUTPATIENT)
Dept: GENERAL RADIOLOGY | Age: 64
DRG: 215 | End: 2019-09-10
Attending: THORACIC SURGERY (CARDIOTHORACIC VASCULAR SURGERY)
Payer: MEDICARE

## 2019-09-10 ENCOUNTER — APPOINTMENT (OUTPATIENT)
Dept: VASCULAR SURGERY | Age: 64
DRG: 215 | End: 2019-09-10
Attending: INTERNAL MEDICINE
Payer: MEDICARE

## 2019-09-10 LAB
ALBUMIN SERPL-MCNC: 2.8 G/DL (ref 3.5–5)
ALBUMIN/GLOB SERPL: 0.8 {RATIO} (ref 1.1–2.2)
ALP SERPL-CCNC: 105 U/L (ref 45–117)
ALT SERPL-CCNC: 6 U/L (ref 12–78)
ANION GAP SERPL CALC-SCNC: 5 MMOL/L (ref 5–15)
AST SERPL-CCNC: 63 U/L (ref 15–37)
BASOPHILS # BLD: 0 K/UL (ref 0–0.1)
BASOPHILS NFR BLD: 1 % (ref 0–1)
BILIRUB SERPL-MCNC: 0.9 MG/DL (ref 0.2–1)
BUN SERPL-MCNC: 23 MG/DL (ref 6–20)
BUN/CREAT SERPL: 6 (ref 12–20)
CALCIUM SERPL-MCNC: 7.8 MG/DL (ref 8.5–10.1)
CHLORIDE SERPL-SCNC: 103 MMOL/L (ref 97–108)
CO2 SERPL-SCNC: 30 MMOL/L (ref 21–32)
CREAT SERPL-MCNC: 4.07 MG/DL (ref 0.7–1.3)
DIFFERENTIAL METHOD BLD: ABNORMAL
EOSINOPHIL # BLD: 0.3 K/UL (ref 0–0.4)
EOSINOPHIL NFR BLD: 6 % (ref 0–7)
ERYTHROCYTE [DISTWIDTH] IN BLOOD BY AUTOMATED COUNT: 19 % (ref 11.5–14.5)
GLOBULIN SER CALC-MCNC: 3.6 G/DL (ref 2–4)
GLUCOSE BLD STRIP.AUTO-MCNC: 102 MG/DL (ref 65–100)
GLUCOSE BLD STRIP.AUTO-MCNC: 114 MG/DL (ref 65–100)
GLUCOSE BLD STRIP.AUTO-MCNC: 130 MG/DL (ref 65–100)
GLUCOSE BLD STRIP.AUTO-MCNC: 158 MG/DL (ref 65–100)
GLUCOSE BLD STRIP.AUTO-MCNC: 70 MG/DL (ref 65–100)
GLUCOSE BLD STRIP.AUTO-MCNC: 88 MG/DL (ref 65–100)
GLUCOSE SERPL-MCNC: 69 MG/DL (ref 65–100)
HCT VFR BLD AUTO: 30.2 % (ref 36.6–50.3)
HGB BLD-MCNC: 9.9 G/DL (ref 12.1–17)
IMM GRANULOCYTES # BLD AUTO: 0.1 K/UL (ref 0–0.04)
IMM GRANULOCYTES NFR BLD AUTO: 2 % (ref 0–0.5)
LYMPHOCYTES # BLD: 0.6 K/UL (ref 0.8–3.5)
LYMPHOCYTES NFR BLD: 10 % (ref 12–49)
MCH RBC QN AUTO: 31 PG (ref 26–34)
MCHC RBC AUTO-ENTMCNC: 32.8 G/DL (ref 30–36.5)
MCV RBC AUTO: 94.7 FL (ref 80–99)
MONOCYTES # BLD: 0.7 K/UL (ref 0–1)
MONOCYTES NFR BLD: 13 % (ref 5–13)
NEUTS SEG # BLD: 3.7 K/UL (ref 1.8–8)
NEUTS SEG NFR BLD: 69 % (ref 32–75)
NRBC # BLD: 0 K/UL (ref 0–0.01)
NRBC BLD-RTO: 0 PER 100 WBC
PLATELET # BLD AUTO: 74 K/UL (ref 150–400)
PMV BLD AUTO: 11.6 FL (ref 8.9–12.9)
POTASSIUM SERPL-SCNC: 4 MMOL/L (ref 3.5–5.1)
PROT SERPL-MCNC: 6.4 G/DL (ref 6.4–8.2)
RBC # BLD AUTO: 3.19 M/UL (ref 4.1–5.7)
SERVICE CMNT-IMP: ABNORMAL
SERVICE CMNT-IMP: NORMAL
SERVICE CMNT-IMP: NORMAL
SODIUM SERPL-SCNC: 138 MMOL/L (ref 136–145)
WBC # BLD AUTO: 5.3 K/UL (ref 4.1–11.1)

## 2019-09-10 PROCEDURE — 77010033678 HC OXYGEN DAILY

## 2019-09-10 PROCEDURE — 36415 COLL VENOUS BLD VENIPUNCTURE: CPT

## 2019-09-10 PROCEDURE — 82962 GLUCOSE BLOOD TEST: CPT

## 2019-09-10 PROCEDURE — 74011250637 HC RX REV CODE- 250/637: Performed by: INTERNAL MEDICINE

## 2019-09-10 PROCEDURE — 74011250637 HC RX REV CODE- 250/637: Performed by: FAMILY MEDICINE

## 2019-09-10 PROCEDURE — 93923 UPR/LXTR ART STDY 3+ LVLS: CPT

## 2019-09-10 PROCEDURE — 74011250637 HC RX REV CODE- 250/637: Performed by: NURSE PRACTITIONER

## 2019-09-10 PROCEDURE — 97530 THERAPEUTIC ACTIVITIES: CPT

## 2019-09-10 PROCEDURE — 80053 COMPREHEN METABOLIC PANEL: CPT

## 2019-09-10 PROCEDURE — 97165 OT EVAL LOW COMPLEX 30 MIN: CPT

## 2019-09-10 PROCEDURE — 65660000000 HC RM CCU STEPDOWN

## 2019-09-10 PROCEDURE — 71045 X-RAY EXAM CHEST 1 VIEW: CPT

## 2019-09-10 PROCEDURE — 97161 PT EVAL LOW COMPLEX 20 MIN: CPT

## 2019-09-10 PROCEDURE — 94760 N-INVAS EAR/PLS OXIMETRY 1: CPT

## 2019-09-10 PROCEDURE — 85025 COMPLETE CBC W/AUTO DIFF WBC: CPT

## 2019-09-10 RX ORDER — LANOLIN ALCOHOL/MO/W.PET/CERES
400 CREAM (GRAM) TOPICAL DAILY
Status: COMPLETED | OUTPATIENT
Start: 2019-09-10 | End: 2019-09-13

## 2019-09-10 RX ADMIN — MIRTAZAPINE 30 MG: 15 TABLET, FILM COATED ORAL at 22:27

## 2019-09-10 RX ADMIN — PREGABALIN 50 MG: 50 CAPSULE ORAL at 16:24

## 2019-09-10 RX ADMIN — Medication 40 ML: at 14:00

## 2019-09-10 RX ADMIN — OXYCODONE AND ACETAMINOPHEN 2 TABLET: 5; 325 TABLET ORAL at 16:24

## 2019-09-10 RX ADMIN — PREGABALIN 50 MG: 50 CAPSULE ORAL at 09:33

## 2019-09-10 RX ADMIN — ASPIRIN 81 MG 81 MG: 81 TABLET ORAL at 09:34

## 2019-09-10 RX ADMIN — MIDODRINE HYDROCHLORIDE 10 MG: 5 TABLET ORAL at 18:31

## 2019-09-10 RX ADMIN — MIDODRINE HYDROCHLORIDE 10 MG: 5 TABLET ORAL at 12:01

## 2019-09-10 RX ADMIN — Medication 40 ML: at 04:39

## 2019-09-10 RX ADMIN — TICAGRELOR 90 MG: 90 TABLET ORAL at 04:38

## 2019-09-10 RX ADMIN — Medication 10 ML: at 21:14

## 2019-09-10 RX ADMIN — FINASTERIDE 5 MG: 5 TABLET, FILM COATED ORAL at 09:34

## 2019-09-10 RX ADMIN — PRAVASTATIN SODIUM 80 MG: 40 TABLET ORAL at 22:26

## 2019-09-10 RX ADMIN — PANTOPRAZOLE SODIUM 40 MG: 40 TABLET, DELAYED RELEASE ORAL at 09:33

## 2019-09-10 RX ADMIN — OXYCODONE AND ACETAMINOPHEN 2 TABLET: 5; 325 TABLET ORAL at 04:38

## 2019-09-10 RX ADMIN — Medication 400 MG: at 12:01

## 2019-09-10 RX ADMIN — OXYCODONE AND ACETAMINOPHEN 2 TABLET: 5; 325 TABLET ORAL at 21:10

## 2019-09-10 RX ADMIN — PREGABALIN 50 MG: 50 CAPSULE ORAL at 22:27

## 2019-09-10 RX ADMIN — TRAZODONE HYDROCHLORIDE 150 MG: 50 TABLET ORAL at 22:27

## 2019-09-10 RX ADMIN — OXYCODONE AND ACETAMINOPHEN 2 TABLET: 5; 325 TABLET ORAL at 12:21

## 2019-09-10 RX ADMIN — FLUOXETINE 20 MG: 20 CAPSULE ORAL at 09:33

## 2019-09-10 RX ADMIN — TICAGRELOR 90 MG: 90 TABLET ORAL at 18:29

## 2019-09-10 RX ADMIN — PANTOPRAZOLE SODIUM 40 MG: 40 TABLET, DELAYED RELEASE ORAL at 16:24

## 2019-09-10 NOTE — PROGRESS NOTES
RAPID RESPONSE FOLLOW UP    Rounded on pt d/t transfer out of CCU on 09/09. Pt found alert, NAD, sitting upright in bed eating breakfast. Pt reports bilateral upper extremity weakness.  equal and pt has good ROM with purposeful movement. PT/OT to evaluate pt today. No further rapid response interventions currently indicated.     Sumit Kimble RN  Rapid Response Team, [id number]

## 2019-09-10 NOTE — PROGRESS NOTES
CSS Floor note    S/P Incision of right axilla impella  S/P CP impella removal      Visit Vitals  /57 (BP 1 Location: Right arm, BP Patient Position: Supine)   Pulse 78   Temp 97.3 °F (36.3 °C)   Resp 20   Ht 5' 7\" (1.702 m)   Wt 170 lb 14.4 oz (77.5 kg)   SpO2 100%   BMI 26.77 kg/m²     Right impella incision is dry without swelling or drainage    Plan : leave staples on for 14 days

## 2019-09-10 NOTE — PROGRESS NOTES
Problem: Self Care Deficits Care Plan (Adult)  Goal: *Acute Goals and Plan of Care (Insert Text)  Description    FUNCTIONAL STATUS PRIOR TO ADMISSION: The patient was functional at the wheelchair level and Mod I with transfers to the chair. Independent with UB dressing and Min A with LB ADLs. Uses reacher and long handled sponge intermittently during bathing/dressing. Mostly independent with feeding yet requires assist with cutting meat items. Does not drive. Relies on Medicaid transportation to HD appointments MW. HOME SUPPORT: The patient lived with his wife, son and daughter in law. Daughter in law serves as Pts caregiver. Occupational Therapy Goals  Initiated 9/10/2019  1. Patient will perform grooming tasks in supported sitting with Setup Assist within 7 day(s). 2.  Patient will perform upper body dressing with independence within 7 day(s). 3.  Patient will perform lower body dressing with Min A using AE/adaptive strategies PRN within 7 day(s). 4.  Patient will perform chair<>BSC/WC<>toilet transfers with modified independence within 7 day(s). 5.  Patient will perform ester care during toileting with modified independence using AE/adaptive strategies PRN within 7 day(s). 6.  Patient will perform clothing management during toileting with Min A using AE/adaptive strategies PRN within 7 day(s). Outcome: Progressing Towards Goal   OCCUPATIONAL THERAPY EVALUATION  Patient: Terri Rodriguez Sr.  (65 y.o. male)  Date: 9/10/2019  Primary Diagnosis: Unstable angina (HCC) [I20.0]  Heart failure (HCC) [I50.9]  Procedure(s) (LRB):  CORONARY ANGIOGRAPHY (N/A)  PERCUTANEOUS CORONARY INTERVENTION (N/A)  Intra-Aortic Balloon Removal (N/A)  Intravascular Ultrasound (N/A)  Insert Stent [place] Coronary (N/A) 4 Days Post-Op   Precautions:  Fall, Contact, Bed Alarm(L AKA)    ASSESSMENT  Based on the objective data described below, the patient presents with decreased functional mobility, poor activity tolerance, desaturation with activity on RA, c/o intermittent R UE p!, RUE edema, and decreased unsupported sitting balance on POD #4 s/p stent placement. With RN approval to assess, O2 sats decreased from 100% on 3L to 86% after 3 minutes on RA following supine<>sit transfer and LB ADL task. O2 sats then recovered to 98% on 3L in under one minute. It is difficult to assess exactly how much assistance Pt requires for each ADL as Pt perseverates during conversation on minor details and becomes easily distracted. Overall he is performing ADLs and related mobility just under his reported baseline. Do not anticipate he will require [place] OT given that he receives some assist at baseline from daughter in law and has DME. Plan to assess R UE function during ADLs and WC<>toilet transfers next session. Current Level of Function Impacting Discharge (ADLs/self-care): CGA-SBA with bed mobility with HOB elevated; requires Min-Mod Assist with ADLs, just below reported baseline. Functional Outcome Measure: The patient scored Total: 35/100 on the Barthel Index outcome measure which is indicative of 65% impaired ability to care for basic self needs/dependency on others; inferred 65% dependency on others for instrumental ADLs. Other factors to consider for discharge: Desaturates with activity on RA; If discharging home, he will require 24/7 assistance. Patient will benefit from skilled therapy intervention to address the above noted impairments. PLAN :  Recommendations and Planned Interventions: self care training, functional mobility training, endurance activities and home safety training    Frequency/Duration: Patient will be followed by occupational therapy 3 times a week to address goals. Recommendation for discharge: (in order for the patient to meet his/her long term goals)  No skilled occupational therapy/ follow up rehabilitation needs identified at this time.  Agree with plan for [place] PT to maximize Pts safety/independence with transfers. This discharge recommendation:  A follow-up discussion with the attending provider and/or case management is planned    Equipment recommendations for successful discharge (if) home: May benefit from sock aide to don R sock        SUBJECTIVE:   Patient stated I normally move a little faster\"    OBJECTIVE DATA SUMMARY:   HISTORY:   Past Medical History:   Diagnosis Date    Anemia     Arthritis     back, hips    CAD (coronary artery disease)     Sees Dr. Nathaniel Batista, 4 heart attacks    Chronic kidney disease     Dr. Stuart Duenas, [facility]-     Chronic LBP 1990    Slipped on gravel and fell at work; Multiple surgeries;  Sees Dr. Veronica Calloway for pain mgmt    Chronic pain     Confusion     Depression     Diabetes ([facility] [address]     ED (erectile dysfunction)     GERD (gastroesophageal reflux disease) 11/2015    Diagnosed [facility] [id number] [facility] last month    Hypercholesteremia     Hypertension     Liver disease     Psychiatric disorder     depression    PVD (peripheral vascular disease) ([facility]     Thromboembolus ([facility] [address]     DVT in right leg     Past Surgical History:   Procedure Laterality Date    CARDIAC SURG PROCEDURE UNLIST      PTCA    HX ABOVE KNEE AMPUTATION Left 2013    Left knee stump non-healing ulcer; Dr. Kristofer Willingham Left     HX APPENDECTOMY      HX Ak[employer] Smalls      Left leg    HX CHOLECYSTECTOMY      HX GI      HX HIP REPLACEMENT      right hip replaced x 2    HX ORTHOPAEDIC  1990s    4 back surgeries    HX ORTHOPAEDIC      donna ankles pin placed    HX ORTHOPAEDIC      left leg 17 surgeries    HX VASCULAR ACCESS Left     port left arm    UPPER GI ENDOSCOPY,BIOPSY  9/2/2015         UPPER GI ENDOSCOPY,BIOPSY  10/12/2015         VASCULAR SURGERY PROCEDURE UNLIST      Multiple revascularization procedures, toe amputations       Expanded or extensive additional review of patient history:     [facility] Environment: Private residence  Wheelchair Ramp: Yes  One/Two Story Residence: Two story, live on 1st floor  Living Alone: No  Support Systems: Child(roger), Family member(s), Spouse/Significant Other/Partner  Patient Expects to be Discharged to[de-identified] Unknown  Current DME Used/Available at Home: Adaptive dressing aides, Commode, bedside, Hospital bed, Shower chair, Wheelchair, power, Wheelchair(long handled sponge, reacher)  Tub or Shower Type: Shower(handicap w/ built in shower seat)    Hand dominance: Right    EXAMINATION OF PERFORMANCE DEFICITS:  Cognitive/Behavioral Status:  Neurologic State: Alert  Orientation Level: Oriented to person;Oriented to situation  Cognition: Appropriate decision making; Appropriate for age attention/concentration; Appropriate safety awareness     Skin: Erythema noted throughout upper body. Surgical staples to R upper chest intact. Edema: Moderate edema noted throughout R UE    Hearing: Auditory  Auditory Impairment: None    Vision/Perceptual:       Acuity: Within Defined Limits;Able to read clock/calendar on wall without difficulty(has Rx for glasses yet hasn't filled it)         Range of Motion:  AROM: Generally decreased, functional (mildly decreased R shoulder AROM yet functional)  PROM: Generally decreased, functional     Strength:  Strength: Generally decreased, functional        Coordination:  Coordination: Generally decreased, functional  Fine Motor Skills-Upper: Left Intact; Right Intact    Gross Motor Skills-Upper: Left Intact; Right Intact    Tone & Sensation:  Tone: Normal  Sensation: Impaired (diminished light touch to R foot and bilat hands; has PVD)       Balance:  Sitting: Intact  Sitting - Static: Good (unsupported)  Sitting - Dynamic: Fair (occasional)  Standing: (not tested)    Functional Mobility and Transfers for ADLs:  Bed Mobility:  Rolling: Contact guard assistance  Supine to Sit: Stand-by assistance(HOB elevated)  Sit to Supine: Stand-by assistance  Scooting: Stand-by assistance    Transfers:  Sit to Stand: (not tested)    ADL Assessment:  Feeding: Minimum assistance(reports difficulty with cutting meat)    Oral Facial Hygiene/Grooming: Minimum assistance    Bathing: Moderate assistance    Upper Body Dressing: Minimum assistance(with donning items over backside d/t p! with R shoulder IR)    Lower Body Dressing: Moderate assistance    Toileting: Moderate assistance(likely needs assist w/ rear ester care & clothing management)       ADL Intervention and task modifications:      Lower Body Dressing Assistance  Shoes with Velcro: Maximum assistance(doffing/donning cam boot on R foot)  Position Performed: Seated edge of bed    Functional Measure:  Barthel Index:    Bathin  Bladder: 10  Bowels: 10  Groomin  Dressin  Feedin  Mobility: 0  Stairs: 0  Toilet Use: 5  Transfer (Bed to Chair and Back): 5  Total: 35/100        The Barthel ADL Index: Guidelines  1. The index should be used as a record of what a patient does, not as a record of what a patient could do. 2. The main aim is to establish degree of independence from any help, physical or verbal, however minor and for whatever reason. 3. The need for supervision renders the patient not independent. 4. A patient's performance should be established using the best available evidence. Asking the patient, friends/relatives and nurses are the usual sources, but direct observation and common sense are also important. However direct testing is not needed. 5. Usually the patient's performance over the preceding 24-48 hours is important, but occasionally longer periods will be relevant. 6. Middle categories imply that the patient supplies over 50 per cent of the effort. 7. Use of aids to be independent is allowed. Jinny Manuel., Barthel, D.W. (9235). Functional evaluation: the Barthel Index. [address] [facility] (14)2. Kit  sudha WON August, Marissa Garcia., Viviana Cabrera., Mendez, [address] ().  Measuring the change indisability after inpatient rehabilitation; comparison of the responsiveness of the Barthel Index and Functional [place] Measure. Journal of Neurology, Neurosurgery, and Psychiatry, 66(4), 431-466. AVANI Ching, LALITHA Kraft, & Bela Gibson M.A. (2004.) Assessment of post-stroke quality of life in cost-effectiveness studies: The usefulness of the Barthel Index and the EuroQoL-5D. Quality of Life Research, 15, 018-44         Occupational Therapy Evaluation Charge Determination   History Examination Decision-Making   LOW Complexity : Brief history review  LOW Complexity : 1-3 performance deficits relating to physical, cognitive , or psychosocial skils that result in activity limitations and / or participation restrictions  MEDIUM Complexity : Patient may present with comorbidities that affect occupational performnce. Miniml to moderate modification of tasks or assistance (eg, physical or verbal ) with assesment(s) is necessary to enable patient to complete evaluation       Based on the above components, the patient evaluation is determined to be of the following complexity level: MEDIUM  Pain Rating:  Pt with c/o intermittent R UE p! during bed mobility and seated ADL task. RN made aware. Activity Tolerance:   Fair, Poor, desaturates with exertion and requires oxygen and observed SOB with activity  Please refer to the flowsheet for vital signs taken during this treatment. After treatment patient left in no apparent distress:    Supine in bed, Call bell within reach and Bed / chair alarm activated; Arm rails x3    COMMUNICATION/EDUCATION:   The patients plan of care was discussed with: Physical Therapist, Registered Nurse and Patient . Home safety education was provided and the patient/caregiver indicated understanding., Patient/family have participated as able in goal setting and plan of care. and Patient/family agree to work toward stated goals and plan of care.     Thank you for this referral.  Sandro Henry, OTR/L  Time Calculation: 25 mins

## 2019-09-10 NOTE — PROGRESS NOTES
NAME: Kylee Rust .        :  1955        MRN:  [id number]        Assessment :    Plan:  --ESRD-  Hypotension  Anemia  CP dyspnea  CAD --[id number]  No HD today - UF has been limited by low BP    On OWEN. Prn prbc's (s/p transfusion )    On Midodrine as of 19 (dose increased )       Subjective:     Chief Complaint: resting. Denies complaint at this time. Joking and in good spirits. We discussed the above. Family/friends in the room. Review of Systems:    Symptom Y/N Comments  Symptom Y/N Comments   Fever/Chills    Chest Pain     Poor Appetite    Edema     Cough    Abdominal Pain     Sputum    Joint Pain     SOB/COREA    Pruritis/Rash     Nausea/vomit    Tolerating PT/OT     Diarrhea    Tolerating Diet     Constipation    Other       Could not obtain due to:      Objective:     VITALS:   Last 24hrs VS reviewed since prior progress note. Most recent are:  Visit Vitals  /78   Pulse 74   Temp 96.7 °F (35.9 °C)   Resp 19   Ht 5' 7\" (1.702 m)   Wt 77.5 kg (170 lb 14.4 oz)   SpO2 99%   BMI 26.77 kg/m²       Intake/Output Summary (Last 24 hours) at 9/10/2019 0701  Last data filed at 9/10/2019 0650  Gross per 24 hour   Intake 100 ml   Output 200 ml   Net -100 ml      Telemetry Reviewed:     PHYSICAL EXAM:  General: intubated.   1+ edema      Lab Data Reviewed: (see below)    Medications Reviewed: (see below)    PMH/SH reviewed - no change compared to H&P  ________________________________________________________________________  Care Plan discussed with:  Patient     Family      RN     Care Manager                    Consultant:          Comments   >50% of visit spent in counseling and coordination of care       ________________________________________________________________________  Delia Fine MD     Procedures: see electronic medical records for all procedures/Xrays and details which  were not copied into this note but were reviewed prior to creation of Plan. LABS:  Recent Labs     09/10/19  0431 09/09/19  0449   WBC 5.3 5.6   HGB 9.9* 9.7*   HCT 30.2* 28.8*   PLT 74* 71*     Recent Labs     09/10/19  0431 09/09/19 0449 09/08/19  0508    134* 138   K 4.0 4.3 3.2*    101 102   CO2 30 26 28   BUN 23* 36* 21*   CREA 4.07* 5.11* 3.65*   GLU 69 139* 90   CA 7.8* 7.9* 7.8*     Recent Labs     09/10/19  0431 09/09/19  0449 09/08/19  0508   SGOT 63* 63* 50*    80 52   TP 6.4 6.1* 5.6*   ALB 2.8* 2.6* 2.5*   GLOB 3.6 3.5 3.1     No results for input(s): INR, PTP, APTT in the last 72 hours. No lab exists for component: INREXT, INREXT   No results for input(s): FE, TIBC, PSAT, FERR in the last 72 hours. Lab Results   Component Value Date/Time    Folate 7.0 01/06/2016 01:08 PM      No results for input(s): PH, PCO2, PO2 in the last 72 hours. No results for input(s): CPK, CKMB in the last 72 hours.     No lab exists for component: TROPONINI  No components found for: [place]  Lab Results   Component Value Date/Time    Color YELLOW/STRAW 09/05/2019 06:00 PM    Appearance CLEAR 09/05/2019 06:00 PM    Specific gravity 1.027 09/05/2019 06:00 PM    pH (UA) 7.0 09/05/2019 06:00 PM    Protein 100 (A) 09/05/2019 06:00 PM    Glucose NEGATIVE  09/05/2019 06:00 PM    Ketone NEGATIVE  09/05/2019 06:00 PM    Bilirubin NEGATIVE  09/05/2019 06:00 PM    Urobilinogen 1.0 09/05/2019 06:00 PM    Nitrites NEGATIVE  09/05/2019 06:00 PM    Leukocyte Esterase NEGATIVE  09/05/2019 06:00 PM    Epithelial cells FEW 09/05/2019 06:00 PM    Bacteria NEGATIVE  09/05/2019 06:00 PM    WBC 0-4 09/05/2019 06:00 PM    RBC 5-10 09/05/2019 06:00 PM       MEDICATIONS:  Current Facility-Administered Medications   Medication Dose Route Frequency    midodrine (PROAMITINE) tablet 10 mg  10 mg Oral TID WITH MEALS    pantoprazole (PROTONIX) tablet 40 mg  40 mg Oral ACB&D    insulin lispro (HUMALOG) injection   SubCUTAneous AC&HS    glucose chewable tablet 16 g  4 Tab Oral PRN    glucagon (GLUCAGEN) injection 1 mg  1 mg IntraMUSCular PRN    dextrose 10% infusion 0-250 mL  0-250 mL IntraVENous PRN    0.9% sodium chloride infusion 250 mL  250 mL IntraVENous PRN    epoetin calos-epbx (RETACRIT) injection 6,000 Units  6,000 Units SubCUTAneous DIALYSIS MON, WED & FRI    aspirin chewable tablet 81 mg  81 mg Oral DAILY    alcohol 62% (NOZIN) nasal  1 Ampule  1 Ampule Topical Q12H    ticagrelor (BRILINTA) tablet 90 mg  90 mg Oral Q12H    albumin human 25% (BUMINATE) solution 12.5 g  12.5 g IntraVENous DIALYSIS PRN    sodium chloride (NS) flush 5-40 mL  5-40 mL IntraVENous Q8H    sodium chloride (NS) flush 5-40 mL  5-40 mL IntraVENous PRN    sodium phosphate (FLEET'S) enema 1 Enema  1 Enema Rectal PRN    oxyCODONE-acetaminophen (PERCOCET) 5-325 mg per tablet 1-2 Tab  1-2 Tab Oral Q4H PRN    pravastatin (PRAVACHOL) tablet 80 mg  80 mg Oral QHS    cyclobenzaprine (FLEXERIL) tablet 5 mg  5 mg Oral TID PRN    finasteride (PROSCAR) tablet 5 mg  5 mg Oral DAILY    FLUoxetine (PROzac) capsule 20 mg  20 mg Oral DAILY    mirtazapine (REMERON) tablet 30 mg  30 mg Oral QHS    nitroglycerin (NITROSTAT) tablet 0.4 mg  0.4 mg SubLINGual Q5MIN PRN    pregabalin (LYRICA) capsule 50 mg  50 mg Oral TID    traZODone (DESYREL) tablet 150 mg  150 mg Oral QHS    glucose chewable tablet 16 g  4 Tab Oral PRN    glucagon (GLUCAGEN) injection 1 mg  1 mg IntraMUSCular PRN    dextrose 10% infusion 0-250 mL  0-250 mL IntraVENous PRN

## 2019-09-10 NOTE — PROGRESS NOTES
[facility] INTERDISCIPLINARY ROUNDS    Cardiopulmonary Care Interdisciplinary Rounds were held today to discuss patient's plan of care and outcomes. The following members were present: Pharmacy, Nursing and Case Management.   Expected Length of Stay:  2d 16h    PLAN OF CARE:   Continue current treatment plan

## 2019-09-10 NOTE — PROGRESS NOTES
Hospitalist Progress Note    NAME: Ang Gold Sr.   :  1955   MRN:  [id number]       Assessment / Plan:  Unstable angina in settings of CAD with h/o multiple stents in the past   HTN   -doing well clinically    -Card cath  with severe disease,  Poor CABG candidate   Cath  w/ Impella CP supported PCI of OM1, mLAD, and LM/LAD/LCx bifurcation w/               Culotte technique w/ total of 4 STANLEY               Impella removed in cath lab. Some bleeding afterwards. Got PRBCs and PLT. Clot noted on distal Impella upon removal (seen on WADE)  -Cardiology/pulmonology help appreciated   - holding Coreg and lisinopril for low BP  - Cont ASA  - on Midodrine for pressor support    DM type II with  Nephropathy  -BS low side   -Hold home Tradjenta   -c/w SS as needed     Thrombocytopenia  -Plt at baseline normal. Admission 53, low side but stable now   Heparin gtt was stopped   -HIT negative, follow BELLA     ESRD   -HD TThSa   -Renal help appreciated. Renal consult for HD. Renally dose Rx as appropriate. PAD  Multiple small wounds    -Patient is scheduled to have a right second toe amputation on 10/1/2019  -Continue aspirin and Brilinta  -consult wound care    L AKA   Depression, home trazodone  H/o R renal artery stenosis, s/p stent 2018                   Code Status: full   Surrogate Decision Maker: Wife Su Foss  DVT Prophylaxis: Scd's pending card cath         Baseline: lives at home with wife and his son; independent   Recommended Disposition: when ok with cardiology      Subjective:     Chief Complaint / Reason for Physician Visit: following unstable angina   No recurrent fever  No CP       Discussed with RN events overnight.      Review of Systems:  Symptom Y/N Comments  Symptom Y/N Comments   Fever/Chills    Chest Pain n    Poor Appetite    Edema     Cough    Abdominal Pain     Sputum    Joint Pain     SOB/COREA n   Pruritis/Rash     Nausea/vomit    Tolerating PT/OT     Diarrhea Tolerating Diet     Constipation    Other       Could NOT obtain due to:      Objective:     VITALS:   Last 24hrs VS reviewed since prior progress note. Most recent are:  Patient Vitals for the past 24 hrs:   Temp Pulse Resp BP SpO2   09/10/19 1447 97.6 °F (36.4 °C) 77 20 102/53 95 %   09/10/19 1026 97.3 °F (36.3 °C) 78 20 104/57 100 %   09/10/19 0738 97.3 °F (36.3 °C) 71 20 134/62 95 %   09/10/19 0433 96.7 °F (35.9 °C) 74 19 131/78 99 %   09/09/19 2307 97.9 °F (36.6 °C) 64 18 119/62 100 %   09/09/19 1938 98.7 °F (37.1 °C) 94 16 112/63 96 %       Intake/Output Summary (Last 24 hours) at 9/10/2019 1558  Last data filed at 9/10/2019 1030  Gross per 24 hour   Intake 240 ml   Output 0 ml   Net 240 ml        PHYSICAL EXAM:  General: WD, WN. Confused some , just got extubated. Cooperative, no acute distress    EENT:  EOMI. Anicteric sclerae. MMM  Resp:  CTA bilaterally, no wheezing or rales. No accessory muscle use  CV:  Regular  rhythm,  No edema. + BL LE chronic venous changes   GI:  Soft, Non distended, Non tender.  +Bowel sounds  Neurologic:  Alert and oriented X 3, normal speech,   Psych:   Good insight. Not anxious nor agitated  Skin:  No rashes. No jaundice. + R shoulder stiches     Reviewed most current lab test results and cultures  YES  Reviewed most current radiology test results   YES  Review and summation of old records today    NO  Reviewed patient's current orders and MAR    YES  PMH/SH reviewed - no change compared to H&P  ________________________________________________________________________  Care Plan discussed with:    Comments   Patient y    Family      RN y    Care Manager     Consultant                        Multidiciplinary team rounds were held today with , nursing, pharmacist and clinical coordinator. Patient's plan of care was discussed; medications were reviewed and discharge planning was addressed. ________________________________________________________________________  Total NON critical care TIME: 25  Minutes    Total CRITICAL CARE TIME Spent:   Minutes non procedure based      Comments   >50% of visit spent in counseling and coordination of care     ________________________________________________________________________  Adilene Lynn MD     Procedures: see electronic medical records for all procedures/Xrays and details which were not copied into this note but were reviewed prior to creation of Plan. LABS:  I reviewed today's most current labs and imaging studies.   Pertinent labs include:  Recent Labs     09/10/19  0431 09/09/19  0449 09/08/19  0508   WBC 5.3 5.6 6.2   HGB 9.9* 9.7* 7.3*   HCT 30.2* 28.8* 21.9*   PLT 74* 71* 72*     Recent Labs     09/10/19  0431 09/09/19  0449 09/08/19  0508    134* 138   K 4.0 4.3 3.2*    101 102   CO2 30 26 28   GLU 69 139* 90   BUN 23* 36* 21*   CREA 4.07* 5.11* 3.65*   CA 7.8* 7.9* 7.8*   ALB 2.8* 2.6* 2.5*   TBILI 0.9 0.6 0.6   SGOT 63* 63* 50*   ALT 6* <6* <6*       Signed: Adilene Lynn MD

## 2019-09-10 NOTE — PROGRESS NOTES
[facility] in [place], [place]  Inpatient Progress Note      Patient name: Zane Brown Sr.  Patient : 1955  Patient MRN: [id number]  Attending MD: Silvia Vang *  Date of service: 09/10/19    CHIEF COMPLAINT:  Cardiogenic shock    24Hr Events  BP stable  Hgb stable  Right arm pain, weakness persists     PLAN:  BP stable, recommend resuming PTA medications   Consider repeating TTE off inotropic support  Dialysis per nephrology  Pancytopenia likely due to recent impella- should improve  Antiplatelet therapy per primary team  Right UE arterial doppler today due to significant pain, weakness, swelling   Wound care consult to evaluate right foot   Keep K>4 and Mg<2  TFTs WNL  Pulmonary hygiene     IMPRESSION:  Chest pain  Acute systolic heart failure  C/b cardiogenic shock  Coronary artery disease  S/p high risk PCI /s/p 4x STANLEY to of LAD, OM1, LM/LAD/LCx bifurcation with STANLEY  LHC () w/ RCA , 60% oLAD w/ neg FFR (0.88), patent LCx stent w/ mild ISR. Ischemic cardiomyopathy, LVEF 20%  Severe AS and moderate AR by intraop WADE  Prolonged QTc  Right renal artery stenosis s/p 7.0 x 19mm BMS 18  Occluded LCIA. Hyperparathyroidism, PTH in 700s  Hypocalcemia, around 6  Vitamin D deficiency  ESRD on dialysis. Dr Crump   Long term hx of Coronary artery disease s/p multivessel stenting s/p PCI of LCx, PTCA of RCA , PTCA LAD 3/94.  Lexiscan  w/ EF 62%, distal ineroapical infarct w/o ischemia  Cath 2018  No intervention.   Lv EF  -  55%   Peripheral vascular disease s/p left iliac stenting, left SFA stenting 3/00, right iliac stenting .   Status post L AKA  Bilateral carotid stenosis; 16-49%   Hypertension  Diabetes  Chronic Tobacco abuse 1-2 PPD .   Chronic pain  Chronic anemia  Hyperlipidemia  Noncompliance  Peptic ulcer disease w/ GIB 15  Falls  Lives w/ sister in [address] [place] [address] now    CARDIAC IMAGING:  Echo (19) LVEF 55%,   EKG (9/4/19) NSR 89bpm,  ms, bifascicular bloc, anterior and inferior infarct, QTC 5.37  [facility] (9/5/19) Films reviewed from 4/18. Ostial LAD. Occluded RCA. LCx ok. Left common iliac artery occluded.   Cath 9/5 w/ RCA , sev reduced LVEF, severe LM, LAD, LCx disease (Renteria Class 1, 1, 1). Discussed w/ CTS; initially planned for CABG but rescinded due to numerous comorbidities. IABP placed @ 1:1. Virgie placed      PHYSICAL EXAM:  Visit Vitals  /62 (BP 1 Location: Right arm, BP Patient Position: Supine)   Pulse 71   Temp 97.3 °F (36.3 °C)   Resp 20   Ht 5' 7\" (1.702 m)   Wt 170 lb 14.4 oz (77.5 kg)   SpO2 95%   BMI 26.77 kg/m²     Physical Exam   Constitutional: He is well-developed, well-nourished, and in no distress. No distress. HENT:   Head: Normocephalic. Neck: Normal range of motion. Neck supple. No JVD present. Cardiovascular: Normal rate, regular rhythm, normal heart sounds and intact distal pulses. Pulmonary/Chest: Effort normal and breath sounds normal. No respiratory distress. Abdominal: Soft. He exhibits no distension. Musculoskeletal: Normal range of motion. He exhibits no edema. L AKA   Neurological: He is alert. Skin: Skin is warm and dry. Venous ulcers on BUE and L LE   Nursing note and vitals reviewed. REVIEW OF SYSTEMS:  Review of Systems   Constitutional: Positive for malaise/fatigue. Negative for fever. HENT: Negative. Respiratory: Negative. Cardiovascular: Positive for leg swelling. Negative for chest pain. Gastrointestinal: Negative. Musculoskeletal:        Severe right arm pain, weakness   Skin: Negative. Neurological: Negative. Psychiatric/Behavioral: Negative for depression. The patient has insomnia. HISTORY OF PRESENT ILLNESS:  Briefly, this is a 70 y/o WM, tobacco smoker, with h/o CAD, ICM LVEF 20%, ESRD on HD, right AKA who presented with new angina. Found to have severe LM disease and  of prox RCA.  Consider too high risk for CABG. Underwent high risk PCI /s/p 4x STANLEY placed to LAD, OM1, LM/LAD/LCx bifurcation with STANLEY with impella 5.0 support. Impella was removed with excellent hemodynamics on dobutamine 5 mcg/kg/min. Dobutamine has been weaned off and he is undergoing hemodynamic optimization in the stepdown unit. PAST MEDICAL HISTORY:  Past Medical History:   Diagnosis Date    Anemia     Arthritis     back, hips    CAD (coronary artery disease)     Sees Dr. Nathaniel Batista, 4 heart attacks    Chronic kidney disease     Dr. Stuart Duenas, [facility] M-W-F     Chronic LBP 1990    Slipped on gravel and fell at work; Multiple surgeries;  Sees Dr. Veronica Calloway for pain mgmt    Chronic pain     Confusion     Depression     Diabetes ([facility]     ED (erectile dysfunction)     GERD (gastroesophageal reflux disease) 11/2015    Diagnosed [place] [address] [facility] last month    Hypercholesteremia     Hypertension     Liver disease     Psychiatric disorder     depression    PVD (peripheral vascular disease) ([facility]     Thromboembolus ([facility]     DVT in right leg       PAST SURGICAL HISTORY:  Past Surgical History:   Procedure Laterality Date    CARDIAC SURG PROCEDURE UNLIST      PTCA    HX ABOVE KNEE AMPUTATION Left 2013    Left knee stump non-healing ulcer; Dr. Kristofer Willingham Left     HX APPENDECTOMY      HX BELOW KNEE AMPUTATION      Left leg    HX CHOLECYSTECTOMY      HX GI      HX HIP REPLACEMENT      right hip replaced x 2    HX ORTHOPAEDIC  1990s    4 back surgeries    HX ORTHOPAEDIC      donna ankles pin placed    HX ORTHOPAEDIC      left leg 17 surgeries    HX VASCULAR ACCESS Left     port left arm    UPPER GI ENDOSCOPY,BIOPSY  9/2/2015         UPPER GI ENDOSCOPY,BIOPSY  10/12/2015         VASCULAR SURGERY PROCEDURE UNLIST      Multiple revascularization procedures, toe amputations       FAMILY HISTORY:  Family History   Problem Relation Age of Onset    Alcohol abuse Father     Diabetes Sister  Diabetes Sister     Lung Disease Mother     Cancer Maternal Grandfather         Head and neck    Cancer Paternal Grandmother         Stomach       SOCIAL HISTORY:  Social History     Socioeconomic History    Marital status:      Spouse name: Not on file    Number of children: Not on file    Years of education: Not on file    Highest education level: Not on file   Occupational History    Occupation: Retired    Tobacco Use    Smoking status: Current Every Day Smoker     Packs/day: 1.00     Years: 35.00     Pack years: 35.00    Smokeless tobacco: Never Used    Tobacco comment: 30 pack years; Occasional cigar   Substance and Sexual Activity    Alcohol use: No     Comment: Former heavy drinker quit drinking about 17 years ago    Drug use: No     Types: Prescription    Sexual activity: Not Currently       LABORATORY RESULTS:     Labs Latest Ref Rng & Units 9/10/2019 9/9/2019 9/8/2019 9/7/2019 9/6/2019 9/6/2019 9/5/2019   WBC 4.1 - 11.1 K/uL 5.3 5.6 6.2 6.4 5.8 3.5(L) -   RBC 4.10 - 5.70 M/uL 3.19(L) 3.14(L) 2.33(L) 3.09(L) 3.37(L) 3.07(L) -   Hemoglobin 12.1 - 17.0 g/dL 9.9(L) 9.7(L) 7. 3(L) 9.5(L) 10. 2(L) 9.6(L) -   Hematocrit 36.6 - 50.3 % 30. 2(L) 28. 8(L) 21. 9(L) 28. 4(L) 31. 3(L) 30. 4(L) -   MCV 80.0 - 99.0 FL 94.7 91.7 94.0 91.9 92.9 99.0 -   Platelets 164 - 698 K/uL 74(L) 71(L) 72(L) 72(L) 90(L) 49(LL) -   Lymphocytes 12 - 49 % 10(L) 7(L) 3(L) - - 17 -   Monocytes 5 - 13 % 13 10 7 - - 14(H) -   Eosinophils 0 - 7 % 6 5 2 - - 4 -   Basophils 0 - 1 % 1 1 0 - - 1 -   Albumin 3.5 - 5.0 g/dL 2. 8(L) 2. 6(L) 2. 5(L) 2. 4(L) 2. 3(L) 2. 5(L) 2. 7(L)   Calcium 8.5 - 10.1 MG/DL 7. 8(L) 7. 9(L) 7. 8(L) 8.0(L) 6. 6(L) 7. 2(L) 7. 8(L)   SGOT 15 - 37 U/L 63(H) 63(H) 50(H) 27 29 17 15   Glucose 65 - 100 mg/dL 69 139(H) 90 137(H) 154(H) 101(H) 79   BUN 6 - 20 MG/DL 23(H) 36(H) 21(H) 42(H) 37(H) 35(H) 26(H)   Creatinine 0.70 - 1.30 MG/DL 4.07(H) 5.11(H) 3.65(H) 5.55(H) 5.07(H) 4.76(H) 3.80(H)   Sodium 136 - 145 mmol/L 138 134(L) 138 134(L) 135(L) 136 133(L)   Potassium 3.5 - 5.1 mmol/L 4.0 4.3 3.2(L) 4.1 3.8 3. 3(L) 3. 3(L)   TSH 0.36 - 3.74 uIU/mL - - - 1.86 - - -   Some recent data might be hidden     Lab Results   Component Value Date/Time    TSH 1.86 09/07/2019 02:20 AM       ALLERGY:  Allergies   Allergen Reactions    Nubain [Nalbuphine] Other (comments)     Made me wild; Dysphoria        CURRENT MEDICATIONS:  Current Facility-Administered Medications   Medication Dose Route Frequency    midodrine (PROAMITINE) tablet 10 mg  10 mg Oral TID WITH MEALS    pantoprazole (PROTONIX) tablet 40 mg  40 mg Oral ACB&D    insulin lispro (HUMALOG) injection   SubCUTAneous AC&HS    glucose chewable tablet 16 g  4 Tab Oral PRN    glucagon (GLUCAGEN) injection 1 mg  1 mg IntraMUSCular PRN    dextrose 10% infusion 0-250 mL  0-250 mL IntraVENous PRN    0.9% sodium chloride infusion 250 mL  250 mL IntraVENous PRN    epoetin calos-epbx (RETACRIT) injection 6,000 Units  6,000 Units SubCUTAneous DIALYSIS MON, WED & FRI    aspirin chewable tablet 81 mg  81 mg Oral DAILY    ticagrelor (BRILINTA) tablet 90 mg  90 mg Oral Q12H    albumin human 25% (BUMINATE) solution 12.5 g  12.5 g IntraVENous DIALYSIS PRN    sodium chloride (NS) flush 5-40 mL  5-40 mL IntraVENous Q8H    sodium chloride (NS) flush 5-40 mL  5-40 mL IntraVENous PRN    sodium phosphate (FLEET'S) enema 1 Enema  1 Enema Rectal PRN    oxyCODONE-acetaminophen (PERCOCET) 5-325 mg per tablet 1-2 Tab  1-2 Tab Oral Q4H PRN    pravastatin (PRAVACHOL) tablet 80 mg  80 mg Oral QHS    cyclobenzaprine (FLEXERIL) tablet 5 mg  5 mg Oral TID PRN    finasteride (PROSCAR) tablet 5 mg  5 mg Oral DAILY    FLUoxetine (PROzac) capsule 20 mg  20 mg Oral DAILY    mirtazapine (REMERON) tablet 30 mg  30 mg Oral QHS    nitroglycerin (NITROSTAT) tablet 0.4 mg  0.4 mg SubLINGual Q5MIN PRN    pregabalin (LYRICA) capsule 50 mg  50 mg Oral TID    traZODone (DESYREL) tablet 150 mg  150 mg Oral QHS Thank you for allowing me to participate in this patient's care. Phong Sanches, SHIRA  [address] [place] [address], [address]  Phone: [phone]    [facility] ATTENDING ADDENDUM    Patient was note seen and examined in person. Data and notes were reviewed. I have discussed and agree with the plan as noted in the NP note above without further additions. Venus Griffiths MD PhD  [facility]    No charge.

## 2019-09-10 NOTE — PROGRESS NOTES
Problem: Mobility Impaired (Adult and Pediatric)  Goal: *Acute Goals and Plan of Care (Insert Text)  Description  FUNCTIONAL STATUS PRIOR TO ADMISSION: The patient was functional at the wheelchair level and was modified independent for transfers to the chair. Patient uses power W/C in his home and manual W/C in the community (does not propel self in W/C). Owns prothesis for LLE, however does not use it. Has assistance from family for bathing and dressing. Completes bed mobility mod I with hospital bed. HOME SUPPORT PRIOR TO ADMISSION: The patient lived with family. Had assistance for ADLs only. Physical Therapy Goals  Initiated 9/10/2019  1. Patient will move from supine to sit and sit to supine , scoot up and down and roll side to side in bed with modified independence within 7 day(s). 2.  Patient will transfer from bed to chair and chair to bed with modified independence using the least restrictive device within 7 day(s). Outcome: Progressing Towards Goal   PHYSICAL THERAPY EVALUATION  Patient: Sara Mathur  (65 y.o. male)  Date: 9/10/2019  Primary Diagnosis: Unstable angina (HCC) [I20.0]  Heart failure (HCC) [I50.9]  Procedure(s) (LRB):  CORONARY ANGIOGRAPHY (N/A)  PERCUTANEOUS CORONARY INTERVENTION (N/A)  Intra-Aortic Balloon Removal (N/A)  Intravascular Ultrasound (N/A)  Insert Stent [place] Coronary (N/A) 4 Days Post-Op   Precautions:  Fall, Contact, Bed Alarm(L AKA)      ASSESSMENT  Based on the objective data described below, the patient presents with impaired functional mobility and independence secondary to increased RUE edema and pain, decreased RUE AAROM, generalized weakness, poor endurance, limited activity tolerance, mild sitting balance impairments, L AKA, and requiring increased time to complete tasks. Patient with decline from baseline functional mobility as patient is typically mod I with bed mobility and transfers and utilizes power W/C for locomotion.  Functional mobility limited this date secondary to RUE pain and session shortened due to testing needing to be completed by Iris. Plan to assess transfers to/from W/C during next therapy session. Current Level of Function Impacting Discharge (mobility/balance): CGA/SBA for bed mobility with use of bed railings    Functional Outcome Measure: The patient scored 30/100 on the Barthel Index outcome measure which is indicative of 70% impairment. Other factors to consider for discharge: Patient will require 24/7 physical assistance if D/C home; RUE pain     Patient will benefit from skilled therapy intervention to address the above noted impairments. PLAN :  Recommendations and Planned Interventions: bed mobility training, transfer training, therapeutic exercises, edema management/control, patient and family training/education and therapeutic activities      Frequency/Duration: Patient will be followed by physical therapy:  3 times a week to address goals. Recommendation for discharge: (in order for the patient to meet his/her long term goals)  Physical therapy at least 2 days/week in the home AND ensure assist and/or supervision for safety with bed mobility and transfers     This discharge recommendation:  Has not yet been discussed the attending provider and/or case management    Equipment recommendations for successful discharge (if) home: patient owns DME required for discharge         SUBJECTIVE:   Patient stated I can pull this arm up with the other arm.     OBJECTIVE DATA SUMMARY:   HISTORY:    Past Medical History:   Diagnosis Date    Anemia     Arthritis     back, hips    CAD (coronary artery disease)     Sees Dr. Suzette Todd, 4 heart attacks    Chronic kidney disease     Dr. Twila Valdovinos, [facility] M-W-F     Chronic LBP 1990    Slipped on gravel and fell at work; Multiple surgeries;  Sees Dr. Irving Martini for pain mgmt    Chronic pain     Confusion     Depression     Diabetes [facility])     ED (erectile dysfunction)     GERD (gastroesophageal reflux disease) 11/2015    Diagnosed at [address] last month    Hypercholesteremia     Hypertension     Liver disease     Psychiatric disorder     depression    PVD (peripheral vascular disease) ([facility]     Thromboembolus ([facility]     DVT in right leg     Past Surgical History:   Procedure Laterality Date    CARDIAC SURG PROCEDURE UNLIST      PTCA    HX ABOVE KNEE AMPUTATION Left 2013    Left knee stump non-healing ulcer; Dr. Wilda Moseley Left     HX APPENDECTOMY      HX [address]      Left leg    HX CHOLECYSTECTOMY      HX GI      HX HIP REPLACEMENT      right hip replaced x 2    HX ORTHOPAEDIC  1990s    4 back surgeries    HX ORTHOPAEDIC      donna ankles pin placed    HX ORTHOPAEDIC      left leg 17 surgeries    HX VASCULAR ACCESS Left     port left arm    UPPER GI ENDOSCOPY,BIOPSY  9/2/2015         UPPER GI ENDOSCOPY,BIOPSY  10/12/2015         VASCULAR SURGERY PROCEDURE UNLIST      Multiple revascularization procedures, toe amputations       Personal factors and/or comorbidities impacting plan of care: L AKA; chronic LBP; Diabetes    Home Situation  Home Environment: Private residence  Wheelchair Ramp: Yes  One/Two Story Residence: Two story, live on 1st floor  Living Alone: No  Support Systems: Child(roger), Family member(s), Spouse/Significant Other/Partner  Patient Expects to be Discharged to[de-identified] Unknown  Current DME Used/Available at Home: Adaptive dressing aides, Commode, bedside, Hospital bed, Shower chair, Wheelchair, power, Wheelchair  Tub or Shower Type: Shower    EXAMINATION/PRESENTATION/DECISION MAKING:   Critical Behavior:  Neurologic State: Alert  Orientation Level: Oriented to person, Oriented to situation  Cognition: Appropriate decision making, Appropriate for age attention/concentration, Appropriate safety awareness     Hearing:   Auditory  Auditory Impairment: None  Skin:  Multiple small skins abrasions  Edema: RUE mild edema  Range Of Motion:  AROM: Generally decreased, functional           PROM: Generally decreased, functional           Strength:    Strength: Generally decreased, functional                    Coordination:  Coordination: Generally decreased, functional  Functional Mobility:  Bed Mobility:  Rolling: Contact guard assistance  Supine to Sit: Stand-by assistance  Sit to Supine: Stand-by assistance  Scooting: Stand-by assistance  Balance:   Sitting: Intact  Sitting - Static: Good (unsupported)  Sitting - Dynamic: Fair (occasional)    Functional Measure:  Barthel Index:    Bathin  Bladder: 10  Bowels: 10  Groomin  Dressin  Feedin  Mobility: 0  Stairs: 0  Toilet Use: 0  Transfer (Bed to Chair and Back): 5  Total: 30/100       The Barthel ADL Index: Guidelines  1. The index should be used as a record of what a patient does, not as a record of what a patient could do. 2. The main aim is to establish degree of independence from any help, physical or verbal, however minor and for whatever reason. 3. The need for supervision renders the patient not independent. 4. A patient's performance should be established using the best available evidence. Asking the patient, friends/relatives and nurses are the usual sources, but direct observation and common sense are also important. However direct testing is not needed. 5. Usually the patient's performance over the preceding 24-48 hours is important, but occasionally longer periods will be relevant. 6. Middle categories imply that the patient supplies over 50 per cent of the effort. 7. Use of aids to be independent is allowed. Tawanda Russ., Barthel, DRaquelW. (2950). Functional evaluation: the Barthel Index. [facility] (14)2. WON Haas, Marsha Mathis., Jhoana Tipton., Mendez, [address] [place] [address] Ave (). Measuring the change indisability after inpatient rehabilitation; comparison of the responsiveness of the Barthel Index and Functional Bluebell Measure.  Journal of Neurology, Neurosurgery, and Psychiatry, 66(1), 521-738. AVANI Silva, LALITHA Kraft, & Debra Piedra M.A. (2004.) Assessment of post-stroke quality of life in cost-effectiveness studies: The usefulness of the Barthel Index and the EuroQoL-5D. Quality of Life Research, 15, 262-16           Physical Therapy Evaluation Charge Determination   History Examination Presentation Decision-Making   MEDIUM  Complexity : 1-2 comorbidities / personal factors will impact the outcome/ POC  HIGH Complexity : 4+ Standardized tests and measures addressing body structure, function, activity limitation and / or participation in recreation  LOW Complexity : Stable, uncomplicated  Other outcome measures Barthel Index  HIGH       Based on the above components, the patient evaluation is determined to be of the following complexity level: LOW     Pain Ratin/10 RUE pain at rest and with activity    Activity Tolerance:   Fair, requires rest breaks and VSS on 2 L/min O2 NC   Please refer to the flowsheet for vital signs taken during this treatment. After treatment patient left in no apparent distress:   Supine in bed, Call bell within reach and Caregiver / family present    COMMUNICATION/EDUCATION:   The patients plan of care was discussed with: Physical Therapist and Registered Nurse. Fall prevention education was provided and the patient/caregiver indicated understanding., Patient/family have participated as able in goal setting and plan of care. and Patient/family agree to work toward stated goals and plan of care.     Thank you for this referral.  Obed Sanders, PT, DPT   Time Calculation: 20 mins

## 2019-09-10 NOTE — PROGRESS NOTES
Initial Nutrition Assessment:    INTERVENTIONS/RECOMMENDATIONS:   · Continue current diet   · Encourage PO intake  · Please document % meals consumed in flowsheets     ASSESSMENT:   Chart reviewed, medically noted for Unstable angina in settings of CAD with h/o multiple stents, T2DM, thrombocytopenia, ESRD-HD, and PMH shown below. Assessing pt due to LOS. Pt reports fair appetite and eating something at every meal. BG well controled ( mg/dL). Cardiac rehab RN has provided heart healthy diet education. Past Medical History:   Diagnosis Date    Anemia     Arthritis     back, hips    CAD (coronary artery disease)     Sees Dr. Ezequiel Gaitan, 4 heart attacks    Chronic kidney disease     Dr. Thora Cockayne, [facility] M-W-     Chronic LBP 1990    Slipped on gravel and fell at work; Multiple surgeries;  Sees Dr. Jaciel Amanda for pain mgmt    Chronic pain     Confusion     Depression     Diabetes ([address]     ED (erectile dysfunction)     GERD (gastroesophageal reflux disease) 11/2015    Diagnosed at [facility] last month    Hypercholesteremia     Hypertension     Liver disease     Psychiatric disorder     depression    PVD (peripheral vascular disease) ([address]     Thromboembolus ([address]     DVT in right leg       Diet Order: Cardiac, Consistent carb  % Eaten:    Patient Vitals for the past 72 hrs:   % Diet Eaten   09/10/19 1030 50 %   09/08/19 1700 40 %   09/08/19 1200 40 %     Pertinent Medications: [x]Reviewed: humalog, Mg-ox, remeron, PPI,   Pertinent Labs: [x]Reviewed:   Food Allergies: [x]NKFA  []Other   Last BM: 9/10  Edema:   n/a     []RUE   []LUE   []RLE   []LLE      Pressure Injury:  n/a    [] Stage I   [] Stage II   [] Stage III   [] Stage IV      Wt Readings from Last 30 Encounters:   09/10/19 77.5 kg (170 lb 14.4 oz)   01/22/19 69.7 kg (153 lb 10.6 oz)   07/25/18 68.5 kg (151 lb)   06/01/18 68 kg (150 lb)   05/25/18 68 kg (150 lb)   05/14/18 68.9 kg (152 lb)   01/06/16 49.9 kg (110 lb)   10/11/15 49.9 kg (110 lb)   09/01/15 56.7 kg (125 lb)   08/07/15 49.9 kg (110 lb)   05/22/15 55.8 kg (123 lb)   04/21/15 60.8 kg (134 lb)   12/08/14 60.8 kg (134 lb)   10/01/14 54.4 kg (120 lb)   09/26/14 53.5 kg (118 lb)   09/08/14 53.2 kg (117 lb 4 oz)   08/11/14 57 kg (125 lb 9.6 oz)   06/06/14 60.5 kg (133 lb 6.4 oz)   12/20/13 65.3 kg (144 lb)   06/17/13 55.8 kg (123 lb)   05/08/13 69.4 kg (153 lb)   05/06/13 73.5 kg (162 lb)   03/19/13 73.5 kg (162 lb)   10/18/12 72.1 kg (159 lb)   06/04/12 68 kg (150 lb)   04/02/12 74.4 kg (164 lb)   03/23/12 72.6 kg (160 lb)   01/30/12 74.8 kg (165 lb)   12/21/11 78 kg (172 lb)   08/22/11 76.7 kg (169 lb)       Anthropometrics:   Height: 5' 7\" (170.2 cm) Weight: 77.5 kg (170 lb 14.4 oz)   IBW (%IBW):   ( ) UBW (%UBW):   (  %)   Last Weight Metrics:  Weight Loss Metrics 9/10/2019 1/22/2019 7/25/2018 6/1/2018 5/25/2018 5/14/2018 1/15/2016   Today's Wt 170 lb 14.4 oz 153 lb 10.6 oz 151 lb 150 lb 150 lb 152 lb -   BMI 26.77 kg/m2 22.69 kg/m2 22.3 kg/m2 22.15 kg/m2 22.15 kg/m2 22.45 kg/m2 -       BMI: Body mass index is 26.77 kg/m². This BMI is indicative of:   []Underweight    []Normal    [x]Overweight    [] Obesity   [] Extreme Obesity (BMI>40)     Estimated Nutrition Needs (Based on):   1800 Kcals/day(BMR: 1500 x 1.2) , 75 g(1 g/kg) Protein  Carbohydrate:  At Least 130 g/day  Fluids: 1800 mL/day (1ml/kcal) or per primary team    NUTRITION DIAGNOSES:   Problem:  No nutritional diagnosis at this time      Etiology: related to       Signs/Symptoms: as evidenced by        NUTRITION INTERVENTIONS:  Meals/Snacks: General/healthful diet                  GOAL:   consume >50% of meals in 5-7 days    LEARNING NEEDS (Diet, Food/Nutrient-Drug Interaction):    [x] None Identified   [] Identified and Education Provided/Documented   [] Identified and Pt declined/was not appropriate     Cultureal, [place], OR Ethnic Dietary Needs:    [x] None Identified   [] Identified and Addressed     [x] Interdisciplinary Care Plan Reviewed/Documented    [x] Discharge Planning: Heart healthy, consistent carb diet      MONITORING /EVALUATION:      Food/Nutrient Intake Outcomes:  Total energy intake  Physical Signs/Symptoms Outcomes: Weight/weight change, Electrolyte and renal profile, Glucose profile, GI    NUTRITION RISK:    [] High              [] Moderate           [x]  Low  []  Minimal/Uncompromised    PT SEEN FOR:    []  MD Consult: []Calorie Count      []Diabetic Diet Education        []Diet Education     []Electrolyte Management     []General Nutrition Management and Supplements     []Management of Tube Feeding     []TPN Recommendations    []  RN Referral:  []MST score >=2     []Enteral/Parenteral Nutrition PTA     []Pregnant: Gestational DM or Multigestation     []Pressure Ulcer/Wound Care needs        []  Low BMI  [x]  LOS Referral       Carri Mendoza RDN  Pager [phone]  Weekend Pager [phone]

## 2019-09-10 NOTE — DIABETES MGMT
[facility]    [facility] Progress Note    Recommendations/ Comments: Please encourage PO intake to prevent BG <80 mg/dL. If patient continues with poor PO intake and BG <80 mg/dL consider adding IV dextrose if medically able. Chart reviewed on Sen[facility] Black Sr. for BG <80 mg/dL. Current hospital DM medication: high sensitivity insulin lispro correction      A1c:   Lab Results   Component Value Date/Time    Hemoglobin A1c 6.6 (H) 09/05/2019 03:00 PM           Recent Glucose Results:   Lab Results   Component Value Date/Time    GLU 69 09/10/2019 04:31 AM    GLUCPOC 158 (H) 09/10/2019 10:32 AM    GLUCPOC 102 (H) 09/10/2019 09:11 AM    GLUCPOC 70 09/10/2019 08:47 AM        Lab Results   Component Value Date/Time    Creatinine 4.07 (H) 09/10/2019 04:31 AM     Estimated Creatinine Clearance: 16.9 mL/min (A) (based on SCr of 4.07 mg/dL (H)). Active Orders   Diet    DIET CARDIAC Regular; 2 GM NA (House Low NA); Consistent Carb 2000kcal        PO intake:   Patient Vitals for the past 72 hrs:   % Diet Eaten   09/10/19 1030 50 %   09/08/19 1700 40 %   09/08/19 1200 40 %       Will continue to follow as needed. Thank you.   NAM/Jeb Rubio  [phone]    Time spent: 5 minutes

## 2019-09-10 NOTE — PROGRESS NOTES
Problem: Falls - Risk of  Goal: *Absence of Falls  Description  Document [place] Fall Risk and appropriate interventions in the flowsheet. Outcome: Progressing Towards Goal  Note:   Fall Risk Interventions:  Mobility Interventions: Bed/chair exit alarm    Mentation Interventions: Adequate sleep, hydration, pain control    Medication Interventions: Bed/chair exit alarm    Elimination Interventions: Bed/chair exit alarm    History of Falls Interventions: Bed/chair exit alarm         Problem: Falls - Risk of  Goal: *Absence of Falls  Description  Document [place] Fall Risk and appropriate interventions in the flowsheet. Outcome: Progressing Towards Goal  Note:   Fall Risk Interventions:  Mobility Interventions: Bed/chair exit alarm    Mentation Interventions: Adequate sleep, hydration, pain control    Medication Interventions: Bed/chair exit alarm    Elimination Interventions: Bed/chair exit alarm    History of Falls Interventions: Bed/chair exit alarm         Problem: Diabetes Self-Management  Goal: *Disease process and treatment process  Description  Define diabetes and identify own type of diabetes; list 3 options for treating diabetes. Outcome: Progressing Towards Goal  Goal: *Incorporating nutritional management into lifestyle  Description  Describe effect of type, amount and timing of food on blood glucose; list 3 methods for planning meals. Outcome: Progressing Towards Goal  Goal: *Incorporating physical activity into lifestyle  Description  State effect of exercise on blood glucose levels. Outcome: Progressing Towards Goal  Goal: *Developing strategies to promote health/change behavior  Description  Define the ABC's of diabetes; identify appropriate screenings, schedule and personal plan for screenings. Outcome: Progressing Towards Goal  Goal: *Using medications safely  Description  State effect of diabetes medications on diabetes; name diabetes medication taking, action and side effects.   Outcome: Progressing Towards Goal  Goal: *Prevention, detection, treatment of acute complications  Description  List symptoms of hyper- and hypoglycemia; describe how to treat low blood sugar and actions for lowering  high blood glucose level. Outcome: Progressing Towards Goal     Problem: Diabetes Self-Management  Goal: *Disease process and treatment process  Description  Define diabetes and identify own type of diabetes; list 3 options for treating diabetes.   Outcome: Progressing Towards Goal

## 2019-09-10 NOTE — PROGRESS NOTES
Progress Note      9/10/2019 7:46 AM  NAME: Davidson Riojas Sr. MRN:  [id number]   Admit Diagnosis: Unstable angina ([facility] [address] [I20.0]  Heart failure ([facility] [address] [I50.9]      Problem List:     1. [place]; cath 4/30 w/ RCA , 60% oLAD w/ neg FFR (0.88), patent LCx stent w/ mild ISR. 2. Cardiogenic Shock; resolved  3. Indeterminate troponin elevation  4. Right renal artery stenosis s/p 7.0 x 19mm BMS 5/4/18  5. Occluded LCIA. 6. Elevated troponin  7. ESRD on dialysis. Dr Crump   8. Long term hx of Coronary artery disease s/p multivessel stenting s/p PCI of LCx, PTCA of RCA 7/97, PTCA LAD 3/94.  Lexiscan 2/08 w/ EF 62%, distal ineroapical infarct w/o ischemia  9. Cath 4/2018  No intervention. Lv EF  -  55%   10. Peripheral vascular disease s/p left iliac stenting, left SFA stenting 3/00, right iliac stenting 11/99.  11. Status post L AKA  12. Bilateral carotid stenosis; 16-49% 9/13  13. Hypertension  14. Diabetes  15. Chronic Tobacco abuse 1-2 PPD . 16. Chronic pain  17. Chronic anemia  18. Hyperlipidemia  19. Noncompliance  20. Peptic ulcer disease w/ GIB '15  21. Falls  22. Lives w/ sister in [address] [place] [address] now  21. Usual Cardiologist:  Dr. Helder Levine / Genevieve Álvarez. Assessment/Plan: TnI 0.1    Films reviewed from 4/18. Ostial LAD. Occluded RCA. LCx ok. Left common iliac artery occluded. Cath 9/5 w/ RCA , sev reduced LVEF, severe LM, LAD, LCx disease (Renteria Class 1, 1, 1). Discussed w/ CTS; initially planned for CABG but rescinded due to numerous comorbidities. IABP placed @ 1:1. [place] placed. Long discussion w/ patient and family. On for Impella 5.0 axillary this AM and then high-risk PCI of LM/LAD/LCx to follow. Risk of death is high; discussed w/ patient and family; pt is willing to accept these risks. Cath 9/6 w/ Impella CP supported PCI of OM1, mLAD, and LM/LAD/LCx bifurcation w/ Culotte technique w/ total of 4 STANLEY    Impella removed in cath lab. Some bleeding afterwards.   Got PRBCs and PLT.    Clot noted on distal Impella upon removal (seen on WADE). No signs of neuro deficits. Echo w/ EF 40-45%, mod AoS, mod AI    Extubated 9/7  A-line out 9/7  PA cath out 9/7  RCFA sheath out 9/7    Remains w/ TLC RIJ    No more fevers    HgB 9.9  PLT 74    Main complaint is left arm pain and decreased ROM. 1. Continue ASA, ticagrelor  2. CAMILLE Ab NEG; BELLA pending  3. Continue midodrine as needed for BP; hold for systolic > 716  4. Would like to start back low dose beta blocker or ACEi; will have to see where BPs go  5. Continue statin  6. Volume management per renal.    7. PT/OT for discharge planning         [x]       High complexity decision making was performed in this patient at high risk for decompensation with multiple organ involvement. Subjective:     Dang Midget Sr. denies CP and SOB. Pain still an issue. Discussed with RN events overnight. Review of Systems:    Symptom Y/N Comments  Symptom Y/N Comments   Fever/Chills N   Chest Pain N    Poor Appetite N   Edema N    Cough N   Abdominal Pain N    Sputum N   Joint Pain N    SOB/COREA N   Pruritis/Rash N    Nausea/vomit N   Tolerating PT/OT Y    Diarrhea N   Tolerating Diet Y    Constipation N   Other       Could NOT obtain due to: sedated     Objective:      Physical Exam:    Last 24hrs VS reviewed since prior progress note. Most recent are:    Visit Vitals  /78   Pulse 74   Temp 96.7 °F (35.9 °C)   Resp 19   Ht 5' 7\" (1.702 m)   Wt 77.5 kg (170 lb 14.4 oz)   SpO2 99%   BMI 26.77 kg/m²       Intake/Output Summary (Last 24 hours) at 9/10/2019 0718  Last data filed at 9/10/2019 0650  Gross per 24 hour   Intake 100 ml   Output 200 ml   Net -100 ml        General Appearance: Well developed, well nourished, AnO x 3,    no acute distress. Ears/Nose/Mouth/Throat: Hearing grossly normal.  Neck: Supple. Chest: Lungs clear to auscultation bilaterally. Cardiovascular: Regular rate and rhythm, S1S2 normal, no murmur.   Abdomen: Soft, non-tender, bowel sounds are active. Extremities: No edema bilaterally. LAKA. Skin: Warm and dry. [x]         Post-cath site without hematoma, bruit, tenderness, or thrill. Distal pulses intact. PMH/SH reviewed - no change compared to H&P    Data Review    Telemetry: sinus rhythm     EKG:   [x]  No new EKG for review    Lab Data Personally Reviewed:    Recent Labs     09/10/19  0431 09/09/19  0449   WBC 5.3 5.6   HGB 9.9* 9.7*   HCT 30.2* 28.8*   PLT 74* 71*     No results for input(s): INR, PTP, APTT in the last 72 hours. No lab exists for component: Kendrick Christian   Recent Labs     09/10/19  0431 09/09/19  0449 09/08/19  0508    134* 138   K 4.0 4.3 3.2*    101 102   CO2 30 26 28   BUN 23* 36* 21*   CREA 4.07* 5.11* 3.65*   GLU 69 139* 90   CA 7.8* 7.9* 7.8*     No results for input(s): CPK, CKNDX, TROIQ in the last 72 hours. No lab exists for component: CPKMB  Lab Results   Component Value Date/Time    Cholesterol, total 236 (H) 04/29/2018 06:00 AM    HDL Cholesterol 34 04/29/2018 06:00 AM    LDL,Direct 62 10/03/2014 05:50 AM    LDL, calculated 172.4 (H) 04/29/2018 06:00 AM    Triglyceride 148 04/29/2018 06:00 AM    CHOL/HDL Ratio 6.9 (H) 04/29/2018 06:00 AM       Recent Labs     09/10/19  0431 09/09/19  0449 09/08/19  0508   SGOT 63* 63* 50*    80 52   TP 6.4 6.1* 5.6*   ALB 2.8* 2.6* 2.5*   GLOB 3.6 3.5 3.1     No results for input(s): PH, PCO2, PO2 in the last 72 hours.     Medications Personally Reviewed:    Current Facility-Administered Medications   Medication Dose Route Frequency    midodrine (PROAMITINE) tablet 10 mg  10 mg Oral TID WITH MEALS    pantoprazole (PROTONIX) tablet 40 mg  40 mg Oral ACB&D    insulin lispro (HUMALOG) injection   SubCUTAneous AC&HS    glucose chewable tablet 16 g  4 Tab Oral PRN    glucagon (GLUCAGEN) injection 1 mg  1 mg IntraMUSCular PRN    dextrose 10% infusion 0-250 mL  0-250 mL IntraVENous PRN    0.9% sodium chloride infusion 250 mL 250 mL IntraVENous PRN    epoetin calos-epbx (RETACRIT) injection 6,000 Units  6,000 Units SubCUTAneous DIALYSIS MON, WED & FRI    aspirin chewable tablet 81 mg  81 mg Oral DAILY    alcohol 62% (NOZIN) nasal  1 Ampule  1 Ampule Topical Q12H    ticagrelor (BRILINTA) tablet 90 mg  90 mg Oral Q12H    albumin human 25% (BUMINATE) solution 12.5 g  12.5 g IntraVENous DIALYSIS PRN    sodium chloride (NS) flush 5-40 mL  5-40 mL IntraVENous Q8H    sodium chloride (NS) flush 5-40 mL  5-40 mL IntraVENous PRN    sodium phosphate (FLEET'S) enema 1 Enema  1 Enema Rectal PRN    oxyCODONE-acetaminophen (PERCOCET) 5-325 mg per tablet 1-2 Tab  1-2 Tab Oral Q4H PRN    pravastatin (PRAVACHOL) tablet 80 mg  80 mg Oral QHS    cyclobenzaprine (FLEXERIL) tablet 5 mg  5 mg Oral TID PRN    finasteride (PROSCAR) tablet 5 mg  5 mg Oral DAILY    FLUoxetine (PROzac) capsule 20 mg  20 mg Oral DAILY    mirtazapine (REMERON) tablet 30 mg  30 mg Oral QHS    nitroglycerin (NITROSTAT) tablet 0.4 mg  0.4 mg SubLINGual Q5MIN PRN    pregabalin (LYRICA) capsule 50 mg  50 mg Oral TID    traZODone (DESYREL) tablet 150 mg  150 mg Oral QHS    glucose chewable tablet 16 g  4 Tab Oral PRN    glucagon (GLUCAGEN) injection 1 mg  1 mg IntraMUSCular PRN    dextrose 10% infusion 0-250 mL  0-250 mL IntraVENous PRN         Claribel Martinez III, DO

## 2019-09-10 NOTE — PROGRESS NOTES
DAVID: Home health resumption    CM received notice that pt is currently open to home health services with [facility] in [facility]. CM sent resumption orders with referral to [facility] via "[employer]     [facility] Medina  Care Manager  [phone]

## 2019-09-11 ENCOUNTER — APPOINTMENT (OUTPATIENT)
Dept: VASCULAR SURGERY | Age: 64
DRG: 215 | End: 2019-09-11
Attending: FAMILY MEDICINE
Payer: MEDICARE

## 2019-09-11 LAB
ALBUMIN SERPL-MCNC: 2.9 G/DL (ref 3.5–5)
ALBUMIN/GLOB SERPL: 0.8 {RATIO} (ref 1.1–2.2)
ALP SERPL-CCNC: 128 U/L (ref 45–117)
ALT SERPL-CCNC: 6 U/L (ref 12–78)
ANION GAP SERPL CALC-SCNC: 7 MMOL/L (ref 5–15)
AST SERPL-CCNC: 58 U/L (ref 15–37)
BACTERIA SPEC CULT: ABNORMAL
BASOPHILS # BLD: 0.1 K/UL (ref 0–0.1)
BASOPHILS NFR BLD: 1 % (ref 0–1)
BILIRUB SERPL-MCNC: 0.8 MG/DL (ref 0.2–1)
BUN SERPL-MCNC: 36 MG/DL (ref 6–20)
BUN/CREAT SERPL: 6 (ref 12–20)
CALCIUM SERPL-MCNC: 8 MG/DL (ref 8.5–10.1)
CHLORIDE SERPL-SCNC: 102 MMOL/L (ref 97–108)
CO2 SERPL-SCNC: 28 MMOL/L (ref 21–32)
CREAT SERPL-MCNC: 5.58 MG/DL (ref 0.7–1.3)
DIFFERENTIAL METHOD BLD: ABNORMAL
EOSINOPHIL # BLD: 0.4 K/UL (ref 0–0.4)
EOSINOPHIL NFR BLD: 7 % (ref 0–7)
ERYTHROCYTE [DISTWIDTH] IN BLOOD BY AUTOMATED COUNT: 18.3 % (ref 11.5–14.5)
GLOBULIN SER CALC-MCNC: 3.6 G/DL (ref 2–4)
GLUCOSE BLD STRIP.AUTO-MCNC: 111 MG/DL (ref 65–100)
GLUCOSE BLD STRIP.AUTO-MCNC: 133 MG/DL (ref 65–100)
GLUCOSE BLD STRIP.AUTO-MCNC: 90 MG/DL (ref 65–100)
GLUCOSE BLD STRIP.AUTO-MCNC: 97 MG/DL (ref 65–100)
GLUCOSE SERPL-MCNC: 89 MG/DL (ref 65–100)
GRAM STN SPEC: ABNORMAL
HCT VFR BLD AUTO: 31.5 % (ref 36.6–50.3)
HGB BLD-MCNC: 9.8 G/DL (ref 12.1–17)
IMM GRANULOCYTES # BLD AUTO: 0 K/UL (ref 0–0.04)
IMM GRANULOCYTES NFR BLD AUTO: 0 % (ref 0–0.5)
LYMPHOCYTES # BLD: 0.7 K/UL (ref 0.8–3.5)
LYMPHOCYTES NFR BLD: 12 % (ref 12–49)
MCH RBC QN AUTO: 30.5 PG (ref 26–34)
MCHC RBC AUTO-ENTMCNC: 31.1 G/DL (ref 30–36.5)
MCV RBC AUTO: 98.1 FL (ref 80–99)
MONOCYTES # BLD: 0.7 K/UL (ref 0–1)
MONOCYTES NFR BLD: 12 % (ref 5–13)
NEUTS SEG # BLD: 3.7 K/UL (ref 1.8–8)
NEUTS SEG NFR BLD: 68 % (ref 32–75)
NRBC # BLD: 0 K/UL (ref 0–0.01)
NRBC BLD-RTO: 0 PER 100 WBC
PLATELET # BLD AUTO: 88 K/UL (ref 150–400)
PMV BLD AUTO: 11.5 FL (ref 8.9–12.9)
POTASSIUM SERPL-SCNC: 4.1 MMOL/L (ref 3.5–5.1)
PROT SERPL-MCNC: 6.5 G/DL (ref 6.4–8.2)
RBC # BLD AUTO: 3.21 M/UL (ref 4.1–5.7)
RBC MORPH BLD: ABNORMAL
RIGHT 1ST DIGIT BP: 85 MMHG
RIGHT ARM BP: 132 MMHG
RIGHT PROX RADIAL A BP: 160 MMHG
RIGHT PROX ULNAR A BP: 156 MMHG
RIGHT WBI: 1.21
SERVICE CMNT-IMP: ABNORMAL
SERVICE CMNT-IMP: NORMAL
SERVICE CMNT-IMP: NORMAL
SODIUM SERPL-SCNC: 137 MMOL/L (ref 136–145)
WBC # BLD AUTO: 5.6 K/UL (ref 4.1–11.1)

## 2019-09-11 PROCEDURE — 74011250637 HC RX REV CODE- 250/637: Performed by: INTERNAL MEDICINE

## 2019-09-11 PROCEDURE — 36415 COLL VENOUS BLD VENIPUNCTURE: CPT

## 2019-09-11 PROCEDURE — 87070 CULTURE OTHR SPECIMN AEROBIC: CPT

## 2019-09-11 PROCEDURE — 82962 GLUCOSE BLOOD TEST: CPT

## 2019-09-11 PROCEDURE — 85025 COMPLETE CBC W/AUTO DIFF WBC: CPT

## 2019-09-11 PROCEDURE — 65660000000 HC RM CCU STEPDOWN

## 2019-09-11 PROCEDURE — 80053 COMPREHEN METABOLIC PANEL: CPT

## 2019-09-11 PROCEDURE — 77030027138 HC INCENT SPIROMETER -A

## 2019-09-11 PROCEDURE — 93971 EXTREMITY STUDY: CPT

## 2019-09-11 PROCEDURE — 74011250636 HC RX REV CODE- 250/636: Performed by: INTERNAL MEDICINE

## 2019-09-11 PROCEDURE — 97110 THERAPEUTIC EXERCISES: CPT

## 2019-09-11 PROCEDURE — 74011250637 HC RX REV CODE- 250/637: Performed by: FAMILY MEDICINE

## 2019-09-11 PROCEDURE — 74011250637 HC RX REV CODE- 250/637: Performed by: NURSE PRACTITIONER

## 2019-09-11 PROCEDURE — 97530 THERAPEUTIC ACTIVITIES: CPT

## 2019-09-11 PROCEDURE — 77010033678 HC OXYGEN DAILY

## 2019-09-11 PROCEDURE — 87077 CULTURE AEROBIC IDENTIFY: CPT

## 2019-09-11 PROCEDURE — 94760 N-INVAS EAR/PLS OXIMETRY 1: CPT

## 2019-09-11 PROCEDURE — 87186 SC STD MICRODIL/AGAR DIL: CPT

## 2019-09-11 RX ORDER — HEPARIN 100 UNIT/ML
500 SYRINGE INTRAVENOUS AS NEEDED
Status: DISCONTINUED | OUTPATIENT
Start: 2019-09-11 | End: 2019-09-11

## 2019-09-11 RX ORDER — GUAIFENESIN 600 MG/1
600 TABLET, EXTENDED RELEASE ORAL EVERY 12 HOURS
Status: DISCONTINUED | OUTPATIENT
Start: 2019-09-11 | End: 2019-09-14 | Stop reason: HOSPADM

## 2019-09-11 RX ORDER — HEPARIN 100 UNIT/ML
300 SYRINGE INTRAVENOUS AS NEEDED
Status: DISCONTINUED | OUTPATIENT
Start: 2019-09-11 | End: 2019-09-14 | Stop reason: HOSPADM

## 2019-09-11 RX ORDER — METOPROLOL SUCCINATE 25 MG/1
12.5 TABLET, EXTENDED RELEASE ORAL DAILY
Status: DISCONTINUED | OUTPATIENT
Start: 2019-09-11 | End: 2019-09-14 | Stop reason: HOSPADM

## 2019-09-11 RX ADMIN — Medication 10 ML: at 05:24

## 2019-09-11 RX ADMIN — PANTOPRAZOLE SODIUM 40 MG: 40 TABLET, DELAYED RELEASE ORAL at 07:41

## 2019-09-11 RX ADMIN — OXYCODONE AND ACETAMINOPHEN 1 TABLET: 5; 325 TABLET ORAL at 13:55

## 2019-09-11 RX ADMIN — TICAGRELOR 90 MG: 90 TABLET ORAL at 05:23

## 2019-09-11 RX ADMIN — OXYCODONE AND ACETAMINOPHEN 2 TABLET: 5; 325 TABLET ORAL at 05:23

## 2019-09-11 RX ADMIN — Medication 300 UNITS: at 04:31

## 2019-09-11 RX ADMIN — OXYCODONE AND ACETAMINOPHEN 1 TABLET: 5; 325 TABLET ORAL at 09:24

## 2019-09-11 RX ADMIN — MIRTAZAPINE 30 MG: 15 TABLET, FILM COATED ORAL at 21:14

## 2019-09-11 RX ADMIN — FLUOXETINE 20 MG: 20 CAPSULE ORAL at 09:20

## 2019-09-11 RX ADMIN — TICAGRELOR 90 MG: 90 TABLET ORAL at 18:03

## 2019-09-11 RX ADMIN — PREGABALIN 50 MG: 50 CAPSULE ORAL at 18:02

## 2019-09-11 RX ADMIN — GUAIFENESIN 600 MG: 600 TABLET, EXTENDED RELEASE ORAL at 13:54

## 2019-09-11 RX ADMIN — ASPIRIN 81 MG 81 MG: 81 TABLET ORAL at 09:20

## 2019-09-11 RX ADMIN — PREGABALIN 50 MG: 50 CAPSULE ORAL at 21:14

## 2019-09-11 RX ADMIN — FINASTERIDE 5 MG: 5 TABLET, FILM COATED ORAL at 09:20

## 2019-09-11 RX ADMIN — TRAZODONE HYDROCHLORIDE 150 MG: 50 TABLET ORAL at 21:14

## 2019-09-11 RX ADMIN — Medication 400 MG: at 09:21

## 2019-09-11 RX ADMIN — Medication 10 ML: at 21:18

## 2019-09-11 RX ADMIN — GUAIFENESIN 600 MG: 600 TABLET, EXTENDED RELEASE ORAL at 21:14

## 2019-09-11 RX ADMIN — PANTOPRAZOLE SODIUM 40 MG: 40 TABLET, DELAYED RELEASE ORAL at 18:02

## 2019-09-11 RX ADMIN — MIDODRINE HYDROCHLORIDE 10 MG: 5 TABLET ORAL at 07:41

## 2019-09-11 RX ADMIN — OXYCODONE AND ACETAMINOPHEN 2 TABLET: 5; 325 TABLET ORAL at 23:30

## 2019-09-11 RX ADMIN — PREGABALIN 50 MG: 50 CAPSULE ORAL at 09:21

## 2019-09-11 RX ADMIN — Medication 40 ML: at 18:02

## 2019-09-11 RX ADMIN — PRAVASTATIN SODIUM 80 MG: 40 TABLET ORAL at 21:14

## 2019-09-11 RX ADMIN — OXYCODONE AND ACETAMINOPHEN 2 TABLET: 5; 325 TABLET ORAL at 18:03

## 2019-09-11 NOTE — PROGRESS NOTES
Problem: Mobility Impaired (Adult and Pediatric)  Goal: *Acute Goals and Plan of Care (Insert Text)  Description  FUNCTIONAL STATUS PRIOR TO ADMISSION: The patient was functional at the wheelchair level and was modified independent for transfers to the chair. Patient uses power W/C in his home and manual W/C in the community (does not propel self in W/C). Owns prothesis for LLE, however does not use it. Has assistance from family for bathing and dressing. Completes bed mobility mod I with hospital bed. HOME SUPPORT PRIOR TO ADMISSION: The patient lived with family. Had assistance for ADLs only. Physical Therapy Goals  Initiated 9/10/2019  1. Patient will move from supine to sit and sit to supine , scoot up and down and roll side to side in bed with modified independence within 7 day(s). 2.  Patient will transfer from bed to chair and chair to bed with modified independence using the least restrictive device within 7 day(s). Outcome: Progressing Towards Goal   PHYSICAL THERAPY TREATMENT  Patient: Simon Roa  (65 y.o. male)  Date: 9/11/2019  Diagnosis: Unstable angina (HCC) [I20.0]  Heart failure (HCC) [I50.9] <principal problem not specified>  Procedure(s) (LRB):  CORONARY ANGIOGRAPHY (N/A)  PERCUTANEOUS CORONARY INTERVENTION (N/A)  Intra-Aortic Balloon Removal (N/A)  Intravascular Ultrasound (N/A)  Insert Stent [place] Coronary (N/A) 5 Days Post-Op  Precautions: Fall, Contact, Bed Alarm(L AKA)  Chart, physical therapy assessment, plan of care and goals were reviewed. ASSESSMENT  Based on the objective data described below, patient is slowly improving his strength and mobility skills. Unable to transfer oob to chair as none available. Tolerated bed exercises with minimal rests needed. RUE is limited by shoulder pain which is impacting overall bed mobility and transfer status.     Current Level of Function Impacting Discharge (mobility/balance): sba for supine to sit with extra time due to pain weakness. Sit to stand needed max a x 1 and was unable to come to full standing due to L sided wt shift preference to his L AKA side. Other factors to consider for discharge: he was previously independent with bed to wheelchair transfers PTA; may benefit from short term rehab depending on progress in acute PT. PLAN :  Patient continues to benefit from skilled intervention to address the above impairments. Continue treatment per established plan of care. to address goals. Recommendation for discharge: (in order for the patient to meet his/her long term goals)  Physical therapy at least 2 days/week in the home AND ensure assist and/or supervision for safety with ADLs and transfers. This discharge recommendation:  Has not yet been discussed the attending provider and/or case management    Equipment recommendations for successful discharge (if) home: patient owns DME required for discharge       SUBJECTIVE:   Patient stated I still feel weak.     OBJECTIVE DATA SUMMARY:   Critical Behavior:  Neurologic State: Alert  Orientation Level: Oriented X4  Cognition: Follows commands     Functional Mobility Training:  Bed Mobility:  Rolling: Stand-by assistance  Supine to Sit: Stand-by assistance  Sit to Supine: Stand-by assistance  Scooting: Stand-by assistance        Transfers:  Sit to Stand: Maximum assistance;Assist x1  Stand to Sit: Moderate assistance;Assist x1           Lateral Transfers: Minimum assistance                 Balance:  Sitting: Impaired  Sitting - Static: Good (unsupported)  Sitting - Dynamic: Fair (occasional)  Standing: Impaired  Standing - Static: Poor;Constant support  Standing - Dynamic : Not tested        Therapeutic Exercises:   Supine - R ankle pumps. [facility], glut sets, R saq. Manual resistive hip extension and abduction. All x 10 reps each. Pain Rating:  R shoulder 6-8/10 with movement.  Nursing is aware    Activity Tolerance:   Fair and requires rest breaks  Please refer to the flowsheet for vital signs taken during this treatment.     After treatment patient left in no apparent distress:   Supine in bed, Call bell within reach, Side rails x 3 and bed in chair position     COMMUNICATION/COLLABORATION:   The patients plan of care was discussed with: Registered Nurse    Casey Alfaro, PT   Time Calculation: 24 mins

## 2019-09-11 NOTE — PROGRESS NOTES
Patient is going off the floor for procedure. Will have HD this afternoon. Will try to see patient later today if possible.     Lisbeth Pederson, PT

## 2019-09-11 NOTE — PROGRESS NOTES
received report from [facility] on this patient at 3pm. His biggest concern is his right shoulder and arm. He is ecchymotic on his arm and chest, very swollen on right chest into shoulder. He has severe limitation of mobility of right arm and cannot lift it on his own. Patient told it was normal as result of Impella. Patient had increase in 02 from 2 liters to 3 after working with therapy when his sats decreased. He is not voiding. He said he was voiding some PTA and had some urine output when he had a linder but it was not much.

## 2019-09-11 NOTE — PROGRESS NOTES
Problem: Falls - Risk of  Goal: *Absence of Falls  Description  Document Lucita Hernandez Fall Risk and appropriate interventions in the flowsheet. Outcome: Progressing Towards Goal  Note:   Fall Risk Interventions:  Mobility Interventions: Bed/chair exit alarm    Mentation Interventions: Adequate sleep, hydration, pain control, Bed/chair exit alarm, Door open when patient unattended    Medication Interventions: Bed/chair exit alarm    Elimination Interventions: Call light in reach    History of Falls Interventions: Bed/chair exit alarm         Problem: Pressure Injury - Risk of  Goal: *Prevention of pressure injury  Description  Document Mitchel Scale and appropriate interventions in the flowsheet.   Outcome: Progressing Towards Goal  Note:   Pressure Injury Interventions:  Sensory Interventions: Assess changes in LOC    Moisture Interventions: Absorbent underpads    Activity Interventions: Pressure redistribution bed/mattress(bed type)    Mobility Interventions: Pressure redistribution bed/mattress (bed type)    Nutrition Interventions: Document food/fluid/supplement intake    Friction and Shear Interventions: Minimize layers                Problem: Cardiac Output -  Decreased  Goal: *Absence of hypoxia  Outcome: Progressing Towards Goal

## 2019-09-11 NOTE — PROGRESS NOTES
Progress Note      9/11/2019 7:46 AM  NAME: Mellissa Closs Sr. MRN:  [id number]   Admit Diagnosis: Unstable angina ([facility] [address] [I20.0]  Heart failure ([facility] [address] [I50.9]      Problem List:     1. [place]; cath 4/30 w/ RCA , 60% oLAD w/ neg FFR (0.88), patent LCx stent w/ mild ISR. 2. Cardiogenic Shock; resolved  3. Indeterminate troponin elevation  4. Right renal artery stenosis s/p 7.0 x 19mm BMS 5/4/18  5. Occluded LCIA. 6. Elevated troponin  7. ESRD on dialysis. Dr Crump   8. Long term hx of Coronary artery disease s/p multivessel stenting s/p PCI of LCx, PTCA of RCA 7/97, PTCA LAD 3/94.  Lexiscan 2/08 w/ EF 62%, distal ineroapical infarct w/o ischemia  9. Cath 4/2018  No intervention. Lv EF  -  55%   10. Peripheral vascular disease s/p left iliac stenting, left SFA stenting 3/00, right iliac stenting 11/99.  11. Status post L AKA  12. Bilateral carotid stenosis; 16-49% 9/13  13. Hypertension  14. Diabetes  15. Chronic Tobacco abuse 1-2 PPD . 16. Chronic pain  17. Chronic anemia  18. Hyperlipidemia  19. Noncompliance  20. Peptic ulcer disease w/ GIB '15  21. Falls  22. Lives w/ sister in [address] [place] [address] now  21. Usual Cardiologist:  Dr. Bradley Brewster / Jose Mckeon. Assessment/Plan: TnI 0.1    Films reviewed from 4/18. Ostial LAD. Occluded RCA. LCx ok. Left common iliac artery occluded. Cath 9/5 w/ RCA , sev reduced LVEF, severe LM, LAD, LCx disease (Renteria Class 1, 1, 1). Discussed w/ CTS; initially planned for CABG but rescinded due to numerous comorbidities. IABP placed @ 1:1. [place] placed. Long discussion w/ patient and family. On for Impella 5.0 axillary this AM and then high-risk PCI of LM/LAD/LCx to follow. Risk of death is high; discussed w/ patient and family; pt is willing to accept these risks. Cath 9/6 w/ Impella CP supported PCI of OM1, mLAD, and LM/LAD/LCx bifurcation w/ Culotte technique w/ total of 4 STANLEY    Impella removed in cath lab. Some bleeding afterwards.   Got PRBCs and PLT.    Clot noted on distal Impella upon removal (seen on WADE). No signs of neuro deficits. Echo w/ EF 40-45%, mod AoS, mod AI    Extubated 9/7  A-line out 9/7  PA cath out 9/7  RCFA sheath out 9/7    Remains w/ TLC RIJ    No more fevers    HgB 9.8  PLT 88    Main complaint is right (error from note 9/10) arm pain and decreased ROM. PVR is normal.    1. Continue ASA, ticagrelor  2. CAMILLE Ab NEG; BELLA pending  3. Continue midodrine as needed for BP; hold for systolic > 676  4. Start Toprol XL 12.5mg today; see how this does w/ BP.  ACei in the future if tolerates; probably as an outpt. 5. Continue statin  6. Volume management per renal.    7. PT/OT for discharge planning; home per notes w/ HH  8. No further cardiac recs; ok for discharge when stable per primary. 9. RIJ line needs to get pulled. 10. Will get an echo in my office as an outpt         [x]       High complexity decision making was performed in this patient at high risk for decompensation with multiple organ involvement. Subjective:     Hollie Walton Sr. denies CP and SOB. Pain still an issue. Discussed with RN events overnight. Review of Systems:    Symptom Y/N Comments  Symptom Y/N Comments   Fever/Chills N   Chest Pain N    Poor Appetite N   Edema N    Cough N   Abdominal Pain N    Sputum N   Joint Pain N    SOB/COREA N   Pruritis/Rash N    Nausea/vomit N   Tolerating PT/OT Y    Diarrhea N   Tolerating Diet Y    Constipation N   Other       Could NOT obtain due to: sedated     Objective:      Physical Exam:    Last 24hrs VS reviewed since prior progress note.  Most recent are:    Visit Vitals  /60 (BP 1 Location: Right arm, BP Patient Position: At rest)   Pulse 83   Temp 97.8 °F (36.6 °C)   Resp 20   Ht 5' 7\" (1.702 m)   Wt 77.3 kg (170 lb 6.7 oz)   SpO2 100%   BMI 26.69 kg/m²       Intake/Output Summary (Last 24 hours) at 9/11/2019 0806  Last data filed at 9/10/2019 2232  Gross per 24 hour   Intake 580 ml   Output    Net 580 ml General Appearance: Well developed, well nourished, AnO x 3,    no acute distress. Ears/Nose/Mouth/Throat: Hearing grossly normal.  Neck: Supple. Chest: Lungs clear to auscultation bilaterally. Cardiovascular: Regular rate and rhythm, S1S2 normal, no murmur. Abdomen: Soft, non-tender, bowel sounds are active. Extremities: No edema bilaterally. LAKA. Skin: Warm and dry. [x]         Post-cath site without hematoma, bruit, tenderness, or thrill. Distal pulses intact. PMH/SH reviewed - no change compared to H&P    Data Review    Telemetry: sinus rhythm     EKG:   [x]  No new EKG for review    Lab Data Personally Reviewed:    Recent Labs     09/11/19  0426 09/10/19  0431   WBC 5.6 5.3   HGB 9.8* 9.9*   HCT 31.5* 30.2*   PLT 88* 74*     No results for input(s): INR, PTP, APTT in the last 72 hours. No lab exists for component: Claudetta Basque   Recent Labs     09/11/19  0426 09/10/19  0431 09/09/19  0449    138 134*   K 4.1 4.0 4.3    103 101   CO2 28 30 26   BUN 36* 23* 36*   CREA 5.58* 4.07* 5.11*   GLU 89 69 139*   CA 8.0* 7.8* 7.9*     No results for input(s): CPK, CKNDX, TROIQ in the last 72 hours. No lab exists for component: CPKMB  Lab Results   Component Value Date/Time    Cholesterol, total 236 (H) 04/29/2018 06:00 AM    HDL Cholesterol 34 04/29/2018 06:00 AM    LDL,Direct 62 10/03/2014 05:50 AM    LDL, calculated 172.4 (H) 04/29/2018 06:00 AM    Triglyceride 148 04/29/2018 06:00 AM    CHOL/HDL Ratio 6.9 (H) 04/29/2018 06:00 AM       Recent Labs     09/11/19  0426 09/10/19  0431 09/09/19  0449   SGOT 58* 63* 63*   * 105 80   TP 6.5 6.4 6.1*   ALB 2.9* 2.8* 2.6*   GLOB 3.6 3.6 3.5     No results for input(s): PH, PCO2, PO2 in the last 72 hours.     Medications Personally Reviewed:    Current Facility-Administered Medications   Medication Dose Route Frequency    heparin (porcine) pf 300 Units  300 Units InterCATHeter PRN    metoprolol succinate (TOPROL-XL) XL tablet 12.5 mg 12.5 mg Oral DAILY    magnesium oxide (MAG-OX) tablet 400 mg  400 mg Oral DAILY    midodrine (PROAMITINE) tablet 10 mg  10 mg Oral TID WITH MEALS    pantoprazole (PROTONIX) tablet 40 mg  40 mg Oral ACB&D    insulin lispro (HUMALOG) injection   SubCUTAneous AC&HS    glucose chewable tablet 16 g  4 Tab Oral PRN    glucagon (GLUCAGEN) injection 1 mg  1 mg IntraMUSCular PRN    dextrose 10% infusion 0-250 mL  0-250 mL IntraVENous PRN    0.9% sodium chloride infusion 250 mL  250 mL IntraVENous PRN    epoetin calos-epbx (RETACRIT) injection 6,000 Units  6,000 Units SubCUTAneous DIALYSIS MON, WED & FRI    aspirin chewable tablet 81 mg  81 mg Oral DAILY    ticagrelor (BRILINTA) tablet 90 mg  90 mg Oral Q12H    albumin human 25% (BUMINATE) solution 12.5 g  12.5 g IntraVENous DIALYSIS PRN    sodium chloride (NS) flush 5-40 mL  5-40 mL IntraVENous Q8H    sodium chloride (NS) flush 5-40 mL  5-40 mL IntraVENous PRN    sodium phosphate (FLEET'S) enema 1 Enema  1 Enema Rectal PRN    oxyCODONE-acetaminophen (PERCOCET) 5-325 mg per tablet 1-2 Tab  1-2 Tab Oral Q4H PRN    pravastatin (PRAVACHOL) tablet 80 mg  80 mg Oral QHS    cyclobenzaprine (FLEXERIL) tablet 5 mg  5 mg Oral TID PRN    finasteride (PROSCAR) tablet 5 mg  5 mg Oral DAILY    FLUoxetine (PROzac) capsule 20 mg  20 mg Oral DAILY    mirtazapine (REMERON) tablet 30 mg  30 mg Oral QHS    nitroglycerin (NITROSTAT) tablet 0.4 mg  0.4 mg SubLINGual Q5MIN PRN    pregabalin (LYRICA) capsule 50 mg  50 mg Oral TID    traZODone (DESYREL) tablet 150 mg  150 mg Oral QHS         Ernesto Norton III, DO

## 2019-09-11 NOTE — PROGRESS NOTES
Report received from [place] , [address] [place] [address]. SBAR were discussed. . Patient sitting up in bed, eating. patient for dialysis today    10:27 am notified Dr. Jaguar Huitron on rounds about swelling right shoulder and pain and limited mobility. Duplex scan ordered. Notified of dark green sputum, sputum ordered. Mucinex ordered.

## 2019-09-11 NOTE — PROGRESS NOTES
Bedside shift change report GIVEN TO CAITLYN Melendrez. Report included the following information SBAR. SIGNIFICANT CHANGES DURING SHIFT:    Blue lumen does not get blood return  0430 - heparin instilled in blue lumen  0500 - heparin removed from blue lumen. Positive blood return in blue lumen. CONCERNS TO ADDRESS WITH MD:              [facility] NURSING NOTE   Admission Date 9/4/2019   Admission Diagnosis Unstable angina ([facility] [address] [I20.0]  Heart failure ([address] [I50.9]   Consults IP CONSULT TO CARDIOLOGY  IP CONSULT TO CARDIAC SURGERY  IP CONSULT TO ANESTHESIOLOGY  IP CONSULT TO ADVANCED HEART FAILURE      Cardiac Monitoring [x] Yes [] No      Purposeful Hourly Rounding [x] Yes    Momo Score Total Score: 3   Momo score 3 or > [x] Bed Alarm [] Avasys [] 1:1 sitter [] Patient refused (Signed refusal form in chart)   Mitchel Score Mitchel Score: 14   Mitchel score 14 or < [] PMT consult [] Wound Care consult    []  Specialty bed  [] Nutrition consult      Influenza Vaccine Received Flu Vaccine for Current Season (usually Sept-March): Not Flu Season           Oxygen needs? [] Room air Oxygen @  []1L    []2L    [x]3L   []4L    []5L   []6L via  NC   Chronic home O2 use?  [x] Yes [] No  Perform O2 challenge test and document in progress note using smartSemantifye (.Homeoxygen)      Last bowel movement Last Bowel Movement Date: 09/10/19      Urinary Catheter [REMOVED] Urinary Catheter 09/05/19 Coude-Indications for Use: Accurate measurement of urinary output     [REMOVED] Urinary Catheter 09/05/19 Coude-Urine Output (mL): 5 ml     LDAs           Quad Lumen 09/06/19 Right Internal jugular (Active)   Central Line Being Utilized Yes 9/11/2019  5:05 AM   Criteria for Appropriate Use Limited/no vessel suitable for conventional peripheral access 9/11/2019  5:05 AM   Site Assessment Clean, dry, & intact 9/11/2019  5:05 AM   Infiltration Assessment 0 9/11/2019  5:05 AM   Affected Extremity/Extremities Color distal to insertion site pink (or appropriate for race) 9/11/2019  5:05 AM   Date of Last Dressing Change 09/08/19 9/10/2019  4:07 PM   Dressing Status Clean, dry, & intact 9/11/2019  5:05 AM   Dressing Type Transparent 9/11/2019  5:05 AM   Action Taken Open ports on tubing capped 9/10/2019  1:22 AM   Proximal Hub Color/Line Status White 9/11/2019  5:05 AM   Positive Blood Return (Medial Site) Yes 9/11/2019  5:05 AM   Medial 1 Hub Color/Line Status Paiz Mailman 9/11/2019  5:05 AM   Positive Blood Return (Lateral Site) Yes 9/11/2019  5:05 AM   Medial 2 Hub Color/Line Status Blue 9/11/2019  5:05 AM   Positive Blood Return (Site #3) Yes 9/11/2019  5:05 AM   Distal Hub Color/Line Status Brown 9/11/2019  5:05 AM   Positive Blood Return (Site #4) Yes 9/11/2019  5:05 AM   Alcohol Cap Used Yes 9/11/2019  5:05 AM            Hemodialysis Access 09/05/19 (Active)   Central Line Being Utilized Yes 9/11/2019  3:00 AM   Criteria for Appropriate Use Dialysis/apheresis 9/11/2019  3:00 AM   Date Accessed  09/09/19 9/9/2019  9:12 AM   Access Time  0730 9/9/2019  9:12 AM   Site Assessment Clean, dry, & intact 9/11/2019  3:00 AM   Date of Last Dressing Change 09/07/19 9/9/2019  8:00 AM   Dressing Status Clean, dry, & intact 9/11/2019  3:00 AM   Dressing Type Gauze 9/10/2019  1:22 AM                     Readmission Risk Assessment Tool Score High Risk            35       Total Score        3 Has Seen PCP in Last 6 Months (Yes=3, No=0)    2 . Living with Significant Other. Assisted Living. LTAC. SNF. or   Rehab    3 Patient Length of Stay (>5 days = 3)    9 Pt.  Coverage (Medicare=5 , Medicaid, or Self-Pay=4)    18 Charlson Comorbidity Score (Age + Comorbid Conditions)        Criteria that do not apply:    IP Visits Last 12 Months (1-3=4, 4=9, >4=11)       Expected Length of Stay 2d 16h   Actual Length of Stay 7

## 2019-09-11 NOTE — PROGRESS NOTES
Hospitalist Progress Note    NAME: Scar Jacobs Sr.   :  1955   MRN:  [id number]       Assessment / Plan:  Unstable angina in settings of CAD with h/o multiple stents in the past   HTN   -doing well clinically    -Card cath  with severe disease,  Poor CABG candidate   Cath  w/ Impella CP supported PCI of OM1, mLAD, and LM/LAD/LCx bifurcation w/               Culotte technique w/ total of 4 STANLEY               Impella removed in cath lab. Some bleeding afterwards. Got PRBCs and PLT. Clot noted on distal Impella upon removal (seen on WADE)  -Cardiology/pulmonology help appreciated   - holding Coreg and lisinopril for low BP  - Cont ASA  - on Midodrine for pressor support    DM type II with  Nephropathy  -BS low side   -Hold home Tradjenta   -c/w SS as needed     Pulmonary edema with atelectasis  - mild due to esrd  - for dialysis today  - Mucinex for congestion   - send sputum for cx   - CXR 9/10 noted  - incentive spirometry       Rt chest wall pain and swelling   - s/p CP Impella removal   - possibly due to right chest wall hematoma   - will get US RT chest wall / UE pending   - pain control as needed     Thrombocytopenia  -Plt at baseline normal. Admission 53, low side but stable now   Heparin gtt was stopped   -HIT negative, follow BELLA     ESRD   -HD MWF  -Renal help appreciated. Renal consult for HD. Renally dose Rx as appropriate.     PAD  Multiple small wounds    -Patient is scheduled to have a right second toe amputation on 10/1/2019  -Continue aspirin and Brilinta  -consult wound care    L AKA   Depression, home trazodone  H/o R renal artery stenosis, s/p stent 2018                   Code Status: full   Surrogate Decision Maker: Wife Armando Sylvester  DVT Prophylaxis: Scd's pending card cath         Baseline: lives at home with wife and his son; independent   Recommended Disposition: when ok with cardiology      Subjective:     Chief Complaint / Reason for Physician Visit: following unstable angina   No recurrent fever  No CP       Discussed with RN events overnight. Review of Systems:  Symptom Y/N Comments  Symptom Y/N Comments   Fever/Chills    Chest Pain n    Poor Appetite    Edema     Cough    Abdominal Pain     Sputum    Joint Pain     SOB/COREA n   Pruritis/Rash     Nausea/vomit    Tolerating PT/OT     Diarrhea    Tolerating Diet     Constipation    Other       Could NOT obtain due to:      Objective:     VITALS:   Last 24hrs VS reviewed since prior progress note. Most recent are:  Patient Vitals for the past 24 hrs:   Temp Pulse Resp BP SpO2   09/11/19 0927  79  118/68    09/11/19 0924  79  (!) 76/56    09/11/19 0738 97.8 °F (36.6 °C) 83 20 148/60 100 %   09/11/19 0300 97.3 °F (36.3 °C) 74 18 122/46 96 %   09/10/19 2232 97.8 °F (36.6 °C) 81 18 116/74 95 %   09/10/19 1937 98.3 °F (36.8 °C) 78 20 118/42 100 %   09/10/19 1831  84  102/59    09/10/19 1447 97.6 °F (36.4 °C) 77 20 102/53 95 %       Intake/Output Summary (Last 24 hours) at 9/11/2019 1036  Last data filed at 9/10/2019 2232  Gross per 24 hour   Intake 340 ml   Output    Net 340 ml        PHYSICAL EXAM:  General: WD, WN. Confused some , just got extubated. Cooperative, no acute distress    EENT:  EOMI. Anicteric sclerae. MMM  Resp:  CTA bilaterally, no wheezing or rales. No accessory muscle use  CV:  Regular  rhythm,  No edema. + BL LE chronic venous changes   GI:  Soft, Non distended, Non tender.  +Bowel sounds  Neurologic:  Alert and oriented X 3, normal speech,   Psych:   Good insight. Not anxious nor agitated  Skin:  No rashes.   No jaundice. + R shoulder stiches     Reviewed most current lab test results and cultures  YES  Reviewed most current radiology test results   YES  Review and summation of old records today    NO  Reviewed patient's current orders and MAR    YES  PMH/SH reviewed - no change compared to H&P  ________________________________________________________________________  Care Plan discussed with:    Comments   Patient y    Family  y    RN y    Care Manager     Consultant                        Multidiciplinary team rounds were held today with , nursing, pharmacist and clinical coordinator. Patient's plan of care was discussed; medications were reviewed and discharge planning was addressed. ________________________________________________________________________  Total NON critical care TIME: 25  Minutes    Total CRITICAL CARE TIME Spent:   Minutes non procedure based      Comments   >50% of visit spent in counseling and coordination of care     ________________________________________________________________________  Michel Shah MD     Procedures: see electronic medical records for all procedures/Xrays and details which were not copied into this note but were reviewed prior to creation of Plan. LABS:  I reviewed today's most current labs and imaging studies.   Pertinent labs include:  Recent Labs     09/11/19  0426 09/10/19  0431 09/09/19  0449   WBC 5.6 5.3 5.6   HGB 9.8* 9.9* 9.7*   HCT 31.5* 30.2* 28.8*   PLT 88* 74* 71*     Recent Labs     09/11/19  0426 09/10/19  0431 09/09/19  0449    138 134*   K 4.1 4.0 4.3    103 101   CO2 28 30 26   GLU 89 69 139*   BUN 36* 23* 36*   CREA 5.58* 4.07* 5.11*   CA 8.0* 7.8* 7.9*   ALB 2.9* 2.8* 2.6*   TBILI 0.8 0.9 0.6   SGOT 58* 63* 63*   ALT 6* 6* <6*       Signed: Michel Shah MD

## 2019-09-11 NOTE — PROGRESS NOTES
Bedside shift change report GIVEN TO Beatris montesinos RN. Report included the following information SBAR and Recent Results. SIGNIFICANT CHANGES DURING SHIFT:  Sputum is still pending but has multiple kinds of bacteria. Right arm scan shows some abnormality and Dr. Oksana Stevens that results are in. Patient has complained more of pain in back which is his chronic pain. He does have bruising right chest into right back. He has been medicated x3 on days. He normally takes only lyrica and tramadol at home and says it does not help much. Still has limited mobility right arm. PT worked with patient. Will add a chair to room so that he can get up to the chair tomorrow. CONCERNS TO ADDRESS WITH MD:  He is still due for dialysis. Called them x2 and trying to get him done soon. Patient has not voided          [facility] NURSING NOTE   Admission Date 9/4/2019   Admission Diagnosis Unstable angina ([address] [I20.0]  Heart failure ([address] [I50.9]   Consults IP CONSULT TO CARDIOLOGY  IP CONSULT TO CARDIAC SURGERY  IP CONSULT TO ANESTHESIOLOGY  IP CONSULT TO ADVANCED HEART FAILURE      Cardiac Monitoring [] Yes [] No      Purposeful Hourly Rounding [] Yes    Momo Score Total Score: 3   Momo score 3 or > [] Bed Alarm [] Avasys [] 1:1 sitter [] Patient refused (Signed refusal form in chart)   Mitchel Score Mitchel Score: 14   Mitchel score 14 or < [] PMT consult [] Wound Care consult    []  Specialty bed  [] Nutrition consult      Influenza Vaccine Received Flu Vaccine for Current Season (usually Sept-March): Not Flu Season           Oxygen needs? [] Room air Oxygen @  []1L    []2L    []3L   []4L    []5L   []6L via  NC   Chronic home O2 use?  [] Yes [] No  Perform O2 challenge test and document in progress note using smartphrase (.Homeoxygen)      Last bowel movement Last Bowel Movement Date: 09/10/19      Urinary Catheter [REMOVED] Urinary Catheter 09/05/19 Coude-Indications for Use: Accurate measurement of urinary output     [REMOVED] Urinary Catheter 09/05/19 Coude-Urine Output (mL): 5 ml     LDAs           Quad Lumen 09/06/19 Right Internal jugular (Active)   Central Line Being Utilized Yes 9/11/2019  7:45 AM   Criteria for Appropriate Use Limited/no vessel suitable for conventional peripheral access 9/11/2019  7:45 AM   Site Assessment Clean, dry, & intact 9/11/2019  7:45 AM   Infiltration Assessment 0 9/11/2019  7:45 AM   Affected Extremity/Extremities Color distal to insertion site pink (or appropriate for race) 9/11/2019  7:45 AM   Date of Last Dressing Change 09/08/19 9/11/2019  7:45 AM   Dressing Status Clean, dry, & intact 9/11/2019  7:45 AM   Dressing Type Transparent 9/11/2019  7:45 AM   Action Taken Open ports on tubing capped 9/10/2019  1:22 AM   Proximal Hub Color/Line Status White 9/11/2019  7:45 AM   Positive Blood Return (Medial Site) Yes 9/11/2019  5:05 AM   Medial 1 Hub Color/Line Status Gray 9/11/2019  7:45 AM   Positive Blood Return (Lateral Site) Yes 9/11/2019  5:05 AM   Medial 2 Hub Color/Line Status Blue 9/11/2019  7:45 AM   Positive Blood Return (Site #3) Yes 9/11/2019  5:05 AM   Distal Hub Color/Line Status Brown 9/11/2019  7:45 AM   Positive Blood Return (Site #4) Yes 9/11/2019  5:05 AM   Alcohol Cap Used Yes 9/11/2019  7:45 AM            Hemodialysis Access 09/05/19 (Active)   Central Line Being Utilized Yes 9/11/2019  3:00 AM   Criteria for Appropriate Use Dialysis/apheresis 9/11/2019  3:00 AM   Date Accessed  09/09/19 9/9/2019  9:12 AM   Access Time  0730 9/9/2019  9:12 AM   Site Assessment Clean, dry, & intact 9/11/2019  3:00 AM   Date of Last Dressing Change 09/07/19 9/9/2019  8:00 AM   Dressing Status Clean, dry, & intact 9/11/2019  3:00 AM   Dressing Type Gauze 9/10/2019  1:22 AM                     Readmission Risk Assessment Tool Score High Risk            35       Total Score        3 Has Seen PCP in Last 6 Months (Yes=3, No=0)    2 . Living with Significant Other. Assisted Living. LTAC. SNF.  or   Rehab 3 Patient Length of Stay (>5 days = 3)    9 Pt.  Coverage (Medicare=5 , Medicaid, or Self-Pay=4)    18 Charlson Comorbidity Score (Age + Comorbid Conditions)        Criteria that do not apply:    IP Visits Last 12 Months (1-3=4, 4=9, >4=11)       Expected Length of Stay 2d 16h   Actual Length of Stay 7

## 2019-09-12 LAB
ALBUMIN SERPL-MCNC: 2.8 G/DL (ref 3.5–5)
ALBUMIN/GLOB SERPL: 0.7 {RATIO} (ref 1.1–2.2)
ALP SERPL-CCNC: 152 U/L (ref 45–117)
ALT SERPL-CCNC: 7 U/L (ref 12–78)
ANION GAP SERPL CALC-SCNC: 8 MMOL/L (ref 5–15)
AST SERPL-CCNC: 56 U/L (ref 15–37)
BASOPHILS # BLD: 0 K/UL (ref 0–0.1)
BASOPHILS NFR BLD: 0 % (ref 0–1)
BILIRUB SERPL-MCNC: 0.8 MG/DL (ref 0.2–1)
BUN SERPL-MCNC: 46 MG/DL (ref 6–20)
BUN/CREAT SERPL: 7 (ref 12–20)
CALCIUM SERPL-MCNC: 8.2 MG/DL (ref 8.5–10.1)
CHLORIDE SERPL-SCNC: 103 MMOL/L (ref 97–108)
CO2 SERPL-SCNC: 26 MMOL/L (ref 21–32)
CREAT SERPL-MCNC: 6.89 MG/DL (ref 0.7–1.3)
DIFFERENTIAL METHOD BLD: ABNORMAL
EOSINOPHIL # BLD: 0.2 K/UL (ref 0–0.4)
EOSINOPHIL NFR BLD: 3 % (ref 0–7)
ERYTHROCYTE [DISTWIDTH] IN BLOOD BY AUTOMATED COUNT: 17.6 % (ref 11.5–14.5)
GLOBULIN SER CALC-MCNC: 3.9 G/DL (ref 2–4)
GLUCOSE BLD STRIP.AUTO-MCNC: 123 MG/DL (ref 65–100)
GLUCOSE BLD STRIP.AUTO-MCNC: 145 MG/DL (ref 65–100)
GLUCOSE BLD STRIP.AUTO-MCNC: 86 MG/DL (ref 65–100)
GLUCOSE BLD STRIP.AUTO-MCNC: 93 MG/DL (ref 65–100)
GLUCOSE SERPL-MCNC: 75 MG/DL (ref 65–100)
HCT VFR BLD AUTO: 33 % (ref 36.6–50.3)
HGB BLD-MCNC: 10.1 G/DL (ref 12.1–17)
IMM GRANULOCYTES # BLD AUTO: 0 K/UL (ref 0–0.04)
IMM GRANULOCYTES NFR BLD AUTO: 0 % (ref 0–0.5)
LYMPHOCYTES # BLD: 0.6 K/UL (ref 0.8–3.5)
LYMPHOCYTES NFR BLD: 9 % (ref 12–49)
MCH RBC QN AUTO: 30.5 PG (ref 26–34)
MCHC RBC AUTO-ENTMCNC: 30.6 G/DL (ref 30–36.5)
MCV RBC AUTO: 99.7 FL (ref 80–99)
MONOCYTES # BLD: 0.9 K/UL (ref 0–1)
MONOCYTES NFR BLD: 15 % (ref 5–13)
NEUTS SEG # BLD: 4.6 K/UL (ref 1.8–8)
NEUTS SEG NFR BLD: 73 % (ref 32–75)
NRBC # BLD: 0 K/UL (ref 0–0.01)
NRBC BLD-RTO: 0 PER 100 WBC
PLATELET # BLD AUTO: 92 K/UL (ref 150–400)
PMV BLD AUTO: 12.1 FL (ref 8.9–12.9)
POTASSIUM SERPL-SCNC: 4.3 MMOL/L (ref 3.5–5.1)
PROT SERPL-MCNC: 6.7 G/DL (ref 6.4–8.2)
RBC # BLD AUTO: 3.31 M/UL (ref 4.1–5.7)
RBC MORPH BLD: ABNORMAL
SERVICE CMNT-IMP: ABNORMAL
SERVICE CMNT-IMP: ABNORMAL
SERVICE CMNT-IMP: NORMAL
SERVICE CMNT-IMP: NORMAL
SODIUM SERPL-SCNC: 137 MMOL/L (ref 136–145)
SRA 100IU/ML UFH SER-ACNC: 1 % (ref 0–20)
SRA UFH SER-IMP: NORMAL
UNFRAC HEPARIN LOW DOSE: 1 % (ref 0–20)
WBC # BLD AUTO: 6.3 K/UL (ref 4.1–11.1)

## 2019-09-12 PROCEDURE — 85025 COMPLETE CBC W/AUTO DIFF WBC: CPT

## 2019-09-12 PROCEDURE — 82962 GLUCOSE BLOOD TEST: CPT

## 2019-09-12 PROCEDURE — 74011250637 HC RX REV CODE- 250/637: Performed by: INTERNAL MEDICINE

## 2019-09-12 PROCEDURE — 65660000000 HC RM CCU STEPDOWN

## 2019-09-12 PROCEDURE — 94760 N-INVAS EAR/PLS OXIMETRY 1: CPT

## 2019-09-12 PROCEDURE — 74011250637 HC RX REV CODE- 250/637: Performed by: FAMILY MEDICINE

## 2019-09-12 PROCEDURE — 77010033678 HC OXYGEN DAILY

## 2019-09-12 PROCEDURE — 36415 COLL VENOUS BLD VENIPUNCTURE: CPT

## 2019-09-12 PROCEDURE — 74011250637 HC RX REV CODE- 250/637: Performed by: NURSE PRACTITIONER

## 2019-09-12 PROCEDURE — 97110 THERAPEUTIC EXERCISES: CPT

## 2019-09-12 PROCEDURE — 74011250636 HC RX REV CODE- 250/636: Performed by: INTERNAL MEDICINE

## 2019-09-12 PROCEDURE — 80053 COMPREHEN METABOLIC PANEL: CPT

## 2019-09-12 PROCEDURE — 97530 THERAPEUTIC ACTIVITIES: CPT

## 2019-09-12 PROCEDURE — 90935 HEMODIALYSIS ONE EVALUATION: CPT

## 2019-09-12 RX ORDER — MUPIROCIN 20 MG/G
OINTMENT TOPICAL EVERY 12 HOURS
Status: DISCONTINUED | OUTPATIENT
Start: 2019-09-12 | End: 2019-09-14 | Stop reason: HOSPADM

## 2019-09-12 RX ADMIN — OXYCODONE AND ACETAMINOPHEN 2 TABLET: 5; 325 TABLET ORAL at 07:26

## 2019-09-12 RX ADMIN — OXYCODONE AND ACETAMINOPHEN 2 TABLET: 5; 325 TABLET ORAL at 22:02

## 2019-09-12 RX ADMIN — GUAIFENESIN 600 MG: 600 TABLET, EXTENDED RELEASE ORAL at 09:07

## 2019-09-12 RX ADMIN — Medication 10 ML: at 22:11

## 2019-09-12 RX ADMIN — METOPROLOL SUCCINATE 12.5 MG: 25 TABLET, EXTENDED RELEASE ORAL at 09:07

## 2019-09-12 RX ADMIN — FINASTERIDE 5 MG: 5 TABLET, FILM COATED ORAL at 09:07

## 2019-09-12 RX ADMIN — ASPIRIN 81 MG 81 MG: 81 TABLET ORAL at 09:07

## 2019-09-12 RX ADMIN — Medication 10 ML: at 13:16

## 2019-09-12 RX ADMIN — PRAVASTATIN SODIUM 80 MG: 40 TABLET ORAL at 22:02

## 2019-09-12 RX ADMIN — MUPIROCIN: 20 OINTMENT TOPICAL at 22:11

## 2019-09-12 RX ADMIN — TICAGRELOR 90 MG: 90 TABLET ORAL at 17:43

## 2019-09-12 RX ADMIN — OXYCODONE AND ACETAMINOPHEN 2 TABLET: 5; 325 TABLET ORAL at 13:51

## 2019-09-12 RX ADMIN — MIDODRINE HYDROCHLORIDE 10 MG: 5 TABLET ORAL at 09:07

## 2019-09-12 RX ADMIN — MIRTAZAPINE 30 MG: 15 TABLET, FILM COATED ORAL at 22:02

## 2019-09-12 RX ADMIN — TRAZODONE HYDROCHLORIDE 150 MG: 50 TABLET ORAL at 22:02

## 2019-09-12 RX ADMIN — PREGABALIN 50 MG: 50 CAPSULE ORAL at 22:02

## 2019-09-12 RX ADMIN — OXYCODONE AND ACETAMINOPHEN 2 TABLET: 5; 325 TABLET ORAL at 17:43

## 2019-09-12 RX ADMIN — FLUOXETINE 20 MG: 20 CAPSULE ORAL at 09:06

## 2019-09-12 RX ADMIN — EPOETIN ALFA-EPBX 6000 UNITS: 3000 INJECTION, SOLUTION INTRAVENOUS; SUBCUTANEOUS at 05:46

## 2019-09-12 RX ADMIN — GUAIFENESIN 600 MG: 600 TABLET, EXTENDED RELEASE ORAL at 22:02

## 2019-09-12 RX ADMIN — PANTOPRAZOLE SODIUM 40 MG: 40 TABLET, DELAYED RELEASE ORAL at 17:43

## 2019-09-12 RX ADMIN — Medication 400 MG: at 09:07

## 2019-09-12 RX ADMIN — PANTOPRAZOLE SODIUM 40 MG: 40 TABLET, DELAYED RELEASE ORAL at 09:07

## 2019-09-12 RX ADMIN — PREGABALIN 50 MG: 50 CAPSULE ORAL at 17:43

## 2019-09-12 RX ADMIN — Medication 10 ML: at 07:27

## 2019-09-12 RX ADMIN — MUPIROCIN: 20 OINTMENT TOPICAL at 13:15

## 2019-09-12 RX ADMIN — PREGABALIN 50 MG: 50 CAPSULE ORAL at 09:07

## 2019-09-12 RX ADMIN — TICAGRELOR 90 MG: 90 TABLET ORAL at 07:26

## 2019-09-12 NOTE — PROGRESS NOTES
Hospitalist Progress Note    NAME: Hemanth Aguayo Sr.   :  1955   MRN:  [id number]       Assessment / Plan:  Unstable angina in settings of CAD with h/o multiple stents in the past   HTN   -doing well clinically    -Card cath  with severe disease,  Poor CABG candidate   Cath  w/ Impella CP supported PCI of OM1, mLAD, and LM/LAD/LCx bifurcation w/               Culotte technique w/ total of 4 STANLEY               Impella removed in cath lab. Some bleeding afterwards. Got PRBCs and PLT. Clot noted on distal Impella upon removal (seen on WADE)  -Cardiology/pulmonology help appreciated   - Cont ASA  - on Midodrine for pressor support  - On metoprolol    DM type II with  Nephropathy  -BS low side   -Hold home Tradjenta   -c/w SS as needed     Pulmonary edema with atelectasis  - mild due to esrd  - On dialysis MWF  - Mucinex for congestion   - send sputum for cx   - CXR 9/10 noted  - incentive spirometry       Rt chest wall pain and swelling   - s/p CP Impella removal   - possibly due to right chest wall hematoma   - will get US RT chest wall / UE no evidence of DVT  - pain control as needed     Thrombocytopenia  -Plt at baseline normal. Admission 53, low side but stable now   Heparin gtt was stopped   -HIT negative, follow [facility]LLA     ESRD   -HD MWF  -Renal help appreciated. Renal consult for HD. Renally dose Rx as appropriate.     PAD  Multiple small wounds    -Patient is scheduled to have a right second toe amputation on 10/1/2019  -Continue aspirin and Brilinta  -consult wound care    L AKA   Depression, home trazodone  H/o R renal artery stenosis, s/p stent 2018                   Code Status: full   Surrogate Decision Maker: Wife Kim Gr  DVT Prophylaxis: Scd's pending card cath         Baseline: lives at home with wife and his son; independent   Recommended Disposition: when ok with cardiology      Subjective:     Chief Complaint / Reason for Physician Visit: following unstable angina sob      Discussed with RN events overnight. Review of Systems:  Symptom Y/N Comments  Symptom Y/N Comments   Fever/Chills    Chest Pain n    Poor Appetite    Edema     Cough    Abdominal Pain     Sputum    Joint Pain     SOB/COREA y   Pruritis/Rash     Nausea/vomit    Tolerating PT/OT     Diarrhea    Tolerating Diet     Constipation    Other       Could NOT obtain due to:      Objective:     VITALS:   Last 24hrs VS reviewed since prior progress note. Most recent are:  Patient Vitals for the past 24 hrs:   Temp Pulse Resp BP SpO2   09/12/19 0730 97.6 °F (36.4 °C) 86 18 122/60    09/12/19 0700  85  118/68    09/12/19 0645  88  121/63    09/12/19 0630  85  (!) 119/91    09/12/19 0615  88  132/66    09/12/19 0600  87  124/64    09/12/19 0545  88  132/87    09/12/19 0530  84  124/58    09/12/19 0515  88  128/69    09/12/19 0500  86  133/61    09/12/19 0445  87  107/59    09/12/19 0430  87  142/66    09/12/19 0415  89  148/73    09/12/19 0400 98.1 °F (36.7 °C) 89 17 142/74 98 %   09/12/19 0345  86  146/75    09/12/19 0333  82 14 146/67    09/11/19 2323 97.8 °F (36.6 °C) 71 18 127/64 94 %   09/11/19 1922 97.3 °F (36.3 °C) 77 20 121/66 100 %   09/11/19 1801  80  135/75    09/11/19 1700  80  135/74    09/11/19 1533 98 °F (36.7 °C) 76 20 119/60 92 %   09/11/19 1137 97.7 °F (36.5 °C) 75 20 117/64 100 %       Intake/Output Summary (Last 24 hours) at 9/12/2019 1050  Last data filed at 9/12/2019 0709  Gross per 24 hour   Intake 840 ml   Output 2000 ml   Net -1160 ml        PHYSICAL EXAM:  General: WD, WN. Confused some , just got extubated. Cooperative, no acute distress    EENT:  EOMI. Anicteric sclerae. MMM  Resp:  CTA bilaterally, no wheezing or rales.   No accessory muscle use  CV:  Regular  rhythm,  No edema. + BL LE chronic venous changes   GI:  Soft, Non distended, Non tender.  +Bowel sounds  Neurologic:  Alert and oriented X 3, normal speech,   Psych:   Good insight. Not anxious nor agitated  Skin:  No rashes. No jaundice. + R shoulder stiches     Reviewed most current lab test results and cultures  YES  Reviewed most current radiology test results   YES  Review and summation of old records today    NO  Reviewed patient's current orders and MAR    YES  PMH/SH reviewed - no change compared to H&P  ________________________________________________________________________  Care Plan discussed with:    Comments   Patient y    Family  y    RN y    Care Manager     Consultant                        Multidiciplinary team rounds were held today with , nursing, pharmacist and clinical coordinator. Patient's plan of care was discussed; medications were reviewed and discharge planning was addressed. ________________________________________________________________________  Total NON critical care TIME: 25  Minutes    Total CRITICAL CARE TIME Spent:   Minutes non procedure based      Comments   >50% of visit spent in counseling and coordination of care     ________________________________________________________________________  Laith Dumont MD     Procedures: see electronic medical records for all procedures/Xrays and details which were not copied into this note but were reviewed prior to creation of Plan. LABS:  I reviewed today's most current labs and imaging studies.   Pertinent labs include:  Recent Labs     09/12/19  0508 09/11/19  0426 09/10/19  0431   WBC 6.3 5.6 5.3   HGB 10.1* 9.8* 9.9*   HCT 33.0* 31.5* 30.2*   PLT 92* 88* 74*     Recent Labs     09/12/19  0508 09/11/19  0426 09/10/19  0431    137 138   K 4.3 4.1 4.0    102 103   CO2 26 28 30   GLU 75 89 69   BUN 46* 36* 23*   CREA 6.89* 5.58* 4.07*   CA 8.2* 8.0* 7.8*   ALB 2.8* 2.9* 2.8*   TBILI 0.8 0.8 0.9   SGOT 56* 58* 63*   ALT 7* 6* 6*       Signed: Laith Dumont MD

## 2019-09-12 NOTE — DISCHARGE INSTRUCTIONS
[address] [facility], [address]   [phone]  [address]    [email or web address]    Patient Discharge Instructions    Mitch Roper / [id number] : 1955    Admitted 2019 Discharged: 2019       · It is important that you take the medication exactly as they are prescribed. · Keep your medication in the bottles provided by the pharmacist and keep a list of the medication names, dosages, and times to be taken in your wallet. · Do not take other medications without consulting your doctor. BRING ALL OF YOUR MEDICINES TO YOUR OFFICE VISIT with Luis Angel Curry DO or my nurse practitioner, Batsheva Castillo. .    Follow-up with Luis Angel Curry DO or my nurse practitioner, Batsheva Castillo in 2 weeks. Cardiac Catheterization  Discharge Instructions    Transradial Catheterization Discharge Instructions (WRIST)    Discharge instructions: Your radial artery in your wrist was used for your cardiac catheterization. This site may be slightly bruised and sore following your procedure. Expect mild tingling or the hand and tenderness at the puncture site for up to 3 days. Excess movement of the wrist used should be avoided for the next 24-48 hours. 1. No lifting over 2 pounds (approximately a ½ gallon of milk) with this arm for 24 hours. 2. Keep the site of the procedure covered with a bandage for 24 hours. 3. You may shower the day after your procedure. Do not take a tub bath or submerge the puncture site in water for 48 hours. 4. No heavy impact activity/lifting > 30 pounds for 1 week. If bleeding of the wrist occurs at home:   If the site on your wrist where you had the catheterization procedure begins to bleed, do not panic. 1. Place 1 or 2 fingers over the puncture site and hold pressure to stop the bleeding. You may be able to feel your pulse as you hold pressure. 2. Lift your fingers after 5 minutes to see if the bleeding has stopped.    3. Once the bleeding has stopped, gently wipe the wrist area clean and cover with a bandage. If the bleeding from your wrist does not stop after 15 minutes, or if there is a large amount of bleeding or spurting, call 911 immediately (do not drive yourself to the hospital). Other concerns: The site may be slightly bruised and sore following your procedure. Should any of the following occur, contact your physician immediately:   1. Any cool or coldness of the arm, discoloration over a large area, ongoing numbness or any abnormal sensations , moderate to severe pain or swelling in the arm. 2. Redness, soreness, swelling, chills or fever, or colored drainage at the procedure site within 3-7 days after your procedure. If you have any further questions or concerns regarding your procedure please call the Cardiac Cath Lab office at [phone]. During regular business hours ask to speak to Dr. Zehra Luna. During non-business hours the answering service will answer. Ask to speak to the physician on call for [facility].     Transfemoral Catheterization Discharge Instructions Amy Lr)     Do not drive, operate any machinery, or sign any legal documents for 24 hours after your procedure. You must have someone to drive you home.  You may take a shower 24 hours after your cardiac catheterization. Be sure to get the dressing wet and then remove it; gently wash the area with warm soapy water. Pat dry and leave open to air. To help prevent infections, be sure to keep the cath site clean and dry. No lotions, creams, powders, ointments, etc. in the cath site for approximately 1 week.  Do not take a tub bath, get in a hot tub or swimming pool for approximately 5 days or until the cath site is completely healed.  No strenuous activity or heavy lifting over 10 lbs. for 7 days.  Drink plenty of fluids for 24-48 hours after your cath to flush the contrast dye from your kidneys.  No alcoholic beverages for 24 hours. You may resume your previous diet (low fat, low cholesterol) after your cath.  After your cath, some bruising or discomfort is common during the healing process. Tylenol, 1-2 tablets every 6 hours as needed, is recommended if you experience any discomfort. If you experience any signs or symptoms of infection such as fever, chills, or poorly healing incision, persistent tenderness or swelling in the groin, redness and/or warmth to the touch, numbness, significant tingling or pain at the groin site or affected extremity, rash, drainage from the cath site, or if the leg feels tight or swollen, call your physician right away.  If bleeding at the cath site occurs, take a clean gauze pad and apply direct pressure to the groin just above the puncture site. Call 911 immediately, and continue to apply direct pressure until an ambulance gets to your location.  You may return to work  2  days after your cardiac cath if no groin bleeding. Information obtained by :  I understand that if any problems occur once I am at home I am to contact my physician. I understand and acknowledge receipt of the instructions indicated above. R.N.'s Signature                                                                  Date/Time                                                                                                                                              Patient or Representative Signature                                                          Date/Time      Clayton Beverage III, DO             [address] [facility], [address] [facility]    [phone]  [place] [address]    [email or web address]                [address]   [facility], [address]    [phone]  [place] [address] [place]    [email or web address]    Patient Discharge Instructions Mary Mckeon. / [id number] : 1955    Admitted 2019 Discharged: 2019       · It is important that you take the medication exactly as they are prescribed. · Keep your medication in the bottles provided by the pharmacist and keep a list of the medication names, dosages, and times to be taken in your wallet. · Do not take other medications without consulting your doctor. BRING ALL OF YOUR MEDICINES TO YOUR OFFICE VISIT. Follow-up with Erik Harvey DO or my nurse practitioner, Yoli Guerrero in 2 weeks. Follow-up with your primary care physician in one week. Heart Attack: After Your Hospitalization     Your Care Instructions    A heart attack (myocardial infarction, or MI) occurs when one or more of the coronary arteries, which supply the heart with oxygen-rich blood, is blocked. A blockage usually occurs when plaque inside the artery breaks open and a blood clot forms in the artery. After a heart attack, you may be worried about your future. Over the next several weeks, your heart will start to heal. Although it is sometimes hard to break old habits, you can prevent another heart attack by making some lifestyle changes and by taking medicines. You may use the following information for ideas about what to do at home to speed your recovery. Follow-up care is a key part of your treatment and safety. Be sure to make and go to all appointments, and call your doctor if you are having problems. It is also a good idea to keep a list of the medicines you take, including dose. How can you care for yourself at home? Activity  Increase your activities slowly. Take short rest breaks when you get tired. As you are able, get more exercise. Walking is a good choice. Bit by bit, increase the amount you walk every day. Try for at least 30 minutes on most days of the week. You also may want to swim, bike, or do other activities.   Cardiac rehabilitation (rehab) program is strongly recommended. Cardiac rehab includes supervised exercise, help with diet and lifestyle changes, and emotional support. It may reduce your risk of future heart problems. Do not drive until your doctor says you can. You can have sex when you are strong enough. This usually means when you can easily walk around or climb stairs. Talk with your doctor if you have any concerns. Do not take sildenafil citrate (Viagra), tadalafil (Cialis), or vardenafil (Levitra) if you are taking nitroglycerin. Lifestyle changes  Do not smoke. Smoking increases your risk of another heart attack. If you need help quitting, talk to your doctor about stop-smoking programs and medicines. These can increase your chances of quitting for good. Eat a heart-healthy diet that is low in cholesterol, saturated fat, and salt, and is full of fruits, vegetables and whole-grains. Eat at least two servings of fish each week. You may get more details about how to eat healthy, but these tips can help you get started. Our website has more information:  www.[employer]  Avoid colds and flu. Get a pneumococcal vaccine shot. If you have had one before, ask your primary care doctor whether you need a second dose. Get a flu shot every fall. If you must be around people with colds or flu, wash your hands often. Medicines  Take your medicines exactly as prescribed. Call your doctor if you think you are having a problem with your medicine. You may need several medicines. Angiotensin-converting enzyme (ACE) inhibitors, beta-blockers, and statins can help prevent another heart attack. ACE inhibitors control your blood pressure, and statins help lower cholesterol. Aspirin and other blood thinners help prevent blood clots. Blood clots can cause a stroke or heart attack. If Plavix is prescribed, you must take it to prevent your stent from clotting. You will likely need the plavix for at least 1 to 2 years.   If your doctor has given you nitroglycerin, keep it with you at all times. If you have chest pain, sit down and rest, and take the first dose of nitroglycerin if the discomfort does not resolve. If chest pain gets worse or is not getting better within 5 minutes, call 911 immediately. Stay on the phone with the emergency ; he or she will give you further instructions. Be sure to tell your doctor about any chest pain you have had, even if it went away. Do not take any over-the-counter medicines, vitamins, or herbal products without talking to your doctor first.    Mental health  Talk to your family, friends, or a counselor about your feelings. It is normal to feel frightened, angry, hopeless, helpless, and even guilty. Talking openly about bad feelings can help you cope. If the blues last, talk to your primary care doctor. When should you call for help? Call 911 anytime you think you may need emergency care. For example, call if:  You have signs of a heart attack. These may include:  Chest pain or pressure. Sweating. Shortness of breath. Nausea or vomiting. Pain that spreads from the chest to the neck, jaw, or one or both shoulders or arms. Dizziness or lightheadedness. A fast or irregular pulse. After calling 911, chew 1 adult-strength aspirin. Wait for an ambulance. Do not try to drive yourself. You passed out (lost consciousness). You feel like you are having another heart attack. Call your doctor now or seek immediate medical care if:  You have had any chest pain, even if it has gone away. You have new or increased shortness of breath. You are dizzy or lightheaded, or you feel like you may faint. Watch closely for changes in your health, and be sure to contact your doctor if you have any problems, including gaining 5 pounds or more. Cardiac Catheterization  Discharge Instructions     You may take a shower. Be sure to get the dressing wet and then remove it; gently wash the area with warm soapy water.   Pat dry and leave open to air. To help prevent infections, be sure to keep the cath site clean and dry. No lotions, creams, powders, ointments, etc. in the cath site for approximately 1 week.  Do not take a tub bath, get in a hot tub or swimming pool for approximately 5 days or until the cath site is completely healed.  No strenuous activity or heavy lifting over 10 lbs. for 7 days.  After your cath, some bruising or discomfort is common during the healing process. Tylenol, 1-2 tablets every 6 hours as needed, is recommended if you experience any discomfort. If you experience any signs or symptoms of infection such as fever, chills, or poorly healing incision, persistent tenderness or swelling in the groin, redness and/or warmth to the touch, numbness, significant tingling or pain at the groin site or affected extremity, rash, drainage from the cath site, or if the leg feels tight or swollen, call your physician right away.  If bleeding at the cath site occurs, take a clean gauze pad and apply direct pressure to the groin just above the puncture site. Call 911 immediately, and continue to apply direct pressure until an ambulance gets to your location. Information obtained by :  I understand that if any problems occur once I am at home I am to contact my physician. I understand and acknowledge receipt of the instructions indicated above. R.N.'s Signature                                                                  Date/Time                                                                                                                                              Patient or Representative Signature                                                          Date/Time      Kerri Pinto III, DO      Where can you learn more? Go to [email or web address] [address], [address]   [phone]  [place], [address] [place] [address]    [email or web address]        Smoking Cessation Program:   This is a free, phone/text/email based, smoking cessation program. The program is individualized to meet each patient's needs. To enroll use this link [email or web address]

## 2019-09-12 NOTE — WOUND CARE
Wound care nurse was consulted to see patient by an NP to eval right second toe. Patient was seen yesterday afternoon but unable to document due to computer problems with Epic. Patient is a 70 y/o CM admitted for unstable angina and currently on [facility]. He states he is a patient of Dr Stacie Paul and he has a scheduled surgery to amputate his right 2nd toe due to osteo. Patient has a left AKA, ESRD-HD, DM, CAD, current smoker, former heavy drinker. Right 2nd toe is \"swollen\" with some light scabbing to it, multiple scabs to RLE also. Patient has no drainage, erythema or odor to this toe. There is obviously underlying skin problems that can not be resolved with anything topical, he needs amputation. Recommend:    RLE and foot scabs: cleanse with soap and water and apply Mupirocin ointment to scabs and leave JAYSON.     Andreas Harding RN, [employer]

## 2019-09-12 NOTE — PROGRESS NOTES
Bedside shift change report GIVEN TO Lida Jeffers RN. Report included the following information SBAR. SIGNIFICANT CHANGES DURING SHIFT:    2000 called dialysis. No answer x3  2050 called [facility] to talk to dialysis nurse, said she would call charge to figure out when they would be here. 2120 spoke to dialysis nurse, Nazanin Greenfield, was told pt is still on the schedule for dialysis tonight. 2125 spoke to dialysis nurse, Aditya Russell, and was told he would be here around midnight. 330 dialysis nurse at bedside    [facility] MD:              13[address] Chhaya Foster NURSING NOTE   Admission Date 9/4/2019   Admission Diagnosis Unstable angina ([facility] [address] [I20.0]  Heart failure ([address] [I50.9]   Consults IP CONSULT TO CARDIOLOGY  IP CONSULT TO CARDIAC SURGERY  IP CONSULT TO ANESTHESIOLOGY  IP CONSULT TO ADVANCED HEART FAILURE      Cardiac Monitoring [x] Yes [] No      Purposeful Hourly Rounding [x] Yes    Momo Score Total Score: 3   Momo score 3 or > [x] Bed Alarm [] Avasys [] 1:1 sitter [] Patient refused (Signed refusal form in chart)   Mitchel Score Mitchel Score: 15   Mitchel score 14 or < [] PMT consult [] Wound Care consult    []  Specialty bed  [] Nutrition consult      Influenza Vaccine Received Flu Vaccine for Current Season (usually Sept-March): Not Flu Season           Oxygen needs? [] Room air Oxygen @  []1L    []2L    [x]3L   []4L    []5L   []6L via  NC   Chronic home O2 use?  [] Yes [x] No  Perform O2 challenge test and document in progress note using smart[employer]Risk [employer] (.[employer]oxygen)      Last bowel movement Last Bowel Movement Date: 09/10/19      Urinary Catheter [REMOVED] Urinary Catheter 09/05/19 Coude-Indications for Use: Accurate measurement of urinary output     [REMOVED] Urinary Catheter 09/05/19 Coude-Urine Output (mL): 5 ml     LDAs           Quad Lumen 09/06/19 Right Internal jugular (Active)   Central Line Being Utilized Yes 9/11/2019  7:22 PM   Criteria for Appropriate Use Limited/no vessel suitable for conventional peripheral access 9/11/2019  7:22 PM   Site Assessment Clean, dry, & intact 9/11/2019  7:22 PM   Infiltration Assessment 0 9/11/2019  7:22 PM   Affected Extremity/Extremities Color distal to insertion site pink (or appropriate for race) 9/11/2019  7:22 PM   Date of Last Dressing Change 09/08/19 9/11/2019  7:45 AM   Dressing Status Clean, dry, & intact 9/11/2019  7:22 PM   Dressing Type Transparent 9/11/2019  7:22 PM   Action Taken Open ports on tubing capped 9/10/2019  1:22 AM   Proximal Hub Color/Line Status White 9/11/2019  7:22 PM   Positive Blood Return (Medial Site) Yes 9/11/2019  7:22 PM   Medial 1 Hub Color/Line Status Gray 9/11/2019  7:22 PM   Positive Blood Return (Lateral Site) Yes 9/11/2019  7:22 PM   Medial 2 Hub Color/Line Status Blue 9/11/2019  7:22 PM   Positive Blood Return (Site #3) No 9/11/2019  7:22 PM   Distal Hub Color/Line Status Brown 9/11/2019  7:22 PM   Positive Blood Return (Site #4) Yes 9/11/2019  7:22 PM   Alcohol Cap Used Yes 9/11/2019  7:45 AM            Hemodialysis Access 09/05/19 (Active)   Central Line Being Utilized Yes 9/11/2019  3:00 AM   Criteria for Appropriate Use Dialysis/apheresis 9/11/2019  3:00 AM   Date Accessed  09/09/19 9/9/2019  9:12 AM   Access Time  0730 9/9/2019  9:12 AM   Site Assessment Clean, dry, & intact 9/11/2019  3:00 AM   Date of Last Dressing Change 09/07/19 9/9/2019  8:00 AM   Dressing Status Clean, dry, & intact 9/11/2019  3:00 AM   Dressing Type Gauze 9/10/2019  1:22 AM                     Readmission Risk Assessment Tool Score High Risk            35       Total Score        3 Has Seen PCP in Last 6 Months (Yes=3, No=0)    2 . Living with Significant Other. Assisted Living. LTAC. SNF. or   Rehab    3 Patient Length of Stay (>5 days = 3)    9 Pt.  Coverage (Medicare=5 , Medicaid, or Self-Pay=4)    18 Charlson Comorbidity Score (Age + Comorbid Conditions)        Criteria that do not apply:    IP Visits Last 12 Months (1-3=4, 4=9, >4=11)       Expected Length of Stay 2d 16h   Actual Length of Stay 8

## 2019-09-12 NOTE — PROGRESS NOTES
Problem: Falls - Risk of  Goal: *Absence of Falls  Description  Document Rafita Peters Fall Risk and appropriate interventions in the flowsheet. Outcome: Progressing Towards Goal  Note:   Fall Risk Interventions:  Mobility Interventions: Bed/chair exit alarm    Mentation Interventions: Bed/chair exit alarm    Medication Interventions: Bed/chair exit alarm    Elimination Interventions: Bed/chair exit alarm    History of Falls Interventions: Bed/chair exit alarm         Problem: Diabetes Self-Management  Goal: *Using medications safely  Description  State effect of diabetes medications on diabetes; name diabetes medication taking, action and side effects. Outcome: Progressing Towards Goal     Problem: Pressure Injury - Risk of  Goal: *Prevention of pressure injury  Description  Document Mitchel Scale and appropriate interventions in the flowsheet.   Outcome: Progressing Towards Goal  Note:   Pressure Injury Interventions:  Sensory Interventions: Assess changes in LOC    Moisture Interventions: Absorbent underpads    Activity Interventions: Pressure redistribution bed/mattress(bed type)    Mobility Interventions: Pressure redistribution bed/mattress (bed type)    Nutrition Interventions: Document food/fluid/supplement intake    Friction and Shear Interventions: Apply protective barrier, creams and emollients

## 2019-09-12 NOTE — PROGRESS NOTES
Problem: Mobility Impaired (Adult and Pediatric)  Goal: *Acute Goals and Plan of Care (Insert Text)  Description  FUNCTIONAL STATUS PRIOR TO ADMISSION: The patient was functional at the wheelchair level and was modified independent for transfers to the chair. Patient uses power W/C in his home and manual W/C in the community (does not propel self in W/C). Owns prothesis for LLE, however does not use it. Has assistance from family for bathing and dressing. Completes bed mobility mod I with hospital bed. HOME SUPPORT PRIOR TO ADMISSION: The patient lived with family. Had assistance for ADLs only. Physical Therapy Goals  Initiated 9/10/2019  1. Patient will move from supine to sit and sit to supine , scoot up and down and roll side to side in bed with modified independence within 7 day(s). 2.  Patient will transfer from bed to chair and chair to bed with modified independence using the least restrictive device within 7 day(s). Note:   PHYSICAL THERAPY TREATMENT  Patient: Eulalio Harrison Sr. (65 y.o. male)  Date: 9/12/2019  Diagnosis: Unstable angina (HCC) [I20.0], Heart failure (HCC) [I50.9]     Procedure(s) (LRB):  CORONARY ANGIOGRAPHY (N/A)  PERCUTANEOUS CORONARY INTERVENTION (N/A)  Intra-Aortic Balloon Removal (N/A)  Intravascular Ultrasound (N/A)  Insert Stent Preston Coronary (N/A) 6 Days Post-Op    Precautions: Fall, Contact, Bed Alarm(L AKA)  Chart, physical therapy assessment, plan of care and goals were reviewed. ASSESSMENT  Based on the objective data described below, pt moves very well for bed mob and stated that he does a squat pivot to his power W/C, RLE is very weak at this time, says that his prostheses does not fit and that he needs a , vc's for safety, did well with ther-ex.     Current Level of Function Impacting Discharge (mobility/balance): Stand-by assistance for bed mob    Other factors to consider for discharge: left AKA         PLAN :  Patient continues to benefit from skilled intervention to address the above impairments. Continue treatment per established plan of care. to address goals. Recommendation for discharge: (in order for the patient to meet his/her long term goals)  Physical therapy at least 2 days/week in the home AND ensure assist and/or supervision for safety    This discharge recommendation:  Has not yet been discussed the attending provider and/or case management    Equipment recommendations for successful discharge (if) home: patient owns DME required for discharge     OBJECTIVE DATA SUMMARY:     Critical Behavior:  Neurologic State: Alert  Orientation Level: Oriented X4  Cognition: Follows commands     Functional Mobility Training:  Bed Mobility:  Rolling: Stand-by assistance  Supine to Sit: Stand-by assistance  Sit to Supine: Stand-by assistance  Scooting: Stand-by assistance  Level of Assistance: Stand-by assistance  Interventions: Verbal cues    Transfers: N/T    Balance:  Sitting: Intact; With support  Sitting - Static: Good (unsupported)  Sitting - Dynamic: Not tested  Standing: (unable)    Ambulation/Gait Training: unable    Therapeutic Exercises:   sitting  EXERCISE   Sets   Reps   Active Active Assist   Passive   Comments   Ankle pumps 1 10 ? ? ? RLE   Heel raises 1 10 ? ? ? \"   Toe tap 1 10 ? ? ? \"   Knee ext 1 10 ? ? ? \"   Hip flex 1 10 ? ? ? bilat   Abd & Add 1 10 ? ? ? \"     Pain Rating: see flow sheet    Activity Tolerance: Poor    After treatment patient left in no apparent distress: Supine in bed, Call bell within reach and Caregiver / family present    COMMUNICATION/COLLABORATION:   The patients plan of care was discussed with: Registered Nurse    Jeff Ramirez PTA   Time Calculation: 25 mins

## 2019-09-12 NOTE — PROGRESS NOTES
Report received from [facility]. Amy PRABHAKAR, ED Summary, MAR and Recent Results was discussed.     Nick Goodman RN

## 2019-09-12 NOTE — PROGRESS NOTES
NAME: Jeanette Tran Sr.        :  1955        MRN:  [id number]        Assessment :    Plan:  --ESRD-  Hypotension  Anemia  CP dyspnea  CAD --[id number]  HD done at 3:30 am today (?) - UF has been limited by low BP, but tolerated 2 liters off this am    On OWEN. Prn prbc's (s/p transfusion )    On Midodrine as of 19 (dose increased )       Subjective:     Chief Complaint: resting. C/o feeling sob. Back pain. We discussed the above. Review of Systems:    Symptom Y/N Comments  Symptom Y/N Comments   Fever/Chills    Chest Pain     Poor Appetite    Edema y    Cough    Abdominal Pain     Sputum    Joint Pain     SOB/COREA y   Pruritis/Rash     Nausea/vomit    Tolerating PT/OT     Diarrhea    Tolerating Diet     Constipation    Other       Could not obtain due to:      Objective:     VITALS:   Last 24hrs VS reviewed since prior progress note. Most recent are:  Visit Vitals  /63   Pulse 88   Temp 98.1 °F (36.7 °C)   Resp 17   Ht 5' 7\" (1.702 m)   Wt 76.2 kg (168 lb)   SpO2 98%   BMI 26.31 kg/m²       Intake/Output Summary (Last 24 hours) at 2019 0659  Last data filed at 2019 0400  Gross per 24 hour   Intake 1080 ml   Output 0 ml   Net 1080 ml      Telemetry Reviewed:     PHYSICAL EXAM:  General: intubated.   1+ edema      Lab Data Reviewed: (see below)    Medications Reviewed: (see below)    PMH/SH reviewed - no change compared to H&P  ________________________________________________________________________  Care Plan discussed with:  Patient     Family      RN     Care Manager                    Consultant:          Comments   >50% of visit spent in counseling and coordination of care       ________________________________________________________________________  Garth Deutsch MD     Procedures: see electronic medical records for all procedures/Xrays and details which  were not copied into this note but were reviewed prior to creation of Plan. LABS:  Recent Labs     09/12/19  0508 09/11/19 0426   WBC 6.3 5.6   HGB 10.1* 9.8*   HCT 33.0* 31.5*   PLT 92* 88*     Recent Labs     09/12/19  0508 09/11/19  0426 09/10/19  0431    137 138   K 4.3 4.1 4.0    102 103   CO2 26 28 30   BUN 46* 36* 23*   CREA 6.89* 5.58* 4.07*   GLU 75 89 69   CA 8.2* 8.0* 7.8*     Recent Labs     09/12/19  0508 09/11/19  0426 09/10/19  0431   SGOT 56* 58* 63*   * 128* 105   TP 6.7 6.5 6.4   ALB 2.8* 2.9* 2.8*   GLOB 3.9 3.6 3.6     No results for input(s): INR, PTP, APTT in the last 72 hours. No lab exists for component: INREXT, INREXT   No results for input(s): FE, TIBC, PSAT, FERR in the last 72 hours. Lab Results   Component Value Date/Time    Folate 7.0 01/06/2016 01:08 PM      No results for input(s): PH, PCO2, PO2 in the last 72 hours. No results for input(s): CPK, CKMB in the last 72 hours.     No lab exists for component: TROPONINI  No components found for: [place]  Lab Results   Component Value Date/Time    Color YELLOW/STRAW 09/05/2019 06:00 PM    Appearance CLEAR 09/05/2019 06:00 PM    Specific gravity 1.027 09/05/2019 06:00 PM    pH (UA) 7.0 09/05/2019 06:00 PM    Protein 100 (A) 09/05/2019 06:00 PM    Glucose NEGATIVE  09/05/2019 06:00 PM    Ketone NEGATIVE  09/05/2019 06:00 PM    Bilirubin NEGATIVE  09/05/2019 06:00 PM    Urobilinogen 1.0 09/05/2019 06:00 PM    Nitrites NEGATIVE  09/05/2019 06:00 PM    Leukocyte Esterase NEGATIVE  09/05/2019 06:00 PM    Epithelial cells FEW 09/05/2019 06:00 PM    Bacteria NEGATIVE  09/05/2019 06:00 PM    WBC 0-4 09/05/2019 06:00 PM    RBC 5-10 09/05/2019 06:00 PM       MEDICATIONS:  Current Facility-Administered Medications   Medication Dose Route Frequency    heparin (porcine) pf 300 Units  300 Units InterCATHeter PRN    metoprolol succinate (TOPROL-XL) XL tablet 12.5 mg  12.5 mg Oral DAILY    guaiFENesin ER (MUCINEX) tablet 600 mg  600 mg Oral Q12H    epoetin calos-epbx (RETACRIT) injection 6,000 Units  6,000 Units SubCUTAneous DIALYSIS MON, WED & FRI    magnesium oxide (MAG-OX) tablet 400 mg  400 mg Oral DAILY    midodrine (PROAMITINE) tablet 10 mg  10 mg Oral TID WITH MEALS    pantoprazole (PROTONIX) tablet 40 mg  40 mg Oral ACB&D    insulin lispro (HUMALOG) injection   SubCUTAneous AC&HS    glucose chewable tablet 16 g  4 Tab Oral PRN    glucagon (GLUCAGEN) injection 1 mg  1 mg IntraMUSCular PRN    dextrose 10% infusion 0-250 mL  0-250 mL IntraVENous PRN    0.9% sodium chloride infusion 250 mL  250 mL IntraVENous PRN    aspirin chewable tablet 81 mg  81 mg Oral DAILY    ticagrelor (BRILINTA) tablet 90 mg  90 mg Oral Q12H    albumin human 25% (BUMINATE) solution 12.5 g  12.5 g IntraVENous DIALYSIS PRN    sodium chloride (NS) flush 5-40 mL  5-40 mL IntraVENous Q8H    sodium chloride (NS) flush 5-40 mL  5-40 mL IntraVENous PRN    sodium phosphate (FLEET'S) enema 1 Enema  1 Enema Rectal PRN    oxyCODONE-acetaminophen (PERCOCET) 5-325 mg per tablet 1-2 Tab  1-2 Tab Oral Q4H PRN    pravastatin (PRAVACHOL) tablet 80 mg  80 mg Oral QHS    cyclobenzaprine (FLEXERIL) tablet 5 mg  5 mg Oral TID PRN    finasteride (PROSCAR) tablet 5 mg  5 mg Oral DAILY    FLUoxetine (PROzac) capsule 20 mg  20 mg Oral DAILY    mirtazapine (REMERON) tablet 30 mg  30 mg Oral QHS    nitroglycerin (NITROSTAT) tablet 0.4 mg  0.4 mg SubLINGual Q5MIN PRN    pregabalin (LYRICA) capsule 50 mg  50 mg Oral TID    traZODone (DESYREL) tablet 150 mg  150 mg Oral QHS

## 2019-09-12 NOTE — PROGRESS NOTES
Progress Note      9/12/2019 7:46 AM  NAME: Senait Black Sr. MRN:  [id number]   Admit Diagnosis: Unstable angina ([facility] [address] [I20.0]  Heart failure ([facility] [address] [I50.9]      Problem List:     1. [place]; cath 4/30 w/ RCA , 60% oLAD w/ neg FFR (0.88), patent LCx stent w/ mild ISR. 2. Cardiogenic Shock; resolved  3. Indeterminate troponin elevation  4. Right renal artery stenosis s/p 7.0 x 19mm BMS 5/4/18  5. Occluded LCIA. 6. Elevated troponin  7. ESRD on dialysis. Dr Crump   8. Long term hx of Coronary artery disease s/p multivessel stenting s/p PCI of LCx, PTCA of RCA 7/97, PTCA LAD 3/94.  Lexiscan 2/08 w/ EF 62%, distal ineroapical infarct w/o ischemia  9. Cath 4/2018  No intervention. Lv EF  -  55%   10. Peripheral vascular disease s/p left iliac stenting, left SFA stenting 3/00, right iliac stenting 11/99.  11. Status post L AKA  12. Bilateral carotid stenosis; 16-49% 9/13  13. Hypertension  14. Diabetes  15. Chronic Tobacco abuse 1-2 PPD . 16. Chronic pain  17. Chronic anemia  18. Hyperlipidemia  19. Noncompliance  20. Peptic ulcer disease w/ GIB '15  21. Falls  22. Lives w/ sister in [address] [place] [address] now  21. Usual Cardiologist:  Dr. Prabha Juan / Hernán Mora. Assessment/Plan: TnI 0.1    Films reviewed from 4/18. Ostial LAD. Occluded RCA. LCx ok. Left common iliac artery occluded. Cath 9/5 w/ RCA , sev reduced LVEF, severe LM, LAD, LCx disease (Renteria Class 1, 1, 1). Discussed w/ CTS; initially planned for CABG but rescinded due to numerous comorbidities. IABP placed @ 1:1. [place] placed. Long discussion w/ patient and family. On for Impella 5.0 axillary this AM and then high-risk PCI of LM/LAD/LCx to follow. Risk of death is high; discussed w/ patient and family; pt is willing to accept these risks. Cath 9/6 w/ Impella CP supported PCI of OM1, mLAD, and LM/LAD/LCx bifurcation w/ Culotte technique w/ total of 4 STANLEY    Impella removed in cath lab. Some bleeding afterwards.   Got PRBCs and PLT.    Clot noted on distal Impella upon removal (seen on WADE). No signs of neuro deficits. Echo w/ EF 40-45%, mod AoS, mod AI    Extubated 9/7  A-line out 9/7  PA cath out 9/7  RCFA sheath out 9/7    Remains w/ TLC RIJ    No more fevers    HgB 10.1  PLT 92    Main complaint is right arm pain and decreased ROM. PVR is normal.    1. Continue ASA, ticagrelor  2. CAMILLE Ab NEG; BELLA pending  3. Continue midodrine as needed for BP; hold for systolic > 124  4. Continue Toprol XL 12.5mg. ACEi in the future if tolerates; probably as an outpt. 5. Continue statin  6. Volume management per renal.    7. PT/OT for discharge planning; home per notes w/ HH  8. No further cardiac recs; ok for discharge when stable per primary. 9. RIJ line needs to get pulled. 10. Will get an echo in my office as an outpt         [x]       High complexity decision making was performed in this patient at high risk for decompensation with multiple organ involvement. Subjective:     Dannielle Pierce Sr. denies CP and SOB. Pain still an issue. Discussed with RN events overnight. Review of Systems:    Symptom Y/N Comments  Symptom Y/N Comments   Fever/Chills N   Chest Pain N    Poor Appetite N   Edema N    Cough N   Abdominal Pain N    Sputum N   Joint Pain N    SOB/COREA N   Pruritis/Rash N    Nausea/vomit N   Tolerating PT/OT Y    Diarrhea N   Tolerating Diet Y    Constipation N   Other       Could NOT obtain due to: sedated     Objective:      Physical Exam:    Last 24hrs VS reviewed since prior progress note.  Most recent are:    Visit Vitals  /60   Pulse 86   Temp 97.6 °F (36.4 °C) (Oral)   Resp 18   Ht 5' 7\" (1.702 m)   Wt 76.2 kg (168 lb)   SpO2 98%   BMI 26.31 kg/m²       Intake/Output Summary (Last 24 hours) at 9/12/2019 0802  Last data filed at 9/12/2019 0709  Gross per 24 hour   Intake 840 ml   Output 2000 ml   Net -1160 ml        General Appearance: Well developed, well nourished, AnO x 3,    no acute distress. Ears/Nose/Mouth/Throat: Hearing grossly normal.  Neck: Supple. Chest: Lungs clear to auscultation bilaterally. Cardiovascular: Regular rate and rhythm, S1S2 normal, no murmur. Abdomen: Soft, non-tender, bowel sounds are active. Extremities: No edema bilaterally. LAKA. Skin: Warm and dry. [x]         Post-cath site without hematoma, bruit, tenderness, or thrill. Distal pulses intact. PMH/ reviewed - no change compared to H&P    Data Review    Telemetry: sinus rhythm     EKG:   [x]  No new EKG for review    Lab Data Personally Reviewed:    Recent Labs     09/12/19 0508 09/11/19 0426   WBC 6.3 5.6   HGB 10.1* 9.8*   HCT 33.0* 31.5*   PLT 92* 88*     No results for input(s): INR, PTP, APTT in the last 72 hours. No lab exists for component: [place]   Recent Labs     09/12/19  0508 09/11/19  0426 09/10/19  0431    137 138   K 4.3 4.1 4.0    102 103   CO2 26 28 30   BUN 46* 36* 23*   CREA 6.89* 5.58* 4.07*   GLU 75 89 69   CA 8.2* 8.0* 7.8*     No results for input(s): CPK, CKNDX, TROIQ in the last 72 hours. No lab exists for component: CPKMB  Lab Results   Component Value Date/Time    Cholesterol, total 236 (H) 04/29/2018 06:00 AM    HDL Cholesterol 34 04/29/2018 06:00 AM    LDL,Direct 62 10/03/2014 05:50 AM    LDL, calculated 172.4 (H) 04/29/2018 06:00 AM    Triglyceride 148 04/29/2018 06:00 AM    CHOL/HDL Ratio 6.9 (H) 04/29/2018 06:00 AM       Recent Labs     09/12/19  0508 09/11/19  0426 09/10/19  0431   SGOT 56* 58* 63*   * 128* 105   TP 6.7 6.5 6.4   ALB 2.8* 2.9* 2.8*   GLOB 3.9 3.6 3.6     No results for input(s): PH, PCO2, PO2 in the last 72 hours.     Medications Personally Reviewed:    Current Facility-Administered Medications   Medication Dose Route Frequency    mupirocin (BACTROBAN) 2 % ointment   Both Nostrils Q12H    heparin (porcine) pf 300 Units  300 Units InterCATHeter PRN    metoprolol succinate (TOPROL-XL) XL tablet 12.5 mg  12.5 mg Oral DAILY    guaiFENesin ER (MUCINEX) tablet 600 mg  600 mg Oral Q12H    epoetin calos-epbx (RETACRIT) injection 6,000 Units  6,000 Units SubCUTAneous DIALYSIS MON, WED & FRI    magnesium oxide (MAG-OX) tablet 400 mg  400 mg Oral DAILY    midodrine (PROAMITINE) tablet 10 mg  10 mg Oral TID WITH MEALS    pantoprazole (PROTONIX) tablet 40 mg  40 mg Oral ACB&D    insulin lispro (HUMALOG) injection   SubCUTAneous AC&HS    glucose chewable tablet 16 g  4 Tab Oral PRN    glucagon (GLUCAGEN) injection 1 mg  1 mg IntraMUSCular PRN    dextrose 10% infusion 0-250 mL  0-250 mL IntraVENous PRN    0.9% sodium chloride infusion 250 mL  250 mL IntraVENous PRN    aspirin chewable tablet 81 mg  81 mg Oral DAILY    ticagrelor (BRILINTA) tablet 90 mg  90 mg Oral Q12H    albumin human 25% (BUMINATE) solution 12.5 g  12.5 g IntraVENous DIALYSIS PRN    sodium chloride (NS) flush 5-40 mL  5-40 mL IntraVENous Q8H    sodium chloride (NS) flush 5-40 mL  5-40 mL IntraVENous PRN    sodium phosphate (FLEET'S) enema 1 Enema  1 Enema Rectal PRN    oxyCODONE-acetaminophen (PERCOCET) 5-325 mg per tablet 1-2 Tab  1-2 Tab Oral Q4H PRN    pravastatin (PRAVACHOL) tablet 80 mg  80 mg Oral QHS    cyclobenzaprine (FLEXERIL) tablet 5 mg  5 mg Oral TID PRN    finasteride (PROSCAR) tablet 5 mg  5 mg Oral DAILY    FLUoxetine (PROzac) capsule 20 mg  20 mg Oral DAILY    mirtazapine (REMERON) tablet 30 mg  30 mg Oral QHS    nitroglycerin (NITROSTAT) tablet 0.4 mg  0.4 mg SubLINGual Q5MIN PRN    pregabalin (LYRICA) capsule 50 mg  50 mg Oral TID    traZODone (DESYREL) tablet 150 mg  150 mg Oral QHS         Mitul Soliz III, DO

## 2019-09-12 NOTE — DIALYSIS
[facility]  [phone]    Vitals   Pre   Post   Assessment   Pre   Post     Temp  Temp: 97.8 °F (36.6 °C) (09/11/19 2323)  97.6 LOC  aox3 aox3   HR   Pulse (Heart Rate): 82 (09/12/19 0333) 86 Lungs   3LNC  3LNC   B/P   BP: 146/67 (09/12/19 0333) 122/60 Cardiac   Denies CP  Denies CP   Resp   Resp Rate: 14 (09/12/19 0333) 18 Skin   Warm/dry  Warm/dry   Pain level  Pain Intensity 1: 5 (09/12/19 0025) 8 (primary nurse to treat) Edema  +2general     +2gen   Orders:    Duration:   Start:   5261 End:   4945 Total:   3.5hr   Dialyzer:   Dialyzer/Set Up Inspection: Scott Berman (09/12/19 0333)   K Bath:   Dialysate K (mEq/L): 4 (09/12/19 0333)   Ca Bath:   Dialysate CA (mEq/L): 2.5 (09/12/19 0333)   Na/Bicarb:   Dialysate NA (mEq/L): 140 (09/12/19 0333)   Target Fluid Removal:   Goal/Amount of Fluid to Remove (mL): 2000 mL (09/12/19 0333)   Access     Type & Location:   E AVF   Labs     Obtained/Reviewed   Critical Results Called   Date when labs were drawn-  Hgb-    HGB   Date Value Ref Range Status   09/11/2019 9.8 (L) 12.1 - 17.0 g/dL Final     K-    Potassium   Date Value Ref Range Status   09/11/2019 4.1 3.5 - 5.1 mmol/L Final     Ca-   Calcium   Date Value Ref Range Status   09/11/2019 8.0 (L) 8.5 - 10.1 MG/DL Final     Bun-   BUN   Date Value Ref Range Status   09/11/2019 36 (H) 6 - 20 MG/DL Final     Creat-   Creatinine   Date Value Ref Range Status   09/11/2019 5.58 (H) 0.70 - 1.30 MG/DL Final     Comment:     INVESTIGATED PER DELTA CHECK PROTOCOL        Medications/ Blood Products Given     Name   Dose   Route and Time        Primary nurse gave 6000u retacrit SQ             Blood Volume Processed (BVP):    77.8L Net Fluid   Removed:  2000ml   Comments   Time Out Done: 3331  Primary Nurse Rpt Pre: Elham Redman RN  Primary Nurse Rpt Post: Elham Redman RN  Pt Education: access care, procedural, fluid management  Care Plan: per primary team  Tx Summary: Orders, labs, notes reviewed. Consent on file. LUE AVF assessed +bruit/thrill. Cannulated with 15G needles for good apparent flows. Taped per policy. Pt tolerated 3.5hr HD tx for net uf 2000ml. Post tx all possible blood returned. Needles removed and hemostasis achieved <10min. Access sites bandaged/taped per policy. Pt left in room in bed in low position, side rails up x3, wheels locked, cb in reach and pt in no acute distress. Admiting Diagnosis: ESRD  Pt's previous clinic- [facility]  Consent signed - Informed Consent Verified: Yes (09/09/19 0826)  [facility] Consent - yes  Hepatitis Status- neg&immune (1/2/19)  Machine #- Machine Number: [id number] (09/12/19 5170)  Telemetry status-yes  Pre-dialysis wt. - Pre-Dialysis Weight: 77.7 kg (171 lb 4.8 oz) (09/12/19 0400)

## 2019-09-12 NOTE — PROGRESS NOTES
Bedside shift change report GIVEN TO Andrés Montez RN. Report included the following information SBAR. SIGNIFICANT CHANGES DURING SHIFT:  none      CONCERNS TO ADDRESS WITH MD:  none          [address] NURSING NOTE   Admission Date 9/4/2019   Admission Diagnosis Unstable angina ([facility] [address] [I20.0]  Heart failure ([facility] [address] [I50.9]   Consults IP CONSULT TO CARDIOLOGY  IP CONSULT TO CARDIAC SURGERY  IP CONSULT TO ANESTHESIOLOGY  IP CONSULT TO ADVANCED HEART FAILURE      Cardiac Monitoring [x] Yes [] No      Purposeful Hourly Rounding [x] Yes    Moom Score Total Score: 3   Momo score 3 or > [x] Bed Alarm [] Avasys [] 1:1 sitter [] Patient refused (Signed refusal form in chart)   Mitchel Score Mitchel Score: 15   Mitchel score 14 or < [] PMT consult [] Wound Care consult    []  Specialty bed  [] Nutrition consult      Influenza Vaccine Received Flu Vaccine for Current Season (usually Sept-March): Not Flu Season           Oxygen needs? [] Room air Oxygen @  []1L    []2L    [x]3L   []4L    []5L   []6L via  NC   Chronic home O2 use?  [] Yes [x] No  Perform O2 challenge test and document in progress note using smartphrase (.Homeoxygen)      Last bowel movement Last Bowel Movement Date: 09/10/19      Urinary Catheter [REMOVED] Urinary Catheter 09/05/19 Coude-Indications for Use: Accurate measurement of urinary output     [REMOVED] Urinary Catheter 09/05/19 Coude-Urine Output (mL): 5 ml     LDAs           Quad Lumen 09/06/19 Right Internal jugular (Active)   Central Line Being Utilized Yes 9/12/2019  4:18 PM   Criteria for Appropriate Use Limited/no vessel suitable for conventional peripheral access 9/12/2019  4:18 PM   Site Assessment Clean, dry, & intact 9/12/2019  4:18 PM   Infiltration Assessment 0 9/12/2019  4:18 PM   Affected Extremity/Extremities Color distal to insertion site pink (or appropriate for race) 9/12/2019  4:18 PM   Date of Last Dressing Change 09/07/19 9/12/2019  4:18 PM   Dressing Status Clean, dry, & intact 9/12/2019 4:18 PM   Dressing Type Disk with Chlorhexadine gluconate (CHG); Transparent 9/12/2019  4:18 PM   Action Taken Open ports on tubing capped 9/12/2019  4:18 PM   Proximal Hub Color/Line Status Flushed; White;Capped 9/12/2019  4:18 PM   Positive Blood Return (Medial Site) Yes 9/12/2019  4:18 PM   Medial 1 Hub Color/Line Status Gray;Flushed;Capped 9/12/2019  4:18 PM   Positive Blood Return (Lateral Site) Yes 9/12/2019  4:18 PM   Medial 2 Hub Color/Line Status Blue;Flushed;Capped 9/12/2019  4:18 PM   Positive Blood Return (Site #3) No 9/12/2019  4:18 PM   Distal Hub Color/Line Status Brown;Capped;Flushed 9/12/2019  4:18 PM   Positive Blood Return (Site #4) Yes 9/12/2019  4:18 PM   Alcohol Cap Used Yes 9/12/2019  4:00 AM            Hemodialysis Access 09/05/19 (Active)   Central Line Being Utilized Yes 9/12/2019  4:18 PM   Criteria for Appropriate Use Dialysis/apheresis 9/12/2019  4:18 PM   Date Accessed  09/12/19 9/12/2019  4:18 PM   Access Time  0730 9/9/2019  9:12 AM   Site Assessment Clean, dry, & intact 9/12/2019  4:18 PM   Date of Last Dressing Change 09/07/19 9/9/2019  8:00 AM   Dressing Status Clean, dry, & intact 9/11/2019  3:00 AM   Dressing Type Tape 9/12/2019  4:18 PM                     Readmission Risk Assessment Tool Score High Risk            35       Total Score        3 Has Seen PCP in Last 6 Months (Yes=3, No=0)    2 . Living with Significant Other. Assisted Living. LTAC. SNF. or   Rehab    3 Patient Length of Stay (>5 days = 3)    9 Pt.  Coverage (Medicare=5 , Medicaid, or Self-Pay=4)    18 Charlson Comorbidity Score (Age + Comorbid Conditions)        Criteria that do not apply:    IP Visits Last 12 Months (1-3=4, 4=9, >4=11)       Expected Length of Stay 2d 16h   Actual Length of Stay 8

## 2019-09-13 LAB
ALBUMIN SERPL-MCNC: 2.8 G/DL (ref 3.5–5)
ALBUMIN/GLOB SERPL: 0.7 {RATIO} (ref 1.1–2.2)
ALP SERPL-CCNC: 156 U/L (ref 45–117)
ALT SERPL-CCNC: 7 U/L (ref 12–78)
ANION GAP SERPL CALC-SCNC: 6 MMOL/L (ref 5–15)
AST SERPL-CCNC: 54 U/L (ref 15–37)
BILIRUB SERPL-MCNC: 0.9 MG/DL (ref 0.2–1)
BUN SERPL-MCNC: 33 MG/DL (ref 6–20)
BUN/CREAT SERPL: 6 (ref 12–20)
CALCIUM SERPL-MCNC: 8.3 MG/DL (ref 8.5–10.1)
CHLORIDE SERPL-SCNC: 103 MMOL/L (ref 97–108)
CO2 SERPL-SCNC: 29 MMOL/L (ref 21–32)
CREAT SERPL-MCNC: 5.37 MG/DL (ref 0.7–1.3)
ERYTHROCYTE [DISTWIDTH] IN BLOOD BY AUTOMATED COUNT: 17.2 % (ref 11.5–14.5)
GLOBULIN SER CALC-MCNC: 4.1 G/DL (ref 2–4)
GLUCOSE BLD STRIP.AUTO-MCNC: 122 MG/DL (ref 65–100)
GLUCOSE BLD STRIP.AUTO-MCNC: 129 MG/DL (ref 65–100)
GLUCOSE BLD STRIP.AUTO-MCNC: 139 MG/DL (ref 65–100)
GLUCOSE BLD STRIP.AUTO-MCNC: 196 MG/DL (ref 65–100)
GLUCOSE SERPL-MCNC: 113 MG/DL (ref 65–100)
HCT VFR BLD AUTO: 34.1 % (ref 36.6–50.3)
HGB BLD-MCNC: 10.1 G/DL (ref 12.1–17)
MCH RBC QN AUTO: 30.1 PG (ref 26–34)
MCHC RBC AUTO-ENTMCNC: 29.6 G/DL (ref 30–36.5)
MCV RBC AUTO: 101.8 FL (ref 80–99)
NRBC # BLD: 0 K/UL (ref 0–0.01)
NRBC BLD-RTO: 0 PER 100 WBC
PLATELET # BLD AUTO: 113 K/UL (ref 150–400)
PMV BLD AUTO: 11.7 FL (ref 8.9–12.9)
POTASSIUM SERPL-SCNC: 4.9 MMOL/L (ref 3.5–5.1)
PROT SERPL-MCNC: 6.9 G/DL (ref 6.4–8.2)
RBC # BLD AUTO: 3.35 M/UL (ref 4.1–5.7)
SERVICE CMNT-IMP: ABNORMAL
SODIUM SERPL-SCNC: 138 MMOL/L (ref 136–145)
WBC # BLD AUTO: 7.2 K/UL (ref 4.1–11.1)

## 2019-09-13 PROCEDURE — 80053 COMPREHEN METABOLIC PANEL: CPT

## 2019-09-13 PROCEDURE — 74011250637 HC RX REV CODE- 250/637: Performed by: INTERNAL MEDICINE

## 2019-09-13 PROCEDURE — 94760 N-INVAS EAR/PLS OXIMETRY 1: CPT

## 2019-09-13 PROCEDURE — 77010033678 HC OXYGEN DAILY

## 2019-09-13 PROCEDURE — 74011250637 HC RX REV CODE- 250/637: Performed by: FAMILY MEDICINE

## 2019-09-13 PROCEDURE — 82962 GLUCOSE BLOOD TEST: CPT

## 2019-09-13 PROCEDURE — 90935 HEMODIALYSIS ONE EVALUATION: CPT

## 2019-09-13 PROCEDURE — 36415 COLL VENOUS BLD VENIPUNCTURE: CPT

## 2019-09-13 PROCEDURE — 74011250637 HC RX REV CODE- 250/637: Performed by: NURSE PRACTITIONER

## 2019-09-13 PROCEDURE — 76937 US GUIDE VASCULAR ACCESS: CPT

## 2019-09-13 PROCEDURE — 65660000000 HC RM CCU STEPDOWN

## 2019-09-13 PROCEDURE — 85027 COMPLETE CBC AUTOMATED: CPT

## 2019-09-13 RX ADMIN — TRAZODONE HYDROCHLORIDE 150 MG: 50 TABLET ORAL at 21:17

## 2019-09-13 RX ADMIN — OXYCODONE AND ACETAMINOPHEN 2 TABLET: 5; 325 TABLET ORAL at 13:05

## 2019-09-13 RX ADMIN — PREGABALIN 50 MG: 50 CAPSULE ORAL at 18:46

## 2019-09-13 RX ADMIN — MUPIROCIN: 20 OINTMENT TOPICAL at 21:18

## 2019-09-13 RX ADMIN — MUPIROCIN: 20 OINTMENT TOPICAL at 09:08

## 2019-09-13 RX ADMIN — PREGABALIN 50 MG: 50 CAPSULE ORAL at 09:06

## 2019-09-13 RX ADMIN — PREGABALIN 50 MG: 50 CAPSULE ORAL at 21:17

## 2019-09-13 RX ADMIN — Medication 400 MG: at 09:06

## 2019-09-13 RX ADMIN — OXYCODONE AND ACETAMINOPHEN 2 TABLET: 5; 325 TABLET ORAL at 02:54

## 2019-09-13 RX ADMIN — Medication 10 ML: at 06:19

## 2019-09-13 RX ADMIN — TICAGRELOR 90 MG: 90 TABLET ORAL at 18:46

## 2019-09-13 RX ADMIN — Medication 10 ML: at 13:17

## 2019-09-13 RX ADMIN — PANTOPRAZOLE SODIUM 40 MG: 40 TABLET, DELAYED RELEASE ORAL at 07:48

## 2019-09-13 RX ADMIN — Medication 10 ML: at 21:18

## 2019-09-13 RX ADMIN — OXYCODONE AND ACETAMINOPHEN 2 TABLET: 5; 325 TABLET ORAL at 18:47

## 2019-09-13 RX ADMIN — MIRTAZAPINE 30 MG: 15 TABLET, FILM COATED ORAL at 21:17

## 2019-09-13 RX ADMIN — OXYCODONE AND ACETAMINOPHEN 2 TABLET: 5; 325 TABLET ORAL at 07:48

## 2019-09-13 RX ADMIN — Medication 10 ML: at 10:41

## 2019-09-13 RX ADMIN — GUAIFENESIN 600 MG: 600 TABLET, EXTENDED RELEASE ORAL at 09:06

## 2019-09-13 RX ADMIN — FLUOXETINE 20 MG: 20 CAPSULE ORAL at 09:06

## 2019-09-13 RX ADMIN — ASPIRIN 81 MG 81 MG: 81 TABLET ORAL at 09:07

## 2019-09-13 RX ADMIN — OXYCODONE AND ACETAMINOPHEN 1 TABLET: 5; 325 TABLET ORAL at 21:17

## 2019-09-13 RX ADMIN — PRAVASTATIN SODIUM 80 MG: 40 TABLET ORAL at 21:17

## 2019-09-13 RX ADMIN — GUAIFENESIN 600 MG: 600 TABLET, EXTENDED RELEASE ORAL at 21:17

## 2019-09-13 RX ADMIN — TICAGRELOR 90 MG: 90 TABLET ORAL at 06:19

## 2019-09-13 RX ADMIN — MIDODRINE HYDROCHLORIDE 10 MG: 5 TABLET ORAL at 13:05

## 2019-09-13 RX ADMIN — METOPROLOL SUCCINATE 12.5 MG: 25 TABLET, EXTENDED RELEASE ORAL at 09:07

## 2019-09-13 RX ADMIN — PANTOPRAZOLE SODIUM 40 MG: 40 TABLET, DELAYED RELEASE ORAL at 18:46

## 2019-09-13 RX ADMIN — FINASTERIDE 5 MG: 5 TABLET, FILM COATED ORAL at 09:06

## 2019-09-13 NOTE — PROGRESS NOTES
Hospitalist Progress Note    NAME: Mundo Roblero Sr.   :  1955   MRN:  [id number]       Assessment / Plan:  Unstable angina in settings of CAD with h/o multiple stents in the past   HTN   -doing well clinically    -Card cath  with severe disease,  Poor CABG candidate   Cath  w/ Impella CP supported PCI of OM1, mLAD, and LM/LAD/LCx bifurcation w/               Culotte technique w/ total of 4 STANLEY               Impella removed in cath lab. Some bleeding afterwards. Got PRBCs and PLT. Clot noted on distal Impella upon removal (seen on WADE)  -Cardiology/pulmonology help appreciated   - Cont ASA  - on Midodrine for pressor support  - On metoprolol    DM type II with  Nephropathy  -BS low side   -Hold home Tradjenta   -c/w SS as needed     Pulmonary edema with atelectasis  - resolved  - On dialysis MWF  - Mucinex for congestion   - send sputum for cx   - CXR 9/10 noted  - incentive spirometry   - 6 min O2 test prior to discharge      Rt chest wall pain and swelling   - s/p CP Impella removal   - possibly due to right chest wall hematoma   - will get US RT chest wall / UE no evidence of DVT  - pain control as needed     Thrombocytopenia  -Plt at baseline normal. Admission 53, low side but stable now   Heparin gtt was stopped   -HIT negative, follow BELLA     ESRD   -HD MWF  -Renal help appreciated. Renal consult for HD. Renally dose Rx as appropriate. PAD  Multiple small wounds    -Patient is scheduled to have a right second toe amputation on 10/1/2019  -Continue aspirin and Brilinta  -consult wound care        L AKA   Depression, home trazodone  H/o R renal artery stenosis, s/p stent 2018                   Code Status: full   Surrogate Decision Maker: Wife Shivam  DVT Prophylaxis: Scd's pending card cath      Remove RIJ today  Baseline: lives at home with wife and his son; independent   Recommended Disposition: Cardiology ok for discharge.  to work on placement.  Will need 6 min o2 test prior      Subjective:     Chief Complaint / Reason for Physician Visit: following unstable angina       Discussed with RN events overnight. Review of Systems:  Symptom Y/N Comments  Symptom Y/N Comments   Fever/Chills    Chest Pain n    Poor Appetite    Edema     Cough    Abdominal Pain     Sputum    Joint Pain     SOB/COREA n   Pruritis/Rash     Nausea/vomit    Tolerating PT/OT     Diarrhea    Tolerating Diet     Constipation    Other       Could NOT obtain due to:      Objective:     VITALS:   Last 24hrs VS reviewed since prior progress note. Most recent are:  Patient Vitals for the past 24 hrs:   Temp Pulse Resp BP SpO2   09/13/19 0724 97.2 °F (36.2 °C) 67 18 120/64 100 %   09/13/19 0255 97.3 °F (36.3 °C) 64 18 139/69 93 %   09/12/19 2322 98.1 °F (36.7 °C) 72 18 114/60 98 %   09/12/19 2005 98.1 °F (36.7 °C) 78 18 119/65 98 %   09/12/19 1648  73  128/75    09/12/19 1445 97.7 °F (36.5 °C) 71 18 116/65 97 %   09/12/19 1052 98.1 °F (36.7 °C) 82 20 115/48 98 %       Intake/Output Summary (Last 24 hours) at 9/13/2019 0924  Last data filed at 9/13/2019 0255  Gross per 24 hour   Intake 1260 ml   Output 225 ml   Net 1035 ml        PHYSICAL EXAM:  General: WD, WN. Confused some , just got extubated. Cooperative, no acute distress    EENT:  EOMI. Anicteric sclerae. MMM  Resp:  CTA bilaterally, no wheezing or rales. No accessory muscle use  CV:  Regular  rhythm,  No edema. + BL LE chronic venous changes   GI:  Soft, Non distended, Non tender.  +Bowel sounds  Neurologic:  Alert and oriented X 3, normal speech,   Psych:   Good insight. Not anxious nor agitated  Skin:  No rashes.   No jaundice. + R shoulder stiches     Reviewed most current lab test results and cultures  YES  Reviewed most current radiology test results   YES  Review and summation of old records today    NO  Reviewed patient's current orders and MAR    YES  PMH/SH reviewed - no change compared to H&P  ________________________________________________________________________  Care Plan discussed with:    Comments   Patient y    Family      RN y    Care Manager     Consultant                        Multidiciplinary team rounds were held today with , nursing, pharmacist and clinical coordinator. Patient's plan of care was discussed; medications were reviewed and discharge planning was addressed. ________________________________________________________________________  Total NON critical care TIME: 30 Minutes    Total CRITICAL CARE TIME Spent:   Minutes non procedure based      Comments   >50% of visit spent in counseling and coordination of care     ________________________________________________________________________  Michel Shah MD     Procedures: see electronic medical records for all procedures/Xrays and details which were not copied into this note but were reviewed prior to creation of Plan. LABS:  I reviewed today's most current labs and imaging studies.   Pertinent labs include:  Recent Labs     09/13/19 0258 09/12/19 0508 09/11/19 0426   WBC 7.2 6.3 5.6   HGB 10.1* 10.1* 9.8*   HCT 34.1* 33.0* 31.5*   * 92* 88*     Recent Labs     09/13/19 0258 09/12/19 0508 09/11/19 0426    137 137   K 4.9 4.3 4.1    103 102   CO2 29 26 28   * 75 89   BUN 33* 46* 36*   CREA 5.37* 6.89* 5.58*   CA 8.3* 8.2* 8.0*   ALB 2.8* 2.8* 2.9*   TBILI 0.9 0.8 0.8   SGOT 54* 56* 58*   ALT 7* 7* 6*       Signed: Michel Shah MD

## 2019-09-13 NOTE — PROCEDURES
[facility] Team [facility]  [phone]    Vitals   Pre   Post   Assessment   Pre   Post     Temp  Temp: 98 °F (36.7 °C) (09/13/19 1343) 97.4, oral LOC  A&Ox4 A&Ox4   HR   Pulse (Heart Rate): 70 (09/13/19 1343) 70 Lungs   Diminished Dim bases   B/P   BP: 108/50 (09/13/19 1343) 149/59 Cardiac   Tele, NSR Tele, NSR   Resp   Resp Rate: 18 (09/13/19 1343) 18 Skin   Several scattered bruises/ scabs; L BKA/ R leg abrasions/ R shin dressing Several scattered bruises/ scabs; L BKA/ R leg abrasions/ R shin dressing   Pain level  Pain Intensity 1: 8 (09/13/19 1304) 3, lower back pain (chronic), not interested in pain medication at this time Edema  Generalized, abd distention     Generalized, abd distention   Orders:    Duration:   Start:    2100 End:    7131 Total:   3.5hr   Dialyzer:   Dialyzer/Set Up Inspection: Anthony Mcknight (09/13/19 1343)   K Bath:   Dialysate K (mEq/L): 3 (09/13/19 1343)   Ca Bath:   Dialysate CA (mEq/L): 2.5 (09/13/19 1343)   Na/Bicarb:   Dialysate NA (mEq/L): 138 (09/13/19 1343)   Target Fluid Removal:   Goal/Amount of Fluid to Remove (mL): 2000 mL (09/13/19 1343)   Access     Type & Location:   HARVEY AVF: skin CDI. No s/s of infection. No issues with cannulation or hemostasis. Running well at .     Labs     Obtained/Reviewed   Critical Results Called   Date when labs were drawn-  Hgb-    HGB   Date Value Ref Range Status   09/13/2019 10.1 (L) 12.1 - 17.0 g/dL Final     K-    Potassium   Date Value Ref Range Status   09/13/2019 4.9 3.5 - 5.1 mmol/L Final     Ca-   Calcium   Date Value Ref Range Status   09/13/2019 8.3 (L) 8.5 - 10.1 MG/DL Final     Bun-   BUN   Date Value Ref Range Status   09/13/2019 33 (H) 6 - 20 MG/DL Final     Creat-   Creatinine   Date Value Ref Range Status   09/13/2019 5.37 (H) 0.70 - 1.30 MG/DL Final        Medications/ Blood Products Given     Name   Dose   Route and Time     None ordered                Blood Volume Processed (BVP):    77L Net Fluid Removed:  2000ml   Comments   Time Out Done: 80  Primary Nurse Rpt Pre: Lobo Fox RN  Primary Nurse Rpt Post: Lobo Fox RN  Pt Education: straighten BP cuff arm during BP evaluation for accuracy  Care Plan: ongoing  Tx Summary:  Pt arrived to HD suite A&Ox4. Consent signed & on file. SBAR received from Primary RN. 1343: Pt cannulated with 86N needles per policy & without issue. VSS. Dialysis Tx initiated. 1430: 100ml NS flush given for clot prevention. Mechanical occlusion on patient side. Patient educated to be careful of HD lines when moving in bed. VSS.  **No further issues during HD treatment**    1715: Tx ended. VSS. All possible blood returned to patient. Hemostasis achieved without issue. Bed locked and in the lowest position, call bell and belongings in reach. SBAR given to Primary, RN. Patient is stable at time of their departure. All Dialysis related medications have been reviewed. Admiting Diagnosis: Heart failure/ Unstable Angina  Pt's previous clinic- [facility]  Consent signed - Informed Consent Verified: Yes (09/13/19 1343)  [facility] Consent - on file  Hepatitis Status- Ab 17; Neg Ag 1/2/19  Machine #- Machine Number: [id number] (09/13/19 1343)  Telemetry status- tele, NSR  Pre-dialysis wt. - Pre-Dialysis Weight: 77.7 kg (171 lb 4.8 oz) (09/12/19 4486)

## 2019-09-13 NOTE — PROGRESS NOTES
Progress Note      9/13/2019 7:46 AM  NAME: Sean Llamas Sr. MRN:  [id number]   Admit Diagnosis: Unstable angina ([facility] [I20.0]  Heart failure ([facility] [I50.9]      Problem List:     1. [place]; cath 4/30 w/ RCA , 60% oLAD w/ neg FFR (0.88), patent LCx stent w/ mild ISR. 2. Cardiogenic Shock; resolved  3. Indeterminate troponin elevation  4. Right renal artery stenosis s/p 7.0 x 19mm BMS 5/4/18  5. Occluded LCIA. 6. Elevated troponin  7. ESRD on dialysis. Dr Crump   8. Long term hx of Coronary artery disease s/p multivessel stenting s/p PCI of LCx, PTCA of RCA 7/97, PTCA LAD 3/94.  Lexiscan 2/08 w/ EF 62%, distal ineroapical infarct w/o ischemia  9. Cath 4/2018  No intervention. Lv EF  -  55%   10. Peripheral vascular disease s/p left iliac stenting, left SFA stenting 3/00, right iliac stenting 11/99.  11. Status post L AKA  12. Bilateral carotid stenosis; 16-49% 9/13  13. Hypertension  14. Diabetes  15. Chronic Tobacco abuse 1-2 PPD . 16. Chronic pain  17. Chronic anemia  18. Hyperlipidemia  19. Noncompliance  20. Peptic ulcer disease w/ GIB '15  21. Falls  22. Lives w/ sister in [address] [place] [address] now  21. Usual Cardiologist:  Dr. Gabino Hernández / [facility]. Assessment/Plan: TnI 0.1    Films reviewed from 4/18. Ostial LAD. Occluded RCA. LCx ok. Left common iliac artery occluded. Cath 9/5 w/ RCA , sev reduced LVEF, severe LM, LAD, LCx disease (Renteria Class 1, 1, 1). Discussed w/ CTS; initially planned for CABG but rescinded due to numerous comorbidities. IABP placed @ 1:1. [place] placed. Long discussion w/ patient and family. On for Impella 5.0 axillary this AM and then high-risk PCI of LM/LAD/LCx to follow. Risk of death is high; discussed w/ patient and family; pt is willing to accept these risks. Cath 9/6 w/ Impella CP supported PCI of OM1, mLAD, and LM/LAD/LCx bifurcation w/ Culotte technique w/ total of 4 STANLEY    Impella removed in cath lab. Some bleeding afterwards.   Got PRBCs and PLT.    Clot noted on distal Impella upon removal (seen on WADE). No signs of neuro deficits. Echo w/ EF 40-45%, mod AoS, mod AI    Extubated 9/7  A-line out 9/7  PA cath out 9/7  RCFA sheath out 9/7    Remains w/ TLC RIJ    No more fevers    HgB 10.1  PLT 92    Main complaint is right arm pain and decreased ROM. PVR is normal.  Getting better. 1. Continue ASA, ticagrelor  2. CAMILLE Ab NEG; BELLA pending  3. Continue midodrine as needed for BP; hold for systolic > 584  4. Continue Toprol XL 12.5mg. ACEi in the future if tolerates; probably as an outpt. 5. Continue statin  6. Volume management per renal.    7. PT/OT for discharge planning; home per notes w/ HH  8. No further cardiac recs; ok for discharge when stable per primary. Not really sure why the patient is still in the hospital.  Has been ready from my standpoint for discharge for the last 3 days. 9. If remains in the hospital, Dr. Sony Arce will be available as needed this wknd. 10. RIJ line needs to get pulled. 11. Will get an echo in my office as an outpt         [x]       High complexity decision making was performed in this patient at high risk for decompensation with multiple organ involvement. Subjective:     Sherman Mccann Sr. denies CP and SOB. Pain still an issue but much better. Discussed with RN events overnight. Review of Systems:    Symptom Y/N Comments  Symptom Y/N Comments   Fever/Chills N   Chest Pain N    Poor Appetite N   Edema N    Cough N   Abdominal Pain N    Sputum N   Joint Pain N    SOB/COREA N   Pruritis/Rash N    Nausea/vomit N   Tolerating PT/OT Y    Diarrhea N   Tolerating Diet Y    Constipation N   Other       Could NOT obtain due to: sedated     Objective:      Physical Exam:    Last 24hrs VS reviewed since prior progress note.  Most recent are:    Visit Vitals  /64 (BP 1 Location: Right arm, BP Patient Position: At rest)   Pulse 67   Temp 97.2 °F (36.2 °C)   Resp 18   Ht 5' 7\" (1.702 m)   Wt 74.1 kg (163 lb 5.8 oz)   SpO2 100%   BMI 25.59 kg/m²       Intake/Output Summary (Last 24 hours) at 9/13/2019 0920  Last data filed at 9/13/2019 0255  Gross per 24 hour   Intake 1260 ml   Output 225 ml   Net 1035 ml        General Appearance: Well developed, well nourished, AnO x 3,    no acute distress. Ears/Nose/Mouth/Throat: Hearing grossly normal.  Neck: Supple. Chest: Lungs clear to auscultation bilaterally. Cardiovascular: Regular rate and rhythm, S1S2 normal, no murmur. Abdomen: Soft, non-tender, bowel sounds are active. Extremities: No edema bilaterally. LAKA. Skin: Warm and dry. [x]         Post-cath site without hematoma, bruit, tenderness, or thrill. Distal pulses intact. PMH/SH reviewed - no change compared to H&P    Data Review    Telemetry: sinus rhythm     EKG:   [x]  No new EKG for review    Lab Data Personally Reviewed:    Recent Labs     09/13/19 0258 09/12/19  0508   WBC 7.2 6.3   HGB 10.1* 10.1*   HCT 34.1* 33.0*   * 92*     No results for input(s): INR, PTP, APTT in the last 72 hours. No lab exists for component: Ankita Like   Recent Labs     09/13/19 0258 09/12/19  0508 09/11/19  0426    137 137   K 4.9 4.3 4.1    103 102   CO2 29 26 28   BUN 33* 46* 36*   CREA 5.37* 6.89* 5.58*   * 75 89   CA 8.3* 8.2* 8.0*     No results for input(s): CPK, CKNDX, TROIQ in the last 72 hours. No lab exists for component: CPKMB  Lab Results   Component Value Date/Time    Cholesterol, total 236 (H) 04/29/2018 06:00 AM    HDL Cholesterol 34 04/29/2018 06:00 AM    LDL,Direct 62 10/03/2014 05:50 AM    LDL, calculated 172.4 (H) 04/29/2018 06:00 AM    Triglyceride 148 04/29/2018 06:00 AM    CHOL/HDL Ratio 6.9 (H) 04/29/2018 06:00 AM       Recent Labs     09/13/19  0258 09/12/19  0508 09/11/19  0426   SGOT 54* 56* 58*   * 152* 128*   TP 6.9 6.7 6.5   ALB 2.8* 2.8* 2.9*   GLOB 4.1* 3.9 3.6     No results for input(s): PH, PCO2, PO2 in the last 72 hours.     Medications Personally Reviewed:    Current Facility-Administered Medications   Medication Dose Route Frequency    mupirocin (BACTROBAN) 2 % ointment   Both Nostrils Q12H    heparin (porcine) pf 300 Units  300 Units InterCATHeter PRN    metoprolol succinate (TOPROL-XL) XL tablet 12.5 mg  12.5 mg Oral DAILY    guaiFENesin ER (MUCINEX) tablet 600 mg  600 mg Oral Q12H    epoetin calos-epbx (RETACRIT) injection 6,000 Units  6,000 Units SubCUTAneous DIALYSIS MON, WED & FRI    midodrine (PROAMITINE) tablet 10 mg  10 mg Oral TID WITH MEALS    pantoprazole (PROTONIX) tablet 40 mg  40 mg Oral ACB&D    insulin lispro (HUMALOG) injection   SubCUTAneous AC&HS    glucose chewable tablet 16 g  4 Tab Oral PRN    glucagon (GLUCAGEN) injection 1 mg  1 mg IntraMUSCular PRN    dextrose 10% infusion 0-250 mL  0-250 mL IntraVENous PRN    0.9% sodium chloride infusion 250 mL  250 mL IntraVENous PRN    aspirin chewable tablet 81 mg  81 mg Oral DAILY    ticagrelor (BRILINTA) tablet 90 mg  90 mg Oral Q12H    albumin human 25% (BUMINATE) solution 12.5 g  12.5 g IntraVENous DIALYSIS PRN    sodium chloride (NS) flush 5-40 mL  5-40 mL IntraVENous Q8H    sodium chloride (NS) flush 5-40 mL  5-40 mL IntraVENous PRN    sodium phosphate (FLEET'S) enema 1 Enema  1 Enema Rectal PRN    oxyCODONE-acetaminophen (PERCOCET) 5-325 mg per tablet 1-2 Tab  1-2 Tab Oral Q4H PRN    pravastatin (PRAVACHOL) tablet 80 mg  80 mg Oral QHS    cyclobenzaprine (FLEXERIL) tablet 5 mg  5 mg Oral TID PRN    finasteride (PROSCAR) tablet 5 mg  5 mg Oral DAILY    FLUoxetine (PROzac) capsule 20 mg  20 mg Oral DAILY    mirtazapine (REMERON) tablet 30 mg  30 mg Oral QHS    nitroglycerin (NITROSTAT) tablet 0.4 mg  0.4 mg SubLINGual Q5MIN PRN    pregabalin (LYRICA) capsule 50 mg  50 mg Oral TID    traZODone (DESYREL) tablet 150 mg  150 mg Oral QHS         Geeta Herrera III, DO

## 2019-09-13 NOTE — PROGRESS NOTES
DAVID: Home Health    Pt is expected to d/c tomorrow. CM faxed Facesheet, Nephrology consult note, and last three dialysis flow sheets to [address] (fx. [phone]). [facility] confirmed they can resume [facility] services for pt following d/c. Weekend CM will follow-up.      Zi Stephens MSW  Care Manager  [phone]

## 2019-09-13 NOTE — PROGRESS NOTES
TRANSFER - IN REPORT:    Verbal report received from Geovanna Hernandez RN on Comeks Sr.  being received from [facility] for ordered procedure      Report consisted of patients Situation, Background, Assessment and   Recommendations(SBAR). Information from the following report(s) SBAR and Kardex was reviewed with the receiving nurse. Opportunity for questions and clarification was provided. Assessment completed upon patients arrival to unit and care assumed.

## 2019-09-13 NOTE — PROGRESS NOTES
HD TRANSFER - OUT REPORT:    Verbal report given to Debra Palafox RN on [employer] Sr. being transferred to [facility] for routine progression of care       Report consisted of patient's Situation, Background, Assessment and   Recommendations(SBAR). Information from the following report(s) SBAR, Procedure Summary, Intake/Output, MAR and Recent Results was reviewed with the receiving nurse. Method:  $$ Method: Hemodialysis (09/13/19 1343)    Fluid Removed  NET Fluid Removed (mL): 2000 ml (09/13/19 1715)     Patient response to treatment:  Improved    End Time  Hemodialysis End Time: 4191 (09/13/19 1715)  If not documented, dialysis nurse to update post-dialysis row in HD/Filtration flowsheet     Medications /Volume expansion agents or Fluid boluses administered during treatment? no    Post-dialysis medication administration due?  no  Remind nurse to administer post-HD medication upon return to unit. Fistula hemostasis? yes    Line heparinization? no    Lines: HARVEY AVF    Opportunity for questions and clarification was provided.       Patient transported with: [employer]

## 2019-09-13 NOTE — PROGRESS NOTES
Problem: Falls - Risk of  Goal: *Absence of Falls  Description  Document [facility] Fall Risk and appropriate interventions in the flowsheet.   Outcome: Progressing Towards Goal  Note:   Fall Risk Interventions:  Mobility Interventions: Bed/chair exit alarm    Mentation Interventions: Adequate sleep, hydration, pain control, Bed/chair exit alarm, Door open when patient unattended, Evaluate medications/consider consulting pharmacy    Medication Interventions: Bed/chair exit alarm    Elimination Interventions: Bed/chair exit alarm    History of Falls Interventions: Bed/chair exit alarm         Problem: Breathing Pattern - Ineffective  Goal: *Absence of hypoxia  Outcome: Progressing Towards Goal  Goal: *Use of effective breathing techniques  Outcome: Progressing Towards Goal     Problem: Infection - Risk of, Central Venous Catheter-Associated Bloodstream Infection  Goal: *Absence of infection signs and symptoms  Outcome: Progressing Towards Goal

## 2019-09-13 NOTE — PROGRESS NOTES
0712  Report received from Russell, [facility]. SBAR, Kardex, ED Summary, Procedure Summary, Intake/Output, MAR, Accordion, Recent Results, Med Rec Status and Cardiac Rhythm NSR were discussed. Jennifer Zuleta    0963  Patient O2 saturation 100% on 3L NC. Nurse removed O2. Patient stated he gets very short of breath and needs the oxygen. Nurse provided education on incentive spirometer and monitored O2 while on room air. Patients O2 saturation 91-94% on room air. Patient does not ambulate, cannot perform 6 min challenge. 1040  PIV placed by PICC team.    1100  R IJ removed. No bleeding or complications. Patient tolerated well. 1250  Patient off unit to dialysis.

## 2019-09-13 NOTE — PROGRESS NOTES
NAME: Hollie Walton Sr.        :  1955        MRN:  [id number]        Assessment :    Plan:  --ESRD-  Hypotension  Anemia  CP dyspnea  CAD --[id number]  HD today with UF as tolerated    On OWEN. Prn prbc's (s/p transfusion )    On Midodrine as of 19 (dose increased )    Will see again on Monday, but please call with questions or changes in the meantime. Subjective:     Chief Complaint: resting. C/o feeling sob. Low Back pain. We discussed the above. Review of Systems:    Symptom Y/N Comments  Symptom Y/N Comments   Fever/Chills    Chest Pain     Poor Appetite    Edema y    Cough    Abdominal Pain     Sputum    Joint Pain     SOB/COREA y   Pruritis/Rash     Nausea/vomit    Tolerating PT/OT     Diarrhea    Tolerating Diet     Constipation    Other       Could not obtain due to:      Objective:     VITALS:   Last 24hrs VS reviewed since prior progress note.  Most recent are:  Visit Vitals  /69   Pulse 64   Temp 97.3 °F (36.3 °C)   Resp 18   Ht 5' 7\" (1.702 m)   Wt 74.1 kg (163 lb 5.8 oz)   SpO2 93%   BMI 25.59 kg/m²       Intake/Output Summary (Last 24 hours) at 2019 0622  Last data filed at 2019 0255  Gross per 24 hour   Intake 1260 ml   Output 2225 ml   Net -965 ml      Telemetry Reviewed:     PHYSICAL EXAM:  General: alert, nad  A few rales  1+ edema      Lab Data Reviewed: (see below)    Medications Reviewed: (see below)    PMH/SH reviewed - no change compared to H&P  ________________________________________________________________________  Care Plan discussed with:  Patient y    Family      RN     Care Manager                    Consultant:          Comments   >50% of visit spent in counseling and coordination of care       ________________________________________________________________________  Perri Merirll MD     Procedures: see electronic medical records for all procedures/Xrays and details which  were not copied into this note but were reviewed prior to creation of Plan. LABS:  Recent Labs     09/13/19 0258 09/12/19  0508   WBC 7.2 6.3   HGB 10.1* 10.1*   HCT 34.1* 33.0*   * 92*     Recent Labs     09/13/19  0258 09/12/19  0508 09/11/19 0426    137 137   K 4.9 4.3 4.1    103 102   CO2 29 26 28   BUN 33* 46* 36*   CREA 5.37* 6.89* 5.58*   * 75 89   CA 8.3* 8.2* 8.0*     Recent Labs     09/13/19 0258 09/12/19 0508 09/11/19 0426   SGOT 54* 56* 58*   * 152* 128*   TP 6.9 6.7 6.5   ALB 2.8* 2.8* 2.9*   GLOB 4.1* 3.9 3.6     No results for input(s): INR, PTP, APTT in the last 72 hours. No lab exists for component: INREXT, INREXT   No results for input(s): FE, TIBC, PSAT, FERR in the last 72 hours. Lab Results   Component Value Date/Time    Folate 7.0 01/06/2016 01:08 PM      No results for input(s): PH, PCO2, PO2 in the last 72 hours. No results for input(s): CPK, CKMB in the last 72 hours.     No lab exists for component: TROPONINI  No components found for: [place]  Lab Results   Component Value Date/Time    Color YELLOW/STRAW 09/05/2019 06:00 PM    Appearance CLEAR 09/05/2019 06:00 PM    Specific gravity 1.027 09/05/2019 06:00 PM    pH (UA) 7.0 09/05/2019 06:00 PM    Protein 100 (A) 09/05/2019 06:00 PM    Glucose NEGATIVE  09/05/2019 06:00 PM    Ketone NEGATIVE  09/05/2019 06:00 PM    Bilirubin NEGATIVE  09/05/2019 06:00 PM    Urobilinogen 1.0 09/05/2019 06:00 PM    Nitrites NEGATIVE  09/05/2019 06:00 PM    Leukocyte Esterase NEGATIVE  09/05/2019 06:00 PM    Epithelial cells FEW 09/05/2019 06:00 PM    Bacteria NEGATIVE  09/05/2019 06:00 PM    WBC 0-4 09/05/2019 06:00 PM    RBC 5-10 09/05/2019 06:00 PM       MEDICATIONS:  Current Facility-Administered Medications   Medication Dose Route Frequency    mupirocin (BACTROBAN) 2 % ointment   Both Nostrils Q12H    heparin (porcine) pf 300 Units  300 Units InterCATHeter PRN    metoprolol succinate (TOPROL-XL) XL tablet 12.5 mg  12.5 mg Oral DAILY    guaiFENesin ER (MUCINEX) tablet 600 mg  600 mg Oral Q12H    epoetin calos-epbx (RETACRIT) injection 6,000 Units  6,000 Units SubCUTAneous DIALYSIS MON, WED & FRI    magnesium oxide (MAG-OX) tablet 400 mg  400 mg Oral DAILY    midodrine (PROAMITINE) tablet 10 mg  10 mg Oral TID WITH MEALS    pantoprazole (PROTONIX) tablet 40 mg  40 mg Oral ACB&D    insulin lispro (HUMALOG) injection   SubCUTAneous AC&HS    glucose chewable tablet 16 g  4 Tab Oral PRN    glucagon (GLUCAGEN) injection 1 mg  1 mg IntraMUSCular PRN    dextrose 10% infusion 0-250 mL  0-250 mL IntraVENous PRN    0.9% sodium chloride infusion 250 mL  250 mL IntraVENous PRN    aspirin chewable tablet 81 mg  81 mg Oral DAILY    ticagrelor (BRILINTA) tablet 90 mg  90 mg Oral Q12H    albumin human 25% (BUMINATE) solution 12.5 g  12.5 g IntraVENous DIALYSIS PRN    sodium chloride (NS) flush 5-40 mL  5-40 mL IntraVENous Q8H    sodium chloride (NS) flush 5-40 mL  5-40 mL IntraVENous PRN    sodium phosphate (FLEET'S) enema 1 Enema  1 Enema Rectal PRN    oxyCODONE-acetaminophen (PERCOCET) 5-325 mg per tablet 1-2 Tab  1-2 Tab Oral Q4H PRN    pravastatin (PRAVACHOL) tablet 80 mg  80 mg Oral QHS    cyclobenzaprine (FLEXERIL) tablet 5 mg  5 mg Oral TID PRN    finasteride (PROSCAR) tablet 5 mg  5 mg Oral DAILY    FLUoxetine (PROzac) capsule 20 mg  20 mg Oral DAILY    mirtazapine (REMERON) tablet 30 mg  30 mg Oral QHS    nitroglycerin (NITROSTAT) tablet 0.4 mg  0.4 mg SubLINGual Q5MIN PRN    pregabalin (LYRICA) capsule 50 mg  50 mg Oral TID    traZODone (DESYREL) tablet 150 mg  150 mg Oral QHS

## 2019-09-14 VITALS
WEIGHT: 156.6 LBS | SYSTOLIC BLOOD PRESSURE: 125 MMHG | HEIGHT: 67 IN | RESPIRATION RATE: 18 BRPM | DIASTOLIC BLOOD PRESSURE: 90 MMHG | OXYGEN SATURATION: 94 % | TEMPERATURE: 97.8 F | BODY MASS INDEX: 24.58 KG/M2 | HEART RATE: 73 BPM

## 2019-09-14 LAB
BACTERIA SPEC CULT: ABNORMAL
GLUCOSE BLD STRIP.AUTO-MCNC: 132 MG/DL (ref 65–100)
GLUCOSE BLD STRIP.AUTO-MCNC: 134 MG/DL (ref 65–100)
GRAM STN SPEC: ABNORMAL
SERVICE CMNT-IMP: ABNORMAL

## 2019-09-14 PROCEDURE — 74011250637 HC RX REV CODE- 250/637: Performed by: FAMILY MEDICINE

## 2019-09-14 PROCEDURE — 74011250637 HC RX REV CODE- 250/637: Performed by: INTERNAL MEDICINE

## 2019-09-14 PROCEDURE — 82962 GLUCOSE BLOOD TEST: CPT

## 2019-09-14 PROCEDURE — 74011250637 HC RX REV CODE- 250/637: Performed by: NURSE PRACTITIONER

## 2019-09-14 RX ORDER — METOPROLOL SUCCINATE 25 MG/1
12.5 TABLET, EXTENDED RELEASE ORAL DAILY
Qty: 30 TAB | Refills: 0 | Status: SHIPPED | OUTPATIENT
Start: 2019-09-15

## 2019-09-14 RX ORDER — MIDODRINE HYDROCHLORIDE 10 MG/1
10 TABLET ORAL
Qty: 90 TAB | Refills: 0 | Status: SHIPPED | OUTPATIENT
Start: 2019-09-14 | End: 2019-10-14

## 2019-09-14 RX ADMIN — ASPIRIN 81 MG 81 MG: 81 TABLET ORAL at 08:46

## 2019-09-14 RX ADMIN — GUAIFENESIN 600 MG: 600 TABLET, EXTENDED RELEASE ORAL at 08:46

## 2019-09-14 RX ADMIN — FINASTERIDE 5 MG: 5 TABLET, FILM COATED ORAL at 08:46

## 2019-09-14 RX ADMIN — TICAGRELOR 90 MG: 90 TABLET ORAL at 05:35

## 2019-09-14 RX ADMIN — Medication 10 ML: at 05:35

## 2019-09-14 RX ADMIN — FLUOXETINE 20 MG: 20 CAPSULE ORAL at 08:46

## 2019-09-14 RX ADMIN — PREGABALIN 50 MG: 50 CAPSULE ORAL at 08:46

## 2019-09-14 RX ADMIN — MUPIROCIN: 20 OINTMENT TOPICAL at 08:48

## 2019-09-14 RX ADMIN — OXYCODONE AND ACETAMINOPHEN 2 TABLET: 5; 325 TABLET ORAL at 01:42

## 2019-09-14 RX ADMIN — OXYCODONE AND ACETAMINOPHEN 1 TABLET: 5; 325 TABLET ORAL at 05:57

## 2019-09-14 RX ADMIN — PANTOPRAZOLE SODIUM 40 MG: 40 TABLET, DELAYED RELEASE ORAL at 08:46

## 2019-09-14 RX ADMIN — OXYCODONE AND ACETAMINOPHEN 2 TABLET: 5; 325 TABLET ORAL at 10:14

## 2019-09-14 RX ADMIN — METOPROLOL SUCCINATE 12.5 MG: 25 TABLET, EXTENDED RELEASE ORAL at 08:47

## 2019-09-14 RX ADMIN — OXYCODONE AND ACETAMINOPHEN 1 TABLET: 5; 325 TABLET ORAL at 05:35

## 2019-09-14 NOTE — PROGRESS NOTES
Problem: Falls - Risk of  Goal: *Absence of Falls  Description  Document Eduardo Blair Fall Risk and appropriate interventions in the flowsheet.   Outcome: Progressing Towards Goal  Note:   Fall Risk Interventions:  Mobility Interventions: Bed/chair exit alarm, Patient to call before getting OOB, PT Consult for mobility concerns    Mentation Interventions: Bed/chair exit alarm, More frequent rounding, Toileting rounds    Medication Interventions: Bed/chair exit alarm, Patient to call before getting OOB, Teach patient to arise slowly    Elimination Interventions: Bed/chair exit alarm, Call light in reach, Patient to call for help with toileting needs, Toileting schedule/hourly rounds    History of Falls Interventions: Bed/chair exit alarm, Door open when patient unattended

## 2019-09-14 NOTE — DISCHARGE SUMMARY
Hospitalist Discharge Summary     Patient ID:  Marisa Kenny  [id number]  71 y.o.  1955 9/4/2019    PCP on record: Chris Hutson DO    Admit date: 9/4/2019  Discharge date and time: 9/14/2019    DISCHARGE DIAGNOSIS:    CONSULTATIONS:  IP CONSULT TO CARDIOLOGY  IP CONSULT TO CARDIAC SURGERY  IP CONSULT TO ANESTHESIOLOGY  IP CONSULT TO ADVANCED HEART FAILURE    Excerpted HPI from H&P of Vedia Cockayne, MD:  This is a 69-year-old male with past medical history of end-stage renal disease on hemodialysis, coronary artery disease status post multiple stents, diabetes mellitus is coming to the hospital with chief complaints of chest pressure while at dialysis center. Noah Rizo reports pain is in the precordial and also substernal region, pressure-like, radiating to the back, without any aggravating and partially relieved with nitroglycerin.  He also reports chronic cough and also shortness of breath without any acute worsening.  He reports mild cough but no phlegm.  Denies fever or chills.  Denies abdominal pain, nausea or vomiting.  He reports a chronic right foot pain for which she is supposed to get an amputation of his second toe.    ______________________________________________________________________  DISCHARGE SUMMARY/HOSPITAL COURSE:  for full details see H&P, daily progress notes, labs, consult notes. Unstable angina in settings of CAD with h/o multiple stents in the past   HTN   -doing well clinically    -Card cath 9/5 with severe disease,  Poor CABG candidate   Cath 9/6 w/ Impella CP supported PCI of OM1, mLAD, and LM/LAD/LCx bifurcation w/               Culotte technique w/ total of 4 STANLEY               Impella removed in cath lab.  Some bleeding afterwards.  Got PRBCs and PLT.                 Clot noted on distal Impella upon removal (seen on WADE)  -Cardiology/pulmonology help appreciated   - Cont ASA  - on Midodrine for pressor support  - On metoprolol    9/14:  Pt cleared for discharge by Cardiology. Will discharge on ASA, Ticagrelor, Midodrine, Toprol XL, statin - holding ACEi for now and decide in the future if pt will be able to tolerate it. Updated pt this morning at bedside and he was ecstatic to learn that he would be discharged. He is in agreement with this. He states that he will make a Cardiology appointment once he is discharged.     DM type II with nephropathy  -Resume home meds     Pulmonary edema with atelectasis - improved    Rt chest wall pain and swelling   - s/p CP Impella removal   - possibly due to right chest wall hematoma   - will get US RT chest wall - no evidence of DVT  - pain control as needed      Thrombocytopenia - resolved  -Plt at baseline normal. Admission 53, low side but stable now   -CAMILLE negative, BELLA negative     ESRD   -HD MWF     PAD  Multiple small wounds    -Patient is scheduled to have a right second toe amputation on 10/1/2019  -Continue aspirin and Brilinta  -Wound Care: RLE and foot scabs: cleanse with soap and water and apply Mupirocin ointment to scabs and leave JAYSON. L AKA   Depression, home trazodone  H/o R renal artery stenosis, s/p stent 05/ 2018    _______________________________________________________________________  Patient seen and examined by me on discharge day. Pertinent Findings:  Gen:    Not in distress  Chest: Clear lungs  CVS:   Regular rhythm. No edema  Abd:  Soft, not distended, not tender  Neuro:  Alert, oriented x3  _______________________________________________________________________  DISCHARGE MEDICATIONS:   Current Discharge Medication List      START taking these medications    Details   ticagrelor (BRILINTA) 90 mg tablet Take 1 Tab by mouth every twelve (12) hours every twelve (12) hours. Qty: 60 Tab, Refills: 0      midodrine (PROAMITINE) 10 mg tablet Take 1 Tab by mouth three (3) times daily (with meals) for 30 days.   Qty: 90 Tab, Refills: 0      metoprolol succinate (TOPROL-XL) 25 mg XL tablet Take 0.5 Tabs by mouth daily. Qty: 30 Tab, Refills: 0         CONTINUE these medications which have NOT CHANGED    Details   nitroglycerin (NITROSTAT) 0.4 mg SL tablet 1 Tab by SubLINGual route every five (5) minutes as needed for Chest Pain. Qty: 20 Tab, Refills: 0      pregabalin (LYRICA) 150 mg capsule Take 150 mg by mouth three (3) times daily. ferric citrate (AURYXIA) 210 mg iron tablet Take 420 mg by mouth Before breakfast, lunch, and dinner. docusate sodium (COLACE) 100 mg capsule Take 100 mg by mouth daily. pravastatin (PRAVACHOL) 80 mg tablet TAKE 1 TABLET BY MOUTH EVERY DAY AT NIGHT  Qty: 90 Tab, Refills: 1      FLUoxetine (PROZAC) 20 mg capsule Take 20 mg by mouth daily. traZODone (DESYREL) 150 mg tablet TAKE 1 TABLET BY MOUTH EVERY DAY AT NIGHT  Qty: 90 Tab, Refills: 1    Associated Diagnoses: Insomnia due to other mental disorder      mirtazapine (REMERON) 30 mg tablet TAKE 1 TABLET BY MOUTH EVERY DAY AT NIGHT  Qty: 90 Tab, Refills: 1    Associated Diagnoses: Insomnia due to other mental disorder      omeprazole (PRILOSEC) 40 mg capsule TAKE 1 CAPSULE BY MOUTH TWICE A DAY  Qty: 180 Cap, Refills: 1    Associated Diagnoses: Gastroesophageal reflux disease without esophagitis      finasteride (PROSCAR) 5 mg tablet TAKE 1 TABLET BY MOUTH EVERY DAY  Qty: 90 Tab, Refills: 1    Associated Diagnoses: Enlarged prostate on rectal examination      aspirin 81 mg chewable tablet Take 1 Tab by mouth daily.  Indications: myocardial infarction prevention  Qty: 90 Tab, Refills: 1    Associated Diagnoses: PVD (peripheral vascular disease) ([facility] [address]         STOP taking these medications       bumetanide (BUMEX) 1 mg tablet Comments:   Reason for Stopping:         isosorbide mononitrate ER (IMDUR) 30 mg tablet Comments:   Reason for Stopping:         lisinopril (PRINIVIL, ZESTRIL) 20 mg tablet Comments:   Reason for Stopping:         carvedilol (COREG) 6.25 mg tablet Comments:   Reason for Stopping: cyclobenzaprine (FLEXERIL) 5 mg tablet Comments:   Reason for Stopping:         clopidogrel (PLAVIX) 75 mg tab Comments:   Reason for Stopping:                 Patient Follow Up Instructions: Activity: Activity as tolerated  Diet: Cardiac Diet  Wound Care: As directed    Follow-up Information     Follow up With Specialties Details Why Contact Wayne Hooks DO Family Practice Schedule an appointment as soon as possible for a visit in 1 week Please call to schedule an appointment as soon as you can. [address] [facility], [address] , DO Cardiology In 2 weeks Office will call you to schedule a follow-up appointment. Please call the office if you have not heard from them within 48 hours. [address]   [address]  [address] [facility] [address]  [facility] [address] [address]  [phone]    [facility] on 9/16/2019 Monday, Wednesday, Friday [address]  [address] [facility]  [phone]        ________________________________________________________________    Risk of deterioration: Low    Condition at Discharge:  Stable  __________________________________________________________________    Disposition  Home with family and home health services    ____________________________________________________________________    Code Status: Full Code  ___________________________________________________________________      Total time in minutes spent coordinating this discharge (includes going over instructions, follow-up, prescriptions, and preparing report for sign off to her PCP) :  35 minutes    Signed:  Shereen Hester MD

## 2019-09-14 NOTE — PROGRESS NOTES
Bedside shift change report GIVEN TO Bonnie Khan RN. Report included the following information SBAR, Kardex, Intake/Output and Cardiac Rhythm SR.         SIGNIFICANT CHANGES DURING SHIFT:  None        CONCERNS TO ADDRESS WITH MD:  occasionally c/o SOB, but sats 94-95% on Room air. [address] NURSING NOTE   Admission Date 9/4/2019   Admission Diagnosis Unstable angina ([facility] [address] [I20.0]  Heart failure ([facility] [address] [I50.9]   Consults IP CONSULT TO CARDIOLOGY  IP CONSULT TO CARDIAC SURGERY  IP CONSULT TO ANESTHESIOLOGY  IP CONSULT TO ADVANCED HEART FAILURE      Cardiac Monitoring [x] Yes [] No      Purposeful Hourly Rounding [x] Yes    Momo Score Total Score: 4   Momo score 3 or > [] Bed Alarm [] Avasys [] 1:1 sitter [] Patient refused (Signed refusal form in chart)   Mitchel Score Mitchel Score: 16   Mitchel score 14 or < [] PMT consult [x] Wound Care consult    []  Specialty bed  [] Nutrition consult      Influenza Vaccine Received Flu Vaccine for Current Season (usually Sept-March): Not Flu Season           Oxygen needs? [x] Room air Oxygen @  []1L    []2L    []3L   []4L    []5L   []6L via  NC   Chronic home O2 use?  [] Yes [x] No  Perform O2 challenge test and document in progress note using smartphrase (.Homeoxygen)      Last bowel movement Last Bowel Movement Date: 09/10/19(andres stated small)      Urinary Catheter [REMOVED] Urinary Catheter 09/05/19 Coude-Indications for Use: Accurate measurement of urinary output     [REMOVED] Urinary Catheter 09/05/19 Coude-Urine Output (mL): 5 ml     LDAs               Peripheral IV 09/13/19 Right Forearm (Active)   Site Assessment Clean, dry, & intact 9/13/2019  7:56 PM   Phlebitis Assessment 0 9/13/2019  7:56 PM   Infiltration Assessment 0 9/13/2019  7:56 PM   Dressing Status Clean, dry, & intact 9/13/2019  7:56 PM   Dressing Type Transparent 9/13/2019  7:56 PM   Hub Color/Line Status Blue 9/13/2019  7:56 PM   Action Taken Other (comment) 9/13/2019 10:42 AM        Hemodialysis Access 09/05/19 (Active)   Central Line Being Utilized Yes 9/12/2019  8:05 PM   Criteria for Appropriate Use Dialysis/apheresis 9/12/2019  8:05 PM   Date Accessed  09/12/19 9/12/2019  4:18 PM   Access Time  0730 9/9/2019  9:12 AM   Site Assessment Clean, dry, & intact 9/12/2019  8:05 PM   Date of Last Dressing Change 09/07/19 9/9/2019  8:00 AM   Dressing Status Clean;Dry 9/12/2019  8:05 PM   Dressing Type Tape 9/12/2019  4:18 PM                     Readmission Risk Assessment Tool Score High Risk            35       Total Score        3 Has Seen PCP in Last 6 Months (Yes=3, No=0)    2 . Living with Significant Other. Assisted Living. LTAC. SNF. or   Rehab    3 Patient Length of Stay (>5 days = 3)    9 Pt.  Coverage (Medicare=5 , Medicaid, or Self-Pay=4)    18 Charlson Comorbidity Score (Age + Comorbid Conditions)        Criteria that do not apply:    IP Visits Last 12 Months (1-3=4, 4=9, >4=11)       Expected Length of Stay 2d 16h   Actual Length of Stay 10

## 2019-09-14 NOTE — PROGRESS NOTES
Problem: Falls - Risk of  Goal: *Absence of Falls  Description  Document Angela Julio Fall Risk and appropriate interventions in the flowsheet.   Outcome: Progressing Towards Goal  Note:   Fall Risk Interventions:  Mobility Interventions: Assess mobility with egress test, Bed/chair exit alarm, Communicate number of staff needed for ambulation/transfer, Mechanical lift, OT consult for ADLs, Patient to call before getting OOB, PT Consult for mobility concerns, PT Consult for assist device competence, Strengthening exercises (ROM-active/passive), Utilize walker, cane, or other assistive device, Utilize gait belt for transfers/ambulation    Mentation Interventions: Adequate sleep, hydration, pain control, Bed/chair exit alarm, Door open when patient unattended, Evaluate medications/consider consulting pharmacy    Medication Interventions: Assess postural VS orthostatic hypotension, Bed/chair exit alarm, Evaluate medications/consider consulting pharmacy, Patient to call before getting OOB, Teach patient to arise slowly, Utilize gait belt for transfers/ambulation    Elimination Interventions: Bed/chair exit alarm, Call light in reach, Patient to call for help with toileting needs, Urinal in reach, Toileting schedule/hourly rounds    History of Falls Interventions: Bed/chair exit alarm, Consult care management for discharge planning, Door open when patient unattended, Evaluate medications/consider consulting pharmacy, Investigate reason for fall, Room close to nurse's station, Utilize gait belt for transfer/ambulation, Assess for delayed presentation/identification of injury for 48 hrs (comment for end date), Vital signs minimum Q4HRs X 24 hrs (comment for end date)         Problem: Patient Education: Go to Patient Education Activity  Goal: Patient/Family Education  Outcome: Progressing Towards Goal     Problem: Patient Education: Go to Patient Education Activity  Goal: Patient/Family Education  Outcome: Progressing Towards Goal     Problem: Unstable angina/NSTEMI: Day 2  Goal: Discharge Planning  Outcome: Progressing Towards Goal  Goal: Medications  Outcome: Progressing Towards Goal  Goal: Respiratory  Outcome: Progressing Towards Goal  Goal: Psychosocial  Outcome: Progressing Towards Goal  Goal: *Hemodynamically stable  Outcome: Progressing Towards Goal  Goal: *Optimal pain control at patient's stated goal  Outcome: Progressing Towards Goal  Goal: *Lungs clear or at baseline  Outcome: Progressing Towards Goal     Problem: Unstable Angina/NSTEMI: Discharge Outcomes  Goal: *Hemodynamically stable  Outcome: Progressing Towards Goal  Goal: *Stable cardiac rhythm  Outcome: Progressing Towards Goal  Goal: *Lungs clear or at baseline  Outcome: Progressing Towards Goal  Goal: *Optimal pain control at patient's stated goal  Outcome: Progressing Towards Goal  Goal: *Identifies cardiac risk factors  Outcome: Progressing Towards Goal  Goal: *Verbalizes home exercise program, activity guidelines, cardiac precautions  Outcome: Progressing Towards Goal  Goal: *Verbalizes understanding and describes prescribed diet  Outcome: Progressing Towards Goal  Goal: *Verbalizes name, dosage, time, side effects, and number of days to continue medications  Outcome: Progressing Towards Goal  Goal: *Anxiety reduced or absent  Outcome: Progressing Towards Goal  Goal: *Understands and describes signs and symptoms to report to providers(Stroke Metric)  Outcome: Progressing Towards Goal  Goal: *Describes follow-up/return visits to physicians  Outcome: Progressing Towards Goal  Goal: *Describes available resources and support systems  Outcome: Progressing Towards Goal     Problem: Diabetes Self-Management  Goal: *Disease process and treatment process  Description  Define diabetes and identify own type of diabetes; list 3 options for treating diabetes.   Outcome: Progressing Towards Goal  Goal: *Incorporating nutritional management into lifestyle  Description  Describe effect of type, amount and timing of food on blood glucose; list 3 methods for planning meals. Outcome: Progressing Towards Goal  Goal: *Monitoring blood glucose, interpreting and using results  Description  Identify recommended blood glucose targets  and personal targets. Outcome: Progressing Towards Goal  Goal: *Prevention, detection, treatment of acute complications  Description  List symptoms of hyper- and hypoglycemia; describe how to treat low blood sugar and actions for lowering  high blood glucose level. Outcome: Progressing Towards Goal     Problem: Pressure Injury - Risk of  Goal: *Prevention of pressure injury  Description  Document Mitchel Scale and appropriate interventions in the flowsheet.   Outcome: Progressing Towards Goal  Note:   Pressure Injury Interventions:  Sensory Interventions: Assess need for specialty bed, Avoid rigorous massage over bony prominences, Chair cushion, Check visual cues for pain, Discuss PT/OT consult with provider    Moisture Interventions: Apply protective barrier, creams and emollients, Check for incontinence Q2 hours and as needed, Assess need for specialty bed    Activity Interventions: Chair cushion, Increase time out of bed, Pressure redistribution bed/mattress(bed type), PT/OT evaluation    Mobility Interventions: Chair cushion, Float heels, HOB 30 degrees or less, Pressure redistribution bed/mattress (bed type), PT/OT evaluation    Nutrition Interventions: Document food/fluid/supplement intake    Friction and Shear Interventions: Foam dressings/transparent film/skin sealants, Feet elevated on foot rest, Apply protective barrier, creams and emollients, HOB 30 degrees or less, Lift sheet                Problem: Infection - Risk of, Multi-drug Resistant Organism Colonization (MDRO)  Goal: *Absence of MDRO colonization  Outcome: Progressing Towards Goal  Goal: *Absence of infection signs and symptoms  Outcome: Progressing Towards Goal     Problem: Patient Education: Go to Patient Education Activity  Goal: Patient/Family Education  Outcome: Progressing Towards Goal

## 2019-09-16 ENCOUNTER — APPOINTMENT (OUTPATIENT)
Dept: GENERAL RADIOLOGY | Age: 64
DRG: 947 | End: 2019-09-16
Attending: EMERGENCY MEDICINE
Payer: MEDICARE

## 2019-09-16 ENCOUNTER — HOSPITAL ENCOUNTER (INPATIENT)
Age: 64
LOS: 2 days | Discharge: HOME HEALTH | DRG: 947 | End: 2019-09-19
Attending: EMERGENCY MEDICINE | Admitting: INTERNAL MEDICINE
Payer: MEDICARE

## 2019-09-16 ENCOUNTER — APPOINTMENT (OUTPATIENT)
Dept: CT IMAGING | Age: 64
DRG: 947 | End: 2019-09-16
Attending: EMERGENCY MEDICINE
Payer: MEDICARE

## 2019-09-16 DIAGNOSIS — Z89.612 HX OF AKA (ABOVE KNEE AMPUTATION), LEFT (HCC): ICD-10-CM

## 2019-09-16 DIAGNOSIS — E11.649 UNCONTROLLED TYPE 2 DIABETES MELLITUS WITH HYPOGLYCEMIA WITHOUT COMA (HCC): ICD-10-CM

## 2019-09-16 DIAGNOSIS — J96.01 ACUTE RESPIRATORY FAILURE WITH HYPOXIA (HCC): ICD-10-CM

## 2019-09-16 DIAGNOSIS — Z99.2 ESRD (END STAGE RENAL DISEASE) ON DIALYSIS (HCC): ICD-10-CM

## 2019-09-16 DIAGNOSIS — R07.9 ACUTE CHEST PAIN: Primary | ICD-10-CM

## 2019-09-16 DIAGNOSIS — I25.84 CORONARY ATHEROSCLEROSIS DUE TO SEVERELY CALCIFIED CORONARY LESION: ICD-10-CM

## 2019-09-16 DIAGNOSIS — E87.5 ACUTE HYPERKALEMIA: ICD-10-CM

## 2019-09-16 DIAGNOSIS — N18.6 ESRD (END STAGE RENAL DISEASE) ON DIALYSIS (HCC): ICD-10-CM

## 2019-09-16 LAB
ALBUMIN SERPL-MCNC: 3.1 G/DL (ref 3.5–5)
ALBUMIN/GLOB SERPL: 0.7 {RATIO} (ref 1.1–2.2)
ALP SERPL-CCNC: 147 U/L (ref 45–117)
ALT SERPL-CCNC: 11 U/L (ref 12–78)
ANION GAP SERPL CALC-SCNC: 10 MMOL/L (ref 5–15)
APTT PPP: 32.1 SEC (ref 22.1–32)
AST SERPL-CCNC: 55 U/L (ref 15–37)
BASOPHILS # BLD: 0 K/UL (ref 0–0.1)
BASOPHILS NFR BLD: 0 % (ref 0–1)
BILIRUB SERPL-MCNC: 1.4 MG/DL (ref 0.2–1)
BUN SERPL-MCNC: 62 MG/DL (ref 6–20)
BUN/CREAT SERPL: 8 (ref 12–20)
CALCIUM SERPL-MCNC: 8.8 MG/DL (ref 8.5–10.1)
CHLORIDE SERPL-SCNC: 99 MMOL/L (ref 97–108)
CK SERPL-CCNC: 265 U/L (ref 39–308)
CO2 SERPL-SCNC: 25 MMOL/L (ref 21–32)
CREAT SERPL-MCNC: 7.31 MG/DL (ref 0.7–1.3)
DIFFERENTIAL METHOD BLD: ABNORMAL
EOSINOPHIL # BLD: 0.1 K/UL (ref 0–0.4)
EOSINOPHIL NFR BLD: 1 % (ref 0–7)
ERYTHROCYTE [DISTWIDTH] IN BLOOD BY AUTOMATED COUNT: 17.2 % (ref 11.5–14.5)
GLOBULIN SER CALC-MCNC: 4.4 G/DL (ref 2–4)
GLUCOSE SERPL-MCNC: 62 MG/DL (ref 65–100)
HCT VFR BLD AUTO: 35 % (ref 36.6–50.3)
HGB BLD-MCNC: 10.7 G/DL (ref 12.1–17)
IMM GRANULOCYTES # BLD AUTO: 0 K/UL (ref 0–0.04)
IMM GRANULOCYTES NFR BLD AUTO: 0 % (ref 0–0.5)
INR PPP: 1.4 (ref 0.9–1.1)
LYMPHOCYTES # BLD: 0.8 K/UL (ref 0.8–3.5)
LYMPHOCYTES NFR BLD: 9 % (ref 12–49)
MCH RBC QN AUTO: 30.7 PG (ref 26–34)
MCHC RBC AUTO-ENTMCNC: 30.6 G/DL (ref 30–36.5)
MCV RBC AUTO: 100.3 FL (ref 80–99)
MONOCYTES # BLD: 0.8 K/UL (ref 0–1)
MONOCYTES NFR BLD: 9 % (ref 5–13)
NEUTS SEG # BLD: 7.5 K/UL (ref 1.8–8)
NEUTS SEG NFR BLD: 81 % (ref 32–75)
NRBC # BLD: 0.02 K/UL (ref 0–0.01)
NRBC BLD-RTO: 0.2 PER 100 WBC
PLATELET # BLD AUTO: 123 K/UL (ref 150–400)
PMV BLD AUTO: 11.4 FL (ref 8.9–12.9)
POTASSIUM SERPL-SCNC: 5.8 MMOL/L (ref 3.5–5.1)
PROT SERPL-MCNC: 7.5 G/DL (ref 6.4–8.2)
PROTHROMBIN TIME: 14.5 SEC (ref 9–11.1)
RBC # BLD AUTO: 3.49 M/UL (ref 4.1–5.7)
RBC MORPH BLD: ABNORMAL
SODIUM SERPL-SCNC: 134 MMOL/L (ref 136–145)
THERAPEUTIC RANGE,PTTT: ABNORMAL SECS (ref 58–77)
TROPONIN I SERPL-MCNC: 0.08 NG/ML
WBC # BLD AUTO: 9.2 K/UL (ref 4.1–11.1)

## 2019-09-16 PROCEDURE — 84484 ASSAY OF TROPONIN QUANT: CPT

## 2019-09-16 PROCEDURE — 71045 X-RAY EXAM CHEST 1 VIEW: CPT

## 2019-09-16 PROCEDURE — 74011636320 HC RX REV CODE- 636/320: Performed by: EMERGENCY MEDICINE

## 2019-09-16 PROCEDURE — 85610 PROTHROMBIN TIME: CPT

## 2019-09-16 PROCEDURE — 80053 COMPREHEN METABOLIC PANEL: CPT

## 2019-09-16 PROCEDURE — 71275 CT ANGIOGRAPHY CHEST: CPT

## 2019-09-16 PROCEDURE — 74011250637 HC RX REV CODE- 250/637: Performed by: HOSPITALIST

## 2019-09-16 PROCEDURE — 85025 COMPLETE CBC W/AUTO DIFF WBC: CPT

## 2019-09-16 PROCEDURE — 82550 ASSAY OF CK (CPK): CPT

## 2019-09-16 PROCEDURE — 93005 ELECTROCARDIOGRAM TRACING: CPT

## 2019-09-16 PROCEDURE — 96374 THER/PROPH/DIAG INJ IV PUSH: CPT

## 2019-09-16 PROCEDURE — 90935 HEMODIALYSIS ONE EVALUATION: CPT

## 2019-09-16 PROCEDURE — 99285 EMERGENCY DEPT VISIT HI MDM: CPT

## 2019-09-16 PROCEDURE — 74011250636 HC RX REV CODE- 250/636: Performed by: EMERGENCY MEDICINE

## 2019-09-16 PROCEDURE — 99218 HC RM OBSERVATION: CPT

## 2019-09-16 PROCEDURE — 74011250637 HC RX REV CODE- 250/637: Performed by: EMERGENCY MEDICINE

## 2019-09-16 PROCEDURE — 85730 THROMBOPLASTIN TIME PARTIAL: CPT

## 2019-09-16 PROCEDURE — 5A1D70Z PERFORMANCE OF URINARY FILTRATION, INTERMITTENT, LESS THAN 6 HOURS PER DAY: ICD-10-PCS | Performed by: INTERNAL MEDICINE

## 2019-09-16 PROCEDURE — 36415 COLL VENOUS BLD VENIPUNCTURE: CPT

## 2019-09-16 RX ORDER — NITROGLYCERIN 0.4 MG/1
0.4 TABLET SUBLINGUAL
Status: DISCONTINUED | OUTPATIENT
Start: 2019-09-16 | End: 2019-09-19 | Stop reason: HOSPADM

## 2019-09-16 RX ORDER — PRAVASTATIN SODIUM 40 MG/1
40 TABLET ORAL
Status: DISCONTINUED | OUTPATIENT
Start: 2019-09-16 | End: 2019-09-19 | Stop reason: HOSPADM

## 2019-09-16 RX ORDER — SODIUM CHLORIDE 0.9 % (FLUSH) 0.9 %
10 SYRINGE (ML) INJECTION
Status: COMPLETED | OUTPATIENT
Start: 2019-09-16 | End: 2019-09-16

## 2019-09-16 RX ORDER — SODIUM BICARBONATE 84 MG/ML
50 INJECTION, SOLUTION INTRAVENOUS
Status: DISCONTINUED | OUTPATIENT
Start: 2019-09-16 | End: 2019-09-16 | Stop reason: ALTCHOICE

## 2019-09-16 RX ORDER — GUAIFENESIN 100 MG/5ML
81 LIQUID (ML) ORAL
Status: COMPLETED | OUTPATIENT
Start: 2019-09-16 | End: 2019-09-16

## 2019-09-16 RX ORDER — MIDODRINE HYDROCHLORIDE 5 MG/1
10 TABLET ORAL
Status: DISCONTINUED | OUTPATIENT
Start: 2019-09-17 | End: 2019-09-19 | Stop reason: HOSPADM

## 2019-09-16 RX ORDER — HEPARIN SODIUM 5000 [USP'U]/ML
5000 INJECTION, SOLUTION INTRAVENOUS; SUBCUTANEOUS EVERY 8 HOURS
Status: DISCONTINUED | OUTPATIENT
Start: 2019-09-16 | End: 2019-09-19 | Stop reason: HOSPADM

## 2019-09-16 RX ORDER — PANTOPRAZOLE SODIUM 40 MG/1
40 TABLET, DELAYED RELEASE ORAL
Status: DISCONTINUED | OUTPATIENT
Start: 2019-09-17 | End: 2019-09-19 | Stop reason: HOSPADM

## 2019-09-16 RX ORDER — PREGABALIN 150 MG/1
150 CAPSULE ORAL 3 TIMES DAILY
Status: DISCONTINUED | OUTPATIENT
Start: 2019-09-16 | End: 2019-09-19 | Stop reason: HOSPADM

## 2019-09-16 RX ORDER — DOCUSATE SODIUM 100 MG/1
100 CAPSULE, LIQUID FILLED ORAL DAILY
Status: DISCONTINUED | OUTPATIENT
Start: 2019-09-17 | End: 2019-09-19 | Stop reason: HOSPADM

## 2019-09-16 RX ORDER — FLUOXETINE HYDROCHLORIDE 20 MG/1
20 CAPSULE ORAL DAILY
Status: DISCONTINUED | OUTPATIENT
Start: 2019-09-17 | End: 2019-09-19 | Stop reason: HOSPADM

## 2019-09-16 RX ORDER — METOPROLOL SUCCINATE 25 MG/1
12.5 TABLET, EXTENDED RELEASE ORAL DAILY
Status: DISCONTINUED | OUTPATIENT
Start: 2019-09-17 | End: 2019-09-19 | Stop reason: HOSPADM

## 2019-09-16 RX ORDER — MIRTAZAPINE 15 MG/1
30 TABLET, FILM COATED ORAL
Status: DISCONTINUED | OUTPATIENT
Start: 2019-09-16 | End: 2019-09-19 | Stop reason: HOSPADM

## 2019-09-16 RX ORDER — FENTANYL CITRATE 50 UG/ML
100 INJECTION, SOLUTION INTRAMUSCULAR; INTRAVENOUS
Status: COMPLETED | OUTPATIENT
Start: 2019-09-16 | End: 2019-09-16

## 2019-09-16 RX ORDER — SODIUM CHLORIDE 0.9 % (FLUSH) 0.9 %
5-40 SYRINGE (ML) INJECTION AS NEEDED
Status: DISCONTINUED | OUTPATIENT
Start: 2019-09-16 | End: 2019-09-19 | Stop reason: HOSPADM

## 2019-09-16 RX ORDER — SODIUM CHLORIDE 0.9 % (FLUSH) 0.9 %
5-40 SYRINGE (ML) INJECTION EVERY 8 HOURS
Status: DISCONTINUED | OUTPATIENT
Start: 2019-09-16 | End: 2019-09-19 | Stop reason: HOSPADM

## 2019-09-16 RX ORDER — FINASTERIDE 5 MG/1
5 TABLET, FILM COATED ORAL DAILY
Status: DISCONTINUED | OUTPATIENT
Start: 2019-09-17 | End: 2019-09-19 | Stop reason: HOSPADM

## 2019-09-16 RX ADMIN — TRAZODONE HYDROCHLORIDE 150 MG: 100 TABLET ORAL at 23:31

## 2019-09-16 RX ADMIN — TICAGRELOR 90 MG: 90 TABLET ORAL at 19:40

## 2019-09-16 RX ADMIN — MIRTAZAPINE 30 MG: 15 TABLET, FILM COATED ORAL at 23:31

## 2019-09-16 RX ADMIN — FENTANYL CITRATE 100 MCG: 50 INJECTION, SOLUTION INTRAMUSCULAR; INTRAVENOUS at 16:41

## 2019-09-16 RX ADMIN — IOPAMIDOL 80 ML: 755 INJECTION, SOLUTION INTRAVENOUS at 18:32

## 2019-09-16 RX ADMIN — Medication 10 ML: at 23:31

## 2019-09-16 RX ADMIN — PRAVASTATIN SODIUM 40 MG: 40 TABLET ORAL at 23:31

## 2019-09-16 RX ADMIN — ASPIRIN 81 MG 81 MG: 81 TABLET ORAL at 19:40

## 2019-09-16 RX ADMIN — Medication 10 ML: at 18:32

## 2019-09-16 RX ADMIN — PREGABALIN 150 MG: 150 CAPSULE ORAL at 23:31

## 2019-09-16 NOTE — CONSULTS
[facility] COnsult Note        NAME: Saar Mathur Sr.       :  1955       MRN:  [id number]     Date/Time: 2019    Risk of deterioration: medium       Assessment:    Plan:  ACute hyperkalemia  ESRD  Non compliance with renal diet  Recent MI/Impella/4 STANLEY  (L)AVF HD now on a 2 k bath--Jean campbell  Reviewed low k diet with pt/wife at bedside. Called the dietician at the HD unit ([facility] [id number]) -she will call ms guthrie tomorrow and review a low k diet in detail  Renal diet   Repeating labs now--   Asked to see for ESRD/HD /hyperkalemia. Transferred her with cp/usa-just out of hospital  with USA/Impella/4 STANLEY- per wife eating everything at home-hanover tomatoes,OJ, etc.  Reportedly has no idea about a renal diet. Pt states CP never fully went away and he has been living with cp. Today was much worse, went to ed  Medical treatment of hyperkalemia was done prior to transfer. Repeat labs (P)    Subjective:     Chief Complaint:  I've always got chest pain. It never went a way    Review of Systems: no n/v/sob/f/c/abd pain (+) cp, pressure      Objective:     VITALS:   Last 24hrs VS reviewed since prior progress note.  Most recent are:  Visit Vitals  /61   Pulse 64   Temp 97.9 °F (36.6 °C)   Resp 17   Ht 5' 9\" (1.753 m)   Wt 70.8 kg (156 lb)   SpO2 98%   BMI 23.04 kg/m²     SpO2 Readings from Last 6 Encounters:   19 98%   19 94%   19 93%   18 98%   18 98%   18 94%    O2 Flow Rate (L/min): 2 l/min   No intake or output data in the 24 hours ending 19 1630     Telemetry Reviewed    PHYSICAL EXAM:    General   well developed, well nourished, appears stated age, in no acute distress  EENT  Normocephalic, Atraumatic, PERRLA, EOMI, sclera clear  Respiratory   Clear To Auscultation bilaterally   Cardiology  Regular Rate and Rythmn    Abdominal  Soft, non-tender, non-distended  Extremities  No clubbing, cyanosis, or edema (RLE)  Amputee (LLE)  Neurological  No focal neurological deficits noted              Lab Data Reviewed: (see below)    Medications Reviewed: (see below)    _____    Attending Physician: Mekhi Busch MD     ____________________________________________________  MEDICATIONS:  Current Facility-Administered Medications   Medication Dose Route Frequency    sodium bicarbonate (8.4%) injection 50 mEq  50 mEq IntraVENous NOW     Current Outpatient Medications   Medication Sig    ticagrelor (BRILINTA) 90 mg tablet Take 1 Tab by mouth every twelve (12) hours every twelve (12) hours.  midodrine (PROAMITINE) 10 mg tablet Take 1 Tab by mouth three (3) times daily (with meals) for 30 days.  metoprolol succinate (TOPROL-XL) 25 mg XL tablet Take 0.5 Tabs by mouth daily.  pregabalin (LYRICA) 150 mg capsule Take 150 mg by mouth three (3) times daily.  ferric citrate (AURYXIA) 210 mg iron tablet Take 420 mg by mouth Before breakfast, lunch, and dinner.  docusate sodium (COLACE) 100 mg capsule Take 100 mg by mouth daily.  pravastatin (PRAVACHOL) 80 mg tablet TAKE 1 TABLET BY MOUTH EVERY DAY AT NIGHT    FLUoxetine (PROZAC) 20 mg capsule Take 20 mg by mouth daily.  traZODone (DESYREL) 150 mg tablet TAKE 1 TABLET BY MOUTH EVERY DAY AT NIGHT    mirtazapine (REMERON) 30 mg tablet TAKE 1 TABLET BY MOUTH EVERY DAY AT NIGHT    omeprazole (PRILOSEC) 40 mg capsule TAKE 1 CAPSULE BY MOUTH TWICE A DAY    finasteride (PROSCAR) 5 mg tablet TAKE 1 TABLET BY MOUTH EVERY DAY    aspirin 81 mg chewable tablet Take 1 Tab by mouth daily. Indications: myocardial infarction prevention    nitroglycerin (NITROSTAT) 0.4 mg SL tablet 1 Tab by SubLINGual route every five (5) minutes as needed for Chest Pain. LABS:  No results for input(s): WBC, HGB, HCT, PLT, HGBEXT, HCTEXT, PLTEXT in the last 72 hours. No results for input(s): NA, K, CL, CO2, BUN, CREA, GLU, CA, MG, PHOS, URICA, PTH, XPTHCT, PTHN, XPTHNT in the last 72 hours.     No lab exists for component: IPTH  No results for input(s): SGOT, GPT, ALT, AP, TBIL, TBILI, TP, ALB, GLOB, GGT, AML, LPSE in the last 72 hours. No lab exists for component: AMYP, HLPSE  No results for input(s): INR, PTP, APTT in the last 72 hours. No lab exists for component: INREXT   No results for input(s): FE, TIBC, PSAT, FERR in the last 72 hours. No results for input(s): PH, PCO2, PO2 in the last 72 hours. No results for input(s): CPK, CKNDX, TROIQ in the last 72 hours.     No lab exists for component: CPKMB  Lab Results   Component Value Date/Time    Glucose (POC) 132 (H) 09/14/2019 11:12 AM    Glucose (POC) 134 (H) 09/14/2019 07:20 AM    Glucose (POC) 122 (H) 09/13/2019 09:04 PM    Glucose (POC) 129 (H) 09/13/2019 06:50 PM    Glucose (POC) 196 (H) 09/13/2019 11:25 AM

## 2019-09-16 NOTE — ED TRIAGE NOTES
Transfer from [facility]. Pt c/o chest pain. Hx 4 cardiac stents placed at this hospital on 9/6/19 after MI. A/Ox4. Potassium 7.0. Missed dialysis today (M, W, F).      Pre-hospital meds: Albuterol neb, 25 units insulin, amp d50, 1g calcium gluconate

## 2019-09-16 NOTE — ED NOTES
Dialysis RN arrives to perform dialysis at bedside. Pt awake and alert resting in NAD. Vitals stable on monitor.

## 2019-09-16 NOTE — PROGRESS NOTES
Pharmacy Medication Reconciliation     The patient was interviewed regarding current PTA medication list, use and drug allergies;  patient and patient's wife present in room and obtained permission from patient to discuss drug regimen with visitor(s) present. The patient was questioned regarding use of any other inhalers, topical products, over the counter medications, herbal medications, vitamin products or ophthalmic/nasal/otic medication use. Allergy Update: Nubain [nalbuphine]    Recommendations/Findings: The following amendments were made to the patient's active medication list on file at [facility]:   1) Additions: none    2) Deletions: none    3) Changes: none    Patient's wife states no changes since the discharge on 9/14/19. I use discharge paperwork.    -Clarified PTA med list with patient/patient's wife interview and Rx query. PTA medication list was corrected to the following:     Prior to Admission Medications   Prescriptions Last Dose Informant Patient Reported? Taking? FLUoxetine (PROZAC) 20 mg capsule 9/16/2019 at 0900 Transfer Papers Yes Yes   Sig: Take 20 mg by mouth daily. aspirin 81 mg chewable tablet 9/16/2019 at 0900 Transfer Papers No Yes   Sig: Take 1 Tab by mouth daily. Indications: myocardial infarction prevention   docusate sodium (COLACE) 100 mg capsule 9/16/2019 at 0900 Transfer Papers Yes Yes   Sig: Take 100 mg by mouth daily. ferric citrate (AURYXIA) 210 mg iron tablet 9/16/2019 at 0900 Transfer Papers Yes Yes   Sig: Take 420 mg by mouth Before breakfast, lunch, and dinner. finasteride (PROSCAR) 5 mg tablet 9/16/2019 at 0900 Transfer Papers No Yes   Sig: TAKE 1 TABLET BY MOUTH EVERY DAY   metoprolol succinate (TOPROL-XL) 25 mg XL tablet 9/16/2019 at 0900 Transfer Papers No Yes   Sig: Take 0.5 Tabs by mouth daily. midodrine (PROAMITINE) 10 mg tablet 9/16/2019 at 0900 Transfer Papers No Yes   Sig: Take 1 Tab by mouth three (3) times daily (with meals) for 30 days. mirtazapine (REMERON) 30 mg tablet 9/15/2019 at 2100 Transfer Papers No Yes   Sig: TAKE 1 TABLET BY MOUTH EVERY DAY AT NIGHT   nitroglycerin (NITROSTAT) 0.4 mg SL tablet  Transfer Papers No Yes   Si Tab by SubLINGual route every five (5) minutes as needed for Chest Pain. omeprazole (PRILOSEC) 40 mg capsule 2019 at 0800 Transfer Papers No Yes   Sig: TAKE 1 CAPSULE BY MOUTH TWICE A DAY   pravastatin (PRAVACHOL) 80 mg tablet 9/15/2019 at 2100 Transfer Papers No Yes   Sig: TAKE 1 TABLET BY MOUTH EVERY DAY AT NIGHT   pregabalin (LYRICA) 150 mg capsule 2019 at 0800 Transfer Papers Yes Yes   Sig: Take 150 mg by mouth three (3) times daily. ticagrelor (BRILINTA) 90 mg tablet 2019 at 0900 Transfer Papers No Yes   Sig: Take 1 Tab by mouth every twelve (12) hours every twelve (12) hours.    traZODone (DESYREL) 150 mg tablet 9/15/2019 at 2100 Transfer Papers No Yes   Sig: TAKE 1 TABLET BY MOUTH EVERY DAY AT NIGHT      Facility-Administered Medications: None          Thank you,  MARGOT [place]

## 2019-09-16 NOTE — ED PROVIDER NOTES
EMERGENCY DEPARTMENT HISTORY AND PHYSICAL EXAM      Date: 9/16/2019  Patient Name: Scar Jacobs Sr.  Patient Age and Sex: 71 y.o. male    History of Presenting Illness     Chief Complaint   Patient presents with    Chest Pain       History Provided By: Patient and EMS    HPI: Scar Jacobs Sr., 71 y.o. male with past medical history as documented below presents to the ED with c/o of acute severe chest pain and abnormal potassium. Pt has a significant medical history of ESRD on dialysis, CAD status post multiple stents. Patient was recently admitted for unstable angina in the setting of CAD with multiple stents. Patient had a cardiac cath on September 5 with severe disease. Patient was a poor CABG candidate. The patient was discharged home on aspirin, ticagrelor, Midrin, metoprolol and statin. Patient presented with concerns for chest pain at the outside facility. Patient has a left upper extremity AV fistula. His calcium on Saturday was 7.0 and patient was given chemical dialysis. According to records at the outside facility transferred here for hyperkalemia, CKD, chest pain and bradycardia. Patient also had an elevated troponin. Review of outside hospital records, patient's CK was 252, troponin was 0.07, magnesium was 2.6 lipase is 42, sodium was 134, potassium 7.0 drawn at 1:40 PM.  BUN was 60, creatinine 6.79, showed a hemoglobin of 12.0. INR was 130, lactic acid was 04.49. Chest x-ray showed no acute disease. According to records, patient did receive 250 cc of fluids at 12:27 PM, calcium gluconate at 2:17 PM, amp of D50, 10 units of insulin as well as albuterol. Surgical history includes left above-knee amputation patient was hypoxic 81% and therefore was placed on 6 L of oxygen. Patient did miss dialysis today as he has been ill. Pt denies any other alleviating or exacerbating factors.  Additionally, pt specifically denies any recent fever, chills, headache, nausea, vomiting, abdominal pain, lightheadedness, dizziness, numbness, BLE swelling, heart palpitations, urinary sxs, diarrhea, constipation, melena, hematochezia, cough, or congestion. There are no other complaints, changes or physical findings at this time. PCP: Isidro Garcia, DO    Past History   Past Medical History:  Past Medical History:   Diagnosis Date    Anemia     Arthritis     back, hips    CAD (coronary artery disease)     Sees Dr. Jose Allen, 4 heart attacks    Chronic kidney disease     Dr. Param Noonan, [facility]     Chronic LBP 1990    Slipped on gravel and fell at work; Multiple surgeries;  Sees Dr. Vince Anne for pain mgmt    Chronic pain     Confusion     Depression     Diabetes ([facility] [address]     ED (erectile dysfunction)     GERD (gastroesophageal reflux disease) 11/2015    Diagnosed at [facility] last month    Hypercholesteremia     Hypertension     Liver disease     Psychiatric disorder     depression    PVD (peripheral vascular disease) ([facility] [address]     Thromboembolus ([facility] [address]     DVT in right leg       Past Surgical History:  Past Surgical History:   Procedure Laterality Date    CARDIAC SURG PROCEDURE UNLIST      PTCA    HX ABOVE KNEE AMPUTATION Left 2013    Left knee stump non-healing ulcer; Dr. Wilson Brood Left     HX APPENDECTOMY      HX BELOW KNEE AMPUTATION      Left leg    HX CHOLECYSTECTOMY      HX GI      HX HIP REPLACEMENT      right hip replaced x 2    HX ORTHOPAEDIC  1990s    4 back surgeries    HX ORTHOPAEDIC      donna ankles pin placed    HX ORTHOPAEDIC      left leg 17 surgeries    HX VASCULAR ACCESS Left     port left arm    UPPER GI ENDOSCOPY,BIOPSY  9/2/2015         UPPER GI ENDOSCOPY,BIOPSY  10/12/2015         VASCULAR SURGERY PROCEDURE UNLIST      Multiple revascularization procedures, toe amputations       Family History:  Family History   Problem Relation Age of Onset    Alcohol abuse Father     Diabetes Sister     Diabetes Sister  Lung Disease Mother     Cancer Maternal Grandfather         Head and neck    Cancer Paternal Grandmother         Stomach       Social History:  Social History     Tobacco Use    Smoking status: Current Every Day Smoker     Packs/day: 1.00     Years: 35.00     Pack years: 35.00    Smokeless tobacco: Never Used    Tobacco comment: 30 pack years; Occasional cigar   Substance Use Topics    Alcohol use: No     Comment: Former heavy drinker quit drinking about 17 years ago    Drug use: No     Types: Prescription       Allergies: Allergies   Allergen Reactions    Nubain [Nalbuphine] Other (comments)     Made me wild; Dysphoria       Current Medications:  No current facility-administered medications on file prior to encounter. Current Outpatient Medications on File Prior to Encounter   Medication Sig Dispense Refill    ticagrelor (BRILINTA) 90 mg tablet Take 1 Tab by mouth every twelve (12) hours every twelve (12) hours. 60 Tab 0    midodrine (PROAMITINE) 10 mg tablet Take 1 Tab by mouth three (3) times daily (with meals) for 30 days. 90 Tab 0    metoprolol succinate (TOPROL-XL) 25 mg XL tablet Take 0.5 Tabs by mouth daily. 30 Tab 0    pregabalin (LYRICA) 150 mg capsule Take 150 mg by mouth three (3) times daily.  ferric citrate (AURYXIA) 210 mg iron tablet Take 420 mg by mouth Before breakfast, lunch, and dinner.  docusate sodium (COLACE) 100 mg capsule Take 100 mg by mouth daily.  pravastatin (PRAVACHOL) 80 mg tablet TAKE 1 TABLET BY MOUTH EVERY DAY AT NIGHT 90 Tab 1    FLUoxetine (PROZAC) 20 mg capsule Take 20 mg by mouth daily.       traZODone (DESYREL) 150 mg tablet TAKE 1 TABLET BY MOUTH EVERY DAY AT NIGHT 90 Tab 1    mirtazapine (REMERON) 30 mg tablet TAKE 1 TABLET BY MOUTH EVERY DAY AT NIGHT 90 Tab 1    omeprazole (PRILOSEC) 40 mg capsule TAKE 1 CAPSULE BY MOUTH TWICE A  Cap 1    finasteride (PROSCAR) 5 mg tablet TAKE 1 TABLET BY MOUTH EVERY DAY 90 Tab 1    aspirin 81 mg chewable tablet Take 1 Tab by mouth daily. Indications: myocardial infarction prevention 90 Tab 1    nitroglycerin (NITROSTAT) 0.4 mg SL tablet 1 Tab by SubLINGual route every five (5) minutes as needed for Chest Pain. 20 Tab 0       Review of Systems   Review of Systems   Constitutional: Positive for fatigue. Negative for chills and fever. HENT: Negative. Negative for congestion, facial swelling, rhinorrhea, sore throat, trouble swallowing and voice change. Eyes: Negative. Respiratory: Positive for chest tightness. Negative for apnea, cough, shortness of breath and wheezing. Cardiovascular: Positive for chest pain. Negative for palpitations and leg swelling. Gastrointestinal: Negative. Negative for abdominal distention, abdominal pain, blood in stool, constipation, diarrhea, nausea and vomiting. Endocrine: Negative. Negative for cold intolerance, heat intolerance and polyuria. Genitourinary: Negative. Negative for difficulty urinating, dysuria, flank pain, frequency, hematuria and urgency. Musculoskeletal: Negative. Negative for arthralgias, back pain, myalgias, neck pain and neck stiffness. Skin: Negative. Negative for color change and rash. Neurological: Positive for weakness. Negative for dizziness, syncope, facial asymmetry, speech difficulty, light-headedness, numbness and headaches. Hematological: Negative. Does not bruise/bleed easily. Psychiatric/Behavioral: Negative. Negative for confusion and self-injury. The patient is not nervous/anxious. Physical Exam   Physical Exam   Constitutional: He is oriented to person, place, and time. He appears well-developed and well-nourished. He is cooperative. He appears toxic. He has a sickly appearance. He appears ill. He appears distressed. HENT:   Head: Normocephalic and atraumatic. Mouth/Throat: Mucous membranes are normal. No posterior oropharyngeal erythema. Eyes: Pupils are equal, round, and reactive to light. Conjunctivae and EOM are normal.   Neck: Normal range of motion. Cardiovascular: Normal rate, regular rhythm, normal heart sounds and intact distal pulses. Exam reveals no gallop and no friction rub. No murmur heard. Pulmonary/Chest: Effort normal and breath sounds normal. No respiratory distress. He has no wheezes. He has no rales. He exhibits no tenderness. TTP over right upper chest near previous Impella site, incision c/d/i with surrounding ecchymoses, no hematoma palpated    Abdominal: Soft. Bowel sounds are normal. He exhibits no distension and no mass. There is no tenderness. There is no rebound and no guarding. Musculoskeletal: Normal range of motion. He exhibits no edema, tenderness or deformity. LEFT AKA, stump C/D/I    LUE AVF with good thrill   Neurological: He is alert and oriented to person, place, and time. He displays normal reflexes. No cranial nerve deficit. He exhibits normal muscle tone. Coordination normal.   Skin: Skin is warm. No rash noted. Psychiatric: He has a normal mood and affect. Nursing note and vitals reviewed.       Diagnostic Study Results     Labs -  Recent Results (from the past 24 hour(s))   EKG, 12 LEAD, INITIAL    Collection Time: 09/16/19  3:54 PM   Result Value Ref Range    Ventricular Rate 65 BPM    Atrial Rate 70 BPM    QRS Duration 160 ms    Q-T Interval 462 ms    QTC Calculation (Bezet) 480 ms    Calculated R Axis -56 degrees    Calculated T Axis 98 degrees    Diagnosis       Wide QRS rhythm with premature ventricular complexes or fusion complexes  Left axis deviation  Right bundle branch block  Minimal voltage criteria for LVH, may be normal variant  Inferior infarct , age undetermined  Anteroseptal infarct , age undetermined  When compared with ECG of 04-SEP-2019 14:12,  Wide QRS rhythm has replaced Sinus rhythm     CBC WITH AUTOMATED DIFF    Collection Time: 09/16/19  4:09 PM   Result Value Ref Range    WBC 9.2 4.1 - 11.1 K/uL    RBC 3.49 (L) 4.10 - 5.70 M/uL    HGB 10.7 (L) 12.1 - 17.0 g/dL    HCT 35.0 (L) 36.6 - 50.3 %    .3 (H) 80.0 - 99.0 FL    MCH 30.7 26.0 - 34.0 PG    MCHC 30.6 30.0 - 36.5 g/dL    RDW 17.2 (H) 11.5 - 14.5 %    PLATELET 098 (L) 886 - 400 K/uL    MPV 11.4 8.9 - 12.9 FL    NRBC 0.2 (H) 0  WBC    ABSOLUTE NRBC 0.02 (H) 0.00 - 0.01 K/uL    NEUTROPHILS 81 (H) 32 - 75 %    LYMPHOCYTES 9 (L) 12 - 49 %    MONOCYTES 9 5 - 13 %    EOSINOPHILS 1 0 - 7 %    BASOPHILS 0 0 - 1 %    IMMATURE GRANULOCYTES 0 0.0 - 0.5 %    ABS. NEUTROPHILS 7.5 1.8 - 8.0 K/UL    ABS. LYMPHOCYTES 0.8 0.8 - 3.5 K/UL    ABS. MONOCYTES 0.8 0.0 - 1.0 K/UL    ABS. EOSINOPHILS 0.1 0.0 - 0.4 K/UL    ABS. BASOPHILS 0.0 0.0 - 0.1 K/UL    ABS. IMM. GRANS. 0.0 0.00 - 0.04 K/UL    DF MANUAL      RBC COMMENTS ANISOCYTOSIS  1+       METABOLIC PANEL, COMPREHENSIVE    Collection Time: 09/16/19  4:09 PM   Result Value Ref Range    Sodium 134 (L) 136 - 145 mmol/L    Potassium 5.8 (H) 3.5 - 5.1 mmol/L    Chloride 99 97 - 108 mmol/L    CO2 25 21 - 32 mmol/L    Anion gap 10 5 - 15 mmol/L    Glucose 62 (L) 65 - 100 mg/dL    BUN 62 (H) 6 - 20 MG/DL    Creatinine 7.31 (H) 0.70 - 1.30 MG/DL    BUN/Creatinine ratio 8 (L) 12 - 20      GFR est AA 9 (L) >60 ml/min/1.73m2    GFR est non-AA 8 (L) >60 ml/min/1.73m2    Calcium 8.8 8.5 - 10.1 MG/DL    Bilirubin, total 1.4 (H) 0.2 - 1.0 MG/DL    ALT (SGPT) 11 (L) 12 - 78 U/L    AST (SGOT) 55 (H) 15 - 37 U/L    Alk.  phosphatase 147 (H) 45 - 117 U/L    Protein, total 7.5 6.4 - 8.2 g/dL    Albumin 3.1 (L) 3.5 - 5.0 g/dL    Globulin 4.4 (H) 2.0 - 4.0 g/dL    A-G Ratio 0.7 (L) 1.1 - 2.2     CK W/ REFLX CKMB    Collection Time: 09/16/19  4:09 PM   Result Value Ref Range     39 - 308 U/L   TROPONIN I    Collection Time: 09/16/19  4:09 PM   Result Value Ref Range    Troponin-I, Qt. 0.08 (H) <0.05 ng/mL   PTT    Collection Time: 09/16/19  5:58 PM   Result Value Ref Range    aPTT 32.1 (H) 22.1 - 32.0 sec    aPTT, therapeutic range     58.0 - 77.0 SECS PROTHROMBIN TIME + INR    Collection Time: 09/16/19  5:58 PM   Result Value Ref Range    INR 1.4 (H) 0.9 - 1.1      Prothrombin time 14.5 (H) 9.0 - 11.1 sec       Radiologic Studies -   CTA CHEST W OR W WO CONT   Final Result   IMPRESSION: Emphysema. Coronary artery disease. No evidence for pulmonary   embolism. XR CHEST PORT   Final Result   IMPRESSION: Persistent right basilar subsegmental atelectasis        CT Results  (Last 48 hours)               09/16/19 1831  CTA CHEST W OR W WO CONT Final result    Impression:  IMPRESSION: Emphysema. Coronary artery disease. No evidence for pulmonary   embolism. Narrative:  EXAM: CTA CHEST W OR W WO CONT       INDICATION: Chest pain. eval for PE       COMPARISON: 9/26/2014. CONTRAST: 80 mL of Isovue-370. TECHNIQUE:    Precontrast  images were obtained to localize the volume for acquisition. Multislice helical CT arteriography was performed from the diaphragm to the   thoracic inlet during uneventful rapid bolus intravenous contrast   administration. Lung and soft tissue windows were generated. Coronal and   sagittal images were generated and 3D post processing consisting of coronal   maximum intensity images was performed. CT dose reduction was achieved through   use of a standardized protocol tailored for this examination and automatic   exposure control for dose modulation. FINDINGS:   The lungs are clear of mass, nodule, airspace disease or edema. Coronary artery   calcification. Emphysema and bibasilar subsegmental atelectasis. The pulmonary arteries are well enhanced and no pulmonary emboli are identified. There is no hilar adenopathy or mass. Subcarinal 1.5 cm node (image 52) (. The   aorta enhances normally without evidence of aneurysm or dissection. The visualized portions of the upper abdominal organs are normal. Prior   cholecystectomy.                CXR Results  (Last 48 hours)               09/16/19 1712 XR CHEST PORT Final result    Impression:  IMPRESSION: Persistent right basilar subsegmental atelectasis       Narrative:  EXAM: XR CHEST PORT       INDICATION: chest pain       COMPARISON: 9/10/2019       FINDINGS: A portable AP radiograph of the chest was obtained at 1631 hours. The   patient is on a cardiac monitor. The right IJ line has been removed. There is   persistent elevation of the right hemidiaphragm with a linear opacity. The left   hemidiaphragm is better seen. The cardiac and mediastinal contours and pulmonary   vascularity are stable. The bones and soft tissues are grossly within normal   limits. Multiple staples overlie the right supraclavicular region. Medical Decision Making   I am the first provider for this patient. I reviewed the vital signs, available nursing notes, past medical history, past surgical history, family history and social history. Vital Signs-Reviewed the patient's vital signs.   Patient Vitals for the past 24 hrs:   Temp Pulse Resp BP SpO2   09/16/19 2318 97.6 °F (36.4 °C) 74 17 162/61 94 %   09/16/19 2250 97.6 °F (36.4 °C) 74 16 162/61 94 %   09/16/19 2215  62 13 132/46 97 %   09/16/19 2200  64 16 148/47 97 %   09/16/19 2145  66 16 136/50 96 %   09/16/19 2130  66 15 127/44 96 %   09/16/19 2115  67 15 139/48 98 %   09/16/19 2100  69 17 146/54 99 %   09/16/19 2045  64 13 128/49 98 %   09/16/19 2030  68 17 125/52 100 %   09/16/19 2015  69 15 137/55 100 %   09/16/19 2000  69 18 152/75 93 %   09/16/19 1945  65 18 133/61 100 %   09/16/19 1930  67 19 (!) 142/39 99 %   09/16/19 1915  62 16  100 %   09/16/19 1900 98 °F (36.7 °C) 63 16 116/63 100 %   09/16/19 1845  62 17 128/51 97 %   09/16/19 1842  64 14  100 %   09/16/19 1800  62 11 113/58 98 %   09/16/19 1752  62 15 105/70 98 %   09/16/19 1730  62 17 115/57    09/16/19 1715  60 12 111/58 98 %   09/16/19 1700  (!) 59 11 115/53 97 %   09/16/19 1643  63 17 125/57 98 %   09/16/19 1615  64 17 129/61 98 %   09/16/19 1607     97 %   09/16/19 1600  65 18 133/60 98 %   09/16/19 1551 97.9 °F (36.6 °C) 66 18 110/89 99 %   09/16/19 1550    110/89 97 %       Pulse Oximetry Analysis - 97% on 3L NC    Cardiac Monitor:   Rate: 66 bpm  Rhythm: Normal Sinus Rhythm      ED EKG interpretation:  Rhythm: Wide QRS rhythm with premature ventricular complexes or fusion complexes and regular . Rate (approx.): 65; Axis: left axis deviation; P wave: normal; QRS interval: normal ; ST/T wave: normal; Other findings: Right bundle branch block This EKG was interpreted by Jennifer Pitt M.D. Records Reviewed: Nursing Notes, Old Medical Records, Previous electrocardiograms, Previous Radiology Studies and Previous Laboratory Studies    Provider Notes (Medical Decision Making):   DDx includes STEMI, NSTEMI, Angina, PE, Aortic Pathology, Chest Wall Pain, Pleurisy, Pneumonia, GERD/esophagitis, Anxiety. No cough/fever or focal lung findings to suggest pneumonia. No tachycardia, hypoxia or pleuritic component to suggest PE. Pulses symmetric and no extremely elevated BP/asymmetry or classic tearing sensation to suggest Aortic Dissection. Also, no neuro findings. No wretching/forceful vomiting to suggest esophageal disaster. Denies IV drug abuse, has native valves, no fevers/murmurs or skin lesions to suggest endocarditis. Will evaluate with EKG, labs, cardiac enzymes, chest x-ray. Will provide pain control and reassess. ED Course:   Initial assessment performed. The patients presenting problems have been discussed, and they are in agreement with the care plan formulated and outlined with them. I have encouraged them to ask questions as they arise throughout their visit. TOBACCO COUNSELING:   Upon evaluation, pt expressed that they are a current tobacco user.  For approximately 10 minutes, pt has been counseled on the dangers of smoking and was encouraged to quit as soon as possible in order to decrease further risks to their health. Pt has conveyed their understanding of the risks involved should they continue to use tobacco products. HYPERTENSION COUNSELING   Education was provided to the patient today regarding their hypertension. Patient is made aware of their elevated blood pressure and is instructed to follow up this week with their Primary Care for a recheck. Patient is counseled regarding consequences of chronic, uncontrolled hypertension including kidney disease, heart disease, stroke or even death. Patient states their understanding and agrees to follow up this week. Additionally, during their visit, I discussed sodium restriction, maintaining ideal body weight and regular exercise program as physiologic means to achieve blood pressure control. The patient will strive towards this. I reviewed our electronic medical record system for any past medical records that were available that may contribute to the patient's current condition, the nursing notes and vital signs from today's visit. Surendra Banegas MD    ED Orders Placed :  Orders Placed This Encounter    XR CHEST PORT    CTA CHEST W AND W/O CONTRAST (use for PE)    CBC WITH AUTOMATED DIFF    METABOLIC PANEL, COMPREHENSIVE    CK W/ REFLX CKMB    TROPONIN I    CBC W/O DIFF    METABOLIC PANEL, COMPREHENSIVE    PTT    PT    DIET CARDIAC Regular    CHEST PAIN PANEL TRACKING (DO NOT DESELECT)    CARDIAC MONITOR - ED ONLY    OXYGEN CANNULA    VITAL SIGNS Per Protocol    MEASURE HEIGHT    WEIGH PATIENT    Cardiac Monitor    VITAL SIGNS PER UNIT ROUTINE    DO NOT RESUSCITATE    IP CONSULT TO NEPHROLOGY    CONTACT ISOLATION    OXYGEN CANNULA Liters per minute: 2; Indications for O2 therapy: CHEST PAIN CONTINUOUS STAT    OXIMETRY, SPOT CHECK    EKG, 12 LEAD, INITIAL    SALINE LOCK IV ONE TIME STAT    HEMODIALYSIS INPATIENT Duration (hr): 3; Dialysate Bath:  K: 2; Dialysate Bath:  CA: 2.5; Dialysate Bath: Bicarb: 35;  Target Fluid Removal (mL): 2,000; Access: Fistula/Graft EVERY MWF Routine    DISCONTD: sodium bicarbonate (8.4%) injection 50 mEq    fentaNYL citrate (PF) injection 100 mcg    iopamidol (ISOVUE-370) 76 % injection 100 mL    sodium chloride (NS) flush 10 mL    aspirin chewable tablet 81 mg    ticagrelor (BRILINTA) tablet 90 mg    docusate sodium (COLACE) capsule 100 mg    ferric citrate (AURYXIA) tablet 420 mg    finasteride (PROSCAR) tablet 5 mg    FLUoxetine (PROzac) capsule 20 mg    metoprolol succinate (TOPROL-XL) XL tablet 12.5 mg    midodrine (PROAMITINE) tablet 10 mg    mirtazapine (REMERON) tablet 30 mg    nitroglycerin (NITROSTAT) tablet 0.4 mg    pantoprazole (PROTONIX) tablet 40 mg    pravastatin (PRAVACHOL) tablet 40 mg    pregabalin (LYRICA) capsule 150 mg    traZODone (DESYREL) tablet 150 mg    sodium chloride (NS) flush 5-40 mL    sodium chloride (NS) flush 5-40 mL    heparin (porcine) injection 5,000 Units    IP CONSULT TO HOSPITALIST    IP CONSULT TO CARDIOLOGY    INITIAL PHYSICIAN ORDER: OBSERVATION/OUTPATIENT IN A BED OBSERVATION; Remote Telemetry; chest pain     ED Medications Administered:  Medications   docusate sodium (COLACE) capsule 100 mg (has no administration in time range)   ferric citrate (AURYXIA) tablet 420 mg (has no administration in time range)   finasteride (PROSCAR) tablet 5 mg (has no administration in time range)   FLUoxetine (PROzac) capsule 20 mg (has no administration in time range)   metoprolol succinate (TOPROL-XL) XL tablet 12.5 mg (has no administration in time range)   midodrine (PROAMITINE) tablet 10 mg (has no administration in time range)   mirtazapine (REMERON) tablet 30 mg (30 mg Oral Given 9/16/19 2331)   nitroglycerin (NITROSTAT) tablet 0.4 mg (has no administration in time range)   pantoprazole (PROTONIX) tablet 40 mg (has no administration in time range)   pravastatin (PRAVACHOL) tablet 40 mg (40 mg Oral Given 9/16/19 2331)   pregabalin (LYRICA) capsule 150 mg (150 mg Oral Given 9/16/19 2331)   traZODone (DESYREL) tablet 150 mg (150 mg Oral Given 9/16/19 2331)   sodium chloride (NS) flush 5-40 mL (10 mL IntraVENous Given 9/16/19 2331)   sodium chloride (NS) flush 5-40 mL (has no administration in time range)   heparin (porcine) injection 5,000 Units (0 Units SubCUTAneous Held 9/16/19 2200)   fentaNYL citrate (PF) injection 100 mcg (100 mcg IntraVENous Given 9/16/19 1641)   iopamidol (ISOVUE-370) 76 % injection 100 mL (80 mL IntraVENous Given 9/16/19 1832)   sodium chloride (NS) flush 10 mL (10 mL IntraVENous Given 9/16/19 1832)   aspirin chewable tablet 81 mg (81 mg Oral Given 9/16/19 1940)   ticagrelor (BRILINTA) tablet 90 mg (90 mg Oral Given 9/16/19 1940)     ED Course as of Sep 17 0127   Mon Sep 16, 2019   1628 Consult Note:  Aislinn Mohamud MD spoke with Dr. Twila Valdovinos,   Specialty: Nephrology  Discussed pt's hx, disposition, and available diagnostic and imaging results. Reviewed care plans. Agree with management and plan thus far. Consultant will ask dialysis to dialyze patient. [HW]      ED Course User Index  [HW] Mary Rosa MD     Progress Note:  BS noted to be 107; K 7.0 at OSH, repeat labs now. Progress Note:  Pt still reporting chest pain, IV Fentanyl given now. Progress Note:  Per chart review, Cath 9/6 with Impella, s/p PCI of OM1, mLAD, and LM/LAD/LCx bifurcation with Culotte technique with total of 4 STANLEY. per notes Impella removed in cath lab. Some bleeding afterwards. Got PRBCs and PLT. Clot noted on distal Impella upon removal (seen on WADE). No signs of neuro deficits. ECHO EF 40-45%    Progress Not:  7:40 PM  Aspirin and Brilinta given by RN. Consult Note:  Aislinn Mohamud MD spoke with Dr. Zehra Luna,   Specialty: Cardiologist  Discussed pt's hx, disposition, and available diagnostic and imaging results. Reviewed care plans. Agree with management and plan thus far.  Consultant states chest pain unlikely related to CAD given recent cath and stent placement. Recommend giving him his home ASA and Brilinta dosing. Consult Note:  Polina Pineda MD spoke with Dr. Naty Kent,   Specialty: Hospitalist  Discussed pt's hx, disposition, and available diagnostic and imaging results. Reviewed care plans. Agree with management and plan thus far. Consultant will evaluate pt for admission. CRITICAL CARE NOTE :    IMPENDING DETERIORATION -Airway, Respiratory, Cardiovascular, Metabolic and Renal  ASSOCIATED RISK FACTORS - Hypotension, Shock, Hypoxia, Dysrhythmia, Metabolic changes and Dehydration  MANAGEMENT- Bedside Assessment and Supervision of Care  INTERPRETATION -  Xrays, CT Scan, ECG, Blood Pressure and Cardiac Output Measures   INTERVENTIONS - hemodynamic mngmt and [employer] interventions  CASE REVIEW - Hospitalist, Medical Sub-Specialist, Nursing, Family and Nephrology and Cardiologist  TREATMENT RESPONSE -Improved  PERFORMED BY - Self    NOTES   :    I have spent 70 minutes of critical care time involved in lab review, consultations with specialist, family decision- making, bedside attention and documentation. During this entire length of time I was immediately available to the patient . Critical Care: The reason for providing this level of medical care for this critically ill patient was due to a critical illness that impaired one or more vital organ systems, such that there was a high probability of imminent or life threatening deterioration in the patient's condition. This care involved high complexity decision making to assess, manipulate, and support vital system functions, to treat this degree of vital organ system failure, and to prevent further life threatening deterioration of the patients condition. Polina Pineda MD    Disposition: ADMIT  Patient is being admitted to the hospital.  The results of their tests and reasons for their admission have been discussed with them and/or available family.   They convey agreement and understanding for the need to be admitted and for their admission diagnosis. Consultation has been made with the inpatient physician specialist for hospitalization. Diagnosis     Clinical Impression:   1. Acute chest pain    2. Acute hyperkalemia    3. ESRD (end stage renal disease) on dialysis ([address]    4. Coronary atherosclerosis due to severely calcified coronary lesion    5. Uncontrolled type 2 diabetes mellitus with hypoglycemia without coma ([address]    6. Hx of AKA (above knee amputation), left ([address]    7. Acute respiratory failure with hypoxia (HCC)        Attestation:  I personally performed the services described in this documentation on this date 9/16/2019 for patient, Liliana Pryoralexis Corey. Thalia Prabhakar MD    Please note that this dictation was completed with [employer], the computer voice recognition software. Quite often unanticipated grammatical, syntax, homophones, and other interpretive errors are inadvertently transcribed by the computer software. Please disregard these errors. Please excuse any errors that have escaped final proofreading. This note will not be viewable in [address]

## 2019-09-16 NOTE — H&P
Hospitalist Admission Note    NAME: Layo Brush   :  1955   MRN:  [id number]     Date/Time:  2019 7:47 PM    Patient PCP: Alysha Hirsch, DO  ______________________________________________________________________  Given the patient's current clinical presentation, I have a high level of concern for decompensation if discharged from the emergency department. Complex decision making was performed, which includes reviewing the patient's available past medical records, laboratory results, and x-ray films. My assessment of this patient's clinical condition and my plan of care is as follows. Assessment / Plan:      Severe CAD, complicated high risk cath  with assistance of CTS  Rt chest wall pain, swelling and bruising extending all over  -Cath  with Impella, s/p PCI of OM1, mLAD, and LM/LAD/LCx bifurcation with Culotte technique with total of 4 STANLEY  -per notes Impella removed in cath lab. Some bleeding afterwards. Got PRBCs and PLT. Clot noted on distal Impella upon removal (seen on WADE).   No signs of neuro deficits.  -Echo w/ EF 40-45%  -troponin flat and has trended down since last admission, not sure if there is any significance of trending troponin  -cardiology called in ER, will follow in am  -cont ASA/brilinta/statin   -monitor chest wall bruising    DM type II with nephropathy  -Resume home meds     ESRD   Hyperkalemia  -HD MWF, nephrology consulted, already being dialyzed     PAD  Multiple small wounds    -Patient is scheduled to have a right second toe amputation on 10/1/2019  -Continue aspirin and Brilinta  -cont Wound Care as previous admission recommendation: RLE and foot scabs: cleanse with soap and water and apply Mupirocin ointment to scabs and leave JAYSON.     L AKA   Depression  -home trazodone    H/o R renal artery stenosis, s/p stent 2018     Chronic mild Thrombocytopenia, monitor     Code Status: DNR  Surrogate Decision Maker: wife Josselyn Monzon    DVT Prophylaxis: heparin  GI Prophylaxis: not indicated    Baseline: wheel chair bound, lives with wife      Subjective:   CHIEF COMPLAINT: chest pain    HISTORY OF PRESENT ILLNESS:     Edouard Spence is a 71 y.o. male with ESRD on dialysis, CAD status post multiple stents, was recently admitted for discharge on September 14 when presented with unstable angina, had a cardiac cath on September 5 with severe disease, considered poor CABG candidate and was discharged home on aspirin, ticagrelor, Midrin, metoprolol and statin. Pt says since discharge he has been feeing weak and continued to feel CP off and on at rest. At OSH his potassium was 7, was given hyperkalemia cocktail and sent to our ER for eval. Troponin was 0.07 there and 0.08 at out facility. Cardiology called, recommended cycle troponin and nephrology called for dialysis.      We were asked to admit for work up and evaluation of the above problems. Past Medical History:   Diagnosis Date    Anemia     Arthritis     back, hips    CAD (coronary artery disease)     Sees Dr. Alejandra Escobar, 4 heart attacks    [facility]     [facility]     Chronic LBP 1990    Slipped on gravel and fell at work; Multiple surgeries;  Sees Dr. Dionte Villarreal for pain mgmt    Chronic pain     Confusion     Depression     [facility]     ED (erectile dysfunction)     GERD (gastroesophageal reflux disease) 11/2015    Diagnosed at [facility] last month    Hypercholesteremia     Hypertension     Liver disease     Psychiatric disorder     depression    PVD (peripheral vascular disease) [facility]     Thromboembolus ([address]     DVT in right leg        Past Surgical History:   Procedure Laterality Date    CARDIAC SURG PROCEDURE UNLIST      PTCA    HX ABOVE KNEE AMPUTATION Left 2013    Left knee stump non-healing ulcer; Dr. Campbell Horvath Left leg    HX CHOLECYSTECTOMY      HX GI      HX HIP REPLACEMENT      right hip replaced x 2    HX ORTHOPAEDIC  1990s    4 back surgeries    HX ORTHOPAEDIC      donna ankles pin placed    HX ORTHOPAEDIC      left leg 17 surgeries    HX VASCULAR ACCESS Left     port left arm    UPPER GI ENDOSCOPY,BIOPSY  9/2/2015         UPPER GI ENDOSCOPY,BIOPSY  10/12/2015         VASCULAR SURGERY PROCEDURE UNLIST      Multiple revascularization procedures, toe amputations       Social History     Tobacco Use    Smoking status: Current Every Day Smoker     Packs/day: 1.00     Years: 35.00     Pack years: 35.00    Smokeless tobacco: Never Used    Tobacco comment: 30 pack years; Occasional cigar   Substance Use Topics    Alcohol use: No     Comment: Former heavy drinker quit drinking about 17 years ago        Family History   Problem Relation Age of Onset    Alcohol abuse Father     Diabetes Sister     Diabetes Sister     Lung Disease Mother     Cancer Maternal Grandfather         Head and neck    Cancer Paternal Grandmother         Stomach     Allergies   Allergen Reactions    Nubain [Nalbuphine] Other (comments)     Made me wild; Dysphoria        Prior to Admission medications    Medication Sig Start Date End Date Taking? Authorizing Provider   ticagrelor (BRILINTA) 90 mg tablet Take 1 Tab by mouth every twelve (12) hours every twelve (12) hours. 9/14/19  Yes Ruby Chen MD   midodrine (PROAMITINE) 10 mg tablet Take 1 Tab by mouth three (3) times daily (with meals) for 30 days. 9/14/19 10/14/19 Yes Ruby Chen MD   metoprolol succinate (TOPROL-XL) 25 mg XL tablet Take 0.5 Tabs by mouth daily. 9/15/19  Yes Ruby Chen MD   pregabalin (LYRICA) 150 mg capsule Take 150 mg by mouth three (3) times daily. Yes Provider, Historical   ferric citrate (AURYXIA) 210 mg iron tablet Take 420 mg by mouth Before breakfast, lunch, and dinner.    Yes Provider, Historical   docusate sodium (COLACE) 100 mg capsule Take 100 mg by mouth daily. Yes Provider, Historical   pravastatin (PRAVACHOL) 80 mg tablet TAKE 1 TABLET BY MOUTH EVERY DAY AT NIGHT 5/25/19  Yes Magnus MAKRS MD   FLUoxetine (PROZAC) 20 mg capsule Take 20 mg by mouth daily. Yes Provider, Historical   traZODone (DESYREL) 150 mg tablet TAKE 1 TABLET BY MOUTH EVERY DAY AT NIGHT 12/1/18  Yes Yelena Quinonez MD   mirtazapine (REMERON) 30 mg tablet TAKE 1 TABLET BY MOUTH EVERY DAY AT NIGHT 12/1/18  Yes Yelena Quinonez MD   omeprazole (PRILOSEC) 40 mg capsule TAKE 1 CAPSULE BY MOUTH TWICE A DAY 12/1/18  Yes Yelena Quinonez MD   finasteride (PROSCAR) 5 mg tablet TAKE 1 TABLET BY MOUTH EVERY DAY 12/1/18  Yes Yelena Quinonez MD   aspirin 81 mg chewable tablet Take 1 Tab by mouth daily. Indications: myocardial infarction prevention 6/1/18  Yes Janet Ndiaye MD   nitroglycerin (NITROSTAT) 0.4 mg SL tablet 1 Tab by SubLINGual route every five (5) minutes as needed for Chest Pain. 6/18/15  Yes Marisol Mcknight MD       REVIEW OF SYSTEMS:     I am not able to complete the review of systems because:    The patient is intubated and sedated    The patient has altered mental status due to his acute medical problems    The patient has baseline aphasia from prior stroke(s)    The patient has baseline dementia and is not reliable historian    The patient is in acute medical distress and unable to provide information           Total of 12 systems reviewed as follows:       POSITIVE= underlined text  Negative = text not underlined  General:  fever, chills, sweats, generalized weakness, weight loss/gain,      loss of appetite   Eyes:    blurred vision, eye pain, loss of vision, double vision  ENT:    rhinorrhea, pharyngitis   Respiratory:   cough, sputum production, SOB, COREA, wheezing, pleuritic pain   Cardiology:   chest pain, palpitations, orthopnea, PND, edema, syncope   Gastrointestinal:  abdominal pain , N/V, diarrhea, dysphagia, constipation, bleeding   Genitourinary:  frequency, urgency, dysuria, hematuria, incontinence   Muskuloskeletal :  arthralgia, myalgia, back pain  Hematology:  easy bruising, nose or gum bleeding, lymphadenopathy   Dermatological: rash, ulceration, pruritis, color change / jaundice  Endocrine:   hot flashes or polydipsia   Neurological:  headache, dizziness, confusion, focal weakness, paresthesia,     Speech difficulties, memory loss, gait difficulty  Psychological: Feelings of anxiety, depression, agitation    Objective:   VITALS:    Visit Vitals  BP (!) 142/39   Pulse 67   Temp 98 °F (36.7 °C) (Oral)   Resp 19   Ht 5' 9\" (1.753 m)   Wt 70.8 kg (156 lb)   SpO2 99%   BMI 23.04 kg/m²       PHYSICAL EXAM:    General:    Alert, cooperative, no distress, appears stated age. HEENT: Atraumatic, anicteric sclerae, pink conjunctivae     No oral ulcers, mucosa moist, throat clear, dentition fair  Neck:  Supple, symmetrical,  thyroid: non tender  Lungs:   Clear to auscultation bilaterally. No Wheezing or Rhonchi. No rales. Chest wall:  No tenderness  No Accessory muscle use. Heart:   Regular  rhythm,  No  murmur   No edema  Abdomen:   Soft, non-tender. Not distended. Bowel sounds normal  Extremities: Multiple small wounds  on hands, and right foot, bruises, skin discoloration,       Skin turgor normal, left AKA  Skin:     As above  Psych:  Good insight. Not depressed. Not anxious or agitated. Neurologic: EOMs intact. No facial asymmetry. No aphasia or slurred speech.  Symmetrical strength in upper extremities, Alert and oriented X 4.     _______________________________________________________________________  Care Plan discussed with:    Comments   Patient x    Family      RN     Care Manager                    Consultant:      _______________________________________________________________________  Expected  Disposition:   Home with Family x   HH/PT/OT/RN    SNF/LTC    RED    ________________________________________________________________________  TOTAL TIME: Minutes    Critical Care Provided     Minutes non procedure based      Comments     Reviewed previous records   >50% of visit spent in counseling and coordination of care  Discussion with patient and/or family and questions answered       ________________________________________________________________________  Signed: Akosua Feldman MD    Procedures: see electronic medical records for all procedures/Xrays and details which were not copied into this note but were reviewed prior to creation of Plan. LAB DATA REVIEWED:    Recent Results (from the past 24 hour(s))   EKG, 12 LEAD, INITIAL    Collection Time: 09/16/19  3:54 PM   Result Value Ref Range    Ventricular Rate 65 BPM    Atrial Rate 70 BPM    QRS Duration 160 ms    Q-T Interval 462 ms    QTC Calculation (Bezet) 480 ms    Calculated R Axis -56 degrees    Calculated T Axis 98 degrees    Diagnosis       Wide QRS rhythm with premature ventricular complexes or fusion complexes  Left axis deviation  Right bundle branch block  Minimal voltage criteria for LVH, may be normal variant  Inferior infarct , age undetermined  Anteroseptal infarct , age undetermined  When compared with ECG of 04-SEP-2019 14:12,  Wide QRS rhythm has replaced Sinus rhythm     CBC WITH AUTOMATED DIFF    Collection Time: 09/16/19  4:09 PM   Result Value Ref Range    WBC 9.2 4.1 - 11.1 K/uL    RBC 3.49 (L) 4.10 - 5.70 M/uL    HGB 10.7 (L) 12.1 - 17.0 g/dL    HCT 35.0 (L) 36.6 - 50.3 %    .3 (H) 80.0 - 99.0 FL    MCH 30.7 26.0 - 34.0 PG    MCHC 30.6 30.0 - 36.5 g/dL    RDW 17.2 (H) 11.5 - 14.5 %    PLATELET 080 (L) 294 - 400 K/uL    MPV 11.4 8.9 - 12.9 FL    NRBC 0.2 (H) 0  WBC    ABSOLUTE NRBC 0.02 (H) 0.00 - 0.01 K/uL    NEUTROPHILS 81 (H) 32 - 75 %    LYMPHOCYTES 9 (L) 12 - 49 %    MONOCYTES 9 5 - 13 %    EOSINOPHILS 1 0 - 7 %    BASOPHILS 0 0 - 1 %    IMMATURE GRANULOCYTES 0 0.0 - 0.5 %    ABS. NEUTROPHILS 7.5 1.8 - 8.0 K/UL    ABS. LYMPHOCYTES 0.8 0.8 - 3.5 K/UL    ABS. MONOCYTES 0.8 0.0 - 1.0 K/UL    ABS. EOSINOPHILS 0.1 0.0 - 0.4 K/UL    ABS. BASOPHILS 0.0 0.0 - 0.1 K/UL    ABS. IMM. GRANS. 0.0 0.00 - 0.04 K/UL    DF MANUAL      RBC COMMENTS ANISOCYTOSIS  1+       METABOLIC PANEL, COMPREHENSIVE    Collection Time: 09/16/19  4:09 PM   Result Value Ref Range    Sodium 134 (L) 136 - 145 mmol/L    Potassium 5.8 (H) 3.5 - 5.1 mmol/L    Chloride 99 97 - 108 mmol/L    CO2 25 21 - 32 mmol/L    Anion gap 10 5 - 15 mmol/L    Glucose 62 (L) 65 - 100 mg/dL    BUN 62 (H) 6 - 20 MG/DL    Creatinine 7.31 (H) 0.70 - 1.30 MG/DL    BUN/Creatinine ratio 8 (L) 12 - 20      GFR est AA 9 (L) >60 ml/min/1.73m2    GFR est non-AA 8 (L) >60 ml/min/1.73m2    Calcium 8.8 8.5 - 10.1 MG/DL    Bilirubin, total 1.4 (H) 0.2 - 1.0 MG/DL    ALT (SGPT) 11 (L) 12 - 78 U/L    AST (SGOT) 55 (H) 15 - 37 U/L    Alk.  phosphatase 147 (H) 45 - 117 U/L    Protein, total 7.5 6.4 - 8.2 g/dL    Albumin 3.1 (L) 3.5 - 5.0 g/dL    Globulin 4.4 (H) 2.0 - 4.0 g/dL    A-G Ratio 0.7 (L) 1.1 - 2.2     CK W/ REFLX CKMB    Collection Time: 09/16/19  4:09 PM   Result Value Ref Range     39 - 308 U/L   TROPONIN I    Collection Time: 09/16/19  4:09 PM   Result Value Ref Range    Troponin-I, Qt. 0.08 (H) <0.05 ng/mL   PTT    Collection Time: 09/16/19  5:58 PM   Result Value Ref Range    aPTT 32.1 (H) 22.1 - 32.0 sec    aPTT, therapeutic range     58.0 - 77.0 SECS   PROTHROMBIN TIME + INR    Collection Time: 09/16/19  5:58 PM   Result Value Ref Range    INR 1.4 (H) 0.9 - 1.1      Prothrombin time 14.5 (H) 9.0 - 11.1 sec

## 2019-09-17 PROBLEM — E87.5 HYPERKALEMIA: Status: ACTIVE | Noted: 2019-09-17

## 2019-09-17 PROBLEM — E16.2 HYPOGLYCEMIA: Status: ACTIVE | Noted: 2019-09-17

## 2019-09-17 LAB
ALBUMIN SERPL-MCNC: 2.9 G/DL (ref 3.5–5)
ALBUMIN/GLOB SERPL: 0.7 {RATIO} (ref 1.1–2.2)
ALP SERPL-CCNC: 140 U/L (ref 45–117)
ALT SERPL-CCNC: 11 U/L (ref 12–78)
ANION GAP SERPL CALC-SCNC: 7 MMOL/L (ref 5–15)
AST SERPL-CCNC: 53 U/L (ref 15–37)
ATRIAL RATE: 70 BPM
BILIRUB SERPL-MCNC: 1.5 MG/DL (ref 0.2–1)
BUN SERPL-MCNC: 26 MG/DL (ref 6–20)
BUN/CREAT SERPL: 6 (ref 12–20)
CALCIUM SERPL-MCNC: 8.2 MG/DL (ref 8.5–10.1)
CALCULATED R AXIS, ECG10: -56 DEGREES
CALCULATED T AXIS, ECG11: 98 DEGREES
CHLORIDE SERPL-SCNC: 98 MMOL/L (ref 97–108)
CO2 SERPL-SCNC: 33 MMOL/L (ref 21–32)
CREAT SERPL-MCNC: 4.09 MG/DL (ref 0.7–1.3)
DIAGNOSIS, 93000: NORMAL
ERYTHROCYTE [DISTWIDTH] IN BLOOD BY AUTOMATED COUNT: 17.3 % (ref 11.5–14.5)
GLOBULIN SER CALC-MCNC: 4.2 G/DL (ref 2–4)
GLUCOSE BLD STRIP.AUTO-MCNC: 137 MG/DL (ref 65–100)
GLUCOSE BLD STRIP.AUTO-MCNC: 142 MG/DL (ref 65–100)
GLUCOSE BLD STRIP.AUTO-MCNC: 150 MG/DL (ref 65–100)
GLUCOSE BLD STRIP.AUTO-MCNC: 207 MG/DL (ref 65–100)
GLUCOSE BLD STRIP.AUTO-MCNC: 46 MG/DL (ref 65–100)
GLUCOSE BLD STRIP.AUTO-MCNC: 58 MG/DL (ref 65–100)
GLUCOSE BLD STRIP.AUTO-MCNC: 94 MG/DL (ref 65–100)
GLUCOSE SERPL-MCNC: 48 MG/DL (ref 65–100)
HCT VFR BLD AUTO: 33.4 % (ref 36.6–50.3)
HGB BLD-MCNC: 10.4 G/DL (ref 12.1–17)
MCH RBC QN AUTO: 30.9 PG (ref 26–34)
MCHC RBC AUTO-ENTMCNC: 31.1 G/DL (ref 30–36.5)
MCV RBC AUTO: 99.1 FL (ref 80–99)
NRBC # BLD: 0.03 K/UL (ref 0–0.01)
NRBC BLD-RTO: 0.6 PER 100 WBC
PLATELET # BLD AUTO: 114 K/UL (ref 150–400)
PMV BLD AUTO: 11.5 FL (ref 8.9–12.9)
POTASSIUM SERPL-SCNC: 3.7 MMOL/L (ref 3.5–5.1)
PROT SERPL-MCNC: 7.1 G/DL (ref 6.4–8.2)
Q-T INTERVAL, ECG07: 462 MS
QRS DURATION, ECG06: 160 MS
QTC CALCULATION (BEZET), ECG08: 480 MS
RBC # BLD AUTO: 3.37 M/UL (ref 4.1–5.7)
SERVICE CMNT-IMP: ABNORMAL
SERVICE CMNT-IMP: NORMAL
SODIUM SERPL-SCNC: 138 MMOL/L (ref 136–145)
VENTRICULAR RATE, ECG03: 65 BPM
WBC # BLD AUTO: 5.4 K/UL (ref 4.1–11.1)

## 2019-09-17 PROCEDURE — 65660000000 HC RM CCU STEPDOWN

## 2019-09-17 PROCEDURE — 99218 HC RM OBSERVATION: CPT

## 2019-09-17 PROCEDURE — 74011250637 HC RX REV CODE- 250/637: Performed by: INTERNAL MEDICINE

## 2019-09-17 PROCEDURE — 94760 N-INVAS EAR/PLS OXIMETRY 1: CPT

## 2019-09-17 PROCEDURE — 97161 PT EVAL LOW COMPLEX 20 MIN: CPT | Performed by: PHYSICAL THERAPIST

## 2019-09-17 PROCEDURE — 97530 THERAPEUTIC ACTIVITIES: CPT | Performed by: PHYSICAL THERAPIST

## 2019-09-17 PROCEDURE — 82962 GLUCOSE BLOOD TEST: CPT

## 2019-09-17 PROCEDURE — 74011000258 HC RX REV CODE- 258: Performed by: INTERNAL MEDICINE

## 2019-09-17 PROCEDURE — 80053 COMPREHEN METABOLIC PANEL: CPT

## 2019-09-17 PROCEDURE — 36415 COLL VENOUS BLD VENIPUNCTURE: CPT

## 2019-09-17 PROCEDURE — 77010033678 HC OXYGEN DAILY

## 2019-09-17 PROCEDURE — 85027 COMPLETE CBC AUTOMATED: CPT

## 2019-09-17 PROCEDURE — 74011250636 HC RX REV CODE- 250/636: Performed by: HOSPITALIST

## 2019-09-17 PROCEDURE — 74011250637 HC RX REV CODE- 250/637: Performed by: HOSPITALIST

## 2019-09-17 PROCEDURE — 74011636637 HC RX REV CODE- 636/637: Performed by: INTERNAL MEDICINE

## 2019-09-17 RX ORDER — INSULIN LISPRO 100 [IU]/ML
INJECTION, SOLUTION INTRAVENOUS; SUBCUTANEOUS
Status: DISCONTINUED | OUTPATIENT
Start: 2019-09-17 | End: 2019-09-19 | Stop reason: HOSPADM

## 2019-09-17 RX ORDER — DEXTROSE 50 % IN WATER (D50W) INTRAVENOUS SYRINGE
12.5-25 AS NEEDED
Status: DISCONTINUED | OUTPATIENT
Start: 2019-09-17 | End: 2019-09-19 | Stop reason: HOSPADM

## 2019-09-17 RX ORDER — DEXTROSE MONOHYDRATE AND SODIUM CHLORIDE 5; .45 G/100ML; G/100ML
50 INJECTION, SOLUTION INTRAVENOUS CONTINUOUS
Status: DISCONTINUED | OUTPATIENT
Start: 2019-09-17 | End: 2019-09-18

## 2019-09-17 RX ORDER — MAGNESIUM SULFATE 100 %
4 CRYSTALS MISCELLANEOUS AS NEEDED
Status: DISCONTINUED | OUTPATIENT
Start: 2019-09-17 | End: 2019-09-19 | Stop reason: HOSPADM

## 2019-09-17 RX ORDER — GUAIFENESIN 100 MG/5ML
81 LIQUID (ML) ORAL DAILY
Status: DISCONTINUED | OUTPATIENT
Start: 2019-09-17 | End: 2019-09-19 | Stop reason: HOSPADM

## 2019-09-17 RX ADMIN — DOCUSATE SODIUM 100 MG: 100 CAPSULE, LIQUID FILLED ORAL at 08:58

## 2019-09-17 RX ADMIN — HEPARIN SODIUM 5000 UNITS: 5000 INJECTION INTRAVENOUS; SUBCUTANEOUS at 21:07

## 2019-09-17 RX ADMIN — ASPIRIN 81 MG 81 MG: 81 TABLET ORAL at 08:58

## 2019-09-17 RX ADMIN — PRAVASTATIN SODIUM 40 MG: 40 TABLET ORAL at 21:08

## 2019-09-17 RX ADMIN — DEXTROSE MONOHYDRATE AND SODIUM CHLORIDE 50 ML/HR: 5; .45 INJECTION, SOLUTION INTRAVENOUS at 05:38

## 2019-09-17 RX ADMIN — TRAZODONE HYDROCHLORIDE 150 MG: 100 TABLET ORAL at 21:07

## 2019-09-17 RX ADMIN — METOPROLOL SUCCINATE 12.5 MG: 25 TABLET, EXTENDED RELEASE ORAL at 08:57

## 2019-09-17 RX ADMIN — FERRIC CITRATE 420 MG: 210 TABLET, COATED ORAL at 08:56

## 2019-09-17 RX ADMIN — MIDODRINE HYDROCHLORIDE 10 MG: 5 TABLET ORAL at 18:43

## 2019-09-17 RX ADMIN — INSULIN LISPRO 2 UNITS: 100 INJECTION, SOLUTION INTRAVENOUS; SUBCUTANEOUS at 18:43

## 2019-09-17 RX ADMIN — FINASTERIDE 5 MG: 5 TABLET, FILM COATED ORAL at 08:57

## 2019-09-17 RX ADMIN — Medication 10 ML: at 21:08

## 2019-09-17 RX ADMIN — PREGABALIN 150 MG: 150 CAPSULE ORAL at 08:57

## 2019-09-17 RX ADMIN — HEPARIN SODIUM 5000 UNITS: 5000 INJECTION INTRAVENOUS; SUBCUTANEOUS at 15:20

## 2019-09-17 RX ADMIN — FLUOXETINE 20 MG: 20 CAPSULE ORAL at 08:57

## 2019-09-17 RX ADMIN — TICAGRELOR 90 MG: 90 TABLET ORAL at 08:58

## 2019-09-17 RX ADMIN — MIDODRINE HYDROCHLORIDE 10 MG: 5 TABLET ORAL at 08:57

## 2019-09-17 RX ADMIN — PANTOPRAZOLE SODIUM 40 MG: 40 TABLET, DELAYED RELEASE ORAL at 08:58

## 2019-09-17 RX ADMIN — FERRIC CITRATE 420 MG: 210 TABLET, COATED ORAL at 18:43

## 2019-09-17 RX ADMIN — PREGABALIN 150 MG: 150 CAPSULE ORAL at 18:43

## 2019-09-17 RX ADMIN — Medication 10 ML: at 05:39

## 2019-09-17 RX ADMIN — MIRTAZAPINE 30 MG: 15 TABLET, FILM COATED ORAL at 21:07

## 2019-09-17 RX ADMIN — MIDODRINE HYDROCHLORIDE 10 MG: 5 TABLET ORAL at 12:11

## 2019-09-17 RX ADMIN — FERRIC CITRATE 420 MG: 210 TABLET, COATED ORAL at 12:11

## 2019-09-17 RX ADMIN — PREGABALIN 150 MG: 150 CAPSULE ORAL at 21:08

## 2019-09-17 NOTE — ED NOTES
Report given to [facility] on Med surg/tele floor. Plan to transport to floor once dialysis finished.

## 2019-09-17 NOTE — PROGRESS NOTES
Bedside and Verbal shift change report given to Mely Lanza RN  (oncoming nurse) by Leida Martines (offgoing nurse). Report given with SBAR, Kardex, Intake/Output, MAR and Recent Results.

## 2019-09-17 NOTE — PROGRESS NOTES
Problem: Mobility Impaired (Adult and Pediatric)  Goal: *Acute Goals and Plan of Care (Insert Text)  Description  FUNCTIONAL STATUS PRIOR TO ADMISSION: The patient was functional at the wheelchair level and was supervision for transfers to the chair prior to last hospital admission    [address] [place] [address]: The patient lived with family who provided supervision as needed. Physical Therapy Goals  Initiated 9/17/2019  1. Patient will move from supine to sit and sit to supine , scoot up and down and roll side to side in bed with modified independence within 7 day(s). 2.  Patient will transfer from bed to chair and chair to bed with modified independence using the least restrictive device within 7 day(s). 3.  Patient will perform 3 sets of 10 repetitions of active strengthening exercises for bilateral upper and R lower extremity(s) with independence within 7 day(s). 9/17/2019 1718 by Abigail Polo PT  Outcome: Not Met   PHYSICAL THERAPY EVALUATION  Patient: Eulalio Harrison Sr. (65 y.o. male)  Date: 9/17/2019  Primary Diagnosis: Chest pain [R07.9]  Chest pain [R07.9]  Hyperkalemia [E87.5]  Hypoglycemia [E16.2]        Precautions:   Fall, Contact(L AKA)      ASSESSMENT  Based on the objective data described below, the patient presents h/o L AKA and demonstrates impaired functional mobility and balance. Patient also demonstrates impaired sensation in R LE. Patient able to transfer from bed to chair today with min/mod A of 2 to clear arm rest of reclining chair. Recommend mobility team for OOB transfers. Also recommend that patient transfer to R side only to decrease risk of falls. Patient was home for one day following discharge from [facility] before returning to hospital secondary to chest pain. Prior to initial admission patient was modified independent for overall mobility at w/c level. He is currently below his functional baseline and would benefit from further therapy.  Recommend HHPT and OT following discharge. Current Level of Function Impacting Discharge (mobility/balance): min/mod A for transfers    Functional Outcome Measure: The patient scored 4/20 on the Elderly mobility scale outcome measure which is indicative of dependence for overall mobility. Patient scheduled for amputation of 2nd toe on R foot on 10/1/19    Patient will benefit from skilled therapy intervention to address the above noted impairments. PLAN :  Recommendations and Planned Interventions: bed mobility training, transfer training, therapeutic exercises, patient and family training/education and therapeutic activities      Frequency/Duration: Patient will be followed by physical therapy:  4 times a week to address goals. Recommendation for discharge: (in order for the patient to meet his/her long term goals)  Physical therapy at least 2 days/week in the home AND ensure assist and/or supervision for safety with transfers to w/c     This discharge recommendation:  Has been made in collaboration with the attending provider and/or case management    Equipment recommendations for successful discharge (if) home: patient owns DME required for discharge         SUBJECTIVE:   Patient stated I've gotten so weak.     OBJECTIVE DATA SUMMARY:   HISTORY:    Past Medical History:   Diagnosis Date    Anemia     Arthritis     back, hips    CAD (coronary artery disease)     Sees Dr. Uzma Tinsley, 4 heart attacks    Chronic kidney disease     Dr. Bon Kurtz, [facility]     Chronic LBP 1990    Slipped on gravel and fell at work; Multiple surgeries;  Sees Dr. Etta Aguilar for pain mgmt    Chronic pain     Confusion     Depression     Diabetes [facility])     ED (erectile dysfunction)     GERD (gastroesophageal reflux disease) 11/2015    Diagnosed at [facility] last month    Hypercholesteremia     Hypertension     Liver disease     Psychiatric disorder     depression    PVD (peripheral vascular disease) ([facility]romboembolus ([facility]     DVT in right leg     Past Surgical History:   Procedure Laterality Date    CARDIAC SURG PROCEDURE UNLIST      PTCA    HX ABOVE KNEE AMPUTATION Left 2013    Left knee stump non-healing ulcer; Dr. Parry Marrow Left     HX APPENDECTOMY      HX BELOW KNEE AMPUTATION      Left leg    HX CHOLECYSTECTOMY      HX GI      HX HIP REPLACEMENT      right hip replaced x 2    HX ORTHOPAEDIC  1990s    4 back surgeries    HX ORTHOPAEDIC      donna ankles pin placed    HX ORTHOPAEDIC      left leg 17 surgeries    HX VASCULAR ACCESS Left     port left arm    UPPER GI ENDOSCOPY,BIOPSY  9/2/2015         UPPER GI ENDOSCOPY,BIOPSY  10/12/2015         VASCULAR SURGERY PROCEDURE UNLIST      Multiple revascularization procedures, toe amputations         Home Situation  Home Environment: Private residence  Wheelchair Ramp: Yes  One/Two Story Residence: Two story  # of Interior Steps: (12-15, but only uses first floor)  Living Alone: No  Support Systems: Child(roger), Family member(s), Spouse/Significant Other/Partner  Patient Expects to be Discharged to[de-identified] Private residence  Current DME Used/Available at Home: Wheelchair, power(transport chair)    EXAMINATION/PRESENTATION/DECISION MAKING:   Critical Behavior:      Patient is alert and oriented x 4        Hearing: Auditory  Auditory Impairment: None    Range Of Motion:  AROM: Generally decreased, functional                       Strength:    Strength: Generally decreased, functional                    Tone & Sensation:   Tone: Normal              Sensation: Impaired(absent sensation in RLE to just distal to knee)               Coordination:  Coordination: Generally decreased, functional       Functional Mobility:  Bed Mobility:  Rolling: Supervision  Supine to Sit: Supervision     Scooting: Supervision  Transfers:  Sit to Stand: Minimum assistance; Moderate assistance;Assist x2  Stand to Sit: Minimum assistance;Assist x2  Stand Pivot Transfers: Minimum assistance;Assist x2(partial stand pivot)                    Balance:   Sitting: Intact  Standing: Impaired  Standing - Static: Poor      Functional Measure:    Elder Mobility Scale    4/20         Scores under 10 - generally these patients are dependent in mobility maneuvers; require help with  basic ADL, such as transfers, toileting and dressing. Scores between 10 - 13 - generally these patients are borderline in terms of safe mobility and  independence in ADL i.e. they require some help with some mobility maneuvers. Scores over 14 - Generally these patients are able to perform mobility maneuvers alone and safely  and are independent in basic ADL. Activity Tolerance:   Fair  Please refer to the flowsheet for vital signs taken during this treatment. After treatment patient left in no apparent distress:   Sitting in chair, Call bell within reach and Caregiver / family present    COMMUNICATION/EDUCATION:   The patients plan of care was discussed with: Registered Nurse,  and Rehabilitation Attendant. Fall prevention education was provided and the patient/caregiver indicated understanding., Patient/family have participated as able in goal setting and plan of care. and Patient/family agree to work toward stated goals and plan of care.     Thank you for this referral.  Dayami Miller, PT   Time Calculation: 34 mins

## 2019-09-17 NOTE — ED NOTES
Dialysis continues, pt resting in stretcher with dialysis RN at bedside. Plan to transfer to floor once complete.

## 2019-09-17 NOTE — PROGRESS NOTES
[facility] Progress Note        NAME: Steve Lopes Sr.       :  1955       MRN:  [id number]     Date/Time: 2019    Risk of deterioration: medium       Assessment:    Plan:  ACute hyperkalemia  ESRD  Non compliance with renal diet  Recent MI/Impella/4 STANLEY  (L)AVF HD MWF  K improved after hd yesterday  HD in am if still here  REviewed cards note-pain from prev impella? Reviewed low k diet with pt/wife/sw from unit to reach out to her personally to review       Subjective:     Chief Complaint:  It's always good to see you    Review of Systems:     Objective:     VITALS:   Last 24hrs VS reviewed since prior progress note.  Most recent are:  Visit Vitals  /63 (BP 1 Location: Right arm, BP Patient Position: Sitting)   Pulse 68   Temp 97.8 °F (36.6 °C)   Resp 17   Ht 5' 9\" (1.753 m)   Wt 70.8 kg (156 lb)   SpO2 93%   BMI 23.04 kg/m²     SpO2 Readings from Last 6 Encounters:   19 93%   19 94%   19 93%   18 98%   18 98%   18 94%    O2 Flow Rate (L/min): 2 l/min       Intake/Output Summary (Last 24 hours) at 2019 1237  Last data filed at 2019 2200  Gross per 24 hour   Intake    Output 2000 ml   Net -2000 ml        Telemetry Reviewed    PHYSICAL EXAM:    General   well developed, well nourished, appears stated age, in no acute distress  EENT  Normocephalic, Atraumatic, PERRLA, EOMI, sclera clear  Respiratory   Clear To Auscultation bilaterally   Cardiology  Regular Rate and Rythmn    Abdominal  Soft, non-tender, non-distended  Extremities  No clubbing, cyanosis, or edema (RLE)  Amputee (LLE)  Neurological  No focal neurological deficits noted              Lab Data Reviewed: (see below)    Medications Reviewed: (see below)    _____    Attending Physician: Dhara Huggins MD     ____________________________________________________  MEDICATIONS:  Current Facility-Administered Medications   Medication Dose Route Frequency    dextrose 5 % - 0.45% NaCl infusion  50 mL/hr IntraVENous CONTINUOUS    insulin lispro (HUMALOG) injection   SubCUTAneous AC&HS    glucose chewable tablet 16 g  4 Tab Oral PRN    dextrose (D50W) injection syrg 12.5-25 g  12.5-25 g IntraVENous PRN    glucagon (GLUCAGEN) injection 1 mg  1 mg IntraMUSCular PRN    aspirin chewable tablet 81 mg  81 mg Oral DAILY    influenza vaccine 2019-20 (6 mos+)(PF) (FLUARIX/FLULAVAL/FLUZONE QUAD) injection 0.5 mL  0.5 mL IntraMUSCular PRIOR TO DISCHARGE    docusate sodium (COLACE) capsule 100 mg  100 mg Oral DAILY    ferric citrate (AURYXIA) tablet 420 mg  420 mg Oral TIDAC    finasteride (PROSCAR) tablet 5 mg  5 mg Oral DAILY    FLUoxetine (PROzac) capsule 20 mg  20 mg Oral DAILY    metoprolol succinate (TOPROL-XL) XL tablet 12.5 mg  12.5 mg Oral DAILY    midodrine (PROAMITINE) tablet 10 mg  10 mg Oral TID WITH MEALS    mirtazapine (REMERON) tablet 30 mg  30 mg Oral QHS    nitroglycerin (NITROSTAT) tablet 0.4 mg  0.4 mg SubLINGual Q5MIN PRN    pantoprazole (PROTONIX) tablet 40 mg  40 mg Oral ACB    pravastatin (PRAVACHOL) tablet 40 mg  40 mg Oral QHS    pregabalin (LYRICA) capsule 150 mg  150 mg Oral TID    traZODone (DESYREL) tablet 150 mg  150 mg Oral QHS    sodium chloride (NS) flush 5-40 mL  5-40 mL IntraVENous Q8H    sodium chloride (NS) flush 5-40 mL  5-40 mL IntraVENous PRN    heparin (porcine) injection 5,000 Units  5,000 Units SubCUTAneous Q8H        LABS:  Recent Labs     09/17/19  0301 09/16/19  1609   WBC 5.4 9.2   HGB 10.4* 10.7*   HCT 33.4* 35.0*   * 123*     Recent Labs     09/17/19  0301 09/16/19  1609    134*   K 3.7 5.8*   CL 98 99   CO2 33* 25   BUN 26* 62*   CREA 4.09* 7.31*   GLU 48* 62*   CA 8.2* 8.8     Recent Labs     09/17/19  0301 09/16/19  1609   SGOT 53* 55*   ALT 11* 11*   * 147*   TBILI 1.5* 1.4*   TP 7.1 7.5   ALB 2.9* 3.1*   GLOB 4.2* 4.4*     Recent Labs     09/16/19  1758   INR 1.4*   PTP 14.5*   APTT 32.1*      No results for input(s): FE, TIBC, PSAT, FERR in the last 72 hours. No results for input(s): PH, PCO2, PO2 in the last 72 hours.   Recent Labs     09/16/19  1609   TROIQ 0.08*     Lab Results   Component Value Date/Time    Glucose (POC) 137 (H) 09/17/2019 11:31 AM    Glucose (POC) 142 (H) 09/17/2019 08:18 AM    Glucose (POC) 94 09/17/2019 04:50 AM    Glucose (POC) 58 (L) 09/17/2019 04:31 AM    Glucose (POC) 46 (LL) 09/17/2019 04:13 AM

## 2019-09-17 NOTE — PROGRESS NOTES
Hospitalist Progress Note    NAME: Jerelene Ormond .   :  1955   MRN:  [id number]       Assessment / Plan:  Chest pain likely from previous Impella  -per cards most likely from previous impella site  -cards recommends to cont med mgmt as below    Severe CAD, complicated high risk cath  with assistance of CTS  -Cath  with Impella, s/p PCI of OM1, mLAD, and LM/LAD/LCx bifurcation with Culotte technique with total of 4 STANLEY  -per notes Impella removed in cath lab.  Some bleeding afterwards.  Got PRBCs and PLT. Clot noted on distal Impella upon removal (seen on WADE).  No signs of neuro deficits.  -Echo w/ EF 40-45%  -cont ASA/brilinta/statin        DM type II with nephropathy  Hypoglycemia new  -A1c is 6.6  -cont SSI  -start dextrose drip due to low blood sugars and turn off in am if BS is improved  -check BS q4 hourly       ESRD   Hyperkalemia  -HD MWF, nephrology following  -K is now normal       PAD  Multiple small wounds    -Patient is scheduled to have a right second toe amputation on 10/1/2019  -Continue aspirin and Brilinta  -cont Wound Care as previous admission recommendation: RLE and foot scabs: cleanse with soap and water and apply Mupirocin ointment to scabs and leave JAYSON.     L AKA   Depression  -home trazodone     H/o R renal artery stenosis, s/p stent 2018     Chronic mild Thrombocytopenia, monitor      Code Status: DNR  Surrogate Decision Maker: wife Naila Batista     DVT Prophylaxis: heparin  GI Prophylaxis: not indicated     Baseline: wheel chair bound, lives with wife      Body mass index is 23.04 kg/m². Subjective:     Chief Complaint / Reason for Physician Visit  Chest pain is improved. Sob at base line. No cough. No fever.       Review of Systems:  Symptom Y/N Comments  Symptom Y/N Comments   Fever/Chills    Chest Pain     Poor Appetite    Edema     Cough    Abdominal Pain     Sputum    Joint Pain     SOB/COREA    Pruritis/Rash     Nausea/vomit    Tolerating PT/OT Diarrhea    Tolerating Diet     Constipation    Other       Could NOT obtain due to:      Objective:     VITALS:   Last 24hrs VS reviewed since prior progress note. Most recent are:  Patient Vitals for the past 24 hrs:   Temp Pulse Resp BP SpO2   09/17/19 1127 97.8 °F (36.6 °C) 68 17 158/63 93 %   09/17/19 0800 97.5 °F (36.4 °C) 63 17 115/55 96 %   09/17/19 0246 98 °F (36.7 °C) 62 16 114/56 97 %   09/16/19 2318 97.6 °F (36.4 °C) 74 17 162/61 94 %   09/16/19 2250 97.6 °F (36.4 °C) 74 16 162/61 94 %   09/16/19 2215  62 13 132/46 97 %   09/16/19 2200  64 16 148/47 97 %   09/16/19 2145  66 16 136/50 96 %   09/16/19 2130  66 15 127/44 96 %   09/16/19 2115  67 15 139/48 98 %   09/16/19 2100  69 17 146/54 99 %   09/16/19 2045  64 13 128/49 98 %   09/16/19 2030  68 17 125/52 100 %   09/16/19 2015  69 15 137/55 100 %   09/16/19 2000  69 18 152/75 93 %   09/16/19 1945  65 18 133/61 100 %   09/16/19 1930  67 19 (!) 142/39 99 %   09/16/19 1915  62 16  100 %   09/16/19 1900 98 °F (36.7 °C) 63 16 116/63 100 %   09/16/19 1845  62 17 128/51 97 %   09/16/19 1842  64 14  100 %   09/16/19 1800  62 11 113/58 98 %   09/16/19 1752  62 15 105/70 98 %   09/16/19 1730  62 17 115/57    09/16/19 1715  60 12 111/58 98 %   09/16/19 1700  (!) 59 11 115/53 97 %   09/16/19 1643  63 17 125/57 98 %       Intake/Output Summary (Last 24 hours) at 9/17/2019 1627  Last data filed at 9/16/2019 2200  Gross per 24 hour   Intake    Output 2000 ml   Net -2000 ml        PHYSICAL EXAM:  General: cooperative, no acute distress    EENT:  EOMI. Anicteric sclerae. MMM  Resp:  CTA bilaterally, no wheezing or rales. No accessory muscle use  CV:  Regular  rhythm,  No edema, chest staples +   GI:  Soft, Non distended, Non tender.  +Bowel sounds  Neurologic:  Alert and oriented X 3, normal speech,   Psych:   Not anxious nor agitated  Skin:  No rashes.   No jaundice    Reviewed most current lab test results and cultures  YES  Reviewed most current radiology test results   YES  Review and summation of old records today    NO  Reviewed patient's current orders and MAR    YES  PMH/SH reviewed - no change compared to H&P          Current Facility-Administered Medications:     dextrose 5 % - 0.45% NaCl infusion, 50 mL/hr, IntraVENous, CONTINUOUS, Cara Lin MD, Last Rate: 50 mL/hr at 09/17/19 0538, 50 mL/hr at 09/17/19 0538    insulin lispro (HUMALOG) injection, , SubCUTAneous, AC&HS, Cara Lin MD, Stopped at 09/17/19 0730    glucose chewable tablet 16 g, 4 Tab, Oral, PRN, Cara Lin MD    dextrose (D50W) injection syrg 12.5-25 g, 12.5-25 g, IntraVENous, PRN, Cara Lin MD    glucagon [facility]) injection 1 mg, 1 mg, IntraMUSCular, PRN, Cara Lin MD    aspirin chewable tablet 81 mg, 81 mg, Oral, DAILY, Ricardo Herrera III, DO, 81 mg at 09/17/19 0858    influenza vaccine 2019-20 (6 mos+)(PF) (FLUARIX/FLULAVAL/FLUZONE QUAD) injection 0.5 mL, 0.5 mL, IntraMUSCular, PRIOR TO DISCHARGE, Adair Carter Cera, MD    docusate sodium (COLACE) capsule 100 mg, 100 mg, Oral, DAILY, Jesu Jack MD, 100 mg at 09/17/19 0858    ferric citrate (AURYXIA) tablet 420 mg, 420 mg, Oral, TIDAC, Jesu Jack MD, 420 mg at 09/17/19 1211    finasteride (PROSCAR) tablet 5 mg, 5 mg, Oral, DAILY, Jesu Jack MD, 5 mg at 09/17/19 0857    FLUoxetine (PROzac) capsule 20 mg, 20 mg, Oral, DAILY, Jesu Jack MD, 20 mg at 09/17/19 0857    metoprolol succinate (TOPROL-XL) XL tablet 12.5 mg, 12.5 mg, Oral, DAILY, Jesu Jack MD, 12.5 mg at 09/17/19 0857    midodrine (PROAMITINE) tablet 10 mg, 10 mg, Oral, TID WITH MEALS, Jesu Jack MD, 10 mg at 09/17/19 1211    mirtazapine (REMERON) tablet 30 mg, 30 mg, Oral, QHS, Jesu Jack MD, 30 mg at 09/16/19 2331    nitroglycerin (NITROSTAT) tablet 0.4 mg, 0.4 mg, SubLINGual, Q5MIN PRN, Jesu Jack MD    pantoprazole (PROTONIX) tablet 40 mg, 40 mg, Oral, ACB, Jesu Jack MD, 40 mg at 09/17/19 5010    pravastatin (PRAVACHOL) tablet 40 mg, 40 mg, Oral, QHS, Harvinder Martini MD, 40 mg at 09/16/19 2331    pregabalin (LYRICA) capsule 150 mg, 150 mg, Oral, TID, Harvinder Martini MD, 150 mg at 09/17/19 0857    traZODone (DESYREL) tablet 150 mg, 150 mg, Oral, QHS, Harvinder Martini MD, 150 mg at 09/16/19 2331    sodium chloride (NS) flush 5-40 mL, 5-40 mL, IntraVENous, Q8H, Harvinder Martini MD, 10 mL at 09/17/19 0539    sodium chloride (NS) flush 5-40 mL, 5-40 mL, IntraVENous, PRN, Harvinder Martini MD    heparin (porcine) injection 5,000 Units, 5,000 Units, SubCUTAneous, Q8H, Harvinder Martini MD, 5,000 Units at 09/17/19 1520  ________________________________________________________________________  Care Plan discussed with:    Comments   Patient y    Family      RN y    Care Manager     Consultant                        Multidiciplinary team rounds were held today with , nursing, pharmacist and clinical coordinator. Patient's plan of care was discussed; medications were reviewed and discharge planning was addressed. ________________________________________________________________________  Total NON critical care TIME:  35   Minutes    Total CRITICAL CARE TIME Spent:   Minutes non procedure based      Comments   >50% of visit spent in counseling and coordination of care     ________________________________________________________________________  Ebony Zelaya MD     Procedures: see electronic medical records for all procedures/Xrays and details which were not copied into this note but were reviewed prior to creation of Plan. LABS:  I reviewed today's most current labs and imaging studies.   Pertinent labs include:  Recent Labs     09/17/19  0301 09/16/19  1609   WBC 5.4 9.2   HGB 10.4* 10.7*   HCT 33.4* 35.0*   * 123*     Recent Labs     09/17/19  0301 09/16/19  1758 09/16/19  1609     --  134*   K 3.7  --  5.8*   CL 98  --  99   CO2 33*  --  25   GLU 48*  --  62*   BUN 26*  --  62* CREA 4.09*  --  7.31*   CA 8.2*  --  8.8   ALB 2.9*  --  3.1*   TBILI 1.5*  --  1.4*   SGOT 53*  --  55*   ALT 11*  --  11*   INR  --  1.4*  --        Signed: Katja Medellin MD

## 2019-09-17 NOTE — PROGRESS NOTES
2318:    Skin:   Scattered abrasions on R leg, amputation left leg, toe on R foot to be amputated on 10/1, stitches on R upper chest, bruising on R upper chest and on R leg. *Per report by ED nurse prior, patient is on observation status to watch bruising on R upper chest.    0414: Checked patients blood sugar after lab called stating it was a critical low. Patients blood sugar was 46. Patient given apple juice due to his kidneys. Will recheck blood sugar at 0429 and notify on call MD.  Patient does not have diabetic orders, but type 2 diabetes is in his hx on the kardex.     3241:

## 2019-09-17 NOTE — ACP (ADVANCE CARE PLANNING)
Advance Care Planning Note      NAME: Mundo Dohertyn .   :  1955   MRN:  [id number]     Date/Time:  2019 1:05 AM    Active Diagnoses:  Hospital Problems  Date Reviewed: 2019          Codes Class Noted POA    Chest pain ICD-10-CM: R07.9  ICD-9-CM: 786.50  2019 Unknown              These active diagnoses are of sufficient risk that focused discussion on advance care planning is indicated in order to allow the patient to thoughtfully consider personal goals of care, and if situations arise that prevent the ability to personally give input, to ensure appropriate representation of their personal desires for different levels and aggressiveness of care. Discussion:   Code status addressed and wants to be a DNR / DNI. Patient wants central line and vasopressors if needed. Patient does not want a feeding tube, if needed, for nutritional support. Patient  would like to assign his wife Itzel Melvin as the surrogate decision maker. Persons present and participating in discussion: Ember Almeida., Jessika Lester MD      Time Spent:   Total time spent face-to-face in education and discussion:   16 minutes.          Jessika Lester MD   Hospitalist

## 2019-09-17 NOTE — PROCEDURES
[facility]  [phone]    Vitals   Pre   Post   Assessment   Pre   Post     Temp  Temp: 98 °F (36.7 °C) (09/16/19 1900)  98 LOC  A&O x 4 No change   HR   63 64 Lungs   clear  no change   B/P   116/63 148/47 Cardiac   NSR  no change   Resp   16 16 Skin   Multiple wounds and lesions  no change   Pain level  Pain Intensity 1: 0 (09/16/19 1702) 0 Edema    none   No change   Orders:    Duration:   Start:   1900 End:   2200 Total:   3 hours   Dialyzer:   Dialyzer/Set Up Inspection: Everton Morrison (09/16/19 1900)   K Bath:   Dialysate K (mEq/L): 2 (09/16/19 1900)   Ca Bath:   Dialysate CA (mEq/L): 2.5 (09/16/19 1900)   Na/Bicarb:   Dialysate NA (mEq/L): 140 (09/16/19 1900)   Target Fluid Removal:   Goal/Amount of Fluid to Remove (mL): 2000 mL (09/16/19 1900)   Access     Type & Location:   Left upper arm AVF was strong bruit and thrill, no s/s of infection,cannulated x 2 with 15 gauge needles without any issues   Labs     Obtained/Reviewed   Critical Results Called   Date when labs were drawn-  Hgb-    HGB   Date Value Ref Range Status   09/16/2019 10.7 (L) 12.1 - 17.0 g/dL Final     K-    Potassium   Date Value Ref Range Status   09/16/2019 5.8 (H) 3.5 - 5.1 mmol/L Final     Ca-   Calcium   Date Value Ref Range Status   09/16/2019 8.8 8.5 - 10.1 MG/DL Final     Bun-   BUN   Date Value Ref Range Status   09/16/2019 62 (H) 6 - 20 MG/DL Final     Creat-   Creatinine   Date Value Ref Range Status   09/16/2019 7.31 (H) 0.70 - 1.30 MG/DL Final     Comment:     INVESTIGATED PER DELTA CHECK PROTOCOL        Medications/ Blood Products Given     Name   Dose   Route and Time           none          Blood Volume Processed (BVP):   61.3 Net Fluid   Removed:  2000 ml   Comments   Time Out Done: yes  Primary Nurse Rpt Pre: Alanna Chang RN  Primary Nurse Rpt Post: Alanna Chang RN  Pt Education: Procedure  Care Plan: Continue current plan of care  Tx Summary: 1900:  Hd was started using left AVF without difficulty  2200:Pt completed treatment without any issues  Admiting Diagnosis: Chest pain  Pt's previous clinic- [facility]  Consent signed - Informed Consent Verified: Yes (09/16/19 1900)  Augusto Consent - yes  Hepatitis Status- Negative / Immune 1/2/2019  Machine #- Machine Number: [id number] (09/16/19 1900)  Telemetry status- Monitored at bedside  Pre-dialysis wt. -  n/a

## 2019-09-17 NOTE — CONSULTS
Consult    NAME: Enrique Hdz Sr.   :  1955   MRN:  [id number]     Date/Time:  2019 7:16 AM    Patient PCP: Ladonna Snyder, DO  ________________________________________________________________________     Assessment:     1. [place]; cath  w/ RCA , 60% oLAD w/ neg FFR (0.88), patent LCx stent w/ mild ISR. Recurrent [place]  w/ Impella-supported PCI of LAD, OM1, and LM/LAD/LCx bifurcation w/ Culotte technique. 2. Indeterminate troponin elevation  3. Right renal artery stenosis s/p 7.0 x 19mm BMS 18  4. Occluded LCIA. 5. ESRD on dialysis. Dr Crump   6. Long term hx of Coronary artery disease s/p multivessel stenting s/p PCI of LCx, PTCA of RCA , PTCA LAD 3/94.  Lexiscan  w/ EF 62%, distal ineroapical infarct w/o ischemia  7. Cath 2018  No intervention.   Lv EF  -  55%   8. Peripheral vascular disease s/p left iliac stenting, left SFA stenting 3/00, right iliac stenting .  9. Status post L AKA  10. Bilateral carotid stenosis; 16-49%   11. Hypertension  12. Diabetes  13. Chronic Tobacco abuse 1-2 PPD .   14. Chronic pain  15. Chronic anemia  16. Hyperlipidemia  17. Noncompliance  18. Peptic ulcer disease w/ GIB '15  19. Falls  20. Lives w/ sister in [facility] now  24. Usual Cardiologist:  Dr. Beth Carmona / Cherelle Paulino. Plan:   Lytes have normalized  I think his \"chest pain\" is from the surgical impella site and not angina. 1. Continue ASA and ticagrelor; I ordered this AM as not originally ordered  2. Continue midodrine 10mg TID as needed  3. Continue Toprol XL 12.5mg daily  4. Continue statin  5. Remainder of care per primary  6. Home after dialysis? Can see me in two weeks. Thank you for this consult and allowing me to take part in this patients care. Please call with questions. [x]        High complexity decision making was performed        Subjective:   CHIEF COMPLAINT:     HISTORY OF PRESENT ILLNESS:     Cindy Ramsey is a 71 y.o.    male who was \"recently admitted for discharge on September 14 when presented with unstable angina, had a cardiac cath on September 5 with severe disease, considered poor CABG candidate and was discharged home on aspirin, ticagrelor, Midrin, metoprolol and statin. Pt says since discharge he has been feeing weak and continued to feel CP off and on at rest. At OSH his potassium was 7, was given hyperkalemia cocktail and sent to our ER for eval. Troponin was 0.07 there and 0.08 at out facility. Cardiology called, recommended cycle troponin and nephrology called for dialysis. \"      We were asked to consult for work up and evaluation of the above problems. Past Medical History:   Diagnosis Date    Anemia     Arthritis     back, hips    CAD (coronary artery disease)     Sees Dr. Jesus Boyd, 4 heart attacks    Chronic kidney disease     Dr. Gregory Moffett, [facility]-     Chronic LBP 1990    Slipped on gravel and fell at work; Multiple surgeries;  Sees Dr. Alexandra Carter for pain mgmt    Chronic pain     Confusion     Depression     Diabetes ([facility]     ED (erectile dysfunction)     GERD (gastroesophageal reflux disease) 11/2015    Diagnosed at [id number] [facility] last month    Hypercholesteremia     Hypertension     Liver disease     Psychiatric disorder     depression    PVD (peripheral vascular disease) ([facility]     Thromboembolus ([facility]     DVT in right leg      Past Surgical History:   Procedure Laterality Date    CARDIAC SURG PROCEDURE UNLIST      PTCA    HX ABOVE KNEE AMPUTATION Left 2013    Left knee stump non-healing ulcer; Dr. Arelis Talbert Left     HX APPENDECTOMY      HX Estephanie Keshav      Left leg    HX CHOLECYSTECTOMY      HX GI      HX HIP REPLACEMENT      right hip replaced x 2    HX ORTHOPAEDIC  1990s    4 back surgeries    HX ORTHOPAEDIC      donna ankles pin placed    HX ORTHOPAEDIC      left leg 17 surgeries    HX VASCULAR ACCESS Left     port left arm    UPPER GI ENDOSCOPY,BIOPSY  9/2/2015         UPPER GI ENDOSCOPY,BIOPSY  10/12/2015         VASCULAR SURGERY PROCEDURE UNLIST      Multiple revascularization procedures, toe amputations     Allergies   Allergen Reactions    Nubain [Nalbuphine] Other (comments)     Made me wild; Dysphoria      Meds:  See below  Social History     Tobacco Use    Smoking status: Current Every Day Smoker     Packs/day: 1.00     Years: 35.00     Pack years: 35.00    Smokeless tobacco: Never Used    Tobacco comment: 30 pack years; Occasional cigar   Substance Use Topics    Alcohol use: No     Comment: Former heavy drinker quit drinking about 17 years ago      Family History   Problem Relation Age of Onset    Alcohol abuse Father     Diabetes Sister     Diabetes Sister     Lung Disease Mother     Cancer Maternal Grandfather         Head and neck    Cancer Paternal Grandmother         Stomach       REVIEW OF SYSTEMS:     []         Unable to obtain  ROS due to ---   [x]         Total of 12 systems reviewed as follows:    Constitutional: negative fever, negative chills, negative weight loss  Eyes:   negative visual changes  ENT:   negative sore throat, tongue or lip swelling  Respiratory:  negative cough, negative dyspnea  Cards:  negative for chest pain, palpitations, lower extremity edema  GI:   negative for nausea, vomiting, diarrhea, and abdominal pain  Genitourinary: negative for frequency, dysuria  Integument:  negative for rash   Hematologic:  negative for easy bruising and gum/nose bleeding  Musculoskel: negative for myalgias,  back pain  Neurological:  negative for headaches, dizziness, vertigo, weakness  Behavl/Psych: negative for feelings of anxiety, depression     Pertinent Positives include :    Objective:      Physical Exam:    Last 24hrs VS reviewed since prior progress note.  Most recent are:    Visit Vitals  /56   Pulse 62   Temp 98 °F (36.7 °C)   Resp 16   Ht 5' 9\" (1.753 m)   Wt 70.8 kg (156 lb)   SpO2 97%   BMI 23.04 kg/m²       Intake/Output Summary (Last 24 hours) at 9/17/2019 0716  Last data filed at 9/16/2019 2200  Gross per 24 hour   Intake    Output 2000 ml   Net -2000 ml        General Appearance: Well developed, well nourished, alert & oriented x 3,    no acute distress. Ears/Nose/Mouth/Throat: Pupils equal and round, Hearing grossly normal.  Neck: Supple. JVP within normal limits. Carotids good upstrokes, with no bruit. Chest: Lungs clear to auscultation bilaterally. Cardiovascular: Regular rate and rhythm, S1S2 normal, no murmur, rubs, gallops. Abdomen: Soft, non-tender, bowel sounds are active. No organomegaly. Extremities: LAKA  Skin: Warm and dry. Neuro: CN II-XII grossly intact, Strength and sensation grossly intact. []         Post-cath site without hematoma, bruit, tenderness, or thrill. Distal pulses intact. Data:      Telemetry:  SR    EKG:  [x]  No new EKG for review. Prior to Admission medications    Medication Sig Start Date End Date Taking? Authorizing Provider   ticagrelor (BRILINTA) 90 mg tablet Take 1 Tab by mouth every twelve (12) hours every twelve (12) hours. 9/14/19  Yes Clydene Closs, MD   midodrine (PROAMITINE) 10 mg tablet Take 1 Tab by mouth three (3) times daily (with meals) for 30 days. 9/14/19 10/14/19 Yes Clydene Closs, MD   metoprolol succinate (TOPROL-XL) 25 mg XL tablet Take 0.5 Tabs by mouth daily. 9/15/19  Yes Clydene Closs, MD   pregabalin (LYRICA) 150 mg capsule Take 150 mg by mouth three (3) times daily. Yes Provider, Historical   ferric citrate (AURYXIA) 210 mg iron tablet Take 420 mg by mouth Before breakfast, lunch, and dinner. Yes Provider, Historical   docusate sodium (COLACE) 100 mg capsule Take 100 mg by mouth daily. Yes Provider, Historical   pravastatin (PRAVACHOL) 80 mg tablet TAKE 1 TABLET BY MOUTH EVERY DAY AT NIGHT 5/25/19  Yes Denis MARKS MD   FLUoxetine (PROZAC) 20 mg capsule Take 20 mg by mouth daily.    Yes Provider, Historical traZODone (DESYREL) 150 mg tablet TAKE 1 TABLET BY MOUTH EVERY DAY AT NIGHT 12/1/18  Yes Emma Leach MD   mirtazapine (REMERON) 30 mg tablet TAKE 1 TABLET BY MOUTH EVERY DAY AT NIGHT 12/1/18  Yes Emma Leach MD   omeprazole (PRILOSEC) 40 mg capsule TAKE 1 CAPSULE BY MOUTH TWICE A DAY 12/1/18  Yes Emma Leach MD   finasteride (PROSCAR) 5 mg tablet TAKE 1 TABLET BY MOUTH EVERY DAY 12/1/18  Yes Emma Leach MD   aspirin 81 mg chewable tablet Take 1 Tab by mouth daily. Indications: myocardial infarction prevention 6/1/18  Yes Jodi Ndiaye MD   nitroglycerin (NITROSTAT) 0.4 mg SL tablet 1 Tab by SubLINGual route every five (5) minutes as needed for Chest Pain.  6/18/15  Yes Merle Viramontes MD       Recent Results (from the past 24 hour(s))   EKG, 12 LEAD, INITIAL    Collection Time: 09/16/19  3:54 PM   Result Value Ref Range    Ventricular Rate 65 BPM    Atrial Rate 70 BPM    QRS Duration 160 ms    Q-T Interval 462 ms    QTC Calculation (Bezet) 480 ms    Calculated R Axis -56 degrees    Calculated T Axis 98 degrees    Diagnosis       Wide QRS rhythm with premature ventricular complexes or fusion complexes  Left axis deviation  Right bundle branch block  Minimal voltage criteria for LVH, may be normal variant  Inferior infarct , age undetermined  Anteroseptal infarct , age undetermined  When compared with ECG of 04-SEP-2019 14:12,  Wide QRS rhythm has replaced Sinus rhythm     CBC WITH AUTOMATED DIFF    Collection Time: 09/16/19  4:09 PM   Result Value Ref Range    WBC 9.2 4.1 - 11.1 K/uL    RBC 3.49 (L) 4.10 - 5.70 M/uL    HGB 10.7 (L) 12.1 - 17.0 g/dL    HCT 35.0 (L) 36.6 - 50.3 %    .3 (H) 80.0 - 99.0 FL    MCH 30.7 26.0 - 34.0 PG    MCHC 30.6 30.0 - 36.5 g/dL    RDW 17.2 (H) 11.5 - 14.5 %    PLATELET 336 (L) 777 - 400 K/uL    MPV 11.4 8.9 - 12.9 FL    NRBC 0.2 (H) 0  WBC    ABSOLUTE NRBC 0.02 (H) 0.00 - 0.01 K/uL    NEUTROPHILS 81 (H) 32 - 75 %    LYMPHOCYTES 9 (L) 12 - 49 %    MONOCYTES 9 5 - 13 %    EOSINOPHILS 1 0 - 7 %    BASOPHILS 0 0 - 1 %    IMMATURE GRANULOCYTES 0 0.0 - 0.5 %    ABS. NEUTROPHILS 7.5 1.8 - 8.0 K/UL    ABS. LYMPHOCYTES 0.8 0.8 - 3.5 K/UL    ABS. MONOCYTES 0.8 0.0 - 1.0 K/UL    ABS. EOSINOPHILS 0.1 0.0 - 0.4 K/UL    ABS. BASOPHILS 0.0 0.0 - 0.1 K/UL    ABS. IMM. GRANS. 0.0 0.00 - 0.04 K/UL    DF MANUAL      RBC COMMENTS ANISOCYTOSIS  1+       METABOLIC PANEL, COMPREHENSIVE    Collection Time: 09/16/19  4:09 PM   Result Value Ref Range    Sodium 134 (L) 136 - 145 mmol/L    Potassium 5.8 (H) 3.5 - 5.1 mmol/L    Chloride 99 97 - 108 mmol/L    CO2 25 21 - 32 mmol/L    Anion gap 10 5 - 15 mmol/L    Glucose 62 (L) 65 - 100 mg/dL    BUN 62 (H) 6 - 20 MG/DL    Creatinine 7.31 (H) 0.70 - 1.30 MG/DL    BUN/Creatinine ratio 8 (L) 12 - 20      GFR est AA 9 (L) >60 ml/min/1.73m2    GFR est non-AA 8 (L) >60 ml/min/1.73m2    Calcium 8.8 8.5 - 10.1 MG/DL    Bilirubin, total 1.4 (H) 0.2 - 1.0 MG/DL    ALT (SGPT) 11 (L) 12 - 78 U/L    AST (SGOT) 55 (H) 15 - 37 U/L    Alk.  phosphatase 147 (H) 45 - 117 U/L    Protein, total 7.5 6.4 - 8.2 g/dL    Albumin 3.1 (L) 3.5 - 5.0 g/dL    Globulin 4.4 (H) 2.0 - 4.0 g/dL    A-G Ratio 0.7 (L) 1.1 - 2.2     CK W/ REFLX CKMB    Collection Time: 09/16/19  4:09 PM   Result Value Ref Range     39 - 308 U/L   TROPONIN I    Collection Time: 09/16/19  4:09 PM   Result Value Ref Range    Troponin-I, Qt. 0.08 (H) <0.05 ng/mL   PTT    Collection Time: 09/16/19  5:58 PM   Result Value Ref Range    aPTT 32.1 (H) 22.1 - 32.0 sec    aPTT, therapeutic range     58.0 - 77.0 SECS   PROTHROMBIN TIME + INR    Collection Time: 09/16/19  5:58 PM   Result Value Ref Range    INR 1.4 (H) 0.9 - 1.1      Prothrombin time 14.5 (H) 9.0 - 11.1 sec   CBC W/O DIFF    Collection Time: 09/17/19  3:01 AM   Result Value Ref Range    WBC 5.4 4.1 - 11.1 K/uL    RBC 3.37 (L) 4.10 - 5.70 M/uL    HGB 10.4 (L) 12.1 - 17.0 g/dL    HCT 33.4 (L) 36.6 - 50.3 %    MCV 99.1 (H) 80.0 - 99.0 FL    MCH 30.9 26.0 - 34.0 PG    MCHC 31.1 30.0 - 36.5 g/dL    RDW 17.3 (H) 11.5 - 14.5 %    PLATELET 610 (L) 614 - 400 K/uL    MPV 11.5 8.9 - 12.9 FL    NRBC 0.6 (H) 0  WBC    ABSOLUTE NRBC 0.03 (H) 0.00 - 1.41 K/uL   METABOLIC PANEL, COMPREHENSIVE    Collection Time: 09/17/19  3:01 AM   Result Value Ref Range    Sodium 138 136 - 145 mmol/L    Potassium 3.7 3.5 - 5.1 mmol/L    Chloride 98 97 - 108 mmol/L    CO2 33 (H) 21 - 32 mmol/L    Anion gap 7 5 - 15 mmol/L    Glucose 48 (LL) 65 - 100 mg/dL    BUN 26 (H) 6 - 20 MG/DL    Creatinine 4.09 (H) 0.70 - 1.30 MG/DL    BUN/Creatinine ratio 6 (L) 12 - 20      GFR est AA 18 (L) >60 ml/min/1.73m2    GFR est non-AA 15 (L) >60 ml/min/1.73m2    Calcium 8.2 (L) 8.5 - 10.1 MG/DL    Bilirubin, total 1.5 (H) 0.2 - 1.0 MG/DL    ALT (SGPT) 11 (L) 12 - 78 U/L    AST (SGOT) 53 (H) 15 - 37 U/L    Alk.  phosphatase 140 (H) 45 - 117 U/L    Protein, total 7.1 6.4 - 8.2 g/dL    Albumin 2.9 (L) 3.5 - 5.0 g/dL    Globulin 4.2 (H) 2.0 - 4.0 g/dL    A-G Ratio 0.7 (L) 1.1 - 2.2     GLUCOSE, POC    Collection Time: 09/17/19  4:13 AM   Result Value Ref Range    Glucose (POC) 46 (LL) 65 - 100 mg/dL    Performed by Pao Musnon    GLUCOSE, POC    Collection Time: 09/17/19  4:31 AM   Result Value Ref Range    Glucose (POC) 58 (L) 65 - 100 mg/dL    Performed by [address] [place] [address], POC    Collection Time: 09/17/19  4:50 AM   Result Value Ref Range    Glucose (POC) 94 65 - 100 mg/dL    Performed by Beto Herrera III, DO

## 2019-09-18 LAB
ANION GAP SERPL CALC-SCNC: 8 MMOL/L (ref 5–15)
BUN SERPL-MCNC: 39 MG/DL (ref 6–20)
BUN/CREAT SERPL: 7 (ref 12–20)
CALCIUM SERPL-MCNC: 7.9 MG/DL (ref 8.5–10.1)
CHLORIDE SERPL-SCNC: 97 MMOL/L (ref 97–108)
CO2 SERPL-SCNC: 27 MMOL/L (ref 21–32)
CREAT SERPL-MCNC: 5.88 MG/DL (ref 0.7–1.3)
ERYTHROCYTE [DISTWIDTH] IN BLOOD BY AUTOMATED COUNT: 18.2 % (ref 11.5–14.5)
GLUCOSE BLD STRIP.AUTO-MCNC: 119 MG/DL (ref 65–100)
GLUCOSE BLD STRIP.AUTO-MCNC: 163 MG/DL (ref 65–100)
GLUCOSE BLD STRIP.AUTO-MCNC: 164 MG/DL (ref 65–100)
GLUCOSE BLD STRIP.AUTO-MCNC: 165 MG/DL (ref 65–100)
GLUCOSE BLD STRIP.AUTO-MCNC: 172 MG/DL (ref 65–100)
GLUCOSE SERPL-MCNC: 155 MG/DL (ref 65–100)
HCT VFR BLD AUTO: 33.2 % (ref 36.6–50.3)
HGB BLD-MCNC: 10.3 G/DL (ref 12.1–17)
MCH RBC QN AUTO: 30.9 PG (ref 26–34)
MCHC RBC AUTO-ENTMCNC: 31 G/DL (ref 30–36.5)
MCV RBC AUTO: 99.7 FL (ref 80–99)
NRBC # BLD: 0.03 K/UL (ref 0–0.01)
NRBC BLD-RTO: 0.5 PER 100 WBC
PLATELET # BLD AUTO: 127 K/UL (ref 150–400)
PMV BLD AUTO: 11.7 FL (ref 8.9–12.9)
POTASSIUM SERPL-SCNC: 4.5 MMOL/L (ref 3.5–5.1)
RBC # BLD AUTO: 3.33 M/UL (ref 4.1–5.7)
SERVICE CMNT-IMP: ABNORMAL
SODIUM SERPL-SCNC: 132 MMOL/L (ref 136–145)
WBC # BLD AUTO: 6 K/UL (ref 4.1–11.1)

## 2019-09-18 PROCEDURE — 82962 GLUCOSE BLOOD TEST: CPT

## 2019-09-18 PROCEDURE — 77010033678 HC OXYGEN DAILY

## 2019-09-18 PROCEDURE — 74011250637 HC RX REV CODE- 250/637: Performed by: INTERNAL MEDICINE

## 2019-09-18 PROCEDURE — 65660000000 HC RM CCU STEPDOWN

## 2019-09-18 PROCEDURE — 74011250637 HC RX REV CODE- 250/637: Performed by: HOSPITALIST

## 2019-09-18 PROCEDURE — 90935 HEMODIALYSIS ONE EVALUATION: CPT

## 2019-09-18 PROCEDURE — 94760 N-INVAS EAR/PLS OXIMETRY 1: CPT

## 2019-09-18 PROCEDURE — 36415 COLL VENOUS BLD VENIPUNCTURE: CPT

## 2019-09-18 PROCEDURE — 74011250636 HC RX REV CODE- 250/636: Performed by: HOSPITALIST

## 2019-09-18 PROCEDURE — 85027 COMPLETE CBC AUTOMATED: CPT

## 2019-09-18 PROCEDURE — 80048 BASIC METABOLIC PNL TOTAL CA: CPT

## 2019-09-18 RX ORDER — OXYCODONE AND ACETAMINOPHEN 5; 325 MG/1; MG/1
1 TABLET ORAL
Status: DISCONTINUED | OUTPATIENT
Start: 2019-09-18 | End: 2019-09-19 | Stop reason: HOSPADM

## 2019-09-18 RX ORDER — ACETAMINOPHEN 325 MG/1
650 TABLET ORAL
Status: DISCONTINUED | OUTPATIENT
Start: 2019-09-18 | End: 2019-09-19 | Stop reason: HOSPADM

## 2019-09-18 RX ADMIN — FINASTERIDE 5 MG: 5 TABLET, FILM COATED ORAL at 12:56

## 2019-09-18 RX ADMIN — METOPROLOL SUCCINATE 12.5 MG: 25 TABLET, EXTENDED RELEASE ORAL at 12:56

## 2019-09-18 RX ADMIN — HEPARIN SODIUM 5000 UNITS: 5000 INJECTION INTRAVENOUS; SUBCUTANEOUS at 14:41

## 2019-09-18 RX ADMIN — FERRIC CITRATE 420 MG: 210 TABLET, COATED ORAL at 17:20

## 2019-09-18 RX ADMIN — Medication 10 ML: at 05:50

## 2019-09-18 RX ADMIN — PREGABALIN 150 MG: 150 CAPSULE ORAL at 22:38

## 2019-09-18 RX ADMIN — HEPARIN SODIUM 5000 UNITS: 5000 INJECTION INTRAVENOUS; SUBCUTANEOUS at 05:13

## 2019-09-18 RX ADMIN — PANTOPRAZOLE SODIUM 40 MG: 40 TABLET, DELAYED RELEASE ORAL at 12:56

## 2019-09-18 RX ADMIN — OXYCODONE AND ACETAMINOPHEN 1 TABLET: 5; 325 TABLET ORAL at 22:37

## 2019-09-18 RX ADMIN — ACETAMINOPHEN 650 MG: 325 TABLET ORAL at 12:56

## 2019-09-18 RX ADMIN — FERRIC CITRATE 420 MG: 210 TABLET, COATED ORAL at 12:56

## 2019-09-18 RX ADMIN — PREGABALIN 150 MG: 150 CAPSULE ORAL at 15:57

## 2019-09-18 RX ADMIN — Medication 10 ML: at 22:40

## 2019-09-18 RX ADMIN — OXYCODONE AND ACETAMINOPHEN 1 TABLET: 5; 325 TABLET ORAL at 14:41

## 2019-09-18 RX ADMIN — HEPARIN SODIUM 5000 UNITS: 5000 INJECTION INTRAVENOUS; SUBCUTANEOUS at 22:37

## 2019-09-18 RX ADMIN — ASPIRIN 81 MG 81 MG: 81 TABLET ORAL at 12:56

## 2019-09-18 RX ADMIN — FLUOXETINE 20 MG: 20 CAPSULE ORAL at 12:56

## 2019-09-18 RX ADMIN — PRAVASTATIN SODIUM 40 MG: 40 TABLET ORAL at 22:37

## 2019-09-18 RX ADMIN — DOCUSATE SODIUM 100 MG: 100 CAPSULE, LIQUID FILLED ORAL at 12:56

## 2019-09-18 RX ADMIN — MIRTAZAPINE 30 MG: 15 TABLET, FILM COATED ORAL at 22:37

## 2019-09-18 RX ADMIN — Medication 10 ML: at 14:26

## 2019-09-18 RX ADMIN — MIDODRINE HYDROCHLORIDE 10 MG: 5 TABLET ORAL at 12:56

## 2019-09-18 RX ADMIN — TRAZODONE HYDROCHLORIDE 150 MG: 100 TABLET ORAL at 22:37

## 2019-09-18 RX ADMIN — MIDODRINE HYDROCHLORIDE 10 MG: 5 TABLET ORAL at 17:20

## 2019-09-18 NOTE — PROGRESS NOTES
[facility] Progress Note        NAME: Enrique Hdz Sr.       :  1955       MRN:  [id number]     Date/Time: 2019    Risk of deterioration: medium       Assessment:    Plan:  ACute hyperkalemia-resolved  ESRD  Non compliance with renal diet  Recent MI/Impella/4 STANLEY  (L)AVF HD MWF  Seen on hD, bp stable  K improved with HD  VOlume status improved with HD  Retacrit  DC planning      I've known this pt for atleast 10 years--he doesn't look much better than this! Subjective:     Chief Complaint: asleep on hd today    Review of Systems: as above    Objective:     VITALS:   Last 24hrs VS reviewed since prior progress note.  Most recent are:  Visit Vitals  /69 (BP 1 Location: Right arm, BP Patient Position: At rest)   Pulse 75   Temp 97.5 °F (36.4 °C)   Resp 17   Ht 5' 9\" (1.753 m)   Wt 70.8 kg (156 lb)   SpO2 98%   BMI 23.04 kg/m²     SpO2 Readings from Last 6 Encounters:   19 98%   19 94%   19 93%   18 98%   18 98%   18 94%    O2 Flow Rate (L/min): 2 l/min(patients O2 known to drop at night while sleeping.)     No intake or output data in the 24 hours ending 19 1307     Telemetry Reviewed    PHYSICAL EXAM:    General   well developed, well nourished, appears stated age, in no acute distress  EENT  Normocephalic, Atraumatic  Respiratory   Clear To Auscultation bilaterally   Cardiology  Regular Rate and Rythmn    Abdominal  Soft, non-tender, non-distended  Extremities  No clubbing, cyanosis, or edema (RLE)  Amputee (LLE)  Neurological  No focal neurological deficits noted              Lab Data Reviewed: (see below)    Medications Reviewed: (see below)    _____    Attending Physician: Eliza Ortiz MD     ____________________________________________________  MEDICATIONS:  Current Facility-Administered Medications   Medication Dose Route Frequency    acetaminophen (TYLENOL) tablet 650 mg  650 mg Oral Q6H PRN    insulin lispro (HUMALOG) injection   SubCUTAneous AC&HS    glucose chewable tablet 16 g  4 Tab Oral PRN    dextrose (D50W) injection syrg 12.5-25 g  12.5-25 g IntraVENous PRN    glucagon (GLUCAGEN) injection 1 mg  1 mg IntraMUSCular PRN    aspirin chewable tablet 81 mg  81 mg Oral DAILY    influenza vaccine 2019-20 (6 mos+)(PF) (FLUARIX/FLULAVAL/FLUZONE QUAD) injection 0.5 mL  0.5 mL IntraMUSCular PRIOR TO DISCHARGE    docusate sodium (COLACE) capsule 100 mg  100 mg Oral DAILY    ferric citrate (AURYXIA) tablet 420 mg  420 mg Oral TIDAC    finasteride (PROSCAR) tablet 5 mg  5 mg Oral DAILY    FLUoxetine (PROzac) capsule 20 mg  20 mg Oral DAILY    metoprolol succinate (TOPROL-XL) XL tablet 12.5 mg  12.5 mg Oral DAILY    midodrine (PROAMITINE) tablet 10 mg  10 mg Oral TID WITH MEALS    mirtazapine (REMERON) tablet 30 mg  30 mg Oral QHS    nitroglycerin (NITROSTAT) tablet 0.4 mg  0.4 mg SubLINGual Q5MIN PRN    pantoprazole (PROTONIX) tablet 40 mg  40 mg Oral ACB    pravastatin (PRAVACHOL) tablet 40 mg  40 mg Oral QHS    pregabalin (LYRICA) capsule 150 mg  150 mg Oral TID    traZODone (DESYREL) tablet 150 mg  150 mg Oral QHS    sodium chloride (NS) flush 5-40 mL  5-40 mL IntraVENous Q8H    sodium chloride (NS) flush 5-40 mL  5-40 mL IntraVENous PRN    heparin (porcine) injection 5,000 Units  5,000 Units SubCUTAneous Q8H        LABS:  Recent Labs     09/18/19  0842 09/17/19  0301   WBC 6.0 5.4   HGB 10.3* 10.4*   HCT 33.2* 33.4*   * 114*     Recent Labs     09/18/19  0842 09/17/19  0301 09/16/19  1609   * 138 134*   K 4.5 3.7 5.8*   CL 97 98 99   CO2 27 33* 25   BUN 39* 26* 62*   CREA 5.88* 4.09* 7.31*   * 48* 62*   CA 7.9* 8.2* 8.8     Recent Labs     09/17/19  0301 09/16/19  1609   SGOT 53* 55*   ALT 11* 11*   * 147*   TBILI 1.5* 1.4*   TP 7.1 7.5   ALB 2.9* 3.1*   GLOB 4.2* 4.4*     Recent Labs     09/16/19  1758   INR 1.4*   PTP 14.5*   APTT 32.1*      No results for input(s): FE, TIBC, PSAT, FERR in the last 72 hours. No results for input(s): PH, PCO2, PO2 in the last 72 hours.   Recent Labs     09/16/19  1609   TROIQ 0.08*     Lab Results   Component Value Date/Time    Glucose (POC) 119 (H) 09/18/2019 12:04 PM    Glucose (POC) 163 (H) 09/18/2019 07:36 AM    Glucose (POC) 172 (H) 09/18/2019 05:23 AM    Glucose (POC) 150 (H) 09/17/2019 08:51 PM    Glucose (POC) 207 (H) 09/17/2019 04:42 PM

## 2019-09-18 NOTE — PROGRESS NOTES
9715: Checked patients blood sugar to make sure it did not drop like it did the previous day. Will continue to monitor. 0700: Bedside and Verbal shift change report given to Jerad Trevizo RN (oncoming nurse) by [facility], RN (offgoing nurse). Report included the following information SBAR, Kardex, MAR and Recent Results.

## 2019-09-18 NOTE — PROGRESS NOTES
1230- pt converted in and out of a-fib today during dialysis. Dr. Shani Sánchze Cardiology notified. Pt has complaints of brief periods of  SOB which he describes as \"gasping\". MD aware. Will continue to monitor. 1630- monitored O2 sats during an episode of SOB and O2 remained at 90%. No need for supplemental O2 at this time. 1900- Bedside shift change report given to Erika Levine (oncoming nurse) by Tamara Bowers (offgoing nurse). Report included the following information SBAR, Kardex, MAR, Accordion, Recent Results and Med Rec Status.

## 2019-09-18 NOTE — DIALYSIS
[facility] Team [facility] Acutes  [phone]    Vitals   Pre   Post   Assessment   Pre   Post     Temp  Temp: 97.9 °F (36.6 °C) (09/18/19 0800)  98.2 LOC  A&Ox4 A&Ox4   HR   Pulse (Heart Rate): 80 (09/18/19 0800) 72 Lungs   Clear Miguel. Clear Miguel. B/P   BP: 115/68(Pre VS) (09/18/19 0800) 135/73 Cardiac   1st degree HRB  1st degree   Resp   Resp Rate: 18 (09/18/19 0800) 16 Skin   Warm and dry  Warm and dry   Pain level  Pain Intensity 1: 8 (09/18/19 0733) 0 Edema  none     none   Orders:    Duration:   Start:    4466 End:     Total:      Dialyzer:   Dialyzer/Set Up Inspection: Revaclear (09/18/19 0800)   K Bath:   Dialysate K (mEq/L): 2 (09/16/19 1900)   Ca Bath:   Dialysate CA (mEq/L): 2.5 (09/16/19 1900)   Na/Bicarb:   Dialysate NA (mEq/L): 140 (09/16/19 1900)   Target Fluid Removal:   Goal/Amount of Fluid to Remove (mL): 2000 mL (09/18/19 0800)   Access     Type & Location:   HARVEY-AVF:+T/B, no redness or swelling. Cannulated with 15g needles x 2. +flahses/aspir/NS flushes   Labs     Obtained/Reviewed   Critical Results Called   Date when labs were drawn-  Hgb-    HGB   Date Value Ref Range Status   09/18/2019 10.3 (L) 12.1 - 17.0 g/dL Final     K-    Potassium   Date Value Ref Range Status   09/17/2019 3.7 3.5 - 5.1 mmol/L Final     Comment:     INVESTIGATED PER DELTA CHECK PROTOCOL     Ca-   Calcium   Date Value Ref Range Status   09/17/2019 8.2 (L) 8.5 - 10.1 MG/DL Final     Bun-   BUN   Date Value Ref Range Status   09/17/2019 26 (H) 6 - 20 MG/DL Final     Creat-   Creatinine   Date Value Ref Range Status   09/17/2019 4.09 (H) 0.70 - 1.30 MG/DL Final     Comment:     INVESTIGATED PER DELTA CHECK PROTOCOL        Medications/ Blood Products Given     Name   Dose   Route and Time     No HD meds                Blood Volume Processed (BVP):    66.2L Net Fluid   Removed:  2000ml   Comments   Time Out Done: yes  Primary Nurse Rpt Pre:CAROL Israel RN  Primary Nurse Rpt Post:CLAYTON Paredes RN  Pt Education: Procedure  Care Plan: To continue HD tx  Tx Summary:      Pt arrived to HD suite A&Ox4. Consent signed & on file. SBAR received from Primary RN. 0800: Pt cannulated with 13O needles per policy & without issue. Labs drawn per request/ order. VSS. Dialysis Tx initiated. 0900: Pt resting quietly. 1114: Tx ended. VSS. All possible blood returned to patient. Hemostasis achieved without issue. Bed locked and in the lowest position, call bell and belongings in reach. SBAR given to Primary, RN. Patient is stable at time of their/ my departure. All Dialysis related medications have been reviewed. Admiting Diagnosis:  Pt's previous clinic-[facility]  Consent signed - Informed Consent Verified: Yes (09/18/19 0800)  [facility] Consent - signed  Hepatitis Status- Neg 1/2/19 angela 1/2/19  Machine #- Machine Number: [id number] (09/18/19 0800)  Telemetry status-1st degree HRB  Pre-dialysis wt. -  n/a

## 2019-09-18 NOTE — PROGRESS NOTES
HD TRANSFER - OUT REPORT:    Verbal report given to Raciel Spicer RN   on [employer] being transferred to [phone] for routine progression of care       Report consisted of patient's Situation, Background, Assessment and   Recommendations(SBAR). Information from the following report(s) SBAR was reviewed with the receiving nurse. Method:  $$ Method: Hemodialysis (09/18/19 0800)    Fluid Removed  NET Fluid Removed (mL): 2000 ml (09/16/19 2200)     Patient response to treatment:  Unchanged     End Time  Hemodialysis End Time: 1113 (09/18/19 1113)  If not documented, dialysis nurse to update post-dialysis row in HD/Filtration flowsheet     Medications /Volume expansion agents or Fluid boluses administered during treatment? no    Post-dialysis medication administration due?  no  Remind nurse to administer post-HD medication upon return to unit. Fistula hemostasis? yes    Line heparinization? no    Lines:     Opportunity for questions and clarification was provided.       Patient transported with: Monitor

## 2019-09-18 NOTE — PROGRESS NOTES
TRANSFER - IN REPORT:    Verbal report received from Isamar Ramos RN on [employer]  being received from [employer] for ordered procedure      Report consisted of patients Situation, Background, Assessment and   Recommendations(SBAR). Information from the following report(s) SBAR and Kardex was reviewed with the receiving nurse. Opportunity for questions and clarification was provided. Assessment completed upon patients arrival to unit and care assumed.

## 2019-09-18 NOTE — PROGRESS NOTES
Hospitalist Progress Note    NAME: Felipe Chavez.   :  1955   MRN:  [id number]       Assessment / Plan:  Chest pain likely from previous Impella  -per cards most likely from previous impella site  -cards recommends to cont med mgmt as below and no further work up    ? New onset A fib  -d/w Dr Falguni Carlos, he will see the pt and give recommednations  -rate is controlled  -Last Ef was 45 % in     Severe CAD, complicated high risk cath  with assistance of CTS  -Cath  with Impella, s/p PCI of OM1, mLAD, and LM/LAD/LCx bifurcation with Culotte technique with total of 4 STANLEY  -per notes Impella removed in cath lab.  Some bleeding afterwards.  Got PRBCs and PLT. Clot noted on distal Impella upon removal (seen on WADE).  No signs of neuro deficits.  -Echo w/ EF 40-45%  -cont ASA/brilinta/statin        DM type II with nephropathy  Hypoglycemia new  -A1c is 6.6  -cont SSI  -Blood sugars are better and now off dextrose drip       ESRD   Hyperkalemia  -HD MWF, nephrology following  -K is now normal       PAD  Multiple small wounds    -Patient is scheduled to have a right second toe amputation on 10/1/2019  -Continue aspirin and Brilinta  -cont Wound Care as previous admission recommendation: RLE and foot scabs: cleanse with soap and water and apply Mupirocin ointment to scabs and leave JAYSON.     L AKA   Depression  -home trazodone     H/o R renal artery stenosis, s/p stent 2018     Chronic mild Thrombocytopenia, monitor      Code Status: DNR  Surrogate Decision Maker: wife Elen Arguelles     DVT Prophylaxis: heparin  GI Prophylaxis: not indicated     Baseline: wheel chair bound, lives with wife      Body mass index is 23.9 kg/m². Subjective:     Chief Complaint / Reason for Physician Visit  Chest pain is improved. Sob at base line. No cough. No fever.       Review of Systems:  Symptom Y/N Comments  Symptom Y/N Comments   Fever/Chills    Chest Pain     Poor Appetite    Edema     Cough    Abdominal Pain Sputum    Joint Pain     SOB/COREA    Pruritis/Rash     Nausea/vomit    Tolerating PT/OT     Diarrhea    Tolerating Diet     Constipation    Other       Could NOT obtain due to:      Objective:     VITALS:   Last 24hrs VS reviewed since prior progress note. Most recent are:  Patient Vitals for the past 24 hrs:   Temp Pulse Resp BP SpO2   09/18/19 1506 98.1 °F (36.7 °C) 64 18 119/84 91 %   09/18/19 1216 97.5 °F (36.4 °C) 75 17 146/69 98 %   09/18/19 1130 98.2 °F (36.8 °C) 72 16 135/73    09/18/19 1113  68  141/62    09/18/19 1100  67  136/64    09/18/19 1045  67  112/49    09/18/19 1030  68  131/59    09/18/19 1015  69  126/61    09/18/19 1000  69  119/56    09/18/19 0945  69  121/59    09/18/19 0930  69  114/55    09/18/19 0915  69  133/60    09/18/19 0900  79  129/69    09/18/19 0845  88  129/63    09/18/19 0830  75  117/67    09/18/19 0814  81  133/66    09/18/19 0800 97.9 °F (36.6 °C) 80 18 115/68    09/18/19 0733 97.8 °F (36.6 °C) 82 18 123/78 91 %   09/18/19 0312 96.2 °F (35.7 °C) 65 18 95/73 100 %   09/17/19 2339 97.6 °F (36.4 °C) 78 18 103/70 100 %   09/17/19 1951 97.9 °F (36.6 °C) 76 17 113/56 95 %     No intake or output data in the 24 hours ending 09/18/19 1621     PHYSICAL EXAM:  General: cooperative, no acute distress    EENT:  EOMI. Anicteric sclerae. MMM  Resp:  CTA bilaterally, no wheezing or rales. No accessory muscle use  CV:  Regular  rhythm,  No edema, chest staples +   GI:  Soft, Non distended, Non tender.  +Bowel sounds  Neurologic:  Alert and oriented X 3, normal speech,   Psych:   Not anxious nor agitated  Skin:  No rashes.   No jaundice    Reviewed most current lab test results and cultures  YES  Reviewed most current radiology test results   YES  Review and summation of old records today    NO  Reviewed patient's current orders and MAR    YES  PMH/SH reviewed - no change compared to H&P          Current Facility-Administered Medications:    acetaminophen (TYLENOL) tablet 650 mg, 650 mg, Oral, Q6H PRN, Danika George MD, 650 mg at 09/18/19 1256    oxyCODONE-acetaminophen (PERCOCET) 5-325 mg per tablet 1 Tab, 1 Tab, Oral, Q6H PRN, Danika George MD, 1 Tab at 09/18/19 1441    insulin lispro (HUMALOG) injection, , SubCUTAneous, AC&HS, Beto Romero MD, Stopped at 09/17/19 2200    glucose chewable tablet 16 g, 4 Tab, Oral, PRN, Beto Romero MD    dextrose (D50W) injection syrg 12.5-25 g, 12.5-25 g, IntraVENous, PRN, Beto Romero MD    glucagon [facility]) injection 1 mg, 1 mg, IntraMUSCular, PRN, Beto Romero MD    aspirin chewable tablet 81 mg, 81 mg, Oral, DAILY, Ricardo Herrera III, DO, 81 mg at 09/18/19 1256    influenza vaccine 2019-20 (6 mos+)(PF) (FLUARIX/FLULAVAL/FLUZONE QUAD) injection 0.5 mL, 0.5 mL, IntraMUSCular, PRIOR TO DISCHARGE, Christopher Carter MD    docusate sodium (COLACE) capsule 100 mg, 100 mg, Oral, DAILY, Sharyn Coleman MD, 100 mg at 09/18/19 1256    ferric citrate (AURYXIA) tablet 420 mg, 420 mg, Oral, TIDAC, Sharyn Coleman MD, 420 mg at 09/18/19 1256    finasteride (PROSCAR) tablet 5 mg, 5 mg, Oral, DAILY, Sharyn Coleman MD, 5 mg at 09/18/19 1256    FLUoxetine (PROzac) capsule 20 mg, 20 mg, Oral, DAILY, Sharyn Coleman MD, 20 mg at 09/18/19 1256    metoprolol succinate (TOPROL-XL) XL tablet 12.5 mg, 12.5 mg, Oral, DAILY, Sharyn Coleman MD, 12.5 mg at 09/18/19 1256    midodrine (PROAMITINE) tablet 10 mg, 10 mg, Oral, TID WITH MEALS, Sharyn Coleman MD, 10 mg at 09/18/19 1256    mirtazapine (REMERON) tablet 30 mg, 30 mg, Oral, QHS, Sharyn Coleman MD, 30 mg at 09/17/19 2107    nitroglycerin (NITROSTAT) tablet 0.4 mg, 0.4 mg, SubLINGual, Q5MIN PRN, Sharyn Coleman MD    pantoprazole (PROTONIX) tablet 40 mg, 40 mg, Oral, ACB, Sharyn Coleman MD, 40 mg at 09/18/19 1256    pravastatin (PRAVACHOL) tablet 40 mg, 40 mg, Oral, QHS, Sharyn Coleman MD, 40 mg at 09/17/19 2108    pregabalin (LYRICA) capsule 150 mg, 150 mg, Oral, TID, Jennifer Nicole MD, 150 mg at 09/18/19 1557    traZODone (DESYREL) tablet 150 mg, 150 mg, Oral, QHS, Jennifer Nicole MD, 150 mg at 09/17/19 2107    sodium chloride (NS) flush 5-40 mL, 5-40 mL, IntraVENous, Q8H, Jennifer Nicole MD, 10 mL at 09/18/19 0550    sodium chloride (NS) flush 5-40 mL, 5-40 mL, IntraVENous, PRN, Jennifer Nicole MD, 10 mL at 09/18/19 1426    heparin (porcine) injection 5,000 Units, 5,000 Units, SubCUTAneous, Q8H, Jennifer Nicole MD, 5,000 Units at 09/18/19 1441  ________________________________________________________________________  Care Plan discussed with:    Comments   Patient y    Family      RN y    Care Manager     Consultant                        Multidiciplinary team rounds were held today with , nursing, pharmacist and clinical coordinator. Patient's plan of care was discussed; medications were reviewed and discharge planning was addressed. ________________________________________________________________________  Total NON critical care TIME:  35   Minutes    Total CRITICAL CARE TIME Spent:   Minutes non procedure based      Comments   >50% of visit spent in counseling and coordination of care     ________________________________________________________________________  Katy Barlow MD     Procedures: see electronic medical records for all procedures/Xrays and details which were not copied into this note but were reviewed prior to creation of Plan. LABS:  I reviewed today's most current labs and imaging studies.   Pertinent labs include:  Recent Labs     09/18/19 0842 09/17/19 0301 09/16/19  1609   WBC 6.0 5.4 9.2   HGB 10.3* 10.4* 10.7*   HCT 33.2* 33.4* 35.0*   * 114* 123*     Recent Labs     09/18/19 0842 09/17/19 0301 09/16/19  1758 09/16/19  1609   * 138  --  134*   K 4.5 3.7  --  5.8*   CL 97 98  --  99   CO2 27 33*  --  25   * 48*  --  62*   BUN 39* 26*  --  62*   CREA 5.88* 4.09*  --  7.31*   CA 7.9* 8.2*  --  8.8   ALB  -- 2.9*  --  3.1*   TBILI  --  1.5*  --  1.4*   SGOT  --  53*  --  55*   ALT  --  11*  --  11*   INR  --   --  1.4*  --        Signed: Eliceo Medina MD

## 2019-09-19 VITALS
OXYGEN SATURATION: 93 % | TEMPERATURE: 97.4 F | RESPIRATION RATE: 17 BRPM | SYSTOLIC BLOOD PRESSURE: 139 MMHG | DIASTOLIC BLOOD PRESSURE: 67 MMHG | HEART RATE: 68 BPM | WEIGHT: 161.82 LBS | HEIGHT: 69 IN | BODY MASS INDEX: 23.97 KG/M2

## 2019-09-19 LAB
GLUCOSE BLD STRIP.AUTO-MCNC: 107 MG/DL (ref 65–100)
GLUCOSE BLD STRIP.AUTO-MCNC: 228 MG/DL (ref 65–100)
SERVICE CMNT-IMP: ABNORMAL
SERVICE CMNT-IMP: ABNORMAL

## 2019-09-19 PROCEDURE — 74011636637 HC RX REV CODE- 636/637: Performed by: INTERNAL MEDICINE

## 2019-09-19 PROCEDURE — 74011250636 HC RX REV CODE- 250/636: Performed by: INTERNAL MEDICINE

## 2019-09-19 PROCEDURE — 74011250637 HC RX REV CODE- 250/637: Performed by: HOSPITALIST

## 2019-09-19 PROCEDURE — 90686 IIV4 VACC NO PRSV 0.5 ML IM: CPT | Performed by: INTERNAL MEDICINE

## 2019-09-19 PROCEDURE — 74011250637 HC RX REV CODE- 250/637: Performed by: INTERNAL MEDICINE

## 2019-09-19 PROCEDURE — 94760 N-INVAS EAR/PLS OXIMETRY 1: CPT

## 2019-09-19 PROCEDURE — 90471 IMMUNIZATION ADMIN: CPT

## 2019-09-19 PROCEDURE — 74011250636 HC RX REV CODE- 250/636: Performed by: HOSPITALIST

## 2019-09-19 PROCEDURE — 82962 GLUCOSE BLOOD TEST: CPT

## 2019-09-19 RX ADMIN — MIDODRINE HYDROCHLORIDE 10 MG: 5 TABLET ORAL at 11:03

## 2019-09-19 RX ADMIN — FLUOXETINE 20 MG: 20 CAPSULE ORAL at 08:48

## 2019-09-19 RX ADMIN — OXYCODONE AND ACETAMINOPHEN 1 TABLET: 5; 325 TABLET ORAL at 15:48

## 2019-09-19 RX ADMIN — TICAGRELOR 90 MG: 90 TABLET ORAL at 11:03

## 2019-09-19 RX ADMIN — FERRIC CITRATE 420 MG: 210 TABLET, COATED ORAL at 11:03

## 2019-09-19 RX ADMIN — MIDODRINE HYDROCHLORIDE 10 MG: 5 TABLET ORAL at 08:50

## 2019-09-19 RX ADMIN — FERRIC CITRATE 420 MG: 210 TABLET, COATED ORAL at 08:50

## 2019-09-19 RX ADMIN — PREGABALIN 150 MG: 150 CAPSULE ORAL at 08:51

## 2019-09-19 RX ADMIN — INFLUENZA VIRUS VACCINE 0.5 ML: 15; 15; 15; 15 SUSPENSION INTRAMUSCULAR at 14:54

## 2019-09-19 RX ADMIN — OXYCODONE AND ACETAMINOPHEN 1 TABLET: 5; 325 TABLET ORAL at 08:50

## 2019-09-19 RX ADMIN — INSULIN LISPRO 2 UNITS: 100 INJECTION, SOLUTION INTRAVENOUS; SUBCUTANEOUS at 12:17

## 2019-09-19 RX ADMIN — ASPIRIN 81 MG 81 MG: 81 TABLET ORAL at 08:50

## 2019-09-19 RX ADMIN — PREGABALIN 150 MG: 150 CAPSULE ORAL at 15:00

## 2019-09-19 RX ADMIN — DOCUSATE SODIUM 100 MG: 100 CAPSULE, LIQUID FILLED ORAL at 08:50

## 2019-09-19 RX ADMIN — FINASTERIDE 5 MG: 5 TABLET, FILM COATED ORAL at 08:48

## 2019-09-19 RX ADMIN — Medication 10 ML: at 06:37

## 2019-09-19 RX ADMIN — METOPROLOL SUCCINATE 12.5 MG: 25 TABLET, EXTENDED RELEASE ORAL at 08:48

## 2019-09-19 RX ADMIN — PANTOPRAZOLE SODIUM 40 MG: 40 TABLET, DELAYED RELEASE ORAL at 08:50

## 2019-09-19 RX ADMIN — HEPARIN SODIUM 5000 UNITS: 5000 INJECTION INTRAVENOUS; SUBCUTANEOUS at 06:36

## 2019-09-19 NOTE — PROGRESS NOTES
Problem: Falls - Risk of  Goal: *Absence of Falls  Description  Document Alvarado Johnson Fall Risk and appropriate interventions in the flowsheet.   Outcome: Progressing Towards Goal  Note:   Fall Risk Interventions:  Mobility Interventions: Bed/chair exit alarm, Communicate number of staff needed for ambulation/transfer, Utilize walker, cane, or other assistive device         Medication Interventions: Patient to call before getting OOB, Teach patient to arise slowly, Utilize gait belt for transfers/ambulation    Elimination Interventions: Patient to call for help with toileting needs, Urinal in reach, Call light in reach    History of Falls Interventions: Evaluate medications/consider consulting pharmacy, Investigate reason for fall

## 2019-09-19 NOTE — DISCHARGE SUMMARY
Hospitalist Discharge Summary     Patient ID:  Jevon  [id number]  71 y.o.  1955 9/16/2019    PCP on record: Padma Sánchez DO    Admit date: 9/16/2019  Discharge date and time: 9/19/2019    DISCHARGE DIAGNOSIS:    Chest pain likely from previous Impella  ? New onset A fib  Severe CAD, complicated high risk cath 9/6 with assistance of CTS  DM type II with nephropathy  Hypoglycemia new resolved  ESRD  Hyperkalemia  PAD on asa and Brilinta  Multiple small wounds    L AKA   Depression  H/o R renal artery stenosis  Chronic mild Thrombocytopenia, monitor        CONSULTATIONS:  IP CONSULT TO NEPHROLOGY  IP CONSULT TO CARDIOLOGY  IP CONSULT TO HOSPITALIST    Excerpted HPI from H&P of Sky Diamond MD:  Renea De La Garza is a 71 y.o. male with ESRD on dialysis, CAD status post multiple stents, was recently admitted for discharge on September 14 when presented with unstable angina, had a cardiac cath on September 5 with severe disease, considered poor CABG candidate and was discharged home on aspirin, ticagrelor, Midrin, metoprolol and statin. Pt says since discharge he has been feeing weak and continued to feel CP off and on at rest. At OSH his potassium was 7, was given hyperkalemia cocktail and sent to our ER for eval. Troponin was 0.07 there and 0.08 at out facility. Cardiology called, recommended cycle troponin and nephrology called for dialysis.        ______________________________________________________________________  DISCHARGE SUMMARY/HOSPITAL COURSE:  for full details see H&P, daily progress notes, labs, consult notes.        Patient was admitted to hospital and diagnosed to have chest pain most likely from the previous Impella site insertion.  Cardiology was consulted for further management and cardiology recommended no additional work-up at this time and recommended to continue his home medications.  Patient also underwent a CT angiogram of the chest for chest pain in the emergency department which showed evidence of emphysema with coronary artery disease and no evidence of pulmonary embolism.  Patient did not complain of any further chest pain.  He was also noted to have questionable irregular rhythm for which patient was seen by cardiologist Dr. Cherelle Paulino who recommended to continue above medications and recommended outpatient follow-up to arrange for a 30-day event monitor on outpatient basis and recommended to continue aspirin and Brilinta for now. Sharlene Tompkins was continued on his home medications and his rest of the medical problems remained stable during hospitalization. [facility] was arranged outpatient follow-up with cardiology in about 1 week time.  Today he seen and examined, his vital signs stable, lab work is at baseline and he is being released in much improved condition for outpatient follow-up.                       _______________________________________________________________________  Patient seen and examined by me on discharge day. Pertinent Findings:  Gen:    Not in distress  Chest: Clear lungs  CVS:   Regular rhythm. No edema  Abd:  Soft, not distended, not tender  Neuro:  Alert, awake  _______________________________________________________________________  DISCHARGE MEDICATIONS:   Current Discharge Medication List      CONTINUE these medications which have NOT CHANGED    Details   ticagrelor (BRILINTA) 90 mg tablet Take 1 Tab by mouth every twelve (12) hours every twelve (12) hours. Qty: 60 Tab, Refills: 0      midodrine (PROAMITINE) 10 mg tablet Take 1 Tab by mouth three (3) times daily (with meals) for 30 days. Qty: 90 Tab, Refills: 0      metoprolol succinate (TOPROL-XL) 25 mg XL tablet Take 0.5 Tabs by mouth daily. Qty: 30 Tab, Refills: 0      pregabalin (LYRICA) 150 mg capsule Take 150 mg by mouth three (3) times daily. ferric citrate (AURYXIA) 210 mg iron tablet Take 420 mg by mouth Before breakfast, lunch, and dinner.       docusate sodium (COLACE) 100 mg capsule Take 100 mg by mouth daily. pravastatin (PRAVACHOL) 80 mg tablet TAKE 1 TABLET BY MOUTH EVERY DAY AT NIGHT  Qty: 90 Tab, Refills: 1      FLUoxetine (PROZAC) 20 mg capsule Take 20 mg by mouth daily. traZODone (DESYREL) 150 mg tablet TAKE 1 TABLET BY MOUTH EVERY DAY AT NIGHT  Qty: 90 Tab, Refills: 1    Associated Diagnoses: Insomnia due to other mental disorder      mirtazapine (REMERON) 30 mg tablet TAKE 1 TABLET BY MOUTH EVERY DAY AT NIGHT  Qty: 90 Tab, Refills: 1    Associated Diagnoses: Insomnia due to other mental disorder      omeprazole (PRILOSEC) 40 mg capsule TAKE 1 CAPSULE BY MOUTH TWICE A DAY  Qty: 180 Cap, Refills: 1    Associated Diagnoses: Gastroesophageal reflux disease without esophagitis      finasteride (PROSCAR) 5 mg tablet TAKE 1 TABLET BY MOUTH EVERY DAY  Qty: 90 Tab, Refills: 1    Associated Diagnoses: Enlarged prostate on rectal examination      aspirin 81 mg chewable tablet Take 1 Tab by mouth daily. Indications: myocardial infarction prevention  Qty: 90 Tab, Refills: 1    Associated Diagnoses: PVD (peripheral vascular disease) (HCC)      nitroglycerin (NITROSTAT) 0.4 mg SL tablet 1 Tab by SubLINGual route every five (5) minutes as needed for Chest Pain. Qty: 20 Tab, Refills: 0               Patient Follow Up Instructions:     1. Recommended diet: Cardiac    2. Recommended activity: Activity as tolerated    3. If you experience any of the following symptoms then please call your primary care physician or return to the emergency room if you cannot get hold of your doctor:    4. Wound Care: Keep chest wound clean with soap and water and keep it dry. 5. Lab work: Cbc and Bmp in 1 week     6. Bring these papers with you to your follow up appointments.  The papers will help your doctors be sure to continue the care plan from the hospital.    7. Follow up with Dr Sarita Page for Holter monitor      Follow-up Information     Follow up With Specialties Details Why Contact Info Devon Reza DO Family Practice Schedule an appointment as soon as possible for a visit for PCP post hospital follow up appt. The nurse will contact you with appointment date and time  [address],[place] [address]      Melissa Finley DO Cardiology Schedule an appointment as soon as possible for a visit in 1 week  [address] [facility]  [address]  [address] [facility]      Gary White MD Nephrology Schedule an appointment as soon as possible for a visit in 1 week  [address] [facility]  [address] [facility] [phone]      [facility]   this is your home health agency. If you do not hear from them in 1-2 days please call them Address: [address] [facility], [address] [place] [address] Alejandro Miranda  Phone: [phone]      RAVINDER Abraham  On 9/20/2019 this is your Dialysis Center.   Please resume 9/20/19 at noon [address] [facility] [address].  [phone]        ________________________________________________________________    Risk of deterioration: High    Condition at Discharge:  Stable  __________________________________________________________________    Disposition  Home with family and home health services    ____________________________________________________________________    Code Status: DNR/DNI  ___________________________________________________________________      Total time in minutes spent coordinating this discharge (includes going over instructions, follow-up, prescriptions, and preparing report for sign off to her PCP) :  35  minutes    Signed:  Justa Bejarano MD

## 2019-09-19 NOTE — DISCHARGE INSTRUCTIONS
Patient Discharge Instructions    Su Mercado. / [id number] : 1955    Admitted 2019 Discharged: 2019         DISCHARGE DIAGNOSIS:   Chest pain likely from previous Impella  ? New onset A fib  Severe CAD, complicated high risk cath  with assistance of CTS  DM type II with nephropathy  Hypoglycemia new  ESRD   Hyperkalemia  PAD  Multiple small wounds    L AKA   Depression  H/o R renal artery stenosis, s/p stent 2018  Chronic mild Thrombocytopenia, monitor          Take Home Medications     {Medication reconciliation information is now added to the patient's AVS automatically when it is printed. There is no need to use this SmartLink in discharge instructions. Highlight this text and delete it to clear this message}      General drug facts     If you have a very bad allergy, wear an allergy ID at all times. It is important that you take the medication exactly as they are prescribed. Keep your medication in the bottles provided by the pharmacist.  Keep a list of all your drugs (prescription, natural products, vitamins, OTC) with you. Give this list to your doctor. Do not take other medications without consulting your doctor. Do not share your drugs with others and do not take anyone else's drugs. Keep all drugs out of the reach of children and pets. Most drugs may be thrown away in household trash after mixing with coffee grounds or porsha litter and sealing in a plastic bag. Keep a list Call your doctor for help with any side effects. If in the [place] you may also call the FDA at 5-918-FDA-9924    Talk with the doctor before starting any new drug, including OTC, natural products, or vitamins. What to do at Home    1. Recommended diet: Cardiac    2. Recommended activity: Activity as tolerated    3. If you experience any of the following symptoms then please call your primary care physician or return to the emergency room if you cannot get hold of your doctor:    4. Wound Care: Keep chest wound clean with soap and water and keep it dry. 5. Lab work: Cbc and Bmp in 1 week     6. Bring these papers with you to your follow up appointments. The papers will help your doctors be sure to continue the care plan from the hospital.      Follow-up with:   PCP: Rocio Youssef DO  Follow-up Information     Follow up With Specialties Details Why Contact Info    Rocio Youssef DO Family Practice Go to for PCP post hospital follow up appt. The nurse will contact you with appointment date and time  [address] [address]  [phone]      Rocio Youssef DO Family Practice Schedule an appointment as soon as possible for a visit in 1 week  [address] [place], [address] ,  Cardiology Schedule an appointment as soon as possible for a visit in 1 week  [address] [facility] [address]  [address] [facility]      Arielle Puente MD Nephrology Schedule an appointment as soon as possible for a visit in 1 week  [address] [facility]  [address]  [address] [phone]             Please call for your own appointment        Information obtained by :  I understand that if any problems occur once I am at home I am to contact my physician. I understand and acknowledge receipt of the instructions indicated above.                                                                                                                                            Physician's or R.N.'s Signature                                                                  Date/Time                                                                                                                                              Patient or Representative Signature                                                          Date/Time

## 2019-09-19 NOTE — PROGRESS NOTES
Pt discharged home via wheelchair [facility] transportation. IV and telemetry discontinued. Discharge instructions explained and given to pt.

## 2019-09-19 NOTE — PROGRESS NOTES
Progress Note      9/19/2019 8:11 AM  NAME: Zane Brown Sr. MRN:  [id number]   Admit Diagnosis: Chest pain [R07.9]  Chest pain [R07.9]  Hyperkalemia [E87.5]  Hypoglycemia [E16.2]      Problem List:     1. [place]; cath 4/30 w/ RCA , 60% oLAD w/ neg FFR (0.88), patent LCx stent w/ mild ISR. Recurrent [place] 9/19 w/ Impella-supported PCI of LAD, OM1, and LM/LAD/LCx bifurcation w/ Culotte technique. 2. Indeterminate troponin elevation  3. Right renal artery stenosis s/p 7.0 x 19mm BMS 5/4/18  4. Occluded LCIA. 5. ESRD on dialysis. Dr Crump   6. Long term hx of Coronary artery disease s/p multivessel stenting s/p PCI of LCx, PTCA of RCA 7/97, PTCA LAD 3/94.  Lexiscan 2/08 w/ EF 62%, distal ineroapical infarct w/o ischemia  7. Cath 4/2018  No intervention.   Lv EF  -  55%   8. Peripheral vascular disease s/p left iliac stenting, left SFA stenting 3/00, right iliac stenting 11/99.  9. Status post L AKA  10. Bilateral carotid stenosis; <49% 9/19 (verts ant)  11. Hypertension  12. Diabetes  13. Chronic Tobacco abuse 1-2 PPD .   14. Chronic pain  15. Chronic anemia  16. Hyperlipidemia  17. Noncompliance  18. Peptic ulcer disease w/ GIB '15  19. Falls  20. Lives w/ sister in [address] [place] [address] now     Assessment/Plan:   Hard to tell if he is having brief salvos of AFib on tele or not; possibly EAT. I dont think this needs to keep him in the hospital. Will get him set up with a 30 day event monitor @ discharge through my office. He has a \"catch\" in his breath that is likely from ticagrelor. Asked him to try and push through it until he sees me in the office. If still there can look to change him to plavix. 21. Continue ASA and ticagrelor  22. I would not relegate him to triple therapy now; need for objective data. He is definitely a set up for AFib. Has anemia @ baseline. 23. Continue midodrine 10mg TID as needed  24. Continue Toprol XL 12.5mg daily  25. Continue statin  26.  Remainder of care per primary  27. Will get an echo in my office  28. OK with discharge when ready from primary standpoint            [x]       High complexity decision making was performed in this patient at high risk for decompensation with multiple organ involvement. Subjective:     Jevon denies chest pain, dyspnea. Discussed with RN events overnight. Review of Systems:    Symptom Y/N Comments  Symptom Y/N Comments   Fever/Chills N   Chest Pain N    Poor Appetite N   Edema N    Cough N   Abdominal Pain N    Sputum N   Joint Pain N    SOB/COREA N   Pruritis/Rash N    Nausea/vomit N   Tolerating PT/OT Y    Diarrhea N   Tolerating Diet Y    Constipation N   Other       Could NOT obtain due to:      Objective:      Physical Exam:    Last 24hrs VS reviewed since prior progress note. Most recent are:    Visit Vitals  /67 (BP 1 Location: Right arm, BP Patient Position: At rest)   Pulse 65   Temp 98.1 °F (36.7 °C)   Resp 16   Ht 5' 9\" (1.753 m)   Wt 73.4 kg (161 lb 13.1 oz)   SpO2 93%   BMI 23.90 kg/m²     No intake or output data in the 24 hours ending 09/19/19 0811     General Appearance: Well developed, well nourished, alert & oriented x 3,    no acute distress. Ears/Nose/Mouth/Throat: Hearing grossly normal.  Neck: Supple. Chest: Lungs clear to auscultation bilaterally. Cardiovascular: Regular rate and rhythm, S1S2 normal, no murmur. Abdomen: Soft, non-tender, bowel sounds are active. Extremities: No edema bilaterally. Skin: Warm and dry. []         Post-cath site without hematoma, bruit, tenderness, or thrill. Distal pulses intact. PMH/SH reviewed - no change compared to H&P    Data Review    Telemetry: sinus rhythm, PVCs, ? PAF/EAT     EKG: ?AFib vs SR w/ 1st deg AVB, LAFB, IWMI, AWMI, RBBB  []  No new EKG for review    Lab Data Personally Reviewed:    Recent Labs     09/18/19  0842 09/17/19  0301   WBC 6.0 5.4   HGB 10.3* 10.4*   HCT 33.2* 33.4*   * 114*     Recent Labs     09/16/19  3428 INR 1.4*   PTP 14.5*   APTT 32.1*      Recent Labs     09/18/19  0842 09/17/19  0301 09/16/19  1609   * 138 134*   K 4.5 3.7 5.8*   CL 97 98 99   CO2 27 33* 25   BUN 39* 26* 62*   CREA 5.88* 4.09* 7.31*   * 48* 62*   CA 7.9* 8.2* 8.8     Recent Labs     09/16/19  1609   TROIQ 0.08*     Lab Results   Component Value Date/Time    Cholesterol, total 236 (H) 04/29/2018 06:00 AM    HDL Cholesterol 34 04/29/2018 06:00 AM    LDL,Direct 62 10/03/2014 05:50 AM    LDL, calculated 172.4 (H) 04/29/2018 06:00 AM    Triglyceride 148 04/29/2018 06:00 AM    CHOL/HDL Ratio 6.9 (H) 04/29/2018 06:00 AM       Recent Labs     09/17/19  0301 09/16/19  1609   SGOT 53* 55*   * 147*   TP 7.1 7.5   ALB 2.9* 3.1*   GLOB 4.2* 4.4*     No results for input(s): PH, PCO2, PO2 in the last 72 hours.     Medications Personally Reviewed:    Current Facility-Administered Medications   Medication Dose Route Frequency    acetaminophen (TYLENOL) tablet 650 mg  650 mg Oral Q6H PRN    oxyCODONE-acetaminophen (PERCOCET) 5-325 mg per tablet 1 Tab  1 Tab Oral Q6H PRN    insulin lispro (HUMALOG) injection   SubCUTAneous AC&HS    glucose chewable tablet 16 g  4 Tab Oral PRN    dextrose (D50W) injection syrg 12.5-25 g  12.5-25 g IntraVENous PRN    glucagon (GLUCAGEN) injection 1 mg  1 mg IntraMUSCular PRN    aspirin chewable tablet 81 mg  81 mg Oral DAILY    influenza vaccine 2019-20 (6 mos+)(PF) (FLUARIX/FLULAVAL/FLUZONE QUAD) injection 0.5 mL  0.5 mL IntraMUSCular PRIOR TO DISCHARGE    docusate sodium (COLACE) capsule 100 mg  100 mg Oral DAILY    ferric citrate (AURYXIA) tablet 420 mg  420 mg Oral TIDAC    finasteride (PROSCAR) tablet 5 mg  5 mg Oral DAILY    FLUoxetine (PROzac) capsule 20 mg  20 mg Oral DAILY    metoprolol succinate (TOPROL-XL) XL tablet 12.5 mg  12.5 mg Oral DAILY    midodrine (PROAMITINE) tablet 10 mg  10 mg Oral TID WITH MEALS    mirtazapine (REMERON) tablet 30 mg  30 mg Oral QHS    nitroglycerin (NITROSTAT) tablet 0.4 mg  0.4 mg SubLINGual Q5MIN PRN    pantoprazole (PROTONIX) tablet 40 mg  40 mg Oral ACB    pravastatin (PRAVACHOL) tablet 40 mg  40 mg Oral QHS    pregabalin (LYRICA) capsule 150 mg  150 mg Oral TID    traZODone (DESYREL) tablet 150 mg  150 mg Oral QHS    sodium chloride (NS) flush 5-40 mL  5-40 mL IntraVENous Q8H    sodium chloride (NS) flush 5-40 mL  5-40 mL IntraVENous PRN    heparin (porcine) injection 5,000 Units  5,000 Units SubCUTAneous Q8H         Theola Dill Javier III, DO

## 2019-09-19 NOTE — PROGRESS NOTES
Reason for Readmission:     Pt was readmitted on 9/17/19 d/t feeling weak and continued to feel CP off and on. Pt was readmitted d/t Dx Severe CAD, R Wall CP, DMT2 with nephropathy, ESRD, Hypoerkalemia, PAD, Multiple small wounds, L AKA, Depression, Hx of R renal artery stenosis, and chronic mild thromobocytopenia. RRAT Score and Risk Level:    36 High      Level of Readmission:    1      Care Conference scheduled:   Not at this time. Resources/supports as identified by patient/family:   Pt has Medicare and [employer] Medicaid and Pt has a supportive family. Top Challenges facing patient (as identified by patient/family and CM): Finances/Medication cost?     Pt has Medicare and [place] that includes at prescription drug coverage. [facility] [address] [place] Kimberly is his caregiver and normally transports him but she is not available today for transport. CM set up [address] transport through [place] with [facility] and he will be here to  Pt at 4 PM today. [facility] was used because Pt does not have a WC here and [facility] can bring WC to the hospital to transport Pt home. It will have to be billed to  as [facility] would not do it for the Medicaid rate. Support system or lack thereof? Pt has supportive spouse, DTR, Son and grandkids at home that help him as needed. DTR in law is caregiver through Medicaid. Living arrangements? [place] two story home with ramp to enter. Self-care/ADLs/Cognition? Pt is alert and oriented. Pt DTR in law is caregiver and helps him with all of his ADLs/IADLs as needed. [facility] Current Advanced Directive/Advance Care Plan:   DNR. Pt does not have AMD.  CM talked to Pt and he refused talking to pastoral care to do AMD today. Plan for utilizing home health:   Pt is currently open to [employer] [facility].   FLOYD [facility] via [email or web address]. Transition of Care Plan:    Based on readmission, the patient's previous Plan of Care has been evaluated and/or modified. The current Transition of Care Plan is:           1. Pt will be discharged home with RYAN with [facility] services today. CM sent [facility] order [facility] via [employer]. 2.  CM will call [facility] and let them know that Pt will be resuming with them 9/20/19. M/W/F at noon. Family will transport to . 3.  Medicare pt has received, reviewed, and signed 2nd IM letter informing them of their right to appeal the discharge. Signed copy has been placed on pt bedside chart. 4.  [place] [facility] will be here at 4 PM to  Pt and take him home. Claude Siddiqui will bring his WC up to room. 5.  CM will email CM Specialist to make PCP follow up appt. Care Management Interventions  PCP Verified by CM:  Yes  Palliative Care Criteria Met (RRAT>21 & CHF Dx)?: No  Mode of Transport at Discharge: [facility] Transport Time of Discharge: 1600  Transition of Care Consult (CM Consult): [address]: No  Reason Outside [facility]: Patient already serviced by other home care/hospice agency  MyChart Signup: No  Discharge Durable Medical Equipment: No  Physical Therapy Consult: Yes  Occupational Therapy Consult: No  Speech Therapy Consult: No  Current Support Network: Lives with Spouse, Has Personal Caregivers  Confirm Follow Up Transport: Family  Plan discussed with Pt/Family/Caregiver: Yes  Freedom of Choice Offered: Yes  Discharge Location  Discharge Placement: Home with home health    [place], [address] [facility]

## 2019-09-23 NOTE — PERIOP NOTES
Called 's office re: patient need for f/u appt and cardiac clearance this week for scheduled surgery on 10/1. Patient has not made an appt yet. Office to notify Dr. Jayde Mccabe nurse as an urgency and to try and squeeze in an add on appt this week. They are going to reach out to patient to try and schedule. Also provided 's information and PAT fax if clearance obtained. Called 's nurse and informed of recent cardiac events with patient and that clearance now needed. She stated they would look in to it and call me back.

## 2019-09-24 NOTE — PERIOP NOTES
Called 's office and spoke with Ray Heath re: obtaining cardiac clearance. She stated the coordinator was trying to call patient's daughter in law now to check. Received VM from Ray Heath, patient has cardiac clearance appointment scheduled for 9/26 and they will call back afterwards.

## 2019-09-27 NOTE — PERIOP NOTES
Spoke with Barbara Marcus; is Dr. Krystal Galvan aware that cardiology wants patient to continue ASA & Nenana  without interruption? Does he want patient to take ASA & Brilllinta am of surgery? req cb        [id number]; Barbara Marcus states Dr. Krystal Galvan is ok with patient taking Brillinta & ASA morning of surgery. Left msg for Mary Roldan; patient's daughter inlaw & caregiver stating same. Number given if she has any questions.

## 2019-10-01 ENCOUNTER — ANESTHESIA (OUTPATIENT)
Dept: SURGERY | Age: 64
End: 2019-10-01
Payer: MEDICARE

## 2019-10-01 ENCOUNTER — HOSPITAL ENCOUNTER (OUTPATIENT)
Age: 64
Setting detail: OUTPATIENT SURGERY
Discharge: HOME | End: 2019-10-01
Attending: SURGERY | Admitting: SURGERY
Payer: MEDICARE

## 2019-10-01 ENCOUNTER — ANESTHESIA EVENT (OUTPATIENT)
Dept: SURGERY | Age: 64
End: 2019-10-01
Payer: MEDICARE

## 2019-10-01 VITALS
OXYGEN SATURATION: 98 % | RESPIRATION RATE: 18 BRPM | BODY MASS INDEX: 22.14 KG/M2 | HEIGHT: 69 IN | SYSTOLIC BLOOD PRESSURE: 158 MMHG | DIASTOLIC BLOOD PRESSURE: 78 MMHG | HEART RATE: 56 BPM | WEIGHT: 149.47 LBS | TEMPERATURE: 97.5 F

## 2019-10-01 DIAGNOSIS — L76.82 PAIN AT SURGICAL INCISION: Primary | ICD-10-CM

## 2019-10-01 LAB
ANION GAP BLD CALC-SCNC: 17 MMOL/L (ref 10–20)
BUN BLD-MCNC: 45 MG/DL (ref 9–20)
CA-I BLD-MCNC: 1.12 MMOL/L (ref 1.12–1.32)
CHLORIDE BLD-SCNC: 99 MMOL/L (ref 98–107)
CO2 BLD-SCNC: 28 MMOL/L (ref 21–32)
CREAT BLD-MCNC: 5.8 MG/DL (ref 0.6–1.3)
GLUCOSE BLD STRIP.AUTO-MCNC: 87 MG/DL (ref 65–100)
GLUCOSE BLD-MCNC: 101 MG/DL (ref 65–100)
HCT VFR BLD CALC: 39 % (ref 36.6–50.3)
POTASSIUM BLD-SCNC: 3.8 MMOL/L (ref 3.5–5.1)
SERVICE CMNT-IMP: ABNORMAL
SERVICE CMNT-IMP: NORMAL
SODIUM BLD-SCNC: 139 MMOL/L (ref 136–145)

## 2019-10-01 PROCEDURE — 77030011265 HC ELECTRD BLD HEX COVD -A: Performed by: SURGERY

## 2019-10-01 PROCEDURE — 74011250636 HC RX REV CODE- 250/636: Performed by: ANESTHESIOLOGY

## 2019-10-01 PROCEDURE — 82962 GLUCOSE BLOOD TEST: CPT

## 2019-10-01 PROCEDURE — 77030036687 HC SHOE PSTOP S2SG -A

## 2019-10-01 PROCEDURE — 88305 TISSUE EXAM BY PATHOLOGIST: CPT

## 2019-10-01 PROCEDURE — 77030031139 HC SUT VCRL2 J&J -A: Performed by: SURGERY

## 2019-10-01 PROCEDURE — 76210000021 HC REC RM PH II 0.5 TO 1 HR: Performed by: SURGERY

## 2019-10-01 PROCEDURE — 74011000272 HC RX REV CODE- 272: Performed by: SURGERY

## 2019-10-01 PROCEDURE — 80047 BASIC METABLC PNL IONIZED CA: CPT

## 2019-10-01 PROCEDURE — 74011000250 HC RX REV CODE- 250: Performed by: SURGERY

## 2019-10-01 PROCEDURE — 76060000032 HC ANESTHESIA 0.5 TO 1 HR: Performed by: SURGERY

## 2019-10-01 PROCEDURE — 77030002916 HC SUT ETHLN J&J -A: Performed by: SURGERY

## 2019-10-01 PROCEDURE — 77030011640 HC PAD GRND REM COVD -A: Performed by: SURGERY

## 2019-10-01 PROCEDURE — 74011250636 HC RX REV CODE- 250/636: Performed by: NURSE ANESTHETIST, CERTIFIED REGISTERED

## 2019-10-01 PROCEDURE — 77030018846 HC SOL IRR STRL H20 ICUM -A: Performed by: SURGERY

## 2019-10-01 PROCEDURE — 77030020782 HC GWN BAIR PAWS FLX 3M -B

## 2019-10-01 PROCEDURE — 76010000138 HC OR TIME 0.5 TO 1 HR: Performed by: SURGERY

## 2019-10-01 PROCEDURE — 74011000250 HC RX REV CODE- 250: Performed by: ANESTHESIOLOGY

## 2019-10-01 PROCEDURE — 74011250636 HC RX REV CODE- 250/636: Performed by: SURGERY

## 2019-10-01 PROCEDURE — 76210000006 HC OR PH I REC 0.5 TO 1 HR: Performed by: SURGERY

## 2019-10-01 PROCEDURE — 88311 DECALCIFY TISSUE: CPT

## 2019-10-01 RX ORDER — MIDAZOLAM HYDROCHLORIDE 1 MG/ML
1 INJECTION, SOLUTION INTRAMUSCULAR; INTRAVENOUS AS NEEDED
Status: DISCONTINUED | OUTPATIENT
Start: 2019-10-01 | End: 2019-10-01 | Stop reason: HOSPADM

## 2019-10-01 RX ORDER — ONDANSETRON 2 MG/ML
INJECTION INTRAMUSCULAR; INTRAVENOUS AS NEEDED
Status: DISCONTINUED | OUTPATIENT
Start: 2019-10-01 | End: 2019-10-01 | Stop reason: HOSPADM

## 2019-10-01 RX ORDER — PROPOFOL 10 MG/ML
INJECTION, EMULSION INTRAVENOUS
Status: DISCONTINUED | OUTPATIENT
Start: 2019-10-01 | End: 2019-10-01 | Stop reason: HOSPADM

## 2019-10-01 RX ORDER — LIDOCAINE HYDROCHLORIDE 10 MG/ML
0.1 INJECTION, SOLUTION EPIDURAL; INFILTRATION; INTRACAUDAL; PERINEURAL AS NEEDED
Status: DISCONTINUED | OUTPATIENT
Start: 2019-10-01 | End: 2019-10-01 | Stop reason: HOSPADM

## 2019-10-01 RX ORDER — TRAMADOL HYDROCHLORIDE 50 MG/1
50 TABLET ORAL
Qty: 12 TAB | Refills: 0 | Status: SHIPPED | OUTPATIENT
Start: 2019-10-01 | End: 2019-10-04

## 2019-10-01 RX ORDER — SODIUM CHLORIDE, SODIUM LACTATE, POTASSIUM CHLORIDE, CALCIUM CHLORIDE 600; 310; 30; 20 MG/100ML; MG/100ML; MG/100ML; MG/100ML
25 INJECTION, SOLUTION INTRAVENOUS CONTINUOUS
Status: DISCONTINUED | OUTPATIENT
Start: 2019-10-01 | End: 2019-10-01 | Stop reason: HOSPADM

## 2019-10-01 RX ORDER — LIDOCAINE HYDROCHLORIDE 20 MG/ML
INJECTION, SOLUTION EPIDURAL; INFILTRATION; INTRACAUDAL; PERINEURAL AS NEEDED
Status: DISCONTINUED | OUTPATIENT
Start: 2019-10-01 | End: 2019-10-01 | Stop reason: HOSPADM

## 2019-10-01 RX ORDER — FENTANYL CITRATE 50 UG/ML
25 INJECTION, SOLUTION INTRAMUSCULAR; INTRAVENOUS
Status: DISCONTINUED | OUTPATIENT
Start: 2019-10-01 | End: 2019-10-01 | Stop reason: HOSPADM

## 2019-10-01 RX ORDER — MORPHINE SULFATE 10 MG/ML
2 INJECTION, SOLUTION INTRAMUSCULAR; INTRAVENOUS
Status: DISCONTINUED | OUTPATIENT
Start: 2019-10-01 | End: 2019-10-01 | Stop reason: HOSPADM

## 2019-10-01 RX ORDER — MIDAZOLAM HYDROCHLORIDE 1 MG/ML
INJECTION, SOLUTION INTRAMUSCULAR; INTRAVENOUS AS NEEDED
Status: DISCONTINUED | OUTPATIENT
Start: 2019-10-01 | End: 2019-10-01 | Stop reason: HOSPADM

## 2019-10-01 RX ORDER — FENTANYL CITRATE 50 UG/ML
50 INJECTION, SOLUTION INTRAMUSCULAR; INTRAVENOUS AS NEEDED
Status: DISCONTINUED | OUTPATIENT
Start: 2019-10-01 | End: 2019-10-01 | Stop reason: HOSPADM

## 2019-10-01 RX ORDER — FENTANYL CITRATE 50 UG/ML
INJECTION, SOLUTION INTRAMUSCULAR; INTRAVENOUS AS NEEDED
Status: DISCONTINUED | OUTPATIENT
Start: 2019-10-01 | End: 2019-10-01 | Stop reason: HOSPADM

## 2019-10-01 RX ORDER — ONDANSETRON 2 MG/ML
4 INJECTION INTRAMUSCULAR; INTRAVENOUS AS NEEDED
Status: DISCONTINUED | OUTPATIENT
Start: 2019-10-01 | End: 2019-10-01 | Stop reason: HOSPADM

## 2019-10-01 RX ORDER — CEFAZOLIN SODIUM/WATER 2 G/20 ML
2 SYRINGE (ML) INTRAVENOUS ONCE
Status: DISCONTINUED | OUTPATIENT
Start: 2019-10-01 | End: 2019-10-01 | Stop reason: HOSPADM

## 2019-10-01 RX ORDER — SODIUM CHLORIDE 9 MG/ML
25 INJECTION, SOLUTION INTRAVENOUS CONTINUOUS
Status: DISCONTINUED | OUTPATIENT
Start: 2019-10-01 | End: 2019-10-01 | Stop reason: HOSPADM

## 2019-10-01 RX ORDER — BUPIVACAINE HYDROCHLORIDE 2.5 MG/ML
INJECTION, SOLUTION EPIDURAL; INFILTRATION; INTRACAUDAL AS NEEDED
Status: DISCONTINUED | OUTPATIENT
Start: 2019-10-01 | End: 2019-10-01 | Stop reason: HOSPADM

## 2019-10-01 RX ADMIN — SODIUM CHLORIDE 25 ML/HR: 900 INJECTION, SOLUTION INTRAVENOUS at 11:55

## 2019-10-01 RX ADMIN — MIDAZOLAM HYDROCHLORIDE 1 MG: 1 INJECTION INTRAMUSCULAR; INTRAVENOUS at 12:32

## 2019-10-01 RX ADMIN — ONDANSETRON HYDROCHLORIDE 4 MG: 2 INJECTION, SOLUTION INTRAMUSCULAR; INTRAVENOUS at 13:00

## 2019-10-01 RX ADMIN — VANCOMYCIN HYDROCHLORIDE 1000 MG: 1 INJECTION, POWDER, LYOPHILIZED, FOR SOLUTION INTRAVENOUS at 11:56

## 2019-10-01 RX ADMIN — MIDAZOLAM HYDROCHLORIDE 1 MG: 1 INJECTION INTRAMUSCULAR; INTRAVENOUS at 12:00

## 2019-10-01 RX ADMIN — PROPOFOL 100 MCG/KG/MIN: 10 INJECTION, EMULSION INTRAVENOUS at 12:33

## 2019-10-01 RX ADMIN — BUPIVACAINE HYDROCHLORIDE 7 ML: 2.5 INJECTION, SOLUTION EPIDURAL; INFILTRATION; INTRACAUDAL; PERINEURAL at 12:04

## 2019-10-01 RX ADMIN — LIDOCAINE HYDROCHLORIDE 7 MG: 20 INJECTION, SOLUTION EPIDURAL; INFILTRATION; INTRACAUDAL; PERINEURAL at 12:04

## 2019-10-01 RX ADMIN — FENTANYL CITRATE 25 MCG: 50 INJECTION, SOLUTION INTRAMUSCULAR; INTRAVENOUS at 12:35

## 2019-10-01 RX ADMIN — LIDOCAINE HYDROCHLORIDE 60 MG: 20 INJECTION, SOLUTION EPIDURAL; INFILTRATION; INTRACAUDAL; PERINEURAL at 12:33

## 2019-10-01 NOTE — PERIOP NOTES
TRANSFER - OUT REPORT:    Verbal report given to Lisy Cary (name) on [employer]  being transferred to phase2(unit) for routine post - op       Report consisted of patients Situation, Background, Assessment and   Recommendations(SBAR). Information from the following report(s) SBAR, Kardex, OR Summary, Intake/Output, MAR and Recent Results was reviewed with the receiving nurse. Opportunity for questions and clarification was provided.       Patient transported with:   Registered Nurse

## 2019-10-01 NOTE — ANESTHESIA PREPROCEDURE EVALUATION
Anesthetic History   No history of anesthetic complications            Review of Systems / Medical History  Patient summary reviewed, nursing notes reviewed and pertinent labs reviewed    Pulmonary          Smoker (EX)      Comments: 35 pack years   Neuro/Psych         Psychiatric history (Depression)    Comments: Depression Cardiovascular    Hypertension: well controlled      CHF  Dysrhythmias   Past MI, CAD, PAD, cardiac stents and hyperlipidemia    Exercise tolerance: <4 METS  Comments: EF<15%       GI/Hepatic/Renal     GERD    Renal disease (CKD): CRI, ESRD and dialysis  PUD    Comments: Hematemesis  Hx of PUD 9-2-15 on EGD Endo/Other    Diabetes: type 2, using insulin    Arthritis (Back and hips) and anemia     Other Findings   Comments: Chronic Low Back Pain with implanted stimulator  Hx of Thromboembolism           Physical Exam    Airway  Mallampati: II  TM Distance: > 6 cm  Neck ROM: normal range of motion   Mouth opening: Normal     Cardiovascular    Rhythm: regular  Rate: normal    Murmur, Aortic area     Dental    Dentition: Edentulous     Pulmonary  Breath sounds clear to auscultation               Abdominal  GI exam deferred       Other Findings            Anesthetic Plan    ASA: 4  Anesthesia type: regional      Post-op pain plan if not by surgeon: peripheral nerve block single    Induction: Intravenous  Anesthetic plan and risks discussed with: Patient

## 2019-10-01 NOTE — ANESTHESIA PROCEDURE NOTES
Peripheral Block    Start time: 10/1/2019 12:00 PM  End time: 10/1/2019 12:06 PM  Performed by: Gregory Lim MD  Authorized by: Gregory Lim MD       Pre-procedure: Indications: at surgeon's request and primary anesthetic    Preanesthetic Checklist: patient identified, risks and benefits discussed, site marked, timeout performed, anesthesia consent given and patient being monitored      Block Type:   Block Type: Ankle  Laterality:  Right  Monitoring:  Standard ASA monitoring, frequent vital sign checks, responsive to questions and oxygen  Injection Technique:  Single shot  Procedures: other (comment)    Patient Position: supine  Prep: chlorhexidine    Location:  Foot  Needle Type:  Stimuplex  Needle Gauge:  22 G  Needle Localization:  Anatomical landmarks and infiltration    Assessment:  Number of attempts:  1  Injection Assessment:  Incremental injection every 5 mL, no paresthesia, no intravascular symptoms and negative aspiration for blood  Patient tolerance:  Patient tolerated the procedure well with no immediate complications  Risks, benefits, alternatives explained at length and patient agrees to proceed. Time out performed and site for block/surgery identified. Standard monitors applied, 3 L NC O2, and sedation given as recorded by RN so as to achieve pt comfort and anxiolysis but maintain meaningful verbal contact. Sterile prep followed by injection of local anesthetic divided over 5 locations to block sural, saphenous, posterior tibial, superficial and deep peroneal nerves. No pain, paresthesia, or heme noted. VSS throughout. No complications noted.

## 2019-10-01 NOTE — PROGRESS NOTES
Patient discharged to home. Iv removed. Discharge instructions, scripts, and medication education done with patient and family. ssi prevention kit given to patient at discharge.

## 2019-10-01 NOTE — DISCHARGE INSTRUCTIONS
Patient Discharge Instructions    Ollie Bland. / [id number] : 1955    Admitted 10/1/2019 Discharged: 10/1/2019     What to do at Home  Recommended activity: Elevate leg  Non weightbearing R leg  Leave dressing for 72-96 hours. Keep dry  Surgical shoe         Information obtained by :  I understand that if any problems occur once I am at home I am to contact my physician. I understand and acknowledge receipt of the instructions indicated above. R.N.'s Signature                                                                  Date/Time                                                                                                                                              Patient or Representative Signature                                                          Date/Time      Angelita Rodriguez MD  TO PREVENT AN INFECTION      1. [address] YOUR HANDS     To prevent infection, good handwashing is the most important thing you or your caregiver can do.  Wash your hands with soap and water or use the hand  we gave you before you touch any wounds. 2. SHOWER     Use the antibacterial soap we gave you when you take a shower.  Shower with this soap until your wounds are healed.  To reach all areas of your body, you may need someone to help you.  Dont forget to clean your belly button with every shower. 3.  USE CLEAN SHEETS     Use freshly cleaned sheets on your bed after surgery.  To keep the surgery site clean, do not allow pets to sleep with you while your wound is still healing. 4. STOP SMOKING     Stop smoking, or at least cut back on smoking     Smoking slows your healing. 5.  CONTROL YOUR BLOOD SUGAR     High blood sugars slow wound healing.     If you are diabetic, control your blood sugar levels before and after your surgery. DISCHARGE SUMMARY from Nurse    The following personal items are in your possession at time of discharge:    Dental Appliances: None  Visual Aid: None  Home Medications: None  Jewelry: None  Clothing: None (left in in pt. room)  Other Valuables: None             PATIENT INSTRUCTIONS:    After general anesthesia or intravenous sedation, for 24 hours or while taking prescription Narcotics:  Limit your activities  Do not drive and operate hazardous machinery  Do not make important personal or business decisions  Do  not drink alcoholic beverages  If you have not urinated within 8 hours after discharge, please contact your surgeon on call. Report the following to your surgeon:  Excessive pain, swelling, redness or odor of or around the surgical area  Temperature over 100.5  Nausea and vomiting lasting longer than 4 hours or if unable to take medications  Any signs of decreased circulation or nerve impairment to extremity: change in color, persistent  numbness, tingling, coldness or increase pain  Any questions    To prevent infection remember to refer to your handout on handwashing given to you by your nurse. *  Please give a list of your current medications to your Primary Care Provider. *  Please update this list whenever your medications are discontinued, doses are      changed, or new medications (including over-the-counter products) are added. *  Please carry medication information at all times in case of emergency situations. These are general instructions for a healthy lifestyle:    No smoking/ No tobacco products/ Avoid exposure to second hand smoke    Surgeon General's Warning:  Quitting smoking now greatly reduces serious risk to your health.     Obesity, smoking, and sedentary lifestyle greatly increases your risk for illness    A healthy diet, regular physical exercise & weight monitoring are important for maintaining a healthy lifestyle    You may be retaining fluid if you have a history of heart failure or if you experience any of the following symptoms:  Weight gain of 3 pounds or more overnight or 5 pounds in a week, increased swelling in our hands or feet or shortness of breath while lying flat in bed. Please call your doctor as soon as you notice any of these symptoms; do not wait until your next office visit. Recognize signs and symptoms of STROKE:    F-face looks uneven    A-arms unable to move or move unevenly    S-speech slurred or non-existent    T-time-call 911 as soon as signs and symptoms begin-DO NOT go       Back to bed or wait to see if you get better-TIME IS BRAIN. The discharge information has been reviewed with the patient. The patient verbalized understanding. Patient Education      Tramadol (Ultram, Ultram ER, [place], Theratramadol-60) - (By mouth)   Why this medicine is used:   Treats moderate to severe pain. This medicine is a narcotic pain reliever. Contact a nurse or doctor right away if you have:  Extreme weakness, shallow breathing  Seizures  Seeing or hearing things that are not there  Anxiety, restlessness, muscle spasms, fever, sweating, diarrhea  Slow or fast heartbeat     Common side effects:  Nausea, vomiting, constipation  Headache, drowsiness  Itching, feeling of warmth, redness of the face, neck, arms, and upper chest  © 2017 [facility] Information is for End User's use only and may not be sold, redistributed or otherwise used for commercial purposes.

## 2019-10-01 NOTE — PERIOP NOTES
Handoff Report from Operating Room to PACU    Report received from [facility]   and Kulwant Allen CRNA regarding Torin Brink Sr. .      Surgeon(s):  Clifton Brady MD  And Procedure(s) (LRB):  RIGHT SECOND TOE AMPUTATION (ANKLE BLOCK) (Right)  confirmed   with allergies and dressings discussed. Anesthesia type, drugs, patient history, complications, estimated blood loss, vital signs, intake and output, and last pain medication were reviewed.

## 2019-10-01 NOTE — BRIEF OP NOTE
BRIEF OPERATIVE NOTE    Date of Procedure: 10/1/2019   Preoperative Diagnosis: Chronic open wound R 2nd toe  Postoperative Diagnosis: same   Procedure(s): RIGHT SECOND TOE AMPUTATION (Gavi Edge)  Surgeon: Marily Myles MD   Anesthesia: block   Estimated Blood Loss: none  Specimens:   ID Type Source Tests Collected by Time Destination   1 : Right second toe Preservative Toe  Shameka Nickerson MD 10/1/2019 1245 Pathology      Findings:   Complications: none  Implants: none

## 2019-10-02 ENCOUNTER — HOSPITAL ENCOUNTER (INPATIENT)
Age: 64
LOS: 3 days | Discharge: HOME HEALTH | DRG: 280 | End: 2019-10-05
Attending: EMERGENCY MEDICINE | Admitting: INTERNAL MEDICINE
Payer: MEDICARE

## 2019-10-02 ENCOUNTER — APPOINTMENT (OUTPATIENT)
Dept: GENERAL RADIOLOGY | Age: 64
DRG: 280 | End: 2019-10-02
Attending: EMERGENCY MEDICINE
Payer: MEDICARE

## 2019-10-02 DIAGNOSIS — R77.8 ELEVATED TROPONIN: Primary | ICD-10-CM

## 2019-10-02 DIAGNOSIS — R07.9 CHEST PAIN, UNSPECIFIED TYPE: ICD-10-CM

## 2019-10-02 DIAGNOSIS — N18.6 ESRD (END STAGE RENAL DISEASE) (HCC): ICD-10-CM

## 2019-10-02 LAB
ALBUMIN SERPL-MCNC: 3 G/DL (ref 3.5–5)
ALBUMIN/GLOB SERPL: 0.7 {RATIO} (ref 1.1–2.2)
ALP SERPL-CCNC: 81 U/L (ref 45–117)
ALT SERPL-CCNC: <6 U/L (ref 12–78)
ANION GAP SERPL CALC-SCNC: 3 MMOL/L (ref 5–15)
AST SERPL-CCNC: 21 U/L (ref 15–37)
BASOPHILS # BLD: 0.1 K/UL (ref 0–0.1)
BASOPHILS NFR BLD: 1 % (ref 0–1)
BILIRUB SERPL-MCNC: 0.8 MG/DL (ref 0.2–1)
BUN SERPL-MCNC: 43 MG/DL (ref 6–20)
BUN/CREAT SERPL: 7 (ref 12–20)
CALCIUM SERPL-MCNC: 8.2 MG/DL (ref 8.5–10.1)
CHLORIDE SERPL-SCNC: 101 MMOL/L (ref 97–108)
CK MB CFR SERPL CALC: 2.8 % (ref 0–2.5)
CK MB SERPL-MCNC: 1.3 NG/ML (ref 5–25)
CK SERPL-CCNC: 47 U/L (ref 39–308)
CK SERPL-CCNC: 58 U/L (ref 39–308)
CO2 SERPL-SCNC: 33 MMOL/L (ref 21–32)
COMMENT, HOLDF: NORMAL
CREAT SERPL-MCNC: 6.34 MG/DL (ref 0.7–1.3)
DIFFERENTIAL METHOD BLD: ABNORMAL
EOSINOPHIL # BLD: 0.3 K/UL (ref 0–0.4)
EOSINOPHIL NFR BLD: 5 % (ref 0–7)
ERYTHROCYTE [DISTWIDTH] IN BLOOD BY AUTOMATED COUNT: 18.6 % (ref 11.5–14.5)
GLOBULIN SER CALC-MCNC: 4.4 G/DL (ref 2–4)
GLUCOSE SERPL-MCNC: 122 MG/DL (ref 65–100)
HCT VFR BLD AUTO: 34.7 % (ref 36.6–50.3)
HGB BLD-MCNC: 10.4 G/DL (ref 12.1–17)
IMM GRANULOCYTES # BLD AUTO: 0 K/UL (ref 0–0.04)
IMM GRANULOCYTES NFR BLD AUTO: 0 % (ref 0–0.5)
LYMPHOCYTES # BLD: 0.4 K/UL (ref 0.8–3.5)
LYMPHOCYTES NFR BLD: 8 % (ref 12–49)
MCH RBC QN AUTO: 31.2 PG (ref 26–34)
MCHC RBC AUTO-ENTMCNC: 30 G/DL (ref 30–36.5)
MCV RBC AUTO: 104.2 FL (ref 80–99)
MONOCYTES # BLD: 0.7 K/UL (ref 0–1)
MONOCYTES NFR BLD: 14 % (ref 5–13)
NEUTS SEG # BLD: 3.6 K/UL (ref 1.8–8)
NEUTS SEG NFR BLD: 72 % (ref 32–75)
NRBC # BLD: 0 K/UL (ref 0–0.01)
NRBC BLD-RTO: 0 PER 100 WBC
PLATELET # BLD AUTO: 69 K/UL (ref 150–400)
PLATELET COMMENTS,PCOM: ABNORMAL
PMV BLD AUTO: 12.4 FL (ref 8.9–12.9)
POTASSIUM SERPL-SCNC: 4 MMOL/L (ref 3.5–5.1)
PROT SERPL-MCNC: 7.4 G/DL (ref 6.4–8.2)
RBC # BLD AUTO: 3.33 M/UL (ref 4.1–5.7)
RBC MORPH BLD: ABNORMAL
RBC MORPH BLD: ABNORMAL
SAMPLES BEING HELD,HOLD: NORMAL
SODIUM SERPL-SCNC: 137 MMOL/L (ref 136–145)
TROPONIN I SERPL-MCNC: 0.13 NG/ML
TROPONIN I SERPL-MCNC: 0.16 NG/ML
TROPONIN I SERPL-MCNC: 0.24 NG/ML
WBC # BLD AUTO: 5.1 K/UL (ref 4.1–11.1)

## 2019-10-02 PROCEDURE — 71045 X-RAY EXAM CHEST 1 VIEW: CPT

## 2019-10-02 PROCEDURE — 65660000000 HC RM CCU STEPDOWN

## 2019-10-02 PROCEDURE — 82550 ASSAY OF CK (CPK): CPT

## 2019-10-02 PROCEDURE — 80053 COMPREHEN METABOLIC PANEL: CPT

## 2019-10-02 PROCEDURE — 84484 ASSAY OF TROPONIN QUANT: CPT

## 2019-10-02 PROCEDURE — 74011250637 HC RX REV CODE- 250/637: Performed by: INTERNAL MEDICINE

## 2019-10-02 PROCEDURE — 93005 ELECTROCARDIOGRAM TRACING: CPT

## 2019-10-02 PROCEDURE — 99285 EMERGENCY DEPT VISIT HI MDM: CPT

## 2019-10-02 PROCEDURE — 74011250637 HC RX REV CODE- 250/637: Performed by: EMERGENCY MEDICINE

## 2019-10-02 PROCEDURE — 85025 COMPLETE CBC W/AUTO DIFF WBC: CPT

## 2019-10-02 PROCEDURE — 36415 COLL VENOUS BLD VENIPUNCTURE: CPT

## 2019-10-02 PROCEDURE — 82553 CREATINE MB FRACTION: CPT

## 2019-10-02 RX ORDER — SODIUM CHLORIDE 0.9 % (FLUSH) 0.9 %
5-40 SYRINGE (ML) INJECTION EVERY 8 HOURS
Status: DISCONTINUED | OUTPATIENT
Start: 2019-10-02 | End: 2019-10-05 | Stop reason: HOSPADM

## 2019-10-02 RX ORDER — FINASTERIDE 5 MG/1
5 TABLET, FILM COATED ORAL DAILY
Status: DISCONTINUED | OUTPATIENT
Start: 2019-10-03 | End: 2019-10-05 | Stop reason: HOSPADM

## 2019-10-02 RX ORDER — MIDODRINE HYDROCHLORIDE 5 MG/1
10 TABLET ORAL
Status: DISCONTINUED | OUTPATIENT
Start: 2019-10-03 | End: 2019-10-05 | Stop reason: HOSPADM

## 2019-10-02 RX ORDER — GUAIFENESIN 100 MG/5ML
81 LIQUID (ML) ORAL DAILY
Status: DISCONTINUED | OUTPATIENT
Start: 2019-10-03 | End: 2019-10-05 | Stop reason: HOSPADM

## 2019-10-02 RX ORDER — NITROGLYCERIN 0.4 MG/1
0.4 TABLET SUBLINGUAL
Status: DISCONTINUED | OUTPATIENT
Start: 2019-10-02 | End: 2019-10-05 | Stop reason: HOSPADM

## 2019-10-02 RX ORDER — METOPROLOL SUCCINATE 25 MG/1
12.5 TABLET, EXTENDED RELEASE ORAL DAILY
Status: DISCONTINUED | OUTPATIENT
Start: 2019-10-03 | End: 2019-10-04

## 2019-10-02 RX ORDER — SODIUM CHLORIDE 0.9 % (FLUSH) 0.9 %
5-40 SYRINGE (ML) INJECTION AS NEEDED
Status: DISCONTINUED | OUTPATIENT
Start: 2019-10-02 | End: 2019-10-05 | Stop reason: HOSPADM

## 2019-10-02 RX ORDER — ACETAMINOPHEN 325 MG/1
650 TABLET ORAL
Status: DISCONTINUED | OUTPATIENT
Start: 2019-10-02 | End: 2019-10-05 | Stop reason: HOSPADM

## 2019-10-02 RX ORDER — GUAIFENESIN 100 MG/5ML
325 LIQUID (ML) ORAL
Status: COMPLETED | OUTPATIENT
Start: 2019-10-02 | End: 2019-10-02

## 2019-10-02 RX ORDER — MIRTAZAPINE 15 MG/1
7.5 TABLET, FILM COATED ORAL
Status: DISCONTINUED | OUTPATIENT
Start: 2019-10-02 | End: 2019-10-05 | Stop reason: HOSPADM

## 2019-10-02 RX ORDER — FLUOXETINE 10 MG/1
20 CAPSULE ORAL DAILY
Status: DISCONTINUED | OUTPATIENT
Start: 2019-10-03 | End: 2019-10-05 | Stop reason: HOSPADM

## 2019-10-02 RX ORDER — PANTOPRAZOLE SODIUM 40 MG/1
40 TABLET, DELAYED RELEASE ORAL
Status: DISCONTINUED | OUTPATIENT
Start: 2019-10-03 | End: 2019-10-05 | Stop reason: HOSPADM

## 2019-10-02 RX ORDER — DOCUSATE SODIUM 100 MG/1
100 CAPSULE, LIQUID FILLED ORAL DAILY
Status: DISCONTINUED | OUTPATIENT
Start: 2019-10-03 | End: 2019-10-05 | Stop reason: HOSPADM

## 2019-10-02 RX ORDER — PRAVASTATIN SODIUM 10 MG/1
10 TABLET ORAL DAILY
Status: DISCONTINUED | OUTPATIENT
Start: 2019-10-03 | End: 2019-10-05 | Stop reason: HOSPADM

## 2019-10-02 RX ADMIN — ACETAMINOPHEN 650 MG: 325 TABLET ORAL at 22:20

## 2019-10-02 RX ADMIN — ASPIRIN 81 MG CHEWABLE TABLET 324 MG: 81 TABLET CHEWABLE at 18:43

## 2019-10-02 NOTE — ED NOTES
Patient arrived following dialysis where he started experiencing chest pain. Patient has a cardiac history.

## 2019-10-02 NOTE — PROGRESS NOTES
Pharmacy Medication Reconciliation     The patient was interviewed regarding current PTA medication list, use and drug allergies. The patient was questioned regarding use of any other inhalers, topical products, over the counter medications, herbal medications, vitamin products or ophthalmic/nasal/otic medication use. Sources: patient, daughter-in-law, [employer]    Allergy Update: Jeraldin [nalbuphine]    Recommendations/Findings: The following amendments were made to the patient's active medication list on file at [facility]:   1) Additions: none    2) Deletions:none    3) Changes:   From metoprolol succinate 25 mg daily to 12.5 mg daily    4) Pertinent Pharmacy Findings:  - Daughter-in-law, Rehan Parsons, said physician stopped Bumex, carvedilol, and flexeril yesterday and changed the Toprol XL dosing.      -Clarified PTA med list with daughter-in-law. PTA medication list was corrected to the following:     Prior to Admission Medications   Prescriptions Last Dose Informant Patient Reported? Taking? FLUoxetine (PROZAC) 20 mg capsule 10/2/2019 at am Child Yes Yes   Sig: Take 20 mg by mouth daily. aspirin 81 mg chewable tablet 10/2/2019 at am Child No Yes   Sig: Take 1 Tab by mouth daily. Indications: myocardial infarction prevention   docusate sodium (COLACE) 100 mg capsule 10/2/2019 at am Child Yes Yes   Sig: Take 100 mg by mouth daily. ferric citrate (AURYXIA) 210 mg iron tablet 10/2/2019 at am Child Yes Yes   Sig: Take 420 mg by mouth Before breakfast, lunch, and dinner. finasteride (PROSCAR) 5 mg tablet 10/2/2019 at am Child No Yes   Sig: TAKE 1 TABLET BY MOUTH EVERY DAY   metoprolol succinate (TOPROL-XL) 25 mg XL tablet 10/2/2019 at Unknown time Child No Yes   Sig: Take 0.5 Tabs by mouth daily. midodrine (PROAMITINE) 10 mg tablet 10/2/2019 at Unknown time Child No Yes   Sig: Take 1 Tab by mouth three (3) times daily (with meals) for 30 days.    mirtazapine (REMERON) 30 mg tablet 10/2/2019 at Unknown time Child No Yes   Sig: TAKE 1 TABLET BY MOUTH EVERY DAY AT NIGHT   nitroglycerin (NITROSTAT) 0.4 mg SL tablet  Child No No   Si Tab by SubLINGual route every five (5) minutes as needed for Chest Pain. omeprazole (PRILOSEC) 40 mg capsule 10/2/2019 at Unknown time Child No Yes   Sig: TAKE 1 CAPSULE BY MOUTH TWICE A DAY   pravastatin (PRAVACHOL) 80 mg tablet 10/1/2019 at pm Child No Yes   Sig: TAKE 1 TABLET BY MOUTH EVERY DAY AT NIGHT   pregabalin (LYRICA) 150 mg capsule 10/2/2019 at Unknown time Child Yes Yes   Sig: Take 150 mg by mouth three (3) times daily. ticagrelor (BRILINTA) 90 mg tablet 10/2/2019 at Unknown time Child No Yes   Sig: Take 1 Tab by mouth every twelve (12) hours every twelve (12) hours. traMADol (ULTRAM) 50 mg tablet 10/2/2019 at Unknown time Child No Yes   Sig: Take 1 Tab by mouth every six (6) hours as needed for Pain for up to 3 days.  Max Daily Amount: 200 mg.   traZODone (DESYREL) 150 mg tablet 10/1/2019 at pm Child No Yes   Sig: TAKE 1 TABLET BY MOUTH EVERY DAY AT NIGHT      Facility-Administered Medications: None          Thank you,  Della Schulz, [address] [facility] pharmacy student

## 2019-10-02 NOTE — ED PROVIDER NOTES
EMERGENCY DEPARTMENT HISTORY AND PHYSICAL EXAM      Date: 10/2/2019  Patient Name: Miles Vallejo Sr.    History of Presenting Illness     Chief Complaint   Patient presents with    Chest Pain     Presents to the ED via EMS with c/o CP while at dialysis today. History Provided By: Patient    HPI: Miles Vallejo Sr., 71 y.o. male with PMHx as noted below presents the emergency department chief complaint of chest pain. Patient states that during dialysis today he had sudden onset left-sided chest pain he describes a dull aching sensation that did not radiate. Patient notes pain has been constant however has significantly improved and is almost resolved on evaluation. Patient otherwise had no shortness of breath, cough or recent illness.       PCP: Geovanna Hutson DO    Current Facility-Administered Medications   Medication Dose Route Frequency Provider Last Rate Last Dose    acetaminophen (TYLENOL) tablet 650 mg  650 mg Oral Q6H PRN Akilah Lopez MD   650 mg at 10/02/19 2220    aspirin chewable tablet 81 mg  81 mg Oral DAILY Akilah Lopez MD        docusate sodium (COLACE) capsule 100 mg  100 mg Oral DAILY Akilah Lopez MD        ferric citrate (AURYXIA) tablet 420 mg  420 mg Oral TIDAC Akilah Lopez MD        finasteride (PROSCAR) tablet 5 mg  5 mg Oral DAILY Akilah Lopez MD        FLUoxetine (PROzac) capsule 20 mg  20 mg Oral DAILY Akilah Lopez MD        metoprolol succinate (TOPROL-XL) XL tablet 12.5 mg  12.5 mg Oral DAILY Akilah Lopez MD        midodrine (PROAMITINE) tablet 10 mg  10 mg Oral TID WITH MEALS Akilah Lopez MD        mirtazapine (REMERON) tablet 7.5 mg  7.5 mg Oral QHS Akilah Lopez MD   7.5 mg at 10/03/19 0000    nitroglycerin (NITROSTAT) tablet 0.4 mg  0.4 mg SubLINGual Q5MIN PRN Akilah Lopez MD        pantoprazole (PROTONIX) tablet 40 mg  40 mg Oral ACB Akilah Lopez MD        pravastatin (PRAVACHOL) tablet 10 mg  10 mg Oral DAILY Akilah Lopez MD  ticagrelor (BRILINTA) tablet 90 mg  90 mg Oral Q12H Neo Sahu MD   90 mg at 10/03/19 0000    sodium chloride (NS) flush 5-40 mL  5-40 mL IntraVENous Q8H Grazyna Keene MD   10 mL at 10/03/19 0000    sodium chloride (NS) flush 5-40 mL  5-40 mL IntraVENous PRN Grazyna Keene MD           Past History     Past Medical History:  Past Medical History:   Diagnosis Date    Anemia     Arthritis     back, hips    CAD (coronary artery disease)     Sees Dr. Rachel Varela, 4 heart attacks    [facility]     Chronic LBP 1990    Slipped on gravel and fell at work; Multiple surgeries;  Sees Dr. Gil Pierce for pain mgmt    Chronic pain     Confusion     Depression     Diabetes ([address]     ED (erectile dysfunction)     GERD (gastroesophageal reflux disease) 11/2015    [facility]    Hypercholesteremia     Hypertension     Liver disease     Psychiatric disorder     depression    PVD (peripheral vascular disease) ([address]     Thromboembolus ([address]     DVT in right leg       Past Surgical History:  Past Surgical History:   Procedure Laterality Date    CARDIAC SURG PROCEDURE UNLIST  09/06/2019    PTCA x 4 stents    HX ABOVE KNEE AMPUTATION Left 2013    Left knee stump non-healing ulcer; Dr. Elli Ferreira Left     HX APPENDECTOMY      HX BELOW KNEE AMPUTATION      Left leg    HX CHOLECYSTECTOMY      HX GI      HX HIP REPLACEMENT      right hip replaced x 2    HX ORTHOPAEDIC  1990s    4 back surgeries    HX ORTHOPAEDIC      donna ankles pin placed    HX ORTHOPAEDIC      left leg 17 surgeries    HX VASCULAR ACCESS Left     port left arm    UPPER GI ENDOSCOPY,BIOPSY  9/2/2015         UPPER GI ENDOSCOPY,BIOPSY  10/12/2015         VASCULAR SURGERY PROCEDURE UNLIST      Multiple revascularization procedures, toe amputations       Family History:  Family History   Problem Relation Age of Onset    Alcohol abuse Father     Diabetes Sister     Diabetes Sister     Lung Disease Mother     Cancer Maternal Grandfather         Head and neck    Cancer Paternal Grandmother         Stomach       Social History:  Social History     Tobacco Use    Smoking status: Current Every Day Smoker     Packs/day: 1.00     Years: 35.00     Pack years: 35.00    Smokeless tobacco: Never Used    Tobacco comment: 30 pack years; Occasional cigar   Substance Use Topics    Alcohol use: No     Comment: Former heavy drinker quit drinking about 17 years ago    Drug use: No     Types: Prescription       Allergies: Allergies   Allergen Reactions    Nubain [Nalbuphine] Other (comments)     Made me wild; Dysphoria         Review of Systems   Review of Systems  Constitutional: Negative for fever, chills, and fatigue. HENT: Negative for congestion, sore throat, rhinorrhea, sneezing and neck stiffness   Eyes: Negative for discharge and redness. Respiratory: Negative for  shortness of breath, wheezing   Cardiovascular: Positive for chest pain, negative palpitations   Gastrointestinal: Negative for nausea, vomiting, abdominal pain, constipation, diarrhea and blood in stool. Genitourinary: Negative for dysuria, hematuria, flank pain, decreased urine volume, discharge,   Musculoskeletal: Negative for myalgias or joint pain . Skin: Negative for rash or lesions . Neurological: Negative weakness, light-headedness, numbness and headaches. Physical Exam   Physical Exam    GENERAL: alert and oriented, no acute distress  EYES: PEERL, No injection, discharge or icterus. ENT: Mucous membranes pink and moist.  NECK: Supple  LUNGS: Airway patent. Non-labored respirations. Breath sounds clear with good air entry bilaterally. HEART: Regular rate and rhythm. No peripheral edema  ABDOMEN: Non-distended and non-tender, without guarding or rebound.   SKIN:  warm, dry  MSK/EXTREMITIES: Left AKA  NEUROLOGICAL: Alert, oriented      Diagnostic Study Results     Labs -     Recent Results (from the past 12 hour(s))   EKG, 12 LEAD, INITIAL    Collection Time: 10/02/19  2:06 PM   Result Value Ref Range    Ventricular Rate 67 BPM    Atrial Rate 67 BPM    P-R Interval 200 ms    QRS Duration 154 ms    Q-T Interval 482 ms    QTC Calculation (Bezet) 509 ms    Calculated P Axis 110 degrees    Calculated R Axis -54 degrees    Calculated T Axis -42 degrees    Diagnosis       Normal sinus rhythm  Left axis deviation  Right bundle branch block  Moderate voltage criteria for LVH, may be normal variant  Inferior infarct (cited on or before 04-SEP-2019)  When compared with ECG of 16-SEP-2019 15:54,  Sinus rhythm has replaced Atrial fibrillation  Criteria for Anteroseptal infarct are no longer present  T wave inversion now evident in Inferior leads  T wave inversion no longer evident in Lateral leads     CBC WITH AUTOMATED DIFF    Collection Time: 10/02/19  2:19 PM   Result Value Ref Range    WBC 5.1 4.1 - 11.1 K/uL    RBC 3.33 (L) 4.10 - 5.70 M/uL    HGB 10.4 (L) 12.1 - 17.0 g/dL    HCT 34.7 (L) 36.6 - 50.3 %    .2 (H) 80.0 - 99.0 FL    MCH 31.2 26.0 - 34.0 PG    MCHC 30.0 30.0 - 36.5 g/dL    RDW 18.6 (H) 11.5 - 14.5 %    PLATELET 69 (L) 781 - 400 K/uL    MPV 12.4 8.9 - 12.9 FL    NRBC 0.0 0  WBC    ABSOLUTE NRBC 0.00 0.00 - 0.01 K/uL    NEUTROPHILS 72 32 - 75 %    LYMPHOCYTES 8 (L) 12 - 49 %    MONOCYTES 14 (H) 5 - 13 %    EOSINOPHILS 5 0 - 7 %    BASOPHILS 1 0 - 1 %    IMMATURE GRANULOCYTES 0 0.0 - 0.5 %    ABS. NEUTROPHILS 3.6 1.8 - 8.0 K/UL    ABS. LYMPHOCYTES 0.4 (L) 0.8 - 3.5 K/UL    ABS. MONOCYTES 0.7 0.0 - 1.0 K/UL    ABS. EOSINOPHILS 0.3 0.0 - 0.4 K/UL    ABS. BASOPHILS 0.1 0.0 - 0.1 K/UL    ABS. IMM.  GRANS. 0.0 0.00 - 0.04 K/UL    DF AUTOMATED      PLATELET COMMENTS DECREASED PLATELETS      RBC COMMENTS MACROCYTOSIS  1+        RBC COMMENTS ANISOCYTOSIS  1+       METABOLIC PANEL, COMPREHENSIVE    Collection Time: 10/02/19  2:19 PM   Result Value Ref Range    Sodium 137 136 - 145 mmol/L    Potassium 4.0 3.5 - 5.1 mmol/L    Chloride 101 97 - 108 mmol/L    CO2 33 (H) 21 - 32 mmol/L    Anion gap 3 (L) 5 - 15 mmol/L    Glucose 122 (H) 65 - 100 mg/dL    BUN 43 (H) 6 - 20 MG/DL    Creatinine 6.34 (H) 0.70 - 1.30 MG/DL    BUN/Creatinine ratio 7 (L) 12 - 20      GFR est AA 11 (L) >60 ml/min/1.73m2    GFR est non-AA 9 (L) >60 ml/min/1.73m2    Calcium 8.2 (L) 8.5 - 10.1 MG/DL    Bilirubin, total 0.8 0.2 - 1.0 MG/DL    ALT (SGPT) <6 (L) 12 - 78 U/L    AST (SGOT) 21 15 - 37 U/L    Alk. phosphatase 81 45 - 117 U/L    Protein, total 7.4 6.4 - 8.2 g/dL    Albumin 3.0 (L) 3.5 - 5.0 g/dL    Globulin 4.4 (H) 2.0 - 4.0 g/dL    A-G Ratio 0.7 (L) 1.1 - 2.2     CK W/ REFLX CKMB    Collection Time: 10/02/19  2:19 PM   Result Value Ref Range    CK 58 39 - 308 U/L   TROPONIN I    Collection Time: 10/02/19  2:19 PM   Result Value Ref Range    Troponin-I, Qt. 0.16 (H) <0.05 ng/mL   SAMPLES BEING HELD    Collection Time: 10/02/19  2:19 PM   Result Value Ref Range    SAMPLES BEING HELD blue     COMMENT        Add-on orders for these samples will be processed based on acceptable specimen integrity and analyte stability, which may vary by analyte. TROPONIN I    Collection Time: 10/02/19  4:18 PM   Result Value Ref Range    Troponin-I, Qt. 0.13 (H) <0.05 ng/mL   TROPONIN I    Collection Time: 10/02/19  5:19 PM   Result Value Ref Range    Troponin-I, Qt. 0.24 (H) <0.05 ng/mL   CK W/ CKMB & INDEX    Collection Time: 10/02/19 10:44 PM   Result Value Ref Range    CK 47 39 - 308 U/L    CK - MB 1.3 <3.6 NG/ML    CK-MB Index 2.8 (H) 0.0 - 2.5         Radiologic Studies -   XR CHEST PORT   Final Result   IMPRESSION: No acute findings. CT Results  (Last 48 hours)    None        CXR Results  (Last 48 hours)               10/02/19 1544  XR CHEST PORT Final result    Impression:  IMPRESSION: No acute findings.        Narrative:  EXAM: XR CHEST PORT       INDICATION: chest pain       COMPARISON: Chest x-ray 9/16/2019. FINDINGS: A portable AP radiograph of the chest was obtained at 15:18 hours. The   patient is on a cardiac monitor. The lungs are clear. The cardiac and   mediastinal contours and pulmonary vascularity are normal.  The bones and soft   tissues show degenerative spine changes, surgical skin staples overlying the   right shoulder, and osteoarthritic changes at the shoulders but otherwise are   grossly within normal limits. Visualized upper abdomen appears unremarkable. Medical Decision Making   I am the first provider for this patient. I reviewed the vital signs, available nursing notes, past medical history, past surgical history, family history and social history. Vital Signs-Reviewed the patient's vital signs. Patient Vitals for the past 12 hrs:   Temp Pulse Resp BP SpO2   10/02/19 2324 97.4 °F (36.3 °C) 65 21 144/48 97 %   10/02/19 1404 98.1 °F (36.7 °C) 67 18 135/54 97 %       EKG interpretation: (Preliminary)  Rhythm: normal sinus rhythm; and regular . Rate (approx.): 67; Axis: LAD; P wave: normal; QRS interval: normal ; ST/T wave: normal;  was interpreted by Andrew Goel MD,ED Provider. Records Reviewed: Nursing Notes and Old Medical Records    Provider Notes (Medical Decision Making): On presentation, the patient is well appearing, in no acute distress with normal vital signs. Based on my history and exam the differential diagnosis for this patient includes ACS, muscular skeletal chest pain, pneumonia, pneumothorax. EKG with no new acute ischemic changes however troponin was noted to be trending upwards on repeat. Discussed case with Dr. Reagan Nelson who recommended no anticoagulation at this time and continue to trend troponins, if troponins continue to trend upward and pain returns should start anticoagulation at that time. We will plan to admit to the hospitalist for further management. ED Course:   Initial assessment performed.  The patients presenting problems have been discussed, and they are in agreement with the care plan formulated and outlined with them. I have encouraged them to ask questions as they arise throughout their visit. PROGRESS NOTE:  The patient has been re-evaluated, with only very mild pain at this time. . Reviewed available results with patient and have counseled them on diagnosis and care plan. They have expressed understanding, and all their questions have been answered. They agree with plan . Admit Note  Patient is being admitted to the hospitalist.  The results of their tests and reasons for their admission have been discussed with them and/or available family. They convey agreement and understanding for the need to be admitted and for their admission diagnosis. Consultation has been made with the inpatient physician specialist for hospitalization. Disposition:  Admission  PLAN:  1. Admit    Diagnosis     Clinical Impression:   1. Elevated troponin    2. Chest pain, unspecified type    3.  [facility]

## 2019-10-03 LAB
ALBUMIN SERPL-MCNC: 2.9 G/DL (ref 3.5–5)
ALBUMIN/GLOB SERPL: 0.7 {RATIO} (ref 1.1–2.2)
ALP SERPL-CCNC: 77 U/L (ref 45–117)
ALT SERPL-CCNC: <6 U/L (ref 12–78)
ANION GAP SERPL CALC-SCNC: 9 MMOL/L (ref 5–15)
AST SERPL-CCNC: 19 U/L (ref 15–37)
ATRIAL RATE: 67 BPM
ATRIAL RATE: 67 BPM
BILIRUB SERPL-MCNC: 1 MG/DL (ref 0.2–1)
BUN SERPL-MCNC: 60 MG/DL (ref 6–20)
BUN/CREAT SERPL: 8 (ref 12–20)
CALCIUM SERPL-MCNC: 8.6 MG/DL (ref 8.5–10.1)
CALCULATED P AXIS, ECG09: 110 DEGREES
CALCULATED P AXIS, ECG09: 40 DEGREES
CALCULATED R AXIS, ECG10: -52 DEGREES
CALCULATED R AXIS, ECG10: -54 DEGREES
CALCULATED T AXIS, ECG11: -35 DEGREES
CALCULATED T AXIS, ECG11: -42 DEGREES
CHLORIDE SERPL-SCNC: 104 MMOL/L (ref 97–108)
CO2 SERPL-SCNC: 26 MMOL/L (ref 21–32)
CREAT SERPL-MCNC: 7.16 MG/DL (ref 0.7–1.3)
DIAGNOSIS, 93000: NORMAL
DIAGNOSIS, 93000: NORMAL
ERYTHROCYTE [DISTWIDTH] IN BLOOD BY AUTOMATED COUNT: 18.3 % (ref 11.5–14.5)
GLOBULIN SER CALC-MCNC: 4.2 G/DL (ref 2–4)
GLUCOSE BLD STRIP.AUTO-MCNC: 112 MG/DL (ref 65–100)
GLUCOSE BLD STRIP.AUTO-MCNC: 120 MG/DL (ref 65–100)
GLUCOSE BLD STRIP.AUTO-MCNC: 136 MG/DL (ref 65–100)
GLUCOSE BLD STRIP.AUTO-MCNC: 90 MG/DL (ref 65–100)
GLUCOSE SERPL-MCNC: 121 MG/DL (ref 65–100)
HCT VFR BLD AUTO: 32.3 % (ref 36.6–50.3)
HGB BLD-MCNC: 9.9 G/DL (ref 12.1–17)
MCH RBC QN AUTO: 32.5 PG (ref 26–34)
MCHC RBC AUTO-ENTMCNC: 30.7 G/DL (ref 30–36.5)
MCV RBC AUTO: 105.9 FL (ref 80–99)
NRBC # BLD: 0 K/UL (ref 0–0.01)
NRBC BLD-RTO: 0 PER 100 WBC
P-R INTERVAL, ECG05: 200 MS
P-R INTERVAL, ECG05: 208 MS
PLATELET # BLD AUTO: 56 K/UL (ref 150–400)
PMV BLD AUTO: 12.7 FL (ref 8.9–12.9)
POTASSIUM SERPL-SCNC: 3.9 MMOL/L (ref 3.5–5.1)
PROT SERPL-MCNC: 7.1 G/DL (ref 6.4–8.2)
Q-T INTERVAL, ECG07: 458 MS
Q-T INTERVAL, ECG07: 482 MS
QRS DURATION, ECG06: 154 MS
QRS DURATION, ECG06: 154 MS
QTC CALCULATION (BEZET), ECG08: 483 MS
QTC CALCULATION (BEZET), ECG08: 509 MS
RBC # BLD AUTO: 3.05 M/UL (ref 4.1–5.7)
SERVICE CMNT-IMP: ABNORMAL
SERVICE CMNT-IMP: NORMAL
SODIUM SERPL-SCNC: 139 MMOL/L (ref 136–145)
TROPONIN I SERPL-MCNC: 0.12 NG/ML
VENTRICULAR RATE, ECG03: 67 BPM
VENTRICULAR RATE, ECG03: 67 BPM
WBC # BLD AUTO: 4 K/UL (ref 4.1–11.1)

## 2019-10-03 PROCEDURE — 74011250637 HC RX REV CODE- 250/637: Performed by: NURSE PRACTITIONER

## 2019-10-03 PROCEDURE — 74011250637 HC RX REV CODE- 250/637: Performed by: INTERNAL MEDICINE

## 2019-10-03 PROCEDURE — 82962 GLUCOSE BLOOD TEST: CPT

## 2019-10-03 PROCEDURE — 85027 COMPLETE CBC AUTOMATED: CPT

## 2019-10-03 PROCEDURE — 90935 HEMODIALYSIS ONE EVALUATION: CPT

## 2019-10-03 PROCEDURE — 84484 ASSAY OF TROPONIN QUANT: CPT

## 2019-10-03 PROCEDURE — 36415 COLL VENOUS BLD VENIPUNCTURE: CPT

## 2019-10-03 PROCEDURE — 80053 COMPREHEN METABOLIC PANEL: CPT

## 2019-10-03 PROCEDURE — 65660000000 HC RM CCU STEPDOWN

## 2019-10-03 PROCEDURE — 5A1D70Z PERFORMANCE OF URINARY FILTRATION, INTERMITTENT, LESS THAN 6 HOURS PER DAY: ICD-10-PCS | Performed by: INTERNAL MEDICINE

## 2019-10-03 RX ORDER — INSULIN LISPRO 100 [IU]/ML
INJECTION, SOLUTION INTRAVENOUS; SUBCUTANEOUS
Status: DISCONTINUED | OUTPATIENT
Start: 2019-10-03 | End: 2019-10-05 | Stop reason: HOSPADM

## 2019-10-03 RX ORDER — OXYCODONE AND ACETAMINOPHEN 5; 325 MG/1; MG/1
1 TABLET ORAL ONCE
Status: COMPLETED | OUTPATIENT
Start: 2019-10-03 | End: 2019-10-03

## 2019-10-03 RX ORDER — DEXTROSE 50 % IN WATER (D50W) INTRAVENOUS SYRINGE
12.5-25 AS NEEDED
Status: DISCONTINUED | OUTPATIENT
Start: 2019-10-03 | End: 2019-10-05 | Stop reason: HOSPADM

## 2019-10-03 RX ORDER — OXYCODONE AND ACETAMINOPHEN 5; 325 MG/1; MG/1
2 TABLET ORAL
Status: DISCONTINUED | OUTPATIENT
Start: 2019-10-03 | End: 2019-10-05 | Stop reason: HOSPADM

## 2019-10-03 RX ORDER — MAGNESIUM SULFATE 100 %
4 CRYSTALS MISCELLANEOUS AS NEEDED
Status: DISCONTINUED | OUTPATIENT
Start: 2019-10-03 | End: 2019-10-05 | Stop reason: HOSPADM

## 2019-10-03 RX ORDER — OXYCODONE AND ACETAMINOPHEN 5; 325 MG/1; MG/1
2 TABLET ORAL ONCE
Status: COMPLETED | OUTPATIENT
Start: 2019-10-03 | End: 2019-10-03

## 2019-10-03 RX ORDER — OXYCODONE AND ACETAMINOPHEN 5; 325 MG/1; MG/1
1 TABLET ORAL
Status: DISCONTINUED | OUTPATIENT
Start: 2019-10-03 | End: 2019-10-03

## 2019-10-03 RX ADMIN — FERRIC CITRATE 420 MG: 210 TABLET, COATED ORAL at 18:46

## 2019-10-03 RX ADMIN — Medication 10 ML: at 20:29

## 2019-10-03 RX ADMIN — FINASTERIDE 5 MG: 5 TABLET, FILM COATED ORAL at 10:57

## 2019-10-03 RX ADMIN — MIDODRINE HYDROCHLORIDE 10 MG: 5 TABLET ORAL at 10:57

## 2019-10-03 RX ADMIN — OXYCODONE HYDROCHLORIDE AND ACETAMINOPHEN 2 TABLET: 5; 325 TABLET ORAL at 00:29

## 2019-10-03 RX ADMIN — ASPIRIN 81 MG CHEWABLE TABLET 81 MG: 81 TABLET CHEWABLE at 10:57

## 2019-10-03 RX ADMIN — MIRTAZAPINE 7.5 MG: 15 TABLET, FILM COATED ORAL at 00:00

## 2019-10-03 RX ADMIN — Medication 10 ML: at 00:00

## 2019-10-03 RX ADMIN — OXYCODONE HYDROCHLORIDE AND ACETAMINOPHEN 1 TABLET: 5; 325 TABLET ORAL at 14:14

## 2019-10-03 RX ADMIN — TICAGRELOR 90 MG: 90 TABLET ORAL at 10:57

## 2019-10-03 RX ADMIN — MIDODRINE HYDROCHLORIDE 10 MG: 5 TABLET ORAL at 14:14

## 2019-10-03 RX ADMIN — TICAGRELOR 90 MG: 90 TABLET ORAL at 00:00

## 2019-10-03 RX ADMIN — METOPROLOL SUCCINATE 12.5 MG: 25 TABLET, EXTENDED RELEASE ORAL at 10:57

## 2019-10-03 RX ADMIN — OXYCODONE HYDROCHLORIDE AND ACETAMINOPHEN 1 TABLET: 5; 325 TABLET ORAL at 15:55

## 2019-10-03 RX ADMIN — MIDODRINE HYDROCHLORIDE 10 MG: 5 TABLET ORAL at 18:46

## 2019-10-03 RX ADMIN — MIRTAZAPINE 7.5 MG: 15 TABLET, FILM COATED ORAL at 20:28

## 2019-10-03 RX ADMIN — TICAGRELOR 90 MG: 90 TABLET ORAL at 20:25

## 2019-10-03 RX ADMIN — OXYCODONE AND ACETAMINOPHEN 2 TABLET: 5; 325 TABLET ORAL at 20:25

## 2019-10-03 RX ADMIN — FERRIC CITRATE 420 MG: 210 TABLET, COATED ORAL at 14:14

## 2019-10-03 RX ADMIN — FLUOXETINE 20 MG: 10 CAPSULE ORAL at 10:57

## 2019-10-03 RX ADMIN — Medication 10 ML: at 04:08

## 2019-10-03 RX ADMIN — PRAVASTATIN SODIUM 10 MG: 10 TABLET ORAL at 10:57

## 2019-10-03 RX ADMIN — Medication 10 ML: at 14:16

## 2019-10-03 RX ADMIN — PANTOPRAZOLE SODIUM 40 MG: 40 TABLET, DELAYED RELEASE ORAL at 10:57

## 2019-10-03 NOTE — PROGRESS NOTES
0364  TRANSFER - IN REPORT:    Verbal report received from Renata BRADY (name) on [employer].  being received from ED (unit) for routine progression of care      Report consisted of patients Situation, Background, Assessment and   Recommendations(SBAR). Information from the following report(s) SBAR was reviewed with the receiving nurse. Opportunity for questions and clarification was provided. Assessment completed upon patients arrival to unit and care assumed. 1215  Paged ED doctor on call Dr. Beto Mosley, and relayed to Dr. Beto Mosley what the patient requested for pain medication for patient's chronic back pain, according to the patient \"Lyrica does not work for me, Tramadol does not work, I take two tablets of Percocet and that works for my back pain. \"

## 2019-10-03 NOTE — CONSULTS
Consultation Note    NAME: Sebas Plascencia   :  1955   MRN:  [id number]     Date/Time:  10/3/2019 2:29 PM    I have been asked to see this patient by Asad Peralta for advice/opinion re: ESKD. Assessment :    Plan:  ESKD  AVF  CAD/CP  HTN MWF HD at [facility] (s/p shortened treatment yesterday)    2 hours HD today to see how he tolerates (Lucy Petty is aware)       Subjective:   CHIEF COMPLAINT:  ESKD    HISTORY OF PRESENT ILLNESS:     Dory Munoz is a 71 y.o.   male who has a history of the below. Mr. Bolivar Alamo was sent to the ER yesterday after about an hour of HD b/c cp. No current cp, but state he has had some since lunch. Mild sob. Past Medical History:   Diagnosis Date    Anemia     Arthritis     back, hips    CAD (coronary artery disease)     Sees Dr. Tra Black, 4 heart attacks    Chronic kidney disease     Dr. Eugenio Todd, [facility]     Chronic LBP     Slipped on gravel and fell at work; Multiple surgeries;  Sees Dr. Zina August for pain mgmt    Chronic pain     Confusion     Depression     Diabetes ([address]     ED (erectile dysfunction)     GERD (gastroesophageal reflux disease) 2015    Diagnosed at [facility] last month    Hypercholesteremia     Hypertension     Liver disease     Psychiatric disorder     depression    PVD (peripheral vascular disease) ([address]     Thromboembolus ([address]     DVT in right leg      Past Surgical History:   Procedure Laterality Date    CARDIAC SURG PROCEDURE UNLIST  2019    PTCA x 4 stents    HX ABOVE KNEE AMPUTATION Left     Left knee stump non-healing ulcer; Dr. Isabelle Gleason Left     HX APPENDECTOMY      HX BELOW KNEE AMPUTATION      Left leg    HX CHOLECYSTECTOMY      HX GI      HX HIP REPLACEMENT      right hip replaced x 2    HX ORTHOPAEDIC  1990s    4 back surgeries    HX ORTHOPAEDIC      donna ankles pin placed    HX ORTHOPAEDIC      left leg 17 surgeries    HX VASCULAR ACCESS Left     port left arm    UPPER GI ENDOSCOPY,BIOPSY  9/2/2015         UPPER GI ENDOSCOPY,BIOPSY  10/12/2015         VASCULAR SURGERY PROCEDURE UNLIST      Multiple revascularization procedures, toe amputations     Social History     Tobacco Use    Smoking status: Current Every Day Smoker     Packs/day: 1.00     Years: 35.00     Pack years: 35.00    Smokeless tobacco: Never Used    Tobacco comment: 30 pack years; Occasional cigar   Substance Use Topics    Alcohol use: No     Comment: Former heavy drinker quit drinking about 17 years ago      Family History   Problem Relation Age of Onset    Alcohol abuse Father     Diabetes Sister     Diabetes Sister     Lung Disease Mother     Cancer Maternal Grandfather         Head and neck    Cancer Paternal Grandmother         Stomach      Allergies   Allergen Reactions    Nubain [Nalbuphine] Other (comments)     Made me wild; Dysphoria      Prior to Admission medications    Medication Sig Start Date End Date Taking? Authorizing Provider   traMADol (ULTRAM) 50 mg tablet Take 1 Tab by mouth every six (6) hours as needed for Pain for up to 3 days. Max Daily Amount: 200 mg. 10/1/19 10/4/19 Yes Clifton Brady MD   ticagrelor (BRILINTA) 90 mg tablet Take 1 Tab by mouth every twelve (12) hours every twelve (12) hours. 9/14/19  Yes John Maloney MD   midodrine (PROAMITINE) 10 mg tablet Take 1 Tab by mouth three (3) times daily (with meals) for 30 days. 9/14/19 10/14/19 Yes John Maloney MD   metoprolol succinate (TOPROL-XL) 25 mg XL tablet Take 0.5 Tabs by mouth daily. 9/15/19  Yes John Maloney MD   pregabalin (LYRICA) 150 mg capsule Take 150 mg by mouth three (3) times daily. Yes Provider, Historical   ferric citrate (AURYXIA) 210 mg iron tablet Take 420 mg by mouth Before breakfast, lunch, and dinner. Yes Provider, Historical   docusate sodium (COLACE) 100 mg capsule Take 100 mg by mouth daily.    Yes Provider, Historical   pravastatin (PRAVACHOL) 80 mg tablet TAKE 1 TABLET BY MOUTH EVERY DAY AT NIGHT 5/25/19  Yes Michael Ndiaye MD   FLUoxetine (PROZAC) 20 mg capsule Take 20 mg by mouth daily. Yes Provider, Luis   traZODone (DESYREL) 150 mg tablet TAKE 1 TABLET BY MOUTH EVERY DAY AT NIGHT 12/1/18  Yes Basilio Kumar MD   mirtazapine (REMERON) 30 mg tablet TAKE 1 TABLET BY MOUTH EVERY DAY AT NIGHT 12/1/18  Yes Basilio Kumar MD   omeprazole (PRILOSEC) 40 mg capsule TAKE 1 CAPSULE BY MOUTH TWICE A DAY 12/1/18  Yes Basilio Kumar MD   finasteride (PROSCAR) 5 mg tablet TAKE 1 TABLET BY MOUTH EVERY DAY 12/1/18  Yes Basilio Kumar MD   aspirin 81 mg chewable tablet Take 1 Tab by mouth daily. Indications: myocardial infarction prevention 6/1/18  Yes Michael Ndiaye MD   nitroglycerin (NITROSTAT) 0.4 mg SL tablet 1 Tab by SubLINGual route every five (5) minutes as needed for Chest Pain.  6/18/15   Good Sarmiento MD     REVIEW OF SYSTEMS:     []  Unable to obtain reliable ROS due to  [] mental status  [] sedated   [] intubated   [x] Total of 12 systems reviewed as follows:  Constitutional: negative fever, negative chills, negative weight loss  Eyes:   negative visual changes  ENT:   negative sore throat, tongue or lip swelling  Respiratory:  negative cough, + dyspnea  Cards:  negative for  palpitations, lower extremity edema; + chest pain  GI:   negative for nausea, vomiting, diarrhea, and abdominal pain  :  negative for frequency, dysuria  Integument:  negative for rash and pruritus  Heme:  negative for easy bruising and gum/nose bleeding  Musculoskel: negative for myalgias,  back pain and muscle weakness  Neuro:  negative for headaches, dizziness, vertigo  Psych:  negative for feelings of anxiety, depression   Travel?: none    Objective:   VITALS:    Visit Vitals  /54 (BP 1 Location: Right arm, BP Patient Position: At rest)   Pulse 60   Temp 97.8 °F (36.6 °C)   Resp 15   Ht 5' 9\" (1.753 m)   Wt 71.8 kg (158 lb 4.8 oz)   SpO2 97%   BMI 23.38 kg/m²     PHYSICAL EXAM:  Gen:  [x]  WD [x]  WN  [] cachectic []  thin []  obese []  disheveled             [x]  ill apearing  []   Critical  [x]   Chronic    [x]  No acute distress    HEENT:   [x] NC/AT/PERRLA/EOMI    [] pink conjunctivae      [] pale conjunctivae                  PERRL  [] yes  [] no      [] moist mucosa    [] dry mucosa    hearing intact to voice [x] yes  [] No                 NECK:   supple [x] yes  [] no        masses [] yes  [] No               thyroid  []  non tender  []  tender    RESP:   [] CTA bilaterally/no wheezing/rhonchi/rales/crackles    [] rhonchi bilaterally - no dullness  [] wheezing   [] rhonchi   [x] crackles (a few)  use of accessory muscles [] yes [] no    CARD:   [x]  regular rate and rhythm/No murmurs/rubs/gallops    murmur  [] yes ()  [] no      Rubs  [] yes  [] no       Gallops [] yes  [] no    Rate []  regular  []  irregular        carotid bruits  [] Right  []  Left                 LE edema [] yes  [x] no           JVP  []  yes   []  no    ABD:    [x] soft/non distended/non tender/+bowel sounds/no HSM    []  Rigid    tenderness [] yes [] no   Liver enlargement  []  yes []  no                Spleen enlargement  []  yes []  no     distended []  yes [] no     bowel sound  [] hypoactive   [] hyperactive    LYMPH:    Neck []  yes [x]  no       Axillae []  yes []  no    SKIN:   Rashes []  yes   [x]  no    Ulcers []  yes   []  no               [] tight to palpitation    skin turgor []  good  [] poor  [] decreased               Cyanosis/clubbing []  yes []  no    NEUR:   [x] cranial nerves II-XII grossly intact       [] Cranial nerves deficit                 []  facial droop    []  slurred speech   [] aphasic     [] Strength normal     []  weakness  []  LUE  []   RUE/ []  LLE  []   RLE    follows commands  []  yes []  no           PSYCH:   insight [] poor [x] good   Alert and Oriented to  [x] person  [x] place  [x]  time                    [] depressed [] anxious [] agitated  [] lethargic [] stuporous  [] sedated     LAB DATA REVIEWED:    Recent Labs     10/03/19  0411 10/02/19  1419   WBC 4.0* 5.1   HGB 9.9* 10.4*   HCT 32.3* 34.7*   PLT 56* 69*     Recent Labs     10/03/19  0411 10/02/19  1419    137   K 3.9 4.0    101   CO2 26 33*   BUN 60* 43*   CREA 7.16* 6.34*   * 122*   CA 8.6 8.2*     Recent Labs     10/03/19  0411 10/02/19  1419   SGOT 19 21   ALT <6* <6*   AP 77 81   TBILI 1.0 0.8   ALB 2.9* 3.0*   GLOB 4.2* 4.4*     No results for input(s): INR, PTP, APTT, INREXT in the last 72 hours. No results for input(s): FE, TIBC, PSAT, FERR in the last 72 hours. No results for input(s): PH, PCO2, PO2 in the last 72 hours. Recent Labs     10/02/19  2244   CPK 47   CKMB 1.3     Lab Results   Component Value Date/Time    Glucose (POC) 90 10/03/2019 12:50 PM    Glucose (POC) 120 (H) 10/03/2019 08:01 AM    Glucose (POC) 87 10/01/2019 01:12 PM    Glucose (POC) 101 (H) 10/01/2019 11:51 AM    Glucose (POC) 228 (H) 09/19/2019 11:41 AM    Glucose (POC) 107 (H) 09/19/2019 07:47 AM       Procedures: see electronic medical records for all procedures/Xrays and details which were not copied into this note but were reviewed prior to creation of Plan.    ________________________________________________________________________       ___________________________________________________  Consulting Physician:  Lupe Dyson MD

## 2019-10-03 NOTE — PROGRESS NOTES
TRANSFER - IN REPORT:    Verbal report received from [facility]) on Keerthi Matomy Money Sr.  being received from [id number]     Report consisted of patients Situation, Background, Assessment and   Recommendations(SBAR). Information from the following report(s) Kardex was reviewed with the receiving nurse. Opportunity for questions and clarification was provided. Assessment completed upon patients arrival to unit and care assumed.

## 2019-10-03 NOTE — H&P
Hospitalist Admission Note    NAME: Radha Valentin   :  1955   MRN:  [id number]     Date/Time:  10/2/2019 8:58 PM    Patient PCP: Arsen Villalta, DO  ______________________________________________________________________  Given the patient's current clinical presentation, I have a high level of concern for decompensation if discharged from the emergency department. Complex decision making was performed, which includes reviewing the patient's available past medical records, laboratory results, and x-ray films. My assessment of this patient's clinical condition and my plan of care is as follows.     Assessment / Plan:      NSTEMI  · Troponin is higher than baseline  · Given his history of end-stage renal disease will order CK and CK-MB and then calculate the ratio  · Was given full dose of aspirin in the emergency department  · Continue aspirin and Brilinta  · Continue nitro sublingual as needed for pain  · Continue following troponin  · Repeat EKG  · Cardiology are following, input is appreciated  · MALENA score is 5    Coronary artery disease  · Status post stents x6, last for stents last month  · Unwell antiplatelets aspirin and Brilinta we will continue  · Continue metoprolol at home dose  · Nitro pain    Chronic thrombocytopenia  · Platelets 78,287 now from 100,000 last month  · His platelets always has been low in the past  · unLikely heparin-induced thrombocytopenia although he had a heparin exposure last month  · Follow platelet counts closely  · Patient blood smear     End-stage renal disease  · On hemodialysis 3 times a week Monday/Wednesday/Friday  · Follow kidney function daily  ·   History of peripheral vascular disease  · Patient smoking last month  · Continue aspirin  · Status post left above-knee amputation and right second toe amputation    History of diabetes mellitus  · Patient has been managed with medications              Code Status: DNR    DVT Prophylaxis: ASA+Brilinta  GI Prophylaxis: not indicated    Baseline: not active      Subjective:   CHIEF COMPLAINT: chest pain     HISTORY OF PRESENT ILLNESS:         Patient is 71years old  man with past medical history coronary artery disease status post PCI x6 last PCI times 4 September 2019, diabetes mellitus type 2, end-stage renal disease on hemodialysis 3 times a week, peripheral vascular disease status post left above-knee amputation and right second toe amputation, tobacco abuse presented to the emergency department from the dialysis center after he had a sharp chest pain. He reported that his chest pain is mostly on the left side, nonradiating, sharp in nature, lasted for 2 hours, relieved by nitro and aspirin. He also reported that this chest pain is very similar to the chest pain he had last month when he had his heart attack and he was concerned this is why he came to the emergency department. Patient reported that he quit smoking last month after his heart attack and he does not drink alcohol or use illicit drugs. In the emergency department his EKG did not reveal any acute changes however his troponin was elevated more than his baseline for which cardiology was consulted stat from the emergency department and they recommended to give full dose of aspirin only at this time. We were asked to admit for work up and evaluation of the above problems. Past Medical History:   Diagnosis Date    Anemia     Arthritis     back, hips    CAD (coronary artery disease)     Sees Dr. Arcelia Berrios, 4 heart attacks    Chronic kidney disease     Dr. Camron Foss, [address] [facility]-     Chronic LBP 1990    Slipped on gravel and fell at work; Multiple surgeries;  Sees Dr. Elise Coulter for pain mgmt    Chronic pain     Confusion     Depression     Diabetes ([facility] [address]     ED (erectile dysfunction)     GERD (gastroesophageal reflux disease) 11/2015    [facility] last month    Hypercholesteremia     Hypertension     Liver disease     Psychiatric disorder     depression    [facility]ipheral [facility]) [facility] [address]     Thromboembolus ([facility] [address]     DVT in right leg        Past Surgical History:   Procedure Laterality Date    CARDIAC SURG PROCEDURE UNLIST  09/06/2019    PTCA x 4 stents    HX ABOVE KNEE AMPUTATION Left 2013    Left knee stump non-healing ulcer; Dr. Abdifatah Cain Left     HX APPENDECTOMY      HX BELOW KNEE AMPUTATION      Left leg    HX CHOLECYSTECTOMY      HX GI      HX HIP REPLACEMENT      right hip replaced x 2    HX ORTHOPAEDIC  1990s    4 back surgeries    HX ORTHOPAEDIC      donna ankles pin placed    HX ORTHOPAEDIC      left leg 17 surgeries    HX VASCULAR ACCESS Left     port left arm    UPPER GI ENDOSCOPY,BIOPSY  9/2/2015         UPPER GI ENDOSCOPY,BIOPSY  10/12/2015         VASCULAR SURGERY PROCEDURE UNLIST      Multiple revascularization procedures, toe amputations       Social History     Tobacco Use    Smoking status: Current Every Day Smoker     Packs/day: 1.00     Years: 35.00     Pack years: 35.00    Smokeless tobacco: Never Used    Tobacco comment: 30 pack years; Occasional cigar   Substance Use Topics    Alcohol use: No     Comment: Former heavy drinker quit drinking about 17 years ago        Family History   Problem Relation Age of Onset    Alcohol abuse Father     Diabetes Sister     Diabetes Sister     Lung Disease Mother     Cancer Maternal Grandfather         Head and neck    Cancer Paternal Grandmother         Stomach     Allergies   Allergen Reactions    Nubain [Nalbuphine] Other (comments)     Made me wild; Dysphoria        Prior to Admission medications    Medication Sig Start Date End Date Taking? Authorizing Provider   traMADol (ULTRAM) 50 mg tablet Take 1 Tab by mouth every six (6) hours as needed for Pain for up to 3 days.  Max Daily Amount: 200 mg. 10/1/19 10/4/19 Yes Jenaro Cruz MD   ticagrelor (BRILINTA) 90 mg tablet Take 1 Tab by mouth every twelve (12) hours every twelve (12) hours. 9/14/19  Yes Nicky Jones MD   midodrine (PROAMITINE) 10 mg tablet Take 1 Tab by mouth three (3) times daily (with meals) for 30 days. 9/14/19 10/14/19 Yes Nicky Jones MD   metoprolol succinate (TOPROL-XL) 25 mg XL tablet Take 0.5 Tabs by mouth daily. 9/15/19  Yes Nicky Jones MD   pregabalin (LYRICA) 150 mg capsule Take 150 mg by mouth three (3) times daily. Yes Provider, Historical   ferric citrate (AURYXIA) 210 mg iron tablet Take 420 mg by mouth Before breakfast, lunch, and dinner. Yes Provider, Historical   docusate sodium (COLACE) 100 mg capsule Take 100 mg by mouth daily. Yes Provider, Historical   pravastatin (PRAVACHOL) 80 mg tablet TAKE 1 TABLET BY MOUTH EVERY DAY AT NIGHT 5/25/19  Yes Alexei MARKS MD   FLUoxetine (PROZAC) 20 mg capsule Take 20 mg by mouth daily. Yes Provider, Historical   traZODone (DESYREL) 150 mg tablet TAKE 1 TABLET BY MOUTH EVERY DAY AT NIGHT 12/1/18  Yes Mil Child MD   mirtazapine (REMERON) 30 mg tablet TAKE 1 TABLET BY MOUTH EVERY DAY AT NIGHT 12/1/18  Yes Mil Child MD   omeprazole (PRILOSEC) 40 mg capsule TAKE 1 CAPSULE BY MOUTH TWICE A DAY 12/1/18  Yes Mil Child MD   finasteride (PROSCAR) 5 mg tablet TAKE 1 TABLET BY MOUTH EVERY DAY 12/1/18  Yes Mil Child MD   aspirin 81 mg chewable tablet Take 1 Tab by mouth daily. Indications: myocardial infarction prevention 6/1/18  Yes Pepper Ndiaye MD   nitroglycerin (NITROSTAT) 0.4 mg SL tablet 1 Tab by SubLINGual route every five (5) minutes as needed for Chest Pain. 6/18/15   Usha Quintero MD       REVIEW OF SYSTEMS:     I am not able to complete the review of systems because:    The patient is intubated and sedated    The patient has altered mental status due to his acute medical problems    The patient has baseline aphasia from prior stroke(s)    The patient has baseline dementia and is not reliable historian The patient is in acute medical distress and unable to provide information           Total of 12 systems reviewed as follows:       POSITIVE= underlined text  Negative = text not underlined  General:  fever, chills, sweats, generalized weakness, weight loss/gain,      loss of appetite   Eyes:    blurred vision, eye pain, loss of vision, double vision  ENT:    rhinorrhea, pharyngitis   Respiratory:   cough, sputum production, SOB, COREA, wheezing, pleuritic pain   Cardiology:   chest pain, palpitations, orthopnea, PND, edema, syncope   Gastrointestinal:  abdominal pain , N/V, diarrhea, dysphagia, constipation, bleeding   Genitourinary:  frequency, urgency, dysuria, hematuria, incontinence   Muskuloskeletal :  arthralgia, myalgia, back pain  Hematology:  easy bruising, nose or gum bleeding, lymphadenopathy   Dermatological: rash, ulceration, pruritis, color change / jaundice  Endocrine:   hot flashes or polydipsia   Neurological:  headache, dizziness, confusion, focal weakness, paresthesia,     Speech difficulties, memory loss, gait difficulty  Psychological: Feelings of anxiety, depression, agitation    Objective:   VITALS:    Visit Vitals  /54 (BP 1 Location: Right arm, BP Patient Position: At rest)   Pulse 67   Temp 98.1 °F (36.7 °C)   Resp 18   Wt 68 kg (150 lb)   SpO2 97%   BMI 22.15 kg/m²       PHYSICAL EXAM:    General:    Alert, cooperative, no distress, appears stated age. HEENT: Atraumatic, anicteric sclerae, pink conjunctivae     No oral ulcers, mucosa moist, throat clear, dentition fair  Neck:  Supple, symmetrical,  thyroid: non tender  Lungs:   Clear to auscultation bilaterally. No Wheezing or Rhonchi. No rales. Chest wall:  No tenderness  No Accessory muscle use. Heart:   Regular  rhythm,  No  murmur   No edema  Abdomen:   Soft, non-tender. Not distended. Bowel sounds normal  Extremities: No cyanosis. No clubbing,      Skin turgor normal, Capillary refill normal, Radial dial pulse 2+.  Left AKA, left foot in dressing, diffuse skin bruises  Skin:     Not pale. Not Jaundiced  No rashes   Psych:  Good insight. Not depressed. Not anxious or agitated. Neurologic: EOMs intact. No facial asymmetry. No aphasia or slurred speech. Symmetrical strength, Sensation grossly intact. Alert and oriented X 4.     _______________________________________________________________________  Care Plan discussed with:    Comments   Patient x    Family  x    RN x    Care Manager                    Consultant:      _______________________________________________________________________  Expected  Disposition:   Home with Family    HH/PT/OT/RN    SNF/LTC x   RED    ________________________________________________________________________  TOTAL TIME:  36 Minutes    Critical Care Provided     Minutes non procedure based      Comments    x Reviewed previous records   >50% of visit spent in counseling and coordination of care x Discussion with patient and/or family and questions answered       ________________________________________________________________________  Signed: Chris Espino MD    Procedures: see electronic medical records for all procedures/Xrays and details which were not copied into this note but were reviewed prior to creation of Plan.     LAB DATA REVIEWED:    Recent Results (from the past 24 hour(s))   EKG, 12 LEAD, INITIAL    Collection Time: 10/02/19  2:06 PM   Result Value Ref Range    Ventricular Rate 67 BPM    Atrial Rate 67 BPM    P-R Interval 200 ms    QRS Duration 154 ms    Q-T Interval 482 ms    QTC Calculation (Bezet) 509 ms    Calculated P Axis 110 degrees    Calculated R Axis -54 degrees    Calculated T Axis -42 degrees    Diagnosis       Normal sinus rhythm  Left axis deviation  Right bundle branch block  Moderate voltage criteria for LVH, may be normal variant  Inferior infarct (cited on or before 04-SEP-2019)  When compared with ECG of 16-SEP-2019 15:54,  Sinus rhythm has replaced Atrial fibrillation  Criteria for Anteroseptal infarct are no longer present  T wave inversion now evident in Inferior leads  T wave inversion no longer evident in Lateral leads     CBC WITH AUTOMATED DIFF    Collection Time: 10/02/19  2:19 PM   Result Value Ref Range    WBC 5.1 4.1 - 11.1 K/uL    RBC 3.33 (L) 4.10 - 5.70 M/uL    HGB 10.4 (L) 12.1 - 17.0 g/dL    HCT 34.7 (L) 36.6 - 50.3 %    .2 (H) 80.0 - 99.0 FL    MCH 31.2 26.0 - 34.0 PG    MCHC 30.0 30.0 - 36.5 g/dL    RDW 18.6 (H) 11.5 - 14.5 %    PLATELET 69 (L) 365 - 400 K/uL    MPV 12.4 8.9 - 12.9 FL    NRBC 0.0 0  WBC    ABSOLUTE NRBC 0.00 0.00 - 0.01 K/uL    NEUTROPHILS 72 32 - 75 %    LYMPHOCYTES 8 (L) 12 - 49 %    MONOCYTES 14 (H) 5 - 13 %    EOSINOPHILS 5 0 - 7 %    BASOPHILS 1 0 - 1 %    IMMATURE GRANULOCYTES 0 0.0 - 0.5 %    ABS. NEUTROPHILS 3.6 1.8 - 8.0 K/UL    ABS. LYMPHOCYTES 0.4 (L) 0.8 - 3.5 K/UL    ABS. MONOCYTES 0.7 0.0 - 1.0 K/UL    ABS. EOSINOPHILS 0.3 0.0 - 0.4 K/UL    ABS. BASOPHILS 0.1 0.0 - 0.1 K/UL    ABS. IMM. GRANS. 0.0 0.00 - 0.04 K/UL    DF AUTOMATED      PLATELET COMMENTS DECREASED PLATELETS      RBC COMMENTS MACROCYTOSIS  1+        RBC COMMENTS ANISOCYTOSIS  1+       METABOLIC PANEL, COMPREHENSIVE    Collection Time: 10/02/19  2:19 PM   Result Value Ref Range    Sodium 137 136 - 145 mmol/L    Potassium 4.0 3.5 - 5.1 mmol/L    Chloride 101 97 - 108 mmol/L    CO2 33 (H) 21 - 32 mmol/L    Anion gap 3 (L) 5 - 15 mmol/L    Glucose 122 (H) 65 - 100 mg/dL    BUN 43 (H) 6 - 20 MG/DL    Creatinine 6.34 (H) 0.70 - 1.30 MG/DL    BUN/Creatinine ratio 7 (L) 12 - 20      GFR est AA 11 (L) >60 ml/min/1.73m2    GFR est non-AA 9 (L) >60 ml/min/1.73m2    Calcium 8.2 (L) 8.5 - 10.1 MG/DL    Bilirubin, total 0.8 0.2 - 1.0 MG/DL    ALT (SGPT) <6 (L) 12 - 78 U/L    AST (SGOT) 21 15 - 37 U/L    Alk.  phosphatase 81 45 - 117 U/L    Protein, total 7.4 6.4 - 8.2 g/dL    Albumin 3.0 (L) 3.5 - 5.0 g/dL    Globulin 4.4 (H) 2.0 - 4.0 g/dL    A-G Ratio 0.7 (L) 1.1 - 2.2     CK W/ REFLX CKMB    Collection Time: 10/02/19  2:19 PM   Result Value Ref Range    CK 58 39 - 308 U/L   TROPONIN I    Collection Time: 10/02/19  2:19 PM   Result Value Ref Range    Troponin-I, Qt. 0.16 (H) <0.05 ng/mL   SAMPLES BEING HELD    Collection Time: 10/02/19  2:19 PM   Result Value Ref Range    SAMPLES BEING HELD blue     COMMENT        Add-on orders for these samples will be processed based on acceptable specimen integrity and analyte stability, which may vary by analyte.    TROPONIN I    Collection Time: 10/02/19  4:18 PM   Result Value Ref Range    Troponin-I, Qt. 0.13 (H) <0.05 ng/mL   TROPONIN I    Collection Time: 10/02/19  5:19 PM   Result Value Ref Range    Troponin-I, Qt. 0.24 (H) <0.05 ng/mL

## 2019-10-03 NOTE — ED NOTES
TRANSFER - OUT REPORT:    Verbal report given to Devang Hill RN (name) on "[employer] [facility]  being transferred to [address] [place] [address] for routine progression of care       Report consisted of patients Situation, Background, Assessment and   Recommendations(SBAR). Information from the following report(s) SBAR, Kardex, ED Summary, Intake/Output, MAR and Recent Results was reviewed with the receiving nurse. Lines:   Peripheral IV 10/02/19 Right Antecubital (Active)   Site Assessment Clean, dry, & intact; Clean;Dry; Intact 10/2/2019  2:25 PM   Phlebitis Assessment 0 10/2/2019  2:25 PM   Infiltration Assessment 0 10/2/2019  2:25 PM   Dressing Status Clean, dry, & intact; Clean;Dry; Intact 10/2/2019  2:25 PM   Dressing Type Tape;Transparent 10/2/2019  2:25 PM   Hub Color/Line Status Pink;Flushed 10/2/2019  2:25 PM   Action Taken Blood drawn 10/2/2019  2:25 PM   Alcohol Cap Used No 10/2/2019  2:25 PM       Peripheral IV 10/02/19 Right Antecubital (Active)        Opportunity for questions and clarification was provided.       Patient transported with:   Registered Nurse

## 2019-10-03 NOTE — PROCEDURES
[facility]  [phone]    Vitals   Pre   Post   Assessment   Pre   Post     Temp  Temp: 98.1 °F (36.7 °C) (10/03/19 1600)  97.7 LOC  A/ O x4 A/O x4   HR   Pulse (Heart Rate): 64 (10/03/19 1600) 68 Lungs   Diminished  Diminished   B/P   BP: 144/56 (10/03/19 1600) 151/54 Cardiac   Regular  Regular   Resp   Resp Rate: 14 (10/03/19 1600) 22 Skin   Warm/Dry  Bruises on the hands  Warm/dry  Bruises on the hands   Pain level  Pain Intensity 1: 10 (10/03/19 1417) 0 Edema  None     None   Orders:    Duration:   Start:    1600   End:    1800 Total:   2 hrs   Dialyzer:   Dialyzer/Set Up Inspection: Revaclear (10/03/19 1600)   K Bath:   Dialysate K (mEq/L): 4 (10/03/19 1600)   Ca Bath:   Dialysate CA (mEq/L): 2.5 (10/03/19 1600)   Na/Bicarb:   Dialysate NA (mEq/L): 138 (10/03/19 1600)   Target Fluid Removal:   Goal/Amount of Fluid to Remove (mL): 1500 mL (10/03/19 1600)   Access     Type & Location:   HARVEY AVF cannulated with 15 G needles x2 with no problems. Prepped with Alcohol as per policy. No  S/S of infection. Positive bruit and thrill pre and post HD   Labs     Obtained/Reviewed   Critical Results Called   Date when labs were drawn-  Hgb-    HGB   Date Value Ref Range Status   10/03/2019 9.9 (L) 12.1 - 17.0 g/dL Final     K-    Potassium   Date Value Ref Range Status   10/03/2019 3.9 3.5 - 5.1 mmol/L Final     Ca-   Calcium   Date Value Ref Range Status   10/03/2019 8.6 8.5 - 10.1 MG/DL Final     Bun-   BUN   Date Value Ref Range Status   10/03/2019 60 (H) 6 - 20 MG/DL Final     Creat-   Creatinine   Date Value Ref Range Status   10/03/2019 7.16 (H) 0.70 - 1.30 MG/DL Final        Medications/ Blood Products Given     Name   Dose   Route and Time     None                Blood Volume Processed (BVP):    49.1 Net Fluid   Removed:  1500 ml   Comments   Time Out Done: 1550  Primary Nurse Rpt Pre:Joana Michael RN  Primary Nurse Rpt Post:Joana Michael RN  Pt Education:Procedural, Access care  Care Plan:Continue HD Plan of Care  Tx Summary: Arrived at pt's room HD set up HD RN assessment done by [facility] ([facility] RN. After Timeout done HD started  1600: HD started with no issues  1630: Pt resting no distress  1700:Access secured Lines Visible  1730: Pt watching TV no complaints  1800:HD completed All possible blood returned with NS Needles removed with minimal bleeding time A 6 mins V 6 mins    HD Related Medications Reviewed    Admiting Diagnosis:NSTEMI  Pt's [facility] clinic-[facility]  Consent signed - Informed Consent Verified: Yes (10/03/19 1600)  Sudheerita Consent - Obtained  Hepatitis Status- Hep B Antigen Negative 1/2/19 Hep B Antibody > 12 1/2/19  Machine #- Machine Number: [id number] (10/03/19 1600)  Telemetry status-NSR  Pre-dialysis wt. -

## 2019-10-03 NOTE — CONSULTS
Consult    NAME: Torin Brink Sr.   :  1955   MRN:  [id number]     Date/Time:  10/3/2019 1:03 PM    Patient PCP: Trina Lozano, DO  ________________________________________________________________________     Assessment:     1. CAD; cath 19 w/ RCA , 60% oLAD w/ neg FFR (0.88), patent LCx stent w/ mild ISR.  PCI of LCx, PTCA of RCA , PTCA LAD 3/94. Gerhard Quails  w/ EF 62%, distal ineroapical infarct w/o ischemia. Recurrent [place]  w/ Impella-supported PCI of LAD, OM1, and LM/LAD/LCx bifurcation w/ Culotte technique. 2. Indeterminate troponin elevation  3. Right renal artery stenosis s/p 7.0 x 19mm BMS 18  4. Occluded LCIA. 5. ESRD on dialysis. Dr Crump   6. Long term hx of Coronary artery disease s/p multivessel stenting s/p PCI of LCx, PTCA of RCA , PTCA LAD 3/94.  Lexiscan  w/ EF 62%, distal ineroapical infarct w/o ischemia  7. Cath 2018  No intervention.   Lv EF  -  55%   8. Peripheral vascular disease s/p left iliac stenting, left SFA stenting 3/00, right iliac stenting .  9. Status post L AKA. R toe amputation 19 (Dr. Henrietta Schwartz)  10. Bilateral carotid stenosis; <49%  (verts ant)  11. Hypertension  12. Diabetes  13. Chronic Tobacco abuse 1-2 PPD .   14. Chronic pain  15. Chronic anemia  16. Hyperlipidemia  17. Noncompliance  18. Peptic ulcer disease w/ GIB '15  19. Falls  20. Lives w/ sister in [facility] now        Plan: TnI 0.2; peaked  No EKG changes    21. Renal to see and figure out if he needs to catch up on HD from yesterday  22. CTS to remove staples from prior Impella  23. No plans for invasive procedures  24. Start Ranexa 500mg BID  25. Continue ASA and ticagrelor  26. Continue midodrine 10mg TID as needed  27. Continue Toprol XL 12.5mg daily  28. Continue statin    Thank you for this consult and allowing me to take part in this patients care. Please call with questions.         [x]        High complexity decision making was performed        Subjective:   CHIEF COMPLAINT: CP    HISTORY OF PRESENT ILLNESS:     Roque High is a 71 y.o.  male who \"presented to the emergency department from the dialysis center after he had a sharp chest pain. He reported that his chest pain is mostly on the left side, nonradiating, sharp in nature, lasted for 2 hours, relieved by nitro and aspirin. He also reported that this chest pain is very similar to the chest pain he had last month when he had his heart attack and he was concerned this is why he came to the emergency department. Patient reported that he quit smoking last month after his heart attack and he does not drink alcohol or use illicit drugs.     In the emergency department his EKG did not reveal any acute changes however his troponin was elevated more than his baseline for which cardiology was consulted stat from the emergency department and they recommended to give full dose of aspirin only at this time. \"      We were asked to consult for work up and evaluation of the above problems. Past Medical History:   Diagnosis Date    Anemia     Arthritis     back, hips    CAD (coronary artery disease)     Sees Dr. Iva Soulier, 4 heart attacks    Chronic kidney disease     Dr. Phoebe Mack, [address] [place], [place] [address]     Chronic LBP 1990    Slipped on gravel and fell at work; Multiple surgeries;  Sees Dr. Qiuncy Ponce for pain mgmt    Chronic pain     Confusion     Depression     Diabetes ([address]     ED (erectile dysfunction)     GERD (gastroesophageal reflux disease) 11/2015    Diagnosed at [facility] last month    Hypercholesteremia     Hypertension     Liver disease     Psychiatric disorder     depression    PVD (peripheral vascular disease) ([address]     Thromboembolus ([address]     DVT in right leg      Past Surgical History:   Procedure Laterality Date    CARDIAC SURG PROCEDURE UNLIST  09/06/2019    PTCA x 4 stents    HX ABOVE KNEE AMPUTATION Left 2013    Left knee stump non-healing ulcer; Dr. Allen Feng Left     HX APPENDECTOMY      HX BELOW KNEE AMPUTATION      Left leg    HX CHOLECYSTECTOMY      HX GI      HX HIP REPLACEMENT      right hip replaced x 2    HX ORTHOPAEDIC  1990s    4 back surgeries    HX ORTHOPAEDIC      donna ankles pin placed    HX ORTHOPAEDIC      left leg 17 surgeries    HX VASCULAR ACCESS Left     port left arm    UPPER GI ENDOSCOPY,BIOPSY  9/2/2015         UPPER GI ENDOSCOPY,BIOPSY  10/12/2015         VASCULAR SURGERY PROCEDURE UNLIST      Multiple revascularization procedures, toe amputations     Allergies   Allergen Reactions    Nubain [Nalbuphine] Other (comments)     Made me wild; Dysphoria      Meds:  See below  Social History     Tobacco Use    Smoking status: Current Every Day Smoker     Packs/day: 1.00     Years: 35.00     Pack years: 35.00    Smokeless tobacco: Never Used    Tobacco comment: 30 pack years;  Occasional cigar   Substance Use Topics    Alcohol use: No     Comment: Former heavy drinker quit drinking about 17 years ago      Family History   Problem Relation Age of Onset    Alcohol abuse Father     Diabetes Sister     Diabetes Sister     Lung Disease Mother     Cancer Maternal Grandfather         Head and neck    Cancer Paternal Grandmother         Stomach       REVIEW OF SYSTEMS:     []         Unable to obtain  ROS due to ---   [x]         Total of 12 systems reviewed as follows:    Constitutional: negative fever, negative chills, negative weight loss  Eyes:   negative visual changes  ENT:   negative sore throat, tongue or lip swelling  Respiratory:  negative cough, negative dyspnea  Cards:  negative for chest pain, palpitations, lower extremity edema  GI:   negative for nausea, vomiting, diarrhea, and abdominal pain  Genitourinary: negative for frequency, dysuria  Integument:  negative for rash   Hematologic:  negative for easy bruising and gum/nose bleeding  Musculoskel: negative for myalgias,  back pain  Neurological:  negative for headaches, dizziness, vertigo, weakness  Behavl/Psych: negative for feelings of anxiety, depression     Pertinent Positives include :    Objective:      Physical Exam:    Last 24hrs VS reviewed since prior progress note. Most recent are:    Visit Vitals  /54 (BP 1 Location: Right arm, BP Patient Position: At rest)   Pulse 60   Temp 97.8 °F (36.6 °C)   Resp 15   Ht 5' 9\" (1.753 m)   Wt 71.8 kg (158 lb 4.8 oz)   SpO2 97%   BMI 23.38 kg/m²       Intake/Output Summary (Last 24 hours) at 10/3/2019 1303  Last data filed at 10/2/2019 2324  Gross per 24 hour   Intake 300 ml   Output    Net 300 ml        General Appearance: Well developed, well nourished, alert & oriented x 3,    no acute distress. Ears/Nose/Mouth/Throat: Pupils equal and round, Hearing grossly normal.  Neck: Supple. JVP within normal limits. Carotids good upstrokes, with no bruit. Chest: Lungs clear to auscultation bilaterally. Cardiovascular: Regular rate and rhythm, S1S2 normal, FABIAN. Abdomen: Soft, non-tender, bowel sounds are active. No organomegaly. Extremities: No edema bilaterally. LAKA. R toe amp. Skin: Warm and dry. Neuro: CN II-XII grossly intact, Strength and sensation grossly intact. []         Post-cath site without hematoma, bruit, tenderness, or thrill. Distal pulses intact. Data:      Telemetry:SR    EKG: SR, RBBB, LAFB  []  No new EKG for review. Prior to Admission medications    Medication Sig Start Date End Date Taking? Authorizing Provider   traMADol (ULTRAM) 50 mg tablet Take 1 Tab by mouth every six (6) hours as needed for Pain for up to 3 days. Max Daily Amount: 200 mg. 10/1/19 10/4/19 Yes Klaudia Knox MD   ticagrelor (BRILINTA) 90 mg tablet Take 1 Tab by mouth every twelve (12) hours every twelve (12) hours. 9/14/19  Yes Veronique Beckham MD   midodrine (PROAMITINE) 10 mg tablet Take 1 Tab by mouth three (3) times daily (with meals) for 30 days.  9/14/19 10/14/19 Yes Charito Robins MD   metoprolol succinate (TOPROL-XL) 25 mg XL tablet Take 0.5 Tabs by mouth daily. 9/15/19  Yes Charito Robins MD   pregabalin (LYRICA) 150 mg capsule Take 150 mg by mouth three (3) times daily. Yes Provider, Historical   ferric citrate (AURYXIA) 210 mg iron tablet Take 420 mg by mouth Before breakfast, lunch, and dinner. Yes Provider, Historical   docusate sodium (COLACE) 100 mg capsule Take 100 mg by mouth daily. Yes Provider, Historical   pravastatin (PRAVACHOL) 80 mg tablet TAKE 1 TABLET BY MOUTH EVERY DAY AT NIGHT 5/25/19  Yes Anusha MARKS MD   FLUoxetine (PROZAC) 20 mg capsule Take 20 mg by mouth daily. Yes Provider, Historical   traZODone (DESYREL) 150 mg tablet TAKE 1 TABLET BY MOUTH EVERY DAY AT NIGHT 12/1/18  Yes Venus Contreras MD   mirtazapine (REMERON) 30 mg tablet TAKE 1 TABLET BY MOUTH EVERY DAY AT NIGHT 12/1/18  Yes Venus Contreras MD   omeprazole (PRILOSEC) 40 mg capsule TAKE 1 CAPSULE BY MOUTH TWICE A DAY 12/1/18  Yes Venus Contreras MD   finasteride (PROSCAR) 5 mg tablet TAKE 1 TABLET BY MOUTH EVERY DAY 12/1/18  Yes Venus Contreras MD   aspirin 81 mg chewable tablet Take 1 Tab by mouth daily. Indications: myocardial infarction prevention 6/1/18  Yes Neptali Ndiaye MD   nitroglycerin (NITROSTAT) 0.4 mg SL tablet 1 Tab by SubLINGual route every five (5) minutes as needed for Chest Pain.  6/18/15   Christiano Carreno MD       Recent Results (from the past 24 hour(s))   EKG, 12 LEAD, INITIAL    Collection Time: 10/02/19  2:06 PM   Result Value Ref Range    Ventricular Rate 67 BPM    Atrial Rate 67 BPM    P-R Interval 200 ms    QRS Duration 154 ms    Q-T Interval 482 ms    QTC Calculation (Bezet) 509 ms    Calculated P Axis 110 degrees    Calculated R Axis -54 degrees    Calculated T Axis -42 degrees    Diagnosis       Normal sinus rhythm  Left axis deviation  Right bundle branch block  Moderate voltage criteria for LVH, may be normal variant    When compared with ECG of 16-SEP-2019 15:54,  Sinus rhythm has replaced Atrial fibrillation  Confirmed by Lilly Burgess ([id number]) on 10/3/2019 9:08:39 AM     CBC WITH AUTOMATED DIFF    Collection Time: 10/02/19  2:19 PM   Result Value Ref Range    WBC 5.1 4.1 - 11.1 K/uL    RBC 3.33 (L) 4.10 - 5.70 M/uL    HGB 10.4 (L) 12.1 - 17.0 g/dL    HCT 34.7 (L) 36.6 - 50.3 %    .2 (H) 80.0 - 99.0 FL    MCH 31.2 26.0 - 34.0 PG    MCHC 30.0 30.0 - 36.5 g/dL    RDW 18.6 (H) 11.5 - 14.5 %    PLATELET 69 (L) 827 - 400 K/uL    MPV 12.4 8.9 - 12.9 FL    NRBC 0.0 0  WBC    ABSOLUTE NRBC 0.00 0.00 - 0.01 K/uL    NEUTROPHILS 72 32 - 75 %    LYMPHOCYTES 8 (L) 12 - 49 %    MONOCYTES 14 (H) 5 - 13 %    EOSINOPHILS 5 0 - 7 %    BASOPHILS 1 0 - 1 %    IMMATURE GRANULOCYTES 0 0.0 - 0.5 %    ABS. NEUTROPHILS 3.6 1.8 - 8.0 K/UL    ABS. LYMPHOCYTES 0.4 (L) 0.8 - 3.5 K/UL    ABS. MONOCYTES 0.7 0.0 - 1.0 K/UL    ABS. EOSINOPHILS 0.3 0.0 - 0.4 K/UL    ABS. BASOPHILS 0.1 0.0 - 0.1 K/UL    ABS. IMM. GRANS. 0.0 0.00 - 0.04 K/UL    DF AUTOMATED      PLATELET COMMENTS DECREASED PLATELETS      RBC COMMENTS MACROCYTOSIS  1+        RBC COMMENTS ANISOCYTOSIS  1+       METABOLIC PANEL, COMPREHENSIVE    Collection Time: 10/02/19  2:19 PM   Result Value Ref Range    Sodium 137 136 - 145 mmol/L    Potassium 4.0 3.5 - 5.1 mmol/L    Chloride 101 97 - 108 mmol/L    CO2 33 (H) 21 - 32 mmol/L    Anion gap 3 (L) 5 - 15 mmol/L    Glucose 122 (H) 65 - 100 mg/dL    BUN 43 (H) 6 - 20 MG/DL    Creatinine 6.34 (H) 0.70 - 1.30 MG/DL    BUN/Creatinine ratio 7 (L) 12 - 20      GFR est AA 11 (L) >60 ml/min/1.73m2    GFR est non-AA 9 (L) >60 ml/min/1.73m2    Calcium 8.2 (L) 8.5 - 10.1 MG/DL    Bilirubin, total 0.8 0.2 - 1.0 MG/DL    ALT (SGPT) <6 (L) 12 - 78 U/L    AST (SGOT) 21 15 - 37 U/L    Alk.  phosphatase 81 45 - 117 U/L    Protein, total 7.4 6.4 - 8.2 g/dL    Albumin 3.0 (L) 3.5 - 5.0 g/dL    Globulin 4.4 (H) 2.0 - 4.0 g/dL    A-G Ratio 0.7 (L) 1.1 - 2.2     CK W/ REFLX CKMB Collection Time: 10/02/19  2:19 PM   Result Value Ref Range    CK 58 39 - 308 U/L   TROPONIN I    Collection Time: 10/02/19  2:19 PM   Result Value Ref Range    Troponin-I, Qt. 0.16 (H) <0.05 ng/mL   SAMPLES BEING HELD    Collection Time: 10/02/19  2:19 PM   Result Value Ref Range    SAMPLES BEING HELD blue     COMMENT        Add-on orders for these samples will be processed based on acceptable specimen integrity and analyte stability, which may vary by analyte.    TROPONIN I    Collection Time: 10/02/19  4:18 PM   Result Value Ref Range    Troponin-I, Qt. 0.13 (H) <0.05 ng/mL   TROPONIN I    Collection Time: 10/02/19  5:19 PM   Result Value Ref Range    Troponin-I, Qt. 0.24 (H) <0.05 ng/mL   EKG, 12 LEAD, INITIAL    Collection Time: 10/02/19  7:27 PM   Result Value Ref Range    Ventricular Rate 67 BPM    Atrial Rate 67 BPM    P-R Interval 208 ms    QRS Duration 154 ms    Q-T Interval 458 ms    QTC Calculation (Bezet) 483 ms    Calculated P Axis 40 degrees    Calculated R Axis -52 degrees    Calculated T Axis -35 degrees    Diagnosis       ** Poor data quality, interpretation may be adversely affected  Normal sinus rhythm  Left axis deviation  Right bundle branch block  Minimal voltage criteria for LVH, may be normal variant      Confirmed by Radha Morales ([id number]) on 10/3/2019 9:09:03 AM     CK W/ CKMB & INDEX    Collection Time: 10/02/19 10:44 PM   Result Value Ref Range    CK 47 39 - 308 U/L    CK - MB 1.3 <3.6 NG/ML    CK-MB Index 2.8 (H) 0.0 - 2.5     METABOLIC PANEL, COMPREHENSIVE    Collection Time: 10/03/19  4:11 AM   Result Value Ref Range    Sodium 139 136 - 145 mmol/L    Potassium 3.9 3.5 - 5.1 mmol/L    Chloride 104 97 - 108 mmol/L    CO2 26 21 - 32 mmol/L    Anion gap 9 5 - 15 mmol/L    Glucose 121 (H) 65 - 100 mg/dL    BUN 60 (H) 6 - 20 MG/DL    Creatinine 7.16 (H) 0.70 - 1.30 MG/DL    BUN/Creatinine ratio 8 (L) 12 - 20      GFR est AA 9 (L) >60 ml/min/1.73m2    GFR est non-AA 8 (L) >60 ml/min/1.73m2 Calcium 8.6 8.5 - 10.1 MG/DL    Bilirubin, total 1.0 0.2 - 1.0 MG/DL    ALT (SGPT) <6 (L) 12 - 78 U/L    AST (SGOT) 19 15 - 37 U/L    Alk.  phosphatase 77 45 - 117 U/L    Protein, total 7.1 6.4 - 8.2 g/dL    Albumin 2.9 (L) 3.5 - 5.0 g/dL    Globulin 4.2 (H) 2.0 - 4.0 g/dL    A-G Ratio 0.7 (L) 1.1 - 2.2     CBC W/O DIFF    Collection Time: 10/03/19  4:11 AM   Result Value Ref Range    WBC 4.0 (L) 4.1 - 11.1 K/uL    RBC 3.05 (L) 4.10 - 5.70 M/uL    HGB 9.9 (L) 12.1 - 17.0 g/dL    HCT 32.3 (L) 36.6 - 50.3 %    .9 (H) 80.0 - 99.0 FL    MCH 32.5 26.0 - 34.0 PG    MCHC 30.7 30.0 - 36.5 g/dL    RDW 18.3 (H) 11.5 - 14.5 %    PLATELET 56 (L) 498 - 400 K/uL    MPV 12.7 8.9 - 12.9 FL    NRBC 0.0 0  WBC    ABSOLUTE NRBC 0.00 0.00 - 0.01 K/uL   TROPONIN I    Collection Time: 10/03/19  4:11 AM   Result Value Ref Range    Troponin-I, Qt. 0.12 (H) <0.05 ng/mL   GLUCOSE, POC    Collection Time: 10/03/19  8:01 AM   Result Value Ref Range    Glucose (POC) 120 (H) 65 - 100 mg/dL    Performed by Darian Calles RN    GLUCOSE, POC    Collection Time: 10/03/19 12:50 PM   Result Value Ref Range    Glucose (POC) 90 65 - 100 mg/dL    Performed by [facility] ANY TAVERAS(RN)         Trisha Dunlap III, DO

## 2019-10-03 NOTE — PROGRESS NOTES
I reviewed pertinent labs and imaging, and discussed /agreed on the plan of care with Dr. Gely Murillo. Hospitalist Progress Note    NAME: Scott Reddy Sr.   :  1955   MRN:  [id number]     History of present illness:  He presented to the ED from dialysis center after reporting a sharp chest pain. He states the pain was mostly on the left side and lasted for 2 hours. The pain was relieved with Nitro and ASA. Assessment / Plan:  NSTEMI, POA  · Troponin elevated above baseline; 0.16->0.13->0.24->0.12  · CK 58  · CK-MB 1.3  · EKG shows NSR  · CXR negative   · Continue ASA and Brilinta   · Nitro PRN  · Appreciate Cardiology input    Coronary artery disease   · S/p 6 cardiac stents, with 4 stents last month   · Continue statin, ASA, and Brilinta   · Continue BB  · PRN Nitro     Chronic thrombocytopenia   · Platelets 56  · Monitor     ESRD on HD  · Consult nephrology for HD  · MWF  · Cr 7.16    Hx of PVD  · Reports quitting smoking 1 month ago  · Continue ASA  · S/p left AKA and right second toe amputation     DM2  · Hgb A1c 6.6 (2019)  · BS AC & HS  · SSI     18.5 - 24.9 Normal weight / Body mass index is 23.38 kg/m². Code status: DNR  Prophylaxis: ASA and Brilinta   Recommended Disposition: Home w/Family     Subjective:     Chief Complaint / Reason for Physician Visit  \"I just don't feel good. \"  Discussed with RN events overnight. Review of Systems:  Symptom Y/N Comments  Symptom Y/N Comments   Fever/Chills n   Chest Pain y    Poor Appetite y   Edema     Cough    Abdominal Pain     Sputum    Joint Pain     SOB/COREA n   Pruritis/Rash     Nausea/vomit n   Tolerating PT/OT     Diarrhea    Tolerating Diet     Constipation    Other       Could NOT obtain due to:      Objective:     VITALS:   Last 24hrs VS reviewed since prior progress note.  Most recent are:  Patient Vitals for the past 24 hrs:   Temp Pulse Resp BP SpO2   10/03/19 1100 97.8 °F (36.6 °C) 60 15 133/54 97 %   10/03/19 0800 97.6 °F (36.4 °C) 68 15 150/58 98 %   10/03/19 0759  68 12 150/58 100 %   10/03/19 0400 98.4 °F (36.9 °C) 62 18 130/43 99 %   10/02/19 2324 97.4 °F (36.3 °C) 65 21 144/48 97 %   10/02/19 1404 98.1 °F (36.7 °C) 67 18 135/54 97 %       Intake/Output Summary (Last 24 hours) at 10/3/2019 1204  Last data filed at 10/2/2019 2324  Gross per 24 hour   Intake 300 ml   Output    Net 300 ml        PHYSICAL EXAM:  General: Chronically ill-appearing  male. NAD   EENT:  EOMI. Anicteric sclerae. MMM  Resp:  CTA bilaterally, no wheezing or rales. No accessory muscle use  CV:  Regular  rhythm,  No edema  GI:  Soft, Non distended, Non tender.  +Bowel sounds  Neurologic:  Alert and oriented X 3, normal speech,   Psych:   Good insight. Not anxious nor agitated  Skin:  No rashes. No jaundice, s/p left AKA    Reviewed most current lab test results and cultures  YES  Reviewed most current radiology test results   YES  Review and summation of old records today    NO  Reviewed patient's current orders and MAR    YES  PMH/SH reviewed - no change compared to H&P  ________________________________________________________________________  Care Plan discussed with:    Comments   Patient x    Family      RN x    Care Manager     Consultant                        Multidiciplinary team rounds were held today with , nursing, pharmacist and clinical coordinator. Patient's plan of care was discussed; medications were reviewed and discharge planning was addressed.      ________________________________________________________________________  Total NON critical care TIME:  25   Minutes    Total CRITICAL CARE TIME Spent:   Minutes non procedure based      Comments   >50% of visit spent in counseling and coordination of care     ________________________________________________________________________  Lyn Boswell NP     Procedures: see electronic medical records for all procedures/Xrays and details which were not copied into this note but were reviewed prior to creation of Plan. LABS:  I reviewed today's most current labs and imaging studies.   Pertinent labs include:  Recent Labs     10/03/19  0411 10/02/19  1419   WBC 4.0* 5.1   HGB 9.9* 10.4*   HCT 32.3* 34.7*   PLT 56* 69*     Recent Labs     10/03/19  0411 10/02/19  1419    137   K 3.9 4.0    101   CO2 26 33*   * 122*   BUN 60* 43*   CREA 7.16* 6.34*   CA 8.6 8.2*   ALB 2.9* 3.0*   TBILI 1.0 0.8   SGOT 19 21   ALT <6* <6*       Signed: Angle Paci, NP

## 2019-10-03 NOTE — PROGRESS NOTES
0730: Received report from night shift nurse, Chavez Carranza. B, VSS, No complaints. Pt reports he knows he is NPO status, and completed a CHG bath with gown change. 0820Chastanley Mirza with MD Wright on the phone about the plan for the patient. Adriana said someone will be here to see the patient. 200: Spoke with MD Wright, pt no longer NPO. Waiting for plan of D/C.    1245: MD Santos Jolly spoke with patient. Plan is to finish Dialysis and re-evaluate the patient. Pt reporting 10/10 back pain, paged MD Mcleod. Paged Zoraida Ashby for staple removal. He will be coming to the patient room to remove the harper. Paged Theron to schedule dialysis. Bhavana on call. 1315: Spoke with MD Naresh Jean. She will be speaking with Augusto to schedule dialysis today. 1417: 10/10 Back pain, percocet given, will reassess. Staples removed from RU chest, site CDI, no complaints. 1425: Spoke with [employer] rep about dialysis ordered today. 1600: Dialysis rep with patient. Pt reporting no Back pain. 1800: Received report from Dialysis rep. Pt stable, no complaints. 1845: Pt eating dinner, family at bedside. 1900: Report given to night shift nurse, Marianna lEena. Reviewed medications, and plan for discharge.

## 2019-10-03 NOTE — PROGRESS NOTES
Reason for Readmission: NSTEMI        > CM asked pt why he felt like he was back in the hospital; pt reported, \"well somewhat of the same thing I was here for in September - I felt like someone was sitting directly on my chest.\"         RRAT Score and Risk Level:  34 - high risk        Level of Readmission:     * Level 2:    > pt's last admission was 9/16/19 -9/19/19 with dx of chest pain  > pt was also admitted 9/4/19 - 9/14/19 with dx of unstable angina        Care Conference scheduled: Will discuss in IDR       Resources/supports as identified by patient/family: Pt appears to have strong familial support in place between his wife Kalyani Yeungt: [phone]), son Merline Baars: [phone]) and daughter-in-law Richy Dec: [phone]). Pt's daughter-in-law serves as his informal caregiver and assists pt with ADL's as needed. Pt also has a daughter (Lesley) that lives in [facility] [facility] and provides additional support as needed. CM did pinpoint one concern related to pt's last DAVID plan at d/c - pt stated he was d/c last admission (9/19/19) with [facility] services intended to be provided by [facility]. Pt reported that he hasn't seen or heard from [facility] since being d/c on 9/19/19. CM inquired if pt or his family called to reach out regarding why [facility] hasn't made contact; pt stated \"I didn't know who to call. \" CM is going to do some research to clarify pt's report and f/u to see why services were not provided to the pt in a timely manner. Top Challenges facing patient (as identified by patient/family and CM): Finances/Medication cost?  Pt reported no issues related to accessing/paying for medication. Pt's preferred pharmacy is the [facility] in [address] [place] [address]. Transportation  Pt doesn't drive and uses Medicaid transportation for appointments. Pt also stated that he has transportation to dialysis that is provided by a company he \"can't remember at the time. \" Pt appears to have no barriers related to transportation at the moment. Support system or lack thereof? No immediate concerns identified at this time. Living arrangements? Pt lives with his wife, son, daughter-in-law, and their children in a two-story home with a ramp leading to the entrance. Self-care/ADLs/Cognition? Pt is alert & oriented x4, actively engaged in dialogue, and sucessfully verified demographic information without any issue. Pt doesn't walk and uses a power chair; pt relies on his daughter-in-law Tim Baldwin to assist with ADL's. Current Advanced Directive/Advance Care Plan: Pt is a DNR with no advanced care plan on file. Plan for utilizing home health:  Pt will likely require HHC at d/c. There are no PT/OT consults in place as of now, however, CM will continue to follow to determine safe DAVID plan for pt before d/c. Transition of Care Plan:  Based on readmission, the patient's previous Plan of Care   has been evaluated and/or modified. The current Transition of Care Plan is:           * Home with HHC, f/u appts, & family support    > Pt receives dialysis at [facility] M,W,F with a chair time of 12:00 PM; CM will provide update to [facility] before d/c to ensure smooth transition back into pt's scheduled chair time. > Pt reported having toe amputated 10/1/19; pt reported he has in-home nurse for wound care but can't remember the company that provides the service    CM will continue to follow patient for discharge planning needs and arrange for services as deemed necessary. Care Management Interventions  PCP Verified by CM: Yes  Mode of Transport at Discharge:  Other (see comment)(Pt uses medicaid transportation services)  Transition of Care Consult (CM Consult): Discharge Planning  MyChart Signup: No  Discharge Durable Medical Equipment: No  Physical Therapy Consult: No  Occupational Therapy Consult: No  Speech Therapy Consult: No  Current Support Network: Wesley, Lives with Caregiver(pt lives with his son and daughter-in-law - pt's daughter-in-law serves as his informal caregiver)  Confirm Follow Up Transport: Other (see comment)(medicaid transportation)  Plan discussed with Pt/Family/Caregiver: Yes  Discharge Location  Discharge Placement: Home with home health    Derrick Alarcon, [address] [place] [address], [address] [place] [address]

## 2019-10-04 PROBLEM — I25.10 CAD (CORONARY ARTERY DISEASE): Status: ACTIVE | Noted: 2019-10-04

## 2019-10-04 PROBLEM — Z99.2 ESRD ON DIALYSIS (HCC): Status: ACTIVE | Noted: 2019-10-04

## 2019-10-04 PROBLEM — D69.3 CHRONIC IDIOPATHIC THROMBOCYTOPENIA (HCC): Status: ACTIVE | Noted: 2019-10-04

## 2019-10-04 PROBLEM — N18.6 ESRD ON DIALYSIS (HCC): Status: ACTIVE | Noted: 2019-10-04

## 2019-10-04 LAB
ALBUMIN SERPL-MCNC: 3.1 G/DL (ref 3.5–5)
ALBUMIN/GLOB SERPL: 0.7 {RATIO} (ref 1.1–2.2)
ALP SERPL-CCNC: 96 U/L (ref 45–117)
ALT SERPL-CCNC: <6 U/L (ref 12–78)
ANION GAP SERPL CALC-SCNC: 6 MMOL/L (ref 5–15)
AST SERPL-CCNC: 19 U/L (ref 15–37)
BILIRUB SERPL-MCNC: 1 MG/DL (ref 0.2–1)
BUN SERPL-MCNC: 47 MG/DL (ref 6–20)
BUN/CREAT SERPL: 8 (ref 12–20)
CALCIUM SERPL-MCNC: 9 MG/DL (ref 8.5–10.1)
CHLORIDE SERPL-SCNC: 104 MMOL/L (ref 97–108)
CO2 SERPL-SCNC: 27 MMOL/L (ref 21–32)
CREAT SERPL-MCNC: 5.95 MG/DL (ref 0.7–1.3)
ERYTHROCYTE [DISTWIDTH] IN BLOOD BY AUTOMATED COUNT: 18.3 % (ref 11.5–14.5)
GLOBULIN SER CALC-MCNC: 4.5 G/DL (ref 2–4)
GLUCOSE BLD STRIP.AUTO-MCNC: 123 MG/DL (ref 65–100)
GLUCOSE BLD STRIP.AUTO-MCNC: 128 MG/DL (ref 65–100)
GLUCOSE BLD STRIP.AUTO-MCNC: 142 MG/DL (ref 65–100)
GLUCOSE BLD STRIP.AUTO-MCNC: 194 MG/DL (ref 65–100)
GLUCOSE SERPL-MCNC: 194 MG/DL (ref 65–100)
HCT VFR BLD AUTO: 36.2 % (ref 36.6–50.3)
HGB BLD-MCNC: 10.9 G/DL (ref 12.1–17)
MAGNESIUM SERPL-MCNC: 2.1 MG/DL (ref 1.6–2.4)
MCH RBC QN AUTO: 31.8 PG (ref 26–34)
MCHC RBC AUTO-ENTMCNC: 30.1 G/DL (ref 30–36.5)
MCV RBC AUTO: 105.5 FL (ref 80–99)
NRBC # BLD: 0 K/UL (ref 0–0.01)
NRBC BLD-RTO: 0 PER 100 WBC
PLATELET # BLD AUTO: 68 K/UL (ref 150–400)
PMV BLD AUTO: 12.4 FL (ref 8.9–12.9)
POTASSIUM SERPL-SCNC: 4.3 MMOL/L (ref 3.5–5.1)
PROT SERPL-MCNC: 7.6 G/DL (ref 6.4–8.2)
RBC # BLD AUTO: 3.43 M/UL (ref 4.1–5.7)
SERVICE CMNT-IMP: ABNORMAL
SODIUM SERPL-SCNC: 137 MMOL/L (ref 136–145)
WBC # BLD AUTO: 5.1 K/UL (ref 4.1–11.1)

## 2019-10-04 PROCEDURE — 36415 COLL VENOUS BLD VENIPUNCTURE: CPT

## 2019-10-04 PROCEDURE — 83735 ASSAY OF MAGNESIUM: CPT

## 2019-10-04 PROCEDURE — 85027 COMPLETE CBC AUTOMATED: CPT

## 2019-10-04 PROCEDURE — 74011636637 HC RX REV CODE- 636/637: Performed by: NURSE PRACTITIONER

## 2019-10-04 PROCEDURE — 65660000000 HC RM CCU STEPDOWN

## 2019-10-04 PROCEDURE — 82962 GLUCOSE BLOOD TEST: CPT

## 2019-10-04 PROCEDURE — 74011250637 HC RX REV CODE- 250/637: Performed by: INTERNAL MEDICINE

## 2019-10-04 PROCEDURE — 80053 COMPREHEN METABOLIC PANEL: CPT

## 2019-10-04 PROCEDURE — 74011250637 HC RX REV CODE- 250/637: Performed by: NURSE PRACTITIONER

## 2019-10-04 RX ORDER — RANOLAZINE 500 MG/1
500 TABLET, EXTENDED RELEASE ORAL EVERY 12 HOURS
Status: DISCONTINUED | OUTPATIENT
Start: 2019-10-04 | End: 2019-10-05 | Stop reason: HOSPADM

## 2019-10-04 RX ORDER — METOPROLOL SUCCINATE 25 MG/1
25 TABLET, EXTENDED RELEASE ORAL DAILY
Status: DISCONTINUED | OUTPATIENT
Start: 2019-10-05 | End: 2019-10-05 | Stop reason: HOSPADM

## 2019-10-04 RX ORDER — RANOLAZINE 500 MG/1
500 TABLET, EXTENDED RELEASE ORAL EVERY 12 HOURS
Qty: 60 TAB | Refills: 0 | Status: SHIPPED | OUTPATIENT
Start: 2019-10-04

## 2019-10-04 RX ORDER — METOPROLOL SUCCINATE 25 MG/1
12.5 TABLET, EXTENDED RELEASE ORAL ONCE
Status: COMPLETED | OUTPATIENT
Start: 2019-10-04 | End: 2019-10-04

## 2019-10-04 RX ADMIN — TICAGRELOR 90 MG: 90 TABLET ORAL at 22:00

## 2019-10-04 RX ADMIN — METOPROLOL SUCCINATE 12.5 MG: 25 TABLET, EXTENDED RELEASE ORAL at 08:53

## 2019-10-04 RX ADMIN — FERRIC CITRATE 420 MG: 210 TABLET, COATED ORAL at 11:35

## 2019-10-04 RX ADMIN — ASPIRIN 81 MG CHEWABLE TABLET 81 MG: 81 TABLET CHEWABLE at 08:52

## 2019-10-04 RX ADMIN — OXYCODONE AND ACETAMINOPHEN 2 TABLET: 5; 325 TABLET ORAL at 17:49

## 2019-10-04 RX ADMIN — Medication 10 ML: at 22:00

## 2019-10-04 RX ADMIN — MIDODRINE HYDROCHLORIDE 10 MG: 5 TABLET ORAL at 17:49

## 2019-10-04 RX ADMIN — FERRIC CITRATE 420 MG: 210 TABLET, COATED ORAL at 08:02

## 2019-10-04 RX ADMIN — RANOLAZINE 500 MG: 500 TABLET, FILM COATED, EXTENDED RELEASE ORAL at 09:00

## 2019-10-04 RX ADMIN — Medication 10 ML: at 05:26

## 2019-10-04 RX ADMIN — INSULIN LISPRO 2 UNITS: 100 INJECTION, SOLUTION INTRAVENOUS; SUBCUTANEOUS at 12:04

## 2019-10-04 RX ADMIN — Medication 10 ML: at 14:35

## 2019-10-04 RX ADMIN — FINASTERIDE 5 MG: 5 TABLET, FILM COATED ORAL at 08:52

## 2019-10-04 RX ADMIN — PANTOPRAZOLE SODIUM 40 MG: 40 TABLET, DELAYED RELEASE ORAL at 08:03

## 2019-10-04 RX ADMIN — MIRTAZAPINE 7.5 MG: 15 TABLET, FILM COATED ORAL at 22:00

## 2019-10-04 RX ADMIN — FERRIC CITRATE 420 MG: 210 TABLET, COATED ORAL at 17:49

## 2019-10-04 RX ADMIN — PRAVASTATIN SODIUM 10 MG: 10 TABLET ORAL at 08:52

## 2019-10-04 RX ADMIN — RANOLAZINE 500 MG: 500 TABLET, FILM COATED, EXTENDED RELEASE ORAL at 22:00

## 2019-10-04 RX ADMIN — TICAGRELOR 90 MG: 90 TABLET ORAL at 08:54

## 2019-10-04 RX ADMIN — MIDODRINE HYDROCHLORIDE 10 MG: 5 TABLET ORAL at 12:04

## 2019-10-04 RX ADMIN — FLUOXETINE 20 MG: 10 CAPSULE ORAL at 08:52

## 2019-10-04 RX ADMIN — OXYCODONE AND ACETAMINOPHEN 2 TABLET: 5; 325 TABLET ORAL at 08:02

## 2019-10-04 RX ADMIN — METOPROLOL SUCCINATE 12.5 MG: 25 TABLET, EXTENDED RELEASE ORAL at 15:57

## 2019-10-04 RX ADMIN — DOCUSATE SODIUM 100 MG: 100 CAPSULE, LIQUID FILLED ORAL at 08:52

## 2019-10-04 NOTE — PROGRESS NOTES
NAME: Cruz Roche Sr.        :  1955        MRN:  [id number]        Assessment :    Plan:  --ESKD  AVF  CAD/CP  HTN  Anemia -MWF HD at [address] [place] [address]; shortened treatment on Wednesday at outpatient unit; tolerated catch up HD yesterday without issue; HD today to keep on MWF schedule     Hold OWEN for now (hgb 10.9)    Will see again on Monday, but please call with questions or changes in the meantime. Subjective:     Chief Complaint:  Eating lunch. Denies current complaint. No sob. No cp. No n/v. We discussed the above. Review of Systems:    Symptom Y/N Comments  Symptom Y/N Comments   Fever/Chills    Chest Pain     Poor Appetite    Edema     Cough    Abdominal Pain     Sputum    Joint Pain     SOB/COREA    Pruritis/Rash     Nausea/vomit    Tolerating PT/OT     Diarrhea    Tolerating Diet     Constipation    Other       Could not obtain due to:      Objective:     VITALS:   Last 24hrs VS reviewed since prior progress note. Most recent are:  Visit Vitals  /51 (BP 1 Location: Right arm, BP Patient Position: At rest)   Pulse 68   Temp 97.9 °F (36.6 °C)   Resp 18   Ht 5' 9\" (1.753 m)   Wt 70.2 kg (154 lb 12.2 oz)   SpO2 99%   BMI 22.85 kg/m²       Intake/Output Summary (Last 24 hours) at 10/4/2019 0646  Last data filed at 10/4/2019 0416  Gross per 24 hour   Intake 350 ml   Output 1500 ml   Net -1150 ml      Telemetry Reviewed:     PHYSICAL EXAM:  General: WD, WN. Alert, cooperative, no acute distress  Resp:  CTA Bilaterally. No Wheezing/Rhonchi/Rales. No access. muscle use  CV:  Regular  rhythm,  No murmur (), No Rubs, No Gallops.  No edema  GI:  Soft, Non distended, Non tender.  +Bowel sounds, no HSM      Lab Data Reviewed: (see below)    Medications Reviewed: (see below)    PMH/SH reviewed - no change compared to H&P  ________________________________________________________________________  Care Plan discussed with:  Patient y    [facility]:          Comments   >50% of visit spent in counseling and coordination of care       ________________________________________________________________________  Anthony Ng MD     Procedures: see electronic medical records for all procedures/Xrays and details which  were not copied into this note but were reviewed prior to creation of Plan. LABS:  Recent Labs     10/04/19  0410 10/03/19  0411   WBC 5.1 4.0*   HGB 10.9* 9.9*   HCT 36.2* 32.3*   PLT 68* 56*     Recent Labs     10/04/19  0410 10/03/19  0411 10/02/19  1419    139 137   K 4.3 3.9 4.0    104 101   CO2 27 26 33*   BUN 47* 60* 43*   CREA 5.95* 7.16* 6.34*   * 121* 122*   CA 9.0 8.6 8.2*     Recent Labs     10/04/19  0410 10/03/19  0411 10/02/19  1419   SGOT 19 19 21   AP 96 77 81   TP 7.6 7.1 7.4   ALB 3.1* 2.9* 3.0*   GLOB 4.5* 4.2* 4.4*     No results for input(s): INR, PTP, APTT, INREXT in the last 72 hours. No results for input(s): FE, TIBC, PSAT, FERR in the last 72 hours. Lab Results   Component Value Date/Time    Folate 7.0 01/06/2016 01:08 PM      No results for input(s): PH, PCO2, PO2 in the last 72 hours.   Recent Labs     10/02/19  2244   CPK 47   CKMB 1.3     No components found for: [place]  Lab Results   Component Value Date/Time    Color YELLOW/STRAW 09/05/2019 06:00 PM    Appearance CLEAR 09/05/2019 06:00 PM    Specific gravity 1.027 09/05/2019 06:00 PM    pH (UA) 7.0 09/05/2019 06:00 PM    Protein 100 (A) 09/05/2019 06:00 PM    Glucose NEGATIVE  09/05/2019 06:00 PM    Ketone NEGATIVE  09/05/2019 06:00 PM    Bilirubin NEGATIVE  09/05/2019 06:00 PM    Urobilinogen 1.0 09/05/2019 06:00 PM    Nitrites NEGATIVE  09/05/2019 06:00 PM    Leukocyte Esterase NEGATIVE  09/05/2019 06:00 PM    Epithelial cells FEW 09/05/2019 06:00 PM    Bacteria NEGATIVE  09/05/2019 06:00 PM    WBC 0-4 09/05/2019 06:00 PM    RBC 5-10 09/05/2019 06:00 PM MEDICATIONS:  Current Facility-Administered Medications   Medication Dose Route Frequency    insulin lispro (HUMALOG) injection   SubCUTAneous AC&HS    glucose chewable tablet 16 g  4 Tab Oral PRN    dextrose (D50W) injection syrg 12.5-25 g  12.5-25 g IntraVENous PRN    glucagon (GLUCAGEN) injection 1 mg  1 mg IntraMUSCular PRN    oxyCODONE-acetaminophen (PERCOCET) 5-325 mg per tablet 2 Tab  2 Tab Oral Q6H PRN    acetaminophen (TYLENOL) tablet 650 mg  650 mg Oral Q6H PRN    aspirin chewable tablet 81 mg  81 mg Oral DAILY    docusate sodium (COLACE) capsule 100 mg  100 mg Oral DAILY    ferric citrate (AURYXIA) tablet 420 mg  420 mg Oral TIDAC    finasteride (PROSCAR) tablet 5 mg  5 mg Oral DAILY    FLUoxetine (PROzac) capsule 20 mg  20 mg Oral DAILY    metoprolol succinate (TOPROL-XL) XL tablet 12.5 mg  12.5 mg Oral DAILY    midodrine (PROAMITINE) tablet 10 mg  10 mg Oral TID WITH MEALS    mirtazapine (REMERON) tablet 7.5 mg  7.5 mg Oral QHS    nitroglycerin (NITROSTAT) tablet 0.4 mg  0.4 mg SubLINGual Q5MIN PRN    pantoprazole (PROTONIX) tablet 40 mg  40 mg Oral ACB    pravastatin (PRAVACHOL) tablet 10 mg  10 mg Oral DAILY    ticagrelor (BRILINTA) tablet 90 mg  90 mg Oral Q12H    sodium chloride (NS) flush 5-40 mL  5-40 mL IntraVENous Q8H    sodium chloride (NS) flush 5-40 mL  5-40 mL IntraVENous PRN

## 2019-10-04 NOTE — DISCHARGE INSTRUCTIONS
HOSPITALIST DISCHARGE INSTRUCTIONS    NAME: Keerthi Mathru Sr.   :  1955   MRN:  [id number]     Date/Time:  10/4/2019 2:38 PM    ADMIT DATE: 10/2/2019   DISCHARGE DATE: 10/4/2019     Attending Physician: Bisi Huggins NP    DISCHARGE DIAGNOSIS:  Active Hospital Problems    Diagnosis Date Noted    CAD (coronary artery disease) 10/04/2019    Chronic idiopathic thrombocytopenia ([facility] 10/04/2019    ESRD on dialysis ([facility] 10/04/2019    Type 2 diabetes with nephropathy ([facility] 2018    NSTEMI (non-ST elevated myocardial infarction) ([facility] 2018    PVD (peripheral vascular disease) ([facility]      Medications: Per above medication reconciliation. Pain Management: per above medications    Recommended diet: Renal Diet    Recommended activity: Activity as tolerated    Wound care: See surgical/procedure care instructions    Indwelling devices:  None    Supplemental Oxygen: None    Code status: DNR        Outside physician follow up: Follow-up Information     Follow up With Specialties Details Why Contact Info    Promise George 113  [phone]      [address]   Return to routine HD schedule; M,W,F @ 12:00 pm.  Jackie [address] [place] [id number] [address] [place] [address] Cardiology Schedule an appointment as soon as possible for a visit  [address]  [address]  [address] [phone]            Information obtained by :  I understand that if any problems occur once I am at home I am to contact my physician. I understand and acknowledge receipt of the instructions indicated above.                                                                                                                                            Physician's or R.N.'s Signature                                                                  Date/Time Patient or Repres

## 2019-10-04 NOTE — PROGRESS NOTES
7:30 AM Bedside report received from [facility].    8:00 AM PRN oxycodone given for patient's c/o pain per MD order. See MAR.    9:00 AM Spoke with Dr. Jamar Garner regarding AM meds. Per MD, OK to hold midodrine d/t /63. Patient refusing SSI this AM d/t patient states, \"I'm not hungry and I'm not going to eat breakfast and I don't want my sugar to drop with dialysis. \" All other AM meds given per Dr. Jamar Garner. See MAR. Will continue to monitor. 1:00 PM Spoke with Rebecca Lomax NP, regarding [facility] agency's request for updated wound care orders concerning patient's recently amputated 2nd toe on R foot. Orders received to have wound care consulted for further instructions. 3:20 PM Notified Dr. Jamar Garner patient had 5 beat run of v-tach, BP, patient reports, \"it made me feel like dog shit. \" Orders received to increase toprol xl to 25 mg tomorrow and give one time dose 12.5 mg toprol xl now as well as check mag level. See MAR. Will f/u and continue to monitor. 5:20 PM SHIRA Syed, notified patient has not been dialyzed yet. NP to dc discharge for this evening and states patient will discharge in AM.    5:50 PM PRN PO oxycodone given for patient's c/o pain per MD order. See MAR.    7:20 PM Bedside report given to [facility], RN.

## 2019-10-04 NOTE — WOUND CARE
Wound care nurse consult for \" S/p amputation of 2nd toe R foot. [facility]\" Patient is a 72 y/o CM admitted for heart issues to IVCU. He is POD# 3 from a right 2nd toe amputation by Dr Clau Chavez. Past Medical History:  
Diagnosis Date  Anemia  Arthritis   
 back, hips  CAD (coronary artery disease) Sees Dr. Jorge Cortes, 4 heart attacks  Chronic kidney disease Dr. Lana Simmons, [facility]  Chronic LBP 1990 Slipped on gravel and fell at work; Multiple surgeries; Sees Dr. Jimenez Pi for pain mgmt  Chronic pain  Confusion  Depression  Diabetes ([facility]  ED (erectile dysfunction)  GERD (gastroesophageal reflux disease) 11/2015 Diagnosed at [address] [facility] last month  Hypercholesteremia  Hypertension  Liver disease  Psychiatric disorder   
 depression  PVD (peripheral vascular disease) ([facility]  Thromboembolus ([facility] DVT in right leg Patient states he was supposed to have a [place] [address] visit today to change the original surgical dressing applied 10/1. Dressing removed to reveal a well approximated surgical site with sutures. Some erythema and edema, no drainage, no odor. Recommend: 
 
Right foot (toe amputation site) dressing change: x2/week. Remove dry dressing and replace with dry 4x4's and secure with kerlix gauze.

## 2019-10-04 NOTE — DISCHARGE SUMMARY
Hospitalist Discharge Summary     Patient ID:  Diomedes Grace  [id number]  71 y.o.  1955  10/2/2019    PCP on record: Trina Lozano DO    Admit date: 10/2/2019  Discharge date and time: 10/4/2019    DISCHARGE DIAGNOSIS:    Active Hospital Problems    Diagnosis Date Noted    CAD (coronary artery disease) 10/04/2019    Chronic idiopathic thrombocytopenia ([facility] 10/04/2019    [facility] 10/04/2019    Type 2 diabetes with nephropathy ([facility] 06/01/2018    NSTEMI (non-ST elevated myocardial infarction) ([facility] 04/28/2018    PVD (peripheral vascular disease) ([facility]        CONSULTATIONS:  IP CONSULT TO CARDIOLOGY  IP CONSULT TO HOSPITALIST  IP CONSULT TO NEPHROLOGY    Excerpted HPI from H&P of Torin Hernandes MD:  \"Patient is 71years old  man with past medical history coronary artery disease status post PCI x6 last PCI times 4 September 2019, diabetes mellitus type 2, end-stage renal disease on hemodialysis 3 times a week, peripheral vascular disease status post left above-knee amputation and right second toe amputation, tobacco abuse presented to the emergency department from the dialysis center after he had a sharp chest pain. He reported that his chest pain is mostly on the left side, nonradiating, sharp in nature, lasted for 2 hours, relieved by nitro and aspirin. He also reported that this chest pain is very similar to the chest pain he had last month when he had his heart attack and he was concerned this is why he came to the emergency department. Patient reported that he quit smoking last month after his heart attack and he does not drink alcohol or use illicit drugs.   In the emergency department his EKG did not reveal any acute changes however his troponin was elevated more than his baseline for which cardiology was consulted stat from the emergency department and they recommended to give full dose of aspirin only at this time.\"  ______________________________________________________________________  DISCHARGE SUMMARY/HOSPITAL COURSE:  for full details see H&P, daily progress notes, labs, consult notes. Scott Reddy Sr.64 y.o. male was admitted to [facility] on 10/2/2019 and treated for the following medical complaints:     NSTEMI, POA  · Troponin elevated above baseline; 0.16->0.13->0.24->0.12  · CK 58  · CK-MB 1.3  · EKG shows NSR  · CXR negative   · Continue ASA and Brilinta   · Nitro PRN  · Appreciate Cardiology input- follow-up OP  · Continue Ranexa      Coronary artery disease   · S/p 6 cardiac stents, with 4 stents last month   · Continue statin, ASA, and Brilinta   · Continue BB  · PRN Nitro      Chronic thrombocytopenia   · Platelets 68  · Monitor      ESRD on HD  · Consult nephrology for HD  · MWF  · Cr 5.95     Hx of PVD  · Reports quitting smoking 1 month ago  · Continue ASA  · S/p left AKA and right second toe amputation      DM2  · Hgb A1c 6.6 (09/05/2019)  · BS AC & HS  · SSI     Patient's plan of care has been reviewed with them. Patient and/or family have verbally conveyed their understanding and agreement of the patient's signs, symptoms, diagnosis, treatment and prognosis and additionally agree to follow up as recommended or return to [facility] should their condition change prior to follow-up. Discharge instructions have also been provided to the patient with some educational information regarding their diagnosis as well a list of reasons why they would want to return to the office prior to their follow-up appointment should their condition change. Noe Russ to avoid frequent ED visits. [facility] can treat; pains, sprains, cuts, wounds, high fevers, upper respiratory infections and much more. There medical team is equipped with all the tools necessary to provide advanced medical care in the comfort of you home, workplace, or location of need.   The medical team consists of doctors, nurse practitioners, and EMTs. [facility] is available 7 days per week 9 am to 9 pm.   Request care by calling [phone] or by going online at [phone] [address]  _______________________________________________________________________  Patient seen and examined by me on discharge day. Pertinent Findings:  Gen:    Not in distress  Chest: Clear lungs  CVS:   Regular rhythm. No edema  Abd:  Soft, not distended, not tender  Neuro:  Alert, oriented   _______________________________________________________________________  DISCHARGE MEDICATIONS:   Current Discharge Medication List      START taking these medications    Details   ranolazine ER (RANEXA) 500 mg SR tablet Take 1 Tab by mouth every twelve (12) hours. Qty: 60 Tab, Refills: 0         CONTINUE these medications which have NOT CHANGED    Details   traMADol (ULTRAM) 50 mg tablet Take 1 Tab by mouth every six (6) hours as needed for Pain for up to 3 days. Max Daily Amount: 200 mg. Qty: 12 Tab, Refills: 0    Associated Diagnoses: Pain at surgical incision      ticagrelor (BRILINTA) 90 mg tablet Take 1 Tab by mouth every twelve (12) hours every twelve (12) hours. Qty: 60 Tab, Refills: 0      midodrine (PROAMITINE) 10 mg tablet Take 1 Tab by mouth three (3) times daily (with meals) for 30 days. Qty: 90 Tab, Refills: 0      metoprolol succinate (TOPROL-XL) 25 mg XL tablet Take 0.5 Tabs by mouth daily. Qty: 30 Tab, Refills: 0      pregabalin (LYRICA) 150 mg capsule Take 150 mg by mouth three (3) times daily. ferric citrate (AURYXIA) 210 mg iron tablet Take 420 mg by mouth Before breakfast, lunch, and dinner. docusate sodium (COLACE) 100 mg capsule Take 100 mg by mouth daily. pravastatin (PRAVACHOL) 80 mg tablet TAKE 1 TABLET BY MOUTH EVERY DAY AT NIGHT  Qty: 90 Tab, Refills: 1      FLUoxetine (PROZAC) 20 mg capsule Take 20 mg by mouth daily.       traZODone (DESYREL) 150 mg tablet TAKE 1 TABLET BY MOUTH EVERY DAY AT NIGHT  Qty: 90 Tab, Refills: 1    Associated Diagnoses: Insomnia due to other mental disorder      mirtazapine (REMERON) 30 mg tablet TAKE 1 TABLET BY MOUTH EVERY DAY AT NIGHT  Qty: 90 Tab, Refills: 1    Associated Diagnoses: Insomnia due to other mental disorder      omeprazole (PRILOSEC) 40 mg capsule TAKE 1 CAPSULE BY MOUTH TWICE A DAY  Qty: 180 Cap, Refills: 1    Associated Diagnoses: Gastroesophageal reflux disease without esophagitis      finasteride (PROSCAR) 5 mg tablet TAKE 1 TABLET BY MOUTH EVERY DAY  Qty: 90 Tab, Refills: 1    Associated Diagnoses: Enlarged prostate on rectal examination      aspirin 81 mg chewable tablet Take 1 Tab by mouth daily. Indications: myocardial infarction prevention  Qty: 90 Tab, Refills: 1    Associated Diagnoses: PVD (peripheral vascular disease) (HCC)      nitroglycerin (NITROSTAT) 0.4 mg SL tablet 1 Tab by SubLINGual route every five (5) minutes as needed for Chest Pain. Qty: 20 Tab, Refills: 0               Patient Follow Up Instructions: Activity: Activity as tolerated  Diet: Renal Diet  Wound Care: As directed    Follow-up with PCP in 1 week.     Follow-up Information     Follow up With Specialties Details Why Contact Wayne    Doris [address] [place] [address] Drive [address] [phone]      [address]   Return to routine HD schedule; M,W,F @ 12:00 pm.  Jackie [address] [place] [address] Cardiology Schedule an appointment as soon as possible for a visit  [address]   [address]  [address] [phone]          ________________________________________________________________    Risk of deterioration: Moderate    Condition at Discharge:  Stable  __________________________________________________________________    Disposition  Home with family and home health services    ____________________________________________________________________    Code Status: DNR/DNI  ___________________________________________________________________      Total time in minutes spent coordinating this discharge (includes going over instructions, follow-up, prescriptions, and preparing report for sign off to her PCP) :  31 minutes    Signed:  Sun Mariano NP

## 2019-10-04 NOTE — PROGRESS NOTES
DAVID Plan:    * Home with [facility] (SN) for wound care, f/u appts, & family support    > CM faxed d/c summary and updated wound care instructions to [facility] [place] (fx: [phone])  > Pt is on will call for [facility] transportation tomorrow morning; PCS form & other corresponding documentation placed in pt's chart  > Pt still needs PCP f/u appt; CM specialist attempted to schedule but reached the office's voicemail  > CM will fax d/c summary, H&P, and last dialysis flow sheet to [facility] upon d/c tomorrow morning (fx: [phone])    CM reviewed pt's chart and noted updates before moving forward with d/c planning. FLOYD called [facility] in [facility] ([phone]) and spoke to Teresita regarding why they have not serviced pt since last d/c 9/19/19; Teresita confirmed that they were scheduled to provide SN for wound care but were unable to make contact with pt before he was readmitted back into the hospital. [facility] stated they were open to accepting pt again, but would need a new order for SN services, a new face-to-face from attending physician, and new wound care instructions. CM will provide pt with FOC document to ensure he wants to move forward with [facility] at d/c.    1:20 PM: CM conducted room visit to check in with pt and discuss DAVID plans. CM informed pt of update provided by [facility] in [facility] and asked if he would like to still utilize them for [facility] at d/c; pt confirmed he wants to utilize them. CM provided pt with [facility] document, explained its purpose, and placed signed copy in chart.  CM sent referral via [employer] to [facility] for [facility] SN at d/c; CM will provide updates as they come in.    CM called [facility] ([phone]) and spoke to Roland Billing regarding pt's projected d/c date; Roland Billing asked CM to fax pt's d/c summary, H&P, face sheet, and last dialysis flow sheet to facility upon d/c. CM confirmed pt will be present for regular scheduled chair time at 12:00 pm on Monday. CM will fax updates to [facility] lupe Hodge (fx: [phone]) once d/c summary is entered. 1:43 PM: CM confirmed [facility] in [place] [place] authorized referral for SN; [facility] place [facility] information in pt's AVS for reference. Pt informed CM that he will need transportation at d/c as his wife is not able to come get him and he doesn't have his wheel chair present. CM sent referral via [employer] to [facility] and inquired if pt would require second authorization as his primary insurance is provided through Medicare. 3:50 PM: CM confirmed [facility] authorized transportation for 6:30 PM this evening. [facility] also reported \"not knowing if pt needs 2nd authorization. \" Upon informing pt's nurse of transportation time, she stated that pt still needed to receive dialysis and wouldn't be finished by 6:30 PM. CM canceled [facility] transportation and placed it on will call for tomorrow morning. CM placed PCS form, face sheet, H&P, and pt's DNR status on chart. Medicare pt has received, reviewed, and signed 2nd IM letter informing them of their right to appeal the discharge. Signed copy has been placed on pt bedside chart. CM will continue to follow patient for discharge planning needs and arrange for services as deemed necessary.     Constantine Casanova MSW  Care Manager, [address] [facility]

## 2019-10-04 NOTE — PROGRESS NOTES
Progress Note      10/4/2019 8:54 AM  NAME: Tutu Wright Sr. MRN:  [id number]   Admit Diagnosis: NSTEMI (non-ST elevated myocardial infarction) [facility]) [I21.4]      Problem List:     1. CAD; cath 4/30/19 w/ RCA , 60% oLAD w/ neg FFR (0.88), patent LCx stent w/ mild ISR.  PCI of LCx, PTCA of RCA 7/97, PTCA LAD 3/94. [place]ana Flight 2/08 w/ EF 62%, distal ineroapical infarct w/o ischemia. Recurrent [place] 9/19 w/ Impella-supported PCI of LAD, OM1, and LM/LAD/LCx bifurcation w/ Culotte technique. 2. Indeterminate troponin elevation  3. Right renal artery stenosis s/p 7.0 x 19mm BMS 5/4/18  4. Occluded LCIA. 5. ESRD on dialysis. Dr Crump   6. Long term hx of Coronary artery disease s/p multivessel stenting s/p PCI of LCx, PTCA of RCA 7/97, PTCA LAD 3/94.  Lexiscan 2/08 w/ EF 62%, distal ineroapical infarct w/o ischemia  7. Cath 4/2018  No intervention.   Lv EF  -  55%   8. Peripheral vascular disease s/p left iliac stenting, left SFA stenting 3/00, right iliac stenting 11/99.  9. Status post L AKA. R toe amputation 9/30/19 (Dr. Tierra Hendricks)  10. Bilateral carotid stenosis; <49% 9/19 (verts ant)  11. Hypertension  12. Diabetes  13. Chronic Tobacco abuse 1-2 PPD .   14. Chronic pain  15. Chronic anemia  16. Hyperlipidemia  17. Noncompliance  18. Peptic ulcer disease w/ GIB '15  19. Falls  20. Lives w/ sister in [facility] now     Assessment/Plan: TnI 0.2; peaked  No EKG changes    Pain free w/ ranexa. Feels good and wants to go home. Staples out.     21. HD per renal; HD today  22. No plans for invasive procedures  23. Continue Ranexa 500mg BID (will need a Rx)  24. Continue ASA and ticagrelor  25. Continue midodrine 10mg TID as needed  26. Continue Toprol XL 12.5mg daily  27. Continue statin  28. I'm ok w/ discharge after HD; can see me in the office         []       High complexity decision making was performed in this patient at high risk for decompensation with multiple organ involvement.     Subjective:     Pollo Barclay ANABEL Monzon Sr. denies chest pain, dyspnea. Discussed with RN events overnight. Review of Systems:    Symptom Y/N Comments  Symptom Y/N Comments   Fever/Chills N   Chest Pain N    Poor Appetite N   Edema N    Cough N   Abdominal Pain N    Sputum N   Joint Pain N    SOB/COREA N   Pruritis/Rash N    Nausea/vomit N   Tolerating PT/OT Y    Diarrhea N   Tolerating Diet Y    Constipation N   Other       Could NOT obtain due to:      Objective:      Physical Exam:    Last 24hrs VS reviewed since prior progress note. Most recent are:    Visit Vitals  /63   Pulse 77   Temp 97.8 °F (36.6 °C)   Resp 18   Ht 5' 9\" (1.753 m)   Wt 70.2 kg (154 lb 12.2 oz)   SpO2 100%   BMI 22.85 kg/m²       Intake/Output Summary (Last 24 hours) at 10/4/2019 0854  Last data filed at 10/4/2019 0747  Gross per 24 hour   Intake 470 ml   Output 1625 ml   Net -1155 ml        General Appearance: Well developed, well nourished, alert & oriented x 3,    no acute distress. Ears/Nose/Mouth/Throat: Hearing grossly normal.  Neck: Supple. Chest: Lungs clear to auscultation bilaterally. Cardiovascular: Regular rate and rhythm, S1S2 normal, FABIAN  Abdomen: Soft, non-tender, bowel sounds are active. Extremities: LAKA, R toe amp  Skin: Warm and dry. []         Post-cath site without hematoma, bruit, tenderness, or thrill. Distal pulses intact. PMH/SH reviewed - no change compared to H&P    Data Review    Telemetry: sinus rhythm     EKG:   [x]  No new EKG for review    Lab Data Personally Reviewed:    Recent Labs     10/04/19  0410 10/03/19  0411   WBC 5.1 4.0*   HGB 10.9* 9.9*   HCT 36.2* 32.3*   PLT 68* 56*     No results for input(s): INR, PTP, APTT, INREXT in the last 72 hours.    Recent Labs     10/04/19  0410 10/03/19  0411 10/02/19  1419    139 137   K 4.3 3.9 4.0    104 101   CO2 27 26 33*   BUN 47* 60* 43*   CREA 5.95* 7.16* 6.34*   * 121* 122*   CA 9.0 8.6 8.2*     Recent Labs     10/03/19  0411 10/02/19  2244 10/02/19  171 10/02/19  1618   CPK  --  47  --   --    CKNDX  --  2.8*  --   --    TROIQ 0.12*  --  0.24* 0.13*     Lab Results   Component Value Date/Time    Cholesterol, total 236 (H) 04/29/2018 06:00 AM    HDL Cholesterol 34 04/29/2018 06:00 AM    LDL,Direct 62 10/03/2014 05:50 AM    LDL, calculated 172.4 (H) 04/29/2018 06:00 AM    Triglyceride 148 04/29/2018 06:00 AM    CHOL/HDL Ratio 6.9 (H) 04/29/2018 06:00 AM       Recent Labs     10/04/19  0410 10/03/19  0411 10/02/19  1419   SGOT 19 19 21   AP 96 77 81   TP 7.6 7.1 7.4   ALB 3.1* 2.9* 3.0*   GLOB 4.5* 4.2* 4.4*     No results for input(s): PH, PCO2, PO2 in the last 72 hours.     Medications Personally Reviewed:    Current Facility-Administered Medications   Medication Dose Route Frequency    ranolazine ER (RANEXA) tablet 500 mg  500 mg Oral Q12H    insulin lispro (HUMALOG) injection   SubCUTAneous AC&HS    glucose chewable tablet 16 g  4 Tab Oral PRN    dextrose (D50W) injection syrg 12.5-25 g  12.5-25 g IntraVENous PRN    glucagon (GLUCAGEN) injection 1 mg  1 mg IntraMUSCular PRN    oxyCODONE-acetaminophen (PERCOCET) 5-325 mg per tablet 2 Tab  2 Tab Oral Q6H PRN    acetaminophen (TYLENOL) tablet 650 mg  650 mg Oral Q6H PRN    aspirin chewable tablet 81 mg  81 mg Oral DAILY    docusate sodium (COLACE) capsule 100 mg  100 mg Oral DAILY    ferric citrate (AURYXIA) tablet 420 mg  420 mg Oral TIDAC    finasteride (PROSCAR) tablet 5 mg  5 mg Oral DAILY    FLUoxetine (PROzac) capsule 20 mg  20 mg Oral DAILY    metoprolol succinate (TOPROL-XL) XL tablet 12.5 mg  12.5 mg Oral DAILY    midodrine (PROAMITINE) tablet 10 mg  10 mg Oral TID WITH MEALS    mirtazapine (REMERON) tablet 7.5 mg  7.5 mg Oral QHS    nitroglycerin (NITROSTAT) tablet 0.4 mg  0.4 mg SubLINGual Q5MIN PRN    pantoprazole (PROTONIX) tablet 40 mg  40 mg Oral ACB    pravastatin (PRAVACHOL) tablet 10 mg  10 mg Oral DAILY    ticagrelor (BRILINTA) tablet 90 mg  90 mg Oral Q12H    sodium chloride (NS) flush 5-40 mL  5-40 mL IntraVENous Q8H    sodium chloride (NS) flush 5-40 mL  5-40 mL IntraVENous PRN         George Lozoya III, DO

## 2019-10-05 VITALS
DIASTOLIC BLOOD PRESSURE: 60 MMHG | TEMPERATURE: 98.1 F | HEART RATE: 71 BPM | OXYGEN SATURATION: 96 % | BODY MASS INDEX: 22.17 KG/M2 | WEIGHT: 149.69 LBS | SYSTOLIC BLOOD PRESSURE: 147 MMHG | RESPIRATION RATE: 14 BRPM | HEIGHT: 69 IN

## 2019-10-05 LAB
ALBUMIN SERPL-MCNC: 3 G/DL (ref 3.5–5)
ALBUMIN/GLOB SERPL: 0.7 {RATIO} (ref 1.1–2.2)
ALP SERPL-CCNC: 92 U/L (ref 45–117)
ALT SERPL-CCNC: 6 U/L (ref 12–78)
ANION GAP SERPL CALC-SCNC: 7 MMOL/L (ref 5–15)
AST SERPL-CCNC: 20 U/L (ref 15–37)
BILIRUB SERPL-MCNC: 1.2 MG/DL (ref 0.2–1)
BUN SERPL-MCNC: 21 MG/DL (ref 6–20)
BUN/CREAT SERPL: 6 (ref 12–20)
CALCIUM SERPL-MCNC: 9 MG/DL (ref 8.5–10.1)
CHLORIDE SERPL-SCNC: 100 MMOL/L (ref 97–108)
CO2 SERPL-SCNC: 30 MMOL/L (ref 21–32)
CREAT SERPL-MCNC: 3.36 MG/DL (ref 0.7–1.3)
ERYTHROCYTE [DISTWIDTH] IN BLOOD BY AUTOMATED COUNT: 17.6 % (ref 11.5–14.5)
GLOBULIN SER CALC-MCNC: 4.6 G/DL (ref 2–4)
GLUCOSE SERPL-MCNC: 92 MG/DL (ref 65–100)
HCT VFR BLD AUTO: 33.6 % (ref 36.6–50.3)
HGB BLD-MCNC: 10.6 G/DL (ref 12.1–17)
MCH RBC QN AUTO: 32 PG (ref 26–34)
MCHC RBC AUTO-ENTMCNC: 31.5 G/DL (ref 30–36.5)
MCV RBC AUTO: 101.5 FL (ref 80–99)
NRBC # BLD: 0 K/UL (ref 0–0.01)
NRBC BLD-RTO: 0 PER 100 WBC
PLATELET # BLD AUTO: 62 K/UL (ref 150–400)
PMV BLD AUTO: 11.8 FL (ref 8.9–12.9)
POTASSIUM SERPL-SCNC: 3.9 MMOL/L (ref 3.5–5.1)
PROT SERPL-MCNC: 7.6 G/DL (ref 6.4–8.2)
RBC # BLD AUTO: 3.31 M/UL (ref 4.1–5.7)
SODIUM SERPL-SCNC: 137 MMOL/L (ref 136–145)
WBC # BLD AUTO: 4.9 K/UL (ref 4.1–11.1)

## 2019-10-05 PROCEDURE — 74011250637 HC RX REV CODE- 250/637: Performed by: INTERNAL MEDICINE

## 2019-10-05 PROCEDURE — 85027 COMPLETE CBC AUTOMATED: CPT

## 2019-10-05 PROCEDURE — 90935 HEMODIALYSIS ONE EVALUATION: CPT

## 2019-10-05 PROCEDURE — 36415 COLL VENOUS BLD VENIPUNCTURE: CPT

## 2019-10-05 PROCEDURE — 80053 COMPREHEN METABOLIC PANEL: CPT

## 2019-10-05 PROCEDURE — 74011250637 HC RX REV CODE- 250/637: Performed by: NURSE PRACTITIONER

## 2019-10-05 RX ADMIN — MIDODRINE HYDROCHLORIDE 10 MG: 5 TABLET ORAL at 09:17

## 2019-10-05 RX ADMIN — PRAVASTATIN SODIUM 10 MG: 10 TABLET ORAL at 09:17

## 2019-10-05 RX ADMIN — FINASTERIDE 5 MG: 5 TABLET, FILM COATED ORAL at 09:17

## 2019-10-05 RX ADMIN — OXYCODONE AND ACETAMINOPHEN 2 TABLET: 5; 325 TABLET ORAL at 07:01

## 2019-10-05 RX ADMIN — METOPROLOL SUCCINATE 25 MG: 25 TABLET, EXTENDED RELEASE ORAL at 09:17

## 2019-10-05 RX ADMIN — TICAGRELOR 90 MG: 90 TABLET ORAL at 09:17

## 2019-10-05 RX ADMIN — Medication 10 ML: at 06:22

## 2019-10-05 RX ADMIN — ASPIRIN 81 MG CHEWABLE TABLET 81 MG: 81 TABLET CHEWABLE at 09:17

## 2019-10-05 RX ADMIN — DOCUSATE SODIUM 100 MG: 100 CAPSULE, LIQUID FILLED ORAL at 09:17

## 2019-10-05 RX ADMIN — FLUOXETINE 20 MG: 10 CAPSULE ORAL at 09:17

## 2019-10-05 RX ADMIN — RANOLAZINE 500 MG: 500 TABLET, FILM COATED, EXTENDED RELEASE ORAL at 09:17

## 2019-10-05 RX ADMIN — PANTOPRAZOLE SODIUM 40 MG: 40 TABLET, DELAYED RELEASE ORAL at 09:17

## 2019-10-05 RX ADMIN — FERRIC CITRATE 420 MG: 210 TABLET, COATED ORAL at 09:17

## 2019-10-05 NOTE — PROCEDURES
[facility] Dialysis Team [facility]  [phone]    Vitals   Pre   Post   Assessment   Pre   Post     Temp  Temp: 97.9 °F (36.6 °C) (10/05/19 0106)  98.1   LOC  Alert/oriented x4   No change   HR   Pulse (Heart Rate): 64 (10/05/19 0106) 69 Lungs   Clear/ room air No change    B/P   BP: 154/58 (10/05/19 0106) 147/60 Cardiac   NSR/monitored No change    Resp   Resp Rate: 16 (10/05/19 0106) 16 Skin   Warm/ dry/ multiple bruising No change    Pain level  Pain Intensity 1: 0 (10/04/19 2344) 0 Edema    None   No change   Orders:    Duration:   Start:    0106 End:    0410 Total:   3hr   Dialyzer:   Dialyzer/Set Up Inspection: Revaclear (10/05/19 0106)   K Bath:   Dialysate K (mEq/L): 3 (10/05/19 0106)   Ca Bath:   Dialysate CA (mEq/L): 2.5 (10/05/19 0106)   Na/Bicarb:   Dialysate NA (mEq/L): 138 (10/05/19 0106)   Target Fluid Removal:   Goal/Amount of Fluid to Remove (mL): 1500 mL (10/05/19 0106)   Access     Type & Location:   LUE AVF, (+) bruit/thrill, cannulated with 15g needles x2, patent, pump at 400   Labs     Obtained/Reviewed   Critical Results Called   Date when labs were drawn-  Hgb-    HGB   Date Value Ref Range Status   10/04/2019 10.9 (L) 12.1 - 17.0 g/dL Final     K-    Potassium   Date Value Ref Range Status   10/04/2019 4.3 3.5 - 5.1 mmol/L Final     Ca-   Calcium   Date Value Ref Range Status   10/04/2019 9.0 8.5 - 10.1 MG/DL Final     Bun-   BUN   Date Value Ref Range Status   10/04/2019 47 (H) 6 - 20 MG/DL Final     Creat-   Creatinine   Date Value Ref Range Status   10/04/2019 5.95 (H) 0.70 - 1.30 MG/DL Final        Medications/ Blood Products Given     Name   Dose   Route and Time     None                Blood Volume Processed (BVP):    63.1   Net Fluid   Removed:  1500ml   Comments   Time Out Done:  0100  Primary Nurse Rpt Pre: Melissa Gentile RN  Primary Nurse Rpt Post: Melissa Gentile RN  Pt Education: Hospital dialysis procedures  Care Plan: continue treatment as ordered  Tx Summary: Patient tolerated treatment well. All possible blood returned with normal saline rinse back. Needles removed, no excessive bleeding, sites secured. Admiting Diagnosis: cardiac issues  Pt's previous clinic- [facility]  Consent signed - Informed Consent Verified: Yes (10/05/19 0106)  [facility] Consent - Verified  Hepatitis Status- Ag (-) Ab Immune  Machine #- Machine Number: [id number] (10/05/19 0106)  Telemetry status- Monitored  Pre-dialysis wt. - Pre-Dialysis Weight: 70.2 kg (154 lb 12.2 oz) (10/05/19 0106)

## 2019-10-05 NOTE — PROGRESS NOTES
2150  Multiple attempts to call inpatient dialysis for confirmation of patient's midnight hemodialysis per nephrologist plan. Phone calls by nurse Abi Harding) were unanswered. [facility]   gave phone number [phone]. Nurse Abi Harding) talked to Hemodialysis (HD) nurse regarding patient's schedule which is confirmed for midnight session per HD nurse    0530  Patient finished his dialysis session, blood work will be done after hemodialysis.  Patient resting in bed in no acute distress    0620  CHG bath done, gown and linens changed

## 2019-10-05 NOTE — PROGRESS NOTES
I reviewed pertinent labs and imaging, and discussed /agreed on the plan of care with Dr. Daina Foss. Hospitalist Progress Note    NAME: Marko Mccracken Sr.   :  1955   MRN:  [id number]     History of present illness:  He presented to the ED from dialysis center after reporting a sharp chest pain. He states the pain was mostly on the left side and lasted for 2 hours. The pain was relieved with Nitro and ASA. Assessment / Plan:  Late dialysis today. Will discharge in AM    NSTEMI, POA  · Troponin elevated above baseline; 0.16->0.13->0.24->0.12  · CK 58  · CK-MB 1.3  · EKG shows NSR  · CXR negative   · Continue ASA and Brilinta   · Nitro PRN  · Appreciate Cardiology input    Coronary artery disease   · S/p 6 cardiac stents, with 4 stents last month   · Continue statin, ASA, and Brilinta   · Continue BB  · PRN Nitro     Chronic thrombocytopenia   · Platelets 68  · Monitor     ESRD on HD  · Consult nephrology for HD  · MWF  · Cr 5.95    Hx of PVD  · Reports quitting smoking 1 month ago  · Continue ASA  · S/p left AKA and right second toe amputation     DM2  · Hgb A1c 6.6 (2019)  · BS AC & HS  · SSI     18.5 - 24.9 Normal weight / Body mass index is 22.85 kg/m². Code status: DNR  Prophylaxis: ASA and Brilinta   Recommended Disposition: Home w/Family     Subjective:     Chief Complaint / Reason for Physician Visit  \"Im ready to go home. \"  Discussed with RN events overnight. Review of Systems:  Symptom Y/N Comments  Symptom Y/N Comments   Fever/Chills n   Chest Pain y    Poor Appetite y   Edema     Cough    Abdominal Pain     Sputum    Joint Pain     SOB/COREA n   Pruritis/Rash     Nausea/vomit n   Tolerating PT/OT     Diarrhea    Tolerating Diet     Constipation    Other       Could NOT obtain due to:      Objective:     VITALS:   Last 24hrs VS reviewed since prior progress note.  Most recent are:  Patient Vitals for the past 24 hrs:   Temp Pulse Resp BP SpO2   10/04/19 1925 (P) 97.4 °F (36.3 °C) 69 (P) 18 (P) 156/52 (P) 96 %   10/04/19 1750  71  158/62    10/04/19 1557  64  155/57    10/04/19 1456  62  145/57    10/04/19 1434 97.7 °F (36.5 °C) 61 18 138/60 100 %   10/04/19 1133 97.9 °F (36.6 °C) 62 18 139/63 96 %   10/04/19 0852  75      10/04/19 0803  77  186/63    10/04/19 0713 97.8 °F (36.6 °C) 75 18 179/65 100 %   10/04/19 0416 97.9 °F (36.6 °C) 68 18 150/51 99 %   10/03/19 2243 98.6 °F (37 °C) 63 18 151/52 100 %   10/03/19 2241  62 21  97 %   10/03/19 2230  60 16  98 %   10/03/19 2215  68 17  96 %   10/03/19 2200  64 9  100 %       Intake/Output Summary (Last 24 hours) at 10/4/2019 2156  Last data filed at 10/4/2019 1600  Gross per 24 hour   Intake 850 ml   Output 125 ml   Net 725 ml        PHYSICAL EXAM:  General: Chronically ill-appearing  male. NAD   EENT:  EOMI. Anicteric sclerae. MMM  Resp:  CTA bilaterally, no wheezing or rales. No accessory muscle use  CV:  Regular  rhythm,  No edema  GI:  Soft, Non distended, Non tender.  +Bowel sounds  Neurologic:  Alert and oriented X 3, normal speech,   Psych:   Good insight. Not anxious nor agitated  Skin:  No rashes. No jaundice, s/p left AKA    Reviewed most current lab test results and cultures  YES  Reviewed most current radiology test results   YES  Review and summation of old records today    NO  Reviewed patient's current orders and MAR    YES  PMH/SH reviewed - no change compared to H&P  ________________________________________________________________________  Care Plan discussed with:    Comments   Patient x    Family      RN x    Care Manager     Consultant                        Multidiciplinary team rounds were held today with , nursing, pharmacist and clinical coordinator. Patient's plan of care was discussed; medications were reviewed and discharge planning was addressed.      ________________________________________________________________________  Total NON critical care TIME:  25 Minutes    Total CRITICAL CARE TIME Spent:   Minutes non procedure based      Comments   >50% of visit spent in counseling and coordination of care     ________________________________________________________________________  Theresa Anderson NP     Procedures: see electronic medical records for all procedures/Xrays and details which were not copied into this note but were reviewed prior to creation of Plan. LABS:  I reviewed today's most current labs and imaging studies.   Pertinent labs include:  Recent Labs     10/04/19  0410 10/03/19  0411 10/02/19  1419   WBC 5.1 4.0* 5.1   HGB 10.9* 9.9* 10.4*   HCT 36.2* 32.3* 34.7*   PLT 68* 56* 69*     Recent Labs     10/04/19  1605 10/04/19  0410 10/03/19  0411 10/02/19  1419   NA  --  137 139 137   K  --  4.3 3.9 4.0   CL  --  104 104 101   CO2  --  27 26 33*   GLU  --  194* 121* 122*   BUN  --  47* 60* 43*   CREA  --  5.95* 7.16* 6.34*   CA  --  9.0 8.6 8.2*   MG 2.1  --   --   --    ALB  --  3.1* 2.9* 3.0*   TBILI  --  1.0 1.0 0.8   SGOT  --  19 19 21   ALT  --  <6* <6* <6*       Signed: Theresa Anderson NP

## 2019-10-06 NOTE — OP NOTES
[address]  OPERATIVE REPORT    Name:  General Gómez  MR#:  [id number]  :  1955  ACCOUNT #:  [de-identified]  DATE OF SERVICE:  10/01/2019      PREOPERATIVE DIAGNOSIS:  Chronic right second toe wound. POSTOPERATIVE DIAGNOSIS:  Chronic right second toe wound. PROCEDURE PERFORMED:  Right great toe reamputation. SURGEON:  Aniket Kearns MD.    ANESTHESIA:  Regional block. SPECIMENS REMOVED:  As above. ESTIMATED BLOOD LOSS:  none. INDICATIONS:  The patient is a 67-year-old gentleman with a prior left below-knee amputation and diffuse atherosclerotic occlusive disease, who has normal vasculature at the transmetatarsal level on the right and a chronic nonhealing wound with osteomyelitis. He is admitted for elective toe amputation. PROCEDURE:  After informed consent, the patient was placed supine on the operating room table. After adequate induction of regional block anesthesia, site, patient confirmation, and administration of prophylactic antibiotics, the right foot was prepped and draped in sterile fashion. A fishmouth incision was made at the base of the right second toe. The underlying bone and soft tissue were divided. There was good bleeding at the cut tissue edges. The flaps were brought together under no tension and closed with interrupted buried Vicryl suture and fine nylon sutures. The patient tolerated the procedure well with no complications.       Saundra Palafox MD      [id number]  D:  10/06/2019 13:55  T:  10/06/2019 15:26  JOB #:  [id number]  CC:  Amol Chaudhary MD

## 2019-10-15 ENCOUNTER — HOSPITAL ENCOUNTER (EMERGENCY)
Age: 64
Discharge: HOME | End: 2019-10-15
Attending: EMERGENCY MEDICINE
Payer: MEDICARE

## 2019-10-15 VITALS
SYSTOLIC BLOOD PRESSURE: 162 MMHG | DIASTOLIC BLOOD PRESSURE: 65 MMHG | OXYGEN SATURATION: 100 % | TEMPERATURE: 98.3 F | HEART RATE: 86 BPM | RESPIRATION RATE: 18 BRPM

## 2019-10-15 DIAGNOSIS — N18.6 END STAGE RENAL DISEASE ON DIALYSIS (HCC): ICD-10-CM

## 2019-10-15 DIAGNOSIS — E87.5 ACUTE HYPERKALEMIA: Primary | ICD-10-CM

## 2019-10-15 DIAGNOSIS — Z99.2 END STAGE RENAL DISEASE ON DIALYSIS (HCC): ICD-10-CM

## 2019-10-15 LAB
ALBUMIN SERPL-MCNC: 3.4 G/DL (ref 3.5–5)
ALBUMIN/GLOB SERPL: 0.8 {RATIO} (ref 1.1–2.2)
ALP SERPL-CCNC: 82 U/L (ref 45–117)
ALT SERPL-CCNC: 15 U/L (ref 12–78)
ANION GAP SERPL CALC-SCNC: 18 MMOL/L (ref 5–15)
AST SERPL-CCNC: 31 U/L (ref 15–37)
BASOPHILS # BLD: 0 K/UL (ref 0–0.1)
BASOPHILS NFR BLD: 0 % (ref 0–1)
BILIRUB SERPL-MCNC: 0.9 MG/DL (ref 0.2–1)
BUN SERPL-MCNC: 104 MG/DL (ref 6–20)
BUN/CREAT SERPL: 11 (ref 12–20)
CALCIUM SERPL-MCNC: 8.1 MG/DL (ref 8.5–10.1)
CHLORIDE SERPL-SCNC: 102 MMOL/L (ref 97–108)
CO2 SERPL-SCNC: 16 MMOL/L (ref 21–32)
CREAT SERPL-MCNC: 9.45 MG/DL (ref 0.7–1.3)
DIFFERENTIAL METHOD BLD: ABNORMAL
EOSINOPHIL # BLD: 0 K/UL (ref 0–0.4)
EOSINOPHIL NFR BLD: 0 % (ref 0–7)
ERYTHROCYTE [DISTWIDTH] IN BLOOD BY AUTOMATED COUNT: 16.3 % (ref 11.5–14.5)
GLOBULIN SER CALC-MCNC: 4.4 G/DL (ref 2–4)
GLUCOSE SERPL-MCNC: 104 MG/DL (ref 65–100)
HCT VFR BLD AUTO: 36.4 % (ref 36.6–50.3)
HGB BLD-MCNC: 11.1 G/DL (ref 12.1–17)
IMM GRANULOCYTES # BLD AUTO: 0.1 K/UL (ref 0–0.04)
IMM GRANULOCYTES NFR BLD AUTO: 1 % (ref 0–0.5)
LYMPHOCYTES # BLD: 0.6 K/UL (ref 0.8–3.5)
LYMPHOCYTES NFR BLD: 7 % (ref 12–49)
MAGNESIUM SERPL-MCNC: 2.2 MG/DL (ref 1.6–2.4)
MCH RBC QN AUTO: 32.2 PG (ref 26–34)
MCHC RBC AUTO-ENTMCNC: 30.5 G/DL (ref 30–36.5)
MCV RBC AUTO: 105.5 FL (ref 80–99)
MONOCYTES # BLD: 1.4 K/UL (ref 0–1)
MONOCYTES NFR BLD: 16 % (ref 5–13)
NEUTS SEG # BLD: 6.7 K/UL (ref 1.8–8)
NEUTS SEG NFR BLD: 76 % (ref 32–75)
NRBC # BLD: 0 K/UL (ref 0–0.01)
NRBC BLD-RTO: 0 PER 100 WBC
PHOSPHATE SERPL-MCNC: 9.2 MG/DL (ref 2.6–4.7)
PLATELET # BLD AUTO: 78 K/UL (ref 150–400)
PLATELET COMMENTS,PCOM: ABNORMAL
PMV BLD AUTO: 12.2 FL (ref 8.9–12.9)
POTASSIUM SERPL-SCNC: 5.5 MMOL/L (ref 3.5–5.1)
PROT SERPL-MCNC: 7.8 G/DL (ref 6.4–8.2)
RBC # BLD AUTO: 3.45 M/UL (ref 4.1–5.7)
RBC MORPH BLD: ABNORMAL
RBC MORPH BLD: ABNORMAL
SODIUM SERPL-SCNC: 136 MMOL/L (ref 136–145)
WBC # BLD AUTO: 8.8 K/UL (ref 4.1–11.1)

## 2019-10-15 PROCEDURE — 36415 COLL VENOUS BLD VENIPUNCTURE: CPT

## 2019-10-15 PROCEDURE — 80053 COMPREHEN METABOLIC PANEL: CPT

## 2019-10-15 PROCEDURE — 85025 COMPLETE CBC W/AUTO DIFF WBC: CPT

## 2019-10-15 PROCEDURE — 99284 EMERGENCY DEPT VISIT MOD MDM: CPT

## 2019-10-15 PROCEDURE — 84100 ASSAY OF PHOSPHORUS: CPT

## 2019-10-15 PROCEDURE — 90935 HEMODIALYSIS ONE EVALUATION: CPT

## 2019-10-15 PROCEDURE — 83735 ASSAY OF MAGNESIUM: CPT

## 2019-10-15 NOTE — ED TRIAGE NOTES
Pt arrives from [facility] with abnormal labs. Potassium elevated. Reports he was feeling sick on Monday and didn't go to dialysis.   Hx of L AKA and amputation of 2nd toe on Rt foot

## 2019-10-15 NOTE — CONSULTS
Consultation Note    NAME: Joseph Cunningham   :  1955   MRN:  [id number]     Date/Time:  10/15/2019 2:04 PM    I have been asked to see this patient by Dr. Margarita Salgado  for advice/opinion re: ESRD. Assessment :    [facility]  Hyperkalemia  noncompliance Potassium here is down to 5.5. Will arrange for HD this afternoon. 2K bath. Labs in AM.    Holding OWEN       Subjective:   CHIEF COMPLAINT:  \"I fel bad so I didn't go to dialysis on Monday. \"    HISTORY OF PRESENT ILLNESS:     Edna Hoffman is a 71 y.o.   male who has a history of ESRD qMWF who missed dialysis on Monday as he \"felt bad. \"  He says that his \"stomach was jumping. \"  He also c/o of hurting all over. No specific c/o chest pain. No dyspnea. He went to an outside ER who did labs that apparently showed his potassium was 7. He was apparently treated acutely and sent here. In the ER labs show a potassium of 5.5. Past Medical History:   Diagnosis Date    Anemia     Arthritis     back, hips    CAD (coronary artery disease)     Sees Dr. Pito Cuadra, 4 heart attacks    [facility]     [facility]     Chronic LBP     Slipped on gravel and fell at work; Multiple surgeries;  Sees Dr. Eunice Bang for pain mgmt    Chronic pain     Confusion     Depression     [facility] [address] [address]     ED (erectile dysfunction)     GERD (gastroesophageal reflux disease) 2015    Diagn[facility] at [facility] last [facility]    Hypercholesteremia     Hypertension     Liver disease     Psychiatric disorder     depression    PVD (peripheral vascular disease) ([address]     Thromboembolus ([address]     DVT in right leg      Past Surgical History:   Procedure Laterality Date    CARDIAC SURG PROCEDURE UNLIST  2019    PTCA x 4 stents    HX ABOVE KNEE AMPUTATION Left     Left knee stump non-healing ulcer; Dr. Nikos Cervantes Left leg    HX CHOLECYSTECTOMY      HX GI      HX HIP REPLACEMENT      right hip replaced x 2    HX ORTHOPAEDIC  1990s    4 back surgeries    HX ORTHOPAEDIC      donna ankles pin placed    HX ORTHOPAEDIC      left leg 17 surgeries    HX VASCULAR ACCESS Left     port left arm    UPPER GI ENDOSCOPY,BIOPSY  9/2/2015         UPPER GI ENDOSCOPY,BIOPSY  10/12/2015         VASCULAR SURGERY PROCEDURE UNLIST      Multiple revascularization procedures, toe amputations     Social History     Tobacco Use    Smoking status: Current Every Day Smoker     Packs/day: 1.00     Years: 35.00     Pack years: 35.00    Smokeless tobacco: Never Used    Tobacco comment: 30 pack years; Occasional cigar   Substance Use Topics    Alcohol use: No     Comment: Former heavy drinker quit drinking about 17 years ago      Family History   Problem Relation Age of Onset    Alcohol abuse Father     Diabetes Sister     Diabetes Sister     Lung Disease Mother     Cancer Maternal Grandfather         Head and neck    Cancer Paternal Grandmother         Stomach      Allergies   Allergen Reactions    Nubain [Nalbuphine] Other (comments)     Made me wild; Dysphoria      Prior to Admission medications    Medication Sig Start Date End Date Taking? Authorizing Provider   ranolazine ER (RANEXA) 500 mg SR tablet Take 1 Tab by mouth every twelve (12) hours. 10/4/19   Yahaira Banks NP   ticagrelor (BRILINTA) 90 mg tablet Take 1 Tab by mouth every twelve (12) hours every twelve (12) hours. 9/14/19   Mary Rodriguez MD   midodrine (PROAMITINE) 10 mg tablet Take 1 Tab by mouth three (3) times daily (with meals) for 30 days. 9/14/19 10/14/19  Mary Rodriguez MD   metoprolol succinate (TOPROL-XL) 25 mg XL tablet Take 0.5 Tabs by mouth daily. 9/15/19   Mary Rodriguez MD   pregabalin (LYRICA) 150 mg capsule Take 150 mg by mouth three (3) times daily.     Provider, Historical   ferric citrate (AURYXIA) 210 mg iron tablet Take 420 mg by mouth Before breakfast, lunch, and dinner. Provider, Historical   docusate sodium (COLACE) 100 mg capsule Take 100 mg by mouth daily. Provider, Historical   pravastatin (PRAVACHOL) 80 mg tablet TAKE 1 TABLET BY MOUTH EVERY DAY AT NIGHT 5/25/19   Vinicio Ndiaye MD   FLUoxetine (PROZAC) 20 mg capsule Take 20 mg by mouth daily. Provider, Historical   traZODone (DESYREL) 150 mg tablet TAKE 1 TABLET BY MOUTH EVERY DAY AT NIGHT 12/1/18   Elysia MARKS MD   mirtazapine (REMERON) 30 mg tablet TAKE 1 TABLET BY MOUTH EVERY DAY AT NIGHT 12/1/18   Vinicio Ndiaye MD   omeprazole (PRILOSEC) 40 mg capsule TAKE 1 CAPSULE BY MOUTH TWICE A DAY 12/1/18   Jarad Ndiaye MD   finasteride (PROSCAR) 5 mg tablet TAKE 1 TABLET BY MOUTH EVERY DAY 12/1/18   Vinicio Ndiaye MD   aspirin 81 mg chewable tablet Take 1 Tab by mouth daily. Indications: myocardial infarction prevention 6/1/18   Vinicio Ndiaye MD   nitroglycerin (NITROSTAT) 0.4 mg SL tablet 1 Tab by SubLINGual route every five (5) minutes as needed for Chest Pain.  6/18/15   Harriet Wilkins MD     REVIEW OF SYSTEMS:     []  Unable to obtain reliable ROS due to  [] mental status  [] sedated   [] intubated   [x] Total of 12 systems reviewed as follows:  Constitutional: negative fever, negative chills, negative weight loss  Eyes:   negative visual changes  ENT:   negative sore throat, tongue or lip swelling  Respiratory:  negative cough, negative dyspnea  Cards:  negative for chest pain, palpitations, lower extremity edema  GI:   negative for nausea, vomiting, diarrhea, and abdominal pain  :  negative for frequency, dysuria  Integument:  negative for rash and pruritus  Heme:  negative for easy bruising and gum/nose bleeding  Musculoskel: negative for myalgias,  back pain and muscle weakness  Neuro:  negative for headaches, dizziness, vertigo  Psych:  negative for feelings of anxiety, depression   Travel?: none    Objective:   VITALS:    Visit Vitals  /62   Pulse 65   Temp 98 °F (36.7 °C) Resp 16   SpO2 100%     PHYSICAL EXAM:  Gen:  []  WD []  WN  [] cachectic []  thin []  obese []  disheveled             []  ill apearing  []   Critical  [x]   Chronic    [x]  No acute distress    HEENT:   [x] NC/AT/PERRL    [x] pink conjunctivae      [] pale conjunctivae                  PERRL  [] yes  [] no      [] moist mucosa    [] dry mucosa    hearing intact to voice [] yes  [] No                 NECK:   supple [x] yes  [] no        masses [] yes  [x] No               thyroid  []  non tender  []  tender    RESP:   [x] CTA bilaterally/no wheezing/rhonchi/rales/crackles    [] rhonchi bilaterally - no dullness  [] wheezing   [] rhonchi   [] crackles     use of accessory muscles [] yes [] no    CARD:   [x]  regular rate and rhythm/No murmurs/rubs/gallops    murmur  [] yes ()  [] no      Rubs  [] yes  [] no       Gallops [] yes  [] no    Rate []  regular  []  irregular        carotid bruits  [] Right  []  Left                 LE edema [] yes  [x] no           JVP  []  yes   []  no    ABD:    [x] soft/non distended/non tender/+bowel sounds/no HSM    []  Rigid    tenderness [] yes [] no   Liver enlargement  []  yes []  no                Spleen enlargement  []  yes []  no     distended []  yes [] no     bowel sound  [] hypoactive   [] hyperactive    LYMPH:    Neck []  yes [x]  no       Axillae []  yes [x]  no    SKIN:   Rashes []  yes   [x]  no    Ulcers []  yes   [x]  no               [] tight to palpitation    skin turgor []  good  [] poor  [] decreased               Cyanosis/clubbing []  yes []  no    NEUR:   [x] cranial nerves II-XII grossly intact       [] Cranial nerves deficit                 []  facial droop    []  slurred speech   [] aphasic     [] Strength normal     []  weakness  []  LUE  []   RUE/ []  LLE  []   RLE    follows commands  [x]  yes []  no           PSYCH:   insight [] poor [x] good   Alert and Oriented to  [x] person  [x] place  [x]  time                    [] depressed [] anxious [] agitated [] lethargic [] stuporous  [] sedated     LAB DATA REVIEWED:    Recent Labs     10/15/19  1308   WBC 8.8   HGB 11.1*   HCT 36.4*   PLT 78*     Recent Labs     10/15/19  1308      K 5.5*      CO2 16*   *   CREA 9.45*   *   CA 8.1*   MG 2.2     Recent Labs     10/15/19  1308   SGOT 31   ALT 15   AP 82   TBILI 0.9   ALB 3.4*   GLOB 4.4*     No results for input(s): INR, PTP, APTT, INREXT in the last 72 hours. No results for input(s): FE, TIBC, PSAT, FERR in the last 72 hours. No results for input(s): PH, PCO2, PO2 in the last 72 hours. No results for input(s): CPK, CKMB in the last 72 hours.     No lab exists for component: TROPONINI  Lab Results   Component Value Date/Time    Glucose (POC) 123 (H) 10/04/2019 11:01 PM    Glucose (POC) 128 (H) 10/04/2019 04:06 PM    Glucose (POC) 142 (H) 10/04/2019 11:32 AM    Glucose (POC) 194 (H) 10/04/2019 07:59 AM    Glucose (POC) 136 (H) 10/03/2019 10:40 PM       Procedures: see electronic medical records for all procedures/Xrays and details which were not copied into this note but were reviewed prior to creation of Plan.    ________________________________________________________________________       ___________________________________________________  Consulting Physician: Geraldo Vizcarra MD

## 2019-10-15 NOTE — PROGRESS NOTES
TRANSFER - IN REPORT:    Verbal report received from Alberta Boyce [facility] on [facility].  being received from ED for ordered procedure      Report consisted of patients Situation, Background, Assessment and   Recommendations(SBAR). Information from the following report(s) SBAR and [employer] was reviewed with the receiving nurse. Opportunity for questions and clarification was provided. Assessment completed upon patients arrival to unit and care assumed.

## 2019-10-15 NOTE — PROGRESS NOTES
HD TRANSFER - OUT REPORT:    Verbal report given to CAITLYN Guy on UNYQ Sr. being transferred to ED  (Unit) for ordered procedure       Report consisted of patient's Situation, Background, Assessment and   Recommendations(SBAR). Information from the following report(s) Kardex was reviewed with the receiving nurse. Method:  $$ Method: Hemodialysis (10/15/19 1900)    Fluid Removed  NET Fluid Removed (mL): 2000 ml (10/15/19 1900)     Patient response to treatment:  Stable    End Time  Hemodialysis End Time: 1900 (10/15/19 1900)  If not documented, dialysis nurse to update post-dialysis row in HD/Filtration flowsheet     Medications /Volume expansion agents or Fluid boluses administered during treatment? no    Post-dialysis medication administration due?  no  Remind nurse to administer post-HD medication upon return to unit. Fistula hemostasis? yes    Line heparinization? no    Lines: secre    Opportunity for questions and clarification was provided.       Patient transported with: [employer]

## 2019-10-15 NOTE — ED PROVIDER NOTES
EMERGENCY DEPARTMENT HISTORY AND PHYSICAL EXAM      Date: 10/15/2019  Patient Name: Naresh Jean Sr.    History of Presenting Illness     Chief Complaint   Patient presents with    Abnormal Lab Results       History Provided By: Patient    HPI: Naresh Jean Sr., 71 y.o. male presents to the ED with history of end-stage renal disease here on transfer from [facility] and after missing dialysis yesterday and having a potassium level registered at 7.6 as well as complaints of muscle twitching. Received a call from outside provider requesting transfer given they do not have hemodialysis capabilities at [facility].  Prior to him coming they administer calcium gluconate, insulin and glucose to reduce potassium level prior to transfer. Patient said this also made his twitching feel a lot better. He denies any other illness complaints and denies any chest pain, shortness of breath or pain complaints at this time. Has been on dialysis for 2 years, has a history of a left AKA and a recent toe amputation of the right foot. There are no other complaints, changes, or physical findings at this time. PCP: Arsen Villalta, DO    No current facility-administered medications on file prior to encounter. Current Outpatient Medications on File Prior to Encounter   Medication Sig Dispense Refill    ranolazine ER (RANEXA) 500 mg SR tablet Take 1 Tab by mouth every twelve (12) hours. 60 Tab 0    ticagrelor (BRILINTA) 90 mg tablet Take 1 Tab by mouth every twelve (12) hours every twelve (12) hours. 60 Tab 0    [] midodrine (PROAMITINE) 10 mg tablet Take 1 Tab by mouth three (3) times daily (with meals) for 30 days. 90 Tab 0    metoprolol succinate (TOPROL-XL) 25 mg XL tablet Take 0.5 Tabs by mouth daily. 30 Tab 0    pregabalin (LYRICA) 150 mg capsule Take 150 mg by mouth three (3) times daily.       ferric citrate (AURYXIA) 210 mg iron tablet Take 420 mg by mouth Before breakfast, lunch, and dinner.  docusate sodium (COLACE) 100 mg capsule Take 100 mg by mouth daily.  pravastatin (PRAVACHOL) 80 mg tablet TAKE 1 TABLET BY MOUTH EVERY DAY AT NIGHT 90 Tab 1    FLUoxetine (PROZAC) 20 mg capsule Take 20 mg by mouth daily.  traZODone (DESYREL) 150 mg tablet TAKE 1 TABLET BY MOUTH EVERY DAY AT NIGHT 90 Tab 1    mirtazapine (REMERON) 30 mg tablet TAKE 1 TABLET BY MOUTH EVERY DAY AT NIGHT 90 Tab 1    omeprazole (PRILOSEC) 40 mg capsule TAKE 1 CAPSULE BY MOUTH TWICE A  Cap 1    finasteride (PROSCAR) 5 mg tablet TAKE 1 TABLET BY MOUTH EVERY DAY 90 Tab 1    aspirin 81 mg chewable tablet Take 1 Tab by mouth daily. Indications: myocardial infarction prevention 90 Tab 1    nitroglycerin (NITROSTAT) 0.4 mg SL tablet 1 Tab by SubLINGual route every five (5) minutes as needed for Chest Pain. 20 Tab 0       Past History     Past Medical History:  Past Medical History:   Diagnosis Date    Anemia     Arthritis     back, hips    CAD (coronary artery disease)     Sees Dr. Barbi Munson, 4 heart attacks    Chronic kidney disease     [facility] Gi Rush, [facility]     Chronic LBP 1990    Slipped on gravel and fell at work; Multiple surgeries;  Sees Dr. Mcpherson Crimes for pain mgmt    Chronic pain     Confusion     Depression     Diabetes ([facility] [address]     ED (erectile dysfunction)     GERD (gastroesophageal reflux disease) 11/2015    [facility]    Hypercholesteremia     Hypertension     Liver disease     Psychiatric disorder     depression    PVD (peripheral vascular disease) ([facility] [address]     Thromboembolus ([facility] [address]     DVT in right leg       Past Surgical History:  Past Surgical History:   Procedure Laterality Date    CARDIAC SURG PROCEDURE UNLIST  09/06/2019    PTCA x 4 stents    HX ABOVE KNEE AMPUTATION Left 2013    Left knee stump non-healing ulcer; Dr. Michelle Selby Left leg    HX CHOLECYSTECTOMY      HX GI      HX HIP REPLACEMENT      right hip replaced x 2    HX ORTHOPAEDIC  1990s    4 back surgeries    HX ORTHOPAEDIC      donna ankles pin placed    HX ORTHOPAEDIC      left leg 17 surgeries    HX VASCULAR ACCESS Left     port left arm    UPPER GI ENDOSCOPY,BIOPSY  9/2/2015         UPPER GI ENDOSCOPY,BIOPSY  10/12/2015         VASCULAR SURGERY PROCEDURE UNLIST      Multiple revascularization procedures, toe amputations       Family History:  Family History   Problem Relation Age of Onset    Alcohol abuse Father     Diabetes Sister     Diabetes Sister     Lung Disease Mother     Cancer Maternal Grandfather         Head and neck    Cancer Paternal Grandmother         Stomach       Social History:  Social History     Tobacco Use    Smoking status: Current Every Day Smoker     Packs/day: 1.00     Years: 35.00     Pack years: 35.00    Smokeless tobacco: Never Used    Tobacco comment: 30 pack years; Occasional cigar   Substance Use Topics    Alcohol use: No     Comment: Former heavy drinker quit drinking about 17 years ago    Drug use: No     Types: Prescription       Allergies: Allergies   Allergen Reactions    Nubain [Nalbuphine] Other (comments)     Made me wild; Dysphoria         Review of Systems   Review of Systems   Constitutional: Negative. HENT: Negative. Eyes: Positive for visual disturbance. Respiratory: Negative. Cardiovascular: Negative. Gastrointestinal: Negative. Endocrine: Negative for cold intolerance and heat intolerance. Genitourinary: Negative for urgency. Musculoskeletal: Positive for myalgias. Neurological: Negative. Negative for dizziness, tremors, seizures, light-headedness, numbness and headaches. Hematological: Negative. All other systems reviewed and are negative.       Physical Exam   Physical Exam   Vital signs and nursing notes reviewed    CONSTITUTIONAL: Alert, in no apparent distress; well-developed; well-nourished. HEAD:  Normocephalic, atraumatic  EYES: PERRL; EOM's intact. ENTM: Nose: no rhinorrhea; Throat: no erythema or exudate, mucous membranes moist  Neck:  Supple. trachea is midline. RESP: Chest clear, equal breath sounds. - W/R/R  CV: S1 and S2 WNL; No murmurs, gallops or rubs. 2+ radial and DP pulses bilaterally. GI: non-distended, normal bowel sounds, abdomen soft and non-tender. No masses or organomegaly. : No costo-vertebral angle tenderness. BACK:  Non-tender, normal appearance  UPPER EXT:  Normal inspection. no joint or soft tissue swelling  LOWER EXT: No edema, no calf tenderness. Left AKA. Second toe of right foot with distal wound closing by secondary intention with intact sutures without bleeding or purulent drainage. The foot is cool to the touch with Doppler pulses palpable. NEURO: Alert and oriented x3, 5/5 strength and light touch sensation intact in bilateral upper and lower extremities. SKIN: No rashes; Warm and dry  PSYCH: Normal mood, normal affect    Diagnostic Study Results     Labs -     Recent Results (from the past 12 hour(s))   CBC WITH AUTOMATED DIFF    Collection Time: 10/15/19  1:08 PM   Result Value Ref Range    WBC 8.8 4.1 - 11.1 K/uL    RBC 3.45 (L) 4.10 - 5.70 M/uL    HGB 11.1 (L) 12.1 - 17.0 g/dL    HCT 36.4 (L) 36.6 - 50.3 %    .5 (H) 80.0 - 99.0 FL    MCH 32.2 26.0 - 34.0 PG    MCHC 30.5 30.0 - 36.5 g/dL    RDW 16.3 (H) 11.5 - 14.5 %    PLATELET 78 (L) 475 - 400 K/uL    MPV 12.2 8.9 - 12.9 FL    NRBC 0.0 0  WBC    ABSOLUTE NRBC 0.00 0.00 - 0.01 K/uL    NEUTROPHILS 76 (H) 32 - 75 %    LYMPHOCYTES 7 (L) 12 - 49 %    MONOCYTES 16 (H) 5 - 13 %    EOSINOPHILS 0 0 - 7 %    BASOPHILS 0 0 - 1 %    IMMATURE GRANULOCYTES 1 (H) 0.0 - 0.5 %    ABS. NEUTROPHILS 6.7 1.8 - 8.0 K/UL    ABS. LYMPHOCYTES 0.6 (L) 0.8 - 3.5 K/UL    ABS. MONOCYTES 1.4 (H) 0.0 - 1.0 K/UL    ABS. EOSINOPHILS 0.0 0.0 - 0.4 K/UL    ABS. BASOPHILS 0.0 0.0 - 0.1 K/UL    ABS. IMM. GRANS. 0.1 (H) 0.00 - 0.04 K/UL    DF AUTOMATED      PLATELET COMMENTS DECREASED PLATELETS      RBC COMMENTS MACROCYTOSIS  1+        RBC COMMENTS ANISOCYTOSIS  1+       METABOLIC PANEL, COMPREHENSIVE    Collection Time: 10/15/19  1:08 PM   Result Value Ref Range    Sodium 136 136 - 145 mmol/L    Potassium 5.5 (H) 3.5 - 5.1 mmol/L    Chloride 102 97 - 108 mmol/L    CO2 16 (L) 21 - 32 mmol/L    Anion gap 18 (H) 5 - 15 mmol/L    Glucose 104 (H) 65 - 100 mg/dL     (H) 6 - 20 MG/DL    Creatinine 9.45 (H) 0.70 - 1.30 MG/DL    BUN/Creatinine ratio 11 (L) 12 - 20      GFR est AA 7 (L) >60 ml/min/1.73m2    GFR est non-AA 6 (L) >60 ml/min/1.73m2    Calcium 8.1 (L) 8.5 - 10.1 MG/DL    Bilirubin, total 0.9 0.2 - 1.0 MG/DL    ALT (SGPT) 15 12 - 78 U/L    AST (SGOT) 31 15 - 37 U/L    Alk. phosphatase 82 45 - 117 U/L    Protein, total 7.8 6.4 - 8.2 g/dL    Albumin 3.4 (L) 3.5 - 5.0 g/dL    Globulin 4.4 (H) 2.0 - 4.0 g/dL    A-G Ratio 0.8 (L) 1.1 - 2.2     MAGNESIUM    Collection Time: 10/15/19  1:08 PM   Result Value Ref Range    Magnesium 2.2 1.6 - 2.4 mg/dL   PHOSPHORUS    Collection Time: 10/15/19  1:08 PM   Result Value Ref Range    Phosphorus 9.2 (H) 2.6 - 4.7 MG/DL       Radiologic Studies -   No orders to display     CT Results  (Last 48 hours)    None        CXR Results  (Last 48 hours)    None      Trope less than 0.03 at outside hospital.    Medical Decision Making   I am the first provider for this patient. I reviewed the vital signs, available nursing notes, past medical history, past surgical history, family history and social history. Vital Signs-Reviewed the patient's vital signs.   Patient Vitals for the past 12 hrs:   Temp Pulse Resp BP SpO2   10/15/19 1247 98 °F (36.7 °C)  16     10/15/19 1244     100 %   10/15/19 1242  65 18     10/15/19 1240    144/62               Records Reviewed: Nursing Notes, Old Medical Records and Previous electrocardiograms    Provider Notes (Medical Decision Making):   69-year-old male requiring emergent dialysis following elevated potassium level at outside hospital today. Repeat potassium today reassuring but still requiring dialysis needs. Have spoken to nephrologist who is arranging dialysis for patient today. Patient will need reevaluation after dialysis but likely can be discharged home. He has no historical or exam concerns for infection or other acute emergency be on his dialysis needs. Patient is known historically to nephrology for being noncompliant with his dialysis sessions and he relays to me that he has some transportation issues and getting to dialysis today. ED Course:   Initial assessment performed. The patients presenting problems have been discussed, and they are in agreement with the care plan formulated and outlined with them. I have encouraged them to ask questions as they arise throughout their visit. ED Course as of Oct 15 1512   Tue Oct 15, 2019   1238 Patient has arrived. Placed nephrology consultation request as well as basic labs to reassess hyperkalemia. [TL]   36 Nephrology return phone call, knows patient well, will set up dialysis for today. Repeat potassium is pending. [TL]      ED Course User Index  [TL] Maggi Real MD     32 [id number]: Patient to HD.  2300 hrs: patient returned to ED after HD Jennifer 2 L of volume removed. Disposition:  Discharge    PLAN:  1. Current Discharge Medication List        2. Follow-up Information    None       Return to ED if worse     Diagnosis     Clinical Impression:   1. Acute hyperkalemia    2. End stage renal disease on dialysis [facility])        Attestations:    Zafar Saldaña MD    Please note that this dictation was completed with [employer], the computer voice recognition software. Quite often unanticipated grammatical, syntax, homophones, and other interpretive errors are inadvertently transcribed by the computer software. Please disregard these errors.   Please excuse any errors that have escaped final proofreading. Thank you.

## 2019-10-15 NOTE — PROCEDURES
[facility]  [phone]    Vitals   Pre   Post   Assessment   Pre   Post     Temp  Temp: 97.4 °F (36.3 °C) (10/15/19 1545)  98.3 LOC  A & 0 x 4 A & O x 4   HR   Pulse (Heart Rate): 68 (10/15/19 1600) 72.0 Lungs   Dim R/L lower lobes  Dim R/L lower lobes   B/P   BP: 143/57 (10/15/19 1600) 162/65 Cardiac   Irreg rate & rhythm  Irreg rate & rhythm   Resp   Resp Rate: 16 (10/15/19 1600) 16 Skin   Warm,dry, skin tears right & left arm Warm,dry, skin tears right & left arm   Pain level    0 Edema    none   none   Orders:    Duration:   Start:    1545   End:    1900 Total:   3.15     Dialyzer:   Dialyzer/Set Up Inspection: Revaclear (10/15/19 1545)   K Bath:   Dialysate K (mEq/L): 2 (10/15/19 1545)   Ca Bath:   Dialysate CA (mEq/L): 2.5 (10/15/19 1545)   Na/Bicarb:   Dialysate NA (mEq/L): 140 (10/15/19 1545)   Target Fluid Removal:   Goal/Amount of Fluid to Remove (mL): 2500 mL (10/15/19 1545)   Access  CVC   Type & Location:      Labs     Obtained/Reviewed   Critical Results Called   Date when labs were drawn-  Hgb-    HGB   Date Value Ref Range Status   10/15/2019 11.1 (L) 12.1 - 17.0 g/dL Final     K-    Potassium   Date Value Ref Range Status   10/15/2019 5.5 (H) 3.5 - 5.1 mmol/L Final     Ca-   Calcium   Date Value Ref Range Status   10/15/2019 8.1 (L) 8.5 - 10.1 MG/DL Final     Bun-   BUN   Date Value Ref Range Status   10/15/2019 104 (H) 6 - 20 MG/DL Final     Creat-   Creatinine   Date Value Ref Range Status   10/15/2019 9.45 (H) 0.70 - 1.30 MG/DL Final        Medications/ Blood Products Given     Name   Dose   Route and Time     none                Blood Volume Processed (BVP):   72.0 Net Fluid   Removed:  2000ml   Comments RN reviewed LPN assessment and completed RN assessment. RN completed patient assessment. RN reviewed technicians vital signs and procedure note. Tx completed.  Reviewed by RN Placido Mahajanor   Time Out Done:   Primary Nurse Rpt Pre: Rere Harper RN  Primary Nurse Rpt Post: Sergei Davis, CAITLYN  Pt Education: access care   Care Plan: Continue HD  Tx Summary:  All Dialysis related medications have been reviewed. Markel Sheridan AVF: skin CDI. No s/s of infection. No issues with cannulation or hemostasis. Running well at . Pt arrived to HD [address]. Consent signed & on file. SBAR received from Primary RN. 1545: Pt cannulated with 78W needles per policy & without issue. Labs drawn per request/ order. VSS. Dialysis Tx initiated. 1615: Pt resting quietly. lines secure and visible  1645: Pt. Stable, lines secure and visible. 1715: Pt. Stable, lines secure and visible  1745: Pt. Talking stef. Well, lines secure and visible  1815: Pt. Stable, lines secure and visible  1845: Pt. Stable, lines secure and visible  1900: Tx ended. VSS. All possible blood returned to patient. Hemostasis achieved without issue. Bed locked and in the lowest position, call bell and belongings in reach. SBAR given to Primary, RN. Patient is stable at time of their/ my departure. Admiting Diagnosis:  Hyperkalemia  All Dialysis related medications have been reviewed. Pt's [facility] clinic- [facility]  Consent signed - Informed Consent Verified: Yes (10/15/19 7510)  [facility] Consent - YEs  Hepatitis Status- Negative  Machine #- Machine Number: [id number] (10/15/19 1545)  Telemetry status-yes  Pre-dialysis wt. -  stretcher bed

## 2021-07-22 NOTE — PROGRESS NOTES
Informed Dr. Renay Bro of tropoin 0.16.   No new orders [Well developed] : well developed [Well nourished] : well nourished [Well appearing] : well appearing [Deferred] : deferred [Acute distress] : no acute distress [Dysmorphic] : no dysmorphic [Abnormal shape] : no abnormal shape [Ear anomaly] : no ear anomaly [Abnormal nose shape] : no abnormal nose shape [Nasal discharge] : no nasal discharge [Mouth lesions] : no mouth lesions [Eye discharge] : no eye discharge [Icteric sclera] : no icteric sclera [Labored breathing] : non- labored breathing [Rigid] : not rigid [Mass] : no mass [Hepatomegaly] : no hepatomegaly [Splenomegaly] : no splenomegaly [Palpable bladder] : no palpable bladder [RUQ Tenderness] : no ruq tenderness [LUQ Tenderness] : no luq tenderness [RLQ Tenderness] : no rlq tenderness [LLQ Tenderness] : no llq tenderness [Right tenderness] : no right tenderness [Left tenderness] : no left tenderness [Renomegaly] : no renomegaly [Right-side mass] : no right-side mass [Left-side mass] : no left-side mass [Dimple] : no dimple [Hair Tuft] : no hair tuft [Limited limb movement] : no limited limb movement [Edema] : no edema [Rashes] : no rashes [Ulcers] : no ulcers [Abnormal turgor] : normal turgor

## 2022-05-05 NOTE — PROGRESS NOTES
Renal-received call from our NP that [address] went to  Mr. Cosmo Nicholson and FOUND him on the ground with his WC on top of him. [facility] and therefore, even though he was confused, and had multiple lacerations took him to dialysis. The dialysis staff noted the AMS, abrasions etc and directed him to the ED for urgent evaluation.   Amira Brewer MD
Parent(s)

## 2025-05-02 NOTE — Clinical Note
Problem: Cognitive:  Goal: Knowledge of wound care  Description: Knowledge of wound care  Outcome: Progressing  Goal: Understands risk factors for wounds  Description: Understands risk factors for wounds  Outcome: Progressing     Problem: Pressure Ulcer:  Goal: Signs of wound healing will improve  Description: Signs of wound healing will improve  Outcome: Progressing  Goal: Absence of new pressure ulcer  Description: Absence of new pressure ulcer  Outcome: Progressing  Goal: Will show no infection signs and symptoms  Description: Will show no infection signs and symptoms  Outcome: Progressing     Problem: Wound:  Goal: Will show signs of wound healing; wound closure and no evidence of infection  Description: Will show signs of wound healing; wound closure and no evidence of infection  Outcome: Progressing      Balloon removed.

## (undated) DEVICE — GOWN,SIRUS,NONRNF,SETINSLV,XL,20/CS: Brand: MEDLINE

## (undated) DEVICE — NC TREK CORONARY DILATATION CATHETER 3.5 MM X 15 MM / RAPID-EXCHANGE: Brand: NC TREK

## (undated) DEVICE — INFECTION CONTROL KIT SYS

## (undated) DEVICE — SYR 3ML LL TIP 1/10ML GRAD --

## (undated) DEVICE — SUTURE VCRL SZ 3-0 L27IN ABSRB UD L26MM SH 1/2 CIR J416H

## (undated) DEVICE — 3M™ TEGADERM™ TRANSPARENT FILM DRESSING FRAME STYLE, 1627, 4 IN X 10 IN (10 CM X 25 CM), 20/CT 4CT/CASE: Brand: 3M™ TEGADERM™

## (undated) DEVICE — HANDLE LT SNAP ON ULT DURABLE LENS FOR TRUMPF ALC DISPOSABLE

## (undated) DEVICE — SURGICAL PROCEDURE PACK BASIN MAJ SET CUST NO CAUT

## (undated) DEVICE — BAG RED 3PLY 2MIL 30X40 IN

## (undated) DEVICE — MINI TREK CORONARY DILATATION CATHETER 2.0 MM X 15 MM / RAPID-EXCHANGE: Brand: MINI TREK

## (undated) DEVICE — (D)PREP SKN CHLRAPRP APPL 26ML -- CONVERT TO ITEM 371833

## (undated) DEVICE — CLIP INT SM WIDE RED TI TRNSVRS GRV CHEVRON SHP W PRECIS

## (undated) DEVICE — GOWN,NON-REINFORCED,XXL: Brand: MEDLINE

## (undated) DEVICE — ARTICULATION RELOAD WITH TRI-STAPLE TECHNOLOGY: Brand: ENDO GIA

## (undated) DEVICE — CATHETER ANGIO PIG 145 DEG 6 FRX110 CM 7.5 CM PERFORMA

## (undated) DEVICE — SUTURE PERMA-HAND 0 L18IN NONABSORBABLE BLK CT-1 L36MM 1/2 C021D

## (undated) DEVICE — SOLUTION IV 1000ML 0.9% SOD CHL

## (undated) DEVICE — PLEDGET SURG W3/16XL0.25IN THK1.65MM PTFE OVL FELT FOR THE

## (undated) DEVICE — COVER,TABLE,HEAVY DUTY,60"X90",STRL: Brand: MEDLINE

## (undated) DEVICE — TOWEL,OR,DSP,ST,BLUE,STD,2/PK,40PK/CS: Brand: MEDLINE

## (undated) DEVICE — TUBING, SUCTION, 1/4" X 10', STRAIGHT: Brand: MEDLINE

## (undated) DEVICE — 3M™ TEGADERM™ TRANSPARENT FILM DRESSING FRAME STYLE, 1626W, 4 IN X 4-3/4 IN (10 CM X 12 CM), 50/CT 4CT/CASE: Brand: 3M™ TEGADERM™

## (undated) DEVICE — ELECTROSURGICAL DEVICE HOLSTER;FOR USE WITH MAXIMUM PEAK VOLTAGE OF 4000 V: Brand: FORCE TRIVERSE

## (undated) DEVICE — PINNACLE INTRODUCER SHEATH: Brand: PINNACLE

## (undated) DEVICE — SUTURE PERMAHAND SZ 3-0 L30IN NONABSORBABLE BLK SILK BRAID A304H

## (undated) DEVICE — PACK PROCEDURE SURG HRT CATH

## (undated) DEVICE — STRAP,POSITIONING,KNEE/BODY,FOAM,4X60": Brand: MEDLINE

## (undated) DEVICE — TRAY PREP DRY W/ PREM GLV 2 APPL 6 SPNG 2 UNDPD 1 OVERWRAP

## (undated) DEVICE — LOOP VES MAXI YEL 2/PK

## (undated) DEVICE — SLIM BODY SKIN STAPLER: Brand: APPOSE ULC

## (undated) DEVICE — BLADE ASSEMB CLP HAIR FINE --

## (undated) DEVICE — SUT PROL 6-0 24IN BV1 DA BLU --

## (undated) DEVICE — Z DUP USE 2582811 CATHETER IABP 8FR 50CC FBROPT CONN W/ INSRT KT TWO STATLOK

## (undated) DEVICE — SUTURE NONABSORBABLE MONOFILAMENT CV-6 TTC-9 24 IN GORTX 6K02B

## (undated) DEVICE — AGENT HEMSTAT W4XL4IN OXIDIZED REGENERATED CELOS ABSRB SFT

## (undated) DEVICE — DUAL LUMEN STOMACH TUBE MULTI-FUNCTIONAL PORT: Brand: SALEM SUMP

## (undated) DEVICE — DRESSING AQUACEL ADVANTAGE ALG W4XL5IN RECT GREATER ABSRB HYDRFBR TECHNOLOGY

## (undated) DEVICE — STAPLER SKIN H3.9MM WIRE DIA0.58MM CRWN 6.9MM 35 STPL ROT

## (undated) DEVICE — REM POLYHESIVE ADULT PATIENT RETURN ELECTRODE: Brand: VALLEYLAB

## (undated) DEVICE — PACK,BASIC,SIRUS,V: Brand: MEDLINE

## (undated) DEVICE — SUTURE PERMAHAND SZ 2-0 L30IN 10X30IN TIE NONABSORBABLE BLK SA85H

## (undated) DEVICE — KIT SHTH INTRO 9FR L10CM PERC INTEGR HEMSTAS VLV SIDEPRT

## (undated) DEVICE — VESSEL LOOPS,MAXI, BLUE: Brand: DEVON

## (undated) DEVICE — CATHETER ANGIO AR1 0.054 INX6 FRX100 CM 3 CM PERFORMA

## (undated) DEVICE — HEX-LOCKING BLADE ELECTRODE: Brand: EDGE

## (undated) DEVICE — DRESSING TRNSPAR 5IN LEN 4IN W FLM ST BIOCL

## (undated) DEVICE — COVER LT HNDL PLAS RIG 1 PER PK

## (undated) DEVICE — Device

## (undated) DEVICE — SWAN-GANZ CCOMBO THERMODILUTION CATHETER: Brand: SWAN-GANZ CCOMBO

## (undated) DEVICE — STERILE POLYISOPRENE POWDER-FREE SURGICAL GLOVES: Brand: PROTEXIS

## (undated) DEVICE — SPONGE LAP 18X18IN STRL -- 5/PK

## (undated) DEVICE — SUT ETHLN 4-0 18IN PS2 BLK --

## (undated) DEVICE — CV INCISE SHEET: Brand: CONVERTORS

## (undated) DEVICE — CATHETER ANGIO JR4 0.054 INX6 FRX100 CM 1.9 CM PERFORMA

## (undated) DEVICE — NEEDLE HYPO 18GA L1.5IN PNK S STL HUB POLYPR SHLD REG BVL

## (undated) DEVICE — TOWEL SURG W17XL27IN STD BLU COT NONFENESTRATED PREWASHED

## (undated) DEVICE — SUT PROL 5-0 36IN RB1 DA BLU --

## (undated) DEVICE — SYSTEM TISS GLUE 5ML CONTAIN SYR PLUNG STD SYR TIP 12MM 5PKS/EA

## (undated) DEVICE — ALL PURPOSE SPONGES,NON-WOVEN, 4 PLY, SOFT POUCH: Brand: CURITY

## (undated) DEVICE — LABEL MED CARD MRMC STRL

## (undated) DEVICE — DRAPE SHT 3 QTR PROXIMA 53X77 --

## (undated) DEVICE — INTENDED TO STANDARDIZE OR CAMERAS TO ALLOW FOR THE USE OF THE OR CAMERA COVER: Brand: ASPEN® O.R. CAMERA COVER

## (undated) DEVICE — COVER,MAYO STAND,STERILE: Brand: MEDLINE

## (undated) DEVICE — SOLUTION IRRIG 1000ML H2O STRL BLT

## (undated) DEVICE — DRAPE,REIN 53X77,STERILE: Brand: MEDLINE

## (undated) DEVICE — MEDI-TRACE CADENCE ADULT, DEFIBRILLATION ELECTRODE -RTS  (10 PR/PK) - PHILIPS: Brand: MEDI-TRACE CADENCE

## (undated) DEVICE — PART NUMBER 108260, VTI 8 MHZ DISPOSABLE DOPPLER PROBE, STRAIGHT, BOX: Brand: VTI 8 MHZ DISPOSABLE DOPPLER PROBE, STRAIGHT, BOX

## (undated) DEVICE — X-RAY SPONGES,16 PLY: Brand: DERMACEA

## (undated) DEVICE — NC TREK CORONARY DILATATION CATHETER 4.5 MM X 12 MM / RAPID-EXCHANGE: Brand: NC TREK

## (undated) DEVICE — DRAPE,EXTREMITY,89X128,STERILE: Brand: MEDLINE

## (undated) DEVICE — RUNTHROUGH NS EXTRA FLOPPY PTCA GUIDEWIRE: Brand: RUNTHROUGH

## (undated) DEVICE — TRAY CATHETER 16FR W URIN M STATLOK STBL DEV FOR LUBRI-SIL 2 TEMP

## (undated) DEVICE — SUTURE PROL SZ 5-0 L24IN NONABSORBABLE BLU RB-2 L13IN 1/2 8554H

## (undated) DEVICE — SYR ART 700 CLEAR MARK 7 -- ARTERION

## (undated) DEVICE — 3M™ IOBAN™ 2 ANTIMICROBIAL INCISE DRAPE 6648EZ: Brand: IOBAN™ 2

## (undated) DEVICE — MINI TREK CORONARY DILATATION CATHETER 1.20 MM X 12 MM / RAPID-EXCHANGE: Brand: MINI TREK

## (undated) DEVICE — SOLUTION IV 500ML 0.9% SOD CHL FLX CONT

## (undated) DEVICE — CATHETER ANGIO AL2 AD 5 FRX100 CM 5 CM PERFORMA

## (undated) DEVICE — SUTURE PROL SZ 5-0 L30IN NONABSORBABLE BLU L13MM RB-2 1/2 8710H

## (undated) DEVICE — SEALANT KT FIBRIN HEMSTAT 4ML -- TISSEEL

## (undated) DEVICE — CATHETERIZATION TRAY FOL 14 FR URIMTR STATLOK SURSTP

## (undated) DEVICE — DRAPE OPHTH 4 PLY SGL FEN

## (undated) DEVICE — CATH ANGI 5F STRD145DPTAIL 110 -- SITESEER

## (undated) DEVICE — (D)STRIP SKN CLSR 0.5X4IN WHT --

## (undated) DEVICE — SUTURE PERMAHAND SZ 2-0 L18IN NONABSORBABLE BLK L26MM PS 1588H

## (undated) DEVICE — MINI TREK CORONARY DILATATION CATHETER 2.0 MM X 12 MM / RAPID-EXCHANGE: Brand: MINI TREK

## (undated) DEVICE — BULB SYRINGE, IRRIGATION WITH PROTECTIVE CAP, 60 CC, INDIVIDUALLY WRAPPED: Brand: DOVER

## (undated) DEVICE — DRAPE PRB US TRNSDCR 6X96IN --

## (undated) DEVICE — DRESSING HEMOSTATIC SFT INTVENT W/O SLT DBL WRP QUIKCLOT LF

## (undated) DEVICE — HI-TORQUE SPARTACORE 14 PERIPHERAL GUIDE WIRE .014 5.0 CM X 190 CM: Brand: SPARTACORE

## (undated) DEVICE — MINI TREK CORONARY DILATATION CATHETER 1.50 MM X 15 MM / RAPID-EXCHANGE: Brand: MINI TREK

## (undated) DEVICE — PUMP HEART IMPELLA CP03 --

## (undated) DEVICE — DEVON™ KNEE AND BODY STRAP 60" X 3" (1.5 M X 7.6 CM): Brand: DEVON

## (undated) DEVICE — 40 MHZ CORONARY IMAGING CATHETER: Brand: OPTICROSS

## (undated) DEVICE — CATH GUID COR EB35 6FR 100CM -- LAUNCHER

## (undated) DEVICE — GAUZE SPONGES,12 PLY: Brand: CURITY

## (undated) DEVICE — Device: Brand: PROWATER

## (undated) DEVICE — CATHETER ANGIO AL1 038 5 FRX100 CM 5 CM PERFORMA

## (undated) DEVICE — SYR 10ML LUER LOK 1/5ML GRAD --

## (undated) DEVICE — 40418 TRENDELENBURG ONE-STEP ARM PROTECTORS LARGE (1 PAIR): Brand: 40418 TRENDELENBURG ONE-STEP ARM PROTECTORS LARGE (1 PAIR)

## (undated) DEVICE — FOGARTY - HYDRAGRIP SURGICAL - CLAMP INSERTS: Brand: FOGARTY SOFTJAW

## (undated) DEVICE — UNIVERSAL STAPLER: Brand: ENDO GIA ULTRA

## (undated) DEVICE — TUBING, SUCTION, 1/4" X 12', STRAIGHT: Brand: MEDLINE

## (undated) DEVICE — SHEET, T, LAPAROTOMY, STERILE: Brand: MEDLINE

## (undated) DEVICE — CATH GUID COR EB35 7FR 100CM -- LAUNCHER

## (undated) DEVICE — ZINACTIVE USE 2641837 CLIP LIG M BLU TI HRT SHP WIRE HORZ 600 PER BX

## (undated) DEVICE — 1200 GUARD II KIT W/5MM TUBE W/O VAC TUBE: Brand: GUARDIAN

## (undated) DEVICE — CUSTOM KT PTCA INFL DEV K05 00053H

## (undated) DEVICE — GUIDEWIRE VASC L145CM 0.035IN J TIP L3MM PTFE FIX COR NAMIC

## (undated) DEVICE — SYR POWER 150ML 8IN FILL TUBE --

## (undated) DEVICE — TREK CORONARY DILATATION CATHETER 2.50 MM X 15 MM / RAPID-EXCHANGE: Brand: TREK

## (undated) DEVICE — SPONGE GZ W4XL4IN COT 12 PLY TYP VII WVN C FLD DSGN

## (undated) DEVICE — KERLIX BANDAGE ROLL: Brand: KERLIX

## (undated) DEVICE — SUTURE VCRL SZ 0 L18IN ABSRB VLT L40MM CT 1/2 CIR J752D

## (undated) DEVICE — ANGIOGRAPHY KIT CUST [K0910930B] [MERIT MEDICAL SYSTEMS INC]

## (undated) DEVICE — Z CONVERTED USE 2107985 COVER FLROSCP W36XL28IN 4 SIDE ADH